# Patient Record
Sex: MALE | Race: WHITE | Employment: OTHER | URBAN - NONMETROPOLITAN AREA
[De-identification: names, ages, dates, MRNs, and addresses within clinical notes are randomized per-mention and may not be internally consistent; named-entity substitution may affect disease eponyms.]

---

## 2018-06-13 ENCOUNTER — OFFICE VISIT (OUTPATIENT)
Dept: CARDIOLOGY | Age: 63
End: 2018-06-13
Payer: COMMERCIAL

## 2018-06-13 VITALS
BODY MASS INDEX: 28.06 KG/M2 | HEART RATE: 69 BPM | HEIGHT: 70 IN | WEIGHT: 196 LBS | DIASTOLIC BLOOD PRESSURE: 84 MMHG | SYSTOLIC BLOOD PRESSURE: 132 MMHG

## 2018-06-13 DIAGNOSIS — I31.8 PERICARDIAL MASS: Primary | ICD-10-CM

## 2018-06-13 PROBLEM — G45.9 TIA (TRANSIENT ISCHEMIC ATTACK): Status: ACTIVE | Noted: 2018-06-13

## 2018-06-13 PROCEDURE — 99203 OFFICE O/P NEW LOW 30 MIN: CPT | Performed by: CLINICAL NURSE SPECIALIST

## 2018-06-13 PROCEDURE — 93000 ELECTROCARDIOGRAM COMPLETE: CPT | Performed by: CLINICAL NURSE SPECIALIST

## 2018-06-13 RX ORDER — MULTIVIT-MIN/IRON/FOLIC ACID/K 18-600-40
2000 CAPSULE ORAL DAILY
COMMUNITY

## 2018-06-20 ENCOUNTER — HOSPITAL ENCOUNTER (OUTPATIENT)
Dept: NON INVASIVE DIAGNOSTICS | Age: 63
Discharge: HOME | End: 2018-06-20
Payer: COMMERCIAL

## 2018-06-20 VITALS — WEIGHT: 193 LBS | BODY MASS INDEX: 27.69 KG/M2

## 2018-06-20 DIAGNOSIS — I31.8 PERICARDIAL MASS: ICD-10-CM

## 2018-06-20 LAB
LV EF: 55 %
LVEF MODALITY: NORMAL

## 2018-06-20 PROCEDURE — 6360000004 HC RX CONTRAST MEDICATION: Performed by: INTERNAL MEDICINE

## 2018-06-20 PROCEDURE — 2580000003 HC RX 258: Performed by: INTERNAL MEDICINE

## 2018-06-20 PROCEDURE — C8929 TTE W OR WO FOL WCON,DOPPLER: HCPCS

## 2018-06-20 RX ORDER — SODIUM CHLORIDE 0.9 % (FLUSH) 0.9 %
10 SYRINGE (ML) INJECTION PRN
Status: ACTIVE | OUTPATIENT
Start: 2018-06-20 | End: 2018-06-21

## 2018-06-20 RX ADMIN — Medication 10 ML: at 14:10

## 2018-06-20 RX ADMIN — PERFLUTREN 2.2 MG: 6.52 INJECTION, SUSPENSION INTRAVENOUS at 14:15

## 2018-06-25 ENCOUNTER — OFFICE VISIT (OUTPATIENT)
Dept: CARDIOLOGY | Age: 63
End: 2018-06-25
Payer: COMMERCIAL

## 2018-06-25 VITALS
SYSTOLIC BLOOD PRESSURE: 128 MMHG | BODY MASS INDEX: 28.06 KG/M2 | DIASTOLIC BLOOD PRESSURE: 78 MMHG | HEART RATE: 68 BPM | WEIGHT: 196 LBS | HEIGHT: 70 IN

## 2018-06-25 DIAGNOSIS — R94.2 ABNORMAL PET SCAN OF LUNG: ICD-10-CM

## 2018-06-25 DIAGNOSIS — I31.8 PERICARDIAL MASS: ICD-10-CM

## 2018-06-25 DIAGNOSIS — R07.9 CHEST PAIN IN ADULT: Primary | ICD-10-CM

## 2018-06-25 PROCEDURE — 99214 OFFICE O/P EST MOD 30 MIN: CPT | Performed by: INTERNAL MEDICINE

## 2018-06-25 NOTE — PATIENT INSTRUCTIONS
to the test.   Nitroglycerin patches must be taken off 1 hour before testing.  Wear comfortable clothing.  Please refrain from any strenuous exercise or activities the day before your test, or the day of your test.   The Nuclear Lexiscan Stress test takes about 2 ½ to 3 hours to complete. If for any reason you are unable to keep this appointment, please contact Outpatient Scheduling, [phone], as soon as possible to reschedule.

## 2018-07-11 NOTE — PROGRESS NOTES
[address], [address] [place] [address], [address] [facility]  The following was transcribed by Radha Mitchell M.T. Marium Link : 1955, 58 y.o. Male        Office Visit:  2018    Chief Complaint   Patient presents with    Follow-up     2-week follow up. No cardiac symptoms today. His doctor at the [facility] seen something on his PET scan and wanted him to see a heart doctor.  Other     He has a history of renal cell cancer and had right nephrectomy in . Reason for visit:   1. Abnormal scan. History of Present Illness:   Mr. Marium Link is seen because of an abnormal scan showing a pericardial mass. This is said to be a PET scan performed because of his history of renal cell carcinoma. The patient is asymptomatic. Patient Active Problem List   Diagnosis Code    TIA (transient ischemic attack) G45.9     Past Medical History:   Diagnosis Date    Cancer ([address]     Chronic kidney disease     TIA (transient ischemic attack)      Past Surgical History:   Procedure Laterality Date    HERNIA REPAIR  2012    KIDNEY REMOVAL Right 2014    SINUS SURGERY        Family History   Problem Relation Age of Onset    Heart Attack Paternal Uncle     Heart Attack Maternal Grandfather 79     Social History   Substance Use Topics    Smoking status: Former Smoker     Types: Cigarettes     Quit date:     Smokeless tobacco: Never Used    Alcohol use No      Allergies   Allergen Reactions    Latex     Clindamycin/Lincomycin     Doxycycline     Levaquin [Levofloxacin In D5w]     Pcn [Penicillins]      Outpatient Prescriptions Marked as Taking for the 18 encounter (Office Visit) with RAUL Butcher MD   Medication Sig Dispense Refill    Cholecalciferol (VITAMIN D) 2000 units CAPS capsule Take 2,000 Units by mouth daily      aspirin 81 MG tablet Take 81 mg by mouth daily       I have reviewed and confirm the Past Medical History, Allergies, and Medications sections above and have updated the computerized patient record. Data:   BP Readings from Last 3 Encounters:   06/25/18 128/78   06/13/18 132/84    Pulse Readings from Last 3 Encounters:   06/25/18 68   06/13/18 69        EKG shows diffuse T-wave abnormalities that are worrisome for ischemia and/or left ventricular hypertrophy. Review of Systems - As per HPI, otherwise negative. Ten organ review performed. Physical Exam:  Vital Signs:  /78   Pulse 68   Ht 5' 10\" (1.778 m)   Wt 196 lb (88.9 kg)   BMI 28.12 kg/m²   Constitutional:  The patient is a pleasant 58 y.o. male in no acute distress. He appears well-developed and well-nourished. HEAD:  Normocephalic without evidence of old or recent trauma. EYES:  Sclerae clear. Conjunctivae pink. EOMs intact. Pupils equal and round. NOSE:  No nasal discharge or epistaxis. MOUTH:  Teeth, gums and palate normal.   THROAT:  No lesions on lips or buccal mucosa. Tongue protrudes in midline and is well papillated. NECK:  There are no carotid bruits. No noted jugular venous distention. No thyromegaly. CHEST:  Clear bilateral breath sounds without wheezes or rhonchi. RESPIRATORY:  The lungs are clear. CARDIOVASCULAR:  The heart's rhythm is regular with a normal rate. No audible murmurs or gallops. ABDOMEN:  The abdomen is soft without tenderness or mass. UPPER EXTREMITY EVALUATION:  Radial pulses palpable bilaterally. LOWER EXTREMITY EVALUATION:  Negative for peripheral edema. Femoral, popliteal, dorsalis pedis, and posterior tibialis pulses 2+ to palpation bilaterally. No cyanosis or clubbing. MUSCULOSKELETAL:  Normal muscle strength and tone. No atrophy or abnormalities. SKIN:  Warm, dry, intact. No dermatitis or ulcers. NEUROLOGIC:  Intact cranial nerves II through XII and no focal weakness. Impression / Plan:   1. Abnormal ECG and pericardial mass.   We will order an

## 2018-07-27 ENCOUNTER — LAB REQUISITION (OUTPATIENT)
Dept: LAB | Facility: HOSPITAL | Age: 63
End: 2018-07-27

## 2018-07-27 DIAGNOSIS — Z00.00 ENCOUNTER FOR GENERAL ADULT MEDICAL EXAMINATION WITHOUT ABNORMAL FINDINGS: ICD-10-CM

## 2018-09-10 LAB
LAB AP CASE REPORT: NORMAL
PATH REPORT.FINAL DX SPEC: NORMAL

## 2018-12-20 ENCOUNTER — TELEPHONE (OUTPATIENT)
Dept: CARDIOLOGY | Age: 63
End: 2018-12-20

## 2018-12-21 ENCOUNTER — TELEPHONE (OUTPATIENT)
Dept: CARDIOLOGY | Age: 63
End: 2018-12-21

## 2019-02-18 ENCOUNTER — OUTSIDE FACILITY SERVICE (OUTPATIENT)
Dept: CARDIOLOGY | Facility: CLINIC | Age: 64
End: 2019-02-18

## 2019-02-18 PROCEDURE — 93010 ELECTROCARDIOGRAM REPORT: CPT | Performed by: INTERNAL MEDICINE

## 2019-07-03 ENCOUNTER — TELEPHONE (OUTPATIENT)
Dept: NEPHROLOGY | Facility: HOSPITAL | Age: 64
End: 2019-07-03

## 2019-07-03 DIAGNOSIS — C64.1 RENAL CELL CARCINOMA OF RIGHT KIDNEY: ICD-10-CM

## 2019-10-15 DIAGNOSIS — C64.9 RENAL CELL CARCINOMA, UNSPECIFIED LATERALITY (HCC): Primary | ICD-10-CM

## 2019-10-15 DIAGNOSIS — N17.9 ACUTE RENAL FAILURE, UNSPECIFIED ACUTE RENAL FAILURE TYPE (HCC): ICD-10-CM

## 2019-10-18 ENCOUNTER — APPOINTMENT (OUTPATIENT)
Dept: LAB | Facility: HOSPITAL | Age: 64
End: 2019-10-18

## 2019-10-18 ENCOUNTER — CONSULT (OUTPATIENT)
Dept: ONCOLOGY | Facility: CLINIC | Age: 64
End: 2019-10-18

## 2019-10-18 ENCOUNTER — APPOINTMENT (OUTPATIENT)
Dept: ULTRASOUND IMAGING | Facility: HOSPITAL | Age: 64
End: 2019-10-18

## 2019-10-18 ENCOUNTER — HOSPITAL ENCOUNTER (INPATIENT)
Facility: HOSPITAL | Age: 64
LOS: 4 days | Discharge: HOME | End: 2019-10-22
Attending: FAMILY MEDICINE | Admitting: INTERNAL MEDICINE

## 2019-10-18 ENCOUNTER — PREP FOR SURGERY (OUTPATIENT)
Dept: OTHER | Facility: HOSPITAL | Age: 64
End: 2019-10-18

## 2019-10-18 VITALS
SYSTOLIC BLOOD PRESSURE: 138 MMHG | RESPIRATION RATE: 16 BRPM | WEIGHT: 181 LBS | HEART RATE: 68 BPM | BODY MASS INDEX: 25.91 KG/M2 | DIASTOLIC BLOOD PRESSURE: 82 MMHG | OXYGEN SATURATION: 98 % | TEMPERATURE: 98.3 F | HEIGHT: 70 IN

## 2019-10-18 DIAGNOSIS — N17.9 ACUTE RENAL FAILURE, UNSPECIFIED ACUTE RENAL FAILURE TYPE (HCC): Primary | ICD-10-CM

## 2019-10-18 DIAGNOSIS — C64.1 CLEAR CELL CARCINOMA OF RIGHT KIDNEY (HCC): ICD-10-CM

## 2019-10-18 DIAGNOSIS — C64.9 RENAL CELL CARCINOMA, UNSPECIFIED LATERALITY (HCC): Primary | ICD-10-CM

## 2019-10-18 PROBLEM — N19 RENAL FAILURE: Status: ACTIVE | Noted: 2019-10-18

## 2019-10-18 PROBLEM — R91.1 SOLITARY PULMONARY NODULE: Status: ACTIVE | Noted: 2018-05-11

## 2019-10-18 PROBLEM — G45.9 TIA (TRANSIENT ISCHEMIC ATTACK): Status: ACTIVE | Noted: 2018-06-13

## 2019-10-18 PROBLEM — Q33.9 LUNG ANOMALY: Status: ACTIVE | Noted: 2018-11-26

## 2019-10-18 PROBLEM — R91.8 LUNG MASS: Status: ACTIVE | Noted: 2019-10-18

## 2019-10-18 LAB
ALBUMIN SERPL-MCNC: 4.2 G/DL (ref 3.5–5.2)
ALBUMIN/GLOB SERPL: 1.4 G/DL
ALP SERPL-CCNC: 59 U/L (ref 39–117)
ALT SERPL W P-5'-P-CCNC: 14 U/L (ref 1–41)
ANION GAP SERPL CALCULATED.3IONS-SCNC: 17 MMOL/L (ref 5–15)
AST SERPL-CCNC: 7 U/L (ref 1–40)
BACTERIA UR QL AUTO: ABNORMAL /HPF
BASOPHILS # BLD AUTO: 0.04 10*3/MM3 (ref 0–0.2)
BASOPHILS NFR BLD AUTO: 0.4 % (ref 0–1.5)
BILIRUB SERPL-MCNC: 0.3 MG/DL (ref 0.2–1.2)
BILIRUB UR QL STRIP: NEGATIVE
BUN BLD-MCNC: 72 MG/DL (ref 8–23)
BUN/CREAT SERPL: 11.2 (ref 7–25)
CALCIUM SPEC-SCNC: 8.5 MG/DL (ref 8.6–10.5)
CHLORIDE SERPL-SCNC: 108 MMOL/L (ref 98–107)
CLARITY UR: CLEAR
CO2 SERPL-SCNC: 18 MMOL/L (ref 22–29)
COLOR UR: YELLOW
CREAT BLD-MCNC: 6.43 MG/DL (ref 0.76–1.27)
CREAT UR-MCNC: 98.3 MG/DL
DEPRECATED RDW RBC AUTO: 46.7 FL (ref 37–54)
EOSINOPHIL # BLD AUTO: 0.35 10*3/MM3 (ref 0–0.4)
EOSINOPHIL NFR BLD AUTO: 3.6 % (ref 0.3–6.2)
ERYTHROCYTE [DISTWIDTH] IN BLOOD BY AUTOMATED COUNT: 14.5 % (ref 12.3–15.4)
GFR SERPL CREATININE-BSD FRML MDRD: 9 ML/MIN/1.73
GFR SERPL CREATININE-BSD FRML MDRD: ABNORMAL ML/MIN/{1.73_M2}
GLOBULIN UR ELPH-MCNC: 3.1 GM/DL
GLUCOSE BLD-MCNC: 110 MG/DL (ref 65–99)
GLUCOSE UR STRIP-MCNC: NEGATIVE MG/DL
HCT VFR BLD AUTO: 36.2 % (ref 37.5–51)
HGB BLD-MCNC: 12.2 G/DL (ref 13–17.7)
HGB UR QL STRIP.AUTO: ABNORMAL
HOLD SPECIMEN: NORMAL
IMM GRANULOCYTES # BLD AUTO: 0.06 10*3/MM3 (ref 0–0.05)
IMM GRANULOCYTES NFR BLD AUTO: 0.6 % (ref 0–0.5)
KETONES UR QL STRIP: NEGATIVE
LEUKOCYTE ESTERASE UR QL STRIP.AUTO: ABNORMAL
LYMPHOCYTES # BLD AUTO: 1.55 10*3/MM3 (ref 0.7–3.1)
LYMPHOCYTES NFR BLD AUTO: 16.1 % (ref 19.6–45.3)
MCH RBC QN AUTO: 29.9 PG (ref 26.6–33)
MCHC RBC AUTO-ENTMCNC: 33.7 G/DL (ref 31.5–35.7)
MCV RBC AUTO: 88.7 FL (ref 79–97)
MONOCYTES # BLD AUTO: 0.88 10*3/MM3 (ref 0.1–0.9)
MONOCYTES NFR BLD AUTO: 9.2 % (ref 5–12)
NEUTROPHILS # BLD AUTO: 6.72 10*3/MM3 (ref 1.7–7)
NEUTROPHILS NFR BLD AUTO: 70.1 % (ref 42.7–76)
NITRITE UR QL STRIP: NEGATIVE
NRBC BLD AUTO-RTO: 0 /100 WBC (ref 0–0.2)
PH UR STRIP.AUTO: 5.5 [PH] (ref 5–8)
PLATELET # BLD AUTO: 362 10*3/MM3 (ref 140–450)
PMV BLD AUTO: 8.8 FL (ref 6–12)
POTASSIUM BLD-SCNC: 4.7 MMOL/L (ref 3.5–5.2)
PROT SERPL-MCNC: 7.3 G/DL (ref 6–8.5)
PROT UR QL STRIP: ABNORMAL
PROT UR-MCNC: 23.7 MG/DL
RBC # BLD AUTO: 4.08 10*6/MM3 (ref 4.14–5.8)
RBC # UR: ABNORMAL /HPF
REF LAB TEST METHOD: ABNORMAL
SODIUM BLD-SCNC: 143 MMOL/L (ref 136–145)
SODIUM UR-SCNC: 76 MMOL/L
SP GR UR STRIP: 1.01 (ref 1–1.03)
SQUAMOUS #/AREA URNS HPF: ABNORMAL /HPF
URATE SERPL-MCNC: 3.9 MG/DL (ref 3.4–7)
UROBILINOGEN UR QL STRIP: ABNORMAL
UUN 24H UR-MCNC: 565 MG/DL
WBC NRBC COR # BLD: 9.6 10*3/MM3 (ref 3.4–10.8)
WBC UR QL AUTO: ABNORMAL /HPF

## 2019-10-18 PROCEDURE — 99205 OFFICE O/P NEW HI 60 MIN: CPT | Performed by: INTERNAL MEDICINE

## 2019-10-18 PROCEDURE — 85025 COMPLETE CBC W/AUTO DIFF WBC: CPT | Performed by: INTERNAL MEDICINE

## 2019-10-18 PROCEDURE — 76775 US EXAM ABDO BACK WALL LIM: CPT

## 2019-10-18 PROCEDURE — 25010000002 METHYLPREDNISOLONE PER 40 MG: Performed by: FAMILY MEDICINE

## 2019-10-18 PROCEDURE — 84300 ASSAY OF URINE SODIUM: CPT | Performed by: NURSE PRACTITIONER

## 2019-10-18 PROCEDURE — 87086 URINE CULTURE/COLONY COUNT: CPT | Performed by: NURSE PRACTITIONER

## 2019-10-18 PROCEDURE — 82570 ASSAY OF URINE CREATININE: CPT | Performed by: NURSE PRACTITIONER

## 2019-10-18 PROCEDURE — 80053 COMPREHEN METABOLIC PANEL: CPT | Performed by: INTERNAL MEDICINE

## 2019-10-18 PROCEDURE — 81001 URINALYSIS AUTO W/SCOPE: CPT | Performed by: NURSE PRACTITIONER

## 2019-10-18 PROCEDURE — 84540 ASSAY OF URINE/UREA-N: CPT | Performed by: NURSE PRACTITIONER

## 2019-10-18 PROCEDURE — 84156 ASSAY OF PROTEIN URINE: CPT | Performed by: NURSE PRACTITIONER

## 2019-10-18 PROCEDURE — 87205 SMEAR GRAM STAIN: CPT | Performed by: INTERNAL MEDICINE

## 2019-10-18 PROCEDURE — 36415 COLL VENOUS BLD VENIPUNCTURE: CPT | Performed by: INTERNAL MEDICINE

## 2019-10-18 PROCEDURE — 84550 ASSAY OF BLOOD/URIC ACID: CPT | Performed by: INTERNAL MEDICINE

## 2019-10-18 RX ORDER — PANTOPRAZOLE SODIUM 40 MG/1
40 TABLET, DELAYED RELEASE ORAL
Status: DISCONTINUED | OUTPATIENT
Start: 2019-10-18 | End: 2019-10-22 | Stop reason: HOSPADM

## 2019-10-18 RX ORDER — GABAPENTIN 100 MG/1
100 CAPSULE ORAL 3 TIMES DAILY
COMMUNITY
Start: 2018-11-27 | End: 2019-10-18

## 2019-10-18 RX ORDER — METHYLPREDNISOLONE SODIUM SUCCINATE 40 MG/ML
20 INJECTION, POWDER, LYOPHILIZED, FOR SOLUTION INTRAMUSCULAR; INTRAVENOUS EVERY 6 HOURS SCHEDULED
Status: DISCONTINUED | OUTPATIENT
Start: 2019-10-18 | End: 2019-10-21

## 2019-10-18 RX ORDER — PREDNISONE 20 MG/1
TABLET ORAL
Refills: 0 | COMMUNITY
Start: 2019-09-06 | End: 2019-10-18

## 2019-10-18 RX ORDER — SODIUM CHLORIDE 9 MG/ML
125 INJECTION, SOLUTION INTRAVENOUS CONTINUOUS
Status: DISCONTINUED | OUTPATIENT
Start: 2019-10-18 | End: 2019-10-19

## 2019-10-18 RX ORDER — TERAZOSIN 1 MG/1
1 CAPSULE ORAL
Refills: 3 | COMMUNITY
Start: 2019-07-27 | End: 2019-10-18

## 2019-10-18 RX ORDER — OMEPRAZOLE 20 MG/1
20 CAPSULE, DELAYED RELEASE ORAL 2 TIMES DAILY
Refills: 2 | COMMUNITY
Start: 2019-08-12 | End: 2020-01-10 | Stop reason: HOSPADM

## 2019-10-18 RX ORDER — ACYCLOVIR 400 MG/1
TABLET ORAL
Refills: 0 | COMMUNITY
Start: 2019-08-12 | End: 2019-10-18

## 2019-10-18 RX ORDER — PREDNISONE 10 MG/1
TABLET ORAL
Refills: 1 | COMMUNITY
Start: 2019-09-23 | End: 2019-10-18

## 2019-10-18 RX ORDER — ONDANSETRON 2 MG/ML
4 INJECTION INTRAMUSCULAR; INTRAVENOUS EVERY 6 HOURS PRN
Status: DISCONTINUED | OUTPATIENT
Start: 2019-10-18 | End: 2019-10-22 | Stop reason: HOSPADM

## 2019-10-18 RX ORDER — TERAZOSIN 2 MG/1
2 CAPSULE ORAL
Refills: 6 | COMMUNITY
Start: 2019-07-24 | End: 2019-10-18

## 2019-10-18 RX ORDER — SODIUM CHLORIDE 0.9 % (FLUSH) 0.9 %
10 SYRINGE (ML) INJECTION EVERY 12 HOURS SCHEDULED
Status: DISCONTINUED | OUTPATIENT
Start: 2019-10-18 | End: 2019-10-22 | Stop reason: HOSPADM

## 2019-10-18 RX ORDER — ALLOPURINOL 100 MG/1
100 TABLET ORAL 2 TIMES DAILY
COMMUNITY
End: 2020-10-28 | Stop reason: ALTCHOICE

## 2019-10-18 RX ORDER — SODIUM CHLORIDE 0.9 % (FLUSH) 0.9 %
10 SYRINGE (ML) INJECTION AS NEEDED
Status: DISCONTINUED | OUTPATIENT
Start: 2019-10-18 | End: 2019-10-22 | Stop reason: HOSPADM

## 2019-10-18 RX ADMIN — PANTOPRAZOLE SODIUM 40 MG: 40 TABLET, DELAYED RELEASE ORAL at 13:23

## 2019-10-18 RX ADMIN — SODIUM CHLORIDE 125 ML/HR: 9 INJECTION, SOLUTION INTRAVENOUS at 13:24

## 2019-10-18 RX ADMIN — SODIUM CHLORIDE, PRESERVATIVE FREE 10 ML: 5 INJECTION INTRAVENOUS at 13:23

## 2019-10-18 RX ADMIN — SODIUM CHLORIDE 125 ML/HR: 9 INJECTION, SOLUTION INTRAVENOUS at 21:50

## 2019-10-18 RX ADMIN — METHYLPREDNISOLONE SODIUM SUCCINATE 20 MG: 40 INJECTION, POWDER, FOR SOLUTION INTRAMUSCULAR; INTRAVENOUS at 17:27

## 2019-10-18 RX ADMIN — METHYLPREDNISOLONE SODIUM SUCCINATE 20 MG: 40 INJECTION, POWDER, FOR SOLUTION INTRAMUSCULAR; INTRAVENOUS at 23:45

## 2019-10-18 RX ADMIN — METHYLPREDNISOLONE SODIUM SUCCINATE 20 MG: 40 INJECTION, POWDER, FOR SOLUTION INTRAMUSCULAR; INTRAVENOUS at 13:23

## 2019-10-18 NOTE — CONSULTS
Nephrology ([facility]) Consult Note      Patient:  Parker Arnold  YOB: 1955  Date of Service: 10/18/2019  MRN: [id number]   Acct: [id number]   Primary Care Physician: Betty Almaraz APRN  Advance Directive:   Code Status and Medical Interventions:   Ordered at: 10/18/19 1120     Code Status:    CPR     Medical Interventions (Level of Support Prior to Arrest):    Full     Admit Date: 10/18/2019       Hospital Day: 0  Referring Provider: Charlie Franco MD      Patient personally seen and examined.  Complete chart including Consults, Notes, Operative Reports, Labs, Cardiology, and Radiology studies reviewed as able.        Subjective:  Parker Arnold is a 64 y.o. male  whom we were consulted for acute kidney injury.  Baseline chronic kidney disease stage 3.  prior right nephrectomy in 2014 due to renal cell carcinoma.  Has previously followed with Marie Shah in our office; last seen in July of this year. Creatinine 2.2 at that time.  Patient has been seeing nephrologist at [facility] since then.  For several months patient has been undergoing chemotherapy for malignant lung cancer from the RCC.  Over the past several months has had multiple episodes of acute kidney injury; due to Sutent as well as Optivo.  He has been on multiple rounds of prednisone.  Last dose of Optivo was five weeks ago.  Review of labs from outside facility shows creatinine has been 2.4-3.3 over the last two months.  Over the last week has developed fatigue, malaise.  Denies any change in urination. Denies n/v/d.  Currently is awake and alert. Denies pain or dyspnea, no edema.  Urine output nonoliguric    Allergies:  Clindamycin/lincomycin; Doxycycline; Hepatitis b virus vaccine; Latex; Levaquin [levofloxacin]; and Penicillins    Home Meds:  Medications Prior to Admission   Medication Sig Dispense Refill Last Dose   • allopurinol (ZYLOPRIM) 100 MG tablet Take 100 mg by mouth Daily.    10/18/2019 at 0630   • omeprazole (priLOSEC) 20 MG capsule Take 20 mg by mouth 2 (Two) Times a Day.  2 10/18/2019 at 0630       Medicines:  Current Facility-Administered Medications   Medication Dose Route Frequency Provider Last Rate Last Dose   • methylPREDNISolone sodium succinate (SOLU-Medrol) injection 20 mg  20 mg Intravenous Q6H Charlie Franco MD       • ondansetron (ZOFRAN) injection 4 mg  4 mg Intravenous Q6H PRN Charlie Franco MD       • pantoprazole (PROTONIX) EC tablet 40 mg  40 mg Oral Q AM Charlie Franco MD       • sodium chloride 0.9 % flush 10 mL  10 mL Intravenous Q12H Charlie Franco MD       • sodium chloride 0.9 % flush 10 mL  10 mL Intravenous PRN Charlie Franco MD       • sodium chloride 0.9 % infusion  125 mL/hr Intravenous Continuous Charlie Franco MD           Past Medical History:  Past Medical History:   Diagnosis Date   • Renal cell carcinoma (CMS/HCC)        Past Surgical History:  Past Surgical History:   Procedure Laterality Date   • HERNIA REPAIR     • LUNG BIOPSY  2018   • NEPHRECTOMY     • SINUS SURGERY         Family History  Family History   Problem Relation Age of Onset   • Other Mother         blood disease unknown   • Cancer Father         unknown   • No Known Problems Sister    • Heart disease Maternal Grandmother    • Heart disease Maternal Grandfather    • No Known Problems Paternal Grandmother    • No Known Problems Paternal Grandfather    • No Known Problems Sister        Social History  Social History     Socioeconomic History   • Marital status:      Spouse name: Not on file   • Number of children: Not on file   • Years of education: Not on file   • Highest education level: Not on file   Tobacco Use   • Smoking status: Former Smoker     Packs/day: 1.00     Years: 15.00     Pack years: 15.00     Types: Cigarettes     Last attempt to quit:      Years since quittin.8   • Smokeless tobacco: Never Used   Substance and Sexual Activity   •  "Alcohol use: No     Frequency: Never   • Drug use: No   • Sexual activity: Defer         Review of Systems:  History obtained from chart review and the patient  General ROS: Patient complains of fatigue and malaise and No fever or chills  Respiratory ROS: No cough, shortness of breath, wheezing  Cardiovascular ROS: No chest pain or palpitations  Gastrointestinal ROS: No abdominal pain or melena  Genito-Urinary ROS: No dysuria or hematuria  Neurological ROS: no headache or dizziness  14 point ROS reviewed with the patient and negative except as noted above and in the HPI unless unable to obtain.    Objective:  Patient Vitals for the past 24 hrs:   BP Temp Temp src Pulse Resp SpO2 Height Weight   10/18/19 0958 146/78 98.1 °F (36.7 °C) Oral 76 16 99 % 177.8 cm (70\") 82 kg (180 lb 12.8 oz)     No intake or output data in the 24 hours ending 10/18/19 1134  General: awake/alert    Neck :supple, no JVD  Chest:  clear to auscultation bilaterally without respiratory distress  CVS: regular rate and rhythm  Abdominal: soft, nontender, positive bowel sounds  Extremities: no cyanosis or edema  Skin: warm and dry without rash   Neuro: no focal motor deficits      Labs:  Results from last 7 days   Lab Units 10/18/19  0739   WBC 10*3/mm3 9.60   HEMOGLOBIN g/dL 12.2*   HEMATOCRIT % 36.2*   PLATELETS 10*3/mm3 362         Results from last 7 days   Lab Units 10/18/19  0739   SODIUM mmol/L 143   POTASSIUM mmol/L 4.7   CHLORIDE mmol/L 108*   CO2 mmol/L 18.0*   BUN mg/dL 72*   CREATININE mg/dL 6.43*   CALCIUM mg/dL 8.5*   BILIRUBIN mg/dL 0.3   ALK PHOS U/L 59   ALT (SGPT) U/L 14   AST (SGOT) U/L 7   GLUCOSE mg/dL 110*       Radiology:   Imaging Results (last 72 hours)     ** No results found for the last 72 hours. **          Culture:         Assessment   1.  Acute kidney injury (?due to Optivo)  2.  Baseline chronic kidney disease stage 3  3.  Prior right nephrectomy due to RCC  4.  Metastatic lung cancer  5.  Metabolic acidosis  6.  " Anemia     Plan:  1.  Workup ongoing. Renal US and urine studies pending  2.  Continue IV fluids  3.  Discussed possibility of dialysis with patient if no improvement in renal function.  4.  Further assessment and plan pending Dr Ward's evaluation of patient      Thank you for the consult, we appreciate the opportunity to provide care to your patients.  Feel free to contact me if I can be of any further assistance.      Leo Dumont, APRN  10/18/2019  11:34 AM

## 2019-10-18 NOTE — PLAN OF CARE
Problem: Patient Care Overview  Goal: Plan of Care Review  Outcome: Ongoing (interventions implemented as appropriate)   10/18/19 0492   Coping/Psychosocial   Plan of Care Reviewed With patient;spouse   Plan of Care Review   Progress no change   OTHER   Outcome Summary Patient was a direct admit today from Dr. PATTERSON's office. No c/o pain. Stirct I&O. Renal US ordered today. NSR on tele. Up ad aurelio. SCD. Voiding. IVF. IV steroids. Will continue to monitor.      Goal: Individualization and Mutuality  Outcome: Ongoing (interventions implemented as appropriate)    Goal: Discharge Needs Assessment  Outcome: Ongoing (interventions implemented as appropriate)    Goal: Interprofessional Rounds/Family Conf  Outcome: Ongoing (interventions implemented as appropriate)      Problem: Oncology Care (Adult)  Goal: Signs and Symptoms of Listed Potential Problems Will be Absent, Minimized or Managed (Oncology Care)  Outcome: Ongoing (interventions implemented as appropriate)      Problem: Kidney Disease, Chronic/End Stage Renal Disease (Adult)  Goal: Signs and Symptoms of Listed Potential Problems Will be Absent, Minimized or Managed (Kidney Disease, Chronic/End Stage Renal Disease)  Outcome: Ongoing (interventions implemented as appropriate)

## 2019-10-18 NOTE — PROGRESS NOTES
[facility]  HEMATOLOGY & ONCOLOGY        Subjective     VISIT DIAGNOSIS: No diagnosis found.    REASON FOR VISIT:     Chief Complaint   Patient presents with   • Renal Cell Cancer     Here for initial consult. Formerly tried Opdivo, caused renal failure. Tried prednisone to counteract the Opdivo but he refused to use.   • Nausea     Pt. states that he feels sick like when his calcium levels are up.        HEMATOLOGY / ONCOLOGY HISTORY:    No history exists.     [No treatment plan]  Cancer Staging Information:  Cancer Staging  No matching staging information was found for the patient.      INTERVAL HISTORY  Patient ID: Parker Arnold is a 64 y.o. year old male with a diagnosis of clear cell renal cancer in 2014. S/p Rh nephrectomy. He was under surveillance until Nov 2018  When he underwent  Wedge resection for a pulm nodule. Pathology c/w metastatic renal cell carcinoma. He was not able to tolerate sutent due to kidney failure, started yervoy/opdivo 5/13/2019 to 8/7/2019, then single agent opdivo 8/28 to 9/9/19. Therapy stopped due to renal failure. He has been on and off prednisone for 2 weeks.  -at today's visit, cr is 6.43,eGFR 7  -he has lost 20lbs, have mid epigastric pain, takes prilosec. He saw nephrologist and was told uric acid is elevated. Allopurinol started. He is complaining of urgency at night, he was started on flomax and passed out, hence that was discontinued.   -denies n/v, sob, cp, dizziness, diarrhea, constipation, rash, arthritis, focal weakness.      Review of Systems     See above otherwise neg    Medications:    Current Outpatient Medications   Medication Sig Dispense Refill   • allopurinol (ZYLOPRIM) 100 MG tablet Take 100 mg by mouth Daily.     • omeprazole (priLOSEC) 20 MG capsule Take 20 mg by mouth 2 (Two) Times a Day.  2     No current facility-administered medications for this visit.        ALLERGIES:  Allergies not on file    Objective      Vitals:    10/18/19  0758   BP: 138/82   Pulse: 68   Resp: 16   Temp: 98.3 °F (36.8 °C)   SpO2: 98%       Current Status 10/18/2019   ECOG score 0       General Appearance: Patient is awake, alert, oriented and in no acute distress. Patient is welldeveloped, wellnourished, and appears stated age.  HEENT: Normocephalic. Sclerae clear, conjunctiva pink, extraocular movements intact, pupils, round, reactive to light and  accommodation. Mouth and throat are clear with moist oral mucosa.  NECK: Supple, no jugular venous distention, thyroid not enlarged.  LYMPH: No cervical, supraclavicular, axillary, or inguinal lymphadenopathy.  CHEST: Equal bilateral expansion, AP  diameter normal, resonant percussion note  LUNGS: Good air movement, no rales, rhonchi, rubs or wheezes with auscultation  CARDIO: Regular sinus rhythm, no murmurs, gallops or rubs.  ABDOMEN: Nondistended, soft, No tenderness, no guarding, no rebound, No hepatosplenomegaly. No abdominal masses. Bowel sounds positive. No hernia  GENITALIA: Not examined.  BREASTS: Not examined.  MUSKEL: No joint swelling, decreased motion, or inflammation  EXTREMS: No edema, clubbing, cyanosis, No varicose veins.  NEURO: Grossly nonfocal, Gait is coordinated and smooth, Cognition is preserved.  SKIN: No rashes, no ecchymoses, no petechia.  PSYCH: Oriented to time, place and person. Memory is preserved. Mood and affect appear normal      RECENT LABS:  Orders Only on 10/15/2019   Component Date Value Ref Range Status   • Glucose 10/18/2019 110* 65 - 99 mg/dL Final   • BUN 10/18/2019 72* 8 - 23 mg/dL Final   • Creatinine 10/18/2019 6.43* 0.76 - 1.27 mg/dL Final   • Sodium 10/18/2019 143  136 - 145 mmol/L Final   • Potassium 10/18/2019 4.7  3.5 - 5.2 mmol/L Final   • Chloride 10/18/2019 108* 98 - 107 mmol/L Final   • CO2 10/18/2019 18.0* 22.0 - 29.0 mmol/L Final   • Calcium 10/18/2019 8.5* 8.6 - 10.5 mg/dL Final   • Total Protein 10/18/2019 7.3  6.0 - 8.5 g/dL Final   • Albumin 10/18/2019 4.20  3.50 -  5.20 g/dL Final   • ALT (SGPT) 10/18/2019 14  1 - 41 U/L Final   • AST (SGOT) 10/18/2019 7  1 - 40 U/L Final   • Alkaline Phosphatase 10/18/2019 59  39 - 117 U/L Final   • Total Bilirubin 10/18/2019 0.3  0.2 - 1.2 mg/dL Final   • eGFR Non African Amer 10/18/2019 9* >60 mL/min/1.73 Final    <15 Indicative of kidney failure.   • eGFR   Amer 10/18/2019    Final    <15 Indicative of kidney failure.   • Globulin 10/18/2019 3.1  gm/dL Final   • A/G Ratio 10/18/2019 1.4  g/dL Final   • BUN/Creatinine Ratio 10/18/2019 11.2  7.0 - 25.0 Final   • Anion Gap 10/18/2019 17.0* 5.0 - 15.0 mmol/L Final       RADIOLOGY:  No results found.         Assessment/Plan 63 yo male with met RCC developed grade 4 renal toxicity from opdivo. Need inpatient management per NCCN guideline    1. Renal failure due to opdivo toxicity: cr of >6. This is grade 3-4 toxicity.  -admit for high dose steroid methylprednisolone 1mg/kg/day. If creatine improved will d/c on oral steroid 60mg/day with slow taper over a month  -check UPEP every 3 days  -Dr Fleming is on call this weekend  -Nephrology consult while in patient.  -Plan of care discussed with Dr Franco and patient and his wife.  -They were given the opportunity to ask questions which I answered to my best ability and they seemed satisfied and willing to proceed with the plan.      Time Spent: 60 minutes; greater than  50% of time was spent in patient counseling and care coordination.     Julio Burleson MD    10/18/2019    8:37 AM

## 2019-10-18 NOTE — H&P
[facility] Medicine Services  HISTORY AND PHYSICAL    Date of Admission: 10/18/2019  Primary Care Physician: Betty Almaraz APRN    Subjective     Chief Complaint: Acute renal failure.  Renal cancer.    History of Present Illness  Patient is a 64-year  male direct admit from Dr. PATTERSON's office for acute renal failure secondary to immunoglobulin treatment.  Patient has nephrectomy of the right kidney in 2014.  Patient has been undergoing immunotherapy secondary to metastatic lung cancer from renal cell carcinoma.  Patient has renal failure for last 3 months.  Treatment immunoglobulins from 5/13/2019 to 8/7/2019.  Patient states he still producing urine.    Patient also have a history of benign prostate hypertrophy.    Review of Systems   Constitutional: Positive for activity change and appetite change. Negative for chills and fever.   HENT: Negative for hearing loss, nosebleeds, tinnitus and trouble swallowing.    Eyes: Negative for visual disturbance.   Respiratory: Negative for cough, chest tightness, shortness of breath and wheezing.    Cardiovascular: Negative for chest pain, palpitations and leg swelling.   Gastrointestinal: Negative for abdominal distention, abdominal pain, blood in stool, constipation, diarrhea, nausea and vomiting.   Endocrine: Negative for cold intolerance, heat intolerance, polydipsia, polyphagia and polyuria.   Genitourinary: Negative for decreased urine volume, difficulty urinating, dysuria, flank pain, frequency and hematuria.   Musculoskeletal: Negative for arthralgias, joint swelling and myalgias.   Skin: Negative for rash.   Allergic/Immunologic: Negative for immunocompromised state.   Neurological: Negative for dizziness, syncope, weakness, light-headedness and headaches.   Hematological: Negative for adenopathy. Does not bruise/bleed easily.   Psychiatric/Behavioral: Negative for confusion and sleep disturbance. The patient is not  nervous/anxious.         Otherwise complete ROS reviewed and negative except as mentioned in the HPI.      Past Medical History:   Past Medical History:   Diagnosis Date   • Renal cell carcinoma (CMS/HCC)        Past Surgical History:  Past Surgical History:   Procedure Laterality Date   • HERNIA REPAIR  2008   • LUNG BIOPSY  11/2018   • NEPHRECTOMY  2014   • SINUS SURGERY  2005       Family History: family history includes Cancer in his father; Heart disease in his maternal grandfather and maternal grandmother; No Known Problems in his paternal grandfather, paternal grandmother, sister, and sister; Other in his mother.    Social History:  reports that he quit smoking about 35 years ago. His smoking use included cigarettes. He has a 15.00 pack-year smoking history. He has never used smokeless tobacco. He reports that he does not drink alcohol or use drugs.    Medications:  Prior to Admission medications    Medication Sig Start Date End Date Taking? Authorizing Provider   allopurinol (ZYLOPRIM) 100 MG tablet Take 100 mg by mouth Daily.    Brian Lopes MD   omeprazole (priLOSEC) 20 MG capsule Take 20 mg by mouth 2 (Two) Times a Day. 8/12/19   Brian Lopes MD   acyclovir (ZOVIRAX) 400 MG tablet TAKE 1 TABLET(S) EVERY 6 HOURS BY ORAL ROUTE FOR 14 DAYS. 8/12/19 10/18/19  Brian Lopes MD   aspirin 81 MG tablet Take 81 mg by mouth.  10/18/19  Brian Lopes MD   gabapentin (NEURONTIN) 100 MG capsule Take 100 mg by mouth 3 (Three) Times a Day. 11/27/18 10/18/19  Brian Lopes MD   predniSONE (DELTASONE) 10 MG tablet TAKE 1 TABLET BY MOUTH EVERY DAY FOR 7 DAYS 9/23/19 10/18/19  Brian Lopes MD   predniSONE (DELTASONE) 20 MG tablet TAKE 2 TABLET(S) EVERY DAY BY MOUTH FOR 7 DAYS. 9/6/19 10/18/19  Brian Lopes MD   terazosin (HYTRIN) 1 MG capsule Take 1 mg by mouth every night at bedtime. 7/27/19 10/18/19  Brian Lopes MD   terazosin (HYTRIN) 2 MG capsule Take  "2 mg by mouth every night at bedtime. 7/24/19 10/18/19  Provider, Brian, MD     Allergies:  Allergies not on file    Objective     Vital Signs: /78 (BP Location: Right arm, Patient Position: Sitting)   Pulse 76   Temp 98.1 °F (36.7 °C) (Oral)   Resp 16   Ht 177.8 cm (70\")   Wt 82 kg (180 lb 12.8 oz)   SpO2 99%   BMI 25.94 kg/m²   Physical Exam   Constitutional: He is oriented to person, place, and time. He appears well-developed and well-nourished.   HENT:   Head: Normocephalic and atraumatic.   Eyes: Conjunctivae and EOM are normal. Pupils are equal, round, and reactive to light.   Neck: Neck supple. No JVD present. No thyromegaly present.   Cardiovascular: Normal rate, regular rhythm, normal heart sounds and intact distal pulses. Exam reveals no gallop and no friction rub.   No murmur heard.  Pulmonary/Chest: Effort normal and breath sounds normal. No respiratory distress. He has no wheezes. He has no rales. He exhibits no tenderness.   Abdominal: Soft. Bowel sounds are normal. He exhibits no distension. There is no tenderness. There is no rebound and no guarding.   Musculoskeletal: Normal range of motion. He exhibits no edema, tenderness or deformity.   Lymphadenopathy:     He has no cervical adenopathy.   Neurological: He is alert and oriented to person, place, and time. He displays normal reflexes. No cranial nerve deficit. He exhibits normal muscle tone.   Skin: Skin is warm and dry. No rash noted.   Psychiatric: He has a normal mood and affect. His behavior is normal. Judgment and thought content normal.   Nursing note and vitals reviewed.      Results Reviewed:    Lab Results (last 24 hours)     ** No results found for the last 24 hours. **           Radiology Data:    Imaging Results (last 24 hours)     ** No results found for the last 24 hours. **          I have personally reviewed and interpreted the radiology studies and ECG obtained at time of admission.     Assessment / Plan    "   Assessment & Plan  Active Hospital Problems    Diagnosis   • Clear cell carcinoma of kidney (CMS/HCC)     Overview:   Diagnoses 11/11/14. Path report in [facility] at [facility]. Tissue has been requested.      • Renal failure   • Lung mass     Plans    Renal failure.  Renal failure for last 3 months.  Probably secondary to immunoglobulin.  Consult nephrology.  IV hydration.  Creatinine greater than 6.  Ultrasound of the left kidneys.    Renal cell carcinoma.  Status post right kidney resection 2014.  Start methylprednisolone 1 mg/kg/day.  Consult oncology. Yervoy/opdivo 5/13/2019 to 8/7/2019.    Lung mass.  Pathology shows metastatic renal cell carcinoma in the lungs.    Reflux.  Protonix.  Zofran as needed.    Nutrition.  Renal diet.    Code Status: full code     I discussed the patient's findings and my recommendations with: patient    Estimated length of stay: 2-5 days.    Charlie Franco MD   10/18/19   11:11 AM

## 2019-10-19 LAB
ALBUMIN SERPL-MCNC: 3.7 G/DL (ref 3.5–5.2)
ALBUMIN/GLOB SERPL: 1.4 G/DL
ALP SERPL-CCNC: 54 U/L (ref 39–117)
ALT SERPL W P-5'-P-CCNC: 11 U/L (ref 1–41)
ANION GAP SERPL CALCULATED.3IONS-SCNC: 14 MMOL/L (ref 5–15)
AST SERPL-CCNC: 7 U/L (ref 1–40)
BACTERIA SPEC AEROBE CULT: NO GROWTH
BASOPHILS # BLD AUTO: 0.01 10*3/MM3 (ref 0–0.2)
BASOPHILS NFR BLD AUTO: 0.1 % (ref 0–1.5)
BILIRUB SERPL-MCNC: 0.2 MG/DL (ref 0.2–1.2)
BUN BLD-MCNC: 63 MG/DL (ref 8–23)
BUN/CREAT SERPL: 11.1 (ref 7–25)
CALCIUM SPEC-SCNC: 8 MG/DL (ref 8.6–10.5)
CHLORIDE SERPL-SCNC: 110 MMOL/L (ref 98–107)
CK SERPL-CCNC: 37 U/L (ref 20–200)
CO2 SERPL-SCNC: 15 MMOL/L (ref 22–29)
CREAT BLD-MCNC: 5.66 MG/DL (ref 0.76–1.27)
DEPRECATED RDW RBC AUTO: 46.8 FL (ref 37–54)
EOSINOPHIL # BLD AUTO: 0 10*3/MM3 (ref 0–0.4)
EOSINOPHIL NFR BLD AUTO: 0 % (ref 0.3–6.2)
EOSINOPHIL SPEC QL MICRO: 2 % EOS/100 CELLS (ref 0–0)
ERYTHROCYTE [DISTWIDTH] IN BLOOD BY AUTOMATED COUNT: 14.2 % (ref 12.3–15.4)
GFR SERPL CREATININE-BSD FRML MDRD: 10 ML/MIN/1.73
GFR SERPL CREATININE-BSD FRML MDRD: ABNORMAL ML/MIN/{1.73_M2}
GLOBULIN UR ELPH-MCNC: 2.7 GM/DL
GLUCOSE BLD-MCNC: 176 MG/DL (ref 65–99)
HCT VFR BLD AUTO: 33.9 % (ref 37.5–51)
HGB BLD-MCNC: 11.2 G/DL (ref 13–17.7)
IMM GRANULOCYTES # BLD AUTO: 0.08 10*3/MM3 (ref 0–0.05)
IMM GRANULOCYTES NFR BLD AUTO: 0.8 % (ref 0–0.5)
LYMPHOCYTES # BLD AUTO: 0.96 10*3/MM3 (ref 0.7–3.1)
LYMPHOCYTES NFR BLD AUTO: 9.1 % (ref 19.6–45.3)
MCH RBC QN AUTO: 30 PG (ref 26.6–33)
MCHC RBC AUTO-ENTMCNC: 33 G/DL (ref 31.5–35.7)
MCV RBC AUTO: 90.9 FL (ref 79–97)
MONOCYTES # BLD AUTO: 0.13 10*3/MM3 (ref 0.1–0.9)
MONOCYTES NFR BLD AUTO: 1.2 % (ref 5–12)
NEUTROPHILS # BLD AUTO: 9.39 10*3/MM3 (ref 1.7–7)
NEUTROPHILS NFR BLD AUTO: 88.8 % (ref 42.7–76)
NRBC BLD AUTO-RTO: 0 /100 WBC (ref 0–0.2)
PLATELET # BLD AUTO: 320 10*3/MM3 (ref 140–450)
PMV BLD AUTO: 9.2 FL (ref 6–12)
POTASSIUM BLD-SCNC: 5.8 MMOL/L (ref 3.5–5.2)
PROT SERPL-MCNC: 6.4 G/DL (ref 6–8.5)
RBC # BLD AUTO: 3.73 10*6/MM3 (ref 4.14–5.8)
SODIUM BLD-SCNC: 139 MMOL/L (ref 136–145)
TSH SERPL DL<=0.05 MIU/L-ACNC: 0.37 UIU/ML (ref 0.27–4.2)
URATE SERPL-MCNC: 3.5 MG/DL (ref 3.4–7)
WBC NRBC COR # BLD: 10.57 10*3/MM3 (ref 3.4–10.8)

## 2019-10-19 PROCEDURE — 94799 UNLISTED PULMONARY SVC/PX: CPT

## 2019-10-19 PROCEDURE — 80053 COMPREHEN METABOLIC PANEL: CPT | Performed by: FAMILY MEDICINE

## 2019-10-19 PROCEDURE — 85025 COMPLETE CBC W/AUTO DIFF WBC: CPT | Performed by: FAMILY MEDICINE

## 2019-10-19 PROCEDURE — 84443 ASSAY THYROID STIM HORMONE: CPT | Performed by: FAMILY MEDICINE

## 2019-10-19 PROCEDURE — 25010000002 METHYLPREDNISOLONE PER 40 MG: Performed by: FAMILY MEDICINE

## 2019-10-19 PROCEDURE — 94760 N-INVAS EAR/PLS OXIMETRY 1: CPT

## 2019-10-19 PROCEDURE — 84550 ASSAY OF BLOOD/URIC ACID: CPT | Performed by: NURSE PRACTITIONER

## 2019-10-19 PROCEDURE — 82550 ASSAY OF CK (CPK): CPT | Performed by: INTERNAL MEDICINE

## 2019-10-19 PROCEDURE — 99232 SBSQ HOSP IP/OBS MODERATE 35: CPT | Performed by: INTERNAL MEDICINE

## 2019-10-19 RX ORDER — SODIUM POLYSTYRENE SULFONATE 15 G/60ML
15 SUSPENSION ORAL; RECTAL ONCE
Status: COMPLETED | OUTPATIENT
Start: 2019-10-19 | End: 2019-10-19

## 2019-10-19 RX ORDER — CALCIUM CARBONATE 200(500)MG
1 TABLET,CHEWABLE ORAL 3 TIMES DAILY PRN
Status: DISCONTINUED | OUTPATIENT
Start: 2019-10-19 | End: 2019-10-22 | Stop reason: HOSPADM

## 2019-10-19 RX ADMIN — METHYLPREDNISOLONE SODIUM SUCCINATE 20 MG: 40 INJECTION, POWDER, FOR SOLUTION INTRAMUSCULAR; INTRAVENOUS at 06:26

## 2019-10-19 RX ADMIN — SODIUM CHLORIDE 125 ML/HR: 9 INJECTION, SOLUTION INTRAVENOUS at 06:26

## 2019-10-19 RX ADMIN — SODIUM BICARBONATE 150 MEQ: 84 INJECTION, SOLUTION INTRAVENOUS at 14:37

## 2019-10-19 RX ADMIN — CALCIUM CARBONATE 1 TABLET: 500 TABLET, CHEWABLE ORAL at 02:06

## 2019-10-19 RX ADMIN — METHYLPREDNISOLONE SODIUM SUCCINATE 20 MG: 40 INJECTION, POWDER, FOR SOLUTION INTRAMUSCULAR; INTRAVENOUS at 23:59

## 2019-10-19 RX ADMIN — PANTOPRAZOLE SODIUM 40 MG: 40 TABLET, DELAYED RELEASE ORAL at 06:26

## 2019-10-19 RX ADMIN — METHYLPREDNISOLONE SODIUM SUCCINATE 20 MG: 40 INJECTION, POWDER, FOR SOLUTION INTRAMUSCULAR; INTRAVENOUS at 18:00

## 2019-10-19 RX ADMIN — SODIUM BICARBONATE 150 MEQ: 84 INJECTION, SOLUTION INTRAVENOUS at 22:41

## 2019-10-19 RX ADMIN — SODIUM POLYSTYRENE SULFONATE 15 G: 15 SUSPENSION ORAL; RECTAL at 13:07

## 2019-10-19 RX ADMIN — METHYLPREDNISOLONE SODIUM SUCCINATE 20 MG: 40 INJECTION, POWDER, FOR SOLUTION INTRAMUSCULAR; INTRAVENOUS at 13:07

## 2019-10-19 NOTE — PLAN OF CARE
Problem: Patient Care Overview  Goal: Plan of Care Review  Outcome: Ongoing (interventions implemented as appropriate)   10/19/19 8481   Coping/Psychosocial   Plan of Care Reviewed With patient   Plan of Care Review   Progress no change   OTHER   Outcome Summary A&Ox4 Denies any pain, Did have c/o acid reflux, received Tums for relief. Up ad aurelio. Voiding well. iv fluids. SCD's on. Strict I&O's. Tele :NSR. Continue to monitor.      Goal: Individualization and Mutuality  Outcome: Ongoing (interventions implemented as appropriate)      Problem: Oncology Care (Adult)  Goal: Signs and Symptoms of Listed Potential Problems Will be Absent, Minimized or Managed (Oncology Care)  Outcome: Ongoing (interventions implemented as appropriate)      Problem: Kidney Disease, Chronic/End Stage Renal Disease (Adult)  Goal: Signs and Symptoms of Listed Potential Problems Will be Absent, Minimized or Managed (Kidney Disease, Chronic/End Stage Renal Disease)  Outcome: Ongoing (interventions implemented as appropriate)

## 2019-10-19 NOTE — PROGRESS NOTES
PROGRESS NOTE.      Patient:  Parker Arnold  YOB: 1955  Date of Service: 10/19/2019  MRN: [id number]   Acct: [id number]   Primary Care Physician: Betty Almaraz APRN  Advance Directive:   Code Status and Medical Interventions:   Ordered at: 10/18/19 1120     Code Status:    CPR     Medical Interventions (Level of Support Prior to Arrest):    Full     Admit Date: 10/18/2019       Hospital Day: 1  Referring Provider: Charlie Franco MD      Patient Seen, Chart, Consults, Notes, Labs, Radiology studies reviewed.        Subjective:  Parker Arnold is a 64 y.o. male  whom we were consulted for acute kidney injury.  Baseline chronic kidney disease stage 3 (s/pr right nephrectomy in 2014 due to renal cell carcinoma).  Has previously seen Marie Shah in our office; last seen in July of this year. Creatinine 2.2 at that time.  Patient has been seeing nephrologist at [facility] since then.  For several months patient has been undergoing chemotherapy for malignant lung cancer from the RCC.  Over the past several months has had multiple episodes of acute kidney injury; due to Sutent as well as Optivo.  He has been on multiple rounds of prednisone.  Last dose of Optivo was five weeks ago.  Review of labs from outside facility shows creatinine has been 2.4-3.3 over the last two months.  Over the last week has developed fatigue, malaise.  Denies any change in urination. Denies n/v/d. Patient was admitted for AMAURI (creatinine was 6.43). He denied NSAIDs abuse. Patient is now managed with steroids and IV fluids. He noticed improving urine output.      Review of Systems:  History obtained from chart review and the patient  General ROS: No fever or chills  Respiratory ROS: No cough, shortness of breath, wheezing  Cardiovascular ROS: no chest pain or dyspnea on exertion  Gastrointestinal ROS: No abdominal pain or melena  Genito-Urinary ROS: No dysuria or hematuria  Musculoskeletal: negative  Skin:  "negative    Objective:  /67 (BP Location: Left arm, Patient Position: Lying)   Pulse 78   Temp 97.8 °F (36.6 °C) (Oral)   Resp 14   Ht 177.8 cm (70\")   Wt 82 kg (180 lb 12.8 oz)   SpO2 99%   BMI 25.94 kg/m²     Intake/Output Summary (Last 24 hours) at 10/19/2019 1319  Last data filed at 10/19/2019 1150  Gross per 24 hour   Intake 2915 ml   Output 1875 ml   Net 1040 ml       Physical examination:  General: awake/alert   Chest:  clear to auscultation bilaterally without respiratory distress  CVS: regular rate and rhythm  Abdominal: soft, nontender, normal bowel sounds  Extremities: no cyanosis or edema  Skin: warm and dry without rash  Neuro: No focal motor deficits    Labs:  Lab Results (last 24 hours)     Procedure Component Value Units Date/Time    Comprehensive Metabolic Panel [[id number]]  (Abnormal) Collected:  10/19/19 0741    Specimen:  Blood Updated:  10/19/19 0815     Glucose 176 mg/dL      BUN 63 mg/dL      Creatinine 5.66 mg/dL      Sodium 139 mmol/L      Potassium 5.8 mmol/L      Chloride 110 mmol/L      CO2 15.0 mmol/L      Calcium 8.0 mg/dL      Total Protein 6.4 g/dL      Albumin 3.70 g/dL      ALT (SGPT) 11 U/L      AST (SGOT) 7 U/L      Alkaline Phosphatase 54 U/L      Total Bilirubin 0.2 mg/dL      eGFR Non African Amer 10 mL/min/1.73      Comment: <15 Indicative of kidney failure.        eGFR   Amer --     Comment: <15 Indicative of kidney failure.        Globulin 2.7 gm/dL      A/G Ratio 1.4 g/dL      BUN/Creatinine Ratio 11.1     Anion Gap 14.0 mmol/L     Narrative:       GFR Normal >60  Chronic Kidney Disease <60  Kidney Failure <15    Uric Acid [[id number]]  (Normal) Collected:  10/19/19 0627    Specimen:  Blood Updated:  10/19/19 0725     Uric Acid 3.5 mg/dL     CK [[id number]]  (Normal) Collected:  10/19/19 0627    Specimen:  Blood Updated:  10/19/19 0725     Creatine Kinase 37 U/L     TSH [[id number]]  (Normal) Collected:  10/19/19 0627    Specimen:  Blood Updated:  10/19/19 " 0725     TSH 0.367 uIU/mL     CBC Auto Differential [[id number]]  (Abnormal) Collected:  10/19/19 0627    Specimen:  Blood Updated:  10/19/19 0701     WBC 10.57 10*3/mm3      RBC 3.73 10*6/mm3      Hemoglobin 11.2 g/dL      Hematocrit 33.9 %      MCV 90.9 fL      MCH 30.0 pg      MCHC 33.0 g/dL      RDW 14.2 %      RDW-SD 46.8 fl      MPV 9.2 fL      Platelets 320 10*3/mm3      Neutrophil % 88.8 %      Lymphocyte % 9.1 %      Monocyte % 1.2 %      Eosinophil % 0.0 %      Basophil % 0.1 %      Immature Grans % 0.8 %      Neutrophils, Absolute 9.39 10*3/mm3      Lymphocytes, Absolute 0.96 10*3/mm3      Monocytes, Absolute 0.13 10*3/mm3      Eosinophils, Absolute 0.00 10*3/mm3      Basophils, Absolute 0.01 10*3/mm3      Immature Grans, Absolute 0.08 10*3/mm3      nRBC 0.0 /100 WBC     Eosinophil Smear - Urine, Urine, Clean Catch [[id number]]  (Abnormal) Collected:  10/18/19 1555    Specimen:  Urine, Clean Catch Updated:  10/19/19 0245     Eosinophil Smear 2 % EOS/100 Cells     Urea Nitrogen, Urine - Urine, Clean Catch [[id number]] Collected:  10/18/19 1138    Specimen:  Urine, Clean Catch Updated:  10/18/19 2048     Urea Nitrogen, Urine 565 mg/dL     Narrative:       Reference intervals for random urine have not been established.  Clinical usage is dependent upon physician's interpretation in combination with other laboratory tests.     Creatinine, Urine, Random - Urine, Clean Catch [[id number]] Collected:  10/18/19 1138    Specimen:  Urine, Clean Catch Updated:  10/18/19 2028     Creatinine, Urine 98.3 mg/dL     Narrative:       Reference intervals for random urine have not been established.  Clinical usage is dependent upon physician's interpretation in combination with other laboratory tests.     Sodium, Urine, Random - Urine, Clean Catch [[id number]] Collected:  10/18/19 1138    Specimen:  Urine, Clean Catch Updated:  10/18/19 1324     Sodium, Urine 76 mmol/L     Narrative:       Reference intervals for random urine  have not been established.  Clinical usage is dependent upon physician's interpretation in combination with other laboratory tests.     Protein, Urine, Random - Urine, Clean Catch [[id number]] Collected:  10/18/19 1138    Specimen:  Urine, Clean Catch Updated:  10/18/19 1324     Total Protein, Urine 23.7 mg/dL     Narrative:       Reference intervals for random urine have not been established.  Clinical usage is dependent upon physician's interpretation in combination with other laboratory tests.           Radiology:   Imaging Results (last 24 hours)     Procedure Component Value Units Date/Time     Renal Limited [[id number]] Collected:  10/18/19 1812     Updated:  10/18/19 1817    Narrative:       Exam: Renal ultrasound     Indication: renal failure     Comparison: None     Finding:     Right kidney is surgically absent.  The left kidney measures 12.5 cm in length.  Left kidney echogenicity and cortical thickness are normal.  No shadowing calculi or hydronephrosis.  No focal bladder abnormality.     Incidentally noted:  There is gallbladder wall thickening and the  gallbladder lumen contains layering sludge. Common bile duct is  nondilated measuring 4 mm diameter.       Impression:          Negative for hydronephrosis, renal atrophy, or shadowing calculi.     Incidentally noted, gallbladder wall thickening and layering sludge.  Recommend clinical correlation to exclude cholecystitis.  This report was finalized on 10/18/2019 18:14 by Dr. Akhil Collins MD.              Assessment     1.  Acute kidney injury (ATN vs AIN- immunotherapy-induced nephrotoxicity)  2.  Baseline chronic kidney disease stage 3  3.  Prior right nephrectomy due to RCC  4.  Metastatic lung disease  5.  Metabolic acidosis  6.  Anemia  7.  Hyperkalemia    Plan:  We will switch to bicarbonate based IV fluids, continue intravenous steroids.  Agree with Kayexalate.  Follow-up labs.  We will hold on dialysis.      Jaswinder Ward,  MD  10/19/2019  1:19 PM

## 2019-10-19 NOTE — PROGRESS NOTES
[facility] ER Medicine Services  INPATIENT PROGRESS NOTE    Length of Stay: 1  Date of Admission: 10/18/2019  Primary Care Physician: Betty Almaraz APRN    Subjective   Chief Complaint: Acute renal failure/renal cancer with mets to the lung.    HPI   Patient has good urine output.  Patient denies any chest pain or shortness of breath.  Patient denies any abdomen pain.    Review of Systems   Constitutional: Positive for activity change and appetite change. Negative for chills and fever.   HENT: Negative for hearing loss, nosebleeds, tinnitus and trouble swallowing.    Eyes: Negative for visual disturbance.   Respiratory: Negative for cough, chest tightness, shortness of breath and wheezing.    Cardiovascular: Negative for chest pain, palpitations and leg swelling.   Gastrointestinal: Negative for abdominal distention, abdominal pain, blood in stool, constipation, diarrhea, nausea and vomiting.   Endocrine: Negative for cold intolerance, heat intolerance, polydipsia, polyphagia and polyuria.   Genitourinary: Negative for decreased urine volume, difficulty urinating, dysuria, flank pain, frequency and hematuria.   Musculoskeletal: Negative for arthralgias, joint swelling and myalgias.   Skin: Negative for rash.   Allergic/Immunologic: Negative for immunocompromised state.   Neurological: Negative for dizziness, syncope, weakness, light-headedness and headaches.   Hematological: Negative for adenopathy. Does not bruise/bleed easily.   Psychiatric/Behavioral: Negative for confusion and sleep disturbance. The patient is not nervous/anxious.     All pertinent negatives and positives are as above. All other systems have been reviewed and are negative unless otherwise stated.     Objective    Temp:  [97.5 °F (36.4 °C)-98.5 °F (36.9 °C)] 97.6 °F (36.4 °C)  Heart Rate:  [74-80] 74  Resp:  [16-18] 16  BP: (116-158)/(60-77) 158/77    Intake/Output Summary (Last 24 hours) at 10/19/2019  1025  Last data filed at 10/19/2019 0918  Gross per 24 hour   Intake 2915 ml   Output 1775 ml   Net 1140 ml     Physical Exam  Constitutional: He is oriented to person, place, and time. He appears well-developed and well-nourished.   HENT:   Head: Normocephalic and atraumatic.   Eyes: Conjunctivae and EOM are normal. Pupils are equal, round, and reactive to light.   Neck: Neck supple. No JVD present. No thyromegaly present.   Cardiovascular: Normal rate, regular rhythm, normal heart sounds and intact distal pulses. Exam reveals no gallop and no friction rub.   No murmur heard.  Pulmonary/Chest: Effort normal and breath sounds normal. No respiratory distress. He has no wheezes. He has no rales. He exhibits no tenderness.   Abdominal: Soft. Bowel sounds are normal. He exhibits no distension. There is no tenderness. There is no rebound and no guarding.   Musculoskeletal: Normal range of motion. He exhibits no edema, tenderness or deformity.   Lymphadenopathy:     He has no cervical adenopathy.   Neurological: He is alert and oriented to person, place, and time. He displays normal reflexes. No cranial nerve deficit. He exhibits normal muscle tone.   Skin: Skin is warm and dry. No rash noted.   Psychiatric: He has a normal mood and affect. His behavior is normal. Judgment and thought content normal.   Nursing note and vitals reviewed.  Results Review:  Lab Results (last 24 hours)     Procedure Component Value Units Date/Time    Comprehensive Metabolic Panel [[id number]]  (Abnormal) Collected:  10/19/19 0741    Specimen:  Blood Updated:  10/19/19 0815     Glucose 176 mg/dL      BUN 63 mg/dL      Creatinine 5.66 mg/dL      Sodium 139 mmol/L      Potassium 5.8 mmol/L      Chloride 110 mmol/L      CO2 15.0 mmol/L      Calcium 8.0 mg/dL      Total Protein 6.4 g/dL      Albumin 3.70 g/dL      ALT (SGPT) 11 U/L      AST (SGOT) 7 U/L      Alkaline Phosphatase 54 U/L      Total Bilirubin 0.2 mg/dL      eGFR Non African Amer 10  mL/min/1.73      Comment: <15 Indicative of kidney failure.        eGFR   Amer --     Comment: <15 Indicative of kidney failure.        Globulin 2.7 gm/dL      A/G Ratio 1.4 g/dL      BUN/Creatinine Ratio 11.1     Anion Gap 14.0 mmol/L     Narrative:       GFR Normal >60  Chronic Kidney Disease <60  Kidney Failure <15    Uric Acid [[id number]]  (Normal) Collected:  10/19/19 0627    Specimen:  Blood Updated:  10/19/19 0725     Uric Acid 3.5 mg/dL     CK [[id number]]  (Normal) Collected:  10/19/19 0627    Specimen:  Blood Updated:  10/19/19 0725     Creatine Kinase 37 U/L     TSH [[id number]]  (Normal) Collected:  10/19/19 0627    Specimen:  Blood Updated:  10/19/19 0725     TSH 0.367 uIU/mL     CBC Auto Differential [[id number]]  (Abnormal) Collected:  10/19/19 0627    Specimen:  Blood Updated:  10/19/19 0701     WBC 10.57 10*3/mm3      RBC 3.73 10*6/mm3      Hemoglobin 11.2 g/dL      Hematocrit 33.9 %      MCV 90.9 fL      MCH 30.0 pg      MCHC 33.0 g/dL      RDW 14.2 %      RDW-SD 46.8 fl      MPV 9.2 fL      Platelets 320 10*3/mm3      Neutrophil % 88.8 %      Lymphocyte % 9.1 %      Monocyte % 1.2 %      Eosinophil % 0.0 %      Basophil % 0.1 %      Immature Grans % 0.8 %      Neutrophils, Absolute 9.39 10*3/mm3      Lymphocytes, Absolute 0.96 10*3/mm3      Monocytes, Absolute 0.13 10*3/mm3      Eosinophils, Absolute 0.00 10*3/mm3      Basophils, Absolute 0.01 10*3/mm3      Immature Grans, Absolute 0.08 10*3/mm3      nRBC 0.0 /100 WBC     Eosinophil Smear - Urine, Urine, Clean Catch [[id number]]  (Abnormal) Collected:  10/18/19 1555    Specimen:  Urine, Clean Catch Updated:  10/19/19 0245     Eosinophil Smear 2 % EOS/100 Cells     Urea Nitrogen, Urine - Urine, Clean Catch [[id number]] Collected:  10/18/19 1138    Specimen:  Urine, Clean Catch Updated:  10/18/19 2048     Urea Nitrogen, Urine 565 mg/dL     Narrative:       Reference intervals for random urine have not been established.  Clinical usage is dependent  upon physician's interpretation in combination with other laboratory tests.     Creatinine, Urine, Random - Urine, Clean Catch [[id number]] Collected:  10/18/19 1138    Specimen:  Urine, Clean Catch Updated:  10/18/19 2028     Creatinine, Urine 98.3 mg/dL     Narrative:       Reference intervals for random urine have not been established.  Clinical usage is dependent upon physician's interpretation in combination with other laboratory tests.     Sodium, Urine, Random - Urine, Clean Catch [[id number]] Collected:  10/18/19 1138    Specimen:  Urine, Clean Catch Updated:  10/18/19 1324     Sodium, Urine 76 mmol/L     Narrative:       Reference intervals for random urine have not been established.  Clinical usage is dependent upon physician's interpretation in combination with other laboratory tests.     Protein, Urine, Random - Urine, Clean Catch [[id number]] Collected:  10/18/19 1138    Specimen:  Urine, Clean Catch Updated:  10/18/19 1324     Total Protein, Urine 23.7 mg/dL     Narrative:       Reference intervals for random urine have not been established.  Clinical usage is dependent upon physician's interpretation in combination with other laboratory tests.     Urinalysis, Microscopic Only - Urine, Clean Catch [[id number]]  (Abnormal) Collected:  10/18/19 1138    Specimen:  Urine, Clean Catch Updated:  10/18/19 1203     RBC, UA 0-2 /HPF      WBC, UA Too Numerous to Count /HPF      Bacteria, UA None Seen /HPF      Squamous Epithelial Cells, UA None Seen /HPF      Methodology Manual Light Microscopy    Urine Culture - Urine, Urine, Clean Catch [[id number]] Collected:  10/18/19 1138    Specimen:  Urine, Clean Catch Updated:  10/18/19 1203    Urinalysis With Culture If Indicated - Urine, Clean Catch [[id number]]  (Abnormal) Collected:  10/18/19 1138    Specimen:  Urine, Clean Catch Updated:  10/18/19 1155     Color, UA Yellow     Appearance, UA Clear     pH, UA 5.5     Specific Gravity, UA 1.014     Glucose, UA Negative      Ketones, UA Negative     Bilirubin, UA Negative     Blood, UA Trace     Protein, UA 30 mg/dL (1+)     Leuk Esterase, UA Moderate (2+)     Nitrite, UA Negative     Urobilinogen, UA 0.2 E.U./dL           Cultures:       Radiology Data:    Imaging Results (last 24 hours)     Procedure Component Value Units Date/Time    US Renal Limited [[id number]] Collected:  10/18/19 1812     Updated:  10/18/19 1817    Narrative:       Exam: Renal ultrasound     Indication: renal failure     Comparison: None     Finding:     Right kidney is surgically absent.  The left kidney measures 12.5 cm in length.  Left kidney echogenicity and cortical thickness are normal.  No shadowing calculi or hydronephrosis.  No focal bladder abnormality.     Incidentally noted:  There is gallbladder wall thickening and the  gallbladder lumen contains layering sludge. Common bile duct is  nondilated measuring 4 mm diameter.       Impression:          Negative for hydronephrosis, renal atrophy, or shadowing calculi.     Incidentally noted, gallbladder wall thickening and layering sludge.  Recommend clinical correlation to exclude cholecystitis.  This report was finalized on 10/18/2019 18:14 by Dr. Akhil Collins MD.          Allergies   Allergen Reactions   • Clindamycin/Lincomycin Rash     pustules   • Doxycycline Rash   • Hepatitis B Virus Vaccine Rash     Pustules     • Latex Rash   • Levaquin [Levofloxacin] Rash   • Penicillins Rash       Scheduled meds:     methylPREDNISolone sodium succinate 20 mg Intravenous Q6H   pantoprazole 40 mg Oral Q AM   sodium chloride 10 mL Intravenous Q12H       PRN meds:  •  calcium carbonate  •  ondansetron  •  sodium chloride    Assessment/Plan       Clear cell carcinoma of kidney (CMS/HCC)    Renal failure    Lung mass    Acute renal failure (ARF) (CMS/HCC)      Plan:  Renal failure.  Improving. Renal failure for last 3 months.  Probably secondary to immunoglobulin.  Consult nephrology.  IV hydration.      Ultrasound of  the kidneys-negative for hydronephrosis, renal atrophy, or shadowing calculi.     Renal cell carcinoma mets to the lungs/lung mass.  Status post right kidney resection .    Continue Solu-Medrol per oncology.  Consult oncology. Yervoy/opdivo 2019 to 2019.    Hyperkalemia.  Kayexalate.    Leukocytosis.  Secondary to steroid.    Gallbladder wall thickening and layering sludge.  Incidental finding from ultrasound.  Discussed with patient.  Patient is asymptomatic.    UTI.  Rocephin.  Urine cultures pending.     Reflux.  Protonix.  Zofran as needed.     Nutrition.  Renal diet.     Discharge Plannin-5 days    Charlie Franco MD   10/19/19   10:25 AM

## 2019-10-19 NOTE — PROGRESS NOTES
"[facility] Progress Note    Progress Note    Patient:  Parker Arnold  YOB: 1955  Date of Service: 10/19/2019  MRN: [id number]   Acct: [id number]   Primary Care Physician: Betty Almaraz APRN  Advance Directive:   Code Status and Medical Interventions:   Ordered at: 10/18/19 1120     Code Status:    CPR     Medical Interventions (Level of Support Prior to Arrest):    Full     Admit Date: 10/18/2019       Hospital Day: 1      Subjective:     Chief Compliant: Patient seen.  No complaints. \"  I am doing fine.  I am urinating good.\"  Stable overnight per nurse.      Review of Systems:   Review of Systems   Constitutional: Negative for chills, diaphoresis and fever.   HENT: Negative for sore throat and trouble swallowing.    Eyes: Negative for redness and visual disturbance.   Respiratory: Negative for cough, shortness of breath and wheezing.    Cardiovascular: Negative for chest pain and leg swelling.   Gastrointestinal: Negative for abdominal pain, diarrhea, nausea and vomiting.   Endocrine: Negative for cold intolerance and heat intolerance.   Genitourinary: Negative for difficulty urinating, dysuria, flank pain and hematuria.   Musculoskeletal: Negative for arthralgias and joint swelling.   Skin: Positive for pallor.   Neurological: Negative for dizziness, seizures, syncope, numbness and headaches.   Hematological: Negative for adenopathy. Does not bruise/bleed easily.   Psychiatric/Behavioral: Negative for agitation, behavioral problems, confusion and hallucinations.           Medications:   Scheduled Meds:  methylPREDNISolone sodium succinate 20 mg Intravenous Q6H   pantoprazole 40 mg Oral Q AM   sodium chloride 10 mL Intravenous Q12H       Continuous Infusions:  sodium chloride 125 mL/hr Last Rate: 125 mL/hr (10/19/19 0626)       Labs:     Lab Results (last 72 hours)     Procedure Component Value Units Date/Time    Comprehensive Metabolic Panel [[id number]] Collected:  " 10/19/19 0741    Specimen:  Blood Updated:  10/19/19 0743    Uric Acid [[id number]]  (Normal) Collected:  10/19/19 0627    Specimen:  Blood Updated:  10/19/19 0725     Uric Acid 3.5 mg/dL     CK [[id number]]  (Normal) Collected:  10/19/19 0627    Specimen:  Blood Updated:  10/19/19 0725     Creatine Kinase 37 U/L     TSH [[id number]]  (Normal) Collected:  10/19/19 0627    Specimen:  Blood Updated:  10/19/19 0725     TSH 0.367 uIU/mL     CBC Auto Differential [[id number]]  (Abnormal) Collected:  10/19/19 0627    Specimen:  Blood Updated:  10/19/19 0701     WBC 10.57 10*3/mm3      RBC 3.73 10*6/mm3      Hemoglobin 11.2 g/dL      Hematocrit 33.9 %      MCV 90.9 fL      MCH 30.0 pg      MCHC 33.0 g/dL      RDW 14.2 %      RDW-SD 46.8 fl      MPV 9.2 fL      Platelets 320 10*3/mm3      Neutrophil % 88.8 %      Lymphocyte % 9.1 %      Monocyte % 1.2 %      Eosinophil % 0.0 %      Basophil % 0.1 %      Immature Grans % 0.8 %      Neutrophils, Absolute 9.39 10*3/mm3      Lymphocytes, Absolute 0.96 10*3/mm3      Monocytes, Absolute 0.13 10*3/mm3      Eosinophils, Absolute 0.00 10*3/mm3      Basophils, Absolute 0.01 10*3/mm3      Immature Grans, Absolute 0.08 10*3/mm3      nRBC 0.0 /100 WBC     Eosinophil Smear - Urine, Urine, Clean Catch [[id number]]  (Abnormal) Collected:  10/18/19 1555    Specimen:  Urine, Clean Catch Updated:  10/19/19 0245     Eosinophil Smear 2 % EOS/100 Cells     Urea Nitrogen, Urine - Urine, Clean Catch [[id number]] Collected:  10/18/19 1138    Specimen:  Urine, Clean Catch Updated:  10/18/19 2048     Urea Nitrogen, Urine 565 mg/dL     Narrative:       Reference intervals for random urine have not been established.  Clinical usage is dependent upon physician's interpretation in combination with other laboratory tests.     Creatinine, Urine, Random - Urine, Clean Catch [[id number]] Collected:  10/18/19 1138    Specimen:  Urine, Clean Catch Updated:  10/18/19 2028     Creatinine, Urine 98.3 mg/dL      Narrative:       Reference intervals for random urine have not been established.  Clinical usage is dependent upon physician's interpretation in combination with other laboratory tests.     Sodium, Urine, Random - Urine, Clean Catch [[id number]] Collected:  10/18/19 1138    Specimen:  Urine, Clean Catch Updated:  10/18/19 1324     Sodium, Urine 76 mmol/L     Narrative:       Reference intervals for random urine have not been established.  Clinical usage is dependent upon physician's interpretation in combination with other laboratory tests.     Protein, Urine, Random - Urine, Clean Catch [[id number]] Collected:  10/18/19 1138    Specimen:  Urine, Clean Catch Updated:  10/18/19 1324     Total Protein, Urine 23.7 mg/dL     Narrative:       Reference intervals for random urine have not been established.  Clinical usage is dependent upon physician's interpretation in combination with other laboratory tests.     Urinalysis, Microscopic Only - Urine, Clean Catch [[id number]]  (Abnormal) Collected:  10/18/19 1138    Specimen:  Urine, Clean Catch Updated:  10/18/19 1203     RBC, UA 0-2 /HPF      WBC, UA Too Numerous to Count /HPF      Bacteria, UA None Seen /HPF      Squamous Epithelial Cells, UA None Seen /HPF      Methodology Manual Light Microscopy    Urine Culture - Urine, Urine, Clean Catch [[id number]] Collected:  10/18/19 1138    Specimen:  Urine, Clean Catch Updated:  10/18/19 1203    Urinalysis With Culture If Indicated - Urine, Clean Catch [[id number]]  (Abnormal) Collected:  10/18/19 1138    Specimen:  Urine, Clean Catch Updated:  10/18/19 1155     Color, UA Yellow     Appearance, UA Clear     pH, UA 5.5     Specific Gravity, UA 1.014     Glucose, UA Negative     Ketones, UA Negative     Bilirubin, UA Negative     Blood, UA Trace     Protein, UA 30 mg/dL (1+)     Leuk Esterase, UA Moderate (2+)     Nitrite, UA Negative     Urobilinogen, UA 0.2 E.U./dL            Radiology:     Imaging Results (last 72 hours)      "Procedure Component Value Units Date/Time    US Renal Limited [[id number]] Collected:  10/18/19 1812     Updated:  10/18/19 1817    Narrative:       Exam: Renal ultrasound     Indication: renal failure     Comparison: None     Finding:     Right kidney is surgically absent.  The left kidney measures 12.5 cm in length.  Left kidney echogenicity and cortical thickness are normal.  No shadowing calculi or hydronephrosis.  No focal bladder abnormality.     Incidentally noted:  There is gallbladder wall thickening and the  gallbladder lumen contains layering sludge. Common bile duct is  nondilated measuring 4 mm diameter.       Impression:          Negative for hydronephrosis, renal atrophy, or shadowing calculi.     Incidentally noted, gallbladder wall thickening and layering sludge.  Recommend clinical correlation to exclude cholecystitis.  This report was finalized on 10/18/2019 18:14 by Dr. Akhil Collins MD.            Objective:   Vitals: /73 (BP Location: Left arm, Patient Position: Lying)   Pulse 77   Temp 97.5 °F (36.4 °C) (Oral)   Resp 18   Ht 177.8 cm (70\")   Wt 82 kg (180 lb 12.8 oz)   SpO2 98%   BMI 25.94 kg/m²   Physical Exam   Constitutional: He is oriented to person, place, and time. He appears well-developed and well-nourished. No distress.   HENT:   Head: Normocephalic and atraumatic.   Eyes: No scleral icterus.   Cardiovascular: Normal rate and regular rhythm.   Pulmonary/Chest: No respiratory distress. He has no wheezes. He has no rales.   Abdominal: Soft. Bowel sounds are normal. There is no tenderness.   Musculoskeletal: He exhibits no edema.   Lymphadenopathy:     He has no cervical adenopathy.   Neurological: He is alert and oriented to person, place, and time.   Skin: Skin is warm and dry. He is not diaphoretic. There is pallor.   Psychiatric: He has a normal mood and affect. His behavior is normal. Judgment and thought content normal.   Nursing note and vitals reviewed.    24HR " INTAKE/OUTPUT:      Intake/Output Summary (Last 24 hours) at 10/19/2019 0806  Last data filed at 10/19/2019 0600  Gross per 24 hour   Intake 2715 ml   Output 1425 ml   Net 1290 ml        Problem list:       Clear cell carcinoma of kidney (CMS/HCC)    Renal failure    Lung mass    Acute renal failure (ARF) (CMS/HCC)      Assessment/Plan:     ASSESSMENT:  1.  Acute kidney injury from immune therapy with Opdivo  2.  Baseline chronic kidney disease stage III.  3.  Stage IV renal cell carcinoma.  4.  Anemia secondary to chronic kidney disease.  5.  Lung metastasis.    Plan:  1.   Re: Heme status, hemoglobin 11.2 from 12.2.  2.  Await CMP.  Creatinine yesterday was 6.43 with GFR of 9 mL/min.  3.  Continue Solu-Medrol 20 mill grams IV every 6 hours with gastric prophylaxis.  4.  Further recommendations pending.  5.  Above discussed with nurse and patient.  They verbalized understanding.

## 2019-10-20 LAB
ALBUMIN SERPL-MCNC: 3.3 G/DL (ref 3.5–5.2)
ALBUMIN/GLOB SERPL: 1.5 G/DL
ALP SERPL-CCNC: 49 U/L (ref 39–117)
ALT SERPL W P-5'-P-CCNC: 11 U/L (ref 1–41)
ANION GAP SERPL CALCULATED.3IONS-SCNC: 13 MMOL/L (ref 5–15)
AST SERPL-CCNC: 7 U/L (ref 1–40)
BASOPHILS # BLD AUTO: 0.01 10*3/MM3 (ref 0–0.2)
BASOPHILS NFR BLD AUTO: 0.1 % (ref 0–1.5)
BILIRUB SERPL-MCNC: <0.2 MG/DL (ref 0.2–1.2)
BUN BLD-MCNC: 63 MG/DL (ref 8–23)
BUN/CREAT SERPL: 13 (ref 7–25)
CALCIUM SPEC-SCNC: 7.2 MG/DL (ref 8.6–10.5)
CHLORIDE SERPL-SCNC: 109 MMOL/L (ref 98–107)
CK SERPL-CCNC: 30 U/L (ref 20–200)
CO2 SERPL-SCNC: 22 MMOL/L (ref 22–29)
CREAT BLD-MCNC: 4.85 MG/DL (ref 0.76–1.27)
DEPRECATED RDW RBC AUTO: 45.2 FL (ref 37–54)
EOSINOPHIL # BLD AUTO: 0 10*3/MM3 (ref 0–0.4)
EOSINOPHIL NFR BLD AUTO: 0 % (ref 0.3–6.2)
ERYTHROCYTE [DISTWIDTH] IN BLOOD BY AUTOMATED COUNT: 14.2 % (ref 12.3–15.4)
GFR SERPL CREATININE-BSD FRML MDRD: 12 ML/MIN/1.73
GFR SERPL CREATININE-BSD FRML MDRD: ABNORMAL ML/MIN/{1.73_M2}
GLOBULIN UR ELPH-MCNC: 2.2 GM/DL
GLUCOSE BLD-MCNC: 139 MG/DL (ref 65–99)
HCT VFR BLD AUTO: 29.4 % (ref 37.5–51)
HGB BLD-MCNC: 10 G/DL (ref 13–17.7)
IMM GRANULOCYTES # BLD AUTO: 0.09 10*3/MM3 (ref 0–0.05)
IMM GRANULOCYTES NFR BLD AUTO: 0.6 % (ref 0–0.5)
LYMPHOCYTES # BLD AUTO: 0.98 10*3/MM3 (ref 0.7–3.1)
LYMPHOCYTES NFR BLD AUTO: 6.5 % (ref 19.6–45.3)
MCH RBC QN AUTO: 29.9 PG (ref 26.6–33)
MCHC RBC AUTO-ENTMCNC: 34 G/DL (ref 31.5–35.7)
MCV RBC AUTO: 87.8 FL (ref 79–97)
MONOCYTES # BLD AUTO: 0.33 10*3/MM3 (ref 0.1–0.9)
MONOCYTES NFR BLD AUTO: 2.2 % (ref 5–12)
NEUTROPHILS # BLD AUTO: 13.74 10*3/MM3 (ref 1.7–7)
NEUTROPHILS NFR BLD AUTO: 90.6 % (ref 42.7–76)
NRBC BLD AUTO-RTO: 0 /100 WBC (ref 0–0.2)
PLATELET # BLD AUTO: 280 10*3/MM3 (ref 140–450)
PMV BLD AUTO: 9.5 FL (ref 6–12)
POTASSIUM BLD-SCNC: 4.5 MMOL/L (ref 3.5–5.2)
PROT SERPL-MCNC: 5.5 G/DL (ref 6–8.5)
RBC # BLD AUTO: 3.35 10*6/MM3 (ref 4.14–5.8)
SODIUM BLD-SCNC: 144 MMOL/L (ref 136–145)
WBC NRBC COR # BLD: 15.15 10*3/MM3 (ref 3.4–10.8)

## 2019-10-20 PROCEDURE — 99232 SBSQ HOSP IP/OBS MODERATE 35: CPT | Performed by: INTERNAL MEDICINE

## 2019-10-20 PROCEDURE — 85025 COMPLETE CBC W/AUTO DIFF WBC: CPT | Performed by: FAMILY MEDICINE

## 2019-10-20 PROCEDURE — 25010000002 METHYLPREDNISOLONE PER 40 MG: Performed by: FAMILY MEDICINE

## 2019-10-20 PROCEDURE — 80053 COMPREHEN METABOLIC PANEL: CPT | Performed by: FAMILY MEDICINE

## 2019-10-20 PROCEDURE — 82550 ASSAY OF CK (CPK): CPT | Performed by: INTERNAL MEDICINE

## 2019-10-20 RX ADMIN — METHYLPREDNISOLONE SODIUM SUCCINATE 20 MG: 40 INJECTION, POWDER, FOR SOLUTION INTRAMUSCULAR; INTRAVENOUS at 17:27

## 2019-10-20 RX ADMIN — METHYLPREDNISOLONE SODIUM SUCCINATE 20 MG: 40 INJECTION, POWDER, FOR SOLUTION INTRAMUSCULAR; INTRAVENOUS at 13:26

## 2019-10-20 RX ADMIN — SODIUM BICARBONATE 75 MEQ: 84 INJECTION, SOLUTION INTRAVENOUS at 22:15

## 2019-10-20 RX ADMIN — SODIUM BICARBONATE 75 MEQ: 84 INJECTION, SOLUTION INTRAVENOUS at 13:26

## 2019-10-20 RX ADMIN — PANTOPRAZOLE SODIUM 40 MG: 40 TABLET, DELAYED RELEASE ORAL at 06:06

## 2019-10-20 RX ADMIN — SODIUM BICARBONATE 150 MEQ: 84 INJECTION, SOLUTION INTRAVENOUS at 07:03

## 2019-10-20 RX ADMIN — METHYLPREDNISOLONE SODIUM SUCCINATE 20 MG: 40 INJECTION, POWDER, FOR SOLUTION INTRAMUSCULAR; INTRAVENOUS at 06:06

## 2019-10-20 NOTE — PLAN OF CARE
Problem: Patient Care Overview  Goal: Plan of Care Review  Outcome: Ongoing (interventions implemented as appropriate)   10/19/19 2056 10/20/19 0342   Coping/Psychosocial   Plan of Care Reviewed With patient --    Plan of Care Review   Progress --  improving   OTHER   Outcome Summary --  No c/o discomfort. VSS. Receiving steroids and iv fluids. Up ad aurelio. Continue to monitor labs. Tele:NSR. Scd's on. Strict I&O. Safety maintained.      Goal: Individualization and Mutuality  Outcome: Ongoing (interventions implemented as appropriate)      Problem: Oncology Care (Adult)  Goal: Signs and Symptoms of Listed Potential Problems Will be Absent, Minimized or Managed (Oncology Care)  Outcome: Ongoing (interventions implemented as appropriate)      Problem: Kidney Disease, Chronic/End Stage Renal Disease (Adult)  Goal: Signs and Symptoms of Listed Potential Problems Will be Absent, Minimized or Managed (Kidney Disease, Chronic/End Stage Renal Disease)  Outcome: Ongoing (interventions implemented as appropriate)

## 2019-10-20 NOTE — PLAN OF CARE
Problem: Patient Care Overview  Goal: Plan of Care Review  Outcome: Ongoing (interventions implemented as appropriate)   10/20/19 3905   Coping/Psychosocial   Plan of Care Reviewed With patient   Plan of Care Review   Progress improving   OTHER   Outcome Summary Doing well today. Steroids continued and fluid changes Monitor labs. NSR on Tele. Safety maintained.        Problem: Oncology Care (Adult)  Goal: Signs and Symptoms of Listed Potential Problems Will be Absent, Minimized or Managed (Oncology Care)  Outcome: Ongoing (interventions implemented as appropriate)      Problem: Kidney Disease, Chronic/End Stage Renal Disease (Adult)  Goal: Signs and Symptoms of Listed Potential Problems Will be Absent, Minimized or Managed (Kidney Disease, Chronic/End Stage Renal Disease)  Outcome: Ongoing (interventions implemented as appropriate)

## 2019-10-20 NOTE — PROGRESS NOTES
[facility] Medicine Services  INPATIENT PROGRESS NOTE    Length of Stay: 2  Date of Admission: 10/18/2019  Primary Care Physician: Betty Almaraz APRN    Subjective   Chief Complaint: Acute renal failure/renal cancer with mets to the lung.    HPI   Good urine output.  Patient stated he is feeling better.  Patient denies any chest pain or shortness of breath.  Patient denies any abdomen pain.  Discussed the patient urinalysis shows normal bacteria and no growth in urine culture.  We will hold off any antibiotics for now.    Review of Systems   Constitutional: Positive for activity change and appetite change. Negative for chills and fever.   HENT: Negative for hearing loss, nosebleeds, tinnitus and trouble swallowing.    Eyes: Negative for visual disturbance.   Respiratory: Negative for cough, chest tightness, shortness of breath and wheezing.    Cardiovascular: Negative for chest pain, palpitations and leg swelling.   Gastrointestinal: Negative for abdominal distention, abdominal pain, blood in stool, constipation, diarrhea, nausea and vomiting.   Endocrine: Negative for cold intolerance, heat intolerance, polydipsia, polyphagia and polyuria.   Genitourinary: Negative for decreased urine volume, difficulty urinating, dysuria, flank pain, frequency and hematuria.   Musculoskeletal: Negative for arthralgias, joint swelling and myalgias.   Skin: Negative for rash.   Allergic/Immunologic: Negative for immunocompromised state.   Neurological: Negative for dizziness, syncope, weakness, light-headedness and headaches.   Hematological: Negative for adenopathy. Does not bruise/bleed easily.   Psychiatric/Behavioral: Negative for confusion and sleep disturbance. The patient is not nervous/anxious.     All pertinent negatives and positives are as above. All other systems have been reviewed and are negative unless otherwise stated.     Objective    Temp:  [97.6 °F (36.4 °C)-98.2 °F  (36.8 °C)] 97.8 °F (36.6 °C)  Heart Rate:  [69-87] 77  Resp:  [14-18] 18  BP: (135-147)/(67-79) 141/74    Intake/Output Summary (Last 24 hours) at 10/20/2019 0854  Last data filed at 10/20/2019 0703  Gross per 24 hour   Intake 2566.58 ml   Output 1725 ml   Net 841.58 ml     Physical Exam  Constitutional: He is oriented to person, place, and time. He appears well-developed and well-nourished.   HENT:   Head: Normocephalic and atraumatic.   Eyes: Conjunctivae and EOM are normal. Pupils are equal, round, and reactive to light.   Neck: Neck supple. No JVD present. No thyromegaly present.   Cardiovascular: Normal rate, regular rhythm, normal heart sounds and intact distal pulses. Exam reveals no gallop and no friction rub.   No murmur heard.  Pulmonary/Chest: Effort normal and breath sounds normal. No respiratory distress. He has no wheezes. He has no rales. He exhibits no tenderness.   Abdominal: Soft. Bowel sounds are normal. He exhibits no distension. There is no tenderness. There is no rebound and no guarding.   Musculoskeletal: Normal range of motion. He exhibits no edema, tenderness or deformity.   Lymphadenopathy:     He has no cervical adenopathy.   Neurological: He is alert and oriented to person, place, and time. He displays normal reflexes. No cranial nerve deficit. He exhibits normal muscle tone.   Skin: Skin is warm and dry. No rash noted.   Psychiatric: He has a normal mood and affect. His behavior is normal. Judgment and thought content normal.   Nursing note and vitals reviewed.  Results Review:  Lab Results (last 24 hours)     Procedure Component Value Units Date/Time    Comprehensive Metabolic Panel [[id number]]  (Abnormal) Collected:  10/20/19 0607    Specimen:  Blood Updated:  10/20/19 0720     Glucose 139 mg/dL      BUN 63 mg/dL      Creatinine 4.85 mg/dL      Sodium 144 mmol/L      Potassium 4.5 mmol/L      Chloride 109 mmol/L      CO2 22.0 mmol/L      Calcium 7.2 mg/dL      Total Protein 5.5 g/dL       Albumin 3.30 g/dL      ALT (SGPT) 11 U/L      AST (SGOT) 7 U/L      Alkaline Phosphatase 49 U/L      Total Bilirubin <0.2 mg/dL      eGFR Non African Amer 12 mL/min/1.73      Comment: <15 Indicative of kidney failure.        eGFR   Amer --     Comment: <15 Indicative of kidney failure.        Globulin 2.2 gm/dL      A/G Ratio 1.5 g/dL      BUN/Creatinine Ratio 13.0     Anion Gap 13.0 mmol/L     Narrative:       GFR Normal >60  Chronic Kidney Disease <60  Kidney Failure <15    CK [[id number]]  (Normal) Collected:  10/20/19 0607    Specimen:  Blood Updated:  10/20/19 0712     Creatine Kinase 30 U/L     CBC & Differential [[id number]] Collected:  10/20/19 0607    Specimen:  Blood Updated:  10/20/19 0655    Narrative:       The following orders were created for panel order CBC & Differential.  Procedure                               Abnormality         Status                     ---------                               -----------         ------                     CBC Auto Differential[[id number]]        Abnormal            Final result                 Please view results for these tests on the individual orders.    CBC Auto Differential [[id number]]  (Abnormal) Collected:  10/20/19 0607    Specimen:  Blood Updated:  10/20/19 0655     WBC 15.15 10*3/mm3      RBC 3.35 10*6/mm3      Hemoglobin 10.0 g/dL      Hematocrit 29.4 %      MCV 87.8 fL      MCH 29.9 pg      MCHC 34.0 g/dL      RDW 14.2 %      RDW-SD 45.2 fl      MPV 9.5 fL      Platelets 280 10*3/mm3      Neutrophil % 90.6 %      Lymphocyte % 6.5 %      Monocyte % 2.2 %      Eosinophil % 0.0 %      Basophil % 0.1 %      Immature Grans % 0.6 %      Neutrophils, Absolute 13.74 10*3/mm3      Lymphocytes, Absolute 0.98 10*3/mm3      Monocytes, Absolute 0.33 10*3/mm3      Eosinophils, Absolute 0.00 10*3/mm3      Basophils, Absolute 0.01 10*3/mm3      Immature Grans, Absolute 0.09 10*3/mm3      nRBC 0.0 /100 WBC     Urine Culture - Urine, Urine, Clean Catch  [[id number]]  (Normal) Collected:  10/18/19 1138    Specimen:  Urine, Clean Catch Updated:  10/19/19 1609     Urine Culture No growth           Cultures:  Urine Culture   Date Value Ref Range Status   10/18/2019 No growth  Final       Radiology Data:    Imaging Results (last 24 hours)     ** No results found for the last 24 hours. **          Allergies   Allergen Reactions   • Clindamycin/Lincomycin Rash     pustules   • Doxycycline Rash   • Hepatitis B Virus Vaccine Rash     Pustules     • Latex Rash   • Levaquin [Levofloxacin] Rash   • Penicillins Rash       Scheduled meds:     methylPREDNISolone sodium succinate 20 mg Intravenous Q6H   pantoprazole 40 mg Oral Q AM   sodium chloride 10 mL Intravenous Q12H       PRN meds:  •  calcium carbonate  •  ondansetron  •  sodium chloride    Assessment/Plan       Clear cell carcinoma of kidney (CMS/HCC)    Renal failure    Lung mass    Acute renal failure (ARF) (CMS/HCC)      Plan:  Renal failure.  Improving. Renal failure for last 3 months.  Probably secondary to immunoglobulin.  Consult nephrology.  IV hydration.   Baseline creatinine is 1.8.    Ultrasound of the kidneys-negative for hydronephrosis, renal atrophy, or shadowing calculi.      Renal cell carcinoma mets to the lungs/lung mass.  Status post right kidney resection .    Continue Solu-Medrol.  Consult oncology. Yervoy/opdivo 2019 to 2019.     Hyperkalemia.  Resolved.     Gallbladder wall thickening and layering sludge.  Incidental finding from ultrasound.  Discussed with patient.  Patient is asymptomatic.    Urine culture no growth.  Urinalysis-no bacteria.  No antibiotics for now.     Reflux.  Protonix.  Zofran as needed.     Nutrition.  Renal diet.    Discharge Plannin-5 days    Charlie Franco MD   10/20/19   8:54 AM

## 2019-10-20 NOTE — PROGRESS NOTES
"[facility] Progress Note    Progress Note    Patient:  Parker Arnold  YOB: 1955  Date of Service: 10/20/2019  MRN: [id number]   Acct: [id number]   Primary Care Physician: Betty Almaraz APRN  Advance Directive:   Code Status and Medical Interventions:   Ordered at: 10/18/19 1120     Code Status:    CPR     Medical Interventions (Level of Support Prior to Arrest):    Full     Admit Date: 10/18/2019       Hospital Day: 2      Subjective:     Chief Compliant: None.  \"I am doing better.\"  Stable overnight per nurse Hawa.      Review of Systems:   Review of Systems   Constitutional: Negative for chills, diaphoresis and fever.   HENT: Negative for nosebleeds and trouble swallowing.    Eyes: Negative for redness and visual disturbance.   Respiratory: Negative for cough, shortness of breath and wheezing.    Cardiovascular: Negative for chest pain, palpitations and leg swelling.   Gastrointestinal: Negative for abdominal pain, nausea and vomiting.   Endocrine: Negative for polydipsia and polyphagia.   Genitourinary: Negative for difficulty urinating, dysuria, flank pain and hematuria.   Musculoskeletal: Negative for arthralgias and joint swelling.   Skin: Positive for pallor.   Neurological: Negative for dizziness, syncope, speech difficulty, numbness and headaches.   Hematological: Negative for adenopathy. Does not bruise/bleed easily.   Psychiatric/Behavioral: Negative for agitation, behavioral problems, confusion and hallucinations. The patient is not nervous/anxious.            Medications:   Scheduled Meds:  methylPREDNISolone sodium succinate 20 mg Intravenous Q6H   pantoprazole 40 mg Oral Q AM   sodium chloride 10 mL Intravenous Q12H       Continuous Infusions:  sodium bicarbonate drip (greater than 75 mEq/bag) 150 mEq Last Rate: 150 mEq (10/20/19 0703)       Labs:     Lab Results (last 72 hours)     Procedure Component Value Units Date/Time    Comprehensive Metabolic Panel " [[id number]]  (Abnormal) Collected:  10/20/19 0607    Specimen:  Blood Updated:  10/20/19 0720     Glucose 139 mg/dL      BUN 63 mg/dL      Creatinine 4.85 mg/dL      Sodium 144 mmol/L      Potassium 4.5 mmol/L      Chloride 109 mmol/L      CO2 22.0 mmol/L      Calcium 7.2 mg/dL      Total Protein 5.5 g/dL      Albumin 3.30 g/dL      ALT (SGPT) 11 U/L      AST (SGOT) 7 U/L      Alkaline Phosphatase 49 U/L      Total Bilirubin <0.2 mg/dL      eGFR Non African Amer 12 mL/min/1.73      Comment: <15 Indicative of kidney failure.        eGFR   Amer --     Comment: <15 Indicative of kidney failure.        Globulin 2.2 gm/dL      A/G Ratio 1.5 g/dL      BUN/Creatinine Ratio 13.0     Anion Gap 13.0 mmol/L     Narrative:       GFR Normal >60  Chronic Kidney Disease <60  Kidney Failure <15    CK [[id number]]  (Normal) Collected:  10/20/19 0607    Specimen:  Blood Updated:  10/20/19 0712     Creatine Kinase 30 U/L     CBC & Differential [[id number]] Collected:  10/20/19 0607    Specimen:  Blood Updated:  10/20/19 0655    Narrative:       The following orders were created for panel order CBC & Differential.  Procedure                               Abnormality         Status                     ---------                               -----------         ------                     CBC Auto Differential[[id number]]        Abnormal            Final result                 Please view results for these tests on the individual orders.    CBC Auto Differential [[id number]]  (Abnormal) Collected:  10/20/19 0607    Specimen:  Blood Updated:  10/20/19 0655     WBC 15.15 10*3/mm3      RBC 3.35 10*6/mm3      Hemoglobin 10.0 g/dL      Hematocrit 29.4 %      MCV 87.8 fL      MCH 29.9 pg      MCHC 34.0 g/dL      RDW 14.2 %      RDW-SD 45.2 fl      MPV 9.5 fL      Platelets 280 10*3/mm3      Neutrophil % 90.6 %      Lymphocyte % 6.5 %      Monocyte % 2.2 %      Eosinophil % 0.0 %      Basophil % 0.1 %      Immature Grans % 0.6 %       Neutrophils, Absolute 13.74 10*3/mm3      Lymphocytes, Absolute 0.98 10*3/mm3      Monocytes, Absolute 0.33 10*3/mm3      Eosinophils, Absolute 0.00 10*3/mm3      Basophils, Absolute 0.01 10*3/mm3      Immature Grans, Absolute 0.09 10*3/mm3      nRBC 0.0 /100 WBC     Urine Culture - Urine, Urine, Clean Catch [[id number]]  (Normal) Collected:  10/18/19 1138    Specimen:  Urine, Clean Catch Updated:  10/19/19 1609     Urine Culture No growth    Comprehensive Metabolic Panel [[id number]]  (Abnormal) Collected:  10/19/19 0741    Specimen:  Blood Updated:  10/19/19 0815     Glucose 176 mg/dL      BUN 63 mg/dL      Creatinine 5.66 mg/dL      Sodium 139 mmol/L      Potassium 5.8 mmol/L      Chloride 110 mmol/L      CO2 15.0 mmol/L      Calcium 8.0 mg/dL      Total Protein 6.4 g/dL      Albumin 3.70 g/dL      ALT (SGPT) 11 U/L      AST (SGOT) 7 U/L      Alkaline Phosphatase 54 U/L      Total Bilirubin 0.2 mg/dL      eGFR Non African Amer 10 mL/min/1.73      Comment: <15 Indicative of kidney failure.        eGFR   Amer --     Comment: <15 Indicative of kidney failure.        Globulin 2.7 gm/dL      A/G Ratio 1.4 g/dL      BUN/Creatinine Ratio 11.1     Anion Gap 14.0 mmol/L     Narrative:       GFR Normal >60  Chronic Kidney Disease <60  Kidney Failure <15    Uric Acid [[id number]]  (Normal) Collected:  10/19/19 0627    Specimen:  Blood Updated:  10/19/19 0725     Uric Acid 3.5 mg/dL     CK [[id number]]  (Normal) Collected:  10/19/19 0627    Specimen:  Blood Updated:  10/19/19 0725     Creatine Kinase 37 U/L     TSH [[id number]]  (Normal) Collected:  10/19/19 0627    Specimen:  Blood Updated:  10/19/19 0725     TSH 0.367 uIU/mL     CBC Auto Differential [[id number]]  (Abnormal) Collected:  10/19/19 0627    Specimen:  Blood Updated:  10/19/19 0701     WBC 10.57 10*3/mm3      RBC 3.73 10*6/mm3      Hemoglobin 11.2 g/dL      Hematocrit 33.9 %      MCV 90.9 fL      MCH 30.0 pg      MCHC 33.0 g/dL      RDW 14.2 %      RDW-SD 46.8  fl      MPV 9.2 fL      Platelets 320 10*3/mm3      Neutrophil % 88.8 %      Lymphocyte % 9.1 %      Monocyte % 1.2 %      Eosinophil % 0.0 %      Basophil % 0.1 %      Immature Grans % 0.8 %      Neutrophils, Absolute 9.39 10*3/mm3      Lymphocytes, Absolute 0.96 10*3/mm3      Monocytes, Absolute 0.13 10*3/mm3      Eosinophils, Absolute 0.00 10*3/mm3      Basophils, Absolute 0.01 10*3/mm3      Immature Grans, Absolute 0.08 10*3/mm3      nRBC 0.0 /100 WBC     Eosinophil Smear - Urine, Urine, Clean Catch [[id number]]  (Abnormal) Collected:  10/18/19 1555    Specimen:  Urine, Clean Catch Updated:  10/19/19 0245     Eosinophil Smear 2 % EOS/100 Cells     Urea Nitrogen, Urine - Urine, Clean Catch [[id number]] Collected:  10/18/19 1138    Specimen:  Urine, Clean Catch Updated:  10/18/19 2048     Urea Nitrogen, Urine 565 mg/dL     Narrative:       Reference intervals for random urine have not been established.  Clinical usage is dependent upon physician's interpretation in combination with other laboratory tests.     Creatinine, Urine, Random - Urine, Clean Catch [[id number]] Collected:  10/18/19 1138    Specimen:  Urine, Clean Catch Updated:  10/18/19 2028     Creatinine, Urine 98.3 mg/dL     Narrative:       Reference intervals for random urine have not been established.  Clinical usage is dependent upon physician's interpretation in combination with other laboratory tests.     Sodium, Urine, Random - Urine, Clean Catch [[id number]] Collected:  10/18/19 1138    Specimen:  Urine, Clean Catch Updated:  10/18/19 1324     Sodium, Urine 76 mmol/L     Narrative:       Reference intervals for random urine have not been established.  Clinical usage is dependent upon physician's interpretation in combination with other laboratory tests.     Protein, Urine, Random - Urine, Clean Catch [[id number]] Collected:  10/18/19 1138    Specimen:  Urine, Clean Catch Updated:  10/18/19 1324     Total Protein, Urine 23.7 mg/dL     Narrative:        Reference intervals for random urine have not been established.  Clinical usage is dependent upon physician's interpretation in combination with other laboratory tests.     Urinalysis, Microscopic Only - Urine, Clean Catch [[id number]]  (Abnormal) Collected:  10/18/19 1138    Specimen:  Urine, Clean Catch Updated:  10/18/19 1203     RBC, UA 0-2 /HPF      WBC, UA Too Numerous to Count /HPF      Bacteria, UA None Seen /HPF      Squamous Epithelial Cells, UA None Seen /HPF      Methodology Manual Light Microscopy    Urinalysis With Culture If Indicated - Urine, Clean Catch [[id number]]  (Abnormal) Collected:  10/18/19 1138    Specimen:  Urine, Clean Catch Updated:  10/18/19 1155     Color, UA Yellow     Appearance, UA Clear     pH, UA 5.5     Specific Gravity, UA 1.014     Glucose, UA Negative     Ketones, UA Negative     Bilirubin, UA Negative     Blood, UA Trace     Protein, UA 30 mg/dL (1+)     Leuk Esterase, UA Moderate (2+)     Nitrite, UA Negative     Urobilinogen, UA 0.2 E.U./dL            Radiology:     Imaging Results (last 72 hours)     Procedure Component Value Units Date/Time    US Renal Limited [[id number]] Collected:  10/18/19 1812     Updated:  10/18/19 1817    Narrative:       Exam: Renal ultrasound     Indication: renal failure     Comparison: None     Finding:     Right kidney is surgically absent.  The left kidney measures 12.5 cm in length.  Left kidney echogenicity and cortical thickness are normal.  No shadowing calculi or hydronephrosis.  No focal bladder abnormality.     Incidentally noted:  There is gallbladder wall thickening and the  gallbladder lumen contains layering sludge. Common bile duct is  nondilated measuring 4 mm diameter.       Impression:          Negative for hydronephrosis, renal atrophy, or shadowing calculi.     Incidentally noted, gallbladder wall thickening and layering sludge.  Recommend clinical correlation to exclude cholecystitis.  This report was finalized on 10/18/2019  "18:14 by Dr. Akhil Collins MD.            Objective:   Vitals: /74 (BP Location: Left arm, Patient Position: Sitting)   Pulse 77   Temp 97.8 °F (36.6 °C) (Oral)   Resp 18   Ht 177.8 cm (70\")   Wt 82.1 kg (180 lb 14.4 oz)   SpO2 100%   BMI 25.96 kg/m²   Physical Exam   Constitutional: He is oriented to person, place, and time. He appears well-developed and well-nourished. No distress.   HENT:   Head: Normocephalic and atraumatic.   Eyes: EOM are normal. Pupils are equal, round, and reactive to light.   Neck: Neck supple.   Cardiovascular: Normal rate and regular rhythm.   Pulmonary/Chest: Breath sounds normal. No respiratory distress. He has no wheezes.   Abdominal: Soft. Bowel sounds are normal. He exhibits no distension. There is no tenderness.   Musculoskeletal: He exhibits no edema.   Lymphadenopathy:     He has no cervical adenopathy.   Neurological: He is alert and oriented to person, place, and time.   Skin: Skin is warm and dry. He is not diaphoretic. There is pallor.   Psychiatric: He has a normal mood and affect. His behavior is normal. Judgment and thought content normal.   Nursing note and vitals reviewed.    24HR INTAKE/OUTPUT:      Intake/Output Summary (Last 24 hours) at 10/20/2019 0819  Last data filed at 10/20/2019 0703  Gross per 24 hour   Intake 2566.58 ml   Output 1725 ml   Net 841.58 ml        Problem list:       Clear cell carcinoma of kidney (CMS/HCC)    Renal failure    Lung mass    Acute renal failure (ARF) (CMS/HCC)      Assessment/Plan:     ASSESSMENT:  1.  Acute kidney injury from immune therapy with Opdivo  2.  Baseline chronic kidney disease stage III.  3.  Stage IV renal cell carcinoma.  4.  Anemia secondary to chronic kidney disease.  5.  Lung metastasis.  6.  Leukocytosis from steroid.   7.  Hyperkalemia 10/19/2019.      Plan:  1.   Re: Heme status, hemoglobin 10 from 11.2 from 12.2.  2.  CMP.  Creatinine 4.85 from 5.66 with GFR of 12 from 10 mL/min.  3.  Continue " Solu-Medrol 20 mg IV every 6 hours with gastric prophylaxis and follow renal function.  4.  CK normal at 30 from 37.   5.  Above discussed with nurse and patient.  They verbalized understanding.

## 2019-10-20 NOTE — PROGRESS NOTES
PROGRESS NOTE.      Patient:  Parker Arnold  YOB: 1955  Date of Service: 10/20/2019  MRN: [id number]   Acct: [id number]   Primary Care Physician: Betty Almaraz APRN  Advance Directive:   Code Status and Medical Interventions:   Ordered at: 10/18/19 1120     Code Status:    CPR     Medical Interventions (Level of Support Prior to Arrest):    Full     Admit Date: 10/18/2019       Hospital Day: 2  Referring Provider: Charlie Franco MD      Patient Seen, Chart, Consults, Notes, Labs, Radiology studies reviewed.        Subjective:  Parker Arnold is a 64 y.o. male  whom we were consulted for acute kidney injury.  Baseline chronic kidney disease stage 3 (s/pr right nephrectomy in 2014 due to renal cell carcinoma).  Has previously seen Marie Shah in our office; last seen in July of this year. Creatinine 2.2 at that time.  Patient has been seeing nephrologist at [facility] since then.  For several months patient has been undergoing chemotherapy for malignant lung cancer from the RCC.  Over the past several months has had multiple episodes of acute kidney injury; due to Sutent as well as Optivo.  He has been on multiple rounds of prednisone.  Last dose of Optivo was five weeks ago.  Review of labs from outside facility shows creatinine has been 2.4-3.3 over the last two months.  Over the last week has developed fatigue, malaise.  Denies any change in urination. Denies n/v/d. Patient was admitted for AMAURI (creatinine was 6.43). He denied NSAIDs abuse. Patient is now managed with steroids and IV fluids. He noticed improving urine output. He has good appetite and urine output.       Review of Systems:  History obtained from chart review and the patient  General ROS: No fever or chills  Respiratory ROS: No cough, shortness of breath, wheezing  Cardiovascular ROS: no chest pain or dyspnea on exertion  Gastrointestinal ROS: No abdominal pain or melena  Genito-Urinary ROS: No dysuria or  "hematuria  Musculoskeletal: negative  Skin: negative    Objective:  /71 (BP Location: Left arm, Patient Position: Sitting)   Pulse 76   Temp 98.1 °F (36.7 °C) (Oral)   Resp 18   Ht 177.8 cm (70\")   Wt 82.1 kg (180 lb 16 oz)   SpO2 99%   BMI 25.97 kg/m²     Intake/Output Summary (Last 24 hours) at 10/20/2019 1222  Last data filed at 10/20/2019 0703  Gross per 24 hour   Intake 2366.58 ml   Output 1475 ml   Net 891.58 ml       Physical examination:  General: awake/alert   Chest:  clear to auscultation bilaterally without respiratory distress  CVS: regular rate and rhythm  Abdominal: soft, nontender, normal bowel sounds  Extremities: no cyanosis or edema  Skin: warm and dry without rash  Neuro: No focal motor deficits    Labs:  Lab Results (last 24 hours)     Procedure Component Value Units Date/Time    Comprehensive Metabolic Panel [[id number]]  (Abnormal) Collected:  10/20/19 0607    Specimen:  Blood Updated:  10/20/19 0720     Glucose 139 mg/dL      BUN 63 mg/dL      Creatinine 4.85 mg/dL      Sodium 144 mmol/L      Potassium 4.5 mmol/L      Chloride 109 mmol/L      CO2 22.0 mmol/L      Calcium 7.2 mg/dL      Total Protein 5.5 g/dL      Albumin 3.30 g/dL      ALT (SGPT) 11 U/L      AST (SGOT) 7 U/L      Alkaline Phosphatase 49 U/L      Total Bilirubin <0.2 mg/dL      eGFR Non African Amer 12 mL/min/1.73      Comment: <15 Indicative of kidney failure.        eGFR   Amer --     Comment: <15 Indicative of kidney failure.        Globulin 2.2 gm/dL      A/G Ratio 1.5 g/dL      BUN/Creatinine Ratio 13.0     Anion Gap 13.0 mmol/L     Narrative:       GFR Normal >60  Chronic Kidney Disease <60  Kidney Failure <15    CK [[id number]]  (Normal) Collected:  10/20/19 0607    Specimen:  Blood Updated:  10/20/19 0712     Creatine Kinase 30 U/L     CBC & Differential [[id number]] Collected:  10/20/19 0607    Specimen:  Blood Updated:  10/20/19 0655    Narrative:       The following orders were created for panel " order CBC & Differential.  Procedure                               Abnormality         Status                     ---------                               -----------         ------                     CBC Auto Differential[[id number]]        Abnormal            Final result                 Please view results for these tests on the individual orders.    CBC Auto Differential [[id number]]  (Abnormal) Collected:  10/20/19 0607    Specimen:  Blood Updated:  10/20/19 0655     WBC 15.15 10*3/mm3      RBC 3.35 10*6/mm3      Hemoglobin 10.0 g/dL      Hematocrit 29.4 %      MCV 87.8 fL      MCH 29.9 pg      MCHC 34.0 g/dL      RDW 14.2 %      RDW-SD 45.2 fl      MPV 9.5 fL      Platelets 280 10*3/mm3      Neutrophil % 90.6 %      Lymphocyte % 6.5 %      Monocyte % 2.2 %      Eosinophil % 0.0 %      Basophil % 0.1 %      Immature Grans % 0.6 %      Neutrophils, Absolute 13.74 10*3/mm3      Lymphocytes, Absolute 0.98 10*3/mm3      Monocytes, Absolute 0.33 10*3/mm3      Eosinophils, Absolute 0.00 10*3/mm3      Basophils, Absolute 0.01 10*3/mm3      Immature Grans, Absolute 0.09 10*3/mm3      nRBC 0.0 /100 WBC     Urine Culture - Urine, Urine, Clean Catch [[id number]]  (Normal) Collected:  10/18/19 1138    Specimen:  Urine, Clean Catch Updated:  10/19/19 1609     Urine Culture No growth          Radiology:   Imaging Results (last 24 hours)     ** No results found for the last 24 hours. **              Assessment     1.  Acute kidney injury (ATN vs AIN- immunotherapy-induced nephrotoxicity)  2.  Baseline chronic kidney disease stage 3  3.  Prior right nephrectomy due to RCC  4.  Metastatic lung disease  5.  Metabolic acidosis  6.  Anemia  7.  Hyperkalemia    Plan:  Continue bicarbonate based IV fluids, continue intravenous steroids.  Follow-up labs.  We will hold on dialysis.      Jaswinder Ward MD  10/20/2019  12:22 PM

## 2019-10-21 PROBLEM — C78.00 LUNG METASTASES: Status: ACTIVE | Noted: 2019-10-18

## 2019-10-21 PROBLEM — R82.81 PYURIA: Status: ACTIVE | Noted: 2019-10-21

## 2019-10-21 PROBLEM — E87.5 HYPERKALEMIA: Status: ACTIVE | Noted: 2019-10-21

## 2019-10-21 PROBLEM — E87.20 METABOLIC ACIDOSIS: Status: ACTIVE | Noted: 2019-10-21

## 2019-10-21 PROBLEM — R73.9 HYPERGLYCEMIA: Status: ACTIVE | Noted: 2019-10-21

## 2019-10-21 PROBLEM — E83.51 HYPOCALCEMIA: Status: ACTIVE | Noted: 2019-10-21

## 2019-10-21 PROBLEM — N17.0 ACUTE RENAL FAILURE WITH TUBULAR NECROSIS (HCC): Status: ACTIVE | Noted: 2019-10-18

## 2019-10-21 LAB
ALBUMIN SERPL-MCNC: 3.5 G/DL (ref 3.5–5.2)
ALBUMIN/GLOB SERPL: 1.5 G/DL
ALP SERPL-CCNC: 43 U/L (ref 39–117)
ALT SERPL W P-5'-P-CCNC: 10 U/L (ref 1–41)
ANION GAP SERPL CALCULATED.3IONS-SCNC: 13 MMOL/L (ref 5–15)
AST SERPL-CCNC: 10 U/L (ref 1–40)
BASOPHILS # BLD AUTO: 0.01 10*3/MM3 (ref 0–0.2)
BASOPHILS NFR BLD AUTO: 0.1 % (ref 0–1.5)
BILIRUB SERPL-MCNC: 0.2 MG/DL (ref 0.2–1.2)
BUN BLD-MCNC: 57 MG/DL (ref 8–23)
BUN/CREAT SERPL: 14.6 (ref 7–25)
CALCIUM SPEC-SCNC: 6.8 MG/DL (ref 8.6–10.5)
CHLORIDE SERPL-SCNC: 105 MMOL/L (ref 98–107)
CO2 SERPL-SCNC: 27 MMOL/L (ref 22–29)
CREAT BLD-MCNC: 3.91 MG/DL (ref 0.76–1.27)
DEPRECATED RDW RBC AUTO: 46.3 FL (ref 37–54)
EOSINOPHIL # BLD AUTO: 0 10*3/MM3 (ref 0–0.4)
EOSINOPHIL NFR BLD AUTO: 0 % (ref 0.3–6.2)
ERYTHROCYTE [DISTWIDTH] IN BLOOD BY AUTOMATED COUNT: 14.4 % (ref 12.3–15.4)
GFR SERPL CREATININE-BSD FRML MDRD: 16 ML/MIN/1.73
GLOBULIN UR ELPH-MCNC: 2.4 GM/DL
GLUCOSE BLD-MCNC: 130 MG/DL (ref 65–99)
HCT VFR BLD AUTO: 30 % (ref 37.5–51)
HGB BLD-MCNC: 10.1 G/DL (ref 13–17.7)
IMM GRANULOCYTES # BLD AUTO: 0.19 10*3/MM3 (ref 0–0.05)
IMM GRANULOCYTES NFR BLD AUTO: 1.5 % (ref 0–0.5)
LYMPHOCYTES # BLD AUTO: 1.12 10*3/MM3 (ref 0.7–3.1)
LYMPHOCYTES NFR BLD AUTO: 8.6 % (ref 19.6–45.3)
MCH RBC QN AUTO: 30.2 PG (ref 26.6–33)
MCHC RBC AUTO-ENTMCNC: 33.7 G/DL (ref 31.5–35.7)
MCV RBC AUTO: 89.8 FL (ref 79–97)
MONOCYTES # BLD AUTO: 0.38 10*3/MM3 (ref 0.1–0.9)
MONOCYTES NFR BLD AUTO: 2.9 % (ref 5–12)
NEUTROPHILS # BLD AUTO: 11.37 10*3/MM3 (ref 1.7–7)
NEUTROPHILS NFR BLD AUTO: 86.9 % (ref 42.7–76)
NRBC BLD AUTO-RTO: 0 /100 WBC (ref 0–0.2)
PLATELET # BLD AUTO: 262 10*3/MM3 (ref 140–450)
PMV BLD AUTO: 9.5 FL (ref 6–12)
POTASSIUM BLD-SCNC: 4.4 MMOL/L (ref 3.5–5.2)
PROT SERPL-MCNC: 5.9 G/DL (ref 6–8.5)
RBC # BLD AUTO: 3.34 10*6/MM3 (ref 4.14–5.8)
SODIUM BLD-SCNC: 145 MMOL/L (ref 136–145)
WBC NRBC COR # BLD: 13.07 10*3/MM3 (ref 3.4–10.8)

## 2019-10-21 PROCEDURE — 85025 COMPLETE CBC W/AUTO DIFF WBC: CPT | Performed by: FAMILY MEDICINE

## 2019-10-21 PROCEDURE — 99253 IP/OBS CNSLTJ NEW/EST LOW 45: CPT | Performed by: INTERNAL MEDICINE

## 2019-10-21 PROCEDURE — 80053 COMPREHEN METABOLIC PANEL: CPT | Performed by: FAMILY MEDICINE

## 2019-10-21 PROCEDURE — 25010000002 METHYLPREDNISOLONE PER 40 MG: Performed by: FAMILY MEDICINE

## 2019-10-21 RX ORDER — PREDNISONE 20 MG/1
40 TABLET ORAL
Status: DISCONTINUED | OUTPATIENT
Start: 2019-10-22 | End: 2019-10-22

## 2019-10-21 RX ORDER — ACETAMINOPHEN 325 MG/1
650 TABLET ORAL EVERY 6 HOURS PRN
Status: DISCONTINUED | OUTPATIENT
Start: 2019-10-21 | End: 2019-10-22 | Stop reason: HOSPADM

## 2019-10-21 RX ORDER — SODIUM CHLORIDE 9 MG/ML
75 INJECTION, SOLUTION INTRAVENOUS CONTINUOUS
Status: DISCONTINUED | OUTPATIENT
Start: 2019-10-21 | End: 2019-10-22 | Stop reason: HOSPADM

## 2019-10-21 RX ORDER — SULFAMETHOXAZOLE AND TRIMETHOPRIM 800; 160 MG/1; MG/1
1 TABLET ORAL 3 TIMES WEEKLY
Status: DISCONTINUED | OUTPATIENT
Start: 2019-10-21 | End: 2019-10-22 | Stop reason: HOSPADM

## 2019-10-21 RX ORDER — FLUCONAZOLE 200 MG/1
400 TABLET ORAL DAILY
Status: DISCONTINUED | OUTPATIENT
Start: 2019-10-21 | End: 2019-10-22

## 2019-10-21 RX ORDER — ACYCLOVIR 200 MG/1
400 CAPSULE ORAL 2 TIMES DAILY
Status: DISCONTINUED | OUTPATIENT
Start: 2019-10-21 | End: 2019-10-22 | Stop reason: HOSPADM

## 2019-10-21 RX ORDER — ALLOPURINOL 100 MG/1
100 TABLET ORAL DAILY
Status: DISCONTINUED | OUTPATIENT
Start: 2019-10-21 | End: 2019-10-22 | Stop reason: HOSPADM

## 2019-10-21 RX ADMIN — SODIUM CHLORIDE, PRESERVATIVE FREE 10 ML: 5 INJECTION INTRAVENOUS at 09:53

## 2019-10-21 RX ADMIN — METHYLPREDNISOLONE SODIUM SUCCINATE 20 MG: 40 INJECTION, POWDER, FOR SOLUTION INTRAMUSCULAR; INTRAVENOUS at 12:44

## 2019-10-21 RX ADMIN — ALLOPURINOL 100 MG: 100 TABLET ORAL at 17:41

## 2019-10-21 RX ADMIN — PANTOPRAZOLE SODIUM 40 MG: 40 TABLET, DELAYED RELEASE ORAL at 06:20

## 2019-10-21 RX ADMIN — METHYLPREDNISOLONE SODIUM SUCCINATE 20 MG: 40 INJECTION, POWDER, FOR SOLUTION INTRAMUSCULAR; INTRAVENOUS at 06:20

## 2019-10-21 RX ADMIN — SULFAMETHOXAZOLE AND TRIMETHOPRIM 160 MG: 800; 160 TABLET ORAL at 17:41

## 2019-10-21 RX ADMIN — ACYCLOVIR 400 MG: 200 CAPSULE ORAL at 20:52

## 2019-10-21 RX ADMIN — FLUCONAZOLE 400 MG: 200 TABLET ORAL at 17:41

## 2019-10-21 RX ADMIN — SODIUM CHLORIDE 75 ML/HR: 9 INJECTION, SOLUTION INTRAVENOUS at 12:44

## 2019-10-21 RX ADMIN — SODIUM BICARBONATE 75 MEQ: 84 INJECTION, SOLUTION INTRAVENOUS at 06:20

## 2019-10-21 RX ADMIN — METHYLPREDNISOLONE SODIUM SUCCINATE 20 MG: 40 INJECTION, POWDER, FOR SOLUTION INTRAMUSCULAR; INTRAVENOUS at 00:35

## 2019-10-21 NOTE — PLAN OF CARE
Problem: Patient Care Overview  Goal: Plan of Care Review  Outcome: Ongoing (interventions implemented as appropriate)   10/21/19 8564   Coping/Psychosocial   Plan of Care Reviewed With patient   Plan of Care Review   Progress improving   OTHER   Outcome Summary No complaints of discomfort. Up ad aurelio. Receiving steroids and fluids. VSS. Early this morning telemetry tech called said pt's hr dropped as low as 33, but went back up to 56, also he had 8 beat run of pvc's. Patient at time said he had previously gotten up to bathroom, Denied any pain or SOB. Will continue to monitor. Patient hoping to go home soon. Monitor labs. Safety maintained.      Goal: Individualization and Mutuality  Outcome: Ongoing (interventions implemented as appropriate)      Problem: Oncology Care (Adult)  Goal: Signs and Symptoms of Listed Potential Problems Will be Absent, Minimized or Managed (Oncology Care)  Outcome: Ongoing (interventions implemented as appropriate)      Problem: Kidney Disease, Chronic/End Stage Renal Disease (Adult)  Goal: Signs and Symptoms of Listed Potential Problems Will be Absent, Minimized or Managed (Kidney Disease, Chronic/End Stage Renal Disease)  Outcome: Ongoing (interventions implemented as appropriate)

## 2019-10-21 NOTE — CONSULTS
[facility]    HEMATOLOGY & ONCOLOGY      CONSULTATION NOTE      REASON FOR CONSULT: opdivo induced nephritis  REFERRING PHYSICIAN: Charlie Franco MD    ADMISSION DIAGNOSIS  Acute renal failure (ARF) (CMS/HCC) [N17.9]      Subjective     HISTORY OF PRESENT ILLNESS:     Pt admitted with a creatinine>6 and high dose steroid started. Creatine down to 4. Pt reports having good urine out put. Nephrology following. Would get a renal biopsy.  Denies fever, steroid induced psychosis, cp, n/v, sob, abdominal pain, fever, chills LAD.  PE unremarkable and non focal.    Past Medical History:   Past Medical History:   Diagnosis Date   • Renal cell carcinoma (CMS/HCC)      Past Surgical History:   Past Surgical History:   Procedure Laterality Date   • HERNIA REPAIR     • LUNG BIOPSY  2018   • NEPHRECTOMY     • SINUS SURGERY       Social History:   Social History     Socioeconomic History   • Marital status:      Spouse name: Not on file   • Number of children: Not on file   • Years of education: Not on file   • Highest education level: Not on file   Tobacco Use   • Smoking status: Former Smoker     Packs/day: 1.00     Years: 15.00     Pack years: 15.00     Types: Cigarettes     Last attempt to quit:      Years since quittin.8   • Smokeless tobacco: Never Used   Substance and Sexual Activity   • Alcohol use: No     Frequency: Never   • Drug use: No   • Sexual activity: Defer     Family History:   Family History   Problem Relation Age of Onset   • Other Mother         blood disease unknown   • Cancer Father         unknown   • No Known Problems Sister    • Heart disease Maternal Grandmother    • Heart disease Maternal Grandfather    • No Known Problems Paternal Grandmother    • No Known Problems Paternal Grandfather    • No Known Problems Sister        Review of Systems   See above. Otherwise negative.  Medications:    Current Facility-Administered Medications   Medication Dose Route  Frequency Provider Last Rate Last Dose   • acetaminophen (TYLENOL) tablet 650 mg  650 mg Oral Q6H PRN Savage Holloway, DO       • acyclovir (ZOVIRAX) capsule 400 mg  400 mg Oral BID Julio Burleson MD       • allopurinol (ZYLOPRIM) tablet 100 mg  100 mg Oral Daily Savage Holloway, DO       • calcium carbonate (TUMS) chewable tablet 500 mg (200 mg elemental)  1 tablet Oral TID PRN Aisha Hansen MD   1 tablet at 10/19/19 0206   • fluconazole (DIFLUCAN) tablet 400 mg  400 mg Oral Daily Julio Burleson MD       • ondansetron (ZOFRAN) injection 4 mg  4 mg Intravenous Q6H PRN Charlie Franco MD       • pantoprazole (PROTONIX) EC tablet 40 mg  40 mg Oral Q AM Charlie Franco MD   40 mg at 10/21/19 0620   • [START ON 10/22/2019] predniSONE (DELTASONE) tablet 40 mg  40 mg Oral Daily With Breakfast Satinder Grant MD       • sodium chloride 0.9 % flush 10 mL  10 mL Intravenous Q12H Charlie Franco MD   10 mL at 10/21/19 0953   • sodium chloride 0.9 % flush 10 mL  10 mL Intravenous PRN Charlie Franco MD       • sodium chloride 0.9 % infusion  75 mL/hr Intravenous Continuous Leo Dumont, APRLEODAN 75 mL/hr at 10/21/19 1244 75 mL/hr at 10/21/19 1244   • sulfamethoxazole-trimethoprim (BACTRIM DS,SEPTRA DS) 800-160 MG per tablet 160 mg  1 tablet Oral Once per day on Mon Wed Fri Julio Burleson MD           ALLERGIES:    Allergies   Allergen Reactions   • Clindamycin/Lincomycin Rash     pustules   • Doxycycline Rash   • Hepatitis B Virus Vaccine Rash     Pustules     • Latex Rash   • Levaquin [Levofloxacin] Rash   • Penicillins Rash       Objective      Vitals:    10/21/19 0145 10/21/19 0626 10/21/19 0802 10/21/19 1143   BP: 108/63 122/72 135/73 127/60   BP Location: Left arm Left arm Left arm Left arm   Patient Position: Lying Lying Sitting Sitting   Pulse: 73 64 51 65   Resp: 18 18 18 18   Temp: 97.9 °F (36.6 °C) 97.3 °F (36.3 °C) 98.1 °F (36.7 °C) 98.4 °F (36.9 °C)   TempSrc: Oral Oral  "Oral Oral   SpO2: 97% 97% 99% 96%   Weight:       Height:         /60 (BP Location: Left arm, Patient Position: Sitting)   Pulse 65   Temp 98.4 °F (36.9 °C) (Oral)   Resp 18   Ht 177.8 cm (70\")   Wt 82.1 kg (180 lb 16 oz)   SpO2 96%   BMI 25.97 kg/m²     Current Status 10/18/2019   ECOG score 0         General Appearance:    Alert, cooperative, no distress, appears stated age   Head:    Normocephalic, without obvious abnormality, atraumatic   Eyes:    PERRL, conjunctiva/corneas clear, EOM's intact, fundi     benign, both eyes   Ears:    Normal TM's and external ear canals, both ears   Nose:   Nares normal, septum midline, mucosa normal, no drainage     or sinus tenderness   Throat:   Lips, mucosa, and tongue normal; teeth and gums normal   Neck:   Supple, symmetrical, trachea midline, no adenopathy;     thyroid:  no enlargement/tenderness/nodules; no carotid    bruit or JVD   Back:     Symmetric, no curvature, ROM normal, no CVA tenderness   Lungs:     Clear to auscultation bilaterally, respirations unlabored   Chest Wall:    No tenderness or deformity    Heart:    Regular rate and rhythm, S1 and S2 normal, no murmur, rub    or gallop   Abdomen:     Soft, non-tender, bowel sounds active all four quadrants,     no masses, no organomegaly   Extremities:   Extremities normal, atraumatic, no cyanosis or edema   Pulses:   2+ and symmetric all extremities   Skin:   Skin color, texture, turgor normal, no rashes or lesions   Lymph nodes:   Cervical, supraclavicular, and axillary nodes normal   Neurologic:   CNII-XII intact, normal strength, sensation and reflexes     throughout       RECENT LABS:  Lab Results (last 72 hours)     Procedure Component Value Units Date/Time    Comprehensive Metabolic Panel [[id number]]  (Abnormal) Collected:  10/21/19 0824    Specimen:  Blood Updated:  10/21/19 0910     Glucose 130 mg/dL      BUN 57 mg/dL      Creatinine 3.91 mg/dL      Sodium 145 mmol/L      Potassium 4.4 mmol/L  "     Chloride 105 mmol/L      CO2 27.0 mmol/L      Calcium 6.8 mg/dL      Total Protein 5.9 g/dL      Albumin 3.50 g/dL      ALT (SGPT) 10 U/L      AST (SGOT) 10 U/L      Alkaline Phosphatase 43 U/L      Total Bilirubin 0.2 mg/dL      eGFR Non African Amer 16 mL/min/1.73      Globulin 2.4 gm/dL      A/G Ratio 1.5 g/dL      BUN/Creatinine Ratio 14.6     Anion Gap 13.0 mmol/L     Narrative:       GFR Normal >60  Chronic Kidney Disease <60  Kidney Failure <15    CBC & Differential [[id number]] Collected:  10/21/19 0824    Specimen:  Blood Updated:  10/21/19 0837    Narrative:       The following orders were created for panel order CBC & Differential.  Procedure                               Abnormality         Status                     ---------                               -----------         ------                     CBC Auto Differential[[id number]]        Abnormal            Final result                 Please view results for these tests on the individual orders.    CBC Auto Differential [[id number]]  (Abnormal) Collected:  10/21/19 0824    Specimen:  Blood Updated:  10/21/19 0837     WBC 13.07 10*3/mm3      RBC 3.34 10*6/mm3      Hemoglobin 10.1 g/dL      Hematocrit 30.0 %      MCV 89.8 fL      MCH 30.2 pg      MCHC 33.7 g/dL      RDW 14.4 %      RDW-SD 46.3 fl      MPV 9.5 fL      Platelets 262 10*3/mm3      Neutrophil % 86.9 %      Lymphocyte % 8.6 %      Monocyte % 2.9 %      Eosinophil % 0.0 %      Basophil % 0.1 %      Immature Grans % 1.5 %      Neutrophils, Absolute 11.37 10*3/mm3      Lymphocytes, Absolute 1.12 10*3/mm3      Monocytes, Absolute 0.38 10*3/mm3      Eosinophils, Absolute 0.00 10*3/mm3      Basophils, Absolute 0.01 10*3/mm3      Immature Grans, Absolute 0.19 10*3/mm3      nRBC 0.0 /100 WBC     Comprehensive Metabolic Panel [[id number]]  (Abnormal) Collected:  10/20/19 0607    Specimen:  Blood Updated:  10/20/19 0720     Glucose 139 mg/dL      BUN 63 mg/dL      Creatinine 4.85 mg/dL      Sodium  144 mmol/L      Potassium 4.5 mmol/L      Chloride 109 mmol/L      CO2 22.0 mmol/L      Calcium 7.2 mg/dL      Total Protein 5.5 g/dL      Albumin 3.30 g/dL      ALT (SGPT) 11 U/L      AST (SGOT) 7 U/L      Alkaline Phosphatase 49 U/L      Total Bilirubin <0.2 mg/dL      eGFR Non African Amer 12 mL/min/1.73      Comment: <15 Indicative of kidney failure.        eGFR   Amer --     Comment: <15 Indicative of kidney failure.        Globulin 2.2 gm/dL      A/G Ratio 1.5 g/dL      BUN/Creatinine Ratio 13.0     Anion Gap 13.0 mmol/L     Narrative:       GFR Normal >60  Chronic Kidney Disease <60  Kidney Failure <15    CK [[id number]]  (Normal) Collected:  10/20/19 0607    Specimen:  Blood Updated:  10/20/19 0712     Creatine Kinase 30 U/L     CBC & Differential [[id number]] Collected:  10/20/19 0607    Specimen:  Blood Updated:  10/20/19 0655    Narrative:       The following orders were created for panel order CBC & Differential.  Procedure                               Abnormality         Status                     ---------                               -----------         ------                     CBC Auto Differential[[id number]]        Abnormal            Final result                 Please view results for these tests on the individual orders.    CBC Auto Differential [[id number]]  (Abnormal) Collected:  10/20/19 0607    Specimen:  Blood Updated:  10/20/19 0655     WBC 15.15 10*3/mm3      RBC 3.35 10*6/mm3      Hemoglobin 10.0 g/dL      Hematocrit 29.4 %      MCV 87.8 fL      MCH 29.9 pg      MCHC 34.0 g/dL      RDW 14.2 %      RDW-SD 45.2 fl      MPV 9.5 fL      Platelets 280 10*3/mm3      Neutrophil % 90.6 %      Lymphocyte % 6.5 %      Monocyte % 2.2 %      Eosinophil % 0.0 %      Basophil % 0.1 %      Immature Grans % 0.6 %      Neutrophils, Absolute 13.74 10*3/mm3      Lymphocytes, Absolute 0.98 10*3/mm3      Monocytes, Absolute 0.33 10*3/mm3      Eosinophils, Absolute 0.00 10*3/mm3      Basophils,  Absolute 0.01 10*3/mm3      Immature Grans, Absolute 0.09 10*3/mm3      nRBC 0.0 /100 WBC     Urine Culture - Urine, Urine, Clean Catch [[id number]]  (Normal) Collected:  10/18/19 1138    Specimen:  Urine, Clean Catch Updated:  10/19/19 1609     Urine Culture No growth    Comprehensive Metabolic Panel [[id number]]  (Abnormal) Collected:  10/19/19 0741    Specimen:  Blood Updated:  10/19/19 0815     Glucose 176 mg/dL      BUN 63 mg/dL      Creatinine 5.66 mg/dL      Sodium 139 mmol/L      Potassium 5.8 mmol/L      Chloride 110 mmol/L      CO2 15.0 mmol/L      Calcium 8.0 mg/dL      Total Protein 6.4 g/dL      Albumin 3.70 g/dL      ALT (SGPT) 11 U/L      AST (SGOT) 7 U/L      Alkaline Phosphatase 54 U/L      Total Bilirubin 0.2 mg/dL      eGFR Non African Amer 10 mL/min/1.73      Comment: <15 Indicative of kidney failure.        eGFR   Amer --     Comment: <15 Indicative of kidney failure.        Globulin 2.7 gm/dL      A/G Ratio 1.4 g/dL      BUN/Creatinine Ratio 11.1     Anion Gap 14.0 mmol/L     Narrative:       GFR Normal >60  Chronic Kidney Disease <60  Kidney Failure <15    Uric Acid [[id number]]  (Normal) Collected:  10/19/19 0627    Specimen:  Blood Updated:  10/19/19 0725     Uric Acid 3.5 mg/dL     CK [[id number]]  (Normal) Collected:  10/19/19 0627    Specimen:  Blood Updated:  10/19/19 0725     Creatine Kinase 37 U/L     TSH [[id number]]  (Normal) Collected:  10/19/19 0627    Specimen:  Blood Updated:  10/19/19 0725     TSH 0.367 uIU/mL     CBC Auto Differential [[id number]]  (Abnormal) Collected:  10/19/19 0627    Specimen:  Blood Updated:  10/19/19 0701     WBC 10.57 10*3/mm3      RBC 3.73 10*6/mm3      Hemoglobin 11.2 g/dL      Hematocrit 33.9 %      MCV 90.9 fL      MCH 30.0 pg      MCHC 33.0 g/dL      RDW 14.2 %      RDW-SD 46.8 fl      MPV 9.2 fL      Platelets 320 10*3/mm3      Neutrophil % 88.8 %      Lymphocyte % 9.1 %      Monocyte % 1.2 %      Eosinophil % 0.0 %      Basophil % 0.1 %       Immature Grans % 0.8 %      Neutrophils, Absolute 9.39 10*3/mm3      Lymphocytes, Absolute 0.96 10*3/mm3      Monocytes, Absolute 0.13 10*3/mm3      Eosinophils, Absolute 0.00 10*3/mm3      Basophils, Absolute 0.01 10*3/mm3      Immature Grans, Absolute 0.08 10*3/mm3      nRBC 0.0 /100 WBC     Eosinophil Smear - Urine, Urine, Clean Catch [[id number]]  (Abnormal) Collected:  10/18/19 1555    Specimen:  Urine, Clean Catch Updated:  10/19/19 0245     Eosinophil Smear 2 % EOS/100 Cells     Urea Nitrogen, Urine - Urine, Clean Catch [[id number]] Collected:  10/18/19 1138    Specimen:  Urine, Clean Catch Updated:  10/18/19 2048     Urea Nitrogen, Urine 565 mg/dL     Narrative:       Reference intervals for random urine have not been established.  Clinical usage is dependent upon physician's interpretation in combination with other laboratory tests.     Creatinine, Urine, Random - Urine, Clean Catch [[id number]] Collected:  10/18/19 1138    Specimen:  Urine, Clean Catch Updated:  10/18/19 2028     Creatinine, Urine 98.3 mg/dL     Narrative:       Reference intervals for random urine have not been established.  Clinical usage is dependent upon physician's interpretation in combination with other laboratory tests.           None     RADIOLOGY:  Us Renal Limited    Result Date: 10/18/2019  Narrative: Exam: Renal ultrasound  Indication: renal failure  Comparison: None  Finding:  Right kidney is surgically absent. The left kidney measures 12.5 cm in length. Left kidney echogenicity and cortical thickness are normal. No shadowing calculi or hydronephrosis. No focal bladder abnormality.  Incidentally noted:  There is gallbladder wall thickening and the gallbladder lumen contains layering sludge. Common bile duct is nondilated measuring 4 mm diameter.      Impression:  Negative for hydronephrosis, renal atrophy, or shadowing calculi.  Incidentally noted, gallbladder wall thickening and layering sludge. Recommend clinical correlation  to exclude cholecystitis. This report was finalized on 10/18/2019 18:14 by Dr. Akhil Collins MD.            Assessment/Plan 64 yom with rcc s/p right nephrectomy, right wedge resection for mets, s/p 4 cycles of opdivo/ipi, then couple maintenace opdivo, therapy stopped due to grade 4-5 toxicity of the kidney that is improving      Acute renal failure with tubular necrosis in the setting of solitary kidney    Clear cell carcinoma of kidney (CMS/HCC)    Lung metastases (CMS/HCC)    Pyuria    Hyperkalemia    Metabolic acidosis    Hyperglycemia    Hypocalcemia          1. RCC:   -stop immunotherapy indefinitely  -will monitor disease.    2. Acute on chronic kidney failure from opdivo: responding well to steroid. Cr down to 4. Pt have been on and off steroid and creatinine worsens.  -will get renal biopsy to help understand what is going on. Discussed with nephrology and patient and they are in agreement  -will kathy high dose steroid as recommended by NCCN guideline 60mg prednisone until he is down to his baseline of 2.2, then will slowly taper for a month  -will plan to see pt in clinic after discharge in a week to reeval renal, if creatinine not down to baseline, will add infliximab to his regimen.    3. Prophylasix: bactrim for PCP, acyclovir for viral and fluconazole for candida since he will be on high dose steroid for a month.      Time Spent: 60 minutes; greater than  50% of time was spent in patient counseling and care coordination.     Julio Burleson MD    10/21/2019    4:15 PM        Thank you for this consultation and opportunity to participate in this patient's care.

## 2019-10-21 NOTE — PAYOR COMM NOTE
"FROM: ALVIN TAVAREZ  PHONE: [phone]  FAX: [phone]    PENDIN    Parker Arias (64 y.o. Male)     Date of Birth Social Security Number Address Home Phone MRN    1955  [address] [place] [id number] [phone] [id number]    Mandaen Marital Status          Tenriism        Admission Date Admission Type Admitting Provider Attending Provider Department, Room/Bed    10/18/19 Urgent Savage Holloway DO Hancock, John C, DO Psychiatric 3A, 330/1    Discharge Date Discharge Disposition Discharge Destination                       Attending Provider:  Savage Holloway DO    Allergies:  Clindamycin/lincomycin, Doxycycline, Hepatitis B Virus Vaccine, Latex, Levaquin [Levofloxacin], Penicillins    Isolation:  None   Infection:  None   Code Status:  CPR    Ht:  177.8 cm (70\")   Wt:  82.1 kg (180 lb 16 oz)    Admission Cmt:  None   Principal Problem:  None                Active Insurance as of 10/18/2019     Primary Coverage     Payor Plan Insurance Group Employer/Plan Group    MISC COMMERCIAL MISC COMMERCIAL [id number]     Coverage Address Coverage Phone Number Coverage Fax Number Effective Dates    [address] [phone]  2018 - None Entered    [facility] [id number]       Subscriber Name Subscriber Birth Date Member ID       ANG ARIAS 1967 [id number]           Secondary Coverage     Payor Plan Insurance Group Employer/Plan Group    MEDICARE MEDICARE A & B      Payor Plan Address Payor Plan Phone Number Payor Plan Fax Number Effective Dates    [address] [phone]  3/1/2019 - None Entered    [facility] [id number]       Subscriber Name Subscriber Birth Date Member ID       PARKER ARIAS 1955 [id number]                 Emergency Contacts      (Rel.) Home Phone Work Phone Mobile Phone    ANG ARIAS (Spouse) -- -- [phone]               History & Physical      Charlie Franco MD at 10/18/19 [address] [place] [address] " [facility] Medicine Services  HISTORY AND PHYSICAL    Date of Admission: 10/18/2019  Primary Care Physician: Betty Almaraz APRN    Subjective     Chief Complaint: Acute renal failure.  Renal cancer.    History of Present Illness  Patient is a 64-year  male direct admit from Dr. PATTERSON's office for acute renal failure secondary to immunoglobulin treatment.  Patient has nephrectomy of the right kidney in 2014.  Patient has been undergoing immunotherapy secondary to metastatic lung cancer from renal cell carcinoma.  Patient has renal failure for last 3 months.  Treatment immunoglobulins from 5/13/2019 to 8/7/2019.  Patient states he still producing urine.    Patient also have a history of benign prostate hypertrophy.    Review of Systems   Constitutional: Positive for activity change and appetite change. Negative for chills and fever.   HENT: Negative for hearing loss, nosebleeds, tinnitus and trouble swallowing.    Eyes: Negative for visual disturbance.   Respiratory: Negative for cough, chest tightness, shortness of breath and wheezing.    Cardiovascular: Negative for chest pain, palpitations and leg swelling.   Gastrointestinal: Negative for abdominal distention, abdominal pain, blood in stool, constipation, diarrhea, nausea and vomiting.   Endocrine: Negative for cold intolerance, heat intolerance, polydipsia, polyphagia and polyuria.   Genitourinary: Negative for decreased urine volume, difficulty urinating, dysuria, flank pain, frequency and hematuria.   Musculoskeletal: Negative for arthralgias, joint swelling and myalgias.   Skin: Negative for rash.   Allergic/Immunologic: Negative for immunocompromised state.   Neurological: Negative for dizziness, syncope, weakness, light-headedness and headaches.   Hematological: Negative for adenopathy. Does not bruise/bleed easily.   Psychiatric/Behavioral: Negative for confusion and sleep disturbance. The patient is not nervous/anxious.          Otherwise complete ROS reviewed and negative except as mentioned in the HPI.      Past Medical History:   Past Medical History:   Diagnosis Date   • Renal cell carcinoma (CMS/HCC)        Past Surgical History:  Past Surgical History:   Procedure Laterality Date   • HERNIA REPAIR  2008   • LUNG BIOPSY  11/2018   • NEPHRECTOMY  2014   • SINUS SURGERY  2005       Family History: family history includes Cancer in his father; Heart disease in his maternal grandfather and maternal grandmother; No Known Problems in his paternal grandfather, paternal grandmother, sister, and sister; Other in his mother.    Social History:  reports that he quit smoking about 35 years ago. His smoking use included cigarettes. He has a 15.00 pack-year smoking history. He has never used smokeless tobacco. He reports that he does not drink alcohol or use drugs.    Medications:  Prior to Admission medications    Medication Sig Start Date End Date Taking? Authorizing Provider   allopurinol (ZYLOPRIM) 100 MG tablet Take 100 mg by mouth Daily.    Brian Lopes MD   omeprazole (priLOSEC) 20 MG capsule Take 20 mg by mouth 2 (Two) Times a Day. 8/12/19   Brian Lopes MD   acyclovir (ZOVIRAX) 400 MG tablet TAKE 1 TABLET(S) EVERY 6 HOURS BY ORAL ROUTE FOR 14 DAYS. 8/12/19 10/18/19  Brian Lopes MD   aspirin 81 MG tablet Take 81 mg by mouth.  10/18/19  Brian Lopes MD   gabapentin (NEURONTIN) 100 MG capsule Take 100 mg by mouth 3 (Three) Times a Day. 11/27/18 10/18/19  Brian Lopes MD   predniSONE (DELTASONE) 10 MG tablet TAKE 1 TABLET BY MOUTH EVERY DAY FOR 7 DAYS 9/23/19 10/18/19  Brian Lopes MD   predniSONE (DELTASONE) 20 MG tablet TAKE 2 TABLET(S) EVERY DAY BY MOUTH FOR 7 DAYS. 9/6/19 10/18/19  Brian Lopes MD   terazosin (HYTRIN) 1 MG capsule Take 1 mg by mouth every night at bedtime. 7/27/19 10/18/19  Brian Lopes MD   terazosin (HYTRIN) 2 MG capsule Take 2 mg by mouth every  "night at bedtime. 7/24/19 10/18/19  Provider, Brian, MD     Allergies:  Allergies not on file    Objective     Vital Signs: /78 (BP Location: Right arm, Patient Position: Sitting)   Pulse 76   Temp 98.1 °F (36.7 °C) (Oral)   Resp 16   Ht 177.8 cm (70\")   Wt 82 kg (180 lb 12.8 oz)   SpO2 99%   BMI 25.94 kg/m²    Physical Exam   Constitutional: He is oriented to person, place, and time. He appears well-developed and well-nourished.   HENT:   Head: Normocephalic and atraumatic.   Eyes: Conjunctivae and EOM are normal. Pupils are equal, round, and reactive to light.   Neck: Neck supple. No JVD present. No thyromegaly present.   Cardiovascular: Normal rate, regular rhythm, normal heart sounds and intact distal pulses. Exam reveals no gallop and no friction rub.   No murmur heard.  Pulmonary/Chest: Effort normal and breath sounds normal. No respiratory distress. He has no wheezes. He has no rales. He exhibits no tenderness.   Abdominal: Soft. Bowel sounds are normal. He exhibits no distension. There is no tenderness. There is no rebound and no guarding.   Musculoskeletal: Normal range of motion. He exhibits no edema, tenderness or deformity.   Lymphadenopathy:     He has no cervical adenopathy.   Neurological: He is alert and oriented to person, place, and time. He displays normal reflexes. No cranial nerve deficit. He exhibits normal muscle tone.   Skin: Skin is warm and dry. No rash noted.   Psychiatric: He has a normal mood and affect. His behavior is normal. Judgment and thought content normal.   Nursing note and vitals reviewed.      Results Reviewed:    Lab Results (last 24 hours)     ** No results found for the last 24 hours. **           Radiology Data:    Imaging Results (last 24 hours)     ** No results found for the last 24 hours. **          I have personally reviewed and interpreted the radiology studies and ECG obtained at time of admission.     Assessment / Plan      Assessment & " Plan  Active Hospital Problems    Diagnosis   • Clear cell carcinoma of kidney (CMS/HCC)     Overview:   Diagnoses 11/11/14. Path report in [facility] at [facility]. Tissue has been requested.      • Renal failure   • Lung mass     Plans    Renal failure.  Renal failure for last 3 months.  Probably secondary to immunoglobulin.  Consult nephrology.  IV hydration.  Creatinine greater than 6.  Ultrasound of the left kidneys.    Renal cell carcinoma.  Status post right kidney resection 2014.  Start methylprednisolone 1 mg/kg/day.  Consult oncology.  Yervoy/opdivo 5/13/2019 to 8/7/2019.    Lung mass.  Pathology shows metastatic renal cell carcinoma in the lungs.    Reflux.  Protonix.  Zofran as needed.    Nutrition.  Renal diet.    Code Status: full code     I discussed the patient's findings and my recommendations with: patient    Estimated length of stay: 2-5 days.    Charlie Franco MD   10/18/19   11:11 AM              Electronically signed by Charlie Franco MD at 10/18/19 1123       Emergency Department Notes     No notes of this type exist for this encounter.           Physician Progress Notes (last 72 hours) (Notes from 10/18/19 1004 through 10/21/19 1004)      Jaswinder Ward MD at 10/20/19 1222          PROGRESS NOTE.      Patient:  Parker Arnold  YOB: 1955  Date of Service: 10/20/2019  MRN: [id number]   Acct: [id number]   Primary Care Physician: Betty Almaraz APRN  Advance Directive:   Code Status and Medical Interventions:   Ordered at: 10/18/19 1120     Code Status:    CPR     Medical Interventions (Level of Support Prior to Arrest):    Full     Admit Date: 10/18/2019       Hospital Day: 2  Referring Provider: Charlie Franco MD      Patient Seen, Chart, Consults, Notes, Labs, Radiology studies reviewed.        Subjective:  Parker Arnold is a 64 y.o. male  whom we were consulted for acute kidney injury.  Baseline chronic kidney disease stage 3 (s/pr  "right nephrectomy in 2014 due to renal cell carcinoma).  Has previously seen Marie Alyssa in our office; last seen in July of this year. Creatinine 2.2 at that time.  Patient has been seeing nephrologist at [facility] since then.  For several months patient has been undergoing chemotherapy for malignant lung cancer from the RCC.  Over the past several months has had multiple episodes of acute kidney injury; due to Sutent as well as Optivo.  He has been on multiple rounds of prednisone.  Last dose of Optivo was five weeks ago.  Review of labs from outside facility shows creatinine has been 2.4-3.3 over the last two months.  Over the last week has developed fatigue, malaise.  Denies any change in urination. Denies n/v/d. Patient was admitted for AMAURI (creatinine was 6.43). He denied NSAIDs abuse. Patient is now managed with steroids and IV fluids. He noticed improving urine output. He has good appetite and urine output.       Review of Systems:  History obtained from chart review and the patient  General ROS: No fever or chills  Respiratory ROS: No cough, shortness of breath, wheezing  Cardiovascular ROS: no chest pain or dyspnea on exertion  Gastrointestinal ROS: No abdominal pain or melena  Genito-Urinary ROS: No dysuria or hematuria  Musculoskeletal: negative  Skin: negative    Objective:  /71 (BP Location: Left arm, Patient Position: Sitting)   Pulse 76   Temp 98.1 °F (36.7 °C) (Oral)   Resp 18   Ht 177.8 cm (70\")   Wt 82.1 kg (180 lb 16 oz)   SpO2 99%   BMI 25.97 kg/m²      Intake/Output Summary (Last 24 hours) at 10/20/2019 1222  Last data filed at 10/20/2019 0703  Gross per 24 hour   Intake 2366.58 ml   Output 1475 ml   Net 891.58 ml       Physical examination:  General: awake/alert   Chest:  clear to auscultation bilaterally without respiratory distress  CVS: regular rate and rhythm  Abdominal: soft, nontender, normal bowel sounds  Extremities: no cyanosis or edema  Skin: warm and dry without " rash  Neuro: No focal motor deficits    Labs:  Lab Results (last 24 hours)     Procedure Component Value Units Date/Time    Comprehensive Metabolic Panel [[id number]]  (Abnormal) Collected:  10/20/19 0607    Specimen:  Blood Updated:  10/20/19 0720     Glucose 139 mg/dL      BUN 63 mg/dL      Creatinine 4.85 mg/dL      Sodium 144 mmol/L      Potassium 4.5 mmol/L      Chloride 109 mmol/L      CO2 22.0 mmol/L      Calcium 7.2 mg/dL      Total Protein 5.5 g/dL      Albumin 3.30 g/dL      ALT (SGPT) 11 U/L      AST (SGOT) 7 U/L      Alkaline Phosphatase 49 U/L      Total Bilirubin <0.2 mg/dL      eGFR Non African Amer 12 mL/min/1.73      Comment: <15 Indicative of kidney failure.        eGFR   Amer --     Comment: <15 Indicative of kidney failure.        Globulin 2.2 gm/dL      A/G Ratio 1.5 g/dL      BUN/Creatinine Ratio 13.0     Anion Gap 13.0 mmol/L     Narrative:       GFR Normal >60  Chronic Kidney Disease <60  Kidney Failure <15    CK [[id number]]  (Normal) Collected:  10/20/19 0607    Specimen:  Blood Updated:  10/20/19 0712     Creatine Kinase 30 U/L     CBC & Differential [[id number]] Collected:  10/20/19 0607    Specimen:  Blood Updated:  10/20/19 0655    Narrative:       The following orders were created for panel order CBC & Differential.  Procedure                               Abnormality         Status                     ---------                               -----------         ------                     CBC Auto Differential[[id number]]        Abnormal            Final result                 Please view results for these tests on the individual orders.    CBC Auto Differential [[id number]]  (Abnormal) Collected:  10/20/19 0607    Specimen:  Blood Updated:  10/20/19 0655     WBC 15.15 10*3/mm3      RBC 3.35 10*6/mm3      Hemoglobin 10.0 g/dL      Hematocrit 29.4 %      MCV 87.8 fL      MCH 29.9 pg      MCHC 34.0 g/dL      RDW 14.2 %      RDW-SD 45.2 fl      MPV 9.5 fL      Platelets 280 10*3/mm3       Neutrophil % 90.6 %      Lymphocyte % 6.5 %      Monocyte % 2.2 %      Eosinophil % 0.0 %      Basophil % 0.1 %      Immature Grans % 0.6 %      Neutrophils, Absolute 13.74 10*3/mm3      Lymphocytes, Absolute 0.98 10*3/mm3      Monocytes, Absolute 0.33 10*3/mm3      Eosinophils, Absolute 0.00 10*3/mm3      Basophils, Absolute 0.01 10*3/mm3      Immature Grans, Absolute 0.09 10*3/mm3      nRBC 0.0 /100 WBC     Urine Culture - Urine, Urine, Clean Catch [[id number]]  (Normal) Collected:  10/18/19 1138    Specimen:  Urine, Clean Catch Updated:  10/19/19 1609     Urine Culture No growth          Radiology:   Imaging Results (last 24 hours)     ** No results found for the last 24 hours. **              Assessment     1.  Acute kidney injury (ATN vs AIN- immunotherapy-induced nephrotoxicity)  2.  Baseline chronic kidney disease stage 3  3.  Prior right nephrectomy due to RCC  4.  Metastatic lung disease  5.  Metabolic acidosis  6.  Anemia  7.  Hyperkalemia    Plan:  Continue bicarbonate based IV fluids, continue intravenous steroids.  Follow-up labs.  We will hold on dialysis.      Jaswinder Ward MD  10/20/2019  12:22 PM    Electronically signed by Jaswinder Ward MD at 10/20/19 1223     Charlie Franco MD at 10/20/19 0897              [facility] Medicine Services  INPATIENT PROGRESS NOTE    Length of Stay: 2  Date of Admission: 10/18/2019  Primary Care Physician: Betty Almaraz APRN    Subjective   Chief Complaint: Acute renal failure/renal cancer with mets to the lung.    HPI   Good urine output.  Patient stated he is feeling better.  Patient denies any chest pain or shortness of breath.  Patient denies any abdomen pain.  Discussed the patient urinalysis shows normal bacteria and no growth in urine culture.  We will hold off any antibiotics for now.    Review of Systems   Constitutional: Positive for activity change and appetite change. Negative for chills and  fever.   HENT: Negative for hearing loss, nosebleeds, tinnitus and trouble swallowing.    Eyes: Negative for visual disturbance.   Respiratory: Negative for cough, chest tightness, shortness of breath and wheezing.    Cardiovascular: Negative for chest pain, palpitations and leg swelling.   Gastrointestinal: Negative for abdominal distention, abdominal pain, blood in stool, constipation, diarrhea, nausea and vomiting.   Endocrine: Negative for cold intolerance, heat intolerance, polydipsia, polyphagia and polyuria.   Genitourinary: Negative for decreased urine volume, difficulty urinating, dysuria, flank pain, frequency and hematuria.   Musculoskeletal: Negative for arthralgias, joint swelling and myalgias.   Skin: Negative for rash.   Allergic/Immunologic: Negative for immunocompromised state.   Neurological: Negative for dizziness, syncope, weakness, light-headedness and headaches.   Hematological: Negative for adenopathy. Does not bruise/bleed easily.   Psychiatric/Behavioral: Negative for confusion and sleep disturbance. The patient is not nervous/anxious.     All pertinent negatives and positives are as above. All other systems have been reviewed and are negative unless otherwise stated.     Objective    Temp:  [97.6 °F (36.4 °C)-98.2 °F (36.8 °C)] 97.8 °F (36.6 °C)  Heart Rate:  [69-87] 77  Resp:  [14-18] 18  BP: (135-147)/(67-79) 141/74    Intake/Output Summary (Last 24 hours) at 10/20/2019 0854  Last data filed at 10/20/2019 0703  Gross per 24 hour   Intake 2566.58 ml   Output 1725 ml   Net 841.58 ml     Physical Exam  Constitutional: He is oriented to person, place, and time. He appears well-developed and well-nourished.   HENT:   Head: Normocephalic and atraumatic.   Eyes: Conjunctivae and EOM are normal. Pupils are equal, round, and reactive to light.   Neck: Neck supple. No JVD present. No thyromegaly present.   Cardiovascular: Normal rate, regular rhythm, normal heart sounds and intact distal pulses.  Exam reveals no gallop and no friction rub.   No murmur heard.  Pulmonary/Chest: Effort normal and breath sounds normal. No respiratory distress. He has no wheezes. He has no rales. He exhibits no tenderness.   Abdominal: Soft. Bowel sounds are normal. He exhibits no distension. There is no tenderness. There is no rebound and no guarding.   Musculoskeletal: Normal range of motion. He exhibits no edema, tenderness or deformity.   Lymphadenopathy:     He has no cervical adenopathy.   Neurological: He is alert and oriented to person, place, and time. He displays normal reflexes. No cranial nerve deficit. He exhibits normal muscle tone.   Skin: Skin is warm and dry. No rash noted.   Psychiatric: He has a normal mood and affect. His behavior is normal. Judgment and thought content normal.   Nursing note and vitals reviewed.  Results Review:  Lab Results (last 24 hours)     Procedure Component Value Units Date/Time    Comprehensive Metabolic Panel [[id number]]  (Abnormal) Collected:  10/20/19 0607    Specimen:  Blood Updated:  10/20/19 0720     Glucose 139 mg/dL      BUN 63 mg/dL      Creatinine 4.85 mg/dL      Sodium 144 mmol/L      Potassium 4.5 mmol/L      Chloride 109 mmol/L      CO2 22.0 mmol/L      Calcium 7.2 mg/dL      Total Protein 5.5 g/dL      Albumin 3.30 g/dL      ALT (SGPT) 11 U/L      AST (SGOT) 7 U/L      Alkaline Phosphatase 49 U/L      Total Bilirubin <0.2 mg/dL      eGFR Non African Amer 12 mL/min/1.73      Comment: <15 Indicative of kidney failure.        eGFR   Amer --     Comment: <15 Indicative of kidney failure.        Globulin 2.2 gm/dL      A/G Ratio 1.5 g/dL      BUN/Creatinine Ratio 13.0     Anion Gap 13.0 mmol/L     Narrative:       GFR Normal >60  Chronic Kidney Disease <60  Kidney Failure <15    CK [[id number]]  (Normal) Collected:  10/20/19 0607    Specimen:  Blood Updated:  10/20/19 0712     Creatine Kinase 30 U/L     CBC & Differential [[id number]] Collected:  10/20/19 0607     Specimen:  Blood Updated:  10/20/19 0655    Narrative:       The following orders were created for panel order CBC & Differential.  Procedure                               Abnormality         Status                     ---------                               -----------         ------                     CBC Auto Differential[[id number]]        Abnormal            Final result                 Please view results for these tests on the individual orders.    CBC Auto Differential [[id number]]  (Abnormal) Collected:  10/20/19 0607    Specimen:  Blood Updated:  10/20/19 0655     WBC 15.15 10*3/mm3      RBC 3.35 10*6/mm3      Hemoglobin 10.0 g/dL      Hematocrit 29.4 %      MCV 87.8 fL      MCH 29.9 pg      MCHC 34.0 g/dL      RDW 14.2 %      RDW-SD 45.2 fl      MPV 9.5 fL      Platelets 280 10*3/mm3      Neutrophil % 90.6 %      Lymphocyte % 6.5 %      Monocyte % 2.2 %      Eosinophil % 0.0 %      Basophil % 0.1 %      Immature Grans % 0.6 %      Neutrophils, Absolute 13.74 10*3/mm3      Lymphocytes, Absolute 0.98 10*3/mm3      Monocytes, Absolute 0.33 10*3/mm3      Eosinophils, Absolute 0.00 10*3/mm3      Basophils, Absolute 0.01 10*3/mm3      Immature Grans, Absolute 0.09 10*3/mm3      nRBC 0.0 /100 WBC     Urine Culture - Urine, Urine, Clean Catch [[id number]]  (Normal) Collected:  10/18/19 1138    Specimen:  Urine, Clean Catch Updated:  10/19/19 1609     Urine Culture No growth           Cultures:  Urine Culture   Date Value Ref Range Status   10/18/2019 No growth  Final       Radiology Data:    Imaging Results (last 24 hours)     ** No results found for the last 24 hours. **          Allergies   Allergen Reactions   • Clindamycin/Lincomycin Rash     pustules   • Doxycycline Rash   • Hepatitis B Virus Vaccine Rash     Pustules     • Latex Rash   • Levaquin [Levofloxacin] Rash   • Penicillins Rash       Scheduled meds:     methylPREDNISolone sodium succinate 20 mg Intravenous Q6H   pantoprazole 40 mg Oral Q AM   sodium  "chloride 10 mL Intravenous Q12H       PRN meds:  •  calcium carbonate  •  ondansetron  •  sodium chloride    Assessment/Plan       Clear cell carcinoma of kidney (CMS/HCC)    Renal failure    Lung mass    Acute renal failure (ARF) (CMS/HCC)      Plan:  Renal failure.  Improving. Renal failure for last 3 months.  Probably secondary to immunoglobulin.  Consult nephrology.  IV hydration.   Baseline creatinine is 1.8.    Ultrasound of the kidneys-negative for hydronephrosis, renal atrophy, or shadowing calculi.      Renal cell carcinoma mets to the lungs/lung mass.  Status post right kidney resection .    Continue Solu-Medrol.  Consult oncology. Yervoy/opdivo 2019 to 2019.     Hyperkalemia.   Resolved.     Gallbladder wall thickening and layering sludge.  Incidental finding from ultrasound.  Discussed with patient.  Patient is asymptomatic.    Urine culture no growth.  Urinalysis-no bacteria.  No antibiotics for now.     Reflux.  Protonix.  Zofran as needed.     Nutrition.  Renal diet.    Discharge Plannin-5 days    Charlie Franco MD   10/20/19   8:54 AM                    Electronically signed by Charlie Franco MD at 10/20/19 0914     Rome Fleming MD at 10/20/19 0819          [facility] Progress Note    Progress Note    Patient:  Parker Arnold  YOB: 1955  Date of Service: 10/20/2019  MRN: [id number]   Acct: [id number]   Primary Care Physician: Betty Almaraz APRN  Advance Directive:   Code Status and Medical Interventions:   Ordered at: 10/18/19 1120     Code Status:    CPR     Medical Interventions (Level of Support Prior to Arrest):    Full     Admit Date: 10/18/2019       Hospital Day: 2      Subjective:     Chief Compliant: None.  \"I am doing better.\"  Stable overnight per nurse Hawa.      Review of Systems:   Review of Systems   Constitutional: Negative for chills, diaphoresis and fever.   HENT: Negative for nosebleeds and trouble swallowing.  "   Eyes: Negative for redness and visual disturbance.   Respiratory: Negative for cough, shortness of breath and wheezing.    Cardiovascular: Negative for chest pain, palpitations and leg swelling.   Gastrointestinal: Negative for abdominal pain, nausea and vomiting.   Endocrine: Negative for polydipsia and polyphagia.   Genitourinary: Negative for difficulty urinating, dysuria, flank pain and hematuria.   Musculoskeletal: Negative for arthralgias and joint swelling.   Skin: Positive for pallor.   Neurological: Negative for dizziness, syncope, speech difficulty, numbness and headaches.   Hematological: Negative for adenopathy. Does not bruise/bleed easily.   Psychiatric/Behavioral: Negative for agitation, behavioral problems, confusion and hallucinations. The patient is not nervous/anxious.            Medications:   Scheduled Meds:  methylPREDNISolone sodium succinate 20 mg Intravenous Q6H   pantoprazole 40 mg Oral Q AM   sodium chloride 10 mL Intravenous Q12H       Continuous Infusions:  sodium bicarbonate drip (greater than 75 mEq/bag) 150 mEq Last Rate: 150 mEq (10/20/19 0703)       Labs:     Lab Results (last 72 hours)     Procedure Component Value Units Date/Time    Comprehensive Metabolic Panel [[id number]]  (Abnormal) Collected:  10/20/19 0607    Specimen:  Blood Updated:  10/20/19 0720     Glucose 139 mg/dL      BUN 63 mg/dL      Creatinine 4.85 mg/dL      Sodium 144 mmol/L      Potassium 4.5 mmol/L      Chloride 109 mmol/L      CO2 22.0 mmol/L      Calcium 7.2 mg/dL      Total Protein 5.5 g/dL      Albumin 3.30 g/dL      ALT (SGPT) 11 U/L      AST (SGOT) 7 U/L      Alkaline Phosphatase 49 U/L      Total Bilirubin <0.2 mg/dL      eGFR Non African Amer 12 mL/min/1.73      Comment: <15 Indicative of kidney failure.        eGFR   Amer --     Comment: <15 Indicative of kidney failure.        Globulin 2.2 gm/dL      A/G Ratio 1.5 g/dL      BUN/Creatinine Ratio 13.0     Anion Gap 13.0 mmol/L     Narrative:        GFR Normal >60  Chronic Kidney Disease <60  Kidney Failure <15    CK [[id number]]  (Normal) Collected:  10/20/19 0607    Specimen:  Blood Updated:  10/20/19 0712     Creatine Kinase 30 U/L     CBC & Differential [[id number]] Collected:  10/20/19 0607    Specimen:  Blood Updated:  10/20/19 0655    Narrative:       The following orders were created for panel order CBC & Differential.  Procedure                               Abnormality         Status                     ---------                               -----------         ------                     CBC Auto Differential[[id number]]        Abnormal            Final result                 Please view results for these tests on the individual orders.    CBC Auto Differential [[id number]]  (Abnormal) Collected:  10/20/19 0607    Specimen:  Blood Updated:  10/20/19 0655     WBC 15.15 10*3/mm3      RBC 3.35 10*6/mm3      Hemoglobin 10.0 g/dL      Hematocrit 29.4 %      MCV 87.8 fL      MCH 29.9 pg      MCHC 34.0 g/dL      RDW 14.2 %      RDW-SD 45.2 fl      MPV 9.5 fL      Platelets 280 10*3/mm3      Neutrophil % 90.6 %      Lymphocyte % 6.5 %      Monocyte % 2.2 %      Eosinophil % 0.0 %      Basophil % 0.1 %      Immature Grans % 0.6 %      Neutrophils, Absolute 13.74 10*3/mm3      Lymphocytes, Absolute 0.98 10*3/mm3      Monocytes, Absolute 0.33 10*3/mm3      Eosinophils, Absolute 0.00 10*3/mm3      Basophils, Absolute 0.01 10*3/mm3      Immature Grans, Absolute 0.09 10*3/mm3      nRBC 0.0 /100 WBC     Urine Culture - Urine, Urine, Clean Catch [[id number]]  (Normal) Collected:  10/18/19 1138    Specimen:  Urine, Clean Catch Updated:  10/19/19 1609     Urine Culture No growth    Comprehensive Metabolic Panel [[id number]]  (Abnormal) Collected:  10/19/19 0741    Specimen:  Blood Updated:  10/19/19 0815     Glucose 176 mg/dL      BUN 63 mg/dL      Creatinine 5.66 mg/dL      Sodium 139 mmol/L      Potassium 5.8 mmol/L      Chloride 110 mmol/L      CO2 15.0 mmol/L       Calcium 8.0 mg/dL      Total Protein 6.4 g/dL      Albumin 3.70 g/dL      ALT (SGPT) 11 U/L      AST (SGOT) 7 U/L      Alkaline Phosphatase 54 U/L      Total Bilirubin 0.2 mg/dL      eGFR Non African Amer 10 mL/min/1.73      Comment: <15 Indicative of kidney failure.        eGFR   Amer --     Comment: <15 Indicative of kidney failure.        Globulin 2.7 gm/dL      A/G Ratio 1.4 g/dL      BUN/Creatinine Ratio 11.1     Anion Gap 14.0 mmol/L     Narrative:       GFR Normal >60  Chronic Kidney Disease <60  Kidney Failure <15    Uric Acid [[id number]]  (Normal) Collected:  10/19/19 0627    Specimen:  Blood Updated:  10/19/19 0725     Uric Acid 3.5 mg/dL     CK [[id number]]  (Normal) Collected:  10/19/19 0627    Specimen:  Blood Updated:  10/19/19 0725     Creatine Kinase 37 U/L     TSH [[id number]]  (Normal) Collected:  10/19/19 0627    Specimen:  Blood Updated:  10/19/19 0725     TSH 0.367 uIU/mL     CBC Auto Differential [[id number]]  (Abnormal) Collected:  10/19/19 0627    Specimen:  Blood Updated:  10/19/19 0701     WBC 10.57 10*3/mm3      RBC 3.73 10*6/mm3      Hemoglobin 11.2 g/dL      Hematocrit 33.9 %      MCV 90.9 fL      MCH 30.0 pg      MCHC 33.0 g/dL      RDW 14.2 %      RDW-SD 46.8 fl      MPV 9.2 fL      Platelets 320 10*3/mm3      Neutrophil % 88.8 %      Lymphocyte % 9.1 %      Monocyte % 1.2 %      Eosinophil % 0.0 %      Basophil % 0.1 %      Immature Grans % 0.8 %      Neutrophils, Absolute 9.39 10*3/mm3      Lymphocytes, Absolute 0.96 10*3/mm3      Monocytes, Absolute 0.13 10*3/mm3      Eosinophils, Absolute 0.00 10*3/mm3      Basophils, Absolute 0.01 10*3/mm3      Immature Grans, Absolute 0.08 10*3/mm3      nRBC 0.0 /100 WBC     Eosinophil Smear - Urine, Urine, Clean Catch [[id number]]  (Abnormal) Collected:  10/18/19 9677    Specimen:  Urine, Clean Catch Updated:  10/19/19 0245     Eosinophil Smear 2 % EOS/100 Cells     Urea Nitrogen, Urine - Urine, Clean Catch [[id number]] Collected:  10/18/19  1138    Specimen:  Urine, Clean Catch Updated:  10/18/19 2048     Urea Nitrogen, Urine 565 mg/dL     Narrative:       Reference intervals for random urine have not been established.  Clinical usage is dependent upon physician's interpretation in combination with other laboratory tests.     Creatinine, Urine, Random - Urine, Clean Catch [[id number]] Collected:  10/18/19 1138    Specimen:  Urine, Clean Catch Updated:  10/18/19 2028     Creatinine, Urine 98.3 mg/dL     Narrative:       Reference intervals for random urine have not been established.  Clinical usage is dependent upon physician's interpretation in combination with other laboratory tests.     Sodium, Urine, Random - Urine, Clean Catch [[id number]] Collected:  10/18/19 1138    Specimen:  Urine, Clean Catch Updated:  10/18/19 1324     Sodium, Urine 76 mmol/L     Narrative:       Reference intervals for random urine have not been established.  Clinical usage is dependent upon physician's interpretation in combination with other laboratory tests.     Protein, Urine, Random - Urine, Clean Catch [[id number]] Collected:  10/18/19 1138    Specimen:  Urine, Clean Catch Updated:  10/18/19 1324     Total Protein, Urine 23.7 mg/dL     Narrative:       Reference intervals for random urine have not been established.  Clinical usage is dependent upon physician's interpretation in combination with other laboratory tests.     Urinalysis, Microscopic Only - Urine, Clean Catch [[id number]]  (Abnormal) Collected:  10/18/19 1138    Specimen:  Urine, Clean Catch Updated:  10/18/19 1203     RBC, UA 0-2 /HPF      WBC, UA Too Numerous to Count /HPF      Bacteria, UA None Seen /HPF      Squamous Epithelial Cells, UA None Seen /HPF      Methodology Manual Light Microscopy    Urinalysis With Culture If Indicated - Urine, Clean Catch [[id number]]  (Abnormal) Collected:  10/18/19 1138    Specimen:  Urine, Clean Catch Updated:  10/18/19 1155     Color, UA Yellow     Appearance, UA Clear      "pH, UA 5.5     Specific Gravity, UA 1.014     Glucose, UA Negative     Ketones, UA Negative     Bilirubin, UA Negative     Blood, UA Trace     Protein, UA 30 mg/dL (1+)     Leuk Esterase, UA Moderate (2+)     Nitrite, UA Negative     Urobilinogen, UA 0.2 E.U./dL            Radiology:     Imaging Results (last 72 hours)     Procedure Component Value Units Date/Time    US Renal Limited [[id number]] Collected:  10/18/19 1812     Updated:  10/18/19 1817    Narrative:       Exam: Renal ultrasound     Indication: renal failure     Comparison: None     Finding:     Right kidney is surgically absent.  The left kidney measures 12.5 cm in length.  Left kidney echogenicity and cortical thickness are normal.  No shadowing calculi or hydronephrosis.  No focal bladder abnormality.     Incidentally noted:  There is gallbladder wall thickening and the  gallbladder lumen contains layering sludge. Common bile duct is  nondilated measuring 4 mm diameter.       Impression:          Negative for hydronephrosis, renal atrophy, or shadowing calculi.     Incidentally noted, gallbladder wall thickening and layering sludge.  Recommend clinical correlation to exclude cholecystitis.  This report was finalized on 10/18/2019 18:14 by Dr. Akhil Collins MD.            Objective:   Vitals: /74 (BP Location: Left arm, Patient Position: Sitting)   Pulse 77   Temp 97.8 °F (36.6 °C) (Oral)   Resp 18   Ht 177.8 cm (70\")   Wt 82.1 kg (180 lb 14.4 oz)   SpO2 100%   BMI 25.96 kg/m²    Physical Exam   Constitutional: He is oriented to person, place, and time. He appears well-developed and well-nourished. No distress.   HENT:   Head: Normocephalic and atraumatic.   Eyes: EOM are normal. Pupils are equal, round, and reactive to light.   Neck: Neck supple.   Cardiovascular: Normal rate and regular rhythm.   Pulmonary/Chest: Breath sounds normal. No respiratory distress. He has no wheezes.   Abdominal: Soft. Bowel sounds are normal. He exhibits no " distension. There is no tenderness.   Musculoskeletal: He exhibits no edema.   Lymphadenopathy:     He has no cervical adenopathy.   Neurological: He is alert and oriented to person, place, and time.   Skin: Skin is warm and dry. He is not diaphoretic. There is pallor.   Psychiatric: He has a normal mood and affect. His behavior is normal. Judgment and thought content normal.   Nursing note and vitals reviewed.    24HR INTAKE/OUTPUT:      Intake/Output Summary (Last 24 hours) at 10/20/2019 0819  Last data filed at 10/20/2019 0703  Gross per 24 hour   Intake 2566.58 ml   Output 1725 ml   Net 841.58 ml        Problem list:       Clear cell carcinoma of kidney (CMS/HCC)    Renal failure    Lung mass    Acute renal failure (ARF) (CMS/HCC)      Assessment/Plan:     ASSESSMENT:  1.  Acute kidney injury from immune therapy with Opdivo  2.  Baseline chronic kidney disease stage III.  3.  Stage IV renal cell carcinoma.  4.  Anemia secondary to chronic kidney disease.  5.  Lung metastasis.  6.  Leukocytosis from steroid.   7.  Hyperkalemia 10/19/2019.      Plan:  1.   Re: Heme status, hemoglobin 10 from 11.2 from 12.2.  2.  CMP.  Creatinine  4.85 from 5.66 with GFR of 12 from 10 mL/min.  3.  Continue Solu-Medrol 20 mg IV every 6 hours with gastric prophylaxis and follow renal function.  4.   CK normal at 30 from 37.   5.  Above discussed with nurse and patient.  They verbalized understanding.             Electronically signed by Rome Fleming MD at 10/20/19 0875     Jaswinder Ward MD at 10/19/19 1312          PROGRESS NOTE.      Patient:  Parker Arnold  YOB: 1955  Date of Service: 10/19/2019  MRN: [id number]   Acct: [id number]   Primary Care Physician: Betty Almaraz APRN  Advance Directive:   Code Status and Medical Interventions:   Ordered at: 10/18/19 1120     Code Status:    CPR     Medical Interventions (Level of Support Prior to Arrest):    Full     Admit Date: 10/18/2019  "      Hospital Day: 1  Referring Provider: Charlie Franco MD      Patient Seen, Chart, Consults, Notes, Labs, Radiology studies reviewed.        Subjective:  Parker Arnold is a 64 y.o. male  whom we were consulted for acute kidney injury.  Baseline chronic kidney disease stage 3 (s/pr right nephrectomy in 2014 due to renal cell carcinoma).  Has previously seen Marie Shah in our office; last seen in July of this year. Creatinine 2.2 at that time.  Patient has been seeing nephrologist at [facility] since then.  For several months patient has been undergoing chemotherapy for malignant lung cancer from the RCC.  Over the past several months has had multiple episodes of acute kidney injury; due to Sutent as well as Optivo.  He has been on multiple rounds of prednisone.  Last dose of Optivo was five weeks ago.  Review of labs from outside facility shows creatinine has been 2.4-3.3 over the last two months.  Over the last week has developed fatigue, malaise.  Denies any change in urination. Denies n/v/d. Patient was admitted for AMAURI (creatinine was 6.43). He denied NSAIDs abuse. Patient is now managed with steroids and IV fluids. He noticed improving urine output.      Review of Systems:  History obtained from chart review and the patient  General ROS: No fever or chills  Respiratory ROS: No cough, shortness of breath, wheezing  Cardiovascular ROS: no chest pain or dyspnea on exertion  Gastrointestinal ROS: No abdominal pain or melena  Genito-Urinary ROS: No dysuria or hematuria  Musculoskeletal: negative  Skin: negative    Objective:  /67 (BP Location: Left arm, Patient Position: Lying)   Pulse 78   Temp 97.8 °F (36.6 °C) (Oral)   Resp 14   Ht 177.8 cm (70\")   Wt 82 kg (180 lb 12.8 oz)   SpO2 99%   BMI 25.94 kg/m²      Intake/Output Summary (Last 24 hours) at 10/19/2019 1319  Last data filed at 10/19/2019 1150  Gross per 24 hour   Intake 2915 ml   Output 1875 ml   Net 1040 ml       Physical " examination:  General: awake/alert   Chest:  clear to auscultation bilaterally without respiratory distress  CVS: regular rate and rhythm  Abdominal: soft, nontender, normal bowel sounds  Extremities: no cyanosis or edema  Skin: warm and dry without rash  Neuro: No focal motor deficits    Labs:  Lab Results (last 24 hours)     Procedure Component Value Units Date/Time    Comprehensive Metabolic Panel [[id number]]  (Abnormal) Collected:  10/19/19 0741    Specimen:  Blood Updated:  10/19/19 0815     Glucose 176 mg/dL      BUN 63 mg/dL      Creatinine 5.66 mg/dL      Sodium 139 mmol/L      Potassium 5.8 mmol/L      Chloride 110 mmol/L      CO2 15.0 mmol/L      Calcium 8.0 mg/dL      Total Protein 6.4 g/dL      Albumin 3.70 g/dL      ALT (SGPT) 11 U/L      AST (SGOT) 7 U/L      Alkaline Phosphatase 54 U/L      Total Bilirubin 0.2 mg/dL      eGFR Non African Amer 10 mL/min/1.73      Comment: <15 Indicative of kidney failure.        eGFR   Amer --     Comment: <15 Indicative of kidney failure.        Globulin 2.7 gm/dL      A/G Ratio 1.4 g/dL      BUN/Creatinine Ratio 11.1     Anion Gap 14.0 mmol/L     Narrative:       GFR Normal >60  Chronic Kidney Disease <60  Kidney Failure <15    Uric Acid [[id number]]  (Normal) Collected:  10/19/19 0627    Specimen:  Blood Updated:  10/19/19 0725     Uric Acid 3.5 mg/dL     CK [[id number]]  (Normal) Collected:  10/19/19 0627    Specimen:  Blood Updated:  10/19/19 0725     Creatine Kinase 37 U/L     TSH [[id number]]  (Normal) Collected:  10/19/19 0627    Specimen:  Blood Updated:  10/19/19 0725     TSH 0.367 uIU/mL     CBC Auto Differential [[id number]]  (Abnormal) Collected:  10/19/19 0627    Specimen:  Blood Updated:  10/19/19 0701     WBC 10.57 10*3/mm3      RBC 3.73 10*6/mm3      Hemoglobin 11.2 g/dL      Hematocrit 33.9 %      MCV 90.9 fL      MCH 30.0 pg      MCHC 33.0 g/dL      RDW 14.2 %      RDW-SD 46.8 fl      MPV 9.2 fL      Platelets 320 10*3/mm3      Neutrophil % 88.8 %       Lymphocyte % 9.1 %      Monocyte % 1.2 %      Eosinophil % 0.0 %      Basophil % 0.1 %      Immature Grans % 0.8 %      Neutrophils, Absolute 9.39 10*3/mm3      Lymphocytes, Absolute 0.96 10*3/mm3      Monocytes, Absolute 0.13 10*3/mm3      Eosinophils, Absolute 0.00 10*3/mm3      Basophils, Absolute 0.01 10*3/mm3      Immature Grans, Absolute 0.08 10*3/mm3      nRBC 0.0 /100 WBC     Eosinophil Smear - Urine, Urine, Clean Catch [[id number]]  (Abnormal) Collected:  10/18/19 1555    Specimen:  Urine, Clean Catch Updated:  10/19/19 0245     Eosinophil Smear 2 % EOS/100 Cells     Urea Nitrogen, Urine - Urine, Clean Catch [[id number]] Collected:  10/18/19 1138    Specimen:  Urine, Clean Catch Updated:  10/18/19 2048     Urea Nitrogen, Urine 565 mg/dL     Narrative:       Reference intervals for random urine have not been established.  Clinical usage is dependent upon physician's interpretation in combination with other laboratory tests.     Creatinine, Urine, Random - Urine, Clean Catch [[id number]] Collected:  10/18/19 1138    Specimen:  Urine, Clean Catch Updated:  10/18/19 2028     Creatinine, Urine 98.3 mg/dL     Narrative:       Reference intervals for random urine have not been established.  Clinical usage is dependent upon physician's interpretation in combination with other laboratory tests.     Sodium, Urine, Random - Urine, Clean Catch [[id number]] Collected:  10/18/19 1138    Specimen:  Urine, Clean Catch Updated:  10/18/19 1324     Sodium, Urine 76 mmol/L     Narrative:       Reference intervals for random urine have not been established.  Clinical usage is dependent upon physician's interpretation in combination with other laboratory tests.     Protein, Urine, Random - Urine, Clean Catch [[id number]] Collected:  10/18/19 1138    Specimen:  Urine, Clean Catch Updated:  10/18/19 1324     Total Protein, Urine 23.7 mg/dL     Narrative:       Reference intervals for random urine have not been  established.  Clinical usage is dependent upon physician's interpretation in combination with other laboratory tests.           Radiology:   Imaging Results (last 24 hours)     Procedure Component Value Units Date/Time    US Renal Limited [[id number]] Collected:  10/18/19 1812     Updated:  10/18/19 1817    Narrative:       Exam: Renal ultrasound     Indication: renal failure     Comparison: None     Finding:     Right kidney is surgically absent.  The left kidney measures 12.5 cm in length.  Left kidney echogenicity and cortical thickness are normal.  No shadowing calculi or hydronephrosis.  No focal bladder abnormality.     Incidentally noted:  There is gallbladder wall thickening and the  gallbladder lumen contains layering sludge. Common bile duct is  nondilated measuring 4 mm diameter.       Impression:          Negative for hydronephrosis, renal atrophy, or shadowing calculi.     Incidentally noted, gallbladder wall thickening and layering sludge.  Recommend clinical correlation to exclude cholecystitis.  This report was finalized on 10/18/2019 18:14 by Dr. Akhil Collins MD.              Assessment     1.  Acute kidney injury (ATN vs AIN- immunotherapy-induced nephrotoxicity)  2.  Baseline chronic kidney disease stage 3  3.  Prior right nephrectomy due to RCC  4.  Metastatic lung disease  5.  Metabolic acidosis  6.  Anemia  7.  Hyperkalemia    Plan:  We will switch to bicarbonate based IV fluids, continue intravenous steroids.  Agree with Kayexalate.  Follow-up labs.  We will hold on dialysis.      Jaswinder Ward MD  10/19/2019  1:19 PM    Electronically signed by Jaswinder Ward MD at 10/19/19 1323     Charlie Franco MD at 10/19/19 1025              [facility]  INPATIENT PROGRESS NOTE    Length of Stay: 1  Date of Admission: 10/18/2019  Primary Care Physician: Betty Almaraz APRN    Subjective   Chief Complaint: Acute renal  failure/renal cancer with mets to the lung.    HPI   Patient has good urine output.  Patient denies any chest pain or shortness of breath.  Patient denies any abdomen pain.    Review of Systems   Constitutional: Positive for activity change and appetite change. Negative for chills and fever.   HENT: Negative for hearing loss, nosebleeds, tinnitus and trouble swallowing.    Eyes: Negative for visual disturbance.   Respiratory: Negative for cough, chest tightness, shortness of breath and wheezing.    Cardiovascular: Negative for chest pain, palpitations and leg swelling.   Gastrointestinal: Negative for abdominal distention, abdominal pain, blood in stool, constipation, diarrhea, nausea and vomiting.   Endocrine: Negative for cold intolerance, heat intolerance, polydipsia, polyphagia and polyuria.   Genitourinary: Negative for decreased urine volume, difficulty urinating, dysuria, flank pain, frequency and hematuria.   Musculoskeletal: Negative for arthralgias, joint swelling and myalgias.   Skin: Negative for rash.   Allergic/Immunologic: Negative for immunocompromised state.   Neurological: Negative for dizziness, syncope, weakness, light-headedness and headaches.   Hematological: Negative for adenopathy. Does not bruise/bleed easily.   Psychiatric/Behavioral: Negative for confusion and sleep disturbance. The patient is not nervous/anxious.     All pertinent negatives and positives are as above. All other systems have been reviewed and are negative unless otherwise stated.     Objective    Temp:  [97.5 °F (36.4 °C)-98.5 °F (36.9 °C)] 97.6 °F (36.4 °C)  Heart Rate:  [74-80] 74  Resp:  [16-18] 16  BP: (116-158)/(60-77) 158/77    Intake/Output Summary (Last 24 hours) at 10/19/2019 1025  Last data filed at 10/19/2019 0918  Gross per 24 hour   Intake 2915 ml   Output 1775 ml   Net 1140 ml     Physical Exam  Constitutional: He is oriented to person, place, and time. He appears well-developed and well-nourished.   HENT:    Head: Normocephalic and atraumatic.   Eyes: Conjunctivae and EOM are normal. Pupils are equal, round, and reactive to light.   Neck: Neck supple. No JVD present. No thyromegaly present.   Cardiovascular: Normal rate, regular rhythm, normal heart sounds and intact distal pulses. Exam reveals no gallop and no friction rub.   No murmur heard.  Pulmonary/Chest: Effort normal and breath sounds normal. No respiratory distress. He has no wheezes. He has no rales. He exhibits no tenderness.   Abdominal: Soft. Bowel sounds are normal. He exhibits no distension. There is no tenderness. There is no rebound and no guarding.   Musculoskeletal: Normal range of motion. He exhibits no edema, tenderness or deformity.   Lymphadenopathy:     He has no cervical adenopathy.   Neurological: He is alert and oriented to person, place, and time. He displays normal reflexes. No cranial nerve deficit. He exhibits normal muscle tone.   Skin: Skin is warm and dry. No rash noted.   Psychiatric: He has a normal mood and affect. His behavior is normal. Judgment and thought content normal.   Nursing note and vitals reviewed.  Results Review:  Lab Results (last 24 hours)     Procedure Component Value Units Date/Time    Comprehensive Metabolic Panel [[id number]]  (Abnormal) Collected:  10/19/19 0741    Specimen:  Blood Updated:  10/19/19 0815     Glucose 176 mg/dL      BUN 63 mg/dL      Creatinine 5.66 mg/dL      Sodium 139 mmol/L      Potassium 5.8 mmol/L      Chloride 110 mmol/L      CO2 15.0 mmol/L      Calcium 8.0 mg/dL      Total Protein 6.4 g/dL      Albumin 3.70 g/dL      ALT (SGPT) 11 U/L      AST (SGOT) 7 U/L      Alkaline Phosphatase 54 U/L      Total Bilirubin 0.2 mg/dL      eGFR Non African Amer 10 mL/min/1.73      Comment: <15 Indicative of kidney failure.        eGFR   Amer --     Comment: <15 Indicative of kidney failure.        Globulin 2.7 gm/dL      A/G Ratio 1.4 g/dL      BUN/Creatinine Ratio 11.1     Anion Gap 14.0 mmol/L      Narrative:       GFR Normal >60  Chronic Kidney Disease <60  Kidney Failure <15    Uric Acid [[id number]]  (Normal) Collected:  10/19/19 0627    Specimen:  Blood Updated:  10/19/19 0725     Uric Acid 3.5 mg/dL     CK [[id number]]  (Normal) Collected:  10/19/19 0627    Specimen:  Blood Updated:  10/19/19 0725     Creatine Kinase 37 U/L     TSH [[id number]]  (Normal) Collected:  10/19/19 0627    Specimen:  Blood Updated:  10/19/19 0725     TSH 0.367 uIU/mL     CBC Auto Differential [[id number]]  (Abnormal) Collected:  10/19/19 0627    Specimen:  Blood Updated:  10/19/19 0701     WBC 10.57 10*3/mm3      RBC 3.73 10*6/mm3      Hemoglobin 11.2 g/dL      Hematocrit 33.9 %      MCV 90.9 fL      MCH 30.0 pg      MCHC 33.0 g/dL      RDW 14.2 %      RDW-SD 46.8 fl      MPV 9.2 fL      Platelets 320 10*3/mm3      Neutrophil % 88.8 %      Lymphocyte % 9.1 %      Monocyte % 1.2 %      Eosinophil % 0.0 %      Basophil % 0.1 %      Immature Grans % 0.8 %      Neutrophils, Absolute 9.39 10*3/mm3      Lymphocytes, Absolute 0.96 10*3/mm3      Monocytes, Absolute 0.13 10*3/mm3      Eosinophils, Absolute 0.00 10*3/mm3      Basophils, Absolute 0.01 10*3/mm3      Immature Grans, Absolute 0.08 10*3/mm3      nRBC 0.0 /100 WBC     Eosinophil Smear - Urine, Urine, Clean Catch [[id number]]  (Abnormal) Collected:  10/18/19 1555    Specimen:  Urine, Clean Catch Updated:  10/19/19 0245     Eosinophil Smear 2 % EOS/100 Cells     Urea Nitrogen, Urine - Urine, Clean Catch [[id number]] Collected:  10/18/19 1138    Specimen:  Urine, Clean Catch Updated:  10/18/19 2048     Urea Nitrogen, Urine 565 mg/dL     Narrative:       Reference intervals for random urine have not been established.  Clinical usage is dependent upon physician's interpretation in combination with other laboratory tests.     Creatinine, Urine, Random - Urine, Clean Catch [[id number]] Collected:  10/18/19 1138    Specimen:  Urine, Clean Catch Updated:  10/18/19 2028     Creatinine, Urine  98.3 mg/dL     Narrative:       Reference intervals for random urine have not been established.  Clinical usage is dependent upon physician's interpretation in combination with other laboratory tests.     Sodium, Urine, Random - Urine, Clean Catch [[id number]] Collected:  10/18/19 1138    Specimen:  Urine, Clean Catch Updated:  10/18/19 1324     Sodium, Urine 76 mmol/L     Narrative:       Reference intervals for random urine have not been established.  Clinical usage is dependent upon physician's interpretation in combination with other laboratory tests.     Protein, Urine, Random - Urine, Clean Catch [[id number]] Collected:  10/18/19 1138    Specimen:  Urine, Clean Catch Updated:  10/18/19 1324     Total Protein, Urine 23.7 mg/dL     Narrative:       Reference intervals for random urine have not been established.  Clinical usage is dependent upon physician's interpretation in combination with other laboratory tests.     Urinalysis, Microscopic Only - Urine, Clean Catch [[id number]]  (Abnormal) Collected:  10/18/19 1138    Specimen:  Urine, Clean Catch Updated:  10/18/19 1203     RBC, UA 0-2 /HPF      WBC, UA Too Numerous to Count /HPF      Bacteria, UA None Seen /HPF      Squamous Epithelial Cells, UA None Seen /HPF      Methodology Manual Light Microscopy    Urine Culture - Urine, Urine, Clean Catch [[id number]] Collected:  10/18/19 1138    Specimen:  Urine, Clean Catch Updated:  10/18/19 1203    Urinalysis With Culture If Indicated - Urine, Clean Catch [[id number]]  (Abnormal) Collected:  10/18/19 1138    Specimen:  Urine, Clean Catch Updated:  10/18/19 1155     Color, UA Yellow     Appearance, UA Clear     pH, UA 5.5     Specific Gravity, UA 1.014     Glucose, UA Negative     Ketones, UA Negative     Bilirubin, UA Negative     Blood, UA Trace     Protein, UA 30 mg/dL (1+)     Leuk Esterase, UA Moderate (2+)     Nitrite, UA Negative     Urobilinogen, UA 0.2 E.U./dL           Cultures:       Radiology Data:    Imaging  Results (last 24 hours)     Procedure Component Value Units Date/Time    US Renal Limited [[id number]] Collected:  10/18/19 1812     Updated:  10/18/19 1817    Narrative:       Exam: Renal ultrasound     Indication: renal failure     Comparison: None     Finding:     Right kidney is surgically absent.  The left kidney measures 12.5 cm in length.  Left kidney echogenicity and cortical thickness are normal.  No shadowing calculi or hydronephrosis.  No focal bladder abnormality.     Incidentally noted:  There is gallbladder wall thickening and the  gallbladder lumen contains layering sludge. Common bile duct is  nondilated measuring 4 mm diameter.       Impression:          Negative for hydronephrosis, renal atrophy, or shadowing calculi.     Incidentally noted, gallbladder wall thickening and layering sludge.  Recommend clinical correlation to exclude cholecystitis.  This report was finalized on 10/18/2019 18:14 by Dr. Akhil Collins MD.          Allergies   Allergen Reactions   • Clindamycin/Lincomycin Rash     pustules   • Doxycycline Rash   • Hepatitis B Virus Vaccine Rash     Pustules     • Latex Rash   • Levaquin [Levofloxacin] Rash   • Penicillins Rash       Scheduled meds:     methylPREDNISolone sodium succinate 20 mg Intravenous Q6H   pantoprazole 40 mg Oral Q AM   sodium chloride 10 mL Intravenous Q12H       PRN meds:  •  calcium carbonate  •  ondansetron  •  sodium chloride    Assessment/Plan       Clear cell carcinoma of kidney (CMS/HCC)    Renal failure    Lung mass    Acute renal failure (ARF) (CMS/HCC)      Plan:  Renal failure.   Improving. Renal failure for last 3 months.  Probably secondary to immunoglobulin.  Consult nephrology.  IV hydration.      Ultrasound of the kidneys-negative for hydronephrosis, renal atrophy, or shadowing calculi.     Renal cell carcinoma mets to the lungs/lung mass.  Status post right kidney resection 2014.     Continue Solu-Medrol per oncology.  Consult  "oncology. Manisha/opdivo 2019 to 2019.    Hyperkalemia.  Kayexalate.    Leukocytosis.  Secondary to steroid.    Gallbladder wall thickening and layering sludge.  Incidental finding from ultrasound.  Discussed with patient.  Patient is asymptomatic.    UTI.  Rocephin.  Urine cultures pending.     Reflux.  Protonix.  Zofran as needed.     Nutrition.  Renal diet.     Discharge Plannin-5 days    Charlie Franco MD   10/19/19   10:25 AM                    Electronically signed by Charlie Franco MD at 10/20/19 0916     Rome Fleming MD at 10/19/19 0806          [facility] Progress Note    Progress Note    Patient:  Parker Arnold  YOB: 1955  Date of Service: 10/19/2019  MRN: [id number]   Acct: [id number]   Primary Care Physician: Betty Almaraz APRN  Advance Directive:   Code Status and Medical Interventions:   Ordered at: 10/18/19 1120     Code Status:    CPR     Medical Interventions (Level of Support Prior to Arrest):    Full     Admit Date: 10/18/2019       Hospital Day: 1      Subjective:     Chief Compliant: Patient seen.  No complaints. \"  I am doing fine.  I am urinating good.\"  Stable overnight per nurse.      Review of Systems:   Review of Systems   Constitutional: Negative for chills, diaphoresis and fever.   HENT: Negative for sore throat and trouble swallowing.    Eyes: Negative for redness and visual disturbance.   Respiratory: Negative for cough, shortness of breath and wheezing.    Cardiovascular: Negative for chest pain and leg swelling.   Gastrointestinal: Negative for abdominal pain, diarrhea, nausea and vomiting.   Endocrine: Negative for cold intolerance and heat intolerance.   Genitourinary: Negative for difficulty urinating, dysuria, flank pain and hematuria.   Musculoskeletal: Negative for arthralgias and joint swelling.   Skin: Positive for pallor.   Neurological: Negative for dizziness, seizures, syncope, numbness and headaches. "   Hematological: Negative for adenopathy. Does not bruise/bleed easily.   Psychiatric/Behavioral: Negative for agitation, behavioral problems, confusion and hallucinations.           Medications:   Scheduled Meds:  methylPREDNISolone sodium succinate 20 mg Intravenous Q6H   pantoprazole 40 mg Oral Q AM   sodium chloride 10 mL Intravenous Q12H       Continuous Infusions:  sodium chloride 125 mL/hr Last Rate: 125 mL/hr (10/19/19 0626)       Labs:     Lab Results (last 72 hours)     Procedure Component Value Units Date/Time    Comprehensive Metabolic Panel [[id number]] Collected:  10/19/19 0741    Specimen:  Blood Updated:  10/19/19 0743    Uric Acid [[id number]]  (Normal) Collected:  10/19/19 0627    Specimen:  Blood Updated:  10/19/19 0725     Uric Acid 3.5 mg/dL     CK [[id number]]  (Normal) Collected:  10/19/19 0627    Specimen:  Blood Updated:  10/19/19 0725     Creatine Kinase 37 U/L     TSH [[id number]]  (Normal) Collected:  10/19/19 0627    Specimen:  Blood Updated:  10/19/19 0725     TSH 0.367 uIU/mL     CBC Auto Differential [[id number]]  (Abnormal) Collected:  10/19/19 0627    Specimen:  Blood Updated:  10/19/19 0701     WBC 10.57 10*3/mm3      RBC 3.73 10*6/mm3      Hemoglobin 11.2 g/dL      Hematocrit 33.9 %      MCV 90.9 fL      MCH 30.0 pg      MCHC 33.0 g/dL      RDW 14.2 %      RDW-SD 46.8 fl      MPV 9.2 fL      Platelets 320 10*3/mm3      Neutrophil % 88.8 %      Lymphocyte % 9.1 %      Monocyte % 1.2 %      Eosinophil % 0.0 %      Basophil % 0.1 %      Immature Grans % 0.8 %      Neutrophils, Absolute 9.39 10*3/mm3      Lymphocytes, Absolute 0.96 10*3/mm3      Monocytes, Absolute 0.13 10*3/mm3      Eosinophils, Absolute 0.00 10*3/mm3      Basophils, Absolute 0.01 10*3/mm3      Immature Grans, Absolute 0.08 10*3/mm3      nRBC 0.0 /100 WBC     Eosinophil Smear - Urine, Urine, Clean Catch [[id number]]  (Abnormal) Collected:  10/18/19 1555    Specimen:  Urine, Clean Catch Updated:  10/19/19 0249      Eosinophil Smear 2 % EOS/100 Cells     Urea Nitrogen, Urine - Urine, Clean Catch [[id number]] Collected:  10/18/19 1138    Specimen:  Urine, Clean Catch Updated:  10/18/19 2048     Urea Nitrogen, Urine 565 mg/dL     Narrative:       Reference intervals for random urine have not been established.  Clinical usage is dependent upon physician's interpretation in combination with other laboratory tests.     Creatinine, Urine, Random - Urine, Clean Catch [[id number]] Collected:  10/18/19 1138    Specimen:  Urine, Clean Catch Updated:  10/18/19 2028     Creatinine, Urine 98.3 mg/dL     Narrative:       Reference intervals for random urine have not been established.  Clinical usage is dependent upon physician's interpretation in combination with other laboratory tests.     Sodium, Urine, Random - Urine, Clean Catch [[id number]] Collected:  10/18/19 1138    Specimen:  Urine, Clean Catch Updated:  10/18/19 1324     Sodium, Urine 76 mmol/L     Narrative:       Reference intervals for random urine have not been established.  Clinical usage is dependent upon physician's interpretation in combination with other laboratory tests.     Protein, Urine, Random - Urine, Clean Catch [[id number]] Collected:  10/18/19 1138    Specimen:  Urine, Clean Catch Updated:  10/18/19 1324     Total Protein, Urine 23.7 mg/dL     Narrative:       Reference intervals for random urine have not been established.  Clinical usage is dependent upon physician's interpretation in combination with other laboratory tests.     Urinalysis, Microscopic Only - Urine, Clean Catch [[id number]]  (Abnormal) Collected:  10/18/19 1138    Specimen:  Urine, Clean Catch Updated:  10/18/19 1203     RBC, UA 0-2 /HPF      WBC, UA Too Numerous to Count /HPF      Bacteria, UA None Seen /HPF      Squamous Epithelial Cells, UA None Seen /HPF      Methodology Manual Light Microscopy    Urine Culture - Urine, Urine, Clean Catch [[id number]] Collected:  10/18/19 1138    Specimen:  Urine,  "Clean Catch Updated:  10/18/19 1203    Urinalysis With Culture If Indicated - Urine, Clean Catch [[id number]]  (Abnormal) Collected:  10/18/19 1138    Specimen:  Urine, Clean Catch Updated:  10/18/19 1155     Color, UA Yellow     Appearance, UA Clear     pH, UA 5.5     Specific Gravity, UA 1.014     Glucose, UA Negative     Ketones, UA Negative     Bilirubin, UA Negative     Blood, UA Trace     Protein, UA 30 mg/dL (1+)     Leuk Esterase, UA Moderate (2+)     Nitrite, UA Negative     Urobilinogen, UA 0.2 E.U./dL            Radiology:     Imaging Results (last 72 hours)     Procedure Component Value Units Date/Time    US Renal Limited [[id number]] Collected:  10/18/19 1812     Updated:  10/18/19 1817    Narrative:       Exam: Renal ultrasound     Indication: renal failure     Comparison: None     Finding:     Right kidney is surgically absent.  The left kidney measures 12.5 cm in length.  Left kidney echogenicity and cortical thickness are normal.  No shadowing calculi or hydronephrosis.  No focal bladder abnormality.     Incidentally noted:  There is gallbladder wall thickening and the  gallbladder lumen contains layering sludge. Common bile duct is  nondilated measuring 4 mm diameter.       Impression:          Negative for hydronephrosis, renal atrophy, or shadowing calculi.     Incidentally noted, gallbladder wall thickening and layering sludge.  Recommend clinical correlation to exclude cholecystitis.  This report was finalized on 10/18/2019 18:14 by Dr. Akhil Collins MD.            Objective:   Vitals: /73 (BP Location: Left arm, Patient Position: Lying)   Pulse 77   Temp 97.5 °F (36.4 °C) (Oral)   Resp 18   Ht 177.8 cm (70\")   Wt 82 kg (180 lb 12.8 oz)   SpO2 98%   BMI 25.94 kg/m²    Physical Exam   Constitutional: He is oriented to person, place, and time. He appears well-developed and well-nourished. No distress.   HENT:   Head: Normocephalic and atraumatic.   Eyes: No scleral icterus. "   Cardiovascular: Normal rate and regular rhythm.   Pulmonary/Chest: No respiratory distress. He has no wheezes. He has no rales.   Abdominal: Soft. Bowel sounds are normal. There is no tenderness.   Musculoskeletal: He exhibits no edema.   Lymphadenopathy:     He has no cervical adenopathy.   Neurological: He is alert and oriented to person, place, and time.   Skin: Skin is warm and dry. He is not diaphoretic. There is pallor.   Psychiatric: He has a normal mood and affect. His behavior is normal. Judgment and thought content normal.   Nursing note and vitals reviewed.    24HR INTAKE/OUTPUT:      Intake/Output Summary (Last 24 hours) at 10/19/2019 0806  Last data filed at 10/19/2019 0600  Gross per 24 hour   Intake 2715 ml   Output 1425 ml   Net 1290 ml        Problem list:       Clear cell carcinoma of kidney (CMS/HCC)    Renal failure    Lung mass    Acute renal failure (ARF) (CMS/HCC)      Assessment/Plan:     ASSESSMENT:  1.  Acute kidney injury from immune therapy with Opdivo  2.  Baseline chronic kidney disease stage III.  3.  Stage IV renal cell carcinoma.  4.  Anemia secondary to chronic kidney disease.  5.  Lung metastasis.    Plan:  1.   Re: Heme status, hemoglobin 11.2 from 12.2.  2.  Await CMP.  Creatinine yesterday was 6.43 with GFR of 9 mL/min.  3.  Continue Solu-Medrol 20 mill grams IV every 6 hours with gastric prophylaxis.  4.  Further recommendations pending.  5.  Above discussed with nurse and patient.  They verbalized understanding.                    Electronically signed by Rome Fleming MD at 10/19/19 0813          Consult Notes (last 72 hours) (Notes from 10/18/19 1004 through 10/21/19 1004)      Leo Dumont, APRN at 10/18/19 1134      Consult Orders    1. Inpatient Nephrology Consult [[id number]] ordered by Charlie Franco MD at 10/18/19 1120           Attestation signed by Jaswinder Ward MD at 10/18/19 1300    I saw and examined patient independently. I have  reviewed the documentation above and agree.  Parker Arnold is a 64 y.o. male  whom we were consulted for acute kidney injury.  Baseline chronic kidney disease stage 3 (s/pr right nephrectomy in 2014 due to renal cell carcinoma).  Has previously seen Marie Shah in our office; last seen in July of this year. Creatinine 2.2 at that time.  Patient has been seeing nephrologist at [facility] since then.  For several months patient has been undergoing chemotherapy for malignant lung cancer from the RCC.  Over the past several months has had multiple episodes of acute kidney injury; due to Sutent as well as Optivo.  He has been on multiple rounds of prednisone.  Last dose of Optivo was five weeks ago.  Review of labs from outside facility shows creatinine has been 2.4-3.3 over the last two months.  Over the last week has developed fatigue, malaise.  Denies any change in urination. Denies n/v/d. Patient was admitted for AMAURI (creatinine was 6.43). He denied NSAIDs abuse.   Vitals: reviewed.  On exam, heart sounds were regular, Lungs with bilateral air entry, abdomen was soft nontender to palpation, legs without edema.  Labs were reviewed.    Assessment and plan:  1.  Acute kidney injury (ATN vs AIN- immunotherapy-induced nephrotoxicity)  2.  Baseline chronic kidney disease stage 3  3.  Prior right nephrectomy due to RCC  4.  Metastatic lung disease  5.  Metabolic acidosis  6.  Anemia   At this time, patient doesn't appear to be uremic and he preferred not to rush into dialysis. I suggest to provide adequate hydration first, hold chemo,provide steroids and follow up labs. Avoid hypotension. Will check CK level. Will also obtain a renal US.                  Nephrology ([facility]) Consult Note      Patient:  Parker Arnold  YOB: 1955  Date of Service: 10/18/2019  MRN: [id number]   Acct: [id number]   Primary Care Physician: Betty Almaraz APRN  Advance Directive:    Code Status and Medical Interventions:   Ordered at: 10/18/19 1120     Code Status:    CPR     Medical Interventions (Level of Support Prior to Arrest):    Full     Admit Date: 10/18/2019       Hospital Day: 0  Referring Provider: Charlie Franco MD      Patient personally seen and examined.  Complete chart including Consults, Notes, Operative Reports, Labs, Cardiology, and Radiology studies reviewed as able.        Subjective:  Parker Arnold is a 64 y.o. male  whom we were consulted for acute kidney injury.  Baseline chronic kidney disease stage 3.  prior right nephrectomy in 2014 due to renal cell carcinoma.  Has previously followed with Marie Shah in our office; last seen in July of this year. Creatinine 2.2 at that time.  Patient has been seeing nephrologist at [facility] since then.  For several months patient has been undergoing chemotherapy for malignant lung cancer from the RCC.  Over the past several months has had multiple episodes of acute kidney injury; due to Sutent as well as Optivo.  He has been on multiple rounds of prednisone.  Last dose of Optivo was five weeks ago.  Review of labs from outside facility shows creatinine has been 2.4-3.3 over the last two months.  Over the last week has developed fatigue, malaise.  Denies any change in urination. Denies n/v/d.  Currently is awake and alert. Denies pain or dyspnea, no edema.  Urine output nonoliguric    Allergies:  Clindamycin/lincomycin; Doxycycline; Hepatitis b virus vaccine; Latex; Levaquin [levofloxacin]; and Penicillins    Home Meds:  Medications Prior to Admission   Medication Sig Dispense Refill Last Dose   • allopurinol (ZYLOPRIM) 100 MG tablet Take 100 mg by mouth Daily.   10/18/2019 at 0630   • omeprazole (priLOSEC) 20 MG capsule Take 20 mg by mouth 2 (Two) Times a Day.  2 10/18/2019 at 0630       Medicines:  Current Facility-Administered Medications   Medication Dose Route Frequency Provider Last Rate Last Dose   •  methylPREDNISolone sodium succinate (SOLU-Medrol) injection 20 mg  20 mg Intravenous Q6H Charlie Franco MD       • ondansetron (ZOFRAN) injection 4 mg  4 mg Intravenous Q6H PRN Charlie Franco MD       • pantoprazole (PROTONIX) EC tablet 40 mg  40 mg Oral Q AM Charlie Franco MD       • sodium chloride 0.9 % flush 10 mL  10 mL Intravenous Q12H Charlie Franco MD       • sodium chloride 0.9 % flush 10 mL  10 mL Intravenous PRN Charlie Franco MD       • sodium chloride 0.9 % infusion  125 mL/hr Intravenous Continuous Charlie Franco MD           Past Medical History:  Past Medical History:   Diagnosis Date   • Renal cell carcinoma (CMS/HCC)        Past Surgical History:  Past Surgical History:   Procedure Laterality Date   • HERNIA REPAIR     • LUNG BIOPSY  2018   • NEPHRECTOMY     • SINUS SURGERY         Family History  Family History   Problem Relation Age of Onset   • Other Mother         blood disease unknown   • Cancer Father         unknown   • No Known Problems Sister    • Heart disease Maternal Grandmother    • Heart disease Maternal Grandfather    • No Known Problems Paternal Grandmother    • No Known Problems Paternal Grandfather    • No Known Problems Sister        Social History  Social History     Socioeconomic History   • Marital status:      Spouse name: Not on file   • Number of children: Not on file   • Years of education: Not on file   • Highest education level: Not on file   Tobacco Use   • Smoking status: Former Smoker     Packs/day: 1.00     Years: 15.00     Pack years: 15.00     Types: Cigarettes     Last attempt to quit:      Years since quittin.8   • Smokeless tobacco: Never Used   Substance and Sexual Activity   • Alcohol use: No     Frequency: Never   • Drug use: No   • Sexual activity: Defer         Review of Systems:  History obtained from chart review and the patient  General ROS: Patient complains of fatigue and malaise and No fever or chills  Respiratory  "ROS: No cough, shortness of breath, wheezing  Cardiovascular ROS: No chest pain or palpitations  Gastrointestinal ROS: No abdominal pain or melena  Genito-Urinary ROS: No dysuria or hematuria  Neurological ROS: no headache or dizziness  14 point ROS reviewed with the patient and negative except as noted above and in the HPI unless unable to obtain.    Objective:  Patient Vitals for the past 24 hrs:   BP Temp Temp src Pulse Resp SpO2 Height Weight   10/18/19 0958 146/78 98.1 °F (36.7 °C) Oral 76 16 99 % 177.8 cm (70\") 82 kg (180 lb 12.8 oz)     No intake or output data in the 24 hours ending 10/18/19 1134  General: awake/alert    Neck :supple, no JVD  Chest:  clear to auscultation bilaterally without respiratory distress  CVS: regular rate and rhythm  Abdominal: soft, nontender, positive bowel sounds  Extremities: no cyanosis or edema  Skin: warm and dry without rash   Neuro: no focal motor deficits      Labs:  Results from last 7 days   Lab Units 10/18/19  0739   WBC 10*3/mm3 9.60   HEMOGLOBIN g/dL 12.2*   HEMATOCRIT % 36.2*   PLATELETS 10*3/mm3 362         Results from last 7 days   Lab Units 10/18/19  0739   SODIUM mmol/L 143   POTASSIUM mmol/L 4.7   CHLORIDE mmol/L 108*   CO2 mmol/L 18.0*   BUN mg/dL 72*   CREATININE mg/dL 6.43*   CALCIUM mg/dL 8.5*   BILIRUBIN mg/dL 0.3   ALK PHOS U/L 59   ALT (SGPT) U/L 14   AST (SGOT) U/L 7   GLUCOSE mg/dL 110*       Radiology:   Imaging Results (last 72 hours)     ** No results found for the last 72 hours. **          Culture:         Assessment   1.  Acute kidney injury (?due to Optivo)  2.  Baseline chronic kidney disease stage 3  3.  Prior right nephrectomy due to RCC  4.  Metastatic lung cancer  5.  Metabolic acidosis  6.  Anemia     Plan:  1.  Workup ongoing. Renal US and urine studies pending  2.  Continue IV fluids  3.  Discussed possibility of dialysis with patient if no improvement in renal function.  4.  Further assessment and plan pending Dr Ward's evaluation of " patient      Thank you for the consult, we appreciate the opportunity to provide care to your patients.  Feel free to contact me if I can be of any further assistance.      Leo Dumont, APRN  10/18/2019  11:34 AM    Electronically signed by Jaswinder Ward MD at 10/18/19 1300

## 2019-10-21 NOTE — PROGRESS NOTES
[facility] Medicine Services  INPATIENT PROGRESS NOTE    Patient Name: Parker Arnold  Date of Admission: 10/18/2019  Today's Date: 10/21/19  Length of Stay: 3  Primary Care Physician: Betty Almaraz APRN    Subjective   Chief Complaint: Renal failure.   HPI   He feels very well today.  He really wishes that he can go home.  However, he knows that oncology and nephrology would like for him to stay until tomorrow.    Fluids are being adjusted.  His renal function continues to improve.    Review of Systems   All pertinent negatives and positives are as above. All other systems have been reviewed and are negative unless otherwise stated.     Objective    Temp:  [97.3 °F (36.3 °C)-98.4 °F (36.9 °C)] 98.4 °F (36.9 °C)  Heart Rate:  [51-73] 65  Resp:  [18] 18  BP: (108-135)/(57-73) 127/60  Physical Exam   Constitutional: He is oriented to person, place, and time. He appears well-developed and well-nourished.   Up in the chair.  No distress.  Seen and discussed with his wife.  Discussed with his nurse, Glenny.   HENT:   Head: Normocephalic and atraumatic.   Eyes: Conjunctivae and EOM are normal. Pupils are equal, round, and reactive to light.   Neck: Neck supple. No JVD present.   Cardiovascular: Normal rate, regular rhythm, normal heart sounds and intact distal pulses. Exam reveals no gallop and no friction rub.   No murmur heard.  Pulmonary/Chest: Effort normal and breath sounds normal. No respiratory distress. He has no wheezes. He has no rales. He exhibits no tenderness.   Abdominal: Soft. Bowel sounds are normal. He exhibits no distension. There is no tenderness. There is no rebound and no guarding.   Musculoskeletal: Normal range of motion. He exhibits no edema, tenderness or deformity.   Neurological: He is alert and oriented to person, place, and time. He displays normal reflexes. No cranial nerve deficit. He exhibits normal muscle tone.   Skin: Skin is warm and  dry. No rash noted.   Psychiatric: He has a normal mood and affect. His behavior is normal. Judgment and thought content normal.     Results Review:  I have reviewed the labs, radiology results, and diagnostic studies.    Laboratory Data:   Results from last 7 days   Lab Units 10/21/19  0824 10/20/19  0607 10/19/19  0627   WBC 10*3/mm3 13.07* 15.15* 10.57   HEMOGLOBIN g/dL 10.1* 10.0* 11.2*   HEMATOCRIT % 30.0* 29.4* 33.9*   PLATELETS 10*3/mm3 262 280 320        Results from last 7 days   Lab Units 10/21/19  0824 10/20/19  0607 10/19/19  0741   SODIUM mmol/L 145 144 139   POTASSIUM mmol/L 4.4 4.5 5.8*   CHLORIDE mmol/L 105 109* 110*   CO2 mmol/L 27.0 22.0 15.0*   BUN mg/dL 57* 63* 63*   CREATININE mg/dL 3.91* 4.85* 5.66*   CALCIUM mg/dL 6.8* 7.2* 8.0*   BILIRUBIN mg/dL 0.2 <0.2* 0.2   ALK PHOS U/L 43 49 54   ALT (SGPT) U/L 10 11 11   AST (SGOT) U/L 10 7 7   GLUCOSE mg/dL 130* 139* 176*     I have reviewed the patient's current medications.     Assessment/Plan     Active Hospital Problems    Diagnosis   • **Acute renal failure with tubular necrosis in the setting of solitary kidney   • Hyperkalemia   • Metabolic acidosis   • Clear cell carcinoma of kidney (CMS/HCC)     Overview:   Diagnoses 11/11/14. Path report in [facility] at [facility]. Tissue has been requested.      • Lung metastases (CMS/HCC)   • Pyuria   • Hyperglycemia   • Hypocalcemia     Plan:   The patient was originally admitted on 10/18 by Dr. Franco.  He has a history of solitary kidney after undergoing a nephrectomy of the right kidney in 2014 for renal cell carcinoma.  Unfortunately, he has had recurrence with metastatic disease to the lung.  He has started seeing Dr. Burleson after following with oncology in [place], [place].  It is felt that his acute renal failure is secondary to recent treatment with Sutent and Opdivo.    He is responding to fluids and steroids.  His serum creatinine is improving nicely.  His hyperkalemia and acidosis  have resolved.  He is now on normal saline.  He may be able to go home tomorrow if his kidney function continues to improve.    His hyperglycemia is likely provoked by steroids.  Check hemoglobin A1c.    SCDs and ambulation for DVT prophylaxis.    Discharge Planning: I expect the patient to be discharged to home tomorrow.     Savage Holloway,    10/21/19   3:21 PM

## 2019-10-21 NOTE — PLAN OF CARE
Problem: Patient Care Overview  Goal: Plan of Care Review  Outcome: Ongoing (interventions implemented as appropriate)   10/21/19 0429   Coping/Psychosocial   Plan of Care Reviewed With patient;spouse   Plan of Care Review   Progress improving   OTHER   Outcome Summary Pt is A&Ox4. VSS. Denies pain. IV infiltrated. New IV in L FA. NS @ 75mL started. Continuing with IV steroid. Strict I/O. Safety maintained. Will cont to monitor.      Goal: Individualization and Mutuality  Outcome: Ongoing (interventions implemented as appropriate)    Goal: Interprofessional Rounds/Family Conf  Outcome: Ongoing (interventions implemented as appropriate)      Problem: Oncology Care (Adult)  Goal: Signs and Symptoms of Listed Potential Problems Will be Absent, Minimized or Managed (Oncology Care)  Outcome: Ongoing (interventions implemented as appropriate)      Problem: Kidney Disease, Chronic/End Stage Renal Disease (Adult)  Goal: Signs and Symptoms of Listed Potential Problems Will be Absent, Minimized or Managed (Kidney Disease, Chronic/End Stage Renal Disease)  Outcome: Ongoing (interventions implemented as appropriate)

## 2019-10-21 NOTE — SIGNIFICANT NOTE
At 0246 patient had an 8 beat run of pvc's. At 0233 heart rate had dropped to 33, then jumped back to 56. He has been running sinus mikael mid 50's -60's. At time of 8 beat run, patient had just gotten back in bed from using bathroom. Denies any SOB, or pain. Will continue to monitor.

## 2019-10-21 NOTE — PROGRESS NOTES
Nephrology ([facility]) Progress Note      Patient:  Parker Arnold  YOB: 1955  Date of Service: 10/21/2019  MRN: [id number]   Acct: [id number]   Primary Care Physician: Betty Almaraz APRN  Advance Directive:   Code Status and Medical Interventions:   Ordered at: 10/18/19 1120     Code Status:    CPR     Medical Interventions (Level of Support Prior to Arrest):    Full     Admit Date: 10/18/2019       Hospital Day: 3  Referring Provider: Charlie Franco MD      Patient personally seen and examined.  Complete chart including Consults, Notes, Operative Reports, Labs, Cardiology, and Radiology studies reviewed as able.        Subjective:  Parker Arnold is a 64 y.o. male  whom we were consulted for acute kidney injury.  Baseline chronic kidney disease stage 3 (s/pr right nephrectomy in 2014 due to renal cell carcinoma).  Has previously seen Marie Shah in our office; last seen in July of this year. Creatinine 2.2 at that time.  Patient has been seeing nephrologist at [facility] since then.  For several months patient has been undergoing chemotherapy for malignant lung cancer from the [facility].  Over the past several months has had multiple episodes of acute kidney injury; due to Sutent as well as Optivo.  He has been on multiple rounds of prednisone.  Last dose of Optivo was five weeks ago.  Review of labs from outside facility shows creatinine has been 2.4-3.3 over the last two months.  Denies any change in urination. Denies n/v/d. Denies NSAIDs use.  Over the last week has developed fatigue, malaise.  Patient admitted for AMAURI (creatinine was 6.43).  Hospital course remarkable for improving renal function with steroids and IV fluids.     Today feeling better. No new overnight issues. Urine output nonoliguric    Allergies:  Clindamycin/lincomycin; Doxycycline; Hepatitis b virus vaccine; Latex; Levaquin [levofloxacin]; and Penicillins    Home Meds:  Medications  Prior to Admission   Medication Sig Dispense Refill Last Dose   • allopurinol (ZYLOPRIM) 100 MG tablet Take 100 mg by mouth Daily.   10/18/2019 at 0630   • omeprazole (priLOSEC) 20 MG capsule Take 20 mg by mouth 2 (Two) Times a Day.  2 10/18/2019 at 0630       Medicines:  Current Facility-Administered Medications   Medication Dose Route Frequency Provider Last Rate Last Dose   • calcium carbonate (TUMS) chewable tablet 500 mg (200 mg elemental)  1 tablet Oral TID PRN Aisha Hansen MD   1 tablet at 10/19/19 0206   • methylPREDNISolone sodium succinate (SOLU-Medrol) injection 20 mg  20 mg Intravenous Q6H Charlie Franco MD   20 mg at 10/21/19 0620   • ondansetron (ZOFRAN) injection 4 mg  4 mg Intravenous Q6H PRN Charlie Franco MD       • pantoprazole (PROTONIX) EC tablet 40 mg  40 mg Oral Q AM Charlie Franco MD   40 mg at 10/21/19 0620   • sodium chloride 0.9 % flush 10 mL  10 mL Intravenous Q12H Charlie Franco MD   10 mL at 10/21/19 0953   • sodium chloride 0.9 % flush 10 mL  10 mL Intravenous PRN Charlie Franco MD       • sodium chloride 0.9 % infusion  75 mL/hr Intravenous Continuous Leo Dumont, APRN           Past Medical History:  Past Medical History:   Diagnosis Date   • Renal cell carcinoma (CMS/HCC)        Past Surgical History:  Past Surgical History:   Procedure Laterality Date   • HERNIA REPAIR  2008   • LUNG BIOPSY  11/2018   • NEPHRECTOMY  2014   • SINUS SURGERY  2005       Family History  Family History   Problem Relation Age of Onset   • Other Mother         blood disease unknown   • Cancer Father         unknown   • No Known Problems Sister    • Heart disease Maternal Grandmother    • Heart disease Maternal Grandfather    • No Known Problems Paternal Grandmother    • No Known Problems Paternal Grandfather    • No Known Problems Sister        Social History  Social History     Socioeconomic History   • Marital status:      Spouse name: Not on file   • Number of children: Not  on file   • Years of education: Not on file   • Highest education level: Not on file   Tobacco Use   • Smoking status: Former Smoker     Packs/day: 1.00     Years: 15.00     Pack years: 15.00     Types: Cigarettes     Last attempt to quit: 1984     Years since quittin.8   • Smokeless tobacco: Never Used   Substance and Sexual Activity   • Alcohol use: No     Frequency: Never   • Drug use: No   • Sexual activity: Defer         Review of Systems:  History obtained from chart review and the patient  General ROS: No fever or chills  Respiratory ROS: No cough, shortness of breath, wheezing  Cardiovascular ROS: No chest pain or palpitations  Gastrointestinal ROS: No abdominal pain or melena  Genito-Urinary ROS: No dysuria or hematuria  Neurological ROS: no headache or dizziness    Objective:  Patient Vitals for the past 24 hrs:   BP Temp Temp src Pulse Resp SpO2 Weight   10/21/19 0802 135/73 98.1 °F (36.7 °C) Oral 51 18 99 % --   10/21/19 0626 122/72 97.3 °F (36.3 °C) Oral 64 18 97 % --   10/21/19 0145 108/63 97.9 °F (36.6 °C) Oral 73 18 97 % --   10/20/19 1900 122/57 98.4 °F (36.9 °C) Oral 73 18 97 % 82.1 kg (180 lb 16 oz)   10/20/19 1154 148/71 98.1 °F (36.7 °C) Oral 76 18 99 % --       Intake/Output Summary (Last 24 hours) at 10/21/2019 1040  Last data filed at 10/21/2019 0500  Gross per 24 hour   Intake 2320.84 ml   Output 1050 ml   Net 1270.84 ml     General: awake/alert    Neck: supple, no JVD  Chest:  clear to auscultation bilaterally without respiratory distress  CVS: regular rate and rhythm  Abdominal: soft, nontender, positive bowel sounds  Extremities: no cyanosis or edema  Skin: warm and dry without rash   Neuro: no focal motor deficits      Labs:  Results from last 7 days   Lab Units 10/21/19  0824 10/20/19  0607 10/19/19  0627   WBC 10*3/mm3 13.07* 15.15* 10.57   HEMOGLOBIN g/dL 10.1* 10.0* 11.2*   HEMATOCRIT % 30.0* 29.4* 33.9*   PLATELETS 10*3/mm3 262 280 320         Results from last 7 days   Lab  Units 10/21/19  0824 10/20/19  0607 10/19/19  0741   SODIUM mmol/L 145 144 139   POTASSIUM mmol/L 4.4 4.5 5.8*   CHLORIDE mmol/L 105 109* 110*   CO2 mmol/L 27.0 22.0 15.0*   BUN mg/dL 57* 63* 63*   CREATININE mg/dL 3.91* 4.85* 5.66*   CALCIUM mg/dL 6.8* 7.2* 8.0*   BILIRUBIN mg/dL 0.2 <0.2* 0.2   ALK PHOS U/L 43 49 54   ALT (SGPT) U/L 10 11 11   AST (SGOT) U/L 10 7 7   GLUCOSE mg/dL 130* 139* 176*       Radiology:   Imaging Results (last 72 hours)     Procedure Component Value Units Date/Time    US Renal Limited [[id number]] Collected:  10/18/19 1812     Updated:  10/18/19 1817    Narrative:       Exam: Renal ultrasound     Indication: renal failure     Comparison: None     Finding:     Right kidney is surgically absent.  The left kidney measures 12.5 cm in length.  Left kidney echogenicity and cortical thickness are normal.  No shadowing calculi or hydronephrosis.  No focal bladder abnormality.     Incidentally noted:  There is gallbladder wall thickening and the  gallbladder lumen contains layering sludge. Common bile duct is  nondilated measuring 4 mm diameter.       Impression:          Negative for hydronephrosis, renal atrophy, or shadowing calculi.     Incidentally noted, gallbladder wall thickening and layering sludge.  Recommend clinical correlation to exclude cholecystitis.  This report was finalized on 10/18/2019 18:14 by Dr. Akhil Collins MD.          Culture:  Urine Culture   Date Value Ref Range Status   10/18/2019 No growth  Final         Assessment   1.  Acute kidney injury (ATN vs AIN- immunotherapy-induced nephrotoxicity)  2.  Baseline chronic kidney disease stage 3  3.  Prior right nephrectomy due to RCC  4.  Metastatic lung disease  5.  Metabolic acidosis--resolved  6.  Anemia   7.  Hyperkalemia--improved    Plan:  1.  Change IV fluids to normal saline at 75 ml/hour  2.  Renal function slowly but steadily recovering. Monitor labs      Leo Dumont, JACLYN  10/21/2019  10:40 AM

## 2019-10-22 VITALS
RESPIRATION RATE: 16 BRPM | DIASTOLIC BLOOD PRESSURE: 84 MMHG | WEIGHT: 197 LBS | HEART RATE: 60 BPM | OXYGEN SATURATION: 98 % | TEMPERATURE: 97.6 F | BODY MASS INDEX: 28.2 KG/M2 | SYSTOLIC BLOOD PRESSURE: 149 MMHG | HEIGHT: 70 IN

## 2019-10-22 LAB
ALBUMIN SERPL-MCNC: 3.3 G/DL (ref 3.5–5.2)
ALBUMIN/GLOB SERPL: 1.4 G/DL
ALP SERPL-CCNC: 41 U/L (ref 39–117)
ALT SERPL W P-5'-P-CCNC: 12 U/L (ref 1–41)
ANION GAP SERPL CALCULATED.3IONS-SCNC: 11 MMOL/L (ref 5–15)
AST SERPL-CCNC: 15 U/L (ref 1–40)
BASOPHILS # BLD AUTO: 0.02 10*3/MM3 (ref 0–0.2)
BASOPHILS NFR BLD AUTO: 0.2 % (ref 0–1.5)
BILIRUB SERPL-MCNC: <0.2 MG/DL (ref 0.2–1.2)
BUN BLD-MCNC: 55 MG/DL (ref 8–23)
BUN/CREAT SERPL: 15.5 (ref 7–25)
CALCIUM SPEC-SCNC: 6.5 MG/DL (ref 8.6–10.5)
CHLORIDE SERPL-SCNC: 108 MMOL/L (ref 98–107)
CO2 SERPL-SCNC: 27 MMOL/L (ref 22–29)
CREAT BLD-MCNC: 3.55 MG/DL (ref 0.76–1.27)
DEPRECATED RDW RBC AUTO: 46.8 FL (ref 37–54)
EOSINOPHIL # BLD AUTO: 0 10*3/MM3 (ref 0–0.4)
EOSINOPHIL NFR BLD AUTO: 0 % (ref 0.3–6.2)
ERYTHROCYTE [DISTWIDTH] IN BLOOD BY AUTOMATED COUNT: 14.5 % (ref 12.3–15.4)
GFR SERPL CREATININE-BSD FRML MDRD: 17 ML/MIN/1.73
GLOBULIN UR ELPH-MCNC: 2.3 GM/DL
GLUCOSE BLD-MCNC: 107 MG/DL (ref 65–99)
HBA1C MFR BLD: 5.5 % (ref 4.8–5.6)
HCT VFR BLD AUTO: 31.1 % (ref 37.5–51)
HGB BLD-MCNC: 10.3 G/DL (ref 13–17.7)
IMM GRANULOCYTES # BLD AUTO: 0.5 10*3/MM3 (ref 0–0.05)
IMM GRANULOCYTES NFR BLD AUTO: 4.2 % (ref 0–0.5)
LYMPHOCYTES # BLD AUTO: 1.56 10*3/MM3 (ref 0.7–3.1)
LYMPHOCYTES NFR BLD AUTO: 13 % (ref 19.6–45.3)
MCH RBC QN AUTO: 29.9 PG (ref 26.6–33)
MCHC RBC AUTO-ENTMCNC: 33.1 G/DL (ref 31.5–35.7)
MCV RBC AUTO: 90.4 FL (ref 79–97)
MONOCYTES # BLD AUTO: 0.99 10*3/MM3 (ref 0.1–0.9)
MONOCYTES NFR BLD AUTO: 8.2 % (ref 5–12)
NEUTROPHILS # BLD AUTO: 8.95 10*3/MM3 (ref 1.7–7)
NEUTROPHILS NFR BLD AUTO: 74.4 % (ref 42.7–76)
NRBC BLD AUTO-RTO: 0.2 /100 WBC (ref 0–0.2)
PLATELET # BLD AUTO: 279 10*3/MM3 (ref 140–450)
PMV BLD AUTO: 9.4 FL (ref 6–12)
POTASSIUM BLD-SCNC: 4 MMOL/L (ref 3.5–5.2)
PROT SERPL-MCNC: 5.6 G/DL (ref 6–8.5)
RBC # BLD AUTO: 3.44 10*6/MM3 (ref 4.14–5.8)
SODIUM BLD-SCNC: 146 MMOL/L (ref 136–145)
WBC NRBC COR # BLD: 12.02 10*3/MM3 (ref 3.4–10.8)

## 2019-10-22 PROCEDURE — 83036 HEMOGLOBIN GLYCOSYLATED A1C: CPT | Performed by: INTERNAL MEDICINE

## 2019-10-22 PROCEDURE — 25010000002 CALCIUM GLUCONATE PER 10 ML: Performed by: INTERNAL MEDICINE

## 2019-10-22 PROCEDURE — 80053 COMPREHEN METABOLIC PANEL: CPT | Performed by: INTERNAL MEDICINE

## 2019-10-22 PROCEDURE — 63710000001 PREDNISONE PER 1 MG: Performed by: INTERNAL MEDICINE

## 2019-10-22 PROCEDURE — 85025 COMPLETE CBC W/AUTO DIFF WBC: CPT | Performed by: INTERNAL MEDICINE

## 2019-10-22 RX ORDER — PREDNISONE 20 MG/1
60 TABLET ORAL
Qty: 90 TABLET | Refills: 0 | Status: SHIPPED | OUTPATIENT
Start: 2019-10-23 | End: 2020-01-02

## 2019-10-22 RX ORDER — FLUCONAZOLE 200 MG/1
200 TABLET ORAL DAILY
Status: DISCONTINUED | OUTPATIENT
Start: 2019-10-23 | End: 2019-10-22 | Stop reason: HOSPADM

## 2019-10-22 RX ORDER — FLUCONAZOLE 200 MG/1
200 TABLET ORAL DAILY
Qty: 28 TABLET | Refills: 0 | Status: SHIPPED | OUTPATIENT
Start: 2019-10-23 | End: 2019-11-20

## 2019-10-22 RX ORDER — SULFAMETHOXAZOLE AND TRIMETHOPRIM 800; 160 MG/1; MG/1
1 TABLET ORAL 3 TIMES WEEKLY
Qty: 12 TABLET | Refills: 0 | Status: SHIPPED | OUTPATIENT
Start: 2019-10-23 | End: 2019-11-19

## 2019-10-22 RX ORDER — ACYCLOVIR 200 MG/1
400 CAPSULE ORAL 2 TIMES DAILY
Qty: 116 CAPSULE | Refills: 0 | Status: SHIPPED | OUTPATIENT
Start: 2019-10-22 | End: 2019-11-20

## 2019-10-22 RX ADMIN — PANTOPRAZOLE SODIUM 40 MG: 40 TABLET, DELAYED RELEASE ORAL at 08:51

## 2019-10-22 RX ADMIN — CALCIUM GLUCONATE 1 G: 98 INJECTION, SOLUTION INTRAVENOUS at 11:05

## 2019-10-22 RX ADMIN — ALLOPURINOL 100 MG: 100 TABLET ORAL at 08:46

## 2019-10-22 RX ADMIN — ACYCLOVIR 400 MG: 200 CAPSULE ORAL at 08:46

## 2019-10-22 RX ADMIN — FLUCONAZOLE 400 MG: 200 TABLET ORAL at 08:46

## 2019-10-22 RX ADMIN — PREDNISONE 40 MG: 20 TABLET ORAL at 08:46

## 2019-10-22 RX ADMIN — SODIUM CHLORIDE 75 ML/HR: 9 INJECTION, SOLUTION INTRAVENOUS at 02:30

## 2019-10-22 NOTE — PAYOR COMM NOTE
"Parker Arias (64 y.o. Male) [id number]   D/C notification   [facility] phone    Fax        Date of Birth Social Security Number Address Home Phone MRN    1955  [address] [place] [id number] [phone] [id number]    Latter-day Marital Status          Baptism        Admission Date Admission Type Admitting Provider Attending Provider Department, Room/Bed    10/18/19 Urgent Savage Holloway, [facility] 3A, 330/1    Discharge Date Discharge Disposition Discharge Destination        10/22/2019 Home or Self Care              Attending Provider:  (none)   Allergies:  Clindamycin/lincomycin, Doxycycline, Hepatitis B Virus Vaccine, Latex, Levaquin [Levofloxacin], Penicillins    Isolation:  None   Infection:  None   Code Status:  CPR    Ht:  177.8 cm (70\")   Wt:  89.4 kg (197 lb)    Admission Cmt:  None   Principal Problem:  Acute renal failure with tubular necrosis in the setting of solitary kidney [N17.0]                 Active Insurance as of 10/18/2019     Primary Coverage     Payor Plan Insurance Group Employer/Plan Group    MISC COMMERCIAL MISC COMMERCIAL [id number]     Coverage Address Coverage Phone Number Coverage Fax Number Effective Dates    [address] [phone]  9/2/2018 - None Entered    [facility] [id number]       Subscriber Name Subscriber Birth Date Member ID       ANG ARIAS 8/13/1967 [id number]           Secondary Coverage     Payor Plan Insurance Group Employer/Plan Group    MEDICARE MEDICARE A & B      Payor Plan Address Payor Plan Phone Number Payor Plan Fax Number Effective Dates    [address] [phone]  3/1/2019 - None Entered    [facility] [id number]       Subscriber Name Subscriber Birth Date Member ID       PARKER ARIAS 1955 [id number]                 Emergency Contacts      (Rel.) Home Phone Work Phone Mobile Phone    ANG ARIAS (Spouse) -- -- [phone]             "   Discharge Summary      Savage Holloway  at 10/22/19 1004                [facility] Medicine Services  DISCHARGE SUMMARY       Date of Admission: 10/18/2019  Date of Discharge:  10/22/2019  Primary Care Physician: Betty Almaraz APRN    Presenting Problem/History of Present Illness:  Abnormal renal function.     Final Discharge Diagnoses:  Active Hospital Problems    Diagnosis   • **Acute renal failure with tubular necrosis in the setting of solitary kidney   • Hyperkalemia   • Metabolic acidosis   • Clear cell carcinoma of kidney (CMS/HCC)     Overview:   Diagnoses 11/11/14. Path report in C.E at [facility]. Tissue has been requested.      • Lung metastases (CMS/HCC)   • Pyuria   • Hyperglycemia   • Hypocalcemia     Consults:   1. Dr. Burleson with onocology.   2. Dr. Ward and Dr. Grant with nephrology.     Procedures Performed: None.     Pertinent Test Results:   Imaging Results (last 7 days)     Procedure Component Value Units Date/Time    US Renal Limited [[id number]] Collected:  10/18/19 1812     Updated:  10/18/19 1817    Narrative:       Exam: Renal ultrasound     Indication: renal failure     Comparison: None     Finding:     Right kidney is surgically absent.  The left kidney measures 12.5 cm in length.  Left kidney echogenicity and cortical thickness are normal.  No shadowing calculi or hydronephrosis.  No focal bladder abnormality.     Incidentally noted:  There is gallbladder wall thickening and the  gallbladder lumen contains layering sludge. Common bile duct is  nondilated measuring 4 mm diameter.       Impression:          Negative for hydronephrosis, renal atrophy, or shadowing calculi.     Incidentally noted, gallbladder wall thickening and layering sludge.  Recommend clinical correlation to exclude cholecystitis.  This report was finalized on 10/18/2019 18:14 by Dr. Akhil Collins MD.        Lab Results (last 7 days)     Procedure  Component Value Units Date/Time    Hemoglobin A1c [[id number]]  (Normal) Collected:  10/22/19 0542    Specimen:  Blood Updated:  10/22/19 0634     Hemoglobin A1C 5.50 %     Narrative:       Hemoglobin A1C Ranges:    Increased Risk for Diabetes  5.7% to 6.4%  Diabetes                     >= 6.5%  Diabetic Goal                < 7.0%    Comprehensive Metabolic Panel [[id number]]  (Abnormal) Collected:  10/22/19 0542    Specimen:  Blood Updated:  10/22/19 0633     Glucose 107 mg/dL      BUN 55 mg/dL      Creatinine 3.55 mg/dL      Sodium 146 mmol/L      Potassium 4.0 mmol/L      Chloride 108 mmol/L      CO2 27.0 mmol/L      Calcium 6.5 mg/dL      Total Protein 5.6 g/dL      Albumin 3.30 g/dL      ALT (SGPT) 12 U/L      AST (SGOT) 15 U/L      Alkaline Phosphatase 41 U/L      Total Bilirubin <0.2 mg/dL      eGFR Non African Amer 17 mL/min/1.73      Globulin 2.3 gm/dL      A/G Ratio 1.4 g/dL      BUN/Creatinine Ratio 15.5     Anion Gap 11.0 mmol/L     Narrative:       GFR Normal >60  Chronic Kidney Disease <60  Kidney Failure <15    CBC Auto Differential [[id number]]  (Abnormal) Collected:  10/22/19 0542    Specimen:  Blood Updated:  10/22/19 0607     WBC 12.02 10*3/mm3      RBC 3.44 10*6/mm3      Hemoglobin 10.3 g/dL      Hematocrit 31.1 %      MCV 90.4 fL      MCH 29.9 pg      MCHC 33.1 g/dL      RDW 14.5 %      RDW-SD 46.8 fl      MPV 9.4 fL      Platelets 279 10*3/mm3      Neutrophil % 74.4 %      Lymphocyte % 13.0 %      Monocyte % 8.2 %      Eosinophil % 0.0 %      Basophil % 0.2 %      Immature Grans % 4.2 %      Neutrophils, Absolute 8.95 10*3/mm3      Lymphocytes, Absolute 1.56 10*3/mm3      Monocytes, Absolute 0.99 10*3/mm3      Eosinophils, Absolute 0.00 10*3/mm3      Basophils, Absolute 0.02 10*3/mm3      Immature Grans, Absolute 0.50 10*3/mm3      nRBC 0.2 /100 WBC     Comprehensive Metabolic Panel [[id number]]  (Abnormal) Collected:  10/21/19 0824    Specimen:  Blood Updated:  10/21/19 0910     Glucose 130 mg/dL       BUN 57 mg/dL      Creatinine 3.91 mg/dL      Sodium 145 mmol/L      Potassium 4.4 mmol/L      Chloride 105 mmol/L      CO2 27.0 mmol/L      Calcium 6.8 mg/dL      Total Protein 5.9 g/dL      Albumin 3.50 g/dL      ALT (SGPT) 10 U/L      AST (SGOT) 10 U/L      Alkaline Phosphatase 43 U/L      Total Bilirubin 0.2 mg/dL      eGFR Non African Amer 16 mL/min/1.73      Globulin 2.4 gm/dL      A/G Ratio 1.5 g/dL      BUN/Creatinine Ratio 14.6     Anion Gap 13.0 mmol/L     Narrative:       GFR Normal >60  Chronic Kidney Disease <60  Kidney Failure <15    CBC Auto Differential [[id number]]  (Abnormal) Collected:  10/21/19 0824    Specimen:  Blood Updated:  10/21/19 0837     WBC 13.07 10*3/mm3      RBC 3.34 10*6/mm3      Hemoglobin 10.1 g/dL      Hematocrit 30.0 %      MCV 89.8 fL      MCH 30.2 pg      MCHC 33.7 g/dL      RDW 14.4 %      RDW-SD 46.3 fl      MPV 9.5 fL      Platelets 262 10*3/mm3      Neutrophil % 86.9 %      Lymphocyte % 8.6 %      Monocyte % 2.9 %      Eosinophil % 0.0 %      Basophil % 0.1 %      Immature Grans % 1.5 %      Neutrophils, Absolute 11.37 10*3/mm3      Lymphocytes, Absolute 1.12 10*3/mm3      Monocytes, Absolute 0.38 10*3/mm3      Eosinophils, Absolute 0.00 10*3/mm3      Basophils, Absolute 0.01 10*3/mm3      Immature Grans, Absolute 0.19 10*3/mm3      nRBC 0.0 /100 WBC     Comprehensive Metabolic Panel [[id number]]  (Abnormal) Collected:  10/20/19 0607    Specimen:  Blood Updated:  10/20/19 0720     Glucose 139 mg/dL      BUN 63 mg/dL      Creatinine 4.85 mg/dL      Sodium 144 mmol/L      Potassium 4.5 mmol/L      Chloride 109 mmol/L      CO2 22.0 mmol/L      Calcium 7.2 mg/dL      Total Protein 5.5 g/dL      Albumin 3.30 g/dL      ALT (SGPT) 11 U/L      AST (SGOT) 7 U/L      Alkaline Phosphatase 49 U/L      Total Bilirubin <0.2 mg/dL      eGFR Non African Amer 12 mL/min/1.73      Comment: <15 Indicative of kidney failure.        eGFR   Amer --     Comment: <15 Indicative of kidney  failure.        Globulin 2.2 gm/dL      A/G Ratio 1.5 g/dL      BUN/Creatinine Ratio 13.0     Anion Gap 13.0 mmol/L     Narrative:       GFR Normal >60  Chronic Kidney Disease <60  Kidney Failure <15    CK [[id number]]  (Normal) Collected:  10/20/19 0607    Specimen:  Blood Updated:  10/20/19 0712     Creatine Kinase 30 U/L     CBC Auto Differential [[id number]]  (Abnormal) Collected:  10/20/19 0607    Specimen:  Blood Updated:  10/20/19 0655     WBC 15.15 10*3/mm3      RBC 3.35 10*6/mm3      Hemoglobin 10.0 g/dL      Hematocrit 29.4 %      MCV 87.8 fL      MCH 29.9 pg      MCHC 34.0 g/dL      RDW 14.2 %      RDW-SD 45.2 fl      MPV 9.5 fL      Platelets 280 10*3/mm3      Neutrophil % 90.6 %      Lymphocyte % 6.5 %      Monocyte % 2.2 %      Eosinophil % 0.0 %      Basophil % 0.1 %      Immature Grans % 0.6 %      Neutrophils, Absolute 13.74 10*3/mm3      Lymphocytes, Absolute 0.98 10*3/mm3      Monocytes, Absolute 0.33 10*3/mm3      Eosinophils, Absolute 0.00 10*3/mm3      Basophils, Absolute 0.01 10*3/mm3      Immature Grans, Absolute 0.09 10*3/mm3      nRBC 0.0 /100 WBC     Urine Culture - Urine, Urine, Clean Catch [[id number]]  (Normal) Collected:  10/18/19 1138    Specimen:  Urine, Clean Catch Updated:  10/19/19 1609     Urine Culture No growth    Comprehensive Metabolic Panel [[id number]]  (Abnormal) Collected:  10/19/19 0741    Specimen:  Blood Updated:  10/19/19 0815     Glucose 176 mg/dL      BUN 63 mg/dL      Creatinine 5.66 mg/dL      Sodium 139 mmol/L      Potassium 5.8 mmol/L      Chloride 110 mmol/L      CO2 15.0 mmol/L      Calcium 8.0 mg/dL      Total Protein 6.4 g/dL      Albumin 3.70 g/dL      ALT (SGPT) 11 U/L      AST (SGOT) 7 U/L      Alkaline Phosphatase 54 U/L      Total Bilirubin 0.2 mg/dL      eGFR Non African Amer 10 mL/min/1.73      Comment: <15 Indicative of kidney failure.        eGFR   Amer --     Comment: <15 Indicative of kidney failure.        Globulin 2.7 gm/dL      A/G Ratio 1.4  g/dL      BUN/Creatinine Ratio 11.1     Anion Gap 14.0 mmol/L     Narrative:       GFR Normal >60  Chronic Kidney Disease <60  Kidney Failure <15    Uric Acid [[id number]]  (Normal) Collected:  10/19/19 0627    Specimen:  Blood Updated:  10/19/19 0725     Uric Acid 3.5 mg/dL     CK [[id number]]  (Normal) Collected:  10/19/19 0627    Specimen:  Blood Updated:  10/19/19 0725     Creatine Kinase 37 U/L     TSH [[id number]]  (Normal) Collected:  10/19/19 0627    Specimen:  Blood Updated:  10/19/19 0725     TSH 0.367 uIU/mL     CBC Auto Differential [[id number]]  (Abnormal) Collected:  10/19/19 0627    Specimen:  Blood Updated:  10/19/19 0701     WBC 10.57 10*3/mm3      RBC 3.73 10*6/mm3      Hemoglobin 11.2 g/dL      Hematocrit 33.9 %      MCV 90.9 fL      MCH 30.0 pg      MCHC 33.0 g/dL      RDW 14.2 %      RDW-SD 46.8 fl      MPV 9.2 fL      Platelets 320 10*3/mm3      Neutrophil % 88.8 %      Lymphocyte % 9.1 %      Monocyte % 1.2 %      Eosinophil % 0.0 %      Basophil % 0.1 %      Immature Grans % 0.8 %      Neutrophils, Absolute 9.39 10*3/mm3      Lymphocytes, Absolute 0.96 10*3/mm3      Monocytes, Absolute 0.13 10*3/mm3      Eosinophils, Absolute 0.00 10*3/mm3      Basophils, Absolute 0.01 10*3/mm3      Immature Grans, Absolute 0.08 10*3/mm3      nRBC 0.0 /100 WBC     Eosinophil Smear - Urine, Urine, Clean Catch [[id number]]  (Abnormal) Collected:  10/18/19 1555    Specimen:  Urine, Clean Catch Updated:  10/19/19 0245     Eosinophil Smear 2 % EOS/100 Cells     Urea Nitrogen, Urine - Urine, Clean Catch [[id number]] Collected:  10/18/19 1138    Specimen:  Urine, Clean Catch Updated:  10/18/19 2048     Urea Nitrogen, Urine 565 mg/dL     Narrative:       Reference intervals for random urine have not been established.  Clinical usage is dependent upon physician's interpretation in combination with other laboratory tests.     Creatinine, Urine, Random - Urine, Clean Catch [[id number]] Collected:  10/18/19 1138    Specimen:   Urine, Clean Catch Updated:  10/18/19 2028     Creatinine, Urine 98.3 mg/dL     Narrative:       Reference intervals for random urine have not been established.  Clinical usage is dependent upon physician's interpretation in combination with other laboratory tests.     Sodium, Urine, Random - Urine, Clean Catch [[id number]] Collected:  10/18/19 1138    Specimen:  Urine, Clean Catch Updated:  10/18/19 1324     Sodium, Urine 76 mmol/L     Narrative:       Reference intervals for random urine have not been established.  Clinical usage is dependent upon physician's interpretation in combination with other laboratory tests.     Protein, Urine, Random - Urine, Clean Catch [[id number]] Collected:  10/18/19 1138    Specimen:  Urine, Clean Catch Updated:  10/18/19 1324     Total Protein, Urine 23.7 mg/dL     Narrative:       Reference intervals for random urine have not been established.  Clinical usage is dependent upon physician's interpretation in combination with other laboratory tests.     Urinalysis, Microscopic Only - Urine, Clean Catch [[id number]]  (Abnormal) Collected:  10/18/19 1138    Specimen:  Urine, Clean Catch Updated:  10/18/19 1203     RBC, UA 0-2 /HPF      WBC, UA Too Numerous to Count /HPF      Bacteria, UA None Seen /HPF      Squamous Epithelial Cells, UA None Seen /HPF      Methodology Manual Light Microscopy    Urinalysis With Culture If Indicated - Urine, Clean Catch [[id number]]  (Abnormal) Collected:  10/18/19 1138    Specimen:  Urine, Clean Catch Updated:  10/18/19 1155     Color, UA Yellow     Appearance, UA Clear     pH, UA 5.5     Specific Gravity, UA 1.014     Glucose, UA Negative     Ketones, UA Negative     Bilirubin, UA Negative     Blood, UA Trace     Protein, UA 30 mg/dL (1+)     Leuk Esterase, UA Moderate (2+)     Nitrite, UA Negative     Urobilinogen, UA 0.2 E.U./dL        Hospital Course:  The patient was originally admitted on 10/18 by Dr. Franco.  He has a history of solitary kidney after  "undergoing a nephrectomy of the right kidney in 2014 for renal cell carcinoma.  Unfortunately, he has had recurrence with metastatic disease to the lung.  He has started seeing Dr. Burleson after following with oncology in [place], [place].  It is felt that his acute renal failure is secondary to recent treatment with Sutent and Opdivo.  Interstitial nephritis would be the main concern.     He has responded to fluids and steroids.  His serum creatinine is improving on a daily basis.  His baseline serum creatinine is 2.2  His hyperkalemia and acidosis have resolved.     It is felt that he can go home with continued improvement of his renal function.  He will remain on 60 mg of prednisone after receiving Solu-Medrol in the hospital.  Nephrology does not recommend a renal biopsy at this point in time.  They will see him on Friday with a repeat BMP.  He will receive a dose of calcium gluconate prior to discharge.     Dr. Burleson is to see him in clinic in 1 week.  She recommends that he be on prophylaxis for viral, bacterial, and fungal infections.  He is to take Bactrim 3 times per week and fluconazole/acyclovir on a daily basis.  Renal dosages were confirmed with pharmacy today.    His mild hyperglycemia is provoked by steroids.   Hemoglobin A1c normal.     Today, he feels well and has no complaints.  He is very anxious to go home and have close outpatient follow-up.    Physical Exam on Discharge:  /84 (BP Location: Left arm, Patient Position: Sitting)   Pulse 60   Temp 97.6 °F (36.4 °C) (Oral)   Resp 16   Ht 177.8 cm (70\")   Wt 89.4 kg (197 lb)   SpO2 98%   BMI 28.27 kg/m²    Physical Exam  Constitutional: He is oriented to person, place, and time. He appears well-developed and well-nourished. Up in bed.  No distress. No family currently present.  Discussed with his nurse, Skylar.   Head: Normocephalic and atraumatic.   Eyes: Conjunctivae and EOM are normal. Pupils are equal, round, and reactive to light. "   Neck: Neck supple. No JVD present.   Musculoskeletal: Normal range of motion. He exhibits no edema, tenderness or deformity.   Neurological: He is alert and oriented to person, place, and time. He displays normal reflexes. No cranial nerve deficit. He exhibits normal muscle tone.   Skin: Skin is warm and dry. No rash noted.   Psychiatric: He has a normal mood and affect. His behavior is normal. Judgment and thought content normal.      Condition on Discharge: Good.     Discharge Disposition:  Home or Self Care    Discharge Medications:     Discharge Medications      New Medications      Instructions Start Date   acyclovir 200 MG capsule  Commonly known as:  ZOVIRAX   400 mg, Oral, 2 Times Daily      fluconazole 200 MG tablet  Commonly known as:  DIFLUCAN   200 mg, Oral, Daily   Start Date:  10/23/2019     predniSONE 20 MG tablet  Commonly known as:  DELTASONE   60 mg, Oral, Daily With Breakfast   Start Date:  10/23/2019     sulfamethoxazole-trimethoprim 800-160 MG per tablet  Commonly known as:  BACTRIM DS,SEPTRA DS   1 tablet, Oral, 3 Times Weekly   Start Date:  10/23/2019        Continue These Medications      Instructions Start Date   allopurinol 100 MG tablet  Commonly known as:  ZYLOPRIM   100 mg, Oral, Daily      omeprazole 20 MG capsule  Commonly known as:  priLOSEC   20 mg, Oral, 2 Times Daily           Discharge Diet:   Diet Instructions     Diet: Regular; Thin      Discharge Diet:  Regular    Fluid Consistency:  Thin        Activity at Discharge:   Activity Instructions     Activity as Tolerated          Follow-up Appointments:   1.  PCP in 1 week.  2.  Dr. Burleson in 1 week.  3.  Nephrology this Friday with a BMP.    Test Results Pending at Discharge: None.     Savage Holloway DO  10/22/19  10:04 AM    Time: 35 minutes.           Electronically signed by Savage Holloway DO at 10/22/19 1012       Discharge Order (From admission, onward)    Start     Ordered    10/22/19 1002  Discharge patient  Once      Expected Discharge Date:  10/22/19    Discharge Disposition:  Home or Self Care    Physician of Record for Attribution - Please select from Treatment Team:  LARISA ZAMAN [[id number]]    Review needed by CMO to determine Physician of Record:  No       Question Answer Comment   Physician of Record for Attribution - Please select from Treatment Team LARISA ZAMAN    Review needed by CMO to determine Physician of Record No        10/22/19 1007

## 2019-10-22 NOTE — PLAN OF CARE
Problem: Patient Care Overview  Goal: Plan of Care Review  Outcome: Ongoing (interventions implemented as appropriate)   10/22/19 0330   Coping/Psychosocial   Plan of Care Reviewed With patient   Plan of Care Review   Progress no change   OTHER   Outcome Summary Pt A&O. Voiding adequetly. Denies pain.        Problem: Oncology Care (Adult)  Goal: Signs and Symptoms of Listed Potential Problems Will be Absent, Minimized or Managed (Oncology Care)  Outcome: Ongoing (interventions implemented as appropriate)      Problem: Kidney Disease, Chronic/End Stage Renal Disease (Adult)  Goal: Signs and Symptoms of Listed Potential Problems Will be Absent, Minimized or Managed (Kidney Disease, Chronic/End Stage Renal Disease)  Outcome: Ongoing (interventions implemented as appropriate)

## 2019-10-22 NOTE — DISCHARGE SUMMARY
[facility] ER Medicine Services  DISCHARGE SUMMARY       Date of Admission: 10/18/2019  Date of Discharge:  10/22/2019  Primary Care Physician: Betty Almaraz APRN    Presenting Problem/History of Present Illness:  Abnormal renal function.     Final Discharge Diagnoses:  Active Hospital Problems    Diagnosis   • **Acute renal failure with tubular necrosis in the setting of solitary kidney   • Hyperkalemia   • Metabolic acidosis   • Clear cell carcinoma of kidney (CMS/HCC)     Overview:   Diagnoses 11/11/14. Path report in C[facility]E at [facility]. Tissue has been requested.      • Lung metastases (CMS/HCC)   • Pyuria   • Hyperglycemia   • Hypocalcemia     Consults:   1. Dr. Burleson with onocology.   2. Dr. Ward and Dr. Grant with nephrology.     Procedures Performed: None.     Pertinent Test Results:   Imaging Results (last 7 days)     Procedure Component Value Units Date/Time    US Renal Limited [[id number]] Collected:  10/18/19 1812     Updated:  10/18/19 1817    Narrative:       Exam: Renal ultrasound     Indication: renal failure     Comparison: None     Finding:     Right kidney is surgically absent.  The left kidney measures 12.5 cm in length.  Left kidney echogenicity and cortical thickness are normal.  No shadowing calculi or hydronephrosis.  No focal bladder abnormality.     Incidentally noted:  There is gallbladder wall thickening and the  gallbladder lumen contains layering sludge. Common bile duct is  nondilated measuring 4 mm diameter.       Impression:          Negative for hydronephrosis, renal atrophy, or shadowing calculi.     Incidentally noted, gallbladder wall thickening and layering sludge.  Recommend clinical correlation to exclude cholecystitis.  This report was finalized on 10/18/2019 18:14 by Dr. Akhil Collins MD.        Lab Results (last 7 days)     Procedure Component Value Units Date/Time    Hemoglobin A1c [[id number]]  (Normal)  Collected:  10/22/19 0542    Specimen:  Blood Updated:  10/22/19 0634     Hemoglobin A1C 5.50 %     Narrative:       Hemoglobin A1C Ranges:    Increased Risk for Diabetes  5.7% to 6.4%  Diabetes                     >= 6.5%  Diabetic Goal                < 7.0%    Comprehensive Metabolic Panel [[id number]]  (Abnormal) Collected:  10/22/19 0542    Specimen:  Blood Updated:  10/22/19 0633     Glucose 107 mg/dL      BUN 55 mg/dL      Creatinine 3.55 mg/dL      Sodium 146 mmol/L      Potassium 4.0 mmol/L      Chloride 108 mmol/L      CO2 27.0 mmol/L      Calcium 6.5 mg/dL      Total Protein 5.6 g/dL      Albumin 3.30 g/dL      ALT (SGPT) 12 U/L      AST (SGOT) 15 U/L      Alkaline Phosphatase 41 U/L      Total Bilirubin <0.2 mg/dL      eGFR Non African Amer 17 mL/min/1.73      Globulin 2.3 gm/dL      A/G Ratio 1.4 g/dL      BUN/Creatinine Ratio 15.5     Anion Gap 11.0 mmol/L     Narrative:       GFR Normal >60  Chronic Kidney Disease <60  Kidney Failure <15    CBC Auto Differential [[id number]]  (Abnormal) Collected:  10/22/19 0542    Specimen:  Blood Updated:  10/22/19 0607     WBC 12.02 10*3/mm3      RBC 3.44 10*6/mm3      Hemoglobin 10.3 g/dL      Hematocrit 31.1 %      MCV 90.4 fL      MCH 29.9 pg      MCHC 33.1 g/dL      RDW 14.5 %      RDW-SD 46.8 fl      MPV 9.4 fL      Platelets 279 10*3/mm3      Neutrophil % 74.4 %      Lymphocyte % 13.0 %      Monocyte % 8.2 %      Eosinophil % 0.0 %      Basophil % 0.2 %      Immature Grans % 4.2 %      Neutrophils, Absolute 8.95 10*3/mm3      Lymphocytes, Absolute 1.56 10*3/mm3      Monocytes, Absolute 0.99 10*3/mm3      Eosinophils, Absolute 0.00 10*3/mm3      Basophils, Absolute 0.02 10*3/mm3      Immature Grans, Absolute 0.50 10*3/mm3      nRBC 0.2 /100 WBC     Comprehensive Metabolic Panel [[id number]]  (Abnormal) Collected:  10/21/19 0824    Specimen:  Blood Updated:  10/21/19 0910     Glucose 130 mg/dL      BUN 57 mg/dL      Creatinine 3.91 mg/dL      Sodium 145 mmol/L       Potassium 4.4 mmol/L      Chloride 105 mmol/L      CO2 27.0 mmol/L      Calcium 6.8 mg/dL      Total Protein 5.9 g/dL      Albumin 3.50 g/dL      ALT (SGPT) 10 U/L      AST (SGOT) 10 U/L      Alkaline Phosphatase 43 U/L      Total Bilirubin 0.2 mg/dL      eGFR Non African Amer 16 mL/min/1.73      Globulin 2.4 gm/dL      A/G Ratio 1.5 g/dL      BUN/Creatinine Ratio 14.6     Anion Gap 13.0 mmol/L     Narrative:       GFR Normal >60  Chronic Kidney Disease <60  Kidney Failure <15    CBC Auto Differential [[id number]]  (Abnormal) Collected:  10/21/19 0824    Specimen:  Blood Updated:  10/21/19 0837     WBC 13.07 10*3/mm3      RBC 3.34 10*6/mm3      Hemoglobin 10.1 g/dL      Hematocrit 30.0 %      MCV 89.8 fL      MCH 30.2 pg      MCHC 33.7 g/dL      RDW 14.4 %      RDW-SD 46.3 fl      MPV 9.5 fL      Platelets 262 10*3/mm3      Neutrophil % 86.9 %      Lymphocyte % 8.6 %      Monocyte % 2.9 %      Eosinophil % 0.0 %      Basophil % 0.1 %      Immature Grans % 1.5 %      Neutrophils, Absolute 11.37 10*3/mm3      Lymphocytes, Absolute 1.12 10*3/mm3      Monocytes, Absolute 0.38 10*3/mm3      Eosinophils, Absolute 0.00 10*3/mm3      Basophils, Absolute 0.01 10*3/mm3      Immature Grans, Absolute 0.19 10*3/mm3      nRBC 0.0 /100 WBC     Comprehensive Metabolic Panel [[id number]]  (Abnormal) Collected:  10/20/19 0607    Specimen:  Blood Updated:  10/20/19 0720     Glucose 139 mg/dL      BUN 63 mg/dL      Creatinine 4.85 mg/dL      Sodium 144 mmol/L      Potassium 4.5 mmol/L      Chloride 109 mmol/L      CO2 22.0 mmol/L      Calcium 7.2 mg/dL      Total Protein 5.5 g/dL      Albumin 3.30 g/dL      ALT (SGPT) 11 U/L      AST (SGOT) 7 U/L      Alkaline Phosphatase 49 U/L      Total Bilirubin <0.2 mg/dL      eGFR Non African Amer 12 mL/min/1.73      Comment: <15 Indicative of kidney failure.        eGFR   Amer --     Comment: <15 Indicative of kidney failure.        Globulin 2.2 gm/dL      A/G Ratio 1.5 g/dL       BUN/Creatinine Ratio 13.0     Anion Gap 13.0 mmol/L     Narrative:       GFR Normal >60  Chronic Kidney Disease <60  Kidney Failure <15    CK [[id number]]  (Normal) Collected:  10/20/19 0607    Specimen:  Blood Updated:  10/20/19 0712     Creatine Kinase 30 U/L     CBC Auto Differential [[id number]]  (Abnormal) Collected:  10/20/19 0607    Specimen:  Blood Updated:  10/20/19 0655     WBC 15.15 10*3/mm3      RBC 3.35 10*6/mm3      Hemoglobin 10.0 g/dL      Hematocrit 29.4 %      MCV 87.8 fL      MCH 29.9 pg      MCHC 34.0 g/dL      RDW 14.2 %      RDW-SD 45.2 fl      MPV 9.5 fL      Platelets 280 10*3/mm3      Neutrophil % 90.6 %      Lymphocyte % 6.5 %      Monocyte % 2.2 %      Eosinophil % 0.0 %      Basophil % 0.1 %      Immature Grans % 0.6 %      Neutrophils, Absolute 13.74 10*3/mm3      Lymphocytes, Absolute 0.98 10*3/mm3      Monocytes, Absolute 0.33 10*3/mm3      Eosinophils, Absolute 0.00 10*3/mm3      Basophils, Absolute 0.01 10*3/mm3      Immature Grans, Absolute 0.09 10*3/mm3      nRBC 0.0 /100 WBC     Urine Culture - Urine, Urine, Clean Catch [[id number]]  (Normal) Collected:  10/18/19 1138    Specimen:  Urine, Clean Catch Updated:  10/19/19 1609     Urine Culture No growth    Comprehensive Metabolic Panel [[id number]]  (Abnormal) Collected:  10/19/19 0741    Specimen:  Blood Updated:  10/19/19 0815     Glucose 176 mg/dL      BUN 63 mg/dL      Creatinine 5.66 mg/dL      Sodium 139 mmol/L      Potassium 5.8 mmol/L      Chloride 110 mmol/L      CO2 15.0 mmol/L      Calcium 8.0 mg/dL      Total Protein 6.4 g/dL      Albumin 3.70 g/dL      ALT (SGPT) 11 U/L      AST (SGOT) 7 U/L      Alkaline Phosphatase 54 U/L      Total Bilirubin 0.2 mg/dL      eGFR Non African Amer 10 mL/min/1.73      Comment: <15 Indicative of kidney failure.        eGFR   Amer --     Comment: <15 Indicative of kidney failure.        Globulin 2.7 gm/dL      A/G Ratio 1.4 g/dL      BUN/Creatinine Ratio 11.1     Anion Gap 14.0 mmol/L      Narrative:       GFR Normal >60  Chronic Kidney Disease <60  Kidney Failure <15    Uric Acid [[id number]]  (Normal) Collected:  10/19/19 0627    Specimen:  Blood Updated:  10/19/19 0725     Uric Acid 3.5 mg/dL     CK [[id number]]  (Normal) Collected:  10/19/19 0627    Specimen:  Blood Updated:  10/19/19 0725     Creatine Kinase 37 U/L     TSH [[id number]]  (Normal) Collected:  10/19/19 0627    Specimen:  Blood Updated:  10/19/19 0725     TSH 0.367 uIU/mL     CBC Auto Differential [[id number]]  (Abnormal) Collected:  10/19/19 0627    Specimen:  Blood Updated:  10/19/19 0701     WBC 10.57 10*3/mm3      RBC 3.73 10*6/mm3      Hemoglobin 11.2 g/dL      Hematocrit 33.9 %      MCV 90.9 fL      MCH 30.0 pg      MCHC 33.0 g/dL      RDW 14.2 %      RDW-SD 46.8 fl      MPV 9.2 fL      Platelets 320 10*3/mm3      Neutrophil % 88.8 %      Lymphocyte % 9.1 %      Monocyte % 1.2 %      Eosinophil % 0.0 %      Basophil % 0.1 %      Immature Grans % 0.8 %      Neutrophils, Absolute 9.39 10*3/mm3      Lymphocytes, Absolute 0.96 10*3/mm3      Monocytes, Absolute 0.13 10*3/mm3      Eosinophils, Absolute 0.00 10*3/mm3      Basophils, Absolute 0.01 10*3/mm3      Immature Grans, Absolute 0.08 10*3/mm3      nRBC 0.0 /100 WBC     Eosinophil Smear - Urine, Urine, Clean Catch [[id number]]  (Abnormal) Collected:  10/18/19 1555    Specimen:  Urine, Clean Catch Updated:  10/19/19 0245     Eosinophil Smear 2 % EOS/100 Cells     Urea Nitrogen, Urine - Urine, Clean Catch [[id number]] Collected:  10/18/19 1138    Specimen:  Urine, Clean Catch Updated:  10/18/19 2048     Urea Nitrogen, Urine 565 mg/dL     Narrative:       Reference intervals for random urine have not been established.  Clinical usage is dependent upon physician's interpretation in combination with other laboratory tests.     Creatinine, Urine, Random - Urine, Clean Catch [[id number]] Collected:  10/18/19 1138    Specimen:  Urine, Clean Catch Updated:  10/18/19 2028     Creatinine, Urine  98.3 mg/dL     Narrative:       Reference intervals for random urine have not been established.  Clinical usage is dependent upon physician's interpretation in combination with other laboratory tests.     Sodium, Urine, Random - Urine, Clean Catch [[id number]] Collected:  10/18/19 1138    Specimen:  Urine, Clean Catch Updated:  10/18/19 1324     Sodium, Urine 76 mmol/L     Narrative:       Reference intervals for random urine have not been established.  Clinical usage is dependent upon physician's interpretation in combination with other laboratory tests.     Protein, Urine, Random - Urine, Clean Catch [[id number]] Collected:  10/18/19 1138    Specimen:  Urine, Clean Catch Updated:  10/18/19 1324     Total Protein, Urine 23.7 mg/dL     Narrative:       Reference intervals for random urine have not been established.  Clinical usage is dependent upon physician's interpretation in combination with other laboratory tests.     Urinalysis, Microscopic Only - Urine, Clean Catch [[id number]]  (Abnormal) Collected:  10/18/19 1138    Specimen:  Urine, Clean Catch Updated:  10/18/19 1203     RBC, UA 0-2 /HPF      WBC, UA Too Numerous to Count /HPF      Bacteria, UA None Seen /HPF      Squamous Epithelial Cells, UA None Seen /HPF      Methodology Manual Light Microscopy    Urinalysis With Culture If Indicated - Urine, Clean Catch [[id number]]  (Abnormal) Collected:  10/18/19 1138    Specimen:  Urine, Clean Catch Updated:  10/18/19 1155     Color, UA Yellow     Appearance, UA Clear     pH, UA 5.5     Specific Gravity, UA 1.014     Glucose, UA Negative     Ketones, UA Negative     Bilirubin, UA Negative     Blood, UA Trace     Protein, UA 30 mg/dL (1+)     Leuk Esterase, UA Moderate (2+)     Nitrite, UA Negative     Urobilinogen, UA 0.2 E.U./dL        Hospital Course:  The patient was originally admitted on 10/18 by Dr. Franco.  He has a history of solitary kidney after undergoing a nephrectomy of the right kidney in 2014 for renal  "cell carcinoma.  Unfortunately, he has had recurrence with metastatic disease to the lung.  He has started seeing Dr. Burleson after following with oncology in [place], [place].  It is felt that his acute renal failure is secondary to recent treatment with Sutent and Opdivo.  Interstitial nephritis would be the main concern.     He has responded to fluids and steroids.  His serum creatinine is improving on a daily basis.  His baseline serum creatinine is 2.2  His hyperkalemia and acidosis have resolved.    It is felt that he can go home with continued improvement of his renal function.  He will remain on 60 mg of prednisone after receiving Solu-Medrol in the hospital.  Nephrology does not recommend a renal biopsy at this point in time.  They will see him on Friday with a repeat BMP.  He will receive a dose of calcium gluconate prior to discharge.     Dr. Burleson is to see him in clinic in 1 week.  She recommends that he be on prophylaxis for viral, bacterial, and fungal infections.  He is to take Bactrim 3 times per week and fluconazole/acyclovir on a daily basis.  Renal dosages were confirmed with pharmacy today.    His mild hyperglycemia is provoked by steroids.  Hemoglobin A1c normal.     Today, he feels well and has no complaints.  He is very anxious to go home and have close outpatient follow-up.    Physical Exam on Discharge:  /84 (BP Location: Left arm, Patient Position: Sitting)   Pulse 60   Temp 97.6 °F (36.4 °C) (Oral)   Resp 16   Ht 177.8 cm (70\")   Wt 89.4 kg (197 lb)   SpO2 98%   BMI 28.27 kg/m²   Physical Exam  Constitutional: He is oriented to person, place, and time. He appears well-developed and well-nourished. Up in bed.  No distress. No family currently present.  Discussed with his nurse, Skylar.   Head: Normocephalic and atraumatic.   Eyes: Conjunctivae and EOM are normal. Pupils are equal, round, and reactive to light.   Neck: Neck supple. No JVD present.   Musculoskeletal: Normal " range of motion. He exhibits no edema, tenderness or deformity.   Neurological: He is alert and oriented to person, place, and time. He displays normal reflexes. No cranial nerve deficit. He exhibits normal muscle tone.   Skin: Skin is warm and dry. No rash noted.   Psychiatric: He has a normal mood and affect. His behavior is normal. Judgment and thought content normal.     Condition on Discharge: Good.     Discharge Disposition:  Home or Self Care    Discharge Medications:     Discharge Medications      New Medications      Instructions Start Date   acyclovir 200 MG capsule  Commonly known as:  ZOVIRAX   400 mg, Oral, 2 Times Daily      fluconazole 200 MG tablet  Commonly known as:  DIFLUCAN   200 mg, Oral, Daily   Start Date:  10/23/2019     predniSONE 20 MG tablet  Commonly known as:  DELTASONE   60 mg, Oral, Daily With Breakfast   Start Date:  10/23/2019     sulfamethoxazole-trimethoprim 800-160 MG per tablet  Commonly known as:  BACTRIM DS,SEPTRA DS   1 tablet, Oral, 3 Times Weekly   Start Date:  10/23/2019        Continue These Medications      Instructions Start Date   allopurinol 100 MG tablet  Commonly known as:  ZYLOPRIM   100 mg, Oral, Daily      omeprazole 20 MG capsule  Commonly known as:  priLOSEC   20 mg, Oral, 2 Times Daily           Discharge Diet:   Diet Instructions     Diet: Regular; Thin      Discharge Diet:  Regular    Fluid Consistency:  Thin        Activity at Discharge:   Activity Instructions     Activity as Tolerated          Follow-up Appointments:   1.  PCP in 1 week.  2.  Dr. Burleson in 1 week.  3.  Nephrology this Friday with a BMP.    Test Results Pending at Discharge: None.     Savage Holloway DO  10/22/19  10:04 AM    Time: 35 minutes.

## 2019-10-23 ENCOUNTER — READMISSION MANAGEMENT (OUTPATIENT)
Dept: CALL CENTER | Facility: HOSPITAL | Age: 64
End: 2019-10-23

## 2019-10-25 ENCOUNTER — READMISSION MANAGEMENT (OUTPATIENT)
Dept: CALL CENTER | Facility: HOSPITAL | Age: 64
End: 2019-10-25

## 2019-10-25 ENCOUNTER — LAB (OUTPATIENT)
Dept: LAB | Facility: HOSPITAL | Age: 64
End: 2019-10-25

## 2019-10-25 ENCOUNTER — TELEPHONE (OUTPATIENT)
Dept: ONCOLOGY | Facility: CLINIC | Age: 64
End: 2019-10-25

## 2019-10-25 DIAGNOSIS — C78.00 MALIGNANT NEOPLASM METASTATIC TO LUNG, UNSPECIFIED LATERALITY (HCC): Primary | ICD-10-CM

## 2019-10-25 DIAGNOSIS — N17.0 ACUTE RENAL FAILURE WITH TUBULAR NECROSIS (HCC): Primary | ICD-10-CM

## 2019-10-25 LAB
ALBUMIN SERPL-MCNC: 3.6 G/DL (ref 3.5–5.2)
ALBUMIN/GLOB SERPL: 1.8 G/DL
ALP SERPL-CCNC: 41 U/L (ref 39–117)
ALT SERPL W P-5'-P-CCNC: 34 U/L (ref 1–41)
ANION GAP SERPL CALCULATED.3IONS-SCNC: 14 MMOL/L (ref 5–15)
AST SERPL-CCNC: 18 U/L (ref 1–40)
BASOPHILS # BLD AUTO: 0.02 10*3/MM3 (ref 0–0.2)
BASOPHILS NFR BLD AUTO: 0.2 % (ref 0–1.5)
BILIRUB SERPL-MCNC: 0.2 MG/DL (ref 0.2–1.2)
BUN BLD-MCNC: 50 MG/DL (ref 8–23)
BUN/CREAT SERPL: 16.7 (ref 7–25)
CALCIUM SPEC-SCNC: 6.6 MG/DL (ref 8.6–10.5)
CHLORIDE SERPL-SCNC: 109 MMOL/L (ref 98–107)
CO2 SERPL-SCNC: 21 MMOL/L (ref 22–29)
CREAT BLD-MCNC: 3 MG/DL (ref 0.76–1.27)
DEPRECATED RDW RBC AUTO: 49 FL (ref 37–54)
EOSINOPHIL # BLD AUTO: 0.01 10*3/MM3 (ref 0–0.4)
EOSINOPHIL NFR BLD AUTO: 0.1 % (ref 0.3–6.2)
ERYTHROCYTE [DISTWIDTH] IN BLOOD BY AUTOMATED COUNT: 15 % (ref 12.3–15.4)
GFR SERPL CREATININE-BSD FRML MDRD: 21 ML/MIN/1.73
GLOBULIN UR ELPH-MCNC: 2 GM/DL
GLUCOSE BLD-MCNC: 129 MG/DL (ref 65–99)
HCT VFR BLD AUTO: 31 % (ref 37.5–51)
HGB BLD-MCNC: 10.2 G/DL (ref 13–17.7)
HOLD SPECIMEN: NORMAL
IMM GRANULOCYTES # BLD AUTO: 0.36 10*3/MM3 (ref 0–0.05)
IMM GRANULOCYTES NFR BLD AUTO: 3.1 % (ref 0–0.5)
LYMPHOCYTES # BLD AUTO: 1.15 10*3/MM3 (ref 0.7–3.1)
LYMPHOCYTES NFR BLD AUTO: 9.9 % (ref 19.6–45.3)
MCH RBC QN AUTO: 30.1 PG (ref 26.6–33)
MCHC RBC AUTO-ENTMCNC: 32.9 G/DL (ref 31.5–35.7)
MCV RBC AUTO: 91.4 FL (ref 79–97)
MONOCYTES # BLD AUTO: 0.55 10*3/MM3 (ref 0.1–0.9)
MONOCYTES NFR BLD AUTO: 4.7 % (ref 5–12)
NEUTROPHILS # BLD AUTO: 9.51 10*3/MM3 (ref 1.7–7)
NEUTROPHILS NFR BLD AUTO: 82 % (ref 42.7–76)
NRBC BLD AUTO-RTO: 0 /100 WBC (ref 0–0.2)
PLATELET # BLD AUTO: 218 10*3/MM3 (ref 140–450)
PMV BLD AUTO: 9.4 FL (ref 6–12)
POTASSIUM BLD-SCNC: 4.2 MMOL/L (ref 3.5–5.2)
PROT SERPL-MCNC: 5.6 G/DL (ref 6–8.5)
RBC # BLD AUTO: 3.39 10*6/MM3 (ref 4.14–5.8)
SODIUM BLD-SCNC: 144 MMOL/L (ref 136–145)
WBC NRBC COR # BLD: 11.6 10*3/MM3 (ref 3.4–10.8)

## 2019-10-25 PROCEDURE — 80053 COMPREHEN METABOLIC PANEL: CPT

## 2019-10-25 PROCEDURE — 85025 COMPLETE CBC W/AUTO DIFF WBC: CPT

## 2019-10-25 PROCEDURE — 36415 COLL VENOUS BLD VENIPUNCTURE: CPT

## 2019-10-25 NOTE — TELEPHONE ENCOUNTER
Placed phone call to Mr Parker Arnold today regarding his labs he had drawn today.  Dr Burleson reviewed them and the creatinine is continuing to improve and now down to 3.0.      Dr PATTERSON advised to tell patient that we will continue him on Prednisone 60mg daily until he reaches his baseline of 2.0 and then we will start the wean. We will wean slowly over 1 month.      He will return on Tuesday the 29th for repeat labs and further recommendations.

## 2019-10-28 ENCOUNTER — READMISSION MANAGEMENT (OUTPATIENT)
Dept: CALL CENTER | Facility: HOSPITAL | Age: 64
End: 2019-10-28

## 2019-10-28 NOTE — OUTREACH NOTE
Medical Week 1 Survey      Responses   Facility patient discharged from?  [facility]   Does the patient have one of the following disease processes/diagnoses(primary or secondary)?  Other   Is there a successful TCM telephone encounter documented?  No   Week 1 attempt successful?  Yes   Call start time  1703   Call end time  1708   Discharge diagnosis  ARF with tubular necrosis in setting of solitary kidney, hyperkalemia, Metabolic acidosis, Clear cell carcinoma of Kidney, lung metastases, pyuria, Hyperglycemia, hypocalcemia   Meds reviewed with patient/caregiver?  Yes   Is the patient having any side effects they believe may be caused by any medication additions or changes?  No   Does the patient have all medications ordered at discharge?  Yes   Is the patient taking all medications as directed (includes completed medication regime)?  Yes   Comments regarding appointments  PCP on 10/29/19   Does the patient have a primary care provider?   Yes   Does the patient have an appointment with their PCP within 7 days of discharge?  Yes   Has the patient kept scheduled appointments due by today?  N/A   Has home health visited the patient within 72 hours of discharge?  N/A   Psychosocial issues?  No   Did the patient receive a copy of their discharge instructions?  Yes   Nursing interventions  Reviewed instructions with patient   What is the patient's perception of their health status since discharge?  Improving   Is the patient/caregiver able to teach back signs and symptoms related to disease process for when to call PCP?  Yes   Is the patient/caregiver able to teach back signs and symptoms related to disease process for when to call 911?  Yes   Is the patient/caregiver able to teach back the hierarchy of who to call/visit for symptoms/problems? PCP, Specialist, Home health nurse, Urgent Care, ED, 911  Yes   Week 1 call completed?  Yes          Estephania Sibley RN

## 2019-10-29 ENCOUNTER — LAB (OUTPATIENT)
Dept: LAB | Facility: HOSPITAL | Age: 64
End: 2019-10-29

## 2019-10-29 DIAGNOSIS — N17.0 ACUTE RENAL FAILURE WITH TUBULAR NECROSIS (HCC): Primary | ICD-10-CM

## 2019-10-29 DIAGNOSIS — N17.0 ACUTE RENAL FAILURE WITH TUBULAR NECROSIS (HCC): ICD-10-CM

## 2019-10-29 LAB
ALBUMIN SERPL-MCNC: 3.8 G/DL (ref 3.5–5.2)
ALBUMIN/GLOB SERPL: 1.7 G/DL
ALP SERPL-CCNC: 42 U/L (ref 39–117)
ALT SERPL W P-5'-P-CCNC: 22 U/L (ref 1–41)
ANION GAP SERPL CALCULATED.3IONS-SCNC: 16 MMOL/L (ref 5–15)
AST SERPL-CCNC: 12 U/L (ref 1–40)
BASOPHILS # BLD AUTO: 0.02 10*3/MM3 (ref 0–0.2)
BASOPHILS NFR BLD AUTO: 0.2 % (ref 0–1.5)
BILIRUB SERPL-MCNC: 0.3 MG/DL (ref 0.2–1.2)
BUN BLD-MCNC: 44 MG/DL (ref 8–23)
BUN/CREAT SERPL: 16 (ref 7–25)
CALCIUM SPEC-SCNC: 7.1 MG/DL (ref 8.6–10.5)
CHLORIDE SERPL-SCNC: 108 MMOL/L (ref 98–107)
CO2 SERPL-SCNC: 19 MMOL/L (ref 22–29)
CREAT BLD-MCNC: 2.75 MG/DL (ref 0.76–1.27)
DEPRECATED RDW RBC AUTO: 50.9 FL (ref 37–54)
EOSINOPHIL # BLD AUTO: 0.01 10*3/MM3 (ref 0–0.4)
EOSINOPHIL NFR BLD AUTO: 0.1 % (ref 0.3–6.2)
ERYTHROCYTE [DISTWIDTH] IN BLOOD BY AUTOMATED COUNT: 16 % (ref 12.3–15.4)
GFR SERPL CREATININE-BSD FRML MDRD: 23 ML/MIN/1.73
GLOBULIN UR ELPH-MCNC: 2.2 GM/DL
GLUCOSE BLD-MCNC: 113 MG/DL (ref 65–99)
HCT VFR BLD AUTO: 34 % (ref 37.5–51)
HGB BLD-MCNC: 11.2 G/DL (ref 13–17.7)
IMM GRANULOCYTES # BLD AUTO: 0.12 10*3/MM3 (ref 0–0.05)
IMM GRANULOCYTES NFR BLD AUTO: 1 % (ref 0–0.5)
LYMPHOCYTES # BLD AUTO: 1.42 10*3/MM3 (ref 0.7–3.1)
LYMPHOCYTES NFR BLD AUTO: 12.2 % (ref 19.6–45.3)
MCH RBC QN AUTO: 30.4 PG (ref 26.6–33)
MCHC RBC AUTO-ENTMCNC: 32.9 G/DL (ref 31.5–35.7)
MCV RBC AUTO: 92.1 FL (ref 79–97)
MONOCYTES # BLD AUTO: 0.78 10*3/MM3 (ref 0.1–0.9)
MONOCYTES NFR BLD AUTO: 6.7 % (ref 5–12)
NEUTROPHILS # BLD AUTO: 9.32 10*3/MM3 (ref 1.7–7)
NEUTROPHILS NFR BLD AUTO: 79.8 % (ref 42.7–76)
NRBC BLD AUTO-RTO: 0 /100 WBC (ref 0–0.2)
PLATELET # BLD AUTO: 198 10*3/MM3 (ref 140–450)
PMV BLD AUTO: 10.2 FL (ref 6–12)
POTASSIUM BLD-SCNC: 4.1 MMOL/L (ref 3.5–5.2)
PROT SERPL-MCNC: 6 G/DL (ref 6–8.5)
RBC # BLD AUTO: 3.69 10*6/MM3 (ref 4.14–5.8)
SODIUM BLD-SCNC: 143 MMOL/L (ref 136–145)
WBC NRBC COR # BLD: 11.67 10*3/MM3 (ref 3.4–10.8)

## 2019-10-29 PROCEDURE — 36415 COLL VENOUS BLD VENIPUNCTURE: CPT

## 2019-10-29 PROCEDURE — 85025 COMPLETE CBC W/AUTO DIFF WBC: CPT

## 2019-10-29 PROCEDURE — 80053 COMPREHEN METABOLIC PANEL: CPT

## 2019-10-29 NOTE — PAYOR COMM NOTE
"[id number]  [place] HOME 10-22-19  UR  103 [id number]    Parker Arias (64 y.o. Male)     Date of Birth Social Security Number Address Home Phone MRN    [id number]  [address] [facility] [address] [phone] [id number]    Denominational Marital Status          Zoroastrianism        Admission Date Admission Type Admitting Provider Attending Provider Department, Room/Bed    10/18/19 Urgent [place], [facility] 3A, 330/1    Discharge Date Discharge Disposition Discharge Destination        10/22/2019 Home or Self Care              Attending Provider:  (none)   Allergies:  Clindamycin/lincomycin, Doxycycline, Hepatitis B Virus Vaccine, Latex, Levaquin [Levofloxacin], Penicillins    Isolation:  None   Infection:  None   Code Status:  Prior    Ht:  177.8 cm (70\")   Wt:  89.4 kg (197 lb)    Admission Cmt:  None   Principal Problem:  Acute renal failure with tubular necrosis in the setting of solitary kidney [N17.0]                 Active Insurance as of 10/18/2019     Primary Coverage     Payor Plan Insurance Group Employer/Plan Group    MISC COMMERCIAL MISC COMMERCIAL [id number]     Coverage Address Coverage Phone Number Coverage Fax Number Effective Dates    [address] [phone]  9/2/2018 - None Entered    [facility] [id number]       Subscriber Name Subscriber Birth Date Member ID       ANG ARIAS 8/13/1967 [id number]           Secondary Coverage     Payor Plan Insurance Group Employer/Plan Group    MEDICARE MEDICARE A & B      Payor Plan Address Payor Plan Phone Number Payor Plan Fax Number Effective Dates    [address] [phone]  3/1/2019 - None Entered    [facility] [id number]       Subscriber Name Subscriber Birth Date Member ID       PARKER ARIAS 1955 [id number]                 Emergency Contacts      (Rel.) Home Phone Work Phone Mobile Phone    ANG ARIAS (Spouse) -- -- [phone]               History & Physical      Charlie Franco MD at " 10/18/19 1053              [facility] Medicine Services  HISTORY AND PHYSICAL    Date of Admission: 10/18/2019  Primary Care Physician: Betty Almaraz APRN    Subjective     Chief Complaint: Acute renal failure.  Renal cancer.    History of Present Illness  Patient is a 64-year  male direct admit from Dr. PATTERSON's office for acute renal failure secondary to immunoglobulin treatment.  Patient has nephrectomy of the right kidney in 2014.  Patient has been undergoing immunotherapy secondary to metastatic lung cancer from renal cell carcinoma.  Patient has renal failure for last 3 months.  Treatment immunoglobulins from 5/13/2019 to 8/7/2019.  Patient states he still producing urine.    Patient also have a history of benign prostate hypertrophy.    Review of Systems   Constitutional: Positive for activity change and appetite change. Negative for chills and fever.   HENT: Negative for hearing loss, nosebleeds, tinnitus and trouble swallowing.    Eyes: Negative for visual disturbance.   Respiratory: Negative for cough, chest tightness, shortness of breath and wheezing.    Cardiovascular: Negative for chest pain, palpitations and leg swelling.   Gastrointestinal: Negative for abdominal distention, abdominal pain, blood in stool, constipation, diarrhea, nausea and vomiting.   Endocrine: Negative for cold intolerance, heat intolerance, polydipsia, polyphagia and polyuria.   Genitourinary: Negative for decreased urine volume, difficulty urinating, dysuria, flank pain, frequency and hematuria.   Musculoskeletal: Negative for arthralgias, joint swelling and myalgias.   Skin: Negative for rash.   Allergic/Immunologic: Negative for immunocompromised state.   Neurological: Negative for dizziness, syncope, weakness, light-headedness and headaches.   Hematological: Negative for adenopathy. Does not bruise/bleed easily.   Psychiatric/Behavioral: Negative for confusion and sleep  disturbance. The patient is not nervous/anxious.         Otherwise complete ROS reviewed and negative except as mentioned in the HPI.      Past Medical History:   Past Medical History:   Diagnosis Date   • Renal cell carcinoma (CMS/HCC)        Past Surgical History:  Past Surgical History:   Procedure Laterality Date   • HERNIA REPAIR  2008   • LUNG BIOPSY  11/2018   • NEPHRECTOMY  2014   • SINUS SURGERY  2005       Family History: family history includes Cancer in his father; Heart disease in his maternal grandfather and maternal grandmother; No Known Problems in his paternal grandfather, paternal grandmother, sister, and sister; Other in his mother.    Social History:  reports that he quit smoking about 35 years ago. His smoking use included cigarettes. He has a 15.00 pack-year smoking history. He has never used smokeless tobacco. He reports that he does not drink alcohol or use drugs.    Medications:  Prior to Admission medications    Medication Sig Start Date End Date Taking? Authorizing Provider   allopurinol (ZYLOPRIM) 100 MG tablet Take 100 mg by mouth Daily.    Brian Lopes MD   omeprazole (priLOSEC) 20 MG capsule Take 20 mg by mouth 2 (Two) Times a Day. 8/12/19   Brian Lopes MD   acyclovir (ZOVIRAX) 400 MG tablet TAKE 1 TABLET(S) EVERY 6 HOURS BY ORAL ROUTE FOR 14 DAYS. 8/12/19 10/18/19  Brian Lopes MD   aspirin 81 MG tablet Take 81 mg by mouth.  10/18/19  Brian Lopes MD   gabapentin (NEURONTIN) 100 MG capsule Take 100 mg by mouth 3 (Three) Times a Day. 11/27/18 10/18/19  Brian Lopes MD   predniSONE (DELTASONE) 10 MG tablet TAKE 1 TABLET BY MOUTH EVERY DAY FOR 7 DAYS 9/23/19 10/18/19  Brian Lopes MD   predniSONE (DELTASONE) 20 MG tablet TAKE 2 TABLET(S) EVERY DAY BY MOUTH FOR 7 DAYS. 9/6/19 10/18/19  Brian Lopes MD   terazosin (HYTRIN) 1 MG capsule Take 1 mg by mouth every night at bedtime. 7/27/19 10/18/19  Brian Lopes MD  "  terazosin (HYTRIN) 2 MG capsule Take 2 mg by mouth every night at bedtime. 7/24/19 10/18/19  Provider, Brian, MD     Allergies:  Allergies not on file    Objective     Vital Signs: /78 (BP Location: Right arm, Patient Position: Sitting)   Pulse 76   Temp 98.1 °F (36.7 °C) (Oral)   Resp 16   Ht 177.8 cm (70\")   Wt 82 kg (180 lb 12.8 oz)   SpO2 99%   BMI 25.94 kg/m²    Physical Exam   Constitutional: He is oriented to person, place, and time. He appears well-developed and well-nourished.   HENT:   Head: Normocephalic and atraumatic.   Eyes: Conjunctivae and EOM are normal. Pupils are equal, round, and reactive to light.   Neck: Neck supple. No JVD present. No thyromegaly present.   Cardiovascular: Normal rate, regular rhythm, normal heart sounds and intact distal pulses. Exam reveals no gallop and no friction rub.   No murmur heard.  Pulmonary/Chest: Effort normal and breath sounds normal. No respiratory distress. He has no wheezes. He has no rales. He exhibits no tenderness.   Abdominal: Soft. Bowel sounds are normal. He exhibits no distension. There is no tenderness. There is no rebound and no guarding.   Musculoskeletal: Normal range of motion. He exhibits no edema, tenderness or deformity.   Lymphadenopathy:     He has no cervical adenopathy.   Neurological: He is alert and oriented to person, place, and time. He displays normal reflexes. No cranial nerve deficit. He exhibits normal muscle tone.   Skin: Skin is warm and dry. No rash noted.   Psychiatric: He has a normal mood and affect. His behavior is normal. Judgment and thought content normal.   Nursing note and vitals reviewed.      Results Reviewed:    Lab Results (last 24 hours)     ** No results found for the last 24 hours. **           Radiology Data:    Imaging Results (last 24 hours)     ** No results found for the last 24 hours. **          I have personally reviewed and interpreted the radiology studies and ECG obtained at time of " admission.     Assessment / Plan      Assessment & Plan  Active Hospital Problems    Diagnosis   • Clear cell carcinoma of kidney (CMS/HCC)     Overview:   Diagnoses 11/11/14. Path report in C.E at [facility]. Tissue has been requested.      • Renal failure   • Lung mass     Plans    Renal failure.  Renal failure for last 3 months.  Probably secondary to immunoglobulin.  Consult nephrology.  IV hydration.  Creatinine greater than 6.  Ultrasound of the left kidneys.    Renal cell carcinoma.  Status post right kidney resection 2014.  Start methylprednisolone 1 mg/kg/day.  Consult oncology.  Yervoy/opdivo 5/13/2019 to 8/7/2019.    Lung mass.  Pathology shows metastatic renal cell carcinoma in the lungs.    Reflux.  Protonix.  Zofran as needed.    Nutrition.  Renal diet.    Code Status: full code     I discussed the patient's findings and my recommendations with: patient    Estimated length of stay: 2-5 days.    Charlie Franco MD   10/18/19   11:11 AM              Electronically signed by Charlie Franco MD at 10/18/19 1123       Emergency Department Notes     No notes of this type exist for this encounter.        Hospital Medications (all)       Dose Frequency Start End    calcium gluconate 1 g in sodium chloride 0.9 % 100 mL IVPB 1 g Once 10/22/2019 10/22/2019    Sig - Route: Infuse 1 g into a venous catheter 1 (One) Time. - Intravenous    sodium polystyrene (KAYEXALATE) 15 GM/60ML suspension 15 g 15 g Once 10/19/2019 10/19/2019    Sig - Route: Take 60 mL by mouth 1 (One) Time. - Oral    acetaminophen (TYLENOL) tablet 650 mg (Discontinued) 650 mg Every 6 Hours PRN 10/21/2019 10/22/2019    Sig - Route: Take 2 tablets by mouth Every 6 (Six) Hours As Needed for Mild Pain . - Oral    Reason for Discontinue: Patient Discharge    acyclovir (ZOVIRAX) capsule 400 mg (Discontinued) 400 mg 2 Times Daily 10/21/2019 10/22/2019    Sig - Route: Take 2 capsules by mouth 2 (Two) Times a Day. - Oral    Reason for  Discontinue: Patient Discharge    allopurinol (ZYLOPRIM) tablet 100 mg (Discontinued) 100 mg Daily 10/21/2019 10/22/2019    Sig - Route: Take 1 tablet by mouth Daily. - Oral    Reason for Discontinue: Patient Discharge    calcium carbonate (TUMS) chewable tablet 500 mg (200 mg elemental) (Discontinued) 1 tablet 3 Times Daily PRN 10/19/2019 10/22/2019    Sig - Route: Chew 500 mg 3 (Three) Times a Day As Needed for Indigestion or Heartburn. - Oral    Reason for Discontinue: Patient Discharge    cefTRIAXone (ROCEPHIN) 1 g/100 mL 0.9% NS (MBP) (Discontinued) 1 g Every 24 Hours 10/20/2019 10/20/2019    Sig - Route: Infuse 100 mL into a venous catheter Daily. - Intravenous    fluconazole (DIFLUCAN) tablet 200 mg (Discontinued) 200 mg Daily 10/23/2019 10/22/2019    Sig - Route: Take 1 tablet by mouth Daily. - Oral    Reason for Discontinue: Patient Discharge    fluconazole (DIFLUCAN) tablet 400 mg (Discontinued) 400 mg Daily 10/21/2019 10/22/2019    Sig - Route: Take 2 tablets by mouth Daily. - Oral    methylPREDNISolone sodium succinate (SOLU-Medrol) injection 20 mg (Discontinued) 20 mg Every 6 Hours Scheduled 10/18/2019 10/21/2019    Sig - Route: Infuse 0.5 mL into a venous catheter Every 6 (Six) Hours. - Intravenous    ondansetron (ZOFRAN) injection 4 mg (Discontinued) 4 mg Every 6 Hours PRN 10/18/2019 10/22/2019    Sig - Route: Infuse 2 mL into a venous catheter Every 6 (Six) Hours As Needed for Nausea or Vomiting. - Intravenous    Reason for Discontinue: Patient Discharge    pantoprazole (PROTONIX) EC tablet 40 mg (Discontinued) 40 mg Every Early Morning 10/18/2019 10/22/2019    Sig - Route: Take 1 tablet by mouth Every Morning. - Oral    Reason for Discontinue: Patient Discharge    predniSONE (DELTASONE) tablet 40 mg (Discontinued) 40 mg Daily With Breakfast 10/22/2019 10/22/2019    Sig - Route: Take 2 tablets by mouth Daily With Breakfast. - Oral    predniSONE (DELTASONE) tablet 60 mg (Discontinued) 60 mg Daily With  Breakfast 10/23/2019 10/22/2019    Sig - Route: Take 60 mg by mouth Daily With Breakfast. - Oral    Reason for Discontinue: Patient Discharge    sodium bicarbonate 8.4 % 150 mEq in sodium chloride 0.45 % 1,000 mL infusion (greater than 75 mEq) (Discontinued) 150 mEq Continuous 10/19/2019 10/20/2019    Sig - Route: Infuse 150 mEq into a venous catheter Continuous. - Intravenous    sodium bicarbonate 8.4 % 75 mEq in sodium chloride 0.45 % 1,000 mL infusion (greater than 75 mEq) (Discontinued) 75 mEq Continuous 10/20/2019 10/21/2019    Sig - Route: Infuse 75 mEq into a venous catheter Continuous. - Intravenous    sodium chloride 0.9 % flush 10 mL (Discontinued) 10 mL Every 12 Hours Scheduled 10/18/2019 10/22/2019    Sig - Route: Infuse 10 mL into a venous catheter Every 12 (Twelve) Hours. - Intravenous    Reason for Discontinue: Patient Discharge    sodium chloride 0.9 % flush 10 mL (Discontinued) 10 mL As Needed 10/18/2019 10/22/2019    Sig - Route: Infuse 10 mL into a venous catheter As Needed for Line Care. - Intravenous    Reason for Discontinue: Patient Discharge    sodium chloride 0.9 % infusion (Discontinued) 125 mL/hr Continuous 10/18/2019 10/19/2019    Sig - Route: Infuse 125 mL/hr into a venous catheter Continuous. - Intravenous    sodium chloride 0.9 % infusion (Discontinued) 75 mL/hr Continuous 10/21/2019 10/22/2019    Sig - Route: Infuse 75 mL/hr into a venous catheter Continuous. - Intravenous    Reason for Discontinue: Patient Discharge    sulfamethoxazole-trimethoprim (BACTRIM DS,SEPTRA DS) 800-160 MG per tablet 160 mg (Discontinued) 1 tablet 3 Times Weekly 10/21/2019 10/22/2019    Sig - Route: Take 1 tablet by mouth Once per day on Mon Wed Fri. - Oral    Reason for Discontinue: Patient Discharge          Lab Results (last 48 hours)     Procedure Component Value Units Date/Time    Hemoglobin A1c [[id number]]  (Normal) Collected:  10/22/19 0542    Specimen:  Blood Updated:  10/22/19 0634     Hemoglobin  A1C 5.50 %     Narrative:       Hemoglobin A1C Ranges:    Increased Risk for Diabetes  5.7% to 6.4%  Diabetes                     >= 6.5%  Diabetic Goal                < 7.0%    Comprehensive Metabolic Panel [[id number]]  (Abnormal) Collected:  10/22/19 0542    Specimen:  Blood Updated:  10/22/19 0633     Glucose 107 mg/dL      BUN 55 mg/dL      Creatinine 3.55 mg/dL      Sodium 146 mmol/L      Potassium 4.0 mmol/L      Chloride 108 mmol/L      CO2 27.0 mmol/L      Calcium 6.5 mg/dL      Total Protein 5.6 g/dL      Albumin 3.30 g/dL      ALT (SGPT) 12 U/L      AST (SGOT) 15 U/L      Alkaline Phosphatase 41 U/L      Total Bilirubin <0.2 mg/dL      eGFR Non African Amer 17 mL/min/1.73      Globulin 2.3 gm/dL      A/G Ratio 1.4 g/dL      BUN/Creatinine Ratio 15.5     Anion Gap 11.0 mmol/L     Narrative:       GFR Normal >60  Chronic Kidney Disease <60  Kidney Failure <15    CBC & Differential [[id number]] Collected:  10/22/19 0542    Specimen:  Blood Updated:  10/22/19 0607    Narrative:       The following orders were created for panel order CBC & Differential.  Procedure                               Abnormality         Status                     ---------                               -----------         ------                     CBC Auto Differential[[id number]]        Abnormal            Final result                 Please view results for these tests on the individual orders.    CBC Auto Differential [[id number]]  (Abnormal) Collected:  10/22/19 0542    Specimen:  Blood Updated:  10/22/19 0607     WBC 12.02 10*3/mm3      RBC 3.44 10*6/mm3      Hemoglobin 10.3 g/dL      Hematocrit 31.1 %      MCV 90.4 fL      MCH 29.9 pg      MCHC 33.1 g/dL      RDW 14.5 %      RDW-SD 46.8 fl      MPV 9.4 fL      Platelets 279 10*3/mm3      Neutrophil % 74.4 %      Lymphocyte % 13.0 %      Monocyte % 8.2 %      Eosinophil % 0.0 %      Basophil % 0.2 %      Immature Grans % 4.2 %      Neutrophils, Absolute 8.95 10*3/mm3       Lymphocytes, Absolute 1.56 10*3/mm3      Monocytes, Absolute 0.99 10*3/mm3      Eosinophils, Absolute 0.00 10*3/mm3      Basophils, Absolute 0.02 10*3/mm3      Immature Grans, Absolute 0.50 10*3/mm3      nRBC 0.2 /100 WBC     Comprehensive Metabolic Panel [[id number]]  (Abnormal) Collected:  10/21/19 0824    Specimen:  Blood Updated:  10/21/19 0910     Glucose 130 mg/dL      BUN 57 mg/dL      Creatinine 3.91 mg/dL      Sodium 145 mmol/L      Potassium 4.4 mmol/L      Chloride 105 mmol/L      CO2 27.0 mmol/L      Calcium 6.8 mg/dL      Total Protein 5.9 g/dL      Albumin 3.50 g/dL      ALT (SGPT) 10 U/L      AST (SGOT) 10 U/L      Alkaline Phosphatase 43 U/L      Total Bilirubin 0.2 mg/dL      eGFR Non African Amer 16 mL/min/1.73      Globulin 2.4 gm/dL      A/G Ratio 1.5 g/dL      BUN/Creatinine Ratio 14.6     Anion Gap 13.0 mmol/L     Narrative:       GFR Normal >60  Chronic Kidney Disease <60  Kidney Failure <15    CBC & Differential [[id number]] Collected:  10/21/19 0824    Specimen:  Blood Updated:  10/21/19 0837    Narrative:       The following orders were created for panel order CBC & Differential.  Procedure                               Abnormality         Status                     ---------                               -----------         ------                     CBC Auto Differential[[id number]]        Abnormal            Final result                 Please view results for these tests on the individual orders.    CBC Auto Differential [[id number]]  (Abnormal) Collected:  10/21/19 0824    Specimen:  Blood Updated:  10/21/19 0837     WBC 13.07 10*3/mm3      RBC 3.34 10*6/mm3      Hemoglobin 10.1 g/dL      Hematocrit 30.0 %      MCV 89.8 fL      MCH 30.2 pg      MCHC 33.7 g/dL      RDW 14.4 %      RDW-SD 46.3 fl      MPV 9.5 fL      Platelets 262 10*3/mm3      Neutrophil % 86.9 %      Lymphocyte % 8.6 %      Monocyte % 2.9 %      Eosinophil % 0.0 %      Basophil % 0.1 %      Immature Grans % 1.5 %       Neutrophils, Absolute 11.37 10*3/mm3      Lymphocytes, Absolute 1.12 10*3/mm3      Monocytes, Absolute 0.38 10*3/mm3      Eosinophils, Absolute 0.00 10*3/mm3      Basophils, Absolute 0.01 10*3/mm3      Immature Grans, Absolute 0.19 10*3/mm3      nRBC 0.0 /100 WBC           Lab Results (last 48 hours)     Procedure Component Value Units Date/Time    Hemoglobin A1c [[id number]]  (Normal) Collected:  10/22/19 0542    Specimen:  Blood Updated:  10/22/19 0634     Hemoglobin A1C 5.50 %     Narrative:       Hemoglobin A1C Ranges:    Increased Risk for Diabetes  5.7% to 6.4%  Diabetes                     >= 6.5%  Diabetic Goal                < 7.0%    Comprehensive Metabolic Panel [[id number]]  (Abnormal) Collected:  10/22/19 0542    Specimen:  Blood Updated:  10/22/19 0633     Glucose 107 mg/dL      BUN 55 mg/dL      Creatinine 3.55 mg/dL      Sodium 146 mmol/L      Potassium 4.0 mmol/L      Chloride 108 mmol/L      CO2 27.0 mmol/L      Calcium 6.5 mg/dL      Total Protein 5.6 g/dL      Albumin 3.30 g/dL      ALT (SGPT) 12 U/L      AST (SGOT) 15 U/L      Alkaline Phosphatase 41 U/L      Total Bilirubin <0.2 mg/dL      eGFR Non African Amer 17 mL/min/1.73      Globulin 2.3 gm/dL      A/G Ratio 1.4 g/dL      BUN/Creatinine Ratio 15.5     Anion Gap 11.0 mmol/L     Narrative:       GFR Normal >60  Chronic Kidney Disease <60  Kidney Failure <15    CBC & Differential [[id number]] Collected:  10/22/19 0542    Specimen:  Blood Updated:  10/22/19 0607    Narrative:       The following orders were created for panel order CBC & Differential.  Procedure                               Abnormality         Status                     ---------                               -----------         ------                     CBC Auto Differential[[id number]]        Abnormal            Final result                 Please view results for these tests on the individual orders.    CBC Auto Differential [[id number]]  (Abnormal) Collected:  10/22/19 0542     Specimen:  Blood Updated:  10/22/19 0607     WBC 12.02 10*3/mm3      RBC 3.44 10*6/mm3      Hemoglobin 10.3 g/dL      Hematocrit 31.1 %      MCV 90.4 fL      MCH 29.9 pg      MCHC 33.1 g/dL      RDW 14.5 %      RDW-SD 46.8 fl      MPV 9.4 fL      Platelets 279 10*3/mm3      Neutrophil % 74.4 %      Lymphocyte % 13.0 %      Monocyte % 8.2 %      Eosinophil % 0.0 %      Basophil % 0.2 %      Immature Grans % 4.2 %      Neutrophils, Absolute 8.95 10*3/mm3      Lymphocytes, Absolute 1.56 10*3/mm3      Monocytes, Absolute 0.99 10*3/mm3      Eosinophils, Absolute 0.00 10*3/mm3      Basophils, Absolute 0.02 10*3/mm3      Immature Grans, Absolute 0.50 10*3/mm3      nRBC 0.2 /100 WBC     Comprehensive Metabolic Panel [[id number]]  (Abnormal) Collected:  10/21/19 0824    Specimen:  Blood Updated:  10/21/19 0910     Glucose 130 mg/dL      BUN 57 mg/dL      Creatinine 3.91 mg/dL      Sodium 145 mmol/L      Potassium 4.4 mmol/L      Chloride 105 mmol/L      CO2 27.0 mmol/L      Calcium 6.8 mg/dL      Total Protein 5.9 g/dL      Albumin 3.50 g/dL      ALT (SGPT) 10 U/L      AST (SGOT) 10 U/L      Alkaline Phosphatase 43 U/L      Total Bilirubin 0.2 mg/dL      eGFR Non African Amer 16 mL/min/1.73      Globulin 2.4 gm/dL      A/G Ratio 1.5 g/dL      BUN/Creatinine Ratio 14.6     Anion Gap 13.0 mmol/L     Narrative:       GFR Normal >60  Chronic Kidney Disease <60  Kidney Failure <15    CBC & Differential [[id number]] Collected:  10/21/19 0824    Specimen:  Blood Updated:  10/21/19 0837    Narrative:       The following orders were created for panel order CBC & Differential.  Procedure                               Abnormality         Status                     ---------                               -----------         ------                     CBC Auto Differential[[id number]]        Abnormal            Final result                 Please view results for these tests on the individual orders.    CBC Auto Differential [[id number]]   (Abnormal) Collected:  10/21/19 0824    Specimen:  Blood Updated:  10/21/19 0837     WBC 13.07 10*3/mm3      RBC 3.34 10*6/mm3      Hemoglobin 10.1 g/dL      Hematocrit 30.0 %      MCV 89.8 fL      MCH 30.2 pg      MCHC 33.7 g/dL      RDW 14.4 %      RDW-SD 46.3 fl      MPV 9.5 fL      Platelets 262 10*3/mm3      Neutrophil % 86.9 %      Lymphocyte % 8.6 %      Monocyte % 2.9 %      Eosinophil % 0.0 %      Basophil % 0.1 %      Immature Grans % 1.5 %      Neutrophils, Absolute 11.37 10*3/mm3      Lymphocytes, Absolute 1.12 10*3/mm3      Monocytes, Absolute 0.38 10*3/mm3      Eosinophils, Absolute 0.00 10*3/mm3      Basophils, Absolute 0.01 10*3/mm3      Immature Grans, Absolute 0.19 10*3/mm3      nRBC 0.0 /100 WBC         Orders (all)     Start     Ordered    10/23/19 0900  fluconazole (DIFLUCAN) tablet 200 mg  Daily,   Status:  Discontinued      10/22/19 0930    10/23/19 0800  predniSONE (DELTASONE) tablet 60 mg  Daily With Breakfast,   Status:  Discontinued      10/22/19 0950    10/23/19 0000  fluconazole (DIFLUCAN) 200 MG tablet  Daily      10/22/19 1004    10/23/19 0000  predniSONE (DELTASONE) 20 MG tablet  Daily With Breakfast      10/22/19 1004    10/23/19 0000  sulfamethoxazole-trimethoprim (BACTRIM DS,SEPTRA DS) 800-160 MG per tablet  3 Times Weekly      10/22/19 1004    10/22/19 1100  calcium gluconate 1 g in sodium chloride 0.9 % 100 mL IVPB  Once      10/22/19 1011    10/22/19 1004  Discontinue IV  Once,   Status:  Canceled      10/22/19 1004    10/22/19 1002  Discharge patient  Once      10/22/19 1004    10/22/19 0800  predniSONE (DELTASONE) tablet 40 mg  Daily With Breakfast,   Status:  Discontinued      10/21/19 1548    10/22/19 0600  Calcium, Ionized  Morning Draw,   Status:  Canceled      10/21/19 1014    10/22/19 0600  Hemoglobin A1c  Morning Draw      10/21/19 1527    10/22/19 0600  Basic Metabolic Panel  Morning Draw,   Status:  Canceled      10/21/19 1527    10/22/19 0600  CBC & Differential   Morning Draw      10/21/19 1548    10/22/19 0600  Comprehensive Metabolic Panel  Morning Draw      10/21/19 1548    10/22/19 0600  CBC Auto Differential  PROCEDURE ONCE      10/22/19 0001    10/22/19 0000  acyclovir (ZOVIRAX) 200 MG capsule  2 Times Daily      10/22/19 1004    10/22/19 0000  Discharge Follow-up with PCP      10/22/19 1004    10/22/19 0000  Discharge Follow-up with Specified Provider: Dr. Burleson; 1 Week      10/22/19 1004    10/22/19 0000  Discharge Follow-up with Specified Provider: Nephrology this Friday with a BMP      10/22/19 1004    10/22/19 0000  Activity as Tolerated      10/22/19 1004    10/22/19 0000  Diet: Regular; Thin      10/22/19 1004    10/21/19 2100  acyclovir (ZOVIRAX) capsule 400 mg  2 Times Daily,   Status:  Discontinued      10/21/19 1614    10/21/19 1700  fluconazole (DIFLUCAN) tablet 400 mg  Daily,   Status:  Discontinued      10/21/19 1614    10/21/19 1700  sulfamethoxazole-trimethoprim (BACTRIM DS,SEPTRA DS) 800-160 MG per tablet 160 mg  Once per day on Mon Wed Fri,   Status:  Discontinued      10/21/19 1614    10/21/19 1615  allopurinol (ZYLOPRIM) tablet 100 mg  Daily,   Status:  Discontinued      10/21/19 1529    10/21/19 1529  acetaminophen (TYLENOL) tablet 650 mg  Every 6 Hours PRN,   Status:  Discontinued      10/21/19 1529    10/21/19 1529  Diet Regular  Diet Effective Now,   Status:  Canceled      10/21/19 1529    10/21/19 1115  sodium chloride 0.9 % infusion  Continuous,   Status:  Discontinued      10/21/19 1015    10/21/19 0600  Basic Metabolic Panel  Morning Draw,   Status:  Canceled      10/20/19 1223    10/21/19 0600  CBC Auto Differential  PROCEDURE ONCE      10/21/19 0001    10/20/19 1315  sodium bicarbonate 8.4 % 75 mEq in sodium chloride 0.45 % 1,000 mL infusion (greater than 75 mEq)  Continuous,   Status:  Discontinued      10/20/19 1223    10/20/19 1000  cefTRIAXone (ROCEPHIN) 1 g/100 mL 0.9% NS (MBP)  Every 24 Hours,   Status:  Discontinued      10/20/19  0902    10/20/19 0600  CBC & Differential  Morning Draw,   Status:  Canceled      10/19/19 0814    10/20/19 0600  CBC & Differential  Daily,   Status:  Canceled      10/19/19 1039    10/20/19 0600  Comprehensive Metabolic Panel  Daily,   Status:  Canceled      10/19/19 1039    10/20/19 0600  Basic Metabolic Panel  Morning Draw,   Status:  Canceled      10/19/19 1324    10/20/19 0600  CK  Morning Draw      10/19/19 1324    10/20/19 0600  CBC Auto Differential  PROCEDURE ONCE      10/20/19 0001    10/19/19 1415  sodium bicarbonate 8.4 % 150 mEq in sodium chloride 0.45 % 1,000 mL infusion (greater than 75 mEq)  Continuous,   Status:  Discontinued      10/19/19 1324    10/19/19 1130  sodium polystyrene (KAYEXALATE) 15 GM/60ML suspension 15 g  Once      10/19/19 1031    10/19/19 0919  Oxygen Therapy- Nasal Cannula; Titrate for SPO2: 90% - 95%  Continuous PRN,   Status:  Canceled      10/19/19 0918    10/19/19 0726  Comprehensive Metabolic Panel  Morning Draw      10/18/19 1120    10/19/19 0600  CBC Auto Differential  Morning Draw      10/18/19 1120    10/19/19 0600  TSH  Morning Draw      10/18/19 1120    10/19/19 0600  Uric Acid  Morning Draw      10/18/19 1148    10/19/19 0600  Basic Metabolic Panel  Morning Draw,   Status:  Canceled      10/18/19 1301    10/19/19 0600  CK  Morning Draw      10/18/19 1301    10/19/19 0111  calcium carbonate (TUMS) chewable tablet 500 mg (200 mg elemental)  3 Times Daily PRN,   Status:  Discontinued      10/19/19 0112    10/18/19 1302  Eosinophil Smear - Urine, Urine, Clean Catch  Once      10/18/19 1301    10/18/19 1215  sodium chloride 0.9 % flush 10 mL  Every 12 Hours Scheduled,   Status:  Discontinued      10/18/19 1120    10/18/19 1215  sodium chloride 0.9 % infusion  Continuous,   Status:  Discontinued      10/18/19 1121    10/18/19 1204  Urine Culture - Urine,  Once      10/18/19 1203    10/18/19 1200  pantoprazole (PROTONIX) EC tablet 40 mg  Every Early Morning,   Status:   Discontinued      10/18/19 1120    10/18/19 1200  Vital Signs  Every 4 Hours,   Status:  Canceled      10/18/19 1120    10/18/19 1200  Strict Intake & Output  Every Hour,   Status:  Canceled      10/18/19 1120    10/18/19 1200  methylPREDNISolone sodium succinate (SOLU-Medrol) injection 20 mg  Every 6 Hours Scheduled,   Status:  Discontinued      10/18/19 1120    10/18/19 1150  Urinalysis, Microscopic Only - Urine, Clean Catch  Once      10/18/19 1149    10/18/19 1148  Protein, Urine, Random - Urine, Clean Catch  Once      10/18/19 1148    10/18/19 1148  Creatinine, Urine, Random - Urine, Clean Catch  Once      10/18/19 1148    10/18/19 1148  Sodium, Urine, Random - Urine, Clean Catch  Once      10/18/19 1148    10/18/19 1148  Urea Nitrogen, Urine - Urine, Clean Catch  Once      10/18/19 1148    10/18/19 1134  Urinalysis With Culture If Indicated - Urine, Clean Catch  Once      10/18/19 1134    10/18/19 1123  Inpatient Oncology Consult  Once     Specialty:  Oncology  Provider:  Julio Burleson MD    10/18/19 1122    10/18/19 1121  Daily Weights  Daily,   Status:  Canceled      10/18/19 1120    10/18/19 1118  US Renal Limited  1 Time Imaging      10/18/19 1120    10/18/19 1118  Inpatient Nephrology Consult  Once     Specialty:  Nephrology  Provider:  Jaswinder Ward MD    10/18/19 1120    10/18/19 1117  Code Status and Medical Interventions:  Continuous,   Status:  Canceled      10/18/19 1120    10/18/19 1117  Intake & Output  Every Shift,   Status:  Canceled      10/18/19 1120    10/18/19 1117  Weigh Patient  Once,   Status:  Canceled      10/18/19 1120    10/18/19 1117  Oxygen Therapy- Nasal Cannula; Titrate for SPO2: 90% - 95%  Continuous,   Status:  Canceled      10/18/19 1120    10/18/19 1117  Insert Peripheral IV  Once,   Status:  Canceled      10/18/19 1120    10/18/19 1117  Saline Lock & Maintain IV Access  Continuous,   Status:  Canceled      10/18/19 1120    10/18/19 1117  Inpatient  Admission  Once      10/18/19 1120    10/18/19 1117  Place Sequential Compression Device  Once      10/18/19 1120    10/18/19 1117  Maintain Sequential Compression Device  Continuous,   Status:  Canceled      10/18/19 1120    10/18/19 1117  Cardiac Monitoring  Continuous,   Status:  Canceled      10/18/19 1120    10/18/19 1117  Diet Regular; Renal  Diet Effective Now,   Status:  Canceled      10/18/19 1120    10/18/19 1116  sodium chloride 0.9 % flush 10 mL  As Needed,   Status:  Discontinued      10/18/19 1120    10/18/19 1115  ondansetron (ZOFRAN) injection 4 mg  Every 6 Hours PRN,   Status:  Discontinued      10/18/19 1120    --  SCANNED - TELEMETRY        10/18/19 0000    --  SCANNED - TELEMETRY        10/18/19 0000    --  SCANNED - TELEMETRY        10/18/19 0000    --  SCANNED - TELEMETRY        10/18/19 0000    --  SCANNED - TELEMETRY        10/18/19 0000             Physician Progress Notes (all)      Savage Holloway DO at 10/21/19 1521              [facility] Medicine Services  INPATIENT PROGRESS NOTE    Patient Name: Parker Arnold  Date of Admission: 10/18/2019  Today's Date: 10/21/19  Length of Stay: 3  Primary Care Physician: Betty Almaraz APRN    Subjective   Chief Complaint: Renal failure.   HPI   He feels very well today.  He really wishes that he can go home.  However, he knows that oncology and nephrology would like for him to stay until tomorrow.    Fluids are being adjusted.  His renal function continues to improve.    Review of Systems   All pertinent negatives and positives are as above. All other systems have been reviewed and are negative unless otherwise stated.     Objective    Temp:  [97.3 °F (36.3 °C)-98.4 °F (36.9 °C)] 98.4 °F (36.9 °C)  Heart Rate:  [51-73] 65  Resp:  [18] 18  BP: (108-135)/(57-73) 127/60  Physical Exam   Constitutional: He is oriented to person, place, and time. He appears well-developed and well-nourished.   Up in the  chair.  No distress.  Seen and discussed with his wife.  Discussed with his nurse, Glenny.   HENT:   Head: Normocephalic and atraumatic.   Eyes: Conjunctivae and EOM are normal. Pupils are equal, round, and reactive to light.   Neck: Neck supple. No JVD present.   Cardiovascular: Normal rate, regular rhythm, normal heart sounds and intact distal pulses. Exam reveals no gallop and no friction rub.   No murmur heard.  Pulmonary/Chest: Effort normal and breath sounds normal. No respiratory distress. He has no wheezes. He has no rales. He exhibits no tenderness.   Abdominal: Soft. Bowel sounds are normal. He exhibits no distension. There is no tenderness. There is no rebound and no guarding.   Musculoskeletal: Normal range of motion. He exhibits no edema, tenderness or deformity.   Neurological: He is alert and oriented to person, place, and time. He displays normal reflexes. No cranial nerve deficit. He exhibits normal muscle tone.   Skin: Skin is warm and dry. No rash noted.   Psychiatric: He has a normal mood and affect. His behavior is normal. Judgment and thought content normal.     Results Review:  I have reviewed the labs, radiology results, and diagnostic studies.    Laboratory Data:   Results from last 7 days   Lab Units 10/21/19  0824 10/20/19  0607 10/19/19  0627   WBC 10*3/mm3 13.07* 15.15* 10.57   HEMOGLOBIN g/dL 10.1* 10.0* 11.2*   HEMATOCRIT % 30.0* 29.4* 33.9*   PLATELETS 10*3/mm3 262 280 320        Results from last 7 days   Lab Units 10/21/19  0824 10/20/19  0607 10/19/19  0741   SODIUM mmol/L 145 144 139   POTASSIUM mmol/L 4.4 4.5 5.8*   CHLORIDE mmol/L 105 109* 110*   CO2 mmol/L 27.0 22.0 15.0*   BUN mg/dL 57* 63* 63*   CREATININE mg/dL 3.91* 4.85* 5.66*   CALCIUM mg/dL 6.8* 7.2* 8.0*   BILIRUBIN mg/dL 0.2 <0.2* 0.2   ALK PHOS U/L 43 49 54   ALT (SGPT) U/L 10 11 11   AST (SGOT) U/L 10 7 7   GLUCOSE mg/dL 130* 139* 176*     I have reviewed the patient's current medications.     Assessment/Plan      Active Hospital Problems    Diagnosis   • **Acute renal failure with tubular necrosis in the setting of solitary kidney   • Hyperkalemia   • Metabolic acidosis   • Clear cell carcinoma of kidney (CMS/HCC)     Overview:   Diagnoses 11/11/14. Path report in [facility] at [facility]. Tissue has been requested.      • Lung metastases (CMS/HCC)   • Pyuria   • Hyperglycemia   • Hypocalcemia     Plan:   The patient was originally admitted on 10/18 by Dr. Franco.  He has a history of solitary kidney after undergoing a nephrectomy of the right kidney in 2014 for renal cell carcinoma.  Unfortunately, he has had recurrence with metastatic disease to the lung.  He has started seeing Dr. Burleson after following with oncology in [place], [place].  It is felt that his acute renal failure is secondary to recent treatment with Sutent and Opdivo.    He is responding to fluids and steroids.  His serum creatinine is improving nicely.  His hyperkalemia and acidosis have resolved.  He is now on normal saline.  He may be able to go home tomorrow if his kidney function continues to improve.    His hyperglycemia is likely provoked by steroids.  Check hemoglobin A1c.    SCDs and ambulation for DVT prophylaxis.    Discharge Planning: I expect the patient to be discharged to home tomorrow.     Savage Holloway DO   10/21/19   3:21 PM      Electronically signed by Savage Holloway DO at 10/21/19 6829     Leo Dumont APRN at 10/21/19 1039     Attestation signed by Satinder Grant MD at 10/21/19 8377 (Updated)    I have personally examined the patient and review all the data.  I also agree with history and physical examination as well as plan generated by my nurse practitioner.    Chief complaint: Abnormal labs.    63 years old very pleasant gentleman who has history of stage IV renal cell CA.  Patient has history of right nephrectomy in 2014 and was diagnosed metastatic disease in the lungs.  His baseline serum creatinine  usually between 1.5 to 2.0 mg.  Patient has been going chemotherapy including Optivo.  Presented with worsening of renal function creatinine of 5.6 mg.  Hospital course remarkable for IV fluid and IV steroid for possible treatment of allergic interstitial nephritis.  His serum creatinine is slowly improving.    Assessment:  1.  Acute kidney injury/allergic interstitial nephritis.  2.  Stage III chronic kidney disease baseline.  3.  Previous right nephrectomy.  4.  Stage IV renal cell CA.  5.  Metastatic disease to lungs.  6.  Hyperkalemia resolved.  7.  Metabolic acidemia improved.    Plan:  1.  Continue slow hydration.  2.  DC Solu-Medrol.  3.  P.o. prednisone.  4.  renal biopsy renal biopsy is not recommended as he has single kidney, meanwhile renal function has improved.                    Nephrology ([facility]) Progress Note      Patient:  Parker Arnold  YOB: 1955  Date of Service: 10/21/2019  MRN: [id number]   Acct: [id number]   Primary Care Physician: Betty Almaraz APRN  Advance Directive:   Code Status and Medical Interventions:   Ordered at: 10/18/19 1120     Code Status:    CPR     Medical Interventions (Level of Support Prior to Arrest):    Full     Admit Date: 10/18/2019       Hospital Day: 3  Referring Provider: Charlie Franco MD      Patient personally seen and examined.  Complete chart including Consults, Notes, Operative Reports, Labs, Cardiology, and Radiology studies reviewed as able.        Subjective:  Parker Arnold is a 64 y.o. male  whom we were consulted for acute kidney injury.  Baseline chronic kidney disease stage 3 (s/pr right nephrectomy in 2014 due to renal cell carcinoma).  Has previously seen Marie Shah in our office; last seen in July of this year. Creatinine 2.2 at that time.  Patient has been seeing nephrologist at [facility] since then.  For several months patient has been undergoing chemotherapy for malignant lung  cancer from the RCC.  Over the past several months has had multiple episodes of acute kidney injury; due to Sutent as well as Optivo.  He has been on multiple rounds of prednisone.  Last dose of Optivo was five weeks ago.  Review of labs from outside facility shows creatinine has been 2.4-3.3 over the last two months.  Denies any change in urination. Denies n/v/d. Denies NSAIDs use.  Over the last week has developed fatigue, malaise.  Patient admitted for AMAURI (creatinine was 6.43).  Hospital course remarkable for improving renal function with steroids and IV fluids.     Today feeling better. No new overnight issues. Urine output nonoliguric    Review of Systems:  History obtained from chart review and the patient  General ROS: No fever or chills  Respiratory ROS: No cough, shortness of breath, wheezing  Cardiovascular ROS: No chest pain or palpitations  Gastrointestinal ROS: No abdominal pain or melena  Genito-Urinary ROS: No dysuria or hematuria  Neurological ROS: no headache or dizziness    Objective:  Patient Vitals for the past 24 hrs:   BP Temp Temp src Pulse Resp SpO2 Weight   10/21/19 0802 135/73 98.1 °F (36.7 °C) Oral 51 18 99 % --   10/21/19 0626 122/72 97.3 °F (36.3 °C) Oral 64 18 97 % --   10/21/19 0145 108/63 97.9 °F (36.6 °C) Oral 73 18 97 % --   10/20/19 1900 122/57 98.4 °F (36.9 °C) Oral 73 18 97 % 82.1 kg (180 lb 16 oz)   10/20/19 1154 148/71 98.1 °F (36.7 °C) Oral 76 18 99 % --       Intake/Output Summary (Last 24 hours) at 10/21/2019 1040  Last data filed at 10/21/2019 0500  Gross per 24 hour   Intake 2320.84 ml   Output 1050 ml   Net 1270.84 ml     General: awake/alert    Neck: supple, no JVD  Chest:  clear to auscultation bilaterally without respiratory distress  CVS: regular rate and rhythm  Abdominal: soft, nontender, positive bowel sounds  Extremities: no cyanosis or edema  Skin: warm and dry without rash   Neuro: no focal motor deficits        Assessment   1.  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  Baseline chronic kidney disease stage 3  3.  Prior right nephrectomy due to RCC  4.  Metastatic lung disease  5.  Metabolic acidosis--resolved  6.  Anemia   7.  Hyperkalemia--improved    Plan:  1.  Change IV fluids to normal saline at 75 ml/hour  2.  Renal function slowly but steadily recovering. Monitor labs      JACLYN Story  10/21/2019  10:40 AM      Electronically signed by Satinder Grant MD at 10/21/19 1547     Jaswinder Wadr MD at 10/20/19 1222          PROGRESS NOTE.      Patient:  Parker Arnold  YOB: 1955  Date of Service: 10/20/2019  MRN: [id number]   Acct: [id number]   Primary Care Physician: Betty Almaraz APRN  Advance Directive:   Code Status and Medical Interventions:   Ordered at: 10/18/19 1120     Code Status:    CPR     Medical Interventions (Level of Support Prior to Arrest):    Full     Admit Date: 10/18/2019       Hospital Day: 2  Referring Provider: Charlie Franco MD      Patient Seen, Chart, Consults, Notes, Labs, Radiology studies reviewed.        Subjective:  Parker Arnold is a 64 y.o. male  whom we were consulted for acute kidney injury.  Baseline chronic kidney disease stage 3 (s/pr right nephrectomy in 2014 due to renal cell carcinoma).  Has previously seen Marie Shah in our office; last seen in July of this year. Creatinine 2.2 at that time.  Patient has been seeing nephrologist at [facility] since then.  For several months patient has been undergoing chemotherapy for malignant lung cancer from the RCC.  Over the past several months has had multiple episodes of acute kidney injury; due to Sutent as well as Optivo.  He has been on multiple rounds of prednisone.  Last dose of Optivo was five weeks ago.  Review of labs from outside facility shows creatinine has been 2.4-3.3 over the last two months.  Over the last week has developed fatigue, malaise.  Denies any change in urination. Denies  "n/v/d. Patient was admitted for AMAURI (creatinine was 6.43). He denied NSAIDs abuse. Patient is now managed with steroids and IV fluids. He noticed improving urine output. He has good appetite and urine output.       Review of Systems:  History obtained from chart review and the patient  General ROS: No fever or chills  Respiratory ROS: No cough, shortness of breath, wheezing  Cardiovascular ROS: no chest pain or dyspnea on exertion  Gastrointestinal ROS: No abdominal pain or melena  Genito-Urinary ROS: No dysuria or hematuria  Musculoskeletal: negative  Skin: negative    Objective:  /71 (BP Location: Left arm, Patient Position: Sitting)   Pulse 76   Temp 98.1 °F (36.7 °C) (Oral)   Resp 18   Ht 177.8 cm (70\")   Wt 82.1 kg (180 lb 16 oz)   SpO2 99%   BMI 25.97 kg/m²      Intake/Output Summary (Last 24 hours) at 10/20/2019 1222  Last data filed at 10/20/2019 0703  Gross per 24 hour   Intake 2366.58 ml   Output 1475 ml   Net 891.58 ml       Physical examination:  General: awake/alert   Chest:  clear to auscultation bilaterally without respiratory distress  CVS: regular rate and rhythm  Abdominal: soft, nontender, normal bowel sounds  Extremities: no cyanosis or edema  Skin: warm and dry without rash  Neuro: No focal motor deficits      1.  Acute kidney injury (ATN vs AIN- immunotherapy-induced nephrotoxicity)  2.  Baseline chronic kidney disease stage 3  3.  Prior right nephrectomy due to RCC  4.  Metastatic lung disease  5.  Metabolic acidosis  6.  Anemia  7.  Hyperkalemia    Plan:  Continue bicarbonate based IV fluids, continue intravenous steroids.  Follow-up labs.  We will hold on dialysis.      Jaswinder Ward MD  10/20/2019  12:22 PM    Electronically signed by Jaswinder Ward MD at 10/20/19 7764     Charlie Franco MD at 10/20/19 0265              [facility] Medicine Services  INPATIENT PROGRESS NOTE    Length of Stay: 2  Date of Admission: " 10/18/2019  Primary Care Physician: Betty Almaraz APRN    Subjective   Chief Complaint: Acute renal failure/renal cancer with mets to the lung.    HPI   Good urine output.  Patient stated he is feeling better.  Patient denies any chest pain or shortness of breath.  Patient denies any abdomen pain.  Discussed the patient urinalysis shows normal bacteria and no growth in urine culture.  We will hold off any antibiotics for now.    Review of Systems   Constitutional: Positive for activity change and appetite change. Negative for chills and fever.   HENT: Negative for hearing loss, nosebleeds, tinnitus and trouble swallowing.    Eyes: Negative for visual disturbance.   Respiratory: Negative for cough, chest tightness, shortness of breath and wheezing.    Cardiovascular: Negative for chest pain, palpitations and leg swelling.   Gastrointestinal: Negative for abdominal distention, abdominal pain, blood in stool, constipation, diarrhea, nausea and vomiting.   Endocrine: Negative for cold intolerance, heat intolerance, polydipsia, polyphagia and polyuria.   Genitourinary: Negative for decreased urine volume, difficulty urinating, dysuria, flank pain, frequency and hematuria.   Musculoskeletal: Negative for arthralgias, joint swelling and myalgias.   Skin: Negative for rash.   Allergic/Immunologic: Negative for immunocompromised state.   Neurological: Negative for dizziness, syncope, weakness, light-headedness and headaches.   Hematological: Negative for adenopathy. Does not bruise/bleed easily.   Psychiatric/Behavioral: Negative for confusion and sleep disturbance. The patient is not nervous/anxious.     All pertinent negatives and positives are as above. All other systems have been reviewed and are negative unless otherwise stated.     Objective    Temp:  [97.6 °F (36.4 °C)-98.2 °F (36.8 °C)] 97.8 °F (36.6 °C)  Heart Rate:  [69-87] 77  Resp:  [14-18] 18  BP: (135-147)/(67-79) 141/74    Intake/Output Summary (Last  24 hours) at 10/20/2019 0854  Last data filed at 10/20/2019 0703  Gross per 24 hour   Intake 2566.58 ml   Output 1725 ml   Net 841.58 ml     Physical Exam  Constitutional: He is oriented to person, place, and time. He appears well-developed and well-nourished.   HENT:   Head: Normocephalic and atraumatic.   Eyes: Conjunctivae and EOM are normal. Pupils are equal, round, and reactive to light.   Neck: Neck supple. No JVD present. No thyromegaly present.   Cardiovascular: Normal rate, regular rhythm, normal heart sounds and intact distal pulses. Exam reveals no gallop and no friction rub.   No murmur heard.  Pulmonary/Chest: Effort normal and breath sounds normal. No respiratory distress. He has no wheezes. He has no rales. He exhibits no tenderness.   Abdominal: Soft. Bowel sounds are normal. He exhibits no distension. There is no tenderness. There is no rebound and no guarding.   Musculoskeletal: Normal range of motion. He exhibits no edema, tenderness or deformity.   Lymphadenopathy:     He has no cervical adenopathy.   Neurological: He is alert and oriented to person, place, and time. He displays normal reflexes. No cranial nerve deficit. He exhibits normal muscle tone.   Skin: Skin is warm and dry. No rash noted.   Psychiatric: He has a normal mood and affect. His behavior is normal. Judgment and thought content normal.   Nursing note and vitals reviewed.      Plan:  Renal failure.  Improving. Renal failure for last 3 months.  Probably secondary to immunoglobulin.  Consult nephrology.  IV hydration.   Baseline creatinine is 1.8.    Ultrasound of the kidneys-negative for hydronephrosis, renal atrophy, or shadowing calculi.      Renal cell carcinoma mets to the lungs/lung mass.  Status post right kidney resection 2014.    Continue Solu-Medrol.  Consult oncology. Yervoy/opdivo 5/13/2019 to 8/7/2019.     Hyperkalemia.   Resolved.     Gallbladder wall thickening and layering sludge.  Incidental finding from  "ultrasound.  Discussed with patient.  Patient is asymptomatic.    Urine culture no growth.  Urinalysis-no bacteria.  No antibiotics for now.     Reflux.  Protonix.  Zofran as needed.     Nutrition.  Renal diet.    Discharge Plannin-5 days    Charlie Franco MD   10/20/19   8:54 AM                    Electronically signed by Charlie Franco MD at 10/20/19 0914     Rome Fleming MD at 10/20/19 0819          [facility] Progress Note    Progress Note    Patient:  Parker Arnold  YOB: 1955  Date of Service: 10/20/2019  MRN: [id number]   Acct: [id number]   Primary Care Physician: Betty Almaraz APRN  Advance Directive:   Code Status and Medical Interventions:   Ordered at: 10/18/19 1120     Code Status:    CPR     Medical Interventions (Level of Support Prior to Arrest):    Full     Admit Date: 10/18/2019       Hospital Day: 2      Subjective:     Chief Compliant: None.  \"I am doing better.\"  Stable overnight per nurse Hawa.      Review of Systems:   Review of Systems   Constitutional: Negative for chills, diaphoresis and fever.   HENT: Negative for nosebleeds and trouble swallowing.    Eyes: Negative for redness and visual disturbance.   Respiratory: Negative for cough, shortness of breath and wheezing.    Cardiovascular: Negative for chest pain, palpitations and leg swelling.   Gastrointestinal: Negative for abdominal pain, nausea and vomiting.   Endocrine: Negative for polydipsia and polyphagia.   Genitourinary: Negative for difficulty urinating, dysuria, flank pain and hematuria.   Musculoskeletal: Negative for arthralgias and joint swelling.   Skin: Positive for pallor.   Neurological: Negative for dizziness, syncope, speech difficulty, numbness and headaches.   Hematological: Negative for adenopathy. Does not bruise/bleed easily.   Psychiatric/Behavioral: Negative for agitation, behavioral problems, confusion and hallucinations. The patient is not nervous/anxious.  " "        Objective:   Vitals: /74 (BP Location: Left arm, Patient Position: Sitting)   Pulse 77   Temp 97.8 °F (36.6 °C) (Oral)   Resp 18   Ht 177.8 cm (70\")   Wt 82.1 kg (180 lb 14.4 oz)   SpO2 100%   BMI 25.96 kg/m²    Physical Exam   Constitutional: He is oriented to person, place, and time. He appears well-developed and well-nourished. No distress.   HENT:   Head: Normocephalic and atraumatic.   Eyes: EOM are normal. Pupils are equal, round, and reactive to light.   Neck: Neck supple.   Cardiovascular: Normal rate and regular rhythm.   Pulmonary/Chest: Breath sounds normal. No respiratory distress. He has no wheezes.   Abdominal: Soft. Bowel sounds are normal. He exhibits no distension. There is no tenderness.   Musculoskeletal: He exhibits no edema.   Lymphadenopathy:     He has no cervical adenopathy.   Neurological: He is alert and oriented to person, place, and time.   Skin: Skin is warm and dry. He is not diaphoretic. There is pallor.   Psychiatric: He has a normal mood and affect. His behavior is normal. Judgment and thought content normal.   Nursing note and vitals reviewed.    24HR INTAKE/OUTPUT:      Intake/Output Summary (Last 24 hours) at 10/20/2019 0819  Last data filed at 10/20/2019 0703  Gross per 24 hour   Intake 2566.58 ml   Output 1725 ml   Net 841.58 ml        Problem list:       Clear cell carcinoma of kidney (CMS/HCC)    Renal failure    Lung mass    Acute renal failure (ARF) (CMS/HCC)      Assessment/Plan:     ASSESSMENT:  1.  Acute kidney injury from immune therapy with Opdivo  2.  Baseline chronic kidney disease stage III.  3.  Stage IV renal cell carcinoma.  4.  Anemia secondary to chronic kidney disease.  5.  Lung metastasis.  6.  Leukocytosis from steroid.   7.  Hyperkalemia 10/19/2019.      Plan:  1.   Re: Heme status, hemoglobin 10 from 11.2 from 12.2.  2.  CMP.  Creatinine  4.85 from 5.66 with GFR of 12 from 10 mL/min.  3.  Continue Solu-Medrol 20 mg IV every 6 " hours with gastric prophylaxis and follow renal function.  4.   CK normal at 30 from 37.   5.  Above discussed with nurse and patient.  They verbalized understanding.             Electronically signed by Rome Fleming MD at 10/20/19 7629     Jaswinder Ward MD at 10/19/19 6352          PROGRESS NOTE.      Patient:  Parker Arnold  YOB: 1955  Date of Service: 10/19/2019  MRN: [id number]   Acct: [id number]   Primary Care Physician: Betty Almaraz APRN  Advance Directive:   Code Status and Medical Interventions:   Ordered at: 10/18/19 1120     Code Status:    CPR     Medical Interventions (Level of Support Prior to Arrest):    Full     Admit Date: 10/18/2019       Hospital Day: 1  Referring Provider: Charlie Franco MD      Patient Seen, Chart, Consults, Notes, Labs, Radiology studies reviewed.        Subjective:  Parker Arnold is a 64 y.o. male  whom we were consulted for acute kidney injury.  Baseline chronic kidney disease stage 3 (s/pr right nephrectomy in 2014 due to renal cell carcinoma).  Has previously seen Marie Shah in our office; last seen in July of this year. Creatinine 2.2 at that time.  Patient has been seeing nephrologist at [facility] since then.  For several months patient has been undergoing chemotherapy for malignant lung cancer from the RCC.  Over the past several months has had multiple episodes of acute kidney injury; due to Sutent as well as Optivo.  He has been on multiple rounds of prednisone.  Last dose of Optivo was five weeks ago.  Review of labs from outside facility shows creatinine has been 2.4-3.3 over the last two months.  Over the last week has developed fatigue, malaise.  Denies any change in urination. Denies n/v/d. Patient was admitted for AMAURI (creatinine was 6.43). He denied NSAIDs abuse. Patient is now managed with steroids and IV fluids. He noticed improving urine output.      Review of Systems:  History obtained from chart review  "and the patient  General ROS: No fever or chills  Respiratory ROS: No cough, shortness of breath, wheezing  Cardiovascular ROS: no chest pain or dyspnea on exertion  Gastrointestinal ROS: No abdominal pain or melena  Genito-Urinary ROS: No dysuria or hematuria  Musculoskeletal: negative  Skin: negative    Objective:  /67 (BP Location: Left arm, Patient Position: Lying)   Pulse 78   Temp 97.8 °F (36.6 °C) (Oral)   Resp 14   Ht 177.8 cm (70\")   Wt 82 kg (180 lb 12.8 oz)   SpO2 99%   BMI 25.94 kg/m²      Intake/Output Summary (Last 24 hours) at 10/19/2019 1319  Last data filed at 10/19/2019 1150  Gross per 24 hour   Intake 2915 ml   Output 1875 ml   Net 1040 ml       Physical examination:  General: awake/alert   Chest:  clear to auscultation bilaterally without respiratory distress  CVS: regular rate and rhythm  Abdominal: soft, nontender, normal bowel sounds  Extremities: no cyanosis or edema  Skin: warm and dry without rash  Neuro: No focal motor deficits    Labs:  Lab Results (last 24 hours)     Procedure Component Value Units Date/Time    Comprehensive Metabolic Panel [[id number]]  (Abnormal) Collected:  10/19/19 0741    Specimen:  Blood Updated:  10/19/19 0815     Glucose 176 mg/dL      BUN 63 mg/dL      Creatinine 5.66 mg/dL      Sodium 139 mmol/L      Potassium 5.8 mmol/L      Chloride 110 mmol/L      CO2 15.0 mmol/L      Calcium 8.0 mg/dL      Total Protein 6.4 g/dL      Albumin 3.70 g/dL      ALT (SGPT) 11 U/L      AST (SGOT) 7 U/L      Alkaline Phosphatase 54 U/L      Total Bilirubin 0.2 mg/dL      eGFR Non African Amer 10 mL/min/1.73      Comment: <15 Indicative of kidney failure.        eGFR   Amer --     Comment: <15 Indicative of kidney failure.        Globulin 2.7 gm/dL      A/G Ratio 1.4 g/dL      BUN/Creatinine Ratio 11.1     Anion Gap 14.0 mmol/L     Narrative:       GFR Normal >60  Chronic Kidney Disease <60  Kidney Failure <15    Uric Acid [[id number]]  (Normal) Collected:  " 10/19/19 0627    Specimen:  Blood Updated:  10/19/19 0725     Uric Acid 3.5 mg/dL     CK [[id number]]  (Normal) Collected:  10/19/19 0627    Specimen:  Blood Updated:  10/19/19 0725     Creatine Kinase 37 U/L     TSH [[id number]]  (Normal) Collected:  10/19/19 0627    Specimen:  Blood Updated:  10/19/19 0725     TSH 0.367 uIU/mL     CBC Auto Differential [[id number]]  (Abnormal) Collected:  10/19/19 0627    Specimen:  Blood Updated:  10/19/19 0701     WBC 10.57 10*3/mm3      RBC 3.73 10*6/mm3      Hemoglobin 11.2 g/dL      Hematocrit 33.9 %      MCV 90.9 fL      MCH 30.0 pg      MCHC 33.0 g/dL      RDW 14.2 %      RDW-SD 46.8 fl      MPV 9.2 fL      Platelets 320 10*3/mm3      Neutrophil % 88.8 %      Lymphocyte % 9.1 %      Monocyte % 1.2 %      Eosinophil % 0.0 %      Basophil % 0.1 %      Immature Grans % 0.8 %      Neutrophils, Absolute 9.39 10*3/mm3      Lymphocytes, Absolute 0.96 10*3/mm3      Monocytes, Absolute 0.13 10*3/mm3      Eosinophils, Absolute 0.00 10*3/mm3      Basophils, Absolute 0.01 10*3/mm3      Immature Grans, Absolute 0.08 10*3/mm3      nRBC 0.0 /100 WBC     Eosinophil Smear - Urine, Urine, Clean Catch [[id number]]  (Abnormal) Collected:  10/18/19 1555    Specimen:  Urine, Clean Catch Updated:  10/19/19 0245     Eosinophil Smear 2 % EOS/100 Cells     Urea Nitrogen, Urine - Urine, Clean Catch [[id number]] Collected:  10/18/19 1138    Specimen:  Urine, Clean Catch Updated:  10/18/19 2048     Urea Nitrogen, Urine 565 mg/dL     Narrative:       Reference intervals for random urine have not been established.  Clinical usage is dependent upon physician's interpretation in combination with other laboratory tests.     Creatinine, Urine, Random - Urine, Clean Catch [[id number]] Collected:  10/18/19 1138    Specimen:  Urine, Clean Catch Updated:  10/18/19 2028     Creatinine, Urine 98.3 mg/dL     Narrative:       Reference intervals for random urine have not been established.  Clinical usage is dependent upon  physician's interpretation in combination with other laboratory tests.     Sodium, Urine, Random - Urine, Clean Catch [[id number]] Collected:  10/18/19 1138    Specimen:  Urine, Clean Catch Updated:  10/18/19 1324     Sodium, Urine 76 mmol/L     Narrative:       Reference intervals for random urine have not been established.  Clinical usage is dependent upon physician's interpretation in combination with other laboratory tests.     Protein, Urine, Random - Urine, Clean Catch [[id number]] Collected:  10/18/19 1138    Specimen:  Urine, Clean Catch Updated:  10/18/19 1324     Total Protein, Urine 23.7 mg/dL     Narrative:       Reference intervals for random urine have not been established.  Clinical usage is dependent upon physician's interpretation in combination with other laboratory tests.           Assessment     1.  Acute kidney injury (ATN vs AIN- immunotherapy-induced nephrotoxicity)  2.  Baseline chronic kidney disease stage 3  3.  Prior right nephrectomy due to RCC  4.  Metastatic lung disease  5.  Metabolic acidosis  6.  Anemia  7.  Hyperkalemia    Plan:  We will switch to bicarbonate based IV fluids, continue intravenous steroids.  Agree with Kayexalate.  Follow-up labs.  We will hold on dialysis.      Jaswinder Ward MD  10/19/2019  1:19 PM    Electronically signed by Jaswinder Wrad MD at 10/19/19 1323     Charlie Franco MD at 10/19/19 1025              [facility] Medicine Services  INPATIENT PROGRESS NOTE    Length of Stay: 1  Date of Admission: 10/18/2019  Primary Care Physician: Betty Almaraz APRN    Subjective   Chief Complaint: Acute renal failure/renal cancer with mets to the lung.    HPI   Patient has good urine output.  Patient denies any chest pain or shortness of breath.  Patient denies any abdomen pain.    Review of Systems   Constitutional: Positive for activity change and appetite change. Negative for chills and fever.   HENT: Negative  for hearing loss, nosebleeds, tinnitus and trouble swallowing.    Eyes: Negative for visual disturbance.   Respiratory: Negative for cough, chest tightness, shortness of breath and wheezing.    Cardiovascular: Negative for chest pain, palpitations and leg swelling.   Gastrointestinal: Negative for abdominal distention, abdominal pain, blood in stool, constipation, diarrhea, nausea and vomiting.   Endocrine: Negative for cold intolerance, heat intolerance, polydipsia, polyphagia and polyuria.   Genitourinary: Negative for decreased urine volume, difficulty urinating, dysuria, flank pain, frequency and hematuria.   Musculoskeletal: Negative for arthralgias, joint swelling and myalgias.   Skin: Negative for rash.   Allergic/Immunologic: Negative for immunocompromised state.   Neurological: Negative for dizziness, syncope, weakness, light-headedness and headaches.   Hematological: Negative for adenopathy. Does not bruise/bleed easily.   Psychiatric/Behavioral: Negative for confusion and sleep disturbance. The patient is not nervous/anxious.     All pertinent negatives and positives are as above. All other systems have been reviewed and are negative unless otherwise stated.     Objective    Temp:  [97.5 °F (36.4 °C)-98.5 °F (36.9 °C)] 97.6 °F (36.4 °C)  Heart Rate:  [74-80] 74  Resp:  [16-18] 16  BP: (116-158)/(60-77) 158/77    Intake/Output Summary (Last 24 hours) at 10/19/2019 1025  Last data filed at 10/19/2019 0918  Gross per 24 hour   Intake 2915 ml   Output 1775 ml   Net 1140 ml     Physical Exam  Constitutional: He is oriented to person, place, and time. He appears well-developed and well-nourished.   HENT:   Head: Normocephalic and atraumatic.   Eyes: Conjunctivae and EOM are normal. Pupils are equal, round, and reactive to light.   Neck: Neck supple. No JVD present. No thyromegaly present.   Cardiovascular: Normal rate, regular rhythm, normal heart sounds and intact distal pulses. Exam reveals no gallop and no  friction rub.   No murmur heard.  Pulmonary/Chest: Effort normal and breath sounds normal. No respiratory distress. He has no wheezes. He has no rales. He exhibits no tenderness.   Abdominal: Soft. Bowel sounds are normal. He exhibits no distension. There is no tenderness. There is no rebound and no guarding.   Musculoskeletal: Normal range of motion. He exhibits no edema, tenderness or deformity.   Lymphadenopathy:     He has no cervical adenopathy.   Neurological: He is alert and oriented to person, place, and time. He displays normal reflexes. No cranial nerve deficit. He exhibits normal muscle tone.   Skin: Skin is warm and dry. No rash noted.   Psychiatric: He has a normal mood and affect. His behavior is normal. Judgment and thought content normal.   Nursing note and vitals reviewed.  Results Review:  Lab Results (last 24 hours)     Procedure Component Value Units Date/Time    Comprehensive Metabolic Panel [[id number]]  (Abnormal) Collected:  10/19/19 0741    Specimen:  Blood Updated:  10/19/19 0815     Glucose 176 mg/dL      BUN 63 mg/dL      Creatinine 5.66 mg/dL      Sodium 139 mmol/L      Potassium 5.8 mmol/L      Chloride 110 mmol/L      CO2 15.0 mmol/L      Calcium 8.0 mg/dL      Total Protein 6.4 g/dL      Albumin 3.70 g/dL      ALT (SGPT) 11 U/L      AST (SGOT) 7 U/L      Alkaline Phosphatase 54 U/L      Total Bilirubin 0.2 mg/dL      eGFR Non African Amer 10 mL/min/1.73      Comment: <15 Indicative of kidney failure.        eGFR   Amer --     Comment: <15 Indicative of kidney failure.        Globulin 2.7 gm/dL      A/G Ratio 1.4 g/dL      BUN/Creatinine Ratio 11.1     Anion Gap 14.0 mmol/L     Narrative:       GFR Normal >60  Chronic Kidney Disease <60  Kidney Failure <15    Uric Acid [[id number]]  (Normal) Collected:  10/19/19 0627    Specimen:  Blood Updated:  10/19/19 0725     Uric Acid 3.5 mg/dL     CK [[id number]]  (Normal) Collected:  10/19/19 0627    Specimen:  Blood Updated:  10/19/19 0725      Creatine Kinase 37 U/L     TSH [[id number]]  (Normal) Collected:  10/19/19 0627    Specimen:  Blood Updated:  10/19/19 0725     TSH 0.367 uIU/mL     CBC Auto Differential [[id number]]  (Abnormal) Collected:  10/19/19 0627    Specimen:  Blood Updated:  10/19/19 0701     WBC 10.57 10*3/mm3      RBC 3.73 10*6/mm3      Hemoglobin 11.2 g/dL      Hematocrit 33.9 %      MCV 90.9 fL      MCH 30.0 pg      MCHC 33.0 g/dL      RDW 14.2 %      RDW-SD 46.8 fl      MPV 9.2 fL      Platelets 320 10*3/mm3      Neutrophil % 88.8 %      Lymphocyte % 9.1 %      Monocyte % 1.2 %      Eosinophil % 0.0 %      Basophil % 0.1 %      Immature Grans % 0.8 %      Neutrophils, Absolute 9.39 10*3/mm3      Lymphocytes, Absolute 0.96 10*3/mm3      Monocytes, Absolute 0.13 10*3/mm3      Eosinophils, Absolute 0.00 10*3/mm3      Basophils, Absolute 0.01 10*3/mm3      Immature Grans, Absolute 0.08 10*3/mm3      nRBC 0.0 /100 WBC     Eosinophil Smear - Urine, Urine, Clean Catch [[id number]]  (Abnormal) Collected:  10/18/19 1555    Specimen:  Urine, Clean Catch Updated:  10/19/19 0245     Eosinophil Smear 2 % EOS/100 Cells     Urea Nitrogen, Urine - Urine, Clean Catch [[id number]] Collected:  10/18/19 1138    Specimen:  Urine, Clean Catch Updated:  10/18/19 2048     Urea Nitrogen, Urine 565 mg/dL     Narrative:       Reference intervals for random urine have not been established.  Clinical usage is dependent upon physician's interpretation in combination with other laboratory tests.     Creatinine, Urine, Random - Urine, Clean Catch [[id number]] Collected:  10/18/19 1138    Specimen:  Urine, Clean Catch Updated:  10/18/19 2028     Creatinine, Urine 98.3 mg/dL     Narrative:       Reference intervals for random urine have not been established.  Clinical usage is dependent upon physician's interpretation in combination with other laboratory tests.     Sodium, Urine, Random - Urine, Clean Catch [[id number]] Collected:  10/18/19 1138    Specimen:  Urine,  Clean Catch Updated:  10/18/19 1324     Sodium, Urine 76 mmol/L     Narrative:       Reference intervals for random urine have not been established.  Clinical usage is dependent upon physician's interpretation in combination with other laboratory tests.     Protein, Urine, Random - Urine, Clean Catch [[id number]] Collected:  10/18/19 1138    Specimen:  Urine, Clean Catch Updated:  10/18/19 1324     Total Protein, Urine 23.7 mg/dL     Narrative:       Reference intervals for random urine have not been established.  Clinical usage is dependent upon physician's interpretation in combination with other laboratory tests.     Urinalysis, Microscopic Only - Urine, Clean Catch [[id number]]  (Abnormal) Collected:  10/18/19 1138    Specimen:  Urine, Clean Catch Updated:  10/18/19 1203     RBC, UA 0-2 /HPF      WBC, UA Too Numerous to Count /HPF      Bacteria, UA None Seen /HPF      Squamous Epithelial Cells, UA None Seen /HPF      Methodology Manual Light Microscopy    Urine Culture - Urine, Urine, Clean Catch [[id number]] Collected:  10/18/19 1138    Specimen:  Urine, Clean Catch Updated:  10/18/19 1203    Urinalysis With Culture If Indicated - Urine, Clean Catch [[id number]]  (Abnormal) Collected:  10/18/19 1138    Specimen:  Urine, Clean Catch Updated:  10/18/19 1155     Color, UA Yellow     Appearance, UA Clear     pH, UA 5.5     Specific Gravity, UA 1.014     Glucose, UA Negative     Ketones, UA Negative     Bilirubin, UA Negative     Blood, UA Trace     Protein, UA 30 mg/dL (1+)     Leuk Esterase, UA Moderate (2+)     Nitrite, UA Negative     Urobilinogen, UA 0.2 E.U./dL           Cultures:       Radiology Data:    Imaging Results (last 24 hours)     Procedure Component Value Units Date/Time    US Renal Limited [[id number]] Collected:  10/18/19 1812     Updated:  10/18/19 1817    Narrative:       Exam: Renal ultrasound     Indication: renal failure     Comparison: None     Finding:     Right kidney is surgically absent.  The  left kidney measures 12.5 cm in length.  Left kidney echogenicity and cortical thickness are normal.  No shadowing calculi or hydronephrosis.  No focal bladder abnormality.     Incidentally noted:  There is gallbladder wall thickening and the  gallbladder lumen contains layering sludge. Common bile duct is  nondilated measuring 4 mm diameter.       Impression:          Negative for hydronephrosis, renal atrophy, or shadowing calculi.     Incidentally noted, gallbladder wall thickening and layering sludge.  Recommend clinical correlation to exclude cholecystitis.  This report was finalized on 10/18/2019 18:14 by Dr. Akhil Collins MD.          Allergies   Allergen Reactions   • Clindamycin/Lincomycin Rash     pustules   • Doxycycline Rash   • Hepatitis B Virus Vaccine Rash     Pustules     • Latex Rash   • Levaquin [Levofloxacin] Rash   • Penicillins Rash       Scheduled meds:     methylPREDNISolone sodium succinate 20 mg Intravenous Q6H   pantoprazole 40 mg Oral Q AM   sodium chloride 10 mL Intravenous Q12H       PRN meds:  •  calcium carbonate  •  ondansetron  •  sodium chloride    Assessment/Plan       Clear cell carcinoma of kidney (CMS/HCC)    Renal failure    Lung mass    Acute renal failure (ARF) (CMS/HCC)      Plan:  Renal failure.   Improving. Renal failure for last 3 months.  Probably secondary to immunoglobulin.  Consult nephrology.  IV hydration.      Ultrasound of the kidneys-negative for hydronephrosis, renal atrophy, or shadowing calculi.     Renal cell carcinoma mets to the lungs/lung mass.  Status post right kidney resection 2014.     Continue Solu-Medrol per oncology.  Consult oncology. Yervoy/opdivo 5/13/2019 to 8/7/2019.    Hyperkalemia.  Kayexalate.    Leukocytosis.  Secondary to steroid.    Gallbladder wall thickening and layering sludge.  Incidental finding from ultrasound.  Discussed with patient.  Patient is asymptomatic.    UTI.  Rocephin.  Urine cultures pending.     Reflux.  Protonix.   "Zofran as needed.     Nutrition.  Renal diet.     Discharge Plannin-5 days    Charlie Franco MD   10/19/19   10:25 AM                    Electronically signed by Charlie Franco MD at 10/20/19 0916     Rome Fleming MD at 10/19/19 0806          [facility] Progress Note    Progress Note    Patient:  Parker Arnold  YOB: 1955  Date of Service: 10/19/2019  MRN: [id number]   Acct: [id number]   Primary Care Physician: Betty Almaraz APRN  Advance Directive:   Code Status and Medical Interventions:   Ordered at: 10/18/19 1120     Code Status:    CPR     Medical Interventions (Level of Support Prior to Arrest):    Full     Admit Date: 10/18/2019       Hospital Day: 1      Subjective:     Chief Compliant: Patient seen.  No complaints. \"  I am doing fine.  I am urinating good.\"  Stable overnight per nurse.      Review of Systems:   Review of Systems   Constitutional: Negative for chills, diaphoresis and fever.   HENT: Negative for sore throat and trouble swallowing.    Eyes: Negative for redness and visual disturbance.   Respiratory: Negative for cough, shortness of breath and wheezing.    Cardiovascular: Negative for chest pain and leg swelling.   Gastrointestinal: Negative for abdominal pain, diarrhea, nausea and vomiting.   Endocrine: Negative for cold intolerance and heat intolerance.   Genitourinary: Negative for difficulty urinating, dysuria, flank pain and hematuria.   Musculoskeletal: Negative for arthralgias and joint swelling.   Skin: Positive for pallor.   Neurological: Negative for dizziness, seizures, syncope, numbness and headaches.   Hematological: Negative for adenopathy. Does not bruise/bleed easily.   Psychiatric/Behavioral: Negative for agitation, behavioral problems, confusion and hallucinations.           Medications:   Scheduled Meds:  methylPREDNISolone sodium succinate 20 mg Intravenous Q6H   pantoprazole 40 mg Oral Q AM   sodium chloride 10 mL " "Intravenous Q12H         Objective:   Vitals: /73 (BP Location: Left arm, Patient Position: Lying)   Pulse 77   Temp 97.5 °F (36.4 °C) (Oral)   Resp 18   Ht 177.8 cm (70\")   Wt 82 kg (180 lb 12.8 oz)   SpO2 98%   BMI 25.94 kg/m²    Physical Exam   Constitutional: He is oriented to person, place, and time. He appears well-developed and well-nourished. No distress.   HENT:   Head: Normocephalic and atraumatic.   Eyes: No scleral icterus.   Cardiovascular: Normal rate and regular rhythm.   Pulmonary/Chest: No respiratory distress. He has no wheezes. He has no rales.   Abdominal: Soft. Bowel sounds are normal. There is no tenderness.   Musculoskeletal: He exhibits no edema.   Lymphadenopathy:     He has no cervical adenopathy.   Neurological: He is alert and oriented to person, place, and time.   Skin: Skin is warm and dry. He is not diaphoretic. There is pallor.   Psychiatric: He has a normal mood and affect. His behavior is normal. Judgment and thought content normal.   Nursing note and vitals reviewed.    24HR INTAKE/OUTPUT:      Intake/Output Summary (Last 24 hours) at 10/19/2019 0806  Last data filed at 10/19/2019 0600  Gross per 24 hour   Intake 2715 ml   Output 1425 ml   Net 1290 ml        Problem list:       Clear cell carcinoma of kidney (CMS/HCC)    Renal failure    Lung mass    Acute renal failure (ARF) (CMS/HCC)      Assessment/Plan:     ASSESSMENT:  1.  Acute kidney injury from immune therapy with Opdivo  2.  Baseline chronic kidney disease stage III.  3.  Stage IV renal cell carcinoma.  4.  Anemia secondary to chronic kidney disease.  5.  Lung metastasis.    Plan:  1.   Re: Heme status, hemoglobin 11.2 from 12.2.  2.  Await CMP.  Creatinine yesterday was 6.43 with GFR of 9 mL/min.  3.  Continue Solu-Medrol 20 mill grams IV every 6 hours with gastric prophylaxis.  4.  Further recommendations pending.  5.  Above discussed with nurse and patient.  They verbalized " "understanding.                    Electronically signed by Rome lFeming MD at 10/19/19 0813          Consult Notes (all)      Julio Bruleson MD at 10/21/19 1615      Consult Orders    1. Inpatient Oncology Consult [[id number]] ordered by Charlie Franco MD at 10/18/19 1122                [facility]    HEMATOLOGY & ONCOLOGY      CONSULTATION NOTE      REASON FOR CONSULT: opdivo induced nephritis  REFERRING PHYSICIAN: Charlie Franco MD    ADMISSION DIAGNOSIS  Acute renal failure (ARF) (CMS/HCC) [N17.9]      Subjective     HISTORY OF PRESENT ILLNESS:     Pt admitted with a creatinine>6 and high dose steroid started. Creatine down to 4. Pt reports having good urine out put. Nephrology following. Would get a renal biopsy.  Denies fever, steroid induced psychosis, cp, n/v, sob, abdominal pain, fever, chills LAD.  PE unremarkable and non focal.  /60 (BP Location: Left arm, Patient Position: Sitting)   Pulse 65   Temp 98.4 °F (36.9 °C) (Oral)   Resp 18   Ht 177.8 cm (70\")   Wt 82.1 kg (180 lb 16 oz)   SpO2 96%   BMI 25.97 kg/m²      Current Status 10/18/2019   ECOG score 0         General Appearance:    Alert, cooperative, no distress, appears stated age   Head:    Normocephalic, without obvious abnormality, atraumatic   Eyes:    PERRL, conjunctiva/corneas clear, EOM's intact, fundi     benign, both eyes   Ears:    Normal TM's and external ear canals, both ears   Nose:   Nares normal, septum midline, mucosa normal, no drainage     or sinus tenderness   Throat:   Lips, mucosa, and tongue normal; teeth and gums normal   Neck:   Supple, symmetrical, trachea midline, no adenopathy;     thyroid:  no enlargement/tenderness/nodules; no carotid    bruit or JVD   Back:     Symmetric, no curvature, ROM normal, no CVA tenderness   Lungs:     Clear to auscultation bilaterally, respirations unlabored   Chest Wall:    No tenderness or deformity    Heart:    Regular rate and rhythm, S1 and S2 " normal, no murmur, rub    or gallop   Abdomen:     Soft, non-tender, bowel sounds active all four quadrants,     no masses, no organomegaly   Extremities:   Extremities normal, atraumatic, no cyanosis or edema   Pulses:   2+ and symmetric all extremities   Skin:   Skin color, texture, turgor normal, no rashes or lesions   Lymph nodes:   Cervical, supraclavicular, and axillary nodes normal   Neurologic:   CNII-XII intact, normal strength, sensation and reflexes     throughout     Assessment/Plan 64 yom with rcc s/p right nephrectomy, right wedge resection for mets, s/p 4 cycles of opdivo/ipi, then couple maintenace opdivo, therapy stopped due to grade 4-5 toxicity of the kidney that is improving      Acute renal failure with tubular necrosis in the setting of solitary kidney    Clear cell carcinoma of kidney (CMS/HCC)    Lung metastases (CMS/HCC)    Pyuria    Hyperkalemia    Metabolic acidosis    Hyperglycemia    Hypocalcemia          1. RCC:   -stop immunotherapy indefinitely  -will monitor disease.    2. Acute on chronic kidney failure from opdivo: responding well to steroid. Cr down to 4. Pt have been on and off steroid and creatinine worsens.  -will get renal biopsy to help understand what is going on. Discussed with nephrology and patient and they are in agreement  -will kathy high dose steroid as recommended by NCCN guideline 60mg prednisone until he is down to his baseline of 2.2, then will slowly taper for a month  -will plan to see pt in clinic after discharge in a week to reeval renal, if creatinine not down to baseline, will add infliximab to his regimen.    3. Prophylasix: bactrim for PCP, acyclovir for viral and fluconazole for candida since he will be on high dose steroid for a month.      Time Spent: 60 minutes; greater than  50% of time was spent in patient counseling and care coordination.     Julio Burleson MD    10/21/2019    4:15 PM        Thank you for this consultation and  opportunity to participate in this patient's care.    Electronically signed by Julio Burleson MD at 10/21/19 1650     Leo Dumont APRN at 10/18/19 1134      Consult Orders    1. Inpatient Nephrology Consult [[id number]] ordered by Charlie Franco MD at 10/18/19 1120           Attestation signed by Jaswinder Ward MD at 10/18/19 1300    I saw and examined patient independently. I have reviewed the documentation above and agree.  Parker Arnold is a 64 y.o. male  whom we were consulted for acute kidney injury.  Baseline chronic kidney disease stage 3 (s/pr right nephrectomy in 2014 due to renal cell carcinoma).  Has previously seen Marie Shah in our office; last seen in July of this year. Creatinine 2.2 at that time.  Patient has been seeing nephrologist at [facility] since then.  For several months patient has been undergoing chemotherapy for malignant lung cancer from the RCC.  Over the past several months has had multiple episodes of acute kidney injury; due to Sutent as well as Optivo.  He has been on multiple rounds of prednisone.  Last dose of Optivo was five weeks ago.  Review of labs from outside facility shows creatinine has been 2.4-3.3 over the last two months.  Over the last week has developed fatigue, malaise.  Denies any change in urination. Denies n/v/d. Patient was admitted for AMAURI (creatinine was 6.43). He denied NSAIDs abuse.   Vitals: reviewed.  On exam, heart sounds were regular, Lungs with bilateral air entry, abdomen was soft nontender to palpation, legs without edema.  Labs were reviewed.    Assessment and plan:  1.  Acute kidney injury (ATN vs AIN- immunotherapy-induced nephrotoxicity)  2.  Baseline chronic kidney disease stage 3  3.  Prior right nephrectomy due to RCC  4.  Metastatic lung disease  5.  Metabolic acidosis  6.  Anemia   At this time, patient doesn't appear to be uremic and he preferred not to rush into dialysis. I suggest to provide adequate  hydration first, hold chemo,provide steroids and follow up labs. Avoid hypotension. Will check CK level. Will also obtain a renal US.                  Nephrology ([facility]) Consult Note      Patient:  Parker Arnold  YOB: 1955  Date of Service: 10/18/2019  MRN: [id number]   Acct: [id number]   Primary Care Physician: Betty Almaraz APRN  Advance Directive:   Code Status and Medical Interventions:   Ordered at: 10/18/19 1120     Code Status:    CPR     Medical Interventions (Level of Support Prior to Arrest):    Full     Admit Date: 10/18/2019       Hospital Day: 0  Referring Provider: Charlie Franco MD      Patient personally seen and examined.  Complete chart including Consults, Notes, Operative Reports, Labs, Cardiology, and Radiology studies reviewed as able.        Subjective:  Parker Arnold is a 64 y.o. male  whom we were consulted for acute kidney injury.  Baseline chronic kidney disease stage 3.  prior right nephrectomy in 2014 due to renal cell carcinoma.  Has previously followed with Marie Shah in our office; last seen in July of this year. Creatinine 2.2 at that time.  Patient has been seeing nephrologist at [facility] since then.  For several months patient has been undergoing chemotherapy for malignant lung cancer from the RCC.  Over the past several months has had multiple episodes of acute kidney injury; due to Sutent as well as Optivo.  He has been on multiple rounds of prednisone.  Last dose of Optivo was five weeks ago.  Review of labs from outside facility shows creatinine has been 2.4-3.3 over the last two months.  Over the last week has developed fatigue, malaise.  Denies any change in urination. Denies n/v/d.  Currently is awake and alert. Denies pain or dyspnea, no edema.  Urine output nonoliguric    Allergies:  Clindamycin/lincomycin; Doxycycline; Hepatitis b virus vaccine; Latex; Levaquin [levofloxacin]; and Penicillins    Home  Meds:  Medications Prior to Admission   Medication Sig Dispense Refill Last Dose   • allopurinol (ZYLOPRIM) 100 MG tablet Take 100 mg by mouth Daily.   10/18/2019 at 0630   • omeprazole (priLOSEC) 20 MG capsule Take 20 mg by mouth 2 (Two) Times a Day.  2 10/18/2019 at 0630       Medicines:  Current Facility-Administered Medications   Medication Dose Route Frequency Provider Last Rate Last Dose   • methylPREDNISolone sodium succinate (SOLU-Medrol) injection 20 mg  20 mg Intravenous Q6H Charlie Franco MD       • ondansetron (ZOFRAN) injection 4 mg  4 mg Intravenous Q6H PRN Charlie Franco MD       • pantoprazole (PROTONIX) EC tablet 40 mg  40 mg Oral Q AM Charlie Franco MD       • sodium chloride 0.9 % flush 10 mL  10 mL Intravenous Q12H Charlie Franco MD       • sodium chloride 0.9 % flush 10 mL  10 mL Intravenous PRN Charlie Franco MD       • sodium chloride 0.9 % infusion  125 mL/hr Intravenous Continuous Charlie Franco MD           Past Medical History:  Past Medical History:   Diagnosis Date   • Renal cell carcinoma (CMS/HCC)        Past Surgical History:  Past Surgical History:   Procedure Laterality Date   • HERNIA REPAIR  2008   • LUNG BIOPSY  11/2018   • NEPHRECTOMY  2014   • SINUS SURGERY  2005       Family History  Family History   Problem Relation Age of Onset   • Other Mother         blood disease unknown   • Cancer Father         unknown   • No Known Problems Sister    • Heart disease Maternal Grandmother    • Heart disease Maternal Grandfather    • No Known Problems Paternal Grandmother    • No Known Problems Paternal Grandfather    • No Known Problems Sister        Social History  Social History     Socioeconomic History   • Marital status:      Spouse name: Not on file   • Number of children: Not on file   • Years of education: Not on file   • Highest education level: Not on file   Tobacco Use   • Smoking status: Former Smoker     Packs/day: 1.00     Years: 15.00     Pack years: 15.00      "Types: Cigarettes     Last attempt to quit:      Years since quittin.8   • Smokeless tobacco: Never Used   Substance and Sexual Activity   • Alcohol use: No     Frequency: Never   • Drug use: No   • Sexual activity: Defer         Review of Systems:  History obtained from chart review and the patient  General ROS: Patient complains of fatigue and malaise and No fever or chills  Respiratory ROS: No cough, shortness of breath, wheezing  Cardiovascular ROS: No chest pain or palpitations  Gastrointestinal ROS: No abdominal pain or melena  Genito-Urinary ROS: No dysuria or hematuria  Neurological ROS: no headache or dizziness  14 point ROS reviewed with the patient and negative except as noted above and in the HPI unless unable to obtain.    Objective:  Patient Vitals for the past 24 hrs:   BP Temp Temp src Pulse Resp SpO2 Height Weight   10/18/19 0958 146/78 98.1 °F (36.7 °C) Oral 76 16 99 % 177.8 cm (70\") 82 kg (180 lb 12.8 oz)     No intake or output data in the 24 hours ending 10/18/19 1134  General: awake/alert    Neck :supple, no JVD  Chest:  clear to auscultation bilaterally without respiratory distress  CVS: regular rate and rhythm  Abdominal: soft, nontender, positive bowel sounds  Extremities: no cyanosis or edema  Skin: warm and dry without rash   Neuro: no focal motor deficits      Labs:  Results from last 7 days   Lab Units 10/18/19  0739   WBC 10*3/mm3 9.60   HEMOGLOBIN g/dL 12.2*   HEMATOCRIT % 36.2*   PLATELETS 10*3/mm3 362         Results from last 7 days   Lab Units 10/18/19  0739   SODIUM mmol/L 143   POTASSIUM mmol/L 4.7   CHLORIDE mmol/L 108*   CO2 mmol/L 18.0*   BUN mg/dL 72*   CREATININE mg/dL 6.43*   CALCIUM mg/dL 8.5*   BILIRUBIN mg/dL 0.3   ALK PHOS U/L 59   ALT (SGPT) U/L 14   AST (SGOT) U/L 7   GLUCOSE mg/dL 110*       Radiology:   Imaging Results (last 72 hours)     ** No results found for the last 72 hours. **          Culture:         Assessment   1.  Acute kidney injury (?due to " Optivo)  2.  Baseline chronic kidney disease stage 3  3.  Prior right nephrectomy due to RCC  4.  Metastatic lung cancer  5.  Metabolic acidosis  6.  Anemia     Plan:  1.  Workup ongoing. Renal US and urine studies pending  2.  Continue IV fluids  3.  Discussed possibility of dialysis with patient if no improvement in renal function.  4.  Further assessment and plan pending Dr Ward's evaluation of patient      Thank you for the consult, we appreciate the opportunity to provide care to your patients.  Feel free to contact me if I can be of any further assistance.      JACLYN Story  10/18/2019  11:34 AM    Electronically signed by Jaswinder Ward MD at 10/18/19 1300          Discharge Summary      Savage Holloway DO at 10/22/19 1004                [facility]  DISCHARGE SUMMARY       Date of Admission: 10/18/2019  Date of Discharge:  10/22/2019  Primary Care Physician: Betty Almaraz APRN    Presenting Problem/History of Present Illness:  Abnormal renal function.     Final Discharge Diagnoses:  Active Hospital Problems    Diagnosis   • **Acute renal failure with tubular necrosis in the setting of solitary kidney   • Hyperkalemia   • Metabolic acidosis   • Clear cell carcinoma of kidney (CMS/HCC)     Overview:   Diagnoses 11/11/14. Path report in C[facility]E at [facility]. Tissue has been requested.      • Lung metastases (CMS/HCC)   • Pyuria   • Hyperglycemia   • Hypocalcemia     Consults:   1. Dr. Burleson with onocology.   2. Dr. Ward and Dr. Grant with nephrology.     Procedures Performed: None.     Pertinent Test Results:   Imaging Results (last 7 days)     Procedure Component Value Units Date/Time    US Renal Limited [[id number]] Collected:  10/18/19 1812     Updated:  10/18/19 1817    Narrative:       Exam: Renal ultrasound     Indication: renal failure     Comparison: None     Finding:     Right kidney is surgically  absent.  The left kidney measures 12.5 cm in length.  Left kidney echogenicity and cortical thickness are normal.  No shadowing calculi or hydronephrosis.  No focal bladder abnormality.     Incidentally noted:  There is gallbladder wall thickening and the  gallbladder lumen contains layering sludge. Common bile duct is  nondilated measuring 4 mm diameter.       Impression:          Negative for hydronephrosis, renal atrophy, or shadowing calculi.     Incidentally noted, gallbladder wall thickening and layering sludge.  Recommend clinical correlation to exclude cholecystitis.  This report was finalized on 10/18/2019 18:14 by Dr. Akhil Collins MD.        Lab Results (last 7 days)     Procedure Component Value Units Date/Time    Hemoglobin A1c [[id number]]  (Normal) Collected:  10/22/19 0542    Specimen:  Blood Updated:  10/22/19 0634     Hemoglobin A1C 5.50 %     Narrative:       Hemoglobin A1C Ranges:    Increased Risk for Diabetes  5.7% to 6.4%  Diabetes                     >= 6.5%  Diabetic Goal                < 7.0%    Comprehensive Metabolic Panel [[id number]]  (Abnormal) Collected:  10/22/19 0542    Specimen:  Blood Updated:  10/22/19 0633     Glucose 107 mg/dL      BUN 55 mg/dL      Creatinine 3.55 mg/dL      Sodium 146 mmol/L      Potassium 4.0 mmol/L      Chloride 108 mmol/L      CO2 27.0 mmol/L      Calcium 6.5 mg/dL      Total Protein 5.6 g/dL      Albumin 3.30 g/dL      ALT (SGPT) 12 U/L      AST (SGOT) 15 U/L      Alkaline Phosphatase 41 U/L      Total Bilirubin <0.2 mg/dL      eGFR Non African Amer 17 mL/min/1.73      Globulin 2.3 gm/dL      A/G Ratio 1.4 g/dL      BUN/Creatinine Ratio 15.5     Anion Gap 11.0 mmol/L     Narrative:       GFR Normal >60  Chronic Kidney Disease <60  Kidney Failure <15    CBC Auto Differential [[id number]]  (Abnormal) Collected:  10/22/19 0542    Specimen:  Blood Updated:  10/22/19 0607     WBC 12.02 10*3/mm3      RBC 3.44 10*6/mm3      Hemoglobin 10.3 g/dL      Hematocrit  31.1 %      MCV 90.4 fL      MCH 29.9 pg      MCHC 33.1 g/dL      RDW 14.5 %      RDW-SD 46.8 fl      MPV 9.4 fL      Platelets 279 10*3/mm3      Neutrophil % 74.4 %      Lymphocyte % 13.0 %      Monocyte % 8.2 %      Eosinophil % 0.0 %      Basophil % 0.2 %      Immature Grans % 4.2 %      Neutrophils, Absolute 8.95 10*3/mm3      Lymphocytes, Absolute 1.56 10*3/mm3      Monocytes, Absolute 0.99 10*3/mm3      Eosinophils, Absolute 0.00 10*3/mm3      Basophils, Absolute 0.02 10*3/mm3      Immature Grans, Absolute 0.50 10*3/mm3      nRBC 0.2 /100 WBC     Comprehensive Metabolic Panel [[id number]]  (Abnormal) Collected:  10/21/19 0824    Specimen:  Blood Updated:  10/21/19 0910     Glucose 130 mg/dL      BUN 57 mg/dL      Creatinine 3.91 mg/dL      Sodium 145 mmol/L      Potassium 4.4 mmol/L      Chloride 105 mmol/L      CO2 27.0 mmol/L      Calcium 6.8 mg/dL      Total Protein 5.9 g/dL      Albumin 3.50 g/dL      ALT (SGPT) 10 U/L      AST (SGOT) 10 U/L      Alkaline Phosphatase 43 U/L      Total Bilirubin 0.2 mg/dL      eGFR Non African Amer 16 mL/min/1.73      Globulin 2.4 gm/dL      A/G Ratio 1.5 g/dL      BUN/Creatinine Ratio 14.6     Anion Gap 13.0 mmol/L     Narrative:       GFR Normal >60  Chronic Kidney Disease <60  Kidney Failure <15    CBC Auto Differential [[id number]]  (Abnormal) Collected:  10/21/19 0824    Specimen:  Blood Updated:  10/21/19 0837     WBC 13.07 10*3/mm3      RBC 3.34 10*6/mm3      Hemoglobin 10.1 g/dL      Hematocrit 30.0 %      MCV 89.8 fL      MCH 30.2 pg      MCHC 33.7 g/dL      RDW 14.4 %      RDW-SD 46.3 fl      MPV 9.5 fL      Platelets 262 10*3/mm3      Neutrophil % 86.9 %      Lymphocyte % 8.6 %      Monocyte % 2.9 %      Eosinophil % 0.0 %      Basophil % 0.1 %      Immature Grans % 1.5 %      Neutrophils, Absolute 11.37 10*3/mm3      Lymphocytes, Absolute 1.12 10*3/mm3      Monocytes, Absolute 0.38 10*3/mm3      Eosinophils, Absolute 0.00 10*3/mm3      Basophils, Absolute 0.01  10*3/mm3      Immature Grans, Absolute 0.19 10*3/mm3      nRBC 0.0 /100 WBC     Comprehensive Metabolic Panel [[id number]]  (Abnormal) Collected:  10/20/19 0607    Specimen:  Blood Updated:  10/20/19 0720     Glucose 139 mg/dL      BUN 63 mg/dL      Creatinine 4.85 mg/dL      Sodium 144 mmol/L      Potassium 4.5 mmol/L      Chloride 109 mmol/L      CO2 22.0 mmol/L      Calcium 7.2 mg/dL      Total Protein 5.5 g/dL      Albumin 3.30 g/dL      ALT (SGPT) 11 U/L      AST (SGOT) 7 U/L      Alkaline Phosphatase 49 U/L      Total Bilirubin <0.2 mg/dL      eGFR Non African Amer 12 mL/min/1.73      Comment: <15 Indicative of kidney failure.        eGFR   Amer --     Comment: <15 Indicative of kidney failure.        Globulin 2.2 gm/dL      A/G Ratio 1.5 g/dL      BUN/Creatinine Ratio 13.0     Anion Gap 13.0 mmol/L     Narrative:       GFR Normal >60  Chronic Kidney Disease <60  Kidney Failure <15    CK [[id number]]  (Normal) Collected:  10/20/19 0607    Specimen:  Blood Updated:  10/20/19 0712     Creatine Kinase 30 U/L     CBC Auto Differential [[id number]]  (Abnormal) Collected:  10/20/19 0607    Specimen:  Blood Updated:  10/20/19 0655     WBC 15.15 10*3/mm3      RBC 3.35 10*6/mm3      Hemoglobin 10.0 g/dL      Hematocrit 29.4 %      MCV 87.8 fL      MCH 29.9 pg      MCHC 34.0 g/dL      RDW 14.2 %      RDW-SD 45.2 fl      MPV 9.5 fL      Platelets 280 10*3/mm3      Neutrophil % 90.6 %      Lymphocyte % 6.5 %      Monocyte % 2.2 %      Eosinophil % 0.0 %      Basophil % 0.1 %      Immature Grans % 0.6 %      Neutrophils, Absolute 13.74 10*3/mm3      Lymphocytes, Absolute 0.98 10*3/mm3      Monocytes, Absolute 0.33 10*3/mm3      Eosinophils, Absolute 0.00 10*3/mm3      Basophils, Absolute 0.01 10*3/mm3      Immature Grans, Absolute 0.09 10*3/mm3      nRBC 0.0 /100 WBC     Urine Culture - Urine, Urine, Clean Catch [[id number]]  (Normal) Collected:  10/18/19 1138    Specimen:  Urine, Clean Catch Updated:  10/19/19 7046      Urine Culture No growth    Comprehensive Metabolic Panel [[id number]]  (Abnormal) Collected:  10/19/19 0741    Specimen:  Blood Updated:  10/19/19 0815     Glucose 176 mg/dL      BUN 63 mg/dL      Creatinine 5.66 mg/dL      Sodium 139 mmol/L      Potassium 5.8 mmol/L      Chloride 110 mmol/L      CO2 15.0 mmol/L      Calcium 8.0 mg/dL      Total Protein 6.4 g/dL      Albumin 3.70 g/dL      ALT (SGPT) 11 U/L      AST (SGOT) 7 U/L      Alkaline Phosphatase 54 U/L      Total Bilirubin 0.2 mg/dL      eGFR Non African Amer 10 mL/min/1.73      Comment: <15 Indicative of kidney failure.        eGFR   Amer --     Comment: <15 Indicative of kidney failure.        Globulin 2.7 gm/dL      A/G Ratio 1.4 g/dL      BUN/Creatinine Ratio 11.1     Anion Gap 14.0 mmol/L     Narrative:       GFR Normal >60  Chronic Kidney Disease <60  Kidney Failure <15    Uric Acid [[id number]]  (Normal) Collected:  10/19/19 0627    Specimen:  Blood Updated:  10/19/19 0725     Uric Acid 3.5 mg/dL     CK [[id number]]  (Normal) Collected:  10/19/19 0627    Specimen:  Blood Updated:  10/19/19 0725     Creatine Kinase 37 U/L     TSH [[id number]]  (Normal) Collected:  10/19/19 0627    Specimen:  Blood Updated:  10/19/19 0725     TSH 0.367 uIU/mL     CBC Auto Differential [[id number]]  (Abnormal) Collected:  10/19/19 0627    Specimen:  Blood Updated:  10/19/19 0701     WBC 10.57 10*3/mm3      RBC 3.73 10*6/mm3      Hemoglobin 11.2 g/dL      Hematocrit 33.9 %      MCV 90.9 fL      MCH 30.0 pg      MCHC 33.0 g/dL      RDW 14.2 %      RDW-SD 46.8 fl      MPV 9.2 fL      Platelets 320 10*3/mm3      Neutrophil % 88.8 %      Lymphocyte % 9.1 %      Monocyte % 1.2 %      Eosinophil % 0.0 %      Basophil % 0.1 %      Immature Grans % 0.8 %      Neutrophils, Absolute 9.39 10*3/mm3      Lymphocytes, Absolute 0.96 10*3/mm3      Monocytes, Absolute 0.13 10*3/mm3      Eosinophils, Absolute 0.00 10*3/mm3      Basophils, Absolute 0.01 10*3/mm3      Immature Grans,  Absolute 0.08 10*3/mm3      nRBC 0.0 /100 WBC     Eosinophil Smear - Urine, Urine, Clean Catch [[id number]]  (Abnormal) Collected:  10/18/19 1555    Specimen:  Urine, Clean Catch Updated:  10/19/19 0245     Eosinophil Smear 2 % EOS/100 Cells     Urea Nitrogen, Urine - Urine, Clean Catch [[id number]] Collected:  10/18/19 1138    Specimen:  Urine, Clean Catch Updated:  10/18/19 2048     Urea Nitrogen, Urine 565 mg/dL     Narrative:       Reference intervals for random urine have not been established.  Clinical usage is dependent upon physician's interpretation in combination with other laboratory tests.     Creatinine, Urine, Random - Urine, Clean Catch [[id number]] Collected:  10/18/19 1138    Specimen:  Urine, Clean Catch Updated:  10/18/19 2028     Creatinine, Urine 98.3 mg/dL     Narrative:       Reference intervals for random urine have not been established.  Clinical usage is dependent upon physician's interpretation in combination with other laboratory tests.     Sodium, Urine, Random - Urine, Clean Catch [[id number]] Collected:  10/18/19 1138    Specimen:  Urine, Clean Catch Updated:  10/18/19 1324     Sodium, Urine 76 mmol/L     Narrative:       Reference intervals for random urine have not been established.  Clinical usage is dependent upon physician's interpretation in combination with other laboratory tests.     Protein, Urine, Random - Urine, Clean Catch [[id number]] Collected:  10/18/19 1138    Specimen:  Urine, Clean Catch Updated:  10/18/19 1324     Total Protein, Urine 23.7 mg/dL     Narrative:       Reference intervals for random urine have not been established.  Clinical usage is dependent upon physician's interpretation in combination with other laboratory tests.     Urinalysis, Microscopic Only - Urine, Clean Catch [[id number]]  (Abnormal) Collected:  10/18/19 1138    Specimen:  Urine, Clean Catch Updated:  10/18/19 1203     RBC, UA 0-2 /HPF      WBC, UA Too Numerous to Count /HPF      Bacteria, UA None  Seen /HPF      Squamous Epithelial Cells, UA None Seen /HPF      Methodology Manual Light Microscopy    Urinalysis With Culture If Indicated - Urine, Clean Catch [[id number]]  (Abnormal) Collected:  10/18/19 1138    Specimen:  Urine, Clean Catch Updated:  10/18/19 1155     Color, UA Yellow     Appearance, UA Clear     pH, UA 5.5     Specific Gravity, UA 1.014     Glucose, UA Negative     Ketones, UA Negative     Bilirubin, UA Negative     Blood, UA Trace     Protein, UA 30 mg/dL (1+)     Leuk Esterase, UA Moderate (2+)     Nitrite, UA Negative     Urobilinogen, UA 0.2 E.U./dL        Hospital Course:  The patient was originally admitted on 10/18 by Dr. Franco.  He has a history of solitary kidney after undergoing a nephrectomy of the right kidney in 2014 for renal cell carcinoma.  Unfortunately, he has had recurrence with metastatic disease to the lung.  He has started seeing Dr. Burleson after following with oncology in [place], [place].  It is felt that his acute renal failure is secondary to recent treatment with Sutent and Opdivo.  Interstitial nephritis would be the main concern.     He has responded to fluids and steroids.  His serum creatinine is improving on a daily basis.  His baseline serum creatinine is 2.2  His hyperkalemia and acidosis have resolved.     It is felt that he can go home with continued improvement of his renal function.  He will remain on 60 mg of prednisone after receiving Solu-Medrol in the hospital.  Nephrology does not recommend a renal biopsy at this point in time.  They will see him on Friday with a repeat BMP.  He will receive a dose of calcium gluconate prior to discharge.     Dr. Burleson is to see him in clinic in 1 week.  She recommends that he be on prophylaxis for viral, bacterial, and fungal infections.  He is to take Bactrim 3 times per week and fluconazole/acyclovir on a daily basis.  Renal dosages were confirmed with pharmacy today.    His mild hyperglycemia is provoked by  "steroids.   Hemoglobin A1c normal.     Today, he feels well and has no complaints.  He is very anxious to go home and have close outpatient follow-up.    Physical Exam on Discharge:  /84 (BP Location: Left arm, Patient Position: Sitting)   Pulse 60   Temp 97.6 °F (36.4 °C) (Oral)   Resp 16   Ht 177.8 cm (70\")   Wt 89.4 kg (197 lb)   SpO2 98%   BMI 28.27 kg/m²    Physical Exam  Constitutional: He is oriented to person, place, and time. He appears well-developed and well-nourished. Up in bed.  No distress. No family currently present.  Discussed with his nurse, Skylar.   Head: Normocephalic and atraumatic.   Eyes: Conjunctivae and EOM are normal. Pupils are equal, round, and reactive to light.   Neck: Neck supple. No JVD present.   Musculoskeletal: Normal range of motion. He exhibits no edema, tenderness or deformity.   Neurological: He is alert and oriented to person, place, and time. He displays normal reflexes. No cranial nerve deficit. He exhibits normal muscle tone.   Skin: Skin is warm and dry. No rash noted.   Psychiatric: He has a normal mood and affect. His behavior is normal. Judgment and thought content normal.      Condition on Discharge: Good.     Discharge Disposition:  Home or Self Care    Discharge Medications:     Discharge Medications      New Medications      Instructions Start Date   acyclovir 200 MG capsule  Commonly known as:  ZOVIRAX   400 mg, Oral, 2 Times Daily      fluconazole 200 MG tablet  Commonly known as:  DIFLUCAN   200 mg, Oral, Daily   Start Date:  10/23/2019     predniSONE 20 MG tablet  Commonly known as:  DELTASONE   60 mg, Oral, Daily With Breakfast   Start Date:  10/23/2019     sulfamethoxazole-trimethoprim 800-160 MG per tablet  Commonly known as:  BACTRIM DS,SEPTRA DS   1 tablet, Oral, 3 Times Weekly   Start Date:  10/23/2019        Continue These Medications      Instructions Start Date   allopurinol 100 MG tablet  Commonly known as:  ZYLOPRIM   100 mg, Oral, Daily    "   omeprazole 20 MG capsule  Commonly known as:  priLOSEC   20 mg, Oral, 2 Times Daily           Discharge Diet:   Diet Instructions     Diet: Regular; Thin      Discharge Diet:  Regular    Fluid Consistency:  Thin        Activity at Discharge:   Activity Instructions     Activity as Tolerated          Follow-up Appointments:   1.  PCP in 1 week.  2.  Dr. Burleson in 1 week.  3.  Nephrology this Friday with a BMP.    Test Results Pending at Discharge: None.     Savage Holloway DO  10/22/19  10:04 AM    Time: 35 minutes.           Electronically signed by Savage Holloway DO at 10/22/19 1014

## 2019-11-01 ENCOUNTER — APPOINTMENT (OUTPATIENT)
Dept: LAB | Facility: HOSPITAL | Age: 64
End: 2019-11-01

## 2019-11-01 ENCOUNTER — OFFICE VISIT (OUTPATIENT)
Dept: ONCOLOGY | Facility: CLINIC | Age: 64
End: 2019-11-01

## 2019-11-01 ENCOUNTER — LAB (OUTPATIENT)
Dept: LAB | Facility: HOSPITAL | Age: 64
End: 2019-11-01

## 2019-11-01 VITALS
DIASTOLIC BLOOD PRESSURE: 72 MMHG | HEART RATE: 81 BPM | RESPIRATION RATE: 16 BRPM | WEIGHT: 181 LBS | HEIGHT: 70 IN | BODY MASS INDEX: 25.91 KG/M2 | SYSTOLIC BLOOD PRESSURE: 121 MMHG | OXYGEN SATURATION: 99 % | TEMPERATURE: 98.1 F

## 2019-11-01 DIAGNOSIS — T38.0X5A ADVERSE EFFECT OF CORTICOSTEROIDS, INITIAL ENCOUNTER: Primary | ICD-10-CM

## 2019-11-01 DIAGNOSIS — N17.0 ACUTE RENAL FAILURE WITH TUBULAR NECROSIS (HCC): ICD-10-CM

## 2019-11-01 PROBLEM — R35.1 NOCTURIA: Status: ACTIVE | Noted: 2019-07-23

## 2019-11-01 PROBLEM — R91.8 MULTIPLE NODULES OF LUNG: Status: ACTIVE | Noted: 2018-05-11

## 2019-11-01 PROBLEM — N40.0 BENIGN PROSTATIC HYPERPLASIA: Status: ACTIVE | Noted: 2019-07-23

## 2019-11-01 PROBLEM — E83.52 HYPERCALCEMIA: Status: ACTIVE | Noted: 2019-10-21

## 2019-11-01 PROBLEM — R33.9 RETENTION OF URINE: Status: ACTIVE | Noted: 2019-07-18

## 2019-11-01 PROBLEM — R35.0 INCREASED FREQUENCY OF URINATION: Status: ACTIVE | Noted: 2019-07-23

## 2019-11-01 LAB
ALBUMIN SERPL-MCNC: 4.2 G/DL (ref 3.5–5.2)
ALBUMIN/GLOB SERPL: 1.9 G/DL
ALP SERPL-CCNC: 43 U/L (ref 39–117)
ALT SERPL W P-5'-P-CCNC: 20 U/L (ref 1–41)
ANION GAP SERPL CALCULATED.3IONS-SCNC: 13 MMOL/L (ref 5–15)
AST SERPL-CCNC: 9 U/L (ref 1–40)
BASOPHILS # BLD AUTO: 0.01 10*3/MM3 (ref 0–0.2)
BASOPHILS NFR BLD AUTO: 0.1 % (ref 0–1.5)
BILIRUB SERPL-MCNC: 0.3 MG/DL (ref 0.2–1.2)
BUN BLD-MCNC: 46 MG/DL (ref 8–23)
BUN/CREAT SERPL: 18.7 (ref 7–25)
CALCIUM SPEC-SCNC: 8.5 MG/DL (ref 8.6–10.5)
CHLORIDE SERPL-SCNC: 107 MMOL/L (ref 98–107)
CO2 SERPL-SCNC: 22 MMOL/L (ref 22–29)
CREAT BLD-MCNC: 2.46 MG/DL (ref 0.76–1.27)
DEPRECATED RDW RBC AUTO: 54.6 FL (ref 37–54)
EOSINOPHIL # BLD AUTO: 0.04 10*3/MM3 (ref 0–0.4)
EOSINOPHIL NFR BLD AUTO: 0.3 % (ref 0.3–6.2)
ERYTHROCYTE [DISTWIDTH] IN BLOOD BY AUTOMATED COUNT: 16.9 % (ref 12.3–15.4)
GFR SERPL CREATININE-BSD FRML MDRD: 27 ML/MIN/1.73
GLOBULIN UR ELPH-MCNC: 2.2 GM/DL
GLUCOSE BLD-MCNC: 82 MG/DL (ref 65–99)
HCT VFR BLD AUTO: 38 % (ref 37.5–51)
HGB BLD-MCNC: 12.3 G/DL (ref 13–17.7)
IMM GRANULOCYTES # BLD AUTO: 0.1 10*3/MM3 (ref 0–0.05)
IMM GRANULOCYTES NFR BLD AUTO: 0.9 % (ref 0–0.5)
LYMPHOCYTES # BLD AUTO: 1.39 10*3/MM3 (ref 0.7–3.1)
LYMPHOCYTES NFR BLD AUTO: 12.1 % (ref 19.6–45.3)
MCH RBC QN AUTO: 30.4 PG (ref 26.6–33)
MCHC RBC AUTO-ENTMCNC: 32.4 G/DL (ref 31.5–35.7)
MCV RBC AUTO: 93.8 FL (ref 79–97)
MONOCYTES # BLD AUTO: 0.74 10*3/MM3 (ref 0.1–0.9)
MONOCYTES NFR BLD AUTO: 6.4 % (ref 5–12)
NEUTROPHILS # BLD AUTO: 9.2 10*3/MM3 (ref 1.7–7)
NEUTROPHILS NFR BLD AUTO: 80.2 % (ref 42.7–76)
NRBC BLD AUTO-RTO: 0 /100 WBC (ref 0–0.2)
PLATELET # BLD AUTO: 167 10*3/MM3 (ref 140–450)
PMV BLD AUTO: 10 FL (ref 6–12)
POTASSIUM BLD-SCNC: 4.2 MMOL/L (ref 3.5–5.2)
PROT SERPL-MCNC: 6.4 G/DL (ref 6–8.5)
RBC # BLD AUTO: 4.05 10*6/MM3 (ref 4.14–5.8)
SODIUM BLD-SCNC: 142 MMOL/L (ref 136–145)
WBC NRBC COR # BLD: 11.48 10*3/MM3 (ref 3.4–10.8)

## 2019-11-01 PROCEDURE — 85025 COMPLETE CBC W/AUTO DIFF WBC: CPT

## 2019-11-01 PROCEDURE — 99214 OFFICE O/P EST MOD 30 MIN: CPT | Performed by: INTERNAL MEDICINE

## 2019-11-01 PROCEDURE — 80053 COMPREHEN METABOLIC PANEL: CPT

## 2019-11-01 PROCEDURE — 36415 COLL VENOUS BLD VENIPUNCTURE: CPT

## 2019-11-01 RX ORDER — ALLOPURINOL 100 MG/1
200 TABLET ORAL
COMMUNITY
End: 2019-12-30 | Stop reason: SDUPTHER

## 2019-11-01 RX ORDER — ACYCLOVIR 200 MG/1
CAPSULE ORAL EVERY 12 HOURS SCHEDULED
COMMUNITY
End: 2020-01-02

## 2019-11-01 RX ORDER — FLUCONAZOLE 200 MG/1
TABLET ORAL
COMMUNITY
End: 2020-01-02

## 2019-11-01 RX ORDER — PREDNISONE 10 MG/1
TABLET ORAL
COMMUNITY
End: 2019-11-01 | Stop reason: DRUGHIGH

## 2019-11-01 RX ORDER — ONDANSETRON HYDROCHLORIDE 8 MG/1
8 TABLET, FILM COATED ORAL EVERY 8 HOURS
COMMUNITY
End: 2021-01-19 | Stop reason: ALTCHOICE

## 2019-11-01 RX ORDER — SULFAMETHOXAZOLE AND TRIMETHOPRIM 800; 160 MG/1; MG/1
TABLET ORAL
COMMUNITY
End: 2020-01-02

## 2019-11-01 NOTE — PROGRESS NOTES
[facility]  HEMATOLOGY & ONCOLOGY        Subjective     VISIT DIAGNOSIS:   Encounter Diagnosis   Name Primary?   • Adverse effect of corticosteroids, initial encounter Yes       REASON FOR VISIT:     Chief Complaint   Patient presents with   • renal cell carcinoma     2 week follow up         HEMATOLOGY / ONCOLOGY HISTORY:    No history exists.     [No treatment plan]  Cancer Staging Information:  Cancer Staging  No matching staging information was found for the patient.      INTERVAL HISTORY  Patient ID: Parker Arnold is a 64 y.o. year old male with a diagnosis of clear cell renal cancer in 2014. S/p Rh nephrectomy. He was under surveillance until Nov 2018  When he underwent  Wedge resection for a pulm nodule. Pathology c/w metastatic renal cell carcinoma. He was not able to tolerate sutent due to kidney failure, started yervoy/opdivo 5/13/2019 to 8/7/2019, then single agent opdivo 8/28 to 9/9/19. Therapy stopped due to renal failure. He has been on and off prednisone for 2 weeks.  -at today's visit, cr is 6.43,eGFR 7  -he has lost 20lbs, have mid epigastric pain, takes prilosec. He saw nephrologist and was told uric acid is elevated. Allopurinol started. He is complaining of urgency at night, he was started on flomax and passed out, hence that was discontinued.   11/1/19: he is tolerating 40mg of prednisone. His stomach stays quizzy due to being on a lot of medications. Antiacid bid helps. Ho hallucination, insomnia or manic behavior.  He saw urologist for urinary issues and some meds was rxed, he does not know the name of it.  -denies n/v, sob, cp, dizziness, diarrhea, constipation, rash, arthritis, focal weakness.      Review of Systems     See above otherwise neg    Medications:    Current Outpatient Medications   Medication Sig Dispense Refill   • acyclovir (ZOVIRAX) 200 MG capsule Take 2 capsules by mouth 2 (Two) Times a Day for 58 doses. 116 capsule 0   • acyclovir (ZOVIRAX) 200 MG  capsule Every 12 (Twelve) Hours.     • allopurinol (ZYLOPRIM) 100 MG tablet Take 100 mg by mouth Daily.     • allopurinol (ZYLOPRIM) 100 MG tablet 200 mg.     • fluconazole (DIFLUCAN) 200 MG tablet Take 1 tablet by mouth Daily for 28 doses. 28 tablet 0   • fluconazole (DIFLUCAN) 200 MG tablet fluconazole 200 mg tablet   Take 1 tablet every day by oral route.     • OMEPRAZOLE PO omeprazole 20 mg capsule,delayed release   Take 1 capsule every day by oral route.     • ondansetron (ZOFRAN) 8 MG tablet Every 8 (Eight) Hours.     • predniSONE (DELTASONE) 20 MG tablet Take 3 tablets by mouth Daily With Breakfast. 90 tablet 0   • sulfamethoxazole-trimethoprim (BACTRIM DS) 800-160 MG per tablet Bactrim DS   3 times a week     • sulfamethoxazole-trimethoprim (BACTRIM DS,SEPTRA DS) 800-160 MG per tablet Take 1 tablet by mouth 3 (Three) Times a Week for 12 doses. 12 tablet 0   • Calcium Carb-Cholecalciferol (CALCIUM + D3) 600-200 MG-UNIT tablet Take 1 tablet by mouth 2 (Two) Times a Day. 60 tablet 5   • omeprazole (priLOSEC) 20 MG capsule Take 20 mg by mouth 2 (Two) Times a Day.  2     No current facility-administered medications for this visit.        ALLERGIES:    Allergies   Allergen Reactions   • Penicillins Rash and Hives   • Clindamycin/Lincomycin Rash     pustules  Rash - pustules     • Doxycycline Rash   • Hepatitis B Virus Vaccine Rash     Pustules     • Latex Rash   • Levofloxacin Rash       Objective      Vitals:    11/01/19 0828   BP: 121/72   Pulse: 81   Resp: 16   Temp: 98.1 °F (36.7 °C)   SpO2: 99%       Current Status 10/18/2019   ECOG score 0       General Appearance: Patient is awake, alert, oriented and in no acute distress. Patient is welldeveloped, wellnourished, and appears stated age.  HEENT: Normocephalic. Sclerae clear, conjunctiva pink, extraocular movements intact, pupils, round, reactive to light and  accommodation. Mouth and throat are clear with moist oral mucosa.  NECK: Supple, no jugular venous  distention, thyroid not enlarged.  LYMPH: No cervical, supraclavicular, axillary, or inguinal lymphadenopathy.  CHEST: Equal bilateral expansion, AP  diameter normal, resonant percussion note  LUNGS: Good air movement, no rales, rhonchi, rubs or wheezes with auscultation  CARDIO: Regular sinus rhythm, no murmurs, gallops or rubs.  ABDOMEN: Nondistended, soft, No tenderness, no guarding, no rebound, No hepatosplenomegaly. No abdominal masses. Bowel sounds positive. No hernia  GENITALIA: Not examined.  BREASTS: Not examined.  MUSKEL: No joint swelling, decreased motion, or inflammation  EXTREMS: No edema, clubbing, cyanosis, No varicose veins.  NEURO: Grossly nonfocal, Gait is coordinated and smooth, Cognition is preserved.  SKIN: No rashes, no ecchymoses, no petechia.  PSYCH: Oriented to time, place and person. Memory is preserved. Mood and affect appear normal      RECENT LABS:  Lab on 11/01/2019   Component Date Value Ref Range Status   • Glucose 11/01/2019 82  65 - 99 mg/dL Final   • BUN 11/01/2019 46* 8 - 23 mg/dL Final   • Creatinine 11/01/2019 2.46* 0.76 - 1.27 mg/dL Final   • Sodium 11/01/2019 142  136 - 145 mmol/L Final   • Potassium 11/01/2019 4.2  3.5 - 5.2 mmol/L Final   • Chloride 11/01/2019 107  98 - 107 mmol/L Final   • CO2 11/01/2019 22.0  22.0 - 29.0 mmol/L Final   • Calcium 11/01/2019 8.5* 8.6 - 10.5 mg/dL Final   • Total Protein 11/01/2019 6.4  6.0 - 8.5 g/dL Final   • Albumin 11/01/2019 4.20  3.50 - 5.20 g/dL Final   • ALT (SGPT) 11/01/2019 20  1 - 41 U/L Final   • AST (SGOT) 11/01/2019 9  1 - 40 U/L Final   • Alkaline Phosphatase 11/01/2019 43  39 - 117 U/L Final   • Total Bilirubin 11/01/2019 0.3  0.2 - 1.2 mg/dL Final   • eGFR Non African Amer 11/01/2019 27* >60 mL/min/1.73 Final   • Globulin 11/01/2019 2.2  gm/dL Final   • A/G Ratio 11/01/2019 1.9  g/dL Final   • BUN/Creatinine Ratio 11/01/2019 18.7  7.0 - 25.0 Final   • Anion Gap 11/01/2019 13.0  5.0 - 15.0 mmol/L Final   • WBC 11/01/2019  11.48* 3.40 - 10.80 10*3/mm3 Final   • RBC 11/01/2019 4.05* 4.14 - 5.80 10*6/mm3 Final   • Hemoglobin 11/01/2019 12.3* 13.0 - 17.7 g/dL Final   • Hematocrit 11/01/2019 38.0  37.5 - 51.0 % Final   • MCV 11/01/2019 93.8  79.0 - 97.0 fL Final   • MCH 11/01/2019 30.4  26.6 - 33.0 pg Final   • MCHC 11/01/2019 32.4  31.5 - 35.7 g/dL Final   • RDW 11/01/2019 16.9* 12.3 - 15.4 % Final   • RDW-SD 11/01/2019 54.6* 37.0 - 54.0 fl Final   • MPV 11/01/2019 10.0  6.0 - 12.0 fL Final   • Platelets 11/01/2019 167  140 - 450 10*3/mm3 Final   • Neutrophil % 11/01/2019 80.2* 42.7 - 76.0 % Final   • Lymphocyte % 11/01/2019 12.1* 19.6 - 45.3 % Final   • Monocyte % 11/01/2019 6.4  5.0 - 12.0 % Final   • Eosinophil % 11/01/2019 0.3  0.3 - 6.2 % Final   • Basophil % 11/01/2019 0.1  0.0 - 1.5 % Final   • Immature Grans % 11/01/2019 0.9* 0.0 - 0.5 % Final   • Neutrophils, Absolute 11/01/2019 9.20* 1.70 - 7.00 10*3/mm3 Final   • Lymphocytes, Absolute 11/01/2019 1.39  0.70 - 3.10 10*3/mm3 Final   • Monocytes, Absolute 11/01/2019 0.74  0.10 - 0.90 10*3/mm3 Final   • Eosinophils, Absolute 11/01/2019 0.04  0.00 - 0.40 10*3/mm3 Final   • Basophils, Absolute 11/01/2019 0.01  0.00 - 0.20 10*3/mm3 Final   • Immature Grans, Absolute 11/01/2019 0.10* 0.00 - 0.05 10*3/mm3 Final   • nRBC 11/01/2019 0.0  0.0 - 0.2 /100 WBC Final   Lab on 10/29/2019   Component Date Value Ref Range Status   • Glucose 10/29/2019 113* 65 - 99 mg/dL Final   • BUN 10/29/2019 44* 8 - 23 mg/dL Final   • Creatinine 10/29/2019 2.75* 0.76 - 1.27 mg/dL Final   • Sodium 10/29/2019 143  136 - 145 mmol/L Final   • Potassium 10/29/2019 4.1  3.5 - 5.2 mmol/L Final   • Chloride 10/29/2019 108* 98 - 107 mmol/L Final   • CO2 10/29/2019 19.0* 22.0 - 29.0 mmol/L Final   • Calcium 10/29/2019 7.1* 8.6 - 10.5 mg/dL Final   • Total Protein 10/29/2019 6.0  6.0 - 8.5 g/dL Final   • Albumin 10/29/2019 3.80  3.50 - 5.20 g/dL Final   • ALT (SGPT) 10/29/2019 22  1 - 41 U/L Final   • AST (SGOT)  10/29/2019 12  1 - 40 U/L Final   • Alkaline Phosphatase 10/29/2019 42  39 - 117 U/L Final   • Total Bilirubin 10/29/2019 0.3  0.2 - 1.2 mg/dL Final   • eGFR Non African Amer 10/29/2019 23* >60 mL/min/1.73 Final   • Globulin 10/29/2019 2.2  gm/dL Final   • A/G Ratio 10/29/2019 1.7  g/dL Final   • BUN/Creatinine Ratio 10/29/2019 16.0  7.0 - 25.0 Final   • Anion Gap 10/29/2019 16.0* 5.0 - 15.0 mmol/L Final   • WBC 10/29/2019 11.67* 3.40 - 10.80 10*3/mm3 Final   • RBC 10/29/2019 3.69* 4.14 - 5.80 10*6/mm3 Final   • Hemoglobin 10/29/2019 11.2* 13.0 - 17.7 g/dL Final   • Hematocrit 10/29/2019 34.0* 37.5 - 51.0 % Final   • MCV 10/29/2019 92.1  79.0 - 97.0 fL Final   • MCH 10/29/2019 30.4  26.6 - 33.0 pg Final   • MCHC 10/29/2019 32.9  31.5 - 35.7 g/dL Final   • RDW 10/29/2019 16.0* 12.3 - 15.4 % Final   • RDW-SD 10/29/2019 50.9  37.0 - 54.0 fl Final   • MPV 10/29/2019 10.2  6.0 - 12.0 fL Final   • Platelets 10/29/2019 198  140 - 450 10*3/mm3 Final   • Neutrophil % 10/29/2019 79.8* 42.7 - 76.0 % Final   • Lymphocyte % 10/29/2019 12.2* 19.6 - 45.3 % Final   • Monocyte % 10/29/2019 6.7  5.0 - 12.0 % Final   • Eosinophil % 10/29/2019 0.1* 0.3 - 6.2 % Final   • Basophil % 10/29/2019 0.2  0.0 - 1.5 % Final   • Immature Grans % 10/29/2019 1.0* 0.0 - 0.5 % Final   • Neutrophils, Absolute 10/29/2019 9.32* 1.70 - 7.00 10*3/mm3 Final   • Lymphocytes, Absolute 10/29/2019 1.42  0.70 - 3.10 10*3/mm3 Final   • Monocytes, Absolute 10/29/2019 0.78  0.10 - 0.90 10*3/mm3 Final   • Eosinophils, Absolute 10/29/2019 0.01  0.00 - 0.40 10*3/mm3 Final   • Basophils, Absolute 10/29/2019 0.02  0.00 - 0.20 10*3/mm3 Final   • Immature Grans, Absolute 10/29/2019 0.12* 0.00 - 0.05 10*3/mm3 Final   • nRBC 10/29/2019 0.0  0.0 - 0.2 /100 WBC Final       RADIOLOGY:  Us Renal Limited    Result Date: 10/18/2019  Narrative: Exam: Renal ultrasound  Indication: renal failure  Comparison: None  Finding:  Right kidney is surgically absent. The left kidney  measures 12.5 cm in length. Left kidney echogenicity and cortical thickness are normal. No shadowing calculi or hydronephrosis. No focal bladder abnormality.  Incidentally noted:  There is gallbladder wall thickening and the gallbladder lumen contains layering sludge. Common bile duct is nondilated measuring 4 mm diameter.      Impression:  Negative for hydronephrosis, renal atrophy, or shadowing calculi.  Incidentally noted, gallbladder wall thickening and layering sludge. Recommend clinical correlation to exclude cholecystitis. This report was finalized on 10/18/2019 18:14 by Dr. Akhil Collins MD.           Assessment/Plan 63 yo male with met RCC developed grade 4 renal toxicity from opdivo.pt here for f/o and is stable.    1. Renal failure due to opdivo toxicity: pt on HD-steroid in the hospital and currently down to 40mg of prednisone. Cr down to 2.46. His baseline was arround 2.2.  -will decrease dose of pr predisone by 10 every week until he is down to 10mg/day , then reduce by 5mg every 5days then stop.  -stop acyclovir, bactrim and fluconazole a week after stopping steroid  -will check dexa for done density  -cmp every 2 weeks  -rtc in a month  2. FEN: low calcium. rx bola and vit D  3. Gerd: on omeprazole        Julio Burleson MD    11/1/2019    8:58 AM

## 2019-11-02 ENCOUNTER — NURSE TRIAGE (OUTPATIENT)
Dept: CALL CENTER | Facility: HOSPITAL | Age: 64
End: 2019-11-02

## 2019-11-02 NOTE — TELEPHONE ENCOUNTER
"States due to multiple medications he is having frequent diarrhea. Asking if okay to take imodium? Discussed based on medications, okay to take regarding medication interactions but may cause QT interval prolongation if abused. States he is going to take sparingly. Discussed Desitin and tucks pads for irritation. Discussed probiotic.     Reason for Disposition  • Caller has medication question, adult has minor symptoms, caller declines triage, and triager answers question    Additional Information  • Negative: Drug overdose and nurse unable to answer question  • Negative: Caller requesting information not related to medicine  • Negative: Caller requesting a prescription for Strep throat and has a positive culture result  • Negative: Rash while taking a medication or within 3 days of stopping it  • Negative: Immunization reaction suspected  • Negative: [1] Asthma and [2] having symptoms of asthma (cough, wheezing, etc)  • Negative: MORE THAN A DOUBLE DOSE of a prescription or over-the-counter (OTC) drug  • Negative: [1] DOUBLE DOSE (an extra dose or lesser amount) of over-the-counter (OTC) drug AND [2] any symptoms (e.g., dizziness, nausea, pain, sleepiness)  • Negative: [1] DOUBLE DOSE (an extra dose or lesser amount) of prescription drug AND [2] any symptoms (e.g., dizziness, nausea, pain, sleepiness)  • Negative: Took another person's prescription drug  • Negative: [1] DOUBLE DOSE (an extra dose or lesser amount) of prescription drug AND [2] NO symptoms (Exception: a double dose of antibiotics)  • Negative: Diabetes drug error or overdose (e.g., insulin or extra dose)  • Negative: [1] Request for URGENT new prescription or refill of \"essential\" medication (i.e., likelihood of harm to patient if not taken) AND [2] triager unable to fill per unit policy  • Negative: [1] Prescription not at pharmacy AND [2] was prescribed today by PCP  • Negative: Pharmacy calling with prescription questions and triager unable to " "answer question  • Negative: Caller has URGENT medication question about med that PCP prescribed and triager unable to answer question  • Negative: Caller has NON-URGENT medication question about med that PCP prescribed and triager unable to answer question  • Negative: Caller requesting a NON-URGENT new prescription or refill and triager unable to refill per unit policy  • Negative: Caller has medication question about med not prescribed by PCP and triager unable to answer question (e.g., compatibility with other med, storage)  • Negative: [1] DOUBLE DOSE (an extra dose or lesser amount) of over-the-counter (OTC) drug AND [2] NO symptoms  • Negative: [1] DOUBLE DOSE (an extra dose or lesser amount) of antibiotic drug AND [2] NO symptoms  • Negative: Caller has medication question only, adult not sick, and triager answers question    Answer Assessment - Initial Assessment Questions  1. SYMPTOMS: \"Do you have any symptoms?\"      See note  2. SEVERITY: If symptoms are present, ask \"Are they mild, moderate or severe?\"      n/a    Protocols used: MEDICATION QUESTION CALL-ADULT-      "

## 2019-11-05 ENCOUNTER — HOSPITAL ENCOUNTER (OUTPATIENT)
Dept: BONE DENSITY | Facility: HOSPITAL | Age: 64
Discharge: HOME | End: 2019-11-05
Admitting: INTERNAL MEDICINE

## 2019-11-05 ENCOUNTER — READMISSION MANAGEMENT (OUTPATIENT)
Dept: CALL CENTER | Facility: HOSPITAL | Age: 64
End: 2019-11-05

## 2019-11-05 DIAGNOSIS — T38.0X5A ADVERSE EFFECT OF CORTICOSTEROIDS, INITIAL ENCOUNTER: ICD-10-CM

## 2019-11-05 PROCEDURE — 77080 DXA BONE DENSITY AXIAL: CPT

## 2019-11-06 ENCOUNTER — READMISSION MANAGEMENT (OUTPATIENT)
Dept: CALL CENTER | Facility: HOSPITAL | Age: 64
End: 2019-11-06

## 2019-11-06 NOTE — OUTREACH NOTE
Medical Week 2 Survey      Responses   Facility patient discharged from?  [facility]   Does the patient have one of the following disease processes/diagnoses(primary or secondary)?  Other   Week 2 attempt successful?  Yes   Call start time  1424   Discharge diagnosis  ARF with tubular necrosis in setting of solitary kidney, hyperkalemia, Metabolic acidosis, Clear cell carcinoma of Kidney, lung metastases, pyuria, Hyperglycemia, hypocalcemia   Call end time  1425   Is patient permission given to speak with other caregiver?  Yes   List who call center can speak with  spouse- Nelda   Person spoke with today (if not patient) and relationship  spouse- Nelda   What is the patient's perception of their health status since discharge?  Improving   Is the patient/caregiver able to teach back the hierarchy of who to call/visit for symptoms/problems? PCP, Specialist, Home health nurse, Urgent Care, ED, 911  Yes   Additional teach back comments  Per wife, pt is doing well, no questions or concerns at this time.   Week 2 Call Completed?  Yes   Revoked  No further contact(revokes)-requires comment   Graduated/Revoked comments  Per wife, no further calls will be needed.          Maribeth Mills RN

## 2019-11-15 ENCOUNTER — APPOINTMENT (OUTPATIENT)
Dept: LAB | Facility: HOSPITAL | Age: 64
End: 2019-11-15

## 2019-11-15 DIAGNOSIS — C78.00 MALIGNANT NEOPLASM METASTATIC TO LUNG, UNSPECIFIED LATERALITY (HCC): Primary | ICD-10-CM

## 2019-11-15 LAB
ALBUMIN SERPL-MCNC: 4.1 G/DL (ref 3.5–5.2)
ALBUMIN/GLOB SERPL: 2 G/DL
ALP SERPL-CCNC: 40 U/L (ref 39–117)
ALT SERPL W P-5'-P-CCNC: 22 U/L (ref 1–41)
ANION GAP SERPL CALCULATED.3IONS-SCNC: 11 MMOL/L (ref 5–15)
AST SERPL-CCNC: 17 U/L (ref 1–40)
BASOPHILS # BLD AUTO: 0.02 10*3/MM3 (ref 0–0.2)
BASOPHILS NFR BLD AUTO: 0.3 % (ref 0–1.5)
BILIRUB SERPL-MCNC: 0.3 MG/DL (ref 0.2–1.2)
BUN BLD-MCNC: 52 MG/DL (ref 8–23)
BUN/CREAT SERPL: 16 (ref 7–25)
CALCIUM SPEC-SCNC: 8.7 MG/DL (ref 8.6–10.5)
CHLORIDE SERPL-SCNC: 110 MMOL/L (ref 98–107)
CO2 SERPL-SCNC: 20 MMOL/L (ref 22–29)
CREAT BLD-MCNC: 3.26 MG/DL (ref 0.76–1.27)
DEPRECATED RDW RBC AUTO: 62.9 FL (ref 37–54)
EOSINOPHIL # BLD AUTO: 0.11 10*3/MM3 (ref 0–0.4)
EOSINOPHIL NFR BLD AUTO: 1.5 % (ref 0.3–6.2)
ERYTHROCYTE [DISTWIDTH] IN BLOOD BY AUTOMATED COUNT: 17.9 % (ref 12.3–15.4)
GFR SERPL CREATININE-BSD FRML MDRD: 19 ML/MIN/1.73
GLOBULIN UR ELPH-MCNC: 2.1 GM/DL
GLUCOSE BLD-MCNC: 113 MG/DL (ref 65–99)
HCT VFR BLD AUTO: 41.4 % (ref 37.5–51)
HGB BLD-MCNC: 13.5 G/DL (ref 13–17.7)
HOLD SPECIMEN: NORMAL
HOLD SPECIMEN: NORMAL
IMM GRANULOCYTES # BLD AUTO: 0.07 10*3/MM3 (ref 0–0.05)
IMM GRANULOCYTES NFR BLD AUTO: 1 % (ref 0–0.5)
LYMPHOCYTES # BLD AUTO: 1.52 10*3/MM3 (ref 0.7–3.1)
LYMPHOCYTES NFR BLD AUTO: 21.4 % (ref 19.6–45.3)
MCH RBC QN AUTO: 31.5 PG (ref 26.6–33)
MCHC RBC AUTO-ENTMCNC: 32.6 G/DL (ref 31.5–35.7)
MCV RBC AUTO: 96.7 FL (ref 79–97)
MONOCYTES # BLD AUTO: 0.6 10*3/MM3 (ref 0.1–0.9)
MONOCYTES NFR BLD AUTO: 8.4 % (ref 5–12)
NEUTROPHILS # BLD AUTO: 4.79 10*3/MM3 (ref 1.7–7)
NEUTROPHILS NFR BLD AUTO: 67.4 % (ref 42.7–76)
NRBC BLD AUTO-RTO: 0 /100 WBC (ref 0–0.2)
PLATELET # BLD AUTO: 168 10*3/MM3 (ref 140–450)
PMV BLD AUTO: 9.2 FL (ref 6–12)
POTASSIUM BLD-SCNC: 5.8 MMOL/L (ref 3.5–5.2)
PROT SERPL-MCNC: 6.2 G/DL (ref 6–8.5)
RBC # BLD AUTO: 4.28 10*6/MM3 (ref 4.14–5.8)
SODIUM BLD-SCNC: 141 MMOL/L (ref 136–145)
WBC NRBC COR # BLD: 7.11 10*3/MM3 (ref 3.4–10.8)

## 2019-11-15 PROCEDURE — 85025 COMPLETE CBC W/AUTO DIFF WBC: CPT | Performed by: INTERNAL MEDICINE

## 2019-11-15 PROCEDURE — 36415 COLL VENOUS BLD VENIPUNCTURE: CPT | Performed by: INTERNAL MEDICINE

## 2019-11-15 PROCEDURE — 80053 COMPREHEN METABOLIC PANEL: CPT | Performed by: INTERNAL MEDICINE

## 2019-11-26 DIAGNOSIS — C78.00 MALIGNANT NEOPLASM METASTATIC TO LUNG, UNSPECIFIED LATERALITY (HCC): Primary | ICD-10-CM

## 2019-12-02 ENCOUNTER — APPOINTMENT (OUTPATIENT)
Dept: LAB | Facility: HOSPITAL | Age: 64
End: 2019-12-02

## 2019-12-02 ENCOUNTER — OFFICE VISIT (OUTPATIENT)
Dept: ONCOLOGY | Facility: CLINIC | Age: 64
End: 2019-12-02

## 2019-12-02 VITALS
SYSTOLIC BLOOD PRESSURE: 120 MMHG | HEIGHT: 70 IN | TEMPERATURE: 98.5 F | RESPIRATION RATE: 16 BRPM | DIASTOLIC BLOOD PRESSURE: 71 MMHG | WEIGHT: 193 LBS | OXYGEN SATURATION: 98 % | BODY MASS INDEX: 27.63 KG/M2 | HEART RATE: 89 BPM

## 2019-12-02 DIAGNOSIS — C78.00 MALIGNANT NEOPLASM METASTATIC TO LUNG, UNSPECIFIED LATERALITY (HCC): Primary | ICD-10-CM

## 2019-12-02 DIAGNOSIS — C64.9 RENAL CELL CARCINOMA, UNSPECIFIED LATERALITY (HCC): Primary | ICD-10-CM

## 2019-12-02 LAB
ALBUMIN SERPL-MCNC: 4 G/DL (ref 3.5–5.2)
ALBUMIN/GLOB SERPL: 1.9 G/DL
ALP SERPL-CCNC: 45 U/L (ref 39–117)
ALT SERPL W P-5'-P-CCNC: 13 U/L (ref 1–41)
ANION GAP SERPL CALCULATED.3IONS-SCNC: 9 MMOL/L (ref 5–15)
AST SERPL-CCNC: 10 U/L (ref 1–40)
BASOPHILS # BLD AUTO: 0.02 10*3/MM3 (ref 0–0.2)
BASOPHILS NFR BLD AUTO: 0.3 % (ref 0–1.5)
BILIRUB SERPL-MCNC: 0.4 MG/DL (ref 0.2–1.2)
BUN BLD-MCNC: 37 MG/DL (ref 8–23)
BUN/CREAT SERPL: 15.7 (ref 7–25)
CALCIUM SPEC-SCNC: 8.4 MG/DL (ref 8.6–10.5)
CHLORIDE SERPL-SCNC: 110 MMOL/L (ref 98–107)
CO2 SERPL-SCNC: 24 MMOL/L (ref 22–29)
CREAT BLD-MCNC: 2.36 MG/DL (ref 0.76–1.27)
DEPRECATED RDW RBC AUTO: 58.3 FL (ref 37–54)
EOSINOPHIL # BLD AUTO: 0.06 10*3/MM3 (ref 0–0.4)
EOSINOPHIL NFR BLD AUTO: 0.8 % (ref 0.3–6.2)
ERYTHROCYTE [DISTWIDTH] IN BLOOD BY AUTOMATED COUNT: 16.7 % (ref 12.3–15.4)
GFR SERPL CREATININE-BSD FRML MDRD: 28 ML/MIN/1.73
GLOBULIN UR ELPH-MCNC: 2.1 GM/DL
GLUCOSE BLD-MCNC: 92 MG/DL (ref 65–99)
HCT VFR BLD AUTO: 36.4 % (ref 37.5–51)
HGB BLD-MCNC: 12.2 G/DL (ref 13–17.7)
HOLD SPECIMEN: NORMAL
HOLD SPECIMEN: NORMAL
IMM GRANULOCYTES # BLD AUTO: 0.04 10*3/MM3 (ref 0–0.05)
IMM GRANULOCYTES NFR BLD AUTO: 0.5 % (ref 0–0.5)
LYMPHOCYTES # BLD AUTO: 1.22 10*3/MM3 (ref 0.7–3.1)
LYMPHOCYTES NFR BLD AUTO: 16.3 % (ref 19.6–45.3)
MCH RBC QN AUTO: 31.8 PG (ref 26.6–33)
MCHC RBC AUTO-ENTMCNC: 33.5 G/DL (ref 31.5–35.7)
MCV RBC AUTO: 94.8 FL (ref 79–97)
MONOCYTES # BLD AUTO: 0.65 10*3/MM3 (ref 0.1–0.9)
MONOCYTES NFR BLD AUTO: 8.7 % (ref 5–12)
NEUTROPHILS # BLD AUTO: 5.5 10*3/MM3 (ref 1.7–7)
NEUTROPHILS NFR BLD AUTO: 73.4 % (ref 42.7–76)
NRBC BLD AUTO-RTO: 0 /100 WBC (ref 0–0.2)
PLATELET # BLD AUTO: 186 10*3/MM3 (ref 140–450)
PMV BLD AUTO: 9 FL (ref 6–12)
POTASSIUM BLD-SCNC: 4.3 MMOL/L (ref 3.5–5.2)
PROT SERPL-MCNC: 6.1 G/DL (ref 6–8.5)
RBC # BLD AUTO: 3.84 10*6/MM3 (ref 4.14–5.8)
SODIUM BLD-SCNC: 143 MMOL/L (ref 136–145)
WBC NRBC COR # BLD: 7.49 10*3/MM3 (ref 3.4–10.8)

## 2019-12-02 PROCEDURE — 36415 COLL VENOUS BLD VENIPUNCTURE: CPT | Performed by: INTERNAL MEDICINE

## 2019-12-02 PROCEDURE — 99214 OFFICE O/P EST MOD 30 MIN: CPT | Performed by: INTERNAL MEDICINE

## 2019-12-02 PROCEDURE — 80053 COMPREHEN METABOLIC PANEL: CPT | Performed by: INTERNAL MEDICINE

## 2019-12-02 PROCEDURE — 85025 COMPLETE CBC W/AUTO DIFF WBC: CPT | Performed by: INTERNAL MEDICINE

## 2019-12-02 RX ORDER — FINASTERIDE 5 MG/1
5 TABLET, FILM COATED ORAL DAILY
Refills: 3 | COMMUNITY
Start: 2019-10-30

## 2019-12-02 NOTE — PROGRESS NOTES
[facility]  HEMATOLOGY & ONCOLOGY        Subjective     VISIT DIAGNOSIS:   No diagnosis found.    REASON FOR VISIT:     Chief Complaint   Patient presents with   • renal cell carcinoma     here for follow up, complaints of hand cramps         HEMATOLOGY / ONCOLOGY HISTORY:    No history exists.     [No treatment plan]  Cancer Staging Information:  Cancer Staging  No matching staging information was found for the patient.      INTERVAL HISTORY  Patient ID: Parker Arnold is a 64 y.o. year old male with a diagnosis of clear cell renal cancer in 2014. S/p Rh nephrectomy. He was under surveillance until Nov 2018  When he underwent  Wedge resection for a pulm nodule. Pathology c/w metastatic renal cell carcinoma. He was not able to tolerate sutent due to kidney failure, started yervoy/opdivo 5/13/2019 to 8/7/2019, then single agent opdivo 8/28 to 9/9/19. Therapy stopped due to renal failure. He has been on and off prednisone for 2 weeks.  -at today's visit, cr is 6.43,eGFR 7  -he has lost 20lbs, have mid epigastric pain, takes prilosec. He saw nephrologist and was told uric acid is elevated. Allopurinol started. He is complaining of urgency at night, he was started on flomax and passed out, hence that was discontinued.   11/1/19: he is tolerating 40mg of prednisone. His stomach stays quizzy due to being on a lot of medications. Antiacid bid helps. Ho hallucination, insomnia or manic behavior.  He saw urologist for urinary issues and some meds was rxed, he does not know the name of it.  -denies n/v, sob, cp, dizziness, diarrhea, constipation, rash, arthritis, focal weakness.      Review of Systems     See above otherwise neg    Medications:    Current Outpatient Medications   Medication Sig Dispense Refill   • acyclovir (ZOVIRAX) 200 MG capsule Every 12 (Twelve) Hours.     • allopurinol (ZYLOPRIM) 100 MG tablet Take 100 mg by mouth Daily.     • allopurinol (ZYLOPRIM) 100 MG tablet 200 mg.     •  Calcium Carb-Cholecalciferol (CALCIUM + D3) 600-200 MG-UNIT tablet Take 1 tablet by mouth 2 (Two) Times a Day. 60 tablet 5   • finasteride (PROSCAR) 5 MG tablet Take 5 mg by mouth Daily.  3   • fluconazole (DIFLUCAN) 200 MG tablet fluconazole 200 mg tablet   Take 1 tablet every day by oral route.     • omeprazole (priLOSEC) 20 MG capsule Take 20 mg by mouth 2 (Two) Times a Day.  2   • OMEPRAZOLE PO omeprazole 20 mg capsule,delayed release   Take 1 capsule every day by oral route.     • ondansetron (ZOFRAN) 8 MG tablet Every 8 (Eight) Hours.     • predniSONE (DELTASONE) 20 MG tablet Take 3 tablets by mouth Daily With Breakfast. 90 tablet 0   • sulfamethoxazole-trimethoprim (BACTRIM DS) 800-160 MG per tablet Bactrim DS   3 times a week       No current facility-administered medications for this visit.        ALLERGIES:    Allergies   Allergen Reactions   • Penicillins Rash and Hives   • Clindamycin/Lincomycin Rash     pustules  Rash - pustules     • Doxycycline Rash   • Hepatitis B Virus Vaccine Rash     Pustules     • Latex Rash   • Levofloxacin Rash       Objective      Vitals:    12/02/19 0905   BP: 120/71   Pulse: 89   Resp: 16   Temp: 98.5 °F (36.9 °C)   SpO2: 98%       Current Status 12/2/2019   ECOG score 0       General Appearance: Patient is awake, alert, oriented and in no acute distress. Patient is welldeveloped, wellnourished, and appears stated age.  HEENT: Normocephalic. Sclerae clear, conjunctiva pink, extraocular movements intact, pupils, round, reactive to light and  accommodation. Mouth and throat are clear with moist oral mucosa.  NECK: Supple, no jugular venous distention, thyroid not enlarged.  LYMPH: No cervical, supraclavicular, axillary, or inguinal lymphadenopathy.  CHEST: Equal bilateral expansion, AP  diameter normal, resonant percussion note  LUNGS: Good air movement, no rales, rhonchi, rubs or wheezes with auscultation  CARDIO: Regular sinus rhythm, no murmurs, gallops or rubs.  ABDOMEN:  Nondistended, soft, No tenderness, no guarding, no rebound, No hepatosplenomegaly. No abdominal masses. Bowel sounds positive. No hernia  GENITALIA: Not examined.  BREASTS: Not examined.  MUSKEL: No joint swelling, decreased motion, or inflammation  EXTREMS: No edema, clubbing, cyanosis, No varicose veins.  NEURO: Grossly nonfocal, Gait is coordinated and smooth, Cognition is preserved.  SKIN: No rashes, no ecchymoses, no petechia.  PSYCH: Oriented to time, place and person. Memory is preserved. Mood and affect appear normal      RECENT LABS:  Orders Only on 11/26/2019   Component Date Value Ref Range Status   • WBC 12/02/2019 7.49  3.40 - 10.80 10*3/mm3 Final   • RBC 12/02/2019 3.84* 4.14 - 5.80 10*6/mm3 Final   • Hemoglobin 12/02/2019 12.2* 13.0 - 17.7 g/dL Final   • Hematocrit 12/02/2019 36.4* 37.5 - 51.0 % Final   • MCV 12/02/2019 94.8  79.0 - 97.0 fL Final   • MCH 12/02/2019 31.8  26.6 - 33.0 pg Final   • MCHC 12/02/2019 33.5  31.5 - 35.7 g/dL Final   • RDW 12/02/2019 16.7* 12.3 - 15.4 % Final   • RDW-SD 12/02/2019 58.3* 37.0 - 54.0 fl Final   • MPV 12/02/2019 9.0  6.0 - 12.0 fL Final   • Platelets 12/02/2019 186  140 - 450 10*3/mm3 Final   • Neutrophil % 12/02/2019 73.4  42.7 - 76.0 % Final   • Lymphocyte % 12/02/2019 16.3* 19.6 - 45.3 % Final   • Monocyte % 12/02/2019 8.7  5.0 - 12.0 % Final   • Eosinophil % 12/02/2019 0.8  0.3 - 6.2 % Final   • Basophil % 12/02/2019 0.3  0.0 - 1.5 % Final   • Immature Grans % 12/02/2019 0.5  0.0 - 0.5 % Final   • Neutrophils, Absolute 12/02/2019 5.50  1.70 - 7.00 10*3/mm3 Final   • Lymphocytes, Absolute 12/02/2019 1.22  0.70 - 3.10 10*3/mm3 Final   • Monocytes, Absolute 12/02/2019 0.65  0.10 - 0.90 10*3/mm3 Final   • Eosinophils, Absolute 12/02/2019 0.06  0.00 - 0.40 10*3/mm3 Final   • Basophils, Absolute 12/02/2019 0.02  0.00 - 0.20 10*3/mm3 Final   • Immature Grans, Absolute 12/02/2019 0.04  0.00 - 0.05 10*3/mm3 Final   • nRBC 12/02/2019 0.0  0.0 - 0.2 /100 WBC Final        RADIOLOGY:  Dexa Bone Density Axial    Result Date: 11/5/2019  Narrative: EXAMINATION: DEXA BONE DENSITY AXIAL- 11/5/2019 11:39 AM CST  HISTORY: 64-year-old male on high dose steroids, clinical concern for osteoporosis.  Bone densitometry has been performed using a Hologic Discovery C Model bone densitometer. The routine evaluation includes the lumbar spine and left hip.  The calculated bone density of the femoral neck is 0.785 g/cm2 which corresponds to a T-score of  -1.1 and a Z-score of 0.0.  The T-score corresponds to the number of standard deviations above or below the standard of a 30 year old patient.  The Z-score refers to the number of standard deviations above or below the mean for a person of the same age as this patient.  Evaluation of the lumbar spine demonstrates an average bone mineral density measurement of 1.104 g/cm2 which corresponds to a T-score of 0.1 and a Z-score of 0.9  Comparison: There are no correlative imaging studies for comparison.      Impression: Impression:  1. Osteopenia of the left hip. Low bone mass. The bone density is between 1.0 and 2.5 standard deviations below the mean for a young adult woman. There is an increased risk of fracture in these patients. This report was finalized on 11/05/2019 11:40 by Dr. Ty Rahman MD.           Assessment/Plan 65 yo male with met RCC developed grade 4 renal toxicity from opdivo.pt here for f/o and is stable.    1. Renal failure due to opdivo toxicity: pt on HD-steroid in the hospital and currently down to 40mg of prednisone. Cr down to 2.46. His baseline was arround 2.2.  -will decrease dose of pr predisone by 10 every week until he is down to 10mg/day , then reduce by 5mg every 5days then stop.  -stop acyclovir, bactrim and fluconazole a week after stopping steroid  -will check dexa for done density  -go to every other day prednisone, rtc in a month  -referral to nephrology made  -cmp every 2 weeks  -rtc in a month  2. FEN: low  calcium. rx gurinder and vit D  3. Gerd: on omeprazole  4. Osteopenia: on gurinder+vit D kathy. Gurinder level still low  5. Hyperextension of digits. Suspect from low gurinder        Julio Burleson MD    12/2/2019    9:14 AM

## 2019-12-30 ENCOUNTER — TELEPHONE (OUTPATIENT)
Dept: ONCOLOGY | Facility: CLINIC | Age: 64
End: 2019-12-30

## 2019-12-30 ENCOUNTER — APPOINTMENT (OUTPATIENT)
Dept: LAB | Facility: HOSPITAL | Age: 64
End: 2019-12-30

## 2019-12-30 ENCOUNTER — HOSPITAL ENCOUNTER (OUTPATIENT)
Dept: CT IMAGING | Facility: HOSPITAL | Age: 64
Discharge: HOME | End: 2019-12-30
Admitting: NURSE PRACTITIONER

## 2019-12-30 ENCOUNTER — OFFICE VISIT (OUTPATIENT)
Dept: ONCOLOGY | Facility: CLINIC | Age: 64
End: 2019-12-30

## 2019-12-30 VITALS
OXYGEN SATURATION: 96 % | HEART RATE: 81 BPM | SYSTOLIC BLOOD PRESSURE: 140 MMHG | BODY MASS INDEX: 27.09 KG/M2 | HEIGHT: 70 IN | RESPIRATION RATE: 16 BRPM | TEMPERATURE: 98.8 F | DIASTOLIC BLOOD PRESSURE: 85 MMHG | WEIGHT: 189.2 LBS

## 2019-12-30 DIAGNOSIS — N28.9 RENAL INSUFFICIENCY: Primary | ICD-10-CM

## 2019-12-30 DIAGNOSIS — C64.9 RENAL CELL CARCINOMA, UNSPECIFIED LATERALITY (HCC): Primary | ICD-10-CM

## 2019-12-30 DIAGNOSIS — C43.9 METASTATIC MELANOMA (HCC): ICD-10-CM

## 2019-12-30 DIAGNOSIS — R25.2 CRAMPS, EXTREMITY: Primary | ICD-10-CM
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
## 2019-12-30 DIAGNOSIS — R79.0 LOW MAGNESIUM LEVEL: Primary | ICD-10-CM

## 2019-12-30 DIAGNOSIS — E86.0 DEHYDRATION: ICD-10-CM

## 2019-12-30 DIAGNOSIS — R26.89 LOSS OF BALANCE: ICD-10-CM

## 2019-12-30 DIAGNOSIS — C64.9 RENAL CELL CARCINOMA, UNSPECIFIED LATERALITY (HCC): ICD-10-CM

## 2019-12-30 DIAGNOSIS — C78.00 MALIGNANT NEOPLASM METASTATIC TO LUNG, UNSPECIFIED LATERALITY (HCC): Primary | ICD-10-CM

## 2019-12-30 LAB
ALBUMIN SERPL-MCNC: 4.1 G/DL (ref 3.5–5.2)
ALBUMIN/GLOB SERPL: 1.6 G/DL
ALP SERPL-CCNC: 60 U/L (ref 39–117)
ALT SERPL W P-5'-P-CCNC: 7 U/L (ref 1–41)
ANION GAP SERPL CALCULATED.3IONS-SCNC: 15 MMOL/L (ref 5–15)
AST SERPL-CCNC: 8 U/L (ref 1–40)
BASOPHILS # BLD AUTO: 0.04 10*3/MM3 (ref 0–0.2)
BASOPHILS NFR BLD AUTO: 0.4 % (ref 0–1.5)
BILIRUB SERPL-MCNC: 0.4 MG/DL (ref 0.2–1.2)
BUN BLD-MCNC: 58 MG/DL (ref 8–23)
BUN/CREAT SERPL: 10.9 (ref 7–25)
CALCIUM SPEC-SCNC: 8 MG/DL (ref 8.6–10.5)
CHLORIDE SERPL-SCNC: 108 MMOL/L (ref 98–107)
CO2 SERPL-SCNC: 17 MMOL/L (ref 22–29)
CREAT BLD-MCNC: 5.32 MG/DL (ref 0.76–1.27)
DEPRECATED RDW RBC AUTO: 48.9 FL (ref 37–54)
EOSINOPHIL # BLD AUTO: 0.2 10*3/MM3 (ref 0–0.4)
EOSINOPHIL NFR BLD AUTO: 2.1 % (ref 0.3–6.2)
ERYTHROCYTE [DISTWIDTH] IN BLOOD BY AUTOMATED COUNT: 14.5 % (ref 12.3–15.4)
GFR SERPL CREATININE-BSD FRML MDRD: 11 ML/MIN/1.73
GFR SERPL CREATININE-BSD FRML MDRD: ABNORMAL ML/MIN/{1.73_M2}
GLOBULIN UR ELPH-MCNC: 2.5 GM/DL
GLUCOSE BLD-MCNC: 120 MG/DL (ref 65–99)
HCT VFR BLD AUTO: 33.3 % (ref 37.5–51)
HGB BLD-MCNC: 11.3 G/DL (ref 13–17.7)
HOLD SPECIMEN: NORMAL
HOLD SPECIMEN: NORMAL
IMM GRANULOCYTES # BLD AUTO: 0.06 10*3/MM3 (ref 0–0.05)
IMM GRANULOCYTES NFR BLD AUTO: 0.6 % (ref 0–0.5)
LYMPHOCYTES # BLD AUTO: 1.17 10*3/MM3 (ref 0.7–3.1)
LYMPHOCYTES NFR BLD AUTO: 12.5 % (ref 19.6–45.3)
MAGNESIUM SERPL-MCNC: 1.6 MG/DL (ref 1.6–2.4)
MCH RBC QN AUTO: 31.1 PG (ref 26.6–33)
MCHC RBC AUTO-ENTMCNC: 33.9 G/DL (ref 31.5–35.7)
MCV RBC AUTO: 91.7 FL (ref 79–97)
MONOCYTES # BLD AUTO: 1.01 10*3/MM3 (ref 0.1–0.9)
MONOCYTES NFR BLD AUTO: 10.8 % (ref 5–12)
NEUTROPHILS # BLD AUTO: 6.85 10*3/MM3 (ref 1.7–7)
NEUTROPHILS NFR BLD AUTO: 73.6 % (ref 42.7–76)
NRBC BLD AUTO-RTO: 0 /100 WBC (ref 0–0.2)
PHOSPHATE SERPL-MCNC: 4 MG/DL (ref 2.5–4.5)
PLATELET # BLD AUTO: 227 10*3/MM3 (ref 140–450)
PMV BLD AUTO: 9.2 FL (ref 6–12)
POTASSIUM BLD-SCNC: 4.8 MMOL/L (ref 3.5–5.2)
PROT SERPL-MCNC: 6.6 G/DL (ref 6–8.5)
RBC # BLD AUTO: 3.63 10*6/MM3 (ref 4.14–5.8)
SODIUM BLD-SCNC: 140 MMOL/L (ref 136–145)
WBC NRBC COR # BLD: 9.33 10*3/MM3 (ref 3.4–10.8)

## 2019-12-30 PROCEDURE — 70450 CT HEAD/BRAIN W/O DYE: CPT

## 2019-12-30 PROCEDURE — 84100 ASSAY OF PHOSPHORUS: CPT | Performed by: INTERNAL MEDICINE

## 2019-12-30 PROCEDURE — 80053 COMPREHEN METABOLIC PANEL: CPT | Performed by: INTERNAL MEDICINE

## 2019-12-30 PROCEDURE — 99214 OFFICE O/P EST MOD 30 MIN: CPT | Performed by: NURSE PRACTITIONER

## 2019-12-30 PROCEDURE — 83735 ASSAY OF MAGNESIUM: CPT | Performed by: INTERNAL MEDICINE

## 2019-12-30 PROCEDURE — 85025 COMPLETE CBC W/AUTO DIFF WBC: CPT | Performed by: INTERNAL MEDICINE

## 2019-12-30 PROCEDURE — 36415 COLL VENOUS BLD VENIPUNCTURE: CPT | Performed by: INTERNAL MEDICINE

## 2019-12-30 RX ORDER — BENZONATATE 100 MG/1
100 CAPSULE ORAL 4 TIMES DAILY
COMMUNITY
End: 2020-10-28 | Stop reason: ALTCHOICE

## 2019-12-30 NOTE — TELEPHONE ENCOUNTER
Patient presents to the office, no apt scheduled with c/o having severe cramping in his hands with his fingers drawing and becoming distorted along with weakness of his lower extremities and has fallen a couple of times at home with this morning being the most recent. He has completed steroid tapering. Labs ordered and patient to come back to office for results.   Discussed with Dr Burleson, she recommends a MRI Scan and will order, to notify patient.

## 2019-12-30 NOTE — PROGRESS NOTES
"[facility]  HEMATOLOGY & ONCOLOGY    [facility] HEMATOLOGY AND ONCOLOGY  [address]  [facility] [id number] [phone]    Patient Name: Parker Arnold  Encounter Date: 12/30/2019  YOB: 1955  Patient Number: [id number]    Chief Complaint   Patient presents with   • renal cell carcinoma     per taya        REASON FOR VISIT: Mr. Parker Arnold is a 64-year-old gentleman with metastatic renal cell carcinoma.  His initial line of therapy with Sutent but became intolerant therefore he was switched to Opdivo/Yervoy.  He went through the Yervoy without complications but then began having grade 4 renal toxicity secondary to Opdivo.  He was placed on high-dose prednisone and slow taper.  He has been off the prednisone now approximately 2 weeks.  His renal function had been improving.  He has not received any treatment for his disease since September 9, 2019.    He walked into the office today with complaints of losing his balance and muscle cramping.  He states his muscles have been cramping off and on for the last few days.  His fingers have been drawing and hands have been drawing.  Does have a headache but no visual changes and no nausea or vomiting.  He denies any significant shortness of breath or chest pain.  He has been fatigued and tired but no known fever or chills and no night sweats.  He states\" there is something wrong, I feel one step off.\"    Labs today show a significant rise in his creatinine to 5.32 with a BUN of 58.  This is up from 4 weeks ago when his BUN was 37 and creatinine 2.36.  Calcium is at 8.0 potassium normal at 4.8 sodium normal at 140, GFR is down to 11 from 28.  CBC shows his hemoglobin dropping to 11.3, white blood count 9.33 and platelets 227,000.    PAST MEDICAL HISTORY:  ALLERGIES:  Allergies   Allergen Reactions   • Amoxicillin Hives   • Penicillins Rash and Hives   • " Clindamycin/Lincomycin Rash     pustules  Rash - pustules     • Doxycycline Rash   • Hepatitis B Virus Vaccine Rash     Pustules     • Latex Rash   • Levofloxacin Rash       CURRENT MEDICATIONS:  Outpatient Encounter Medications as of 12/30/2019   Medication Sig Dispense Refill   • allopurinol (ZYLOPRIM) 100 MG tablet Take 100 mg by mouth 2 (Two) Times a Day.     • benzonatate (TESSALON) 100 MG capsule Take 100 mg by mouth 4 (Four) Times a Day.     • Calcium Carb-Cholecalciferol (CALCIUM + D3) 600-200 MG-UNIT tablet Take 1 tablet by mouth 2 (Two) Times a Day. 60 tablet 5   • finasteride (PROSCAR) 5 MG tablet Take 5 mg by mouth Daily.  3   • omeprazole (priLOSEC) 20 MG capsule Take 20 mg by mouth 2 (Two) Times a Day.  2   • ondansetron (ZOFRAN) 8 MG tablet Every 8 (Eight) Hours.     • acyclovir (ZOVIRAX) 200 MG capsule Every 12 (Twelve) Hours.     • fluconazole (DIFLUCAN) 200 MG tablet fluconazole 200 mg tablet   Take 1 tablet every day by oral route.     • predniSONE (DELTASONE) 20 MG tablet Take 3 tablets by mouth Daily With Breakfast. 90 tablet 0   • sulfamethoxazole-trimethoprim (BACTRIM DS) 800-160 MG per tablet Bactrim DS   3 times a week     • [DISCONTINUED] allopurinol (ZYLOPRIM) 100 MG tablet 200 mg.     • [DISCONTINUED] OMEPRAZOLE PO omeprazole 20 mg capsule,delayed release   Take 1 capsule every day by oral route.       No facility-administered encounter medications on file as of 12/30/2019.      ADULT ILLNESSES:  Patient Active Problem List   Diagnosis Code   • Renal cell carcinoma (CMS/HCC) C64.9   • Multiple nodules of lung R91.8   • Lung anomaly Q33.9   • TIA (transient ischemic attack) G45.9   • Acute renal failure with tubular necrosis in the setting of solitary kidney N17.0   • Lung metastases (CMS/HCC) C78.00   • Pyuria R82.81   • Hyperkalemia E87.5   • Metabolic acidosis E87.2   • Hyperglycemia R73.9   • Hypercalcemia E83.52   • Benign prostatic hyperplasia N40.0   • Increased frequency of  urination R35.0   • Nocturia R35.1   • Retention of urine R33.9     SURGERIES:  Past Surgical History:   Procedure Laterality Date   • HERNIA REPAIR     • LUNG BIOPSY  2018   • NEPHRECTOMY     • SINUS SURGERY       HEALTH MAINTENANCE ITEMS:  Health Maintenance Due   Topic Date Due   • TDAP/TD VACCINES (1 - Tdap) 1965   • ZOSTER VACCINE (1 of 2) 2004   • PNEUMOCOCCAL VACCINE (65+ HIGH RISK) (1 of 2 - PCV13) 2019   • HEPATITIS C SCREENING  2019   • MEDICARE ANNUAL WELLNESS  2019   • COLONOSCOPY  2019   • INFLUENZA VACCINE  2019       <no information>  Last Completed Colonoscopy       Status Date      COLONOSCOPY No completions recorded          FAMILY HISTORY:  Family History   Problem Relation Age of Onset   • Other Mother         blood disease unknown   • Cancer Father         unknown   • No Known Problems Sister    • Heart disease Maternal Grandmother    • Heart disease Maternal Grandfather    • No Known Problems Paternal Grandmother    • No Known Problems Paternal Grandfather    • No Known Problems Sister      SOCIAL HISTORY:  Social History     Socioeconomic History   • Marital status:      Spouse name: Not on file   • Number of children: Not on file   • Years of education: Not on file   • Highest education level: Not on file   Tobacco Use   • Smoking status: Former Smoker     Packs/day: 1.00     Years: 15.00     Pack years: 15.00     Types: Cigarettes     Last attempt to quit: 1984     Years since quittin.0   • Smokeless tobacco: Never Used   Substance and Sexual Activity   • Alcohol use: No     Frequency: Never   • Drug use: No   • Sexual activity: Defer       REVIEW OF SYSTEMS:  Review of Systems   Constitutional: Positive for fatigue. Negative for appetite change, chills, diaphoresis and fever.   HENT:   Negative for hearing loss, mouth sores, nosebleeds, sore throat and trouble swallowing.    Eyes: Negative for eye problems.   Respiratory:  "Negative for chest tightness, cough and shortness of breath.    Cardiovascular: Negative for chest pain, leg swelling and palpitations.   Gastrointestinal: Negative for abdominal distention, abdominal pain, constipation, diarrhea, nausea and vomiting.   Genitourinary: Negative for bladder incontinence, difficulty urinating, dysuria, frequency and hematuria.    Musculoskeletal: Positive for arthralgias, gait problem and myalgias. Negative for back pain and neck pain.   Skin: Negative for itching, rash and wound.   Neurological: Positive for dizziness, gait problem and headaches. Negative for numbness, seizures and speech difficulty.   Hematological: Negative for adenopathy. Does not bruise/bleed easily.   Psychiatric/Behavioral: Negative for confusion and decreased concentration. The patient is not nervous/anxious.         /85   Pulse 81   Temp 98.8 °F (37.1 °C)   Resp 16   Ht 177.8 cm (70\")   Wt 85.8 kg (189 lb 3.2 oz)   SpO2 96%   BMI 27.15 kg/m²  Body surface area is 2.04 meters squared.  Pain Score    12/30/19 0920   PainSc:   2   PainLoc: Generalized  Comment: joints hurting and hands and feets cramping       Physical Exam:  Physical Exam   Constitutional: He is oriented to person, place, and time. He appears well-developed and well-nourished.   HENT:   Head: Normocephalic and atraumatic.   Mouth/Throat: Oropharynx is clear and moist.   Eyes: Pupils are equal, round, and reactive to light. Conjunctivae and EOM are normal.   Neck: Normal range of motion. Neck supple.   Cardiovascular: Normal rate, regular rhythm and normal heart sounds.   No murmur heard.  Pulmonary/Chest: Effort normal and breath sounds normal. No respiratory distress. He has no wheezes. He has no rales.   Abdominal: Soft. Bowel sounds are normal. He exhibits no distension. There is no tenderness.   Musculoskeletal: Normal range of motion. He exhibits no edema.   Neurological: He is alert and oriented to person, place, and time. "   Gait normal in office   Skin: Skin is warm and dry. No rash noted.   Psychiatric: He has a normal mood and affect. His behavior is normal. Judgment and thought content normal.       Parker Arnold reports a pain score of 2.  Given his pain assessment as noted, treatment options were discussed and the following options were decided upon as a follow-up plan to address the patient's pain: continuation of current treatment plan for pain.      Patient's Body mass index is 27.15 kg/m². BMI is above normal parameters. Recommendations include: nutrition counseling.      LABS    Lab Results - Last 18 Months   Lab Units 12/30/19  0826 12/02/19  0858 11/15/19  0838 11/01/19  0805 10/29/19  0857 10/25/19  0828   HEMOGLOBIN g/dL 11.3* 12.2* 13.5 12.3* 11.2* 10.2*   HEMATOCRIT % 33.3* 36.4* 41.4 38.0 34.0* 31.0*   MCV fL 91.7 94.8 96.7 93.8 92.1 91.4   WBC 10*3/mm3 9.33 7.49 7.11 11.48* 11.67* 11.60*   RDW % 14.5 16.7* 17.9* 16.9* 16.0* 15.0   MPV fL 9.2 9.0 9.2 10.0 10.2 9.4   PLATELETS 10*3/mm3 227 186 168 167 198 218   IMM GRAN % % 0.6* 0.5 1.0* 0.9* 1.0* 3.1*   NEUTROS ABS 10*3/mm3 6.85 5.50 4.79 9.20* 9.32* 9.51*   LYMPHS ABS 10*3/mm3 1.17 1.22 1.52 1.39 1.42 1.15   MONOS ABS 10*3/mm3 1.01* 0.65 0.60 0.74 0.78 0.55   EOS ABS 10*3/mm3 0.20 0.06 0.11 0.04 0.01 0.01   BASOS ABS 10*3/mm3 0.04 0.02 0.02 0.01 0.02 0.02   IMMATURE GRANS (ABS) 10*3/mm3 0.06* 0.04 0.07* 0.10* 0.12* 0.36*   NRBC /100 WBC 0.0 0.0 0.0 0.0 0.0 0.0       Lab Results - Last 18 Months   Lab Units 12/30/19  0826 12/02/19  0858 11/15/19  0838 11/01/19  0805 10/29/19  0857 10/25/19  0828   GLUCOSE mg/dL 120* 92 113* 82 113* 129*   SODIUM mmol/L 140 143 141 142 143 144   POTASSIUM mmol/L 4.8 4.3 5.8* 4.2 4.1 4.2   CO2 mmol/L 17.0* 24.0 20.0* 22.0 19.0* 21.0*   CHLORIDE mmol/L 108* 110* 110* 107 108* 109*   ANION GAP mmol/L 15.0 9.0 11.0 13.0 16.0* 14.0   CREATININE mg/dL 5.32* 2.36* 3.26* 2.46* 2.75* 3.00*   BUN mg/dL 58* 37* 52* 46* 44* 50*   BUN /  "CREAT RATIO  10.9 15.7 16.0 18.7 16.0 16.7   CALCIUM mg/dL 8.0* 8.4* 8.7 8.5* 7.1* 6.6*   EGFR IF NONAFRICN AM mL/min/1.73 11* 28* 19* 27* 23* 21*   ALK PHOS U/L 60 45 40 43 42 41   TOTAL PROTEIN g/dL 6.6 6.1 6.2 6.4 6.0 5.6*   ALT (SGPT) U/L 7 13 22 20 22 34   AST (SGOT) U/L 8 10 17 9 12 18   BILIRUBIN mg/dL 0.4 0.4 0.3 0.3 0.3 0.2   ALBUMIN g/dL 4.10 4.00 4.10 4.20 3.80 3.60   GLOBULIN gm/dL 2.5 2.1 2.1 2.2 2.2 2.0       Lab Results - Last 18 Months   Lab Units 10/19/19  0627 10/18/19  0739   URIC ACID mg/dL 3.5 3.9       Lab Results - Last 18 Months   Lab Units 10/19/19  0627   TSH uIU/mL 0.367     ASSESSMENT  1.  Metastatic renal cell carcinoma with lung metastases whom unfortunately has not been able to undergo therapy since September due to renal toxicity from Opdivo.  He has been on high-dose prednisone therapy and slow taper.  The prednisone was discontinued 2 weeks ago.  2.  Renal toxicity secondary to Opdivo therapy now off prednisone for 2 weeks.  Creatinine has doubled within the last month from 2.36 on 12/2/2019 to now 5.32.  BUN is up from 37 to now 58.  GFR is down to 11 from 28.  Potassium is normal and calcium is slightly low at 8.0.  Albumin is normal.  3.  New onset of balance loss, muscle cramping and drawing of the fingers and hands.  Patient states he feels \"a step-off.\"  4.  Afebrile with no obvious infectious sequela  5.  Weight down 4 pounds since December 2, 2019  6.  Anemia likely secondary to renal insufficiency stable at 11.3    PLAN  1.  Order mag and phosphorus  2.  Discussed patient with Dr. Burleson due to renal insufficiency and in need of imaging of the brain. Dr Burleson recommended CT of Head without contrast for now.  Will give a liter of NS and then reevaluate labs.   3.  Patient has appt with Nephrology today and was advised to keep that appointment.  4.  Advised if symptoms worsen to go to ER    All activities occurring within this visit are in accordance with the plan of " care as set forth by Dr. Julio Burleson.      I have spent a total of  __36__  minutes in face-to-face encounter with the patient, out of which more than 50% was counseling the patient and family regarding coordination of care.  Counseling included but not limited to time spent reviewing labs, treatment plan as well as answering questions.     Demetrice Lowery, APRN   12/30/2019  1:17 PM

## 2019-12-30 NOTE — TELEPHONE ENCOUNTER
Spoke with patient, we are unable to chela IV fluids for him today at the Center due to amount of txts already scheduled. Informed him that I could chela IV fluids locally at his hospital [facility], but he refused said he would rather wait until tomorrow.  Apt was chela for the IV infusion on 12/31/19 @ 8:30 am in [facility], he v/u and written apt note was given to patient with time and date. He v/u.

## 2019-12-31 ENCOUNTER — INFUSION (OUTPATIENT)
Dept: ONCOLOGY | Facility: HOSPITAL | Age: 64
End: 2019-12-31

## 2019-12-31 VITALS
WEIGHT: 190 LBS | HEART RATE: 85 BPM | HEIGHT: 70 IN | TEMPERATURE: 97.6 F | BODY MASS INDEX: 27.2 KG/M2 | RESPIRATION RATE: 12 BRPM | DIASTOLIC BLOOD PRESSURE: 63 MMHG | OXYGEN SATURATION: 100 % | SYSTOLIC BLOOD PRESSURE: 140 MMHG

## 2019-12-31 DIAGNOSIS — E86.0 DEHYDRATION: Primary | ICD-10-CM

## 2019-12-31 DIAGNOSIS — C64.9 RENAL CELL CARCINOMA, UNSPECIFIED LATERALITY (HCC): ICD-10-CM

## 2019-12-31 PROCEDURE — 96361 HYDRATE IV INFUSION ADD-ON: CPT

## 2019-12-31 PROCEDURE — 96360 HYDRATION IV INFUSION INIT: CPT

## 2019-12-31 RX ADMIN — SODIUM CHLORIDE 1000 ML: 9 INJECTION, SOLUTION INTRAVENOUS at 09:14

## 2020-01-02 ENCOUNTER — TELEPHONE (OUTPATIENT)
Dept: ONCOLOGY | Facility: CLINIC | Age: 65
End: 2020-01-02

## 2020-01-02 ENCOUNTER — APPOINTMENT (OUTPATIENT)
Dept: LAB | Facility: HOSPITAL | Age: 65
End: 2020-01-02

## 2020-01-02 ENCOUNTER — OFFICE VISIT (OUTPATIENT)
Dept: ONCOLOGY | Facility: CLINIC | Age: 65
End: 2020-01-02

## 2020-01-02 VITALS
BODY MASS INDEX: 27.43 KG/M2 | SYSTOLIC BLOOD PRESSURE: 115 MMHG | HEART RATE: 73 BPM | DIASTOLIC BLOOD PRESSURE: 70 MMHG | WEIGHT: 191.6 LBS | HEIGHT: 70 IN | OXYGEN SATURATION: 95 % | RESPIRATION RATE: 16 BRPM | TEMPERATURE: 97.8 F

## 2020-01-02 DIAGNOSIS — C64.9 RENAL CELL CARCINOMA, UNSPECIFIED LATERALITY (HCC): Primary | ICD-10-CM

## 2020-01-02 DIAGNOSIS — C64.9 RENAL CELL CARCINOMA, UNSPECIFIED LATERALITY (HCC): ICD-10-CM

## 2020-01-02 DIAGNOSIS — R35.1 NOCTURIA: Primary | ICD-10-CM

## 2020-01-02 DIAGNOSIS — E86.0 DEHYDRATION: Primary | ICD-10-CM

## 2020-01-02 DIAGNOSIS — E86.0 DEHYDRATION: ICD-10-CM

## 2020-01-02 DIAGNOSIS — C64.1 RENAL CELL CARCINOMA OF RIGHT KIDNEY (HCC): ICD-10-CM

## 2020-01-02 PROBLEM — N05.9 NEPHRITIS AND NEPHROPATHY: Status: ACTIVE | Noted: 2020-01-02

## 2020-01-02 PROBLEM — T65.91XA: Status: ACTIVE | Noted: 2020-01-02

## 2020-01-02 LAB
ALBUMIN SERPL-MCNC: 4.1 G/DL (ref 3.5–5.2)
ALBUMIN/GLOB SERPL: 1.7 G/DL
ALP SERPL-CCNC: 62 U/L (ref 39–117)
ALT SERPL W P-5'-P-CCNC: 9 U/L (ref 1–41)
ANION GAP SERPL CALCULATED.3IONS-SCNC: 14 MMOL/L (ref 5–15)
AST SERPL-CCNC: 10 U/L (ref 1–40)
BASOPHILS # BLD AUTO: 0.04 10*3/MM3 (ref 0–0.2)
BASOPHILS NFR BLD AUTO: 0.6 % (ref 0–1.5)
BILIRUB SERPL-MCNC: 0.3 MG/DL (ref 0.2–1.2)
BUN BLD-MCNC: 58 MG/DL (ref 8–23)
BUN/CREAT SERPL: 10.3 (ref 7–25)
CALCIUM SPEC-SCNC: 8 MG/DL (ref 8.6–10.5)
CHLORIDE SERPL-SCNC: 106 MMOL/L (ref 98–107)
CO2 SERPL-SCNC: 19 MMOL/L (ref 22–29)
CREAT BLD-MCNC: 5.64 MG/DL (ref 0.76–1.27)
DEPRECATED RDW RBC AUTO: 46.7 FL (ref 37–54)
EOSINOPHIL # BLD AUTO: 0.27 10*3/MM3 (ref 0–0.4)
EOSINOPHIL NFR BLD AUTO: 3.9 % (ref 0.3–6.2)
ERYTHROCYTE [DISTWIDTH] IN BLOOD BY AUTOMATED COUNT: 14.2 % (ref 12.3–15.4)
GFR SERPL CREATININE-BSD FRML MDRD: 10 ML/MIN/1.73
GFR SERPL CREATININE-BSD FRML MDRD: ABNORMAL ML/MIN/{1.73_M2}
GLOBULIN UR ELPH-MCNC: 2.4 GM/DL
GLUCOSE BLD-MCNC: 114 MG/DL (ref 65–99)
HCT VFR BLD AUTO: 29.2 % (ref 37.5–51)
HGB BLD-MCNC: 10.6 G/DL (ref 13–17.7)
HOLD SPECIMEN: NORMAL
IMM GRANULOCYTES # BLD AUTO: 0.03 10*3/MM3 (ref 0–0.05)
IMM GRANULOCYTES NFR BLD AUTO: 0.4 % (ref 0–0.5)
LYMPHOCYTES # BLD AUTO: 1 10*3/MM3 (ref 0.7–3.1)
LYMPHOCYTES NFR BLD AUTO: 14.6 % (ref 19.6–45.3)
MCH RBC QN AUTO: 32.6 PG (ref 26.6–33)
MCHC RBC AUTO-ENTMCNC: 36.3 G/DL (ref 31.5–35.7)
MCV RBC AUTO: 89.8 FL (ref 79–97)
MONOCYTES # BLD AUTO: 0.72 10*3/MM3 (ref 0.1–0.9)
MONOCYTES NFR BLD AUTO: 10.5 % (ref 5–12)
NEUTROPHILS # BLD AUTO: 4.8 10*3/MM3 (ref 1.7–7)
NEUTROPHILS NFR BLD AUTO: 70 % (ref 42.7–76)
NRBC BLD AUTO-RTO: 0 /100 WBC (ref 0–0.2)
PLATELET # BLD AUTO: 236 10*3/MM3 (ref 140–450)
PMV BLD AUTO: 9.6 FL (ref 6–12)
POTASSIUM BLD-SCNC: 4.5 MMOL/L (ref 3.5–5.2)
PROT SERPL-MCNC: 6.5 G/DL (ref 6–8.5)
RBC # BLD AUTO: 3.25 10*6/MM3 (ref 4.14–5.8)
SODIUM BLD-SCNC: 139 MMOL/L (ref 136–145)
URATE SERPL-MCNC: 4.6 MG/DL (ref 3.4–7)
WBC NRBC COR # BLD: 6.86 10*3/MM3 (ref 3.4–10.8)

## 2020-01-02 PROCEDURE — 36415 COLL VENOUS BLD VENIPUNCTURE: CPT | Performed by: INTERNAL MEDICINE

## 2020-01-02 PROCEDURE — 99214 OFFICE O/P EST MOD 30 MIN: CPT | Performed by: INTERNAL MEDICINE

## 2020-01-02 PROCEDURE — 85025 COMPLETE CBC W/AUTO DIFF WBC: CPT | Performed by: INTERNAL MEDICINE

## 2020-01-02 PROCEDURE — 84550 ASSAY OF BLOOD/URIC ACID: CPT | Performed by: INTERNAL MEDICINE

## 2020-01-02 PROCEDURE — 80053 COMPREHEN METABOLIC PANEL: CPT | Performed by: INTERNAL MEDICINE

## 2020-01-02 NOTE — TELEPHONE ENCOUNTER
Spoke with pt. He v/u    ----- Message from Julio Burleson MD sent at 1/2/2020  2:29 PM CST -----  Please contact patient with result. Normal. Tell him to stop allopurinol.

## 2020-01-02 NOTE — PROGRESS NOTES
[facility]  HEMATOLOGY & ONCOLOGY        Subjective     VISIT DIAGNOSIS:   Encounter Diagnoses   Name Primary?   • Renal cell carcinoma, unspecified laterality (CMS/HCC) Yes   • Renal cell carcinoma of right kidney (CMS/HCC)        REASON FOR VISIT:     Chief Complaint   Patient presents with   • renal cell carcinoma     follow up.         HEMATOLOGY / ONCOLOGY HISTORY:      Renal cell carcinoma of right kidney (CMS/HCC)    10/18/2019 Initial Diagnosis     Renal cell carcinoma (CMS/HCC)      12/31/2019 -  Chemotherapy     OP SUPPORTIVE HYDRATION + ANTIEMETICS       Chemotherapy Treatment History     Treatment Plan:  Cyclophosphamide -immunotherapy induced nephrotoxicity    Medications: predniSONE (DELTASONE) tablet 123 mg, 60 mg/m2, 0 of 1 cycle    vinCRIStine (ONCOVIN) 2 mg in sodium chloride 0.9 % 52 mL chemo IVPB, 2 mg, 0 of 1 cycle    hydrocortisone sodium succinate (Solu-CORTEF) 50 mg, methotrexate PF 12 mg in sodium chloride 0.9 % 5 mL chemo syringe, , 0 of 1 cycle    cyclophosphamide (CYTOXAN) 1,640 mg in sodium chloride 0.9 % 332 mL chemo IVPB, 800 mg/m2, 0 of 1 cycle      Reason treatment was stopped:  Change in Level of Care     Chemotherapy Treatment History     Treatment Plan: cytoxan    Medications: [No matching medication found in this treatment plan]    Reason treatment was stopped:  [Plan is still active]     Cancer Staging Information:  Cancer Staging  No matching staging information was found for the patient.      INTERVAL HISTORY  Patient ID: Parker Arnold is a 64 y.o. year old male with a diagnosis of clear cell renal cancer in 2014. S/p Rh nephrectomy. He was under surveillance until Nov 2018  When he underwent  Wedge resection for a pulm nodule. Pathology c/w metastatic renal cell carcinoma. He was not able to tolerate sutent due to kidney failure, started yervoy/opdivo 5/13/2019 to 8/7/2019, then single agent opdivo 8/28 to 9/9/19. Therapy stopped due to renal  failure. He has been on and off prednisone for 2 weeks.  -at today's visit, cr is 6.43,eGFR 7  -he has lost 20lbs, have mid epigastric pain, takes prilosec. He saw nephrologist and was told uric acid is elevated. Allopurinol started. He is complaining of urgency at night, he was started on flomax and passed out, hence that was discontinued.   11/1/19: he is tolerating 40mg of prednisone. His stomach stays quizzy due to being on a lot of medications. Antiacid bid helps. Ho hallucination, insomnia or manic behavior.  He saw urologist for urinary issues and some meds was rxed, he does not know the name of it.  1/2/20: complaining of just not feeling right, muscle cramps and multiple joint pains with no swelling. Labs show elevated cr>5, He received fluid without much improvement. He will see nephrology tomorrow. He is on 600/200 bola/vit once a day  -denies n/v, sob, cp, dizziness, diarrhea, constipation, rash,  focal weakness.      Review of Systems     See above otherwise neg    Medications:    Current Outpatient Medications   Medication Sig Dispense Refill   • allopurinol (ZYLOPRIM) 100 MG tablet Take 100 mg by mouth 2 (Two) Times a Day.     • benzonatate (TESSALON) 100 MG capsule Take 100 mg by mouth 4 (Four) Times a Day.     • Calcium Carb-Cholecalciferol (CALCIUM + D3) 600-200 MG-UNIT tablet Take 1 tablet by mouth 2 (Two) Times a Day. 60 tablet 5   • finasteride (PROSCAR) 5 MG tablet Take 5 mg by mouth Daily.  3   • omeprazole (priLOSEC) 20 MG capsule Take 20 mg by mouth 2 (Two) Times a Day.  2   • ondansetron (ZOFRAN) 8 MG tablet Every 8 (Eight) Hours.       No current facility-administered medications for this visit.        ALLERGIES:    Allergies   Allergen Reactions   • Amoxicillin Hives   • Penicillins Hives and Rash   • Clindamycin/Lincomycin Rash     pustules  Rash - pustules     • Doxycycline Rash   • Hepatitis B Virus Vaccine Rash     Pustules     • Latex Rash   • Levofloxacin Rash       Objective       Vitals:    01/02/20 0934   BP: 115/70   Pulse: 73   Resp: 16   Temp: 97.8 °F (36.6 °C)   SpO2: 95%       Current Status 1/2/2020   ECOG score 0       General Appearance: Patient is awake, alert, oriented and in no acute distress. Patient is welldeveloped, wellnourished, and appears stated age.  HEENT: Normocephalic. Sclerae clear, conjunctiva pink, extraocular movements intact, pupils, round, reactive to light and  accommodation. Mouth and throat are clear with moist oral mucosa.  NECK: Supple, no jugular venous distention, thyroid not enlarged.  LYMPH: No cervical, supraclavicular, axillary, or inguinal lymphadenopathy.  CHEST: Equal bilateral expansion, AP  diameter normal, resonant percussion note  LUNGS: Good air movement, no rales, rhonchi, rubs or wheezes with auscultation  CARDIO: Regular sinus rhythm, no murmurs, gallops or rubs.  ABDOMEN: Nondistended, soft, No tenderness, no guarding, no rebound, No hepatosplenomegaly. No abdominal masses. Bowel sounds positive. No hernia  GENITALIA: Not examined.  BREASTS: Not examined.  MUSKEL: No joint swelling, decreased motion, or inflammation  EXTREMS: No edema, clubbing, cyanosis, No varicose veins.  NEURO: Grossly nonfocal, Gait is coordinated and smooth, Cognition is preserved.  SKIN: No rashes, no ecchymoses, no petechia.  PSYCH: Oriented to time, place and person. Memory is preserved. Mood and affect appear normal      RECENT LABS:  Orders Only on 01/02/2020   Component Date Value Ref Range Status   • Glucose 01/02/2020 114* 65 - 99 mg/dL Final   • BUN 01/02/2020 58* 8 - 23 mg/dL Final   • Creatinine 01/02/2020 5.64* 0.76 - 1.27 mg/dL Final   • Sodium 01/02/2020 139  136 - 145 mmol/L Final   • Potassium 01/02/2020 4.5  3.5 - 5.2 mmol/L Final   • Chloride 01/02/2020 106  98 - 107 mmol/L Final   • CO2 01/02/2020 19.0* 22.0 - 29.0 mmol/L Final   • Calcium 01/02/2020 8.0* 8.6 - 10.5 mg/dL Final   • Total Protein 01/02/2020 6.5  6.0 - 8.5 g/dL Final   • Albumin  01/02/2020 4.10  3.50 - 5.20 g/dL Final   • ALT (SGPT) 01/02/2020 9  1 - 41 U/L Final   • AST (SGOT) 01/02/2020 10  1 - 40 U/L Final   • Alkaline Phosphatase 01/02/2020 62  39 - 117 U/L Final   • Total Bilirubin 01/02/2020 0.3  0.2 - 1.2 mg/dL Final   • eGFR Non African Amer 01/02/2020 10* >60 mL/min/1.73 Final    <15 Indicative of kidney failure.   • eGFR   Amer 01/02/2020    Final    <15 Indicative of kidney failure.   • Globulin 01/02/2020 2.4  gm/dL Final   • A/G Ratio 01/02/2020 1.7  g/dL Final   • BUN/Creatinine Ratio 01/02/2020 10.3  7.0 - 25.0 Final   • Anion Gap 01/02/2020 14.0  5.0 - 15.0 mmol/L Final   • WBC 01/02/2020 6.86  3.40 - 10.80 10*3/mm3 Final   • RBC 01/02/2020 3.25* 4.14 - 5.80 10*6/mm3 Final   • Hemoglobin 01/02/2020 10.6* 13.0 - 17.7 g/dL Final   • Hematocrit 01/02/2020 29.2* 37.5 - 51.0 % Final   • MCV 01/02/2020 89.8  79.0 - 97.0 fL Final   • MCH 01/02/2020 32.6  26.6 - 33.0 pg Final   • MCHC 01/02/2020 36.3* 31.5 - 35.7 g/dL Final   • RDW 01/02/2020 14.2  12.3 - 15.4 % Final   • RDW-SD 01/02/2020 46.7  37.0 - 54.0 fl Final   • MPV 01/02/2020 9.6  6.0 - 12.0 fL Final   • Platelets 01/02/2020 236  140 - 450 10*3/mm3 Final   • Neutrophil % 01/02/2020 70.0  42.7 - 76.0 % Final   • Lymphocyte % 01/02/2020 14.6* 19.6 - 45.3 % Final   • Monocyte % 01/02/2020 10.5  5.0 - 12.0 % Final   • Eosinophil % 01/02/2020 3.9  0.3 - 6.2 % Final   • Basophil % 01/02/2020 0.6  0.0 - 1.5 % Final   • Immature Grans % 01/02/2020 0.4  0.0 - 0.5 % Final   • Neutrophils, Absolute 01/02/2020 4.80  1.70 - 7.00 10*3/mm3 Final   • Lymphocytes, Absolute 01/02/2020 1.00  0.70 - 3.10 10*3/mm3 Final   • Monocytes, Absolute 01/02/2020 0.72  0.10 - 0.90 10*3/mm3 Final   • Eosinophils, Absolute 01/02/2020 0.27  0.00 - 0.40 10*3/mm3 Final   • Basophils, Absolute 01/02/2020 0.04  0.00 - 0.20 10*3/mm3 Final   • Immature Grans, Absolute 01/02/2020 0.03  0.00 - 0.05 10*3/mm3 Final   • nRBC 01/02/2020 0.0  0.0 - 0.2 /100 WBC  Final   • Uric Acid 01/02/2020 4.6  3.4 - 7.0 mg/dL Final   Appointment on 01/02/2020   Component Date Value Ref Range Status   • Extra Tube 01/02/2020 Hold for add-ons.   Final    Auto resulted.   Orders Only on 12/30/2019   Component Date Value Ref Range Status   • Magnesium 12/30/2019 1.6  1.6 - 2.4 mg/dL Final   • Phosphorus 12/30/2019 4.0  2.5 - 4.5 mg/dL Final   Orders Only on 12/30/2019   Component Date Value Ref Range Status   • Glucose 12/30/2019 120* 65 - 99 mg/dL Final   • BUN 12/30/2019 58* 8 - 23 mg/dL Final   • Creatinine 12/30/2019 5.32* 0.76 - 1.27 mg/dL Final   • Sodium 12/30/2019 140  136 - 145 mmol/L Final   • Potassium 12/30/2019 4.8  3.5 - 5.2 mmol/L Final   • Chloride 12/30/2019 108* 98 - 107 mmol/L Final   • CO2 12/30/2019 17.0* 22.0 - 29.0 mmol/L Final   • Calcium 12/30/2019 8.0* 8.6 - 10.5 mg/dL Final   • Total Protein 12/30/2019 6.6  6.0 - 8.5 g/dL Final   • Albumin 12/30/2019 4.10  3.50 - 5.20 g/dL Final   • ALT (SGPT) 12/30/2019 7  1 - 41 U/L Final   • AST (SGOT) 12/30/2019 8  1 - 40 U/L Final   • Alkaline Phosphatase 12/30/2019 60  39 - 117 U/L Final   • Total Bilirubin 12/30/2019 0.4  0.2 - 1.2 mg/dL Final   • eGFR Non African Amer 12/30/2019 11* >60 mL/min/1.73 Final    <15 Indicative of kidney failure.   • eGFR   Amer 12/30/2019    Final    <15 Indicative of kidney failure.   • Globulin 12/30/2019 2.5  gm/dL Final   • A/G Ratio 12/30/2019 1.6  g/dL Final   • BUN/Creatinine Ratio 12/30/2019 10.9  7.0 - 25.0 Final   • Anion Gap 12/30/2019 15.0  5.0 - 15.0 mmol/L Final   • WBC 12/30/2019 9.33  3.40 - 10.80 10*3/mm3 Final   • RBC 12/30/2019 3.63* 4.14 - 5.80 10*6/mm3 Final   • Hemoglobin 12/30/2019 11.3* 13.0 - 17.7 g/dL Final   • Hematocrit 12/30/2019 33.3* 37.5 - 51.0 % Final   • MCV 12/30/2019 91.7  79.0 - 97.0 fL Final   • MCH 12/30/2019 31.1  26.6 - 33.0 pg Final   • MCHC 12/30/2019 33.9  31.5 - 35.7 g/dL Final   • RDW 12/30/2019 14.5  12.3 - 15.4 % Final   • RDW-SD 12/30/2019  48.9  37.0 - 54.0 fl Final   • MPV 12/30/2019 9.2  6.0 - 12.0 fL Final   • Platelets 12/30/2019 227  140 - 450 10*3/mm3 Final   • Neutrophil % 12/30/2019 73.6  42.7 - 76.0 % Final   • Lymphocyte % 12/30/2019 12.5* 19.6 - 45.3 % Final   • Monocyte % 12/30/2019 10.8  5.0 - 12.0 % Final   • Eosinophil % 12/30/2019 2.1  0.3 - 6.2 % Final   • Basophil % 12/30/2019 0.4  0.0 - 1.5 % Final   • Immature Grans % 12/30/2019 0.6* 0.0 - 0.5 % Final   • Neutrophils, Absolute 12/30/2019 6.85  1.70 - 7.00 10*3/mm3 Final   • Lymphocytes, Absolute 12/30/2019 1.17  0.70 - 3.10 10*3/mm3 Final   • Monocytes, Absolute 12/30/2019 1.01* 0.10 - 0.90 10*3/mm3 Final   • Eosinophils, Absolute 12/30/2019 0.20  0.00 - 0.40 10*3/mm3 Final   • Basophils, Absolute 12/30/2019 0.04  0.00 - 0.20 10*3/mm3 Final   • Immature Grans, Absolute 12/30/2019 0.06* 0.00 - 0.05 10*3/mm3 Final   • nRBC 12/30/2019 0.0  0.0 - 0.2 /100 WBC Final   Appointment on 12/30/2019   Component Date Value Ref Range Status   • Extra Tube 12/30/2019 Hold for add-ons.   Final    Auto resulted.   • Extra Tube 12/30/2019 Hold for add-ons.   Final    Auto resulted.       RADIOLOGY:  Ct Head Without Contrast    Result Date: 12/31/2019  Narrative: CT HEAD WO CONTRAST- 12/30/2019 12:12 PM CST  HISTORY: loss of balance; metastatic renal cell ca; R26.89-Other abnormalities of gait and mobility; C79.9-Secondary malignant neoplasm of unspecified site   DOSE LENGTH PRODUCT: 762 mGy cm. Automated exposure control was also utilized to decrease patient radiation dose.  Technique: Axial CT of the brain without IV contrast. Sagittal and coronal reformations are also provided for review. Soft tissue and bone kernels are available for interpretation.  Comparison: None.  Findings:  There is no evidence of acute large vascular territory infarct. No intra-axial or extra-axial hemorrhage. No visualized mass lesion or mass effect. The ventricles, cortical sulci and basal cisterns are symmetric and age  appropriate.  Posterior fossa structures are unremarkable. The scalp and calvarium are intact. Visualized paranasal sinuses and mastoids are clear.      Impression: Impression:  1. No acute intracranial process. This report was finalized on 12/31/2019 07:18 by Dr Enzo Ovalle, .           Assessment/Plan 63 yo male with met RCC developed grade 4 renal toxicity from opdivo.pt here for f/o and is stable.    1. Renal failure due to opdivo toxicity: pt on HD-steroid in the hospital and currently down to 40mg of prednisone. Cr down to 2.46. His baseline was arround 2.2.  -will decrease dose of pr predisone by 10 every week until he is down to 10mg/day , then reduce by 5mg every 5days then stop.  -stop acyclovir, bactrim and fluconazole a week after stopping steroid  -dexa scan showed 11/2019 showed osteopenia. Advised bola/vid 600/200 BID  -currently off steroid. Not sure if this is his new baseline  -he has appointment with nephrology  -hopefully biopsy of the kidney will be obtained to help guide therapy  -avoid all nephrotoxins. If uric acid normal, will stop allopurinol  -plan for monthly cytoxan per NCCN guideline  -not able to get CT or MRI due to bad renal function. Will order MRI to assess disease.  2. FEN: low calcium. rx bola and vit D  3. Gerd: on omeprazole  4. Osteopenia: on bola+vit D kathy. Bola level still low, advised 2 tabs instead of one as he was taking  5. Hyperextension of digits. Suspect from low bola        Julio Burleson MD    1/2/2020    10:57 AM

## 2020-01-03 ENCOUNTER — HOSPITAL ENCOUNTER (OUTPATIENT)
Dept: ULTRASOUND IMAGING | Age: 65
Discharge: HOME | End: 2020-01-03
Payer: COMMERCIAL

## 2020-01-03 LAB
BACTERIA: NEGATIVE /HPF
BILIRUBIN URINE: NEGATIVE
BLOOD, URINE: ABNORMAL
CLARITY: CLEAR
COLOR: YELLOW
CREATININE URINE: 66.6 MG/DL (ref 4.2–622)
EOSINOPHIL,URINE: NORMAL
EPITHELIAL CELLS, UA: 1 /HPF (ref 0–5)
GLUCOSE URINE: NEGATIVE MG/DL
HYALINE CASTS: 1 /HPF (ref 0–8)
KETONES, URINE: NEGATIVE MG/DL
LEUKOCYTE ESTERASE, URINE: ABNORMAL
NITRITE, URINE: NEGATIVE
PH UA: 6 (ref 5–8)
PROTEIN PROTEIN: 13 MG/DL (ref 15–45)
PROTEIN UA: ABNORMAL MG/DL
RBC UA: 1 /HPF (ref 0–4)
SODIUM URINE: 72 MMOL/L
SPECIFIC GRAVITY UA: 1.01 (ref 1–1.03)
UROBILINOGEN, URINE: 0.2 E.U./DL
WBC UA: 11 /HPF (ref 0–5)

## 2020-01-03 PROCEDURE — 87205 SMEAR GRAM STAIN: CPT

## 2020-01-03 PROCEDURE — 76770 US EXAM ABDO BACK WALL COMP: CPT

## 2020-01-03 PROCEDURE — 82570 ASSAY OF URINE CREATININE: CPT

## 2020-01-03 PROCEDURE — 84300 ASSAY OF URINE SODIUM: CPT

## 2020-01-03 PROCEDURE — 81001 URINALYSIS AUTO W/SCOPE: CPT

## 2020-01-03 PROCEDURE — 84156 ASSAY OF PROTEIN URINE: CPT

## 2020-01-04 ENCOUNTER — HOSPITAL ENCOUNTER (INPATIENT)
Facility: HOSPITAL | Age: 65
LOS: 6 days | Discharge: HOME | End: 2020-01-10
Attending: INTERNAL MEDICINE | Admitting: INTERNAL MEDICINE

## 2020-01-04 DIAGNOSIS — E83.51 HYPOCALCEMIA: ICD-10-CM

## 2020-01-04 DIAGNOSIS — C78.00 MALIGNANT NEOPLASM METASTATIC TO LUNG, UNSPECIFIED LATERALITY (HCC): ICD-10-CM

## 2020-01-04 DIAGNOSIS — N17.9 AKI (ACUTE KIDNEY INJURY) (HCC): Primary | ICD-10-CM

## 2020-01-04 DIAGNOSIS — C64.1 RENAL CELL CARCINOMA OF RIGHT KIDNEY (HCC): ICD-10-CM

## 2020-01-04 DIAGNOSIS — D64.9 ANEMIA, UNSPECIFIED TYPE: ICD-10-CM

## 2020-01-04 PROBLEM — N18.30 STAGE 3 CHRONIC KIDNEY DISEASE: Status: ACTIVE | Noted: 2020-01-04

## 2020-01-04 LAB
ALBUMIN SERPL-MCNC: 4 G/DL (ref 3.5–5.2)
ALBUMIN/GLOB SERPL: 1.5 G/DL
ALP SERPL-CCNC: 64 U/L (ref 39–117)
ALT SERPL W P-5'-P-CCNC: 8 U/L (ref 1–41)
ANION GAP SERPL CALCULATED.3IONS-SCNC: 12 MMOL/L (ref 5–15)
AST SERPL-CCNC: 12 U/L (ref 1–40)
BACTERIA UR QL AUTO: ABNORMAL /HPF
BASOPHILS # BLD AUTO: 0.02 10*3/MM3 (ref 0–0.2)
BASOPHILS NFR BLD AUTO: 0.3 % (ref 0–1.5)
BILIRUB SERPL-MCNC: 0.3 MG/DL (ref 0.2–1.2)
BILIRUB UR QL STRIP: NEGATIVE
BUN BLD-MCNC: 69 MG/DL (ref 8–23)
BUN/CREAT SERPL: 12.4 (ref 7–25)
CALCIUM SPEC-SCNC: 8.2 MG/DL (ref 8.6–10.5)
CHLORIDE SERPL-SCNC: 108 MMOL/L (ref 98–107)
CLARITY UR: CLEAR
CO2 SERPL-SCNC: 18 MMOL/L (ref 22–29)
COLOR UR: YELLOW
CREAT BLD-MCNC: 5.58 MG/DL (ref 0.76–1.27)
DEPRECATED RDW RBC AUTO: 47.6 FL (ref 37–54)
EOSINOPHIL # BLD AUTO: 0.23 10*3/MM3 (ref 0–0.4)
EOSINOPHIL NFR BLD AUTO: 3.1 % (ref 0.3–6.2)
ERYTHROCYTE [DISTWIDTH] IN BLOOD BY AUTOMATED COUNT: 14.1 % (ref 12.3–15.4)
GFR SERPL CREATININE-BSD FRML MDRD: 10 ML/MIN/1.73
GFR SERPL CREATININE-BSD FRML MDRD: ABNORMAL ML/MIN/{1.73_M2}
GLOBULIN UR ELPH-MCNC: 2.6 GM/DL
GLUCOSE BLD-MCNC: 101 MG/DL (ref 65–99)
GLUCOSE UR STRIP-MCNC: NEGATIVE MG/DL
HCT VFR BLD AUTO: 29.5 % (ref 37.5–51)
HGB BLD-MCNC: 10.2 G/DL (ref 13–17.7)
HGB UR QL STRIP.AUTO: NEGATIVE
HYALINE CASTS UR QL AUTO: ABNORMAL /LPF
IMM GRANULOCYTES # BLD AUTO: 0.03 10*3/MM3 (ref 0–0.05)
IMM GRANULOCYTES NFR BLD AUTO: 0.4 % (ref 0–0.5)
KETONES UR QL STRIP: NEGATIVE
LEUKOCYTE ESTERASE UR QL STRIP.AUTO: ABNORMAL
LYMPHOCYTES # BLD AUTO: 1.22 10*3/MM3 (ref 0.7–3.1)
LYMPHOCYTES NFR BLD AUTO: 16.4 % (ref 19.6–45.3)
MCH RBC QN AUTO: 31.6 PG (ref 26.6–33)
MCHC RBC AUTO-ENTMCNC: 34.6 G/DL (ref 31.5–35.7)
MCV RBC AUTO: 91.3 FL (ref 79–97)
MONOCYTES # BLD AUTO: 0.81 10*3/MM3 (ref 0.1–0.9)
MONOCYTES NFR BLD AUTO: 10.9 % (ref 5–12)
NEUTROPHILS # BLD AUTO: 5.12 10*3/MM3 (ref 1.7–7)
NEUTROPHILS NFR BLD AUTO: 68.9 % (ref 42.7–76)
NITRITE UR QL STRIP: NEGATIVE
NRBC BLD AUTO-RTO: 0 /100 WBC (ref 0–0.2)
PH UR STRIP.AUTO: 5.5 [PH] (ref 5–8)
PLATELET # BLD AUTO: 238 10*3/MM3 (ref 140–450)
PMV BLD AUTO: 9.3 FL (ref 6–12)
POTASSIUM BLD-SCNC: 5.2 MMOL/L (ref 3.5–5.2)
PROT SERPL-MCNC: 6.6 G/DL (ref 6–8.5)
PROT UR QL STRIP: ABNORMAL
RBC # BLD AUTO: 3.23 10*6/MM3 (ref 4.14–5.8)
RBC # UR: ABNORMAL /HPF
REF LAB TEST METHOD: ABNORMAL
SODIUM BLD-SCNC: 138 MMOL/L (ref 136–145)
SODIUM UR-SCNC: 97 MMOL/L
SP GR UR STRIP: 1.01 (ref 1–1.03)
SQUAMOUS #/AREA URNS HPF: ABNORMAL /HPF
UROBILINOGEN UR QL STRIP: ABNORMAL
WBC NRBC COR # BLD: 7.43 10*3/MM3 (ref 3.4–10.8)
WBC UR QL AUTO: ABNORMAL /HPF

## 2020-01-04 PROCEDURE — 80053 COMPREHEN METABOLIC PANEL: CPT | Performed by: PHYSICIAN ASSISTANT

## 2020-01-04 PROCEDURE — 63710000001 PREDNISONE PER 1 MG: Performed by: INTERNAL MEDICINE

## 2020-01-04 PROCEDURE — 99284 EMERGENCY DEPT VISIT MOD MDM: CPT

## 2020-01-04 PROCEDURE — 81001 URINALYSIS AUTO W/SCOPE: CPT | Performed by: PHYSICIAN ASSISTANT

## 2020-01-04 PROCEDURE — 82570 ASSAY OF URINE CREATININE: CPT | Performed by: INTERNAL MEDICINE

## 2020-01-04 PROCEDURE — 87086 URINE CULTURE/COLONY COUNT: CPT | Performed by: PHYSICIAN ASSISTANT

## 2020-01-04 PROCEDURE — 85025 COMPLETE CBC W/AUTO DIFF WBC: CPT | Performed by: PHYSICIAN ASSISTANT

## 2020-01-04 PROCEDURE — 94760 N-INVAS EAR/PLS OXIMETRY 1: CPT

## 2020-01-04 PROCEDURE — 94799 UNLISTED PULMONARY SVC/PX: CPT

## 2020-01-04 PROCEDURE — 84300 ASSAY OF URINE SODIUM: CPT | Performed by: INTERNAL MEDICINE

## 2020-01-04 RX ORDER — PANTOPRAZOLE SODIUM 40 MG/1
40 TABLET, DELAYED RELEASE ORAL
Status: DISCONTINUED | OUTPATIENT
Start: 2020-01-04 | End: 2020-01-10 | Stop reason: HOSPADM

## 2020-01-04 RX ORDER — PANTOPRAZOLE SODIUM 40 MG/1
40 TABLET, DELAYED RELEASE ORAL EVERY MORNING
Status: DISCONTINUED | OUTPATIENT
Start: 2020-01-04 | End: 2020-01-04

## 2020-01-04 RX ORDER — SODIUM CHLORIDE 0.9 % (FLUSH) 0.9 %
10 SYRINGE (ML) INJECTION AS NEEDED
Status: DISCONTINUED | OUTPATIENT
Start: 2020-01-04 | End: 2020-01-10 | Stop reason: HOSPADM

## 2020-01-04 RX ORDER — SODIUM CHLORIDE 0.9 % (FLUSH) 0.9 %
10 SYRINGE (ML) INJECTION EVERY 12 HOURS SCHEDULED
Status: DISCONTINUED | OUTPATIENT
Start: 2020-01-04 | End: 2020-01-10 | Stop reason: HOSPADM

## 2020-01-04 RX ORDER — ACETAMINOPHEN 325 MG/1
650 TABLET ORAL EVERY 4 HOURS PRN
Status: DISCONTINUED | OUTPATIENT
Start: 2020-01-04 | End: 2020-01-10 | Stop reason: HOSPADM

## 2020-01-04 RX ORDER — FINASTERIDE 5 MG/1
5 TABLET, FILM COATED ORAL DAILY
Status: DISCONTINUED | OUTPATIENT
Start: 2020-01-04 | End: 2020-01-10 | Stop reason: HOSPADM

## 2020-01-04 RX ORDER — PREDNISONE 20 MG/1
40 TABLET ORAL
Status: DISCONTINUED | OUTPATIENT
Start: 2020-01-04 | End: 2020-01-10 | Stop reason: HOSPADM

## 2020-01-04 RX ORDER — ACETAMINOPHEN 160 MG/5ML
650 SOLUTION ORAL EVERY 4 HOURS PRN
Status: DISCONTINUED | OUTPATIENT
Start: 2020-01-04 | End: 2020-01-10 | Stop reason: HOSPADM

## 2020-01-04 RX ORDER — SODIUM CHLORIDE 9 MG/ML
75 INJECTION, SOLUTION INTRAVENOUS CONTINUOUS
Status: DISCONTINUED | OUTPATIENT
Start: 2020-01-04 | End: 2020-01-07

## 2020-01-04 RX ORDER — ACETAMINOPHEN 650 MG/1
650 SUPPOSITORY RECTAL EVERY 4 HOURS PRN
Status: DISCONTINUED | OUTPATIENT
Start: 2020-01-04 | End: 2020-01-10 | Stop reason: HOSPADM

## 2020-01-04 RX ORDER — ONDANSETRON 2 MG/ML
4 INJECTION INTRAMUSCULAR; INTRAVENOUS EVERY 6 HOURS PRN
Status: DISCONTINUED | OUTPATIENT
Start: 2020-01-04 | End: 2020-01-10 | Stop reason: HOSPADM

## 2020-01-04 RX ADMIN — SODIUM CHLORIDE, PRESERVATIVE FREE 10 ML: 5 INJECTION INTRAVENOUS at 12:16

## 2020-01-04 RX ADMIN — SODIUM CHLORIDE 125 ML/HR: 9 INJECTION, SOLUTION INTRAVENOUS at 12:09

## 2020-01-04 RX ADMIN — SODIUM CHLORIDE 500 ML: 9 INJECTION, SOLUTION INTRAVENOUS at 08:25

## 2020-01-04 RX ADMIN — FINASTERIDE 5 MG: 5 TABLET, FILM COATED ORAL at 12:09

## 2020-01-04 RX ADMIN — PREDNISONE 40 MG: 20 TABLET ORAL at 20:52

## 2020-01-04 RX ADMIN — SODIUM CHLORIDE 125 ML/HR: 9 INJECTION, SOLUTION INTRAVENOUS at 20:52

## 2020-01-04 RX ADMIN — PANTOPRAZOLE SODIUM 40 MG: 40 TABLET, DELAYED RELEASE ORAL at 12:09

## 2020-01-04 NOTE — ED NOTES
Patient is a 64 year old male that presents to ER with complaints of abnormal labs after a follow up visit with his PCP. Patient reports that for the past 10 days he has felt fatigued and followed up with his PCP. Patient reports a history of Kidney cancer and recently found that it had mets to his lungs. Patient reports that he felt as if something were off due to his fatigue. Labs at PCP showed decreased kidney function studies.      Lori Spangler RN  01/04/20 0911

## 2020-01-04 NOTE — ED PROVIDER NOTES
Subjective   Pt is a pleasant 64 y.o. Male in [facility]. He presents as referral from for acute renal failure.   Hx of RCC 5 years ago with R nephrectomy.  Did very well until recurrent disease with mets diagnosed 1/2019.  Tried various chemo but each time had complication including kidney injury.  Last chemo tx in September.  Started on prednisone for kidney injury in October with taper.  Just completed ~2-3 weeks ago.  His basline Cr has been ~2.   Was feeling more fatigued and sluggish.  Also c/o muscle tightness.  Presented to outside office ~5 days ago and noted to have AMAURI.  Sent for IV fluids on 12/30 but recheck on 1/2 showed persistently elevated Cr 5.64 and GFR of 10. K 4.5, Ca 8.0. LFTs wnl.  Also anemic with H/H 10.6/29.2  Per pt, renal/bladder US also completed wo evidence of obstruction or abnormality but I do not have those records as of this time to confirm findings.       History provided by:  Patient and spouse      Review of Systems   Constitutional: Positive for activity change and fatigue. Negative for chills, diaphoresis and fever.   HENT: Negative for trouble swallowing and voice change.    Eyes: Negative for visual disturbance.   Respiratory: Positive for cough. Negative for apnea, chest tightness, shortness of breath, wheezing and stridor.         He did have coughing spell x 1 last night with post-tussive vomiting but otherwise denies respiratory s/s   Cardiovascular: Negative for chest pain, palpitations and leg swelling.   Gastrointestinal: Positive for vomiting (x 1 episode last night- post tussive). Negative for abdominal distention, abdominal pain, constipation, diarrhea and nausea.   Genitourinary: Positive for flank pain (left). Negative for decreased urine volume, difficulty urinating, dysuria, hematuria, penile swelling, scrotal swelling and testicular pain.        Has chronic urgency, frequency and hesitancy but denies worsening with current c/o   Musculoskeletal: Positive for  arthralgias and myalgias. Negative for back pain, gait problem, joint swelling, neck pain and neck stiffness.   Skin: Negative for color change, pallor, rash and wound.        Pruritis    Neurological: Positive for weakness. Negative for dizziness, tremors, syncope, facial asymmetry, speech difficulty, light-headedness and headaches.   Hematological: Negative for adenopathy. Does not bruise/bleed easily.   Psychiatric/Behavioral: Negative for agitation, behavioral problems, confusion, decreased concentration and hallucinations.       Past Medical History:   Diagnosis Date   • Dehydration 12/30/2019   • Nephritis and nephropathy 1/2/2020   • Renal cell carcinoma (CMS/HCC)    • Renal cell carcinoma of right kidney (CMS/HCC) 10/18/2019    Overview:  Diagnoses 11/11/14. Path report in C.E at [facility]. Tissue has been requested.    • Toxicity, chemicals 1/2/2020       Allergies   Allergen Reactions   • Amoxicillin Hives   • Penicillins Hives and Rash   • Clindamycin/Lincomycin Rash     pustules  Rash - pustules     • Doxycycline Rash   • Hepatitis B Virus Vaccine Rash     Pustules     • Latex Rash   • Levofloxacin Rash       Past Surgical History:   Procedure Laterality Date   • HERNIA REPAIR  2008   • LUNG BIOPSY  11/2018   • NEPHRECTOMY  2014   • SINUS SURGERY  2005       Family History   Problem Relation Age of Onset   • Other Mother         blood disease unknown   • Cancer Father         unknown   • No Known Problems Sister    • Heart disease Maternal Grandmother    • Heart disease Maternal Grandfather    • No Known Problems Paternal Grandmother    • No Known Problems Paternal Grandfather    • No Known Problems Sister        Social History     Socioeconomic History   • Marital status:      Spouse name: Not on file   • Number of children: Not on file   • Years of education: Not on file   • Highest education level: Not on file   Tobacco Use   • Smoking status: Former Smoker     Packs/day: 1.00  "    Years: 15.00     Pack years: 15.00     Types: Cigarettes     Last attempt to quit:      Years since quittin.0   • Smokeless tobacco: Never Used   Substance and Sexual Activity   • Alcohol use: No     Frequency: Never   • Drug use: No   • Sexual activity: Defer       Objective   Physical Exam   Constitutional: He is oriented to person, place, and time. He appears well-developed and well-nourished. No distress.   HENT:   Head: Normocephalic.   Mouth/Throat: Oropharynx is clear and moist.   Eyes: Pupils are equal, round, and reactive to light. Conjunctivae and EOM are normal. No scleral icterus.   Neck: Normal range of motion. Neck supple. No neck rigidity.   Cardiovascular: Normal rate, regular rhythm, normal heart sounds and intact distal pulses.   Pulmonary/Chest: Effort normal and breath sounds normal. He has no wheezes. He has no rales. He exhibits no tenderness.   Abdominal: Soft. Bowel sounds are normal. He exhibits no distension. There is CVA tenderness (left). There is no rebound and no guarding.   Musculoskeletal: Normal range of motion. He exhibits no tenderness or deformity.   Lymphadenopathy:     He has no cervical adenopathy.   Neurological: He is alert and oriented to person, place, and time. He displays normal reflexes. No sensory deficit. He exhibits normal muscle tone.   CN normal as tested   Skin: Skin is warm and dry. Capillary refill takes less than 2 seconds. No rash noted. He is not diaphoretic. No erythema. No pallor.   Psychiatric: He has a normal mood and affect. His behavior is normal. Judgment and thought content normal.   Nursing note and vitals reviewed.      Procedures           ED Course  ED Course as of 910   Sat 2020   0859 Renal US reviewed- \"The ultrasound examination of the kidneys bilaterally is performed.  There is no previous study for comparison.  The right kidney is surgically absent.  Left kidney measures 13.2 x 6.4 x 5.8 cm. There is an " "echolucent  nodule in the upper pole measuring 1.4 x 2.6 x 2.6 cm. There is good  through transmission. There is normal corticomedullary differentiation  of the remaining kidney. The renal cortex measures 1.5-1.8 cm. No  evidence of hydronephrosis.  The urinary bladder is incompletely distended. The prostate is  enlarged and pushing into the floor of the urinary bladder. Prostate  measures 4.8 x 3.9 x 4 cm. There is intrinsic calcification.  IMPRESSION:  Left renal cyst. No hydronephrosis.  Moderate enlargement of the prostate.  Right nephrectomy.  Signed by Dr Kathe Ramírez on 1/3/2020 4:21 PM\"    [DC]   0902 Labs consistent with hx and AMAURI. Hypocalcemia, K wnl. Records reviewed- call placed to medicine.     [DC]   0905 Spoke with Dr. Holloway, pt to be admitted for further eval/care.  Discussed tx plan with patient.  He verbalized understanding.  Resting comfortably as this time.    [DC]      ED Course User Index  [DC] Castleman, Danna D, PA      /70   Pulse 77   Temp 97.4 °F (36.3 °C)   Resp 15   Ht 177.8 cm (70\")   Wt 87.1 kg (192 lb)   SpO2 99%   BMI 27.55 kg/m²                                            MDM  Number of Diagnoses or Management Options  AMAURI (acute kidney injury) (CMS/HCC): new and requires workup  Anemia, unspecified type:   Hypocalcemia:   Malignant neoplasm metastatic to lung, unspecified laterality (CMS/HCC):   Renal cell carcinoma of right kidney (CMS/HCC):      Amount and/or Complexity of Data Reviewed  Clinical lab tests: reviewed  Review and summarize past medical records: yes  Discuss the patient with other providers: yes    Risk of Complications, Morbidity, and/or Mortality  Presenting problems: moderate    Patient Progress  Patient progress: stable      Final diagnoses:   AMAURI (acute kidney injury) (CMS/HCC)   Hypocalcemia   Anemia, unspecified type   Renal cell carcinoma of right kidney (CMS/HCC)   Malignant neoplasm metastatic to lung, unspecified laterality (CMS/HCC) "            Castleman, Danna D, PA  01/04/20 0937

## 2020-01-04 NOTE — H&P
[facility] Medicine Services  HISTORY AND PHYSICAL    Date of Admission: 1/4/2020  Primary Care Physician: Betty Almaraz APRN    Subjective     Chief Complaint: Weakness, slow mentation.    History of Present Illness  This is a 64-year-old  gentleman who resides in [place], [place].  He sees Betty Hernadez for primary care.  He follows with Dr. Burleson for history of metastatic renal cell carcinoma.  He had a right nephrectomy in 2014 and has had recurrence in his lungs.  He was treated with Sutent and Opdivo last fall and developed what was thought to be interstitial nephritis of the remaining left kidney.  He was treated with a prolonged course of oral steroids, prednisone.  His renal function had improved with this.  There was some thought given to the possibility of a biopsy, but it was felt that the risk outweighed the benefits as his renal function was improving on steroids and he only has 1 kidney.  He was seen by Dr. Grant during his stay in the hospital in October.    He has been doing well as an outpatient recently.  He has been followed in the nephrology clinic by Leo Dumont.  He was not feeling well over about the last week.  He did not feel that his mentation was a sharp.  He has been nauseous.  He has had vomiting at times.  He was concerned that there was a new problem with his labs.  He ultimately had new labs drawn on 12/30.  His serum creatinine was found to be 5.32 with a CO2 of 17, but normal potassium.  An attempt was made to hydrate him as an outpatient, but his renal function has been no better on repeat labs on 1/2.  He was contacted yesterday and told to come to the hospital by Leo Dumont.  However, he waited until this morning.  Serum creatinine is 5.58 at present with a bicarb of 18 and a normal potassium level.    He does not complain of a decrease in his urine output.    He tells me that he finally tapered off prednisone  about a week prior to [place].    His serum creatinine was 3.55 when we discharged him on 10/22.  He was followed subsequently in the nephrology clinic and ultimately had a serum creatinine that got down to 2.46 on 11/1.  He had labs repeated on 11/15 and had a creatinine of 3.2.  Creatinine was then 2.36 on 12/2.    He has not had any further treatment of his metastatic renal cell carcinoma since September.    Review of Systems   Otherwise complete ROS reviewed and negative except as mentioned in the HPI.    Past Medical History:   Past Medical History:   Diagnosis Date   • Dehydration 12/30/2019   • Nephritis and nephropathy 1/2/2020   • Renal cell carcinoma (CMS/HCC)    • Renal cell carcinoma of right kidney (CMS/HCC) 10/18/2019    Overview:  Diagnoses 11/11/14. Path report in C.E at [facility]. Tissue has been requested.    • Toxicity, chemicals 1/2/2020     Past Surgical History:  Past Surgical History:   Procedure Laterality Date   • HERNIA REPAIR  2008   • LUNG BIOPSY  11/2018   • NEPHRECTOMY  2014   • SINUS SURGERY  2005     Social History:  reports that he quit smoking about 36 years ago. His smoking use included cigarettes. He has a 15.00 pack-year smoking history. He has never used smokeless tobacco. He reports that he does not drink alcohol or use drugs.    Family History: family history includes Cancer in his father; Heart disease in his maternal grandfather and maternal grandmother; No Known Problems in his paternal grandfather, paternal grandmother, sister, and sister; Other in his mother.       Allergies:  Allergies   Allergen Reactions   • Amoxicillin Hives   • Penicillins Hives and Rash   • Clindamycin/Lincomycin Rash     pustules  Rash - pustules     • Doxycycline Rash   • Hepatitis B Virus Vaccine Rash     Pustules     • Latex Rash   • Levofloxacin Rash     Medications:  Prior to Admission medications    Medication Sig Start Date End Date Taking? Authorizing Provider  "  allopurinol (ZYLOPRIM) 100 MG tablet Take 100 mg by mouth 2 (Two) Times a Day.    Brian Lopes MD   benzonatate (TESSALON) 100 MG capsule Take 100 mg by mouth 4 (Four) Times a Day.    Brian Lopes MD   Calcium Carb-Cholecalciferol (CALCIUM + D3) 600-200 MG-UNIT tablet Take 1 tablet by mouth 2 (Two) Times a Day. 11/1/19   Julio Burleson MD   finasteride (PROSCAR) 5 MG tablet Take 5 mg by mouth Daily. 10/30/19   Brian Lopes MD   omeprazole (priLOSEC) 20 MG capsule Take 20 mg by mouth 2 (Two) Times a Day. 8/12/19   Brian Lopes MD   ondansetron (ZOFRAN) 8 MG tablet Every 8 (Eight) Hours.    Brian Lopes MD     Objective     Vital Signs: /70   Pulse 77   Temp 97.4 °F (36.3 °C)   Resp 15   Ht 177.8 cm (70\")   Wt 87.1 kg (192 lb)   SpO2 99%   BMI 27.55 kg/m²   Physical Exam   Constitutional: He is oriented to person, place, and time. He appears well-developed and well-nourished.   Up in bed.  No distress.  His wife is present with him.   HENT:   Head: Normocephalic and atraumatic.   Eyes: Pupils are equal, round, and reactive to light. Conjunctivae and EOM are normal.   Neck: Neck supple. No JVD present.   Cardiovascular: Normal rate, regular rhythm, normal heart sounds and intact distal pulses. Exam reveals no gallop and no friction rub.   No murmur heard.  Pulmonary/Chest: Effort normal and breath sounds normal. No respiratory distress. He has no wheezes. He has no rales. He exhibits no tenderness.   Abdominal: Soft. Bowel sounds are normal. He exhibits no distension. There is no tenderness. There is no rebound and no guarding.   Musculoskeletal: Normal range of motion. He exhibits no edema, tenderness or deformity.   Neurological: He is alert and oriented to person, place, and time. He displays normal reflexes. No cranial nerve deficit. He exhibits normal muscle tone.   Skin: Skin is warm and dry. No rash noted.   Psychiatric: He has a normal " mood and affect. His behavior is normal. Judgment and thought content normal.     Results Reviewed:  Lab Results (last 24 hours)     Procedure Component Value Units Date/Time    Urinalysis With Culture If Indicated - Urine, Clean Catch [[id number]]  (Abnormal) Collected:  01/04/20 0830    Specimen:  Urine, Clean Catch Updated:  01/04/20 0855     Color, UA Yellow     Appearance, UA Clear     pH, UA 5.5     Specific Gravity, UA 1.012     Glucose, UA Negative     Ketones, UA Negative     Bilirubin, UA Negative     Blood, UA Negative     Protein, UA 30 mg/dL (1+)     Leuk Esterase, UA Small (1+)     Nitrite, UA Negative     Urobilinogen, UA 0.2 E.U./dL    Urinalysis, Microscopic Only - Urine, Clean Catch [[id number]]  (Abnormal) Collected:  01/04/20 0830    Specimen:  Urine, Clean Catch Updated:  01/04/20 0855     RBC, UA None Seen /HPF      WBC, UA 6-12 /HPF      Bacteria, UA None Seen /HPF      Squamous Epithelial Cells, UA 0-2 /HPF      Hyaline Casts, UA 0-2 /LPF      Methodology Manual Light Microscopy    Urine Culture - Urine, Urine, Clean Catch [[id number]] Collected:  01/04/20 0830    Specimen:  Urine, Clean Catch Updated:  01/04/20 0855    Comprehensive Metabolic Panel [[id number]]  (Abnormal) Collected:  01/04/20 0825    Specimen:  Blood Updated:  01/04/20 0854     Glucose 101 mg/dL      BUN 69 mg/dL      Creatinine 5.58 mg/dL      Sodium 138 mmol/L      Potassium 5.2 mmol/L      Chloride 108 mmol/L      CO2 18.0 mmol/L      Calcium 8.2 mg/dL      Total Protein 6.6 g/dL      Albumin 4.00 g/dL      ALT (SGPT) 8 U/L      AST (SGOT) 12 U/L      Comment: Specimen hemolyzed.  Results may be affected.        Alkaline Phosphatase 64 U/L      Total Bilirubin 0.3 mg/dL      eGFR Non African Amer 10 mL/min/1.73      Comment: <15 Indicative of kidney failure.        eGFR   Amer --     Comment: <15 Indicative of kidney failure.        Globulin 2.6 gm/dL      A/G Ratio 1.5 g/dL      BUN/Creatinine Ratio 12.4     Anion  Gap 12.0 mmol/L     Narrative:       GFR Normal >60  Chronic Kidney Disease <60  Kidney Failure <15      CBC & Differential [[id number]] Collected:  01/04/20 0825    Specimen:  Blood Updated:  01/04/20 0833    Narrative:       The following orders were created for panel order CBC & Differential.  Procedure                               Abnormality         Status                     ---------                               -----------         ------                     CBC Auto Differential[[id number]]        Abnormal            Final result                 Please view results for these tests on the individual orders.    CBC Auto Differential [[id number]]  (Abnormal) Collected:  01/04/20 0825    Specimen:  Blood Updated:  01/04/20 0833     WBC 7.43 10*3/mm3      RBC 3.23 10*6/mm3      Hemoglobin 10.2 g/dL      Hematocrit 29.5 %      MCV 91.3 fL      MCH 31.6 pg      MCHC 34.6 g/dL      RDW 14.1 %      RDW-SD 47.6 fl      MPV 9.3 fL      Platelets 238 10*3/mm3      Neutrophil % 68.9 %      Lymphocyte % 16.4 %      Monocyte % 10.9 %      Eosinophil % 3.1 %      Basophil % 0.3 %      Immature Grans % 0.4 %      Neutrophils, Absolute 5.12 10*3/mm3      Lymphocytes, Absolute 1.22 10*3/mm3      Monocytes, Absolute 0.81 10*3/mm3      Eosinophils, Absolute 0.23 10*3/mm3      Basophils, Absolute 0.02 10*3/mm3      Immature Grans, Absolute 0.03 10*3/mm3      nRBC 0.0 /100 WBC         Renal ultrasound done at [facility] [place] [facility] on 1/3:  Left renal cyst. No hydronephrosis.  Moderate enlargement of the prostate.  Right nephrectomy.    I have personally reviewed and interpreted the radiology studies and ECG obtained at time of admission.     Assessment / Plan     Assessment:   Active Hospital Problems    Diagnosis   • **Acute renal failure with tubular necrosis in the setting of solitary kidney   • Stage 3 chronic kidney disease (CMS/HCC)   • Lung metastases (CMS/HCC)   • Renal cell carcinoma of right kidney (CMS/HCC)     Overview:    Diagnoses 11/11/14. Path report in C.E at [facility]. Tissue has been requested.      • Dehydration   • Metabolic acidosis   • Nephritis and nephropathy   • Benign prostatic hyperplasia     Plan:   He will be admitted to my service for [facility].  He has a history of solitary kidney after undergoing a nephrectomy of the right kidney in 2014 for renal cell carcinoma.  Unfortunately, he has had recurrence with metastatic disease to the lung.  He has started seeing Dr. Burleson after following with oncology in [place], [place].  He had an episode acute renal failure in October 2019 that was felt secondary to Sutent and Opdivo.  He presents today with worsening renal function on an outpatient basis.  Nephrology has tried to give him outpatient fluids with no improvement in his labs.  He had an outpatient renal ultrasound yesterday that showed prior right nephrectomy and a left renal cyst with no hydronephrosis.  He is admitted for nephrology consultation and management.    There was some question as to whether or not we need to proceed with a biopsy the last time that this happened.  Dr. Grant elected not to given the fact that he had solitary kidney and improving numbers on prednisone.  Nephrology may want to entertain a biopsy at this point.  I will hold on further steroids until he is evaluated by Dr. Ferrer.  In the meantime, will obtain a urinalysis and urine electrolytes.  IV fluids.    Avoid nephrotoxins.    Reconcile and resume home medications as appropriate.    SCDs for DVT prophylaxis.    Code Status: Full.      I discussed the patient's findings and my recommendations with the patient and his wife.     Estimated length of stay is indeterminate at present.     Savage Holloway DO   01/04/20   10:28 AM

## 2020-01-05 LAB
25(OH)D3 SERPL-MCNC: 30.8 NG/ML (ref 30–100)
ANION GAP SERPL CALCULATED.3IONS-SCNC: 13 MMOL/L (ref 5–15)
ANION GAP SERPL CALCULATED.3IONS-SCNC: 13 MMOL/L (ref 5–15)
BACTERIA SPEC AEROBE CULT: NO GROWTH
BUN BLD-MCNC: 64 MG/DL (ref 8–23)
BUN BLD-MCNC: 66 MG/DL (ref 8–23)
BUN/CREAT SERPL: 12.1 (ref 7–25)
BUN/CREAT SERPL: 12.1 (ref 7–25)
CALCIUM SPEC-SCNC: 7.4 MG/DL (ref 8.6–10.5)
CALCIUM SPEC-SCNC: 7.5 MG/DL (ref 8.6–10.5)
CHLORIDE SERPL-SCNC: 111 MMOL/L (ref 98–107)
CHLORIDE SERPL-SCNC: 112 MMOL/L (ref 98–107)
CO2 SERPL-SCNC: 16 MMOL/L (ref 22–29)
CO2 SERPL-SCNC: 16 MMOL/L (ref 22–29)
CREAT BLD-MCNC: 5.29 MG/DL (ref 0.76–1.27)
CREAT BLD-MCNC: 5.45 MG/DL (ref 0.76–1.27)
CREAT UR-MCNC: 38.1 MG/DL
DEPRECATED RDW RBC AUTO: 45.8 FL (ref 37–54)
ERYTHROCYTE [DISTWIDTH] IN BLOOD BY AUTOMATED COUNT: 13.8 % (ref 12.3–15.4)
GFR SERPL CREATININE-BSD FRML MDRD: 11 ML/MIN/1.73
GFR SERPL CREATININE-BSD FRML MDRD: 11 ML/MIN/1.73
GFR SERPL CREATININE-BSD FRML MDRD: ABNORMAL ML/MIN/{1.73_M2}
GFR SERPL CREATININE-BSD FRML MDRD: ABNORMAL ML/MIN/{1.73_M2}
GLUCOSE BLD-MCNC: 133 MG/DL (ref 65–99)
GLUCOSE BLD-MCNC: 167 MG/DL (ref 65–99)
HCT VFR BLD AUTO: 28.6 % (ref 37.5–51)
HGB BLD-MCNC: 10 G/DL (ref 13–17.7)
MAGNESIUM SERPL-MCNC: 1.7 MG/DL (ref 1.6–2.4)
MCH RBC QN AUTO: 31.3 PG (ref 26.6–33)
MCHC RBC AUTO-ENTMCNC: 35 G/DL (ref 31.5–35.7)
MCV RBC AUTO: 89.7 FL (ref 79–97)
PHOSPHATE SERPL-MCNC: 2.6 MG/DL (ref 2.5–4.5)
PLATELET # BLD AUTO: 214 10*3/MM3 (ref 140–450)
PMV BLD AUTO: 9.5 FL (ref 6–12)
POTASSIUM BLD-SCNC: 5.7 MMOL/L (ref 3.5–5.2)
POTASSIUM BLD-SCNC: 6.2 MMOL/L (ref 3.5–5.2)
PTH-INTACT SERPL-MCNC: 285.9 PG/ML (ref 15–65)
RBC # BLD AUTO: 3.19 10*6/MM3 (ref 4.14–5.8)
SODIUM BLD-SCNC: 140 MMOL/L (ref 136–145)
SODIUM BLD-SCNC: 141 MMOL/L (ref 136–145)
URATE SERPL-MCNC: 4.7 MG/DL (ref 3.4–7)
WBC NRBC COR # BLD: 6.65 10*3/MM3 (ref 3.4–10.8)

## 2020-01-05 PROCEDURE — 82306 VITAMIN D 25 HYDROXY: CPT | Performed by: INTERNAL MEDICINE

## 2020-01-05 PROCEDURE — 94760 N-INVAS EAR/PLS OXIMETRY 1: CPT

## 2020-01-05 PROCEDURE — 63710000001 PREDNISONE PER 1 MG: Performed by: INTERNAL MEDICINE

## 2020-01-05 PROCEDURE — 85027 COMPLETE CBC AUTOMATED: CPT | Performed by: INTERNAL MEDICINE

## 2020-01-05 PROCEDURE — 84550 ASSAY OF BLOOD/URIC ACID: CPT | Performed by: INTERNAL MEDICINE

## 2020-01-05 PROCEDURE — 94799 UNLISTED PULMONARY SVC/PX: CPT

## 2020-01-05 PROCEDURE — 83735 ASSAY OF MAGNESIUM: CPT | Performed by: INTERNAL MEDICINE

## 2020-01-05 PROCEDURE — 84100 ASSAY OF PHOSPHORUS: CPT | Performed by: INTERNAL MEDICINE

## 2020-01-05 PROCEDURE — 83970 ASSAY OF PARATHORMONE: CPT | Performed by: INTERNAL MEDICINE

## 2020-01-05 PROCEDURE — 80048 BASIC METABOLIC PNL TOTAL CA: CPT | Performed by: INTERNAL MEDICINE

## 2020-01-05 RX ORDER — SODIUM POLYSTYRENE SULFONATE 15 G/60ML
15 SUSPENSION ORAL; RECTAL ONCE
Status: COMPLETED | OUTPATIENT
Start: 2020-01-05 | End: 2020-01-05

## 2020-01-05 RX ORDER — SODIUM BICARBONATE 650 MG/1
650 TABLET ORAL 2 TIMES DAILY
Status: DISCONTINUED | OUTPATIENT
Start: 2020-01-05 | End: 2020-01-06

## 2020-01-05 RX ADMIN — SODIUM CHLORIDE 125 ML/HR: 9 INJECTION, SOLUTION INTRAVENOUS at 04:07

## 2020-01-05 RX ADMIN — SODIUM BICARBONATE 650 MG: 650 TABLET ORAL at 21:25

## 2020-01-05 RX ADMIN — SODIUM CHLORIDE 75 ML/HR: 9 INJECTION, SOLUTION INTRAVENOUS at 16:36

## 2020-01-05 RX ADMIN — PREDNISONE 40 MG: 20 TABLET ORAL at 09:46

## 2020-01-05 RX ADMIN — FINASTERIDE 5 MG: 5 TABLET, FILM COATED ORAL at 09:46

## 2020-01-05 RX ADMIN — PANTOPRAZOLE SODIUM 40 MG: 40 TABLET, DELAYED RELEASE ORAL at 05:37

## 2020-01-05 RX ADMIN — SODIUM POLYSTYRENE SULFONATE 15 G: 15 SUSPENSION ORAL; RECTAL at 16:36

## 2020-01-05 RX ADMIN — SODIUM CHLORIDE, PRESERVATIVE FREE 10 ML: 5 INJECTION INTRAVENOUS at 09:46

## 2020-01-05 NOTE — PLAN OF CARE
Problem: Patient Care Overview  Goal: Plan of Care Review  Outcome: Ongoing (interventions implemented as appropriate)  Flowsheets (Taken 1/5/2020 7422)  Progress: improving  Plan of Care Reviewed With: patient  Outcome Summary: No c/o pain.  Voiding well.  IV fluids infusing as ordered.  Prednisone given as ordered.  Pt up ad aurelio.  Gait steady.

## 2020-01-05 NOTE — PLAN OF CARE
A&Ox4. No c/o pain. C/o weakness and fatigue. Denies any n/t. Pt searches for words at times. Reports increase urinary frequency as well as hesitancy. IVF initiated. Room air. PPP. VSS. Safety maintained.

## 2020-01-05 NOTE — NURSING NOTE
Discussed with pt order for SCDs.  Pt is refusing at this time.  He states that he is up so often to void he does not want to have to be taking them off and on so frequently.  Education provided r/t increased risk of DVT.  Pt voices understanding. Larisa Workman RN

## 2020-01-05 NOTE — CONSULTS
Nephrology ([facility]) Consult Note      Patient:  Parker Arnold  YOB: 1955  Date of Service: 1/4/2020  MRN: [id number]   Acct: [id number]   Primary Care Physician: Betty Almarza APRN  Advance Directive:   Code Status and Medical Interventions:   Ordered at: 01/04/20 1107     Level Of Support Discussed With:    Patient     Code Status:    CPR     Medical Interventions (Level of Support Prior to Arrest):    Full     Admit Date: 1/4/2020       Hospital Day: 0  Referring Provider: Savage Holloway DO      Patient personally seen and examined.  Complete chart including Consults, Notes, Operative Reports, Labs, Cardiology, and Radiology studies reviewed as able.        Subjective:  Parker Arnold is a 64 y.o. male  whom we were consulted for acute renal failure with baseline chronic kidney disease.  Has a history of renal cell carcinoma status post right nephrectomy.  Was treated with chemotherapy including Sutent and Opdivo and subsequently developed acute or social nephritis (clinical diagnosis) of the left kidney.  He was treated with steroids with improvement and the risk of biopsy was felt to outweigh the benefits.  He had been feeling poorly over the last several days with nausea and decreased appetite.  Notes chronic urinary frequency.  Also had some vomiting as well.  He had seen JACLYN Matute in the office yesterday was advised to present to the emergency room and instead came the the next day.  Denies NSAIDs, hematuria, hemoptysis.  They note that he had previously been on prednisone 10 mg every other day for the past several months and stopped this approximately 10 days prior to admission    Allergies:  Amoxicillin; Penicillins; Clindamycin/lincomycin; Doxycycline; Hepatitis b virus vaccine; Latex; and Levofloxacin    Home Meds:  Medications Prior to Admission   Medication Sig Dispense Refill Last Dose   • allopurinol (ZYLOPRIM) 100 MG tablet  Take 100 mg by mouth 2 (Two) Times a Day.   Taking   • benzonatate (TESSALON) 100 MG capsule Take 100 mg by mouth 4 (Four) Times a Day.   Taking   • Calcium Carb-Cholecalciferol (CALCIUM + D3) 600-200 MG-UNIT tablet Take 1 tablet by mouth 2 (Two) Times a Day. 60 tablet 5 Taking   • finasteride (PROSCAR) 5 MG tablet Take 5 mg by mouth Daily.  3 Taking   • omeprazole (priLOSEC) 20 MG capsule Take 20 mg by mouth 2 (Two) Times a Day.  2 Taking   • ondansetron (ZOFRAN) 8 MG tablet Every 8 (Eight) Hours.   Taking       Medicines:  Current Facility-Administered Medications   Medication Dose Route Frequency Provider Last Rate Last Dose   • acetaminophen (TYLENOL) tablet 650 mg  650 mg Oral Q4H PRN Savage Holloway DO        Or   • acetaminophen (TYLENOL) 160 MG/5ML solution 650 mg  650 mg Oral Q4H PRN Savage Holloway DO        Or   • acetaminophen (TYLENOL) suppository 650 mg  650 mg Rectal Q4H PRN Savage Holloway DO       • finasteride (PROSCAR) tablet 5 mg  5 mg Oral Daily Savage Holloway DO   5 mg at 01/04/20 1209   • ondansetron (ZOFRAN) injection 4 mg  4 mg Intravenous Q6H PRN Savage Holloway DO       • pantoprazole (PROTONIX) EC tablet 40 mg  40 mg Oral Q AM Savage Holloway DO   40 mg at 01/04/20 1209   • sodium chloride 0.9 % flush 10 mL  10 mL Intravenous PRN Castleman, Danna D, PA       • sodium chloride 0.9 % flush 10 mL  10 mL Intravenous Q12H Savage Holloway DO   10 mL at 01/04/20 1216   • sodium chloride 0.9 % flush 10 mL  10 mL Intravenous PRN Savage Holloway DO       • sodium chloride 0.9 % infusion  125 mL/hr Intravenous Continuous Savage Holloway  mL/hr at 01/04/20 1209 125 mL/hr at 01/04/20 1209       Past Medical History:  Past Medical History:   Diagnosis Date   • Dehydration 12/30/2019   • Nephritis and nephropathy 1/2/2020   • Renal cell carcinoma (CMS/HCC)    • Renal cell carcinoma of right kidney (CMS/HCC) 10/18/2019    Overview:  Diagnoses 11/11/14. Path report in C.E at [facility]. Tissue has been requested.    • Toxicity, chemicals 2020       Past Surgical History:  Past Surgical History:   Procedure Laterality Date   • HERNIA REPAIR     • LUNG BIOPSY  2018   • NEPHRECTOMY     • SINUS SURGERY         Family History  Family History   Problem Relation Age of Onset   • Other Mother         blood disease unknown   • Cancer Father         unknown   • No Known Problems Sister    • Heart disease Maternal Grandmother    • Heart disease Maternal Grandfather    • No Known Problems Paternal Grandmother    • No Known Problems Paternal Grandfather    • No Known Problems Sister        Social History  Social History     Socioeconomic History   • Marital status:      Spouse name: Not on file   • Number of children: Not on file   • Years of education: Not on file   • Highest education level: Not on file   Tobacco Use   • Smoking status: Former Smoker     Packs/day: 1.00     Years: 15.00     Pack years: 15.00     Types: Cigarettes     Last attempt to quit:      Years since quittin.0   • Smokeless tobacco: Never Used   Substance and Sexual Activity   • Alcohol use: No     Frequency: Never   • Drug use: No   • Sexual activity: Defer         Review of Systems:  History obtained from chart review and the patient  General ROS: No fever or chills  Respiratory ROS: No cough, shortness of breath, wheezing  Cardiovascular ROS: No chest pain or palpitations  Gastrointestinal ROS: No abdominal pain or melena  Genito-Urinary ROS: No dysuria or hematuria  14 point ROS reviewed with the patient and negative except as noted above and in the HPI unless unable to obtain.    Objective:  Patient Vitals for the past 24 hrs:   BP Temp Temp src Pulse Resp SpO2 Height Weight   20 1841 -- -- -- 76 -- 98 % -- --   20 1556 126/69 98.3 °F (36.8 °C) Oral 77 16 98 % -- --   20 1530 -- -- -- 74 16 98 % -- --   20 1100 153/69 98.1 °F (36.7 °C) Oral 72 16 98 % -- --  "  01/04/20 0922 145/70 97.4 °F (36.3 °C) -- 77 15 99 % -- --   01/04/20 0742 130/72 97.9 °F (36.6 °C) Oral 80 16 100 % 177.8 cm (70\") 87.1 kg (192 lb)       Intake/Output Summary (Last 24 hours) at 1/4/2020 1855  Last data filed at 1/4/2020 1259  Gross per 24 hour   Intake 900 ml   Output 600 ml   Net 300 ml     General: awake/alert   Chest:  clear to auscultation bilaterally without respiratory distress  CVS: regular rate and rhythm  Abdominal: soft, nontender, positive bowel sounds  Extremities: no cyanosis or edema  Skin: warm and dry without rash      Labs:  Results from last 7 days   Lab Units 01/04/20  0825 01/02/20  0824 12/30/19  0826   WBC 10*3/mm3 7.43 6.86 9.33   HEMOGLOBIN g/dL 10.2* 10.6* 11.3*   HEMATOCRIT % 29.5* 29.2* 33.3*   PLATELETS 10*3/mm3 238 236 227         Results from last 7 days   Lab Units 01/04/20  0825 01/02/20  0824 12/30/19  0826   SODIUM mmol/L 138 139 140   POTASSIUM mmol/L 5.2 4.5 4.8   CHLORIDE mmol/L 108* 106 108*   CO2 mmol/L 18.0* 19.0* 17.0*   BUN mg/dL 69* 58* 58*   CREATININE mg/dL 5.58* 5.64* 5.32*   CALCIUM mg/dL 8.2* 8.0* 8.0*   BILIRUBIN mg/dL 0.3 0.3 0.4   ALK PHOS U/L 64 62 60   ALT (SGPT) U/L 8 9 7   AST (SGOT) U/L 12 10 8   GLUCOSE mg/dL 101* 114* 120*       Radiology:   Imaging Results (Last 72 Hours)     ** No results found for the last 72 hours. **          Culture:  No results found for: BLOODCX, URINECX, WOUNDCX, MRSACX, RESPCX, STOOLCX      Assessment   Acute renal failure  Chronic kidney disease stage III  Solitary left kidney  Renal cell carcinoma of the right kidney status post nephrectomy with lung metastases  Metabolic acidosis  Nausea vomiting  BPH with lower urinary tract symptoms    Plan:  Discussed with patient, nursing, family, Dr. Holloway  Work-up ordered and ongoing  IV fluids  Steroids  Defer any consideration of biopsy at this point given the risk for now the same as they were previously and monitor response to therapy.  We would not be able to do " a renal biopsy until Monday at this point anyway      Thank you for the consult, we appreciate the opportunity to provide care to your patients.  Feel free to contact me if I can be of any further assistance.      Won Ferrer MD  1/4/2020  6:55 PM

## 2020-01-05 NOTE — PROGRESS NOTES
"    [facility] Medicine Services  INPATIENT PROGRESS NOTE    Patient Name: Parker Arnold  Date of Admission: 1/4/2020  Today's Date: 01/05/20  Length of Stay: 1  Primary Care Physician: Betty Almaraz APRN    Subjective   Chief Complaint: AoCKD.  HPI   He does not feel as \"foggy\" today.  He thinks his appetite is better.  He continues on hydration.  Repeating labs now to reassess potassium.    He is concerned about being back on steroids.    Review of Systems   All pertinent negatives and positives are as above. All other systems have been reviewed and are negative unless otherwise stated.     Objective    Temp:  [97.4 °F (36.3 °C)-98.3 °F (36.8 °C)] 98.1 °F (36.7 °C)  Heart Rate:  [74-87] 82  Resp:  [16] 16  BP: (124-129)/(60-73) 128/73  Physical Exam  Constitutional: He is oriented to person, place, and time. He appears well-developed and well-nourished. Up in bed, watching basketball.  No distress.  His wife and daughter are present with him.  Head: Normocephalic and atraumatic.   Eyes: Pupils are equal, round, and reactive to light. Conjunctivae and EOM are normal.   Neck: Neck supple. No JVD present.   Cardiovascular: Normal rate, regular rhythm, normal heart sounds and intact distal pulses. Exam reveals no gallop and no friction rub.   No murmur heard.  Pulmonary/Chest: Effort normal and breath sounds normal. No respiratory distress. He has no wheezes. He has no rales. He exhibits no tenderness.   Abdominal: Soft. Bowel sounds are normal. He exhibits no distension. There is no tenderness. There is no rebound and no guarding.   Musculoskeletal: Normal range of motion. He exhibits no edema, tenderness or deformity.   Neurological: He is alert and oriented to person, place, and time. He displays normal reflexes. No cranial nerve deficit. He exhibits normal muscle tone.   Skin: Skin is warm and dry. No rash noted.   Psychiatric: He has a normal mood and affect. His " behavior is normal. Judgment and thought content normal.    Results Review:  I have reviewed the labs, radiology results, and diagnostic studies.    Laboratory Data:   Results from last 7 days   Lab Units 01/05/20  0520 01/04/20  0825 01/02/20  0824   WBC 10*3/mm3 6.65 7.43 6.86   HEMOGLOBIN g/dL 10.0* 10.2* 10.6*   HEMATOCRIT % 28.6* 29.5* 29.2*   PLATELETS 10*3/mm3 214 238 236     Results from last 7 days   Lab Units 01/05/20  0520 01/04/20  0825 01/02/20  0824 12/30/19  0826   SODIUM mmol/L 140 138 139 140   POTASSIUM mmol/L 6.2* 5.2 4.5 4.8   CHLORIDE mmol/L 111* 108* 106 108*   CO2 mmol/L 16.0* 18.0* 19.0* 17.0*   BUN mg/dL 64* 69* 58* 58*   CREATININE mg/dL 5.29* 5.58* 5.64* 5.32*   CALCIUM mg/dL 7.5* 8.2* 8.0* 8.0*   BILIRUBIN mg/dL  --  0.3 0.3 0.4   ALK PHOS U/L  --  64 62 60   ALT (SGPT) U/L  --  8 9 7   AST (SGOT) U/L  --  12 10 8   GLUCOSE mg/dL 167* 101* 114* 120*     Culture Data:   Urine Culture   Date Value Ref Range Status   01/04/2020 No growth  Final     I have reviewed the patient's current medications.     Assessment/Plan     Active Hospital Problems    Diagnosis   • **Acute renal failure with tubular necrosis in the setting of solitary kidney   • Stage 3 chronic kidney disease (CMS/HCC)   • Nephritis and nephropathy   • Metabolic acidosis   • Hyperkalemia   • Lung metastases (CMS/HCC)   • Renal cell carcinoma of right kidney (CMS/HCC)     Overview:   Diagnoses 11/11/14. Path report in C.E at [facility]. Tissue has been requested.      • Dehydration   • Hyperglycemia   • Benign prostatic hyperplasia     Plan:   He was admitted to my service here at [facility] on 1/4.  He has a history of solitary kidney after undergoing a nephrectomy of the right kidney in 2014 for renal cell carcinoma.  Unfortunately, he has had recurrence with metastatic disease to the lung.  He has started seeing Dr. Burleson after following with oncology in [place], [place].  He had an episode  acute renal failure in October 2019 that was felt secondary to Sutent and Opdivo.  He presented on 1/4 with recent worsening renal function on an outpatient basis.  Nephrology had tried to give him outpatient fluids with no improvement in his labs.  He had an outpatient renal ultrasound on 1/3 that showed prior right nephrectomy and a left renal cyst with no hydronephrosis.  He was admitted for nephrology consultation and management.     There was some question as to whether or not we need to proceed with a biopsy the last time that this happened.  Dr. Grant elected not to given the fact that he had solitary kidney and improving numbers on prednisone.  I discussed the case with Dr. Ferrer last night.  He wanted to go ahead and start the patient back on steroids and continue IV fluids.  Labs to be further monitored.  Dr. Grant will resume the patient's care tomorrow and can make further decisions on whether or not a biopsy is felt necessary given the recurrence of his issue.      Avoid nephrotoxins.  Start oral sodium bicarbonate.  Repeat BMP now and treat potassium if needed.     Reconcile and resume home medications as appropriate.     SCDs for DVT prophylaxis.    Discharge Planning: I expect the patient to be discharged to home when okay with nephrology.     Savage Holloway, DO   01/05/20   1:52 PM

## 2020-01-05 NOTE — PLAN OF CARE
A&Ox4. No c/o pain thus far this shift. Denies any n/t. Generalized weakness and fatigue continues. Voiding frequently. PPP.Up ad. VSS. Very pleasant. Wife is at bedside.Safety maintained. Will continue to monitor.

## 2020-01-05 NOTE — PROGRESS NOTES
Nephrology ([facility]) Progress Note      Patient:  Parker Arnold  YOB: 1955  Date of Service: 1/5/2020  MRN: [id number]   Acct: [id number]   Primary Care Physician: Betty Almaraz APRN  Advance Directive:   Code Status and Medical Interventions:   Ordered at: 01/04/20 1107     Level Of Support Discussed With:    Patient     Code Status:    CPR     Medical Interventions (Level of Support Prior to Arrest):    Full     Admit Date: 1/4/2020       Hospital Day: 1  Referring Provider: Savage Holloway DO      Patient personally seen and examined.  Complete chart including Consults, Notes, Operative Reports, Labs, Cardiology, and Radiology studies reviewed as able.        Subjective:  Parker Arnold is a 64 y.o. male whom we were consulted for acute renal failure with baseline chronic kidney disease.  Has a history of renal cell carcinoma status post right nephrectomy.  Was treated with chemotherapy including Sutent and Opdivo and subsequently developed acute or social nephritis (clinical diagnosis) of the left kidney.  He was treated with steroids with improvement and the risk of biopsy was felt to outweigh the benefits.  He had been feeling poorly over the last several days with nausea and decreased appetite.  Notes chronic urinary frequency.  Also had some vomiting as well.  He had seen JACLYN Matute in the office yesterday was advised to present to the emergency room and instead came the the next day.  Denies NSAIDs, hematuria, hemoptysis.  They note that he had previously been on prednisone 10 mg every other day for the past several months and stopped this approximately 10 days prior to admission    Today, no new events.  Patient continues to deny chest pain, shortness of air, nausea vomiting.  No issues dysuria hematuria.  Potassium was high this morning rechecked and still high so Kayexalate was ordered but not yet given at time  examination.    Allergies:  Amoxicillin; Penicillins; Clindamycin/lincomycin; Doxycycline; Hepatitis b virus vaccine; Latex; and Levofloxacin    Home Meds:  Medications Prior to Admission   Medication Sig Dispense Refill Last Dose   • allopurinol (ZYLOPRIM) 100 MG tablet Take 100 mg by mouth 2 (Two) Times a Day.   Taking   • benzonatate (TESSALON) 100 MG capsule Take 100 mg by mouth 4 (Four) Times a Day.   Taking   • Calcium Carb-Cholecalciferol (CALCIUM + D3) 600-200 MG-UNIT tablet Take 1 tablet by mouth 2 (Two) Times a Day. 60 tablet 5 Taking   • finasteride (PROSCAR) 5 MG tablet Take 5 mg by mouth Daily.  3 Taking   • omeprazole (priLOSEC) 20 MG capsule Take 20 mg by mouth 2 (Two) Times a Day.  2 Taking   • ondansetron (ZOFRAN) 8 MG tablet Every 8 (Eight) Hours.   Taking       Medicines:  Current Facility-Administered Medications   Medication Dose Route Frequency Provider Last Rate Last Dose   • acetaminophen (TYLENOL) tablet 650 mg  650 mg Oral Q4H PRN Savage Holloway DO        Or   • acetaminophen (TYLENOL) 160 MG/5ML solution 650 mg  650 mg Oral Q4H PRN Savage Holloway DO        Or   • acetaminophen (TYLENOL) suppository 650 mg  650 mg Rectal Q4H PRN Savage Holloway DO       • finasteride (PROSCAR) tablet 5 mg  5 mg Oral Daily Savage Holloway DO   5 mg at 01/05/20 0946   • ondansetron (ZOFRAN) injection 4 mg  4 mg Intravenous Q6H PRN Savage Holloway DO       • pantoprazole (PROTONIX) EC tablet 40 mg  40 mg Oral Q AM Savage Holloway DO   40 mg at 01/05/20 0537   • predniSONE (DELTASONE) tablet 40 mg  40 mg Oral Daily With Breakfast Won Ferrer MD   40 mg at 01/05/20 0946   • sodium bicarbonate tablet 650 mg  650 mg Oral BID Savage Holloway DO       • sodium chloride 0.9 % flush 10 mL  10 mL Intravenous PRN Castleman, Danna D, PA       • sodium chloride 0.9 % flush 10 mL  10 mL Intravenous Q12H Savage Holloway DO   10 mL at 01/05/20 0946   • sodium chloride 0.9 % flush 10 mL  10 mL  Intravenous PRN Savage Holloway DO       • sodium chloride 0.9 % infusion  75 mL/hr Intravenous Continuous Savage Holloway DO 75 mL/hr at 20 1636 75 mL/hr at 20 1636       Past Medical History:  Past Medical History:   Diagnosis Date   • Dehydration 2019   • Nephritis and nephropathy 2020   • Renal cell carcinoma (CMS/HCC)    • Renal cell carcinoma of right kidney (CMS/HCC) 10/18/2019    Overview:  Diagnoses 14. Path report in C.E at [facility]. Tissue has been requested.    • Toxicity, chemicals 2020       Past Surgical History:  Past Surgical History:   Procedure Laterality Date   • HERNIA REPAIR     • LUNG BIOPSY  2018   • NEPHRECTOMY     • SINUS SURGERY         Family History  Family History   Problem Relation Age of Onset   • Other Mother         blood disease unknown   • Cancer Father         unknown   • No Known Problems Sister    • Heart disease Maternal Grandmother    • Heart disease Maternal Grandfather    • No Known Problems Paternal Grandmother    • No Known Problems Paternal Grandfather    • No Known Problems Sister        Social History  Social History     Socioeconomic History   • Marital status:      Spouse name: Not on file   • Number of children: Not on file   • Years of education: Not on file   • Highest education level: Not on file   Tobacco Use   • Smoking status: Former Smoker     Packs/day: 1.00     Years: 15.00     Pack years: 15.00     Types: Cigarettes     Last attempt to quit: 1984     Years since quittin.0   • Smokeless tobacco: Never Used   Substance and Sexual Activity   • Alcohol use: No     Frequency: Never   • Drug use: No   • Sexual activity: Defer         Review of Systems:  History obtained from chart review and the patient  General ROS: No fever or chills  Respiratory ROS: No cough, shortness of breath, wheezing  Cardiovascular ROS: No chest pain or palpitations  Gastrointestinal ROS: No abdominal pain  or melena  Genito-Urinary ROS: No dysuria or hematuria    Objective:  Patient Vitals for the past 24 hrs:   BP Temp Temp src Pulse Resp SpO2   01/05/20 1541 129/71 97.8 °F (36.6 °C) Oral 84 16 97 %   01/05/20 0702 128/73 98.1 °F (36.7 °C) Oral 82 16 98 %   01/05/20 0435 126/64 97.4 °F (36.3 °C) Oral 87 16 96 %   01/05/20 0017 124/60 97.8 °F (36.6 °C) Oral 82 16 96 %   01/04/20 2039 129/67 97.9 °F (36.6 °C) Oral 75 16 97 %   01/04/20 1841 -- -- -- 76 -- 98 %       Intake/Output Summary (Last 24 hours) at 1/5/2020 1720  Last data filed at 1/5/2020 1300  Gross per 24 hour   Intake 2608 ml   Output --   Net 2608 ml     General: awake/alert   Chest:  clear to auscultation bilaterally without respiratory distress  CVS: regular rate and rhythm  Abdominal: soft, nontender, positive bowel sounds  Extremities: no cyanosis or edema  Skin: warm and dry without rash      Labs:  Results from last 7 days   Lab Units 01/05/20 0520 01/04/20  0825 01/02/20  0824   WBC 10*3/mm3 6.65 7.43 6.86   HEMOGLOBIN g/dL 10.0* 10.2* 10.6*   HEMATOCRIT % 28.6* 29.5* 29.2*   PLATELETS 10*3/mm3 214 238 236         Results from last 7 days   Lab Units 01/05/20  1414 01/05/20  0520 01/04/20  0825 01/02/20  0824 12/30/19  0826   SODIUM mmol/L 141 140 138 139 140   POTASSIUM mmol/L 5.7* 6.2* 5.2 4.5 4.8   CHLORIDE mmol/L 112* 111* 108* 106 108*   CO2 mmol/L 16.0* 16.0* 18.0* 19.0* 17.0*   BUN mg/dL 66* 64* 69* 58* 58*   CREATININE mg/dL 5.45* 5.29* 5.58* 5.64* 5.32*   CALCIUM mg/dL 7.4* 7.5* 8.2* 8.0* 8.0*   BILIRUBIN mg/dL  --   --  0.3 0.3 0.4   ALK PHOS U/L  --   --  64 62 60   ALT (SGPT) U/L  --   --  8 9 7   AST (SGOT) U/L  --   --  12 10 8   GLUCOSE mg/dL 133* 167* 101* 114* 120*       Radiology:   Imaging Results (Last 72 Hours)     ** No results found for the last 72 hours. **          Culture:  Urine Culture   Date Value Ref Range Status   01/04/2020 No growth  Final         Assessment   Acute renal failure  Chronic kidney disease stage  III  Solitary left kidney  Renal cell carcinoma of the right kidney status post nephrectomy with lung metastases  Metabolic acidosis  Nausea vomiting  BPH with lower urinary tract symptoms     Plan:  Discussed with patient, nursing, family, Dr. Holloway  Work-up ordered and ongoing  IV fluids  Steroids  Defer any consideration of biopsy at this point given the risk for now the same as they were previously and monitor response to therapy.  We would not be able to do a renal biopsy until Monday at this point anyway  We will keep n.p.o. overnight in case dialysis required tomorrow  Medically manage potassium as noted above  Titrate bicarbonate      Won Ferrer MD  1/5/2020  5:20 PM

## 2020-01-06 LAB
ANION GAP SERPL CALCULATED.3IONS-SCNC: 13 MMOL/L (ref 5–15)
BUN BLD-MCNC: 65 MG/DL (ref 8–23)
BUN/CREAT SERPL: 13.3 (ref 7–25)
CALCIUM SPEC-SCNC: 6.9 MG/DL (ref 8.6–10.5)
CHLORIDE SERPL-SCNC: 111 MMOL/L (ref 98–107)
CO2 SERPL-SCNC: 17 MMOL/L (ref 22–29)
CREAT BLD-MCNC: 4.87 MG/DL (ref 0.76–1.27)
DEPRECATED RDW RBC AUTO: 46.1 FL (ref 37–54)
ERYTHROCYTE [DISTWIDTH] IN BLOOD BY AUTOMATED COUNT: 13.8 % (ref 12.3–15.4)
GFR SERPL CREATININE-BSD FRML MDRD: 12 ML/MIN/1.73
GFR SERPL CREATININE-BSD FRML MDRD: ABNORMAL ML/MIN/{1.73_M2}
GLUCOSE BLD-MCNC: 135 MG/DL (ref 65–99)
HCT VFR BLD AUTO: 24.3 % (ref 37.5–51)
HGB BLD-MCNC: 8.4 G/DL (ref 13–17.7)
MCH RBC QN AUTO: 31.6 PG (ref 26.6–33)
MCHC RBC AUTO-ENTMCNC: 34.6 G/DL (ref 31.5–35.7)
MCV RBC AUTO: 91.4 FL (ref 79–97)
PLATELET # BLD AUTO: 197 10*3/MM3 (ref 140–450)
PMV BLD AUTO: 9.8 FL (ref 6–12)
POTASSIUM BLD-SCNC: 4.8 MMOL/L (ref 3.5–5.2)
RBC # BLD AUTO: 2.66 10*6/MM3 (ref 4.14–5.8)
SODIUM BLD-SCNC: 141 MMOL/L (ref 136–145)
WBC NRBC COR # BLD: 10.12 10*3/MM3 (ref 3.4–10.8)

## 2020-01-06 PROCEDURE — 63710000001 PREDNISONE PER 1 MG: Performed by: INTERNAL MEDICINE

## 2020-01-06 PROCEDURE — 85027 COMPLETE CBC AUTOMATED: CPT | Performed by: INTERNAL MEDICINE

## 2020-01-06 PROCEDURE — 80048 BASIC METABOLIC PNL TOTAL CA: CPT | Performed by: INTERNAL MEDICINE

## 2020-01-06 RX ORDER — SODIUM BICARBONATE 650 MG/1
650 TABLET ORAL 3 TIMES DAILY
Status: DISCONTINUED | OUTPATIENT
Start: 2020-01-06 | End: 2020-01-10 | Stop reason: HOSPADM

## 2020-01-06 RX ADMIN — FINASTERIDE 5 MG: 5 TABLET, FILM COATED ORAL at 10:42

## 2020-01-06 RX ADMIN — SODIUM CHLORIDE 75 ML/HR: 9 INJECTION, SOLUTION INTRAVENOUS at 20:12

## 2020-01-06 RX ADMIN — SODIUM BICARBONATE 650 MG: 650 TABLET ORAL at 20:12

## 2020-01-06 RX ADMIN — SODIUM CHLORIDE 75 ML/HR: 9 INJECTION, SOLUTION INTRAVENOUS at 05:37

## 2020-01-06 RX ADMIN — SODIUM BICARBONATE 650 MG: 650 TABLET ORAL at 16:56

## 2020-01-06 RX ADMIN — SODIUM CHLORIDE, PRESERVATIVE FREE 10 ML: 5 INJECTION INTRAVENOUS at 10:43

## 2020-01-06 RX ADMIN — PREDNISONE 40 MG: 20 TABLET ORAL at 10:42

## 2020-01-06 NOTE — PLAN OF CARE
Problem: Patient Care Overview  Goal: Plan of Care Review  Outcome: Ongoing (interventions implemented as appropriate)  Flowsheets (Taken 1/6/2020 0512)  Progress: improving  Plan of Care Reviewed With: patient  Note:   A & O, no c/o pain, gen weakness, up ad aurelio, voiding, IVF continue, NPO since midnight in case dialysis needed today

## 2020-01-06 NOTE — PLAN OF CARE
Problem: Patient Care Overview  Goal: Plan of Care Review  Outcome: Ongoing (interventions implemented as appropriate)  Flowsheets  Taken 1/6/2020 1426  Progress: improving  Outcome Summary: Pt alert and orientedx4. Denies pain. VSS. Family at bedside.  Taken 1/6/2020 0812  Plan of Care Reviewed With: patient

## 2020-01-06 NOTE — PROGRESS NOTES
[facility] Medicine Services  INPATIENT PROGRESS NOTE    Length of Stay: 2  Date of Admission: 1/4/2020  Primary Care Physician: Betty Almaraz APRN    Subjective     Chief Complaint:     Acute on chronic kidney failure    HPI     Patient states that he feels more alert today.  He is currently eating lunch.  He continues on IV fluids.  Creatinine has improved to 4.7 with the EFR improved to 12.  Hemoglobin 8.4.  Dialysis is being held while labs show improvement.    Review of Systems     All pertinent negatives and positives are as above. All other systems have been reviewed and are negative unless otherwise stated.     Objective    Temp:  [97.8 °F (36.6 °C)-98.5 °F (36.9 °C)] 98.2 °F (36.8 °C)  Heart Rate:  [] 86  Resp:  [16] 16  BP: (108-137)/(55-75) 110/67    Lab Results (last 24 hours)     Procedure Component Value Units Date/Time    Basic Metabolic Panel [[id number]]  (Abnormal) Collected:  01/06/20 0611    Specimen:  Blood Updated:  01/06/20 0704     Glucose 135 mg/dL      BUN 65 mg/dL      Creatinine 4.87 mg/dL      Sodium 141 mmol/L      Potassium 4.8 mmol/L      Chloride 111 mmol/L      CO2 17.0 mmol/L      Calcium 6.9 mg/dL      eGFR   Amer --     Comment: <15 Indicative of kidney failure.        eGFR Non African Amer 12 mL/min/1.73      Comment: <15 Indicative of kidney failure.        BUN/Creatinine Ratio 13.3     Anion Gap 13.0 mmol/L     Narrative:       GFR Normal >60  Chronic Kidney Disease <60  Kidney Failure <15      CBC (No Diff) [[id number]]  (Abnormal) Collected:  01/06/20 0611    Specimen:  Blood Updated:  01/06/20 0702     WBC 10.12 10*3/mm3      RBC 2.66 10*6/mm3      Hemoglobin 8.4 g/dL      Hematocrit 24.3 %      MCV 91.4 fL      MCH 31.6 pg      MCHC 34.6 g/dL      RDW 13.8 %      RDW-SD 46.1 fl      MPV 9.8 fL      Platelets 197 10*3/mm3     Basic Metabolic Panel [[id number]]  (Abnormal) Collected:  01/05/20 1414    Specimen:  Blood  Updated:  01/05/20 1515     Glucose 133 mg/dL      BUN 66 mg/dL      Creatinine 5.45 mg/dL      Sodium 141 mmol/L      Potassium 5.7 mmol/L      Chloride 112 mmol/L      CO2 16.0 mmol/L      Calcium 7.4 mg/dL      eGFR   Amer --     Comment: <15 Indicative of kidney failure.        eGFR Non African Amer 11 mL/min/1.73      Comment: <15 Indicative of kidney failure.        BUN/Creatinine Ratio 12.1     Anion Gap 13.0 mmol/L     Narrative:       GFR Normal >60  Chronic Kidney Disease <60  Kidney Failure <15      Vitamin D 25 Hydroxy [[id number]]  (Normal) Collected:  01/05/20 0520    Specimen:  Blood Updated:  01/05/20 1330     25 Hydroxy, Vitamin D 30.8 ng/ml     Narrative:       Reference Range for Total Vitamin D 25(OH)     Deficiency <20.0 ng/mL   Insufficiency 21-29 ng/mL   Sufficiency  ng/mL  Toxicity >100 ng/ml    Creatinine, Urine, Random - Urine, Clean Catch [[id number]] Collected:  01/04/20 1649    Specimen:  Urine, Clean Catch Updated:  01/05/20 1327     Creatinine, Urine 38.1 mg/dL     Narrative:       Reference intervals for random urine have not been established.  Clinical usage is dependent upon physician's interpretation in combination with other laboratory tests.             Imaging Results (Last 24 Hours)     ** No results found for the last 24 hours. **             Intake/Output Summary (Last 24 hours) at 1/6/2020 1303  Last data filed at 1/6/2020 0900  Gross per 24 hour   Intake 1480 ml   Output --   Net 1480 ml       Physical Exam  Constitutional: He is oriented to person, place, and time. He appears well-developed and well-nourished.  In the side of the bed eating lunch. No distress.  His wife is present with him.  Head: Normocephalic and atraumatic.   Eyes: Pupils are equal, round, and reactive to light. Conjunctivae and EOM are normal.   Neck: Neck supple. No JVD present.   Cardiovascular: Normal rate, regular rhythm, normal heart sounds and intact distal pulses. Exam reveals no  gallop and no friction rub.   No murmur heard.  Pulmonary/Chest: Effort normal and breath sounds normal. No respiratory distress. He has no wheezes. He has no rales. He exhibits no tenderness.   Abdominal: Soft. Bowel sounds are normal. He exhibits no distension. There is no tenderness. There is no rebound and no guarding.   Musculoskeletal: Normal range of motion. He exhibits no edema, tenderness or deformity.   Neurological: He is alert and oriented to person, place, and time. He displays normal reflexes. No cranial nerve deficit. He exhibits normal muscle tone.   Skin: Skin is warm and dry. No rash noted.   Psychiatric: He has a normal mood and affect. His behavior is normal. Judgment and thought content normal.        Results Review:  I have reviewed the labs, radiology results, and diagnostic studies since my last progress note and made treatment changes reflective of the results.   I have reviewed the current medications.    Assessment/Plan     Active Hospital Problems    Diagnosis   • **Acute renal failure with tubular necrosis in the setting of solitary kidney   • Stage 3 chronic kidney disease (CMS/HCC)   • Nephritis and nephropathy   • Dehydration   • Metabolic acidosis   • Hyperkalemia   • Hyperglycemia   • Lung metastases (CMS/HCC)   • Renal cell carcinoma of right kidney (CMS/HCC)     Overview:   Diagnoses 11/11/14. Path report in C.E at [facility]. Tissue has been requested.      • Benign prostatic hyperplasia       PLAN:  Continue serial BMPs  IV fluids per nephrology  It is doubtful that renal biopsy would be worth the risk.    Bipin Burr,    01/06/20   1:03 PM

## 2020-01-06 NOTE — PROGRESS NOTES
Nephrology ([facility]) Progress Note      Patient:  Parker Arnold  YOB: 1955  Date of Service: 1/6/2020  MRN: [id number]   Acct: [id number]   Primary Care Physician: Betty Almaraz APRN  Advance Directive:   Code Status and Medical Interventions:   Ordered at: 01/04/20 1107     Level Of Support Discussed With:    Patient     Code Status:    CPR     Medical Interventions (Level of Support Prior to Arrest):    Full     Admit Date: 1/4/2020       Hospital Day: 2  Referring Provider: Savage Holloway DO      Patient personally seen and examined.  Complete chart including Consults, Notes, Operative Reports, Labs, Cardiology, and Radiology studies reviewed as able.        Subjective:  Parker Arnold is a 64 y.o. male  whom we were consulted for acute kidney injury.  Baseline chronic kidney disease stage 3, baseline creatinine approximately 2.4.  History of renal cell carcinoma status post right nephrectomy.  Was treated with chemotherapy including Sutent and Opdivo; subsequently developed acute interstitial nephritis (clinical diagnosis) of the left kidney.  He was treated with steroids with improvement and the risk of biopsy was felt to outweigh the benefits.  He was tapered off steroids about ten days ago.  Over the last several days began feeling poorly along with nausea/vomiting and decreased appetite, also had noted diffuse skin itching.  Outpatient labs on 12/30 creatinine 5.3; given IV fluids. Repeat labs on 1/02 did now show any improvement.  He had not noted any decreased urine output but was having urinary frequency; Renal US on 1/03 was unremarkable.  Hospital course remarkable for resumption of Prednisone.    Today is feeling better. No new overnight issues.  Urine output nonoliguric    Allergies:  Amoxicillin; Penicillins; Clindamycin/lincomycin; Doxycycline; Hepatitis b virus vaccine; Latex; and Levofloxacin    Home Meds:  Medications Prior to  Admission   Medication Sig Dispense Refill Last Dose   • allopurinol (ZYLOPRIM) 100 MG tablet Take 100 mg by mouth 2 (Two) Times a Day.   Taking   • benzonatate (TESSALON) 100 MG capsule Take 100 mg by mouth 4 (Four) Times a Day.   Taking   • Calcium Carb-Cholecalciferol (CALCIUM + D3) 600-200 MG-UNIT tablet Take 1 tablet by mouth 2 (Two) Times a Day. 60 tablet 5 Taking   • finasteride (PROSCAR) 5 MG tablet Take 5 mg by mouth Daily.  3 Taking   • omeprazole (priLOSEC) 20 MG capsule Take 20 mg by mouth 2 (Two) Times a Day.  2 Taking   • ondansetron (ZOFRAN) 8 MG tablet Every 8 (Eight) Hours.   Taking       Medicines:  Current Facility-Administered Medications   Medication Dose Route Frequency Provider Last Rate Last Dose   • acetaminophen (TYLENOL) tablet 650 mg  650 mg Oral Q4H PRN Savage Holloway DO        Or   • acetaminophen (TYLENOL) 160 MG/5ML solution 650 mg  650 mg Oral Q4H PRN Savage Holloway DO        Or   • acetaminophen (TYLENOL) suppository 650 mg  650 mg Rectal Q4H PRN Savage Holloway DO       • finasteride (PROSCAR) tablet 5 mg  5 mg Oral Daily Savage Holloway DO   5 mg at 01/05/20 0946   • ondansetron (ZOFRAN) injection 4 mg  4 mg Intravenous Q6H PRN Savage Holloway DO       • pantoprazole (PROTONIX) EC tablet 40 mg  40 mg Oral Q AM Savage Holloway DO   40 mg at 01/05/20 0537   • predniSONE (DELTASONE) tablet 40 mg  40 mg Oral Daily With Breakfast Won Ferrer MD   40 mg at 01/05/20 0946   • sodium bicarbonate tablet 650 mg  650 mg Oral BID Savage Holloway DO   650 mg at 01/05/20 2125   • sodium chloride 0.9 % flush 10 mL  10 mL Intravenous PRN Castleman, Danna D, PA       • sodium chloride 0.9 % flush 10 mL  10 mL Intravenous Q12H Savage Holloway DO   10 mL at 01/05/20 0946   • sodium chloride 0.9 % flush 10 mL  10 mL Intravenous PRN Savage Holloway DO       • sodium chloride 0.9 % infusion  75 mL/hr Intravenous Continuous Savage Holloway DO 75 mL/hr at 01/06/20 0537 75 mL/hr  at 20 0537       Past Medical History:  Past Medical History:   Diagnosis Date   • Dehydration 2019   • Nephritis and nephropathy 2020   • Renal cell carcinoma (CMS/HCC)    • Renal cell carcinoma of right kidney (CMS/HCC) 10/18/2019    Overview:  Diagnoses 14. Path report in C.E at [facility]. Tissue has been requested.    • Toxicity, chemicals 2020       Past Surgical History:  Past Surgical History:   Procedure Laterality Date   • HERNIA REPAIR     • LUNG BIOPSY  2018   • NEPHRECTOMY     • SINUS SURGERY         Family History  Family History   Problem Relation Age of Onset   • Other Mother         blood disease unknown   • Cancer Father         unknown   • No Known Problems Sister    • Heart disease Maternal Grandmother    • Heart disease Maternal Grandfather    • No Known Problems Paternal Grandmother    • No Known Problems Paternal Grandfather    • No Known Problems Sister        Social History  Social History     Socioeconomic History   • Marital status:      Spouse name: Not on file   • Number of children: Not on file   • Years of education: Not on file   • Highest education level: Not on file   Tobacco Use   • Smoking status: Former Smoker     Packs/day: 1.00     Years: 15.00     Pack years: 15.00     Types: Cigarettes     Last attempt to quit:      Years since quittin.0   • Smokeless tobacco: Never Used   Substance and Sexual Activity   • Alcohol use: No     Frequency: Never   • Drug use: No   • Sexual activity: Defer         Review of Systems:  History obtained from chart review and the patient  General ROS: No fever or chills  Respiratory ROS: No cough, shortness of breath, wheezing  Cardiovascular ROS: No chest pain or palpitations  Gastrointestinal ROS: No abdominal pain or melena  Genito-Urinary ROS: No dysuria or hematuria  Neurological ROS: no headache or dizziness    Objective:  Patient Vitals for the past 24 hrs:   BP Temp Temp  src Pulse Resp SpO2   01/06/20 0700 108/61 97.9 °F (36.6 °C) Oral 84 16 96 %   01/06/20 0445 131/56 97.8 °F (36.6 °C) Oral 89 16 95 %   01/06/20 0054 117/55 98.5 °F (36.9 °C) Oral 88 16 98 %   01/05/20 2014 137/75 97.9 °F (36.6 °C) Oral 73 16 97 %   01/05/20 1902 -- -- -- 100 -- 99 %   01/05/20 1541 129/71 97.8 °F (36.6 °C) Oral 84 16 97 %       Intake/Output Summary (Last 24 hours) at 1/6/2020 1007  Last data filed at 1/6/2020 0537  Gross per 24 hour   Intake 1360 ml   Output --   Net 1360 ml     General: awake/alert    Neck: supple, no JVD  Chest:  clear to auscultation bilaterally without respiratory distress  CVS: regular rate and rhythm  Abdominal: soft, nontender, positive bowel sounds  Extremities: no cyanosis or edema  Skin: warm and dry without rash  Neuro: no focal motor deficits    Labs:  Results from last 7 days   Lab Units 01/06/20  0611 01/05/20  0520 01/04/20  0825   WBC 10*3/mm3 10.12 6.65 7.43   HEMOGLOBIN g/dL 8.4* 10.0* 10.2*   HEMATOCRIT % 24.3* 28.6* 29.5*   PLATELETS 10*3/mm3 197 214 238         Results from last 7 days   Lab Units 01/06/20  0611 01/05/20  1414 01/05/20  0520 01/04/20  0825 01/02/20  0824   SODIUM mmol/L 141 141 140 138 139   POTASSIUM mmol/L 4.8 5.7* 6.2* 5.2 4.5   CHLORIDE mmol/L 111* 112* 111* 108* 106   CO2 mmol/L 17.0* 16.0* 16.0* 18.0* 19.0*   BUN mg/dL 65* 66* 64* 69* 58*   CREATININE mg/dL 4.87* 5.45* 5.29* 5.58* 5.64*   CALCIUM mg/dL 6.9* 7.4* 7.5* 8.2* 8.0*   BILIRUBIN mg/dL  --   --   --  0.3 0.3   ALK PHOS U/L  --   --   --  64 62   ALT (SGPT) U/L  --   --   --  8 9   AST (SGOT) U/L  --   --   --  12 10   GLUCOSE mg/dL 135* 133* 167* 101* 114*       Radiology:   Imaging Results (Last 72 Hours)     ** No results found for the last 72 hours. **          Culture:  Urine Culture   Date Value Ref Range Status   01/04/2020 No growth  Final         Assessment   1.  Acute kidney injury  2.  Baseline chronic kidney disease stage 3  3.  Status post right nephrectomy  4.   Prior right renal cell carcinoma with current metastatic disease to lung  5.  Metabolic acidosis  6.  BPH with chronic urinary frequency    Plan:  1.  Labs showing some improvement today, will hold on dialysis and monitor closely  2.  Resume diet  3.  Increase bicarbonate to TID  4.  Continue gentle IV fluids      Leo Dumont, APRN  1/6/2020  10:07 AM

## 2020-01-07 ENCOUNTER — HOSPITAL ENCOUNTER (OUTPATIENT)
Dept: CT IMAGING | Facility: HOSPITAL | Age: 65
End: 2020-01-07

## 2020-01-07 ENCOUNTER — APPOINTMENT (OUTPATIENT)
Dept: CT IMAGING | Facility: HOSPITAL | Age: 65
End: 2020-01-07

## 2020-01-07 ENCOUNTER — APPOINTMENT (OUTPATIENT)
Dept: LAB | Facility: HOSPITAL | Age: 65
End: 2020-01-07

## 2020-01-07 LAB
ALBUMIN SERPL-MCNC: 3.5 G/DL (ref 3.5–5.2)
ALBUMIN/GLOB SERPL: 1.8 G/DL
ALP SERPL-CCNC: 48 U/L (ref 39–117)
ALT SERPL W P-5'-P-CCNC: 8 U/L (ref 1–41)
ANION GAP SERPL CALCULATED.3IONS-SCNC: 13 MMOL/L (ref 5–15)
AST SERPL-CCNC: 10 U/L (ref 1–40)
BILIRUB SERPL-MCNC: 0.2 MG/DL (ref 0.2–1.2)
BUN BLD-MCNC: 59 MG/DL (ref 8–23)
BUN/CREAT SERPL: 12.6 (ref 7–25)
CALCIUM SPEC-SCNC: 6.4 MG/DL (ref 8.6–10.5)
CHLORIDE SERPL-SCNC: 112 MMOL/L (ref 98–107)
CO2 SERPL-SCNC: 19 MMOL/L (ref 22–29)
CREAT BLD-MCNC: 4.68 MG/DL (ref 0.76–1.27)
GFR SERPL CREATININE-BSD FRML MDRD: 13 ML/MIN/1.73
GFR SERPL CREATININE-BSD FRML MDRD: ABNORMAL ML/MIN/{1.73_M2}
GLOBULIN UR ELPH-MCNC: 2 GM/DL
GLUCOSE BLD-MCNC: 114 MG/DL (ref 65–99)
POTASSIUM BLD-SCNC: 4.6 MMOL/L (ref 3.5–5.2)
PROT SERPL-MCNC: 5.5 G/DL (ref 6–8.5)
SODIUM BLD-SCNC: 144 MMOL/L (ref 136–145)

## 2020-01-07 PROCEDURE — 80053 COMPREHEN METABOLIC PANEL: CPT | Performed by: NURSE PRACTITIONER

## 2020-01-07 PROCEDURE — 63710000001 PREDNISONE PER 1 MG: Performed by: INTERNAL MEDICINE

## 2020-01-07 RX ADMIN — SODIUM BICARBONATE: 84 INJECTION, SOLUTION INTRAVENOUS at 18:03

## 2020-01-07 RX ADMIN — PANTOPRAZOLE SODIUM 40 MG: 40 TABLET, DELAYED RELEASE ORAL at 05:59

## 2020-01-07 RX ADMIN — SODIUM CHLORIDE 75 ML/HR: 9 INJECTION, SOLUTION INTRAVENOUS at 09:02

## 2020-01-07 RX ADMIN — SODIUM BICARBONATE 650 MG: 650 TABLET ORAL at 17:47

## 2020-01-07 RX ADMIN — PREDNISONE 40 MG: 20 TABLET ORAL at 09:08

## 2020-01-07 RX ADMIN — FINASTERIDE 5 MG: 5 TABLET, FILM COATED ORAL at 09:08

## 2020-01-07 RX ADMIN — SODIUM BICARBONATE 650 MG: 650 TABLET ORAL at 22:23

## 2020-01-07 RX ADMIN — SODIUM BICARBONATE 650 MG: 650 TABLET ORAL at 09:08

## 2020-01-07 NOTE — PLAN OF CARE
Problem: Patient Care Overview  Goal: Plan of Care Review  Outcome: Ongoing (interventions implemented as appropriate)  Flowsheets (Taken 1/7/2020 4622)  Progress: improving  Plan of Care Reviewed With: patient  Outcome Summary: pt alert and oriented x4,  up ad aurelio, safety maintained, no c/o pain, VSS, will continue to monitor.

## 2020-01-07 NOTE — PROGRESS NOTES
[facility] Medicine Services  INPATIENT PROGRESS NOTE    Length of Stay: 3  Date of Admission: 1/4/2020  Primary Care Physician: Betty Almaraz APRN    Subjective     Chief Complaint:     Acute on chronic kidney failure    HPI     Patient is awake, alert and very talkative.  His wife is at bedside.  Estimated GFR has improved to 13 and creatinine is improved to 4.68 with a BUN of 59.  His IV has been out for 3 or 4 hours now and we are waiting for the VATS team to restart it.  Dialysis continues to be held while renal function shows improvement.  The patient is nonoliguric.  Patient was encouraged to be out of bed and to ambulate in the halls.    Review of Systems     All pertinent negatives and positives are as above. All other systems have been reviewed and are negative unless otherwise stated.     Objective    Temp:  [97.5 °F (36.4 °C)-98.5 °F (36.9 °C)] 97.5 °F (36.4 °C)  Heart Rate:  [74-89] 80  Resp:  [16-18] 18  BP: ()/(49-77) 94/57    Lab Results (last 24 hours)     Procedure Component Value Units Date/Time    Comprehensive Metabolic Panel [[id number]]  (Abnormal) Collected:  01/07/20 0534    Specimen:  Blood Updated:  01/07/20 0643     Glucose 114 mg/dL      BUN 59 mg/dL      Creatinine 4.68 mg/dL      Sodium 144 mmol/L      Potassium 4.6 mmol/L      Chloride 112 mmol/L      CO2 19.0 mmol/L      Calcium 6.4 mg/dL      Total Protein 5.5 g/dL      Albumin 3.50 g/dL      ALT (SGPT) 8 U/L      AST (SGOT) 10 U/L      Alkaline Phosphatase 48 U/L      Total Bilirubin 0.2 mg/dL      eGFR Non African Amer 13 mL/min/1.73      Comment: <15 Indicative of kidney failure.        eGFR   Amer --     Comment: <15 Indicative of kidney failure.        Globulin 2.0 gm/dL      A/G Ratio 1.8 g/dL      BUN/Creatinine Ratio 12.6     Anion Gap 13.0 mmol/L     Narrative:       GFR Normal >60  Chronic Kidney Disease <60  Kidney Failure <15            Imaging Results (Last 24  Hours)     ** No results found for the last 24 hours. **             Intake/Output Summary (Last 24 hours) at 1/7/2020 1319  Last data filed at 1/7/2020 0904  Gross per 24 hour   Intake 1000 ml   Output 2875 ml   Net -1875 ml       Physical Exam    Constitutional: He is oriented to person, place, and time. He appears well-developed and well-nourished.  Sitting on the side of the bed. No distress.  His wife is present with him.  Head: Normocephalic and atraumatic.   Eyes: Pupils are equal, round, and reactive to light. Conjunctivae and EOM are normal.   Neck: Neck supple. No JVD present.   Cardiovascular: Normal rate, regular rhythm, normal heart sounds and intact distal pulses. Exam reveals no gallop and no friction rub.   No murmur heard.  Pulmonary/Chest: Effort normal and breath sounds normal. No respiratory distress. He has no wheezes. He has no rales. He exhibits no tenderness.   Abdominal: Soft. Bowel sounds are normal. He exhibits no distension. There is no tenderness. There is no rebound and no guarding.   Musculoskeletal: Normal range of motion. He exhibits no edema, tenderness or deformity.   Neurological: He is alert and oriented to person, place, and time. He displays normal reflexes. No cranial nerve deficit. He exhibits normal muscle tone.   Skin: Skin is warm and dry. No rash noted.   Psychiatric: He has a normal mood and affect. His behavior is normal. Judgment and thought content normal.    Results Review:  I have reviewed the labs, radiology results, and diagnostic studies since my last progress note and made treatment changes reflective of the results.   I have reviewed the current medications.    Assessment/Plan     Active Hospital Problems    Diagnosis   • **Acute renal failure with tubular necrosis in the setting of solitary kidney   • Stage 3 chronic kidney disease (CMS/HCC)   • Nephritis and nephropathy   • Dehydration   • Metabolic acidosis   • Hyperkalemia   • Hyperglycemia   • Lung  metastases (CMS/HCC)   • Renal cell carcinoma of right kidney (CMS/HCC)     Overview:   Diagnoses 11/11/14. Path report in C.E at [facility]. Tissue has been requested.      • Benign prostatic hyperplasia       PLAN:  Continue serial BMPs  IV fluids per nephrology    Bipin Burr DO   01/07/20   1:19 PM

## 2020-01-07 NOTE — PROGRESS NOTES
Nephrology ([facility]) Progress Note      Patient:  Parker Arnold  YOB: 1955  Date of Service: 1/7/2020  MRN: [id number]   Acct: [id number]   Primary Care Physician: Betty Almaraz APRN  Advance Directive:   Code Status and Medical Interventions:   Ordered at: 01/04/20 1107     Level Of Support Discussed With:    Patient     Code Status:    CPR     Medical Interventions (Level of Support Prior to Arrest):    Full     Admit Date: 1/4/2020       Hospital Day: 3  Referring Provider: Savage Holloway DO      Patient personally seen and examined.  Complete chart including Consults, Notes, Operative Reports, Labs, Cardiology, and Radiology studies reviewed as able.        Subjective:  Parker Arnold is a 64 y.o. male  whom we were consulted for acute kidney injury.  Baseline chronic kidney disease stage 3, baseline creatinine approximately 2.4.  History of renal cell carcinoma status post right nephrectomy.  Was treated with chemotherapy including Sutent and Opdivo; subsequently developed acute interstitial nephritis (clinical diagnosis) of the left kidney.  He was treated with steroids with improvement and the risk of biopsy was felt to outweigh the benefits.  He was tapered off steroids about ten days ago.  Over the last several days began feeling poorly along with nausea/vomiting and decreased appetite, also had noted diffuse skin itching.  Outpatient labs on 12/30 creatinine 5.3; given IV fluids. Repeat labs on 1/02 did now show any improvement.  He had not noted any decreased urine output but was having urinary frequency; Renal US on 1/03 was unremarkable.  Hospital course remarkable for resumption of Prednisone with some improvement of renal function    Today is feeling better. No new overnight issues.  Urine output nonoliguric    Allergies:  Amoxicillin; Penicillins; Clindamycin/lincomycin; Doxycycline; Hepatitis b virus vaccine; Latex; and  Levofloxacin    Home Meds:  Medications Prior to Admission   Medication Sig Dispense Refill Last Dose   • allopurinol (ZYLOPRIM) 100 MG tablet Take 100 mg by mouth 2 (Two) Times a Day.   Past Week at Unknown time   • benzonatate (TESSALON) 100 MG capsule Take 100 mg by mouth 4 (Four) Times a Day.   Past Week at Unknown time   • Calcium Carb-Cholecalciferol (CALCIUM + D3) 600-200 MG-UNIT tablet Take 1 tablet by mouth 2 (Two) Times a Day. 60 tablet 5 Past Week at Unknown time   • finasteride (PROSCAR) 5 MG tablet Take 5 mg by mouth Daily.  3 Past Week at Unknown time   • omeprazole (priLOSEC) 20 MG capsule Take 20 mg by mouth 2 (Two) Times a Day.  2 Past Week at Unknown time   • ondansetron (ZOFRAN) 8 MG tablet Take 8 mg by mouth Every 8 (Eight) Hours.   Past Week at Unknown time       Medicines:  Current Facility-Administered Medications   Medication Dose Route Frequency Provider Last Rate Last Dose   • acetaminophen (TYLENOL) tablet 650 mg  650 mg Oral Q4H PRN Savage Holloway DO        Or   • acetaminophen (TYLENOL) 160 MG/5ML solution 650 mg  650 mg Oral Q4H PRN Savage Holloway DO        Or   • acetaminophen (TYLENOL) suppository 650 mg  650 mg Rectal Q4H PRN Savage Holloway DO       • finasteride (PROSCAR) tablet 5 mg  5 mg Oral Daily Savage Holloway DO   5 mg at 01/07/20 0908   • ondansetron (ZOFRAN) injection 4 mg  4 mg Intravenous Q6H PRN Savage Holloway DO       • pantoprazole (PROTONIX) EC tablet 40 mg  40 mg Oral Q AM Savage Holloway DO   40 mg at 01/07/20 0559   • predniSONE (DELTASONE) tablet 40 mg  40 mg Oral Daily With Breakfast Won Ferrer MD   40 mg at 01/07/20 0908   • sodium bicarbonate tablet 650 mg  650 mg Oral TID Leo Dumont APRN   650 mg at 01/07/20 0908   • sodium chloride 0.9 % flush 10 mL  10 mL Intravenous PRN Castleman, Danna D, PA       • sodium chloride 0.9 % flush 10 mL  10 mL Intravenous Q12H Savage Holloway, DO   10 mL at 01/06/20 1043   • sodium chloride  0.9 % flush 10 mL  10 mL Intravenous PRN Savage Holloway DO       • sodium chloride 0.9 % infusion  75 mL/hr Intravenous Continuous Savage Holloway DO 75 mL/hr at 20 0902 75 mL/hr at 20 0902       Past Medical History:  Past Medical History:   Diagnosis Date   • Dehydration 2019   • Nephritis and nephropathy 2020   • Renal cell carcinoma (CMS/HCC)    • Renal cell carcinoma of right kidney (CMS/HCC) 10/18/2019    Overview:  Diagnoses 14. Path report in C.E at [facility]. Tissue has been requested.    • Toxicity, chemicals 2020       Past Surgical History:  Past Surgical History:   Procedure Laterality Date   • HERNIA REPAIR     • LUNG BIOPSY  2018   • NEPHRECTOMY     • SINUS SURGERY         Family History  Family History   Problem Relation Age of Onset   • Other Mother         blood disease unknown   • Cancer Father         unknown   • No Known Problems Sister    • Heart disease Maternal Grandmother    • Heart disease Maternal Grandfather    • No Known Problems Paternal Grandmother    • No Known Problems Paternal Grandfather    • No Known Problems Sister        Social History  Social History     Socioeconomic History   • Marital status:      Spouse name: Not on file   • Number of children: Not on file   • Years of education: Not on file   • Highest education level: Not on file   Tobacco Use   • Smoking status: Former Smoker     Packs/day: 1.00     Years: 15.00     Pack years: 15.00     Types: Cigarettes     Last attempt to quit:      Years since quittin.0   • Smokeless tobacco: Never Used   Substance and Sexual Activity   • Alcohol use: No     Frequency: Never   • Drug use: No   • Sexual activity: Defer         Review of Systems:  History obtained from chart review and the patient  General ROS: No fever or chills  Respiratory ROS: No cough, shortness of breath, wheezing  Cardiovascular ROS: No chest pain or palpitations  Gastrointestinal  ROS: No abdominal pain or melena  Genito-Urinary ROS: No dysuria or hematuria  Neurological ROS: no headache or dizziness    Objective:  Patient Vitals for the past 24 hrs:   BP Temp Temp src Pulse Resp SpO2 Weight   01/07/20 0739 94/57 97.5 °F (36.4 °C) Oral 80 18 98 % --   01/07/20 0537 108/53 97.8 °F (36.6 °C) Oral 74 18 97 % --   01/07/20 0141 97/49 98.2 °F (36.8 °C) Oral 79 18 98 % --   01/06/20 1900 147/77 98.5 °F (36.9 °C) Oral 79 16 97 % 87.1 kg (192 lb)   01/06/20 1500 142/73 98.3 °F (36.8 °C) Oral 89 16 96 % --   01/06/20 1100 110/67 98.2 °F (36.8 °C) Oral 86 16 97 % --       Intake/Output Summary (Last 24 hours) at 1/7/2020 0919  Last data filed at 1/7/2020 0904  Gross per 24 hour   Intake 1480 ml   Output 2875 ml   Net -1395 ml     General: awake/alert    Neck: supple, no JVD  Chest:  clear to auscultation bilaterally without respiratory distress  CVS: regular rate and rhythm  Abdominal: soft, nontender, positive bowel sounds  Extremities: no cyanosis or edema  Skin: warm and dry without rash  Neuro: no focal motor deficits    Labs:  Results from last 7 days   Lab Units 01/06/20  0611 01/05/20  0520 01/04/20  0825   WBC 10*3/mm3 10.12 6.65 7.43   HEMOGLOBIN g/dL 8.4* 10.0* 10.2*   HEMATOCRIT % 24.3* 28.6* 29.5*   PLATELETS 10*3/mm3 197 214 238         Results from last 7 days   Lab Units 01/07/20  0534 01/06/20  0611 01/05/20  1414  01/04/20  0825 01/02/20  0824   SODIUM mmol/L 144 141 141   < > 138 139   POTASSIUM mmol/L 4.6 4.8 5.7*   < > 5.2 4.5   CHLORIDE mmol/L 112* 111* 112*   < > 108* 106   CO2 mmol/L 19.0* 17.0* 16.0*   < > 18.0* 19.0*   BUN mg/dL 59* 65* 66*   < > 69* 58*   CREATININE mg/dL 4.68* 4.87* 5.45*   < > 5.58* 5.64*   CALCIUM mg/dL 6.4* 6.9* 7.4*   < > 8.2* 8.0*   BILIRUBIN mg/dL 0.2  --   --   --  0.3 0.3   ALK PHOS U/L 48  --   --   --  64 62   ALT (SGPT) U/L 8  --   --   --  8 9   AST (SGOT) U/L 10  --   --   --  12 10   GLUCOSE mg/dL 114* 135* 133*   < > 101* 114*    < > = values  in this interval not displayed.       Radiology:   Imaging Results (Last 72 Hours)     ** No results found for the last 72 hours. **          Culture:  Urine Culture   Date Value Ref Range Status   01/04/2020 No growth  Final         Assessment   1.  Acute kidney injury  2.  Baseline chronic kidney disease stage 3  3.  Status post right nephrectomy  4.  Prior right renal cell carcinoma with current metastatic disease to lung  5.  Metabolic acidosis  6.  BPH with chronic urinary frequency    Plan:  1.  Labs continue to slowly improve, will hold on dialysis and monitor closely  2.  Encourage PO fluids      Leo Dumont, APRN  1/7/2020  9:19 AM

## 2020-01-07 NOTE — PAYOR COMM NOTE
"FROM: ALVIN TAVAREZ  PHONE: [phone]  FAX: [phone]    AUTH: [id number]    Parker Arias (64 y.o. Male)     Date of Birth Social Security Number Address Home Phone MRN    1955  [address] [place] [id number] [phone] [id number]    [employer] Marital Status          Yarsani        Admission Date Admission Type Admitting Provider Attending Provider Department, Room/Bed    1/4/20 Emergency Bipin Burr DO Robinson, Maurice S, DO [facility] 3A, 338/1    Discharge Date Discharge Disposition Discharge Destination                       Attending Provider:  Bipin Burr DO    Allergies:  Amoxicillin, Penicillins, Clindamycin/lincomycin, Doxycycline, Hepatitis B Virus Vaccine, Latex, Levofloxacin    Isolation:  None   Infection:  None   Code Status:  CPR    Ht:  177.8 cm (70\")   Wt:  87.1 kg (192 lb)    Admission Cmt:  None   Principal Problem:  Acute renal failure with tubular necrosis in the setting of solitary kidney [N17.0]                 Active Insurance as of 1/4/2020     Primary Coverage     Payor Plan Insurance Group Employer/Plan Group    MISC COMMERCIAL MISC COMMERCIAL [id number]     Coverage Address Coverage Phone Number Coverage Fax Number Effective Dates    [address] [phone]  9/2/2018 - None Entered    [facility] [id number]       Subscriber Name Subscriber Birth Date Member ID       ANG ARIAS 8/13/1967 [id number]           Secondary Coverage     Payor Plan Insurance Group Employer/Plan Group    MEDICARE MEDICARE A & B      Payor Plan Address Payor Plan Phone Number Payor Plan Fax Number Effective Dates    [address] [phone]  3/1/2019 - None Entered    [facility] [id number]       Subscriber Name Subscriber Birth Date Member ID       PARKER ARIAS 1955 [id number]                 Emergency Contacts      (Rel.) Home Phone Work Phone Mobile Phone    HUGOBETHANG (Spouse) -- -- [phone]            Vital " Signs (last day)     Date/Time   Temp   Temp src   Pulse   Resp   BP   Patient Position   SpO2    01/07/20 0739   97.5 (36.4)   Oral   80   18   94/57   Lying   98    01/07/20 0537   97.8 (36.6)   Oral   74   18   108/53   Lying   97    01/07/20 0141   98.2 (36.8)   Oral   79   18   97/49   Lying   98    01/06/20 1900   98.5 (36.9)   Oral   79   16   147/77   Sitting   97    01/06/20 1500   98.3 (36.8)   Oral   89   16   142/73   Sitting   96    01/06/20 1100   98.2 (36.8)   Oral   86   16   110/67   Sitting   97    01/06/20 0700   97.9 (36.6)   Oral   84   16   108/61   Lying   96    01/06/20 0445   97.8 (36.6)   Oral   89   16   131/56   Lying   95    01/06/20 0054   98.5 (36.9)   Oral   88   16   117/55   --   98              Facility-Administered Medications as of 1/7/2020   Medication Dose Route Frequency Provider Last Rate Last Dose   • acetaminophen (TYLENOL) tablet 650 mg  650 mg Oral Q4H PRN Savage Holloway DO        Or   • acetaminophen (TYLENOL) 160 MG/5ML solution 650 mg  650 mg Oral Q4H PRN Savage Holloway DO        Or   • acetaminophen (TYLENOL) suppository 650 mg  650 mg Rectal Q4H PRN Savage Holloway DO       • finasteride (PROSCAR) tablet 5 mg  5 mg Oral Daily Savage Holloway DO   5 mg at 01/07/20 0908   • ondansetron (ZOFRAN) injection 4 mg  4 mg Intravenous Q6H PRN Savage Holloway DO       • pantoprazole (PROTONIX) EC tablet 40 mg  40 mg Oral Q AM Savage Holloway DO   40 mg at 01/07/20 0559   • predniSONE (DELTASONE) tablet 40 mg  40 mg Oral Daily With Breakfast Won Ferrer MD   40 mg at 01/07/20 0908   • sodium bicarbonate tablet 650 mg  650 mg Oral TID Leo Dumont APRN   650 mg at 01/07/20 0908   • [COMPLETED] sodium chloride 0.9 % bolus 500 mL  500 mL Intravenous Once Castleman, Fidelina D, PA   Stopped at 01/04/20 0923   • sodium chloride 0.9 % flush 10 mL  10 mL Intravenous PRN Castleman, Danna D, PA       • sodium chloride 0.9 % flush 10 mL  10 mL Intravenous Q12H  Savage Holloway DO   10 mL at 01/06/20 1043   • sodium chloride 0.9 % flush 10 mL  10 mL Intravenous PRN Savage Holloawy DO       • sodium chloride 0.9 % infusion  75 mL/hr Intravenous Continuous Savage Holloway DO 75 mL/hr at 01/07/20 0902 75 mL/hr at 01/07/20 0902   • [COMPLETED] sodium polystyrene (KAYEXALATE) 15 GM/60ML suspension 15 g  15 g Oral Once Won Ferrer MD   15 g at 01/05/20 1636     Lab Results (last 24 hours)     Procedure Component Value Units Date/Time    Comprehensive Metabolic Panel [[id number]]  (Abnormal) Collected:  01/07/20 0534    Specimen:  Blood Updated:  01/07/20 0643     Glucose 114 mg/dL      BUN 59 mg/dL      Creatinine 4.68 mg/dL      Sodium 144 mmol/L      Potassium 4.6 mmol/L      Chloride 112 mmol/L      CO2 19.0 mmol/L      Calcium 6.4 mg/dL      Total Protein 5.5 g/dL      Albumin 3.50 g/dL      ALT (SGPT) 8 U/L      AST (SGOT) 10 U/L      Alkaline Phosphatase 48 U/L      Total Bilirubin 0.2 mg/dL      eGFR Non African Amer 13 mL/min/1.73      Comment: <15 Indicative of kidney failure.        eGFR   Amer --     Comment: <15 Indicative of kidney failure.        Globulin 2.0 gm/dL      A/G Ratio 1.8 g/dL      BUN/Creatinine Ratio 12.6     Anion Gap 13.0 mmol/L     Narrative:       GFR Normal >60  Chronic Kidney Disease <60  Kidney Failure <15          Imaging Results (Last 24 Hours)     ** No results found for the last 24 hours. **        ECG/EMG Results (last 24 hours)     ** No results found for the last 24 hours. **        Orders (last 24 hrs)      Start     Ordered    01/07/20 1224  PICC Consult  Once     Provider:  (Not yet assigned)    01/07/20 1223    01/07/20 0600  Comprehensive Metabolic Panel  Daily      01/06/20 1021    01/06/20 1600  sodium bicarbonate tablet 650 mg  3 Times Daily      01/06/20 1021    01/04/20 2000  predniSONE (DELTASONE) tablet 40 mg  Daily With Breakfast      01/04/20 1907    01/04/20 1215  pantoprazole (PROTONIX) EC tablet 40  mg  Every Early Morning      01/04/20 1118    01/04/20 1200  sodium chloride 0.9 % flush 10 mL  Every 12 Hours Scheduled      01/04/20 1107    01/04/20 1200  sodium chloride 0.9 % infusion  Continuous      01/04/20 1107    01/04/20 1200  finasteride (PROSCAR) tablet 5 mg  Daily      01/04/20 1107    01/04/20 1107  ondansetron (ZOFRAN) injection 4 mg  Every 6 Hours PRN      01/04/20 1107    01/04/20 1107  acetaminophen (TYLENOL) tablet 650 mg  Every 4 Hours PRN      01/04/20 1107    01/04/20 1107  acetaminophen (TYLENOL) 160 MG/5ML solution 650 mg  Every 4 Hours PRN      01/04/20 1107    01/04/20 1107  acetaminophen (TYLENOL) suppository 650 mg  Every 4 Hours PRN      01/04/20 1107    01/04/20 1106  sodium chloride 0.9 % flush 10 mL  As Needed      01/04/20 1107    01/04/20 0804  sodium chloride 0.9 % flush 10 mL  As Needed      01/04/20 0804    Unscheduled  Oxygen Therapy- Nasal Cannula; Titrate for SPO2: 90% - 95%  Continuous PRN      01/06/20 0901                   Physician Progress Notes (last 24 hours) (Notes from 01/06/20 1320 through 01/07/20 1320)      Leo Dumont APRN at 01/07/20 0919          Nephrology ([facility]) Progress Note      Patient:  Parker Arnold  YOB: 1955  Date of Service: 1/7/2020  MRN: [id number]   Acct: [id number]   Primary Care Physician: Betty Almaraz APRN  Advance Directive:   Code Status and Medical Interventions:   Ordered at: 01/04/20 1107     Level Of Support Discussed With:    Patient     Code Status:    CPR     Medical Interventions (Level of Support Prior to Arrest):    Full     Admit Date: 1/4/2020       Hospital Day: 3  Referring Provider: Savage Holloway DO      Patient personally seen and examined.  Complete chart including Consults, Notes, Operative Reports, Labs, Cardiology, and Radiology studies reviewed as able.        Subjective:  Parker Arnold is a 64 y.o. male  whom we were consulted for acute  kidney injury.  Baseline chronic kidney disease stage 3, baseline creatinine approximately 2.4.  History of renal cell carcinoma status post right nephrectomy.  Was treated with chemotherapy including Sutent and Opdivo; subsequently developed acute interstitial nephritis (clinical diagnosis) of the left kidney.  He was treated with steroids with improvement and the risk of biopsy was felt to outweigh the benefits.  He was tapered off steroids about ten days ago.  Over the last several days began feeling poorly along with nausea/vomiting and decreased appetite, also had noted diffuse skin itching.  Outpatient labs on 12/30 creatinine 5.3; given IV fluids. Repeat labs on 1/02 did now show any improvement.  He had not noted any decreased urine output but was having urinary frequency; Renal US on 1/03 was unremarkable.  Hospital course remarkable for resumption of Prednisone with some improvement of renal function    Today is feeling better. No new overnight issues.  Urine output nonoliguric    Allergies:  Amoxicillin; Penicillins; Clindamycin/lincomycin; Doxycycline; Hepatitis b virus vaccine; Latex; and Levofloxacin    Home Meds:  Medications Prior to Admission   Medication Sig Dispense Refill Last Dose   • allopurinol (ZYLOPRIM) 100 MG tablet Take 100 mg by mouth 2 (Two) Times a Day.   Past Week at Unknown time   • benzonatate (TESSALON) 100 MG capsule Take 100 mg by mouth 4 (Four) Times a Day.   Past Week at Unknown time   • Calcium Carb-Cholecalciferol (CALCIUM + D3) 600-200 MG-UNIT tablet Take 1 tablet by mouth 2 (Two) Times a Day. 60 tablet 5 Past Week at Unknown time   • finasteride (PROSCAR) 5 MG tablet Take 5 mg by mouth Daily.  3 Past Week at Unknown time   • omeprazole (priLOSEC) 20 MG capsule Take 20 mg by mouth 2 (Two) Times a Day.  2 Past Week at Unknown time   • ondansetron (ZOFRAN) 8 MG tablet Take 8 mg by mouth Every 8 (Eight) Hours.   Past Week at Unknown time       Medicines:  Current  Facility-Administered Medications   Medication Dose Route Frequency Provider Last Rate Last Dose   • acetaminophen (TYLENOL) tablet 650 mg  650 mg Oral Q4H PRN Savage Holloway DO        Or   • acetaminophen (TYLENOL) 160 MG/5ML solution 650 mg  650 mg Oral Q4H PRN Savage Holloway DO        Or   • acetaminophen (TYLENOL) suppository 650 mg  650 mg Rectal Q4H PRN Savage Holloway DO       • finasteride (PROSCAR) tablet 5 mg  5 mg Oral Daily Savage Holloway DO   5 mg at 01/07/20 0908   • ondansetron (ZOFRAN) injection 4 mg  4 mg Intravenous Q6H PRN Savage Holloway DO       • pantoprazole (PROTONIX) EC tablet 40 mg  40 mg Oral Q AM Savage Holloway DO   40 mg at 01/07/20 0559   • predniSONE (DELTASONE) tablet 40 mg  40 mg Oral Daily With Breakfast Won Ferrer MD   40 mg at 01/07/20 0908   • sodium bicarbonate tablet 650 mg  650 mg Oral TID Leo Dumont APRN   650 mg at 01/07/20 0908   • sodium chloride 0.9 % flush 10 mL  10 mL Intravenous PRN Castleman, Danna D, PA       • sodium chloride 0.9 % flush 10 mL  10 mL Intravenous Q12H Savage Holloway DO   10 mL at 01/06/20 1043   • sodium chloride 0.9 % flush 10 mL  10 mL Intravenous PRN Savage Holloway DO       • sodium chloride 0.9 % infusion  75 mL/hr Intravenous Continuous Savage Holloway DO 75 mL/hr at 01/07/20 0902 75 mL/hr at 01/07/20 0902       Past Medical History:  Past Medical History:   Diagnosis Date   • Dehydration 12/30/2019   • Nephritis and nephropathy 1/2/2020   • Renal cell carcinoma (CMS/HCC)    • Renal cell carcinoma of right kidney (CMS/HCC) 10/18/2019    Overview:  Diagnoses 11/11/14. Path report in [facility] at [facility]. Tissue has been requested.    • Toxicity, chemicals 1/2/2020       Past Surgical History:  Past Surgical History:   Procedure Laterality Date   • HERNIA REPAIR  2008   • LUNG BIOPSY  11/2018   • NEPHRECTOMY  2014   • SINUS SURGERY  2005       Family History  Family History   Problem Relation  Age of Onset   • Other Mother         blood disease unknown   • Cancer Father         unknown   • No Known Problems Sister    • Heart disease Maternal Grandmother    • Heart disease Maternal Grandfather    • No Known Problems Paternal Grandmother    • No Known Problems Paternal Grandfather    • No Known Problems Sister        Social History  Social History     Socioeconomic History   • Marital status:      Spouse name: Not on file   • Number of children: Not on file   • Years of education: Not on file   • Highest education level: Not on file   Tobacco Use   • Smoking status: Former Smoker     Packs/day: 1.00     Years: 15.00     Pack years: 15.00     Types: Cigarettes     Last attempt to quit:      Years since quittin.0   • Smokeless tobacco: Never Used   Substance and Sexual Activity   • Alcohol use: No     Frequency: Never   • Drug use: No   • Sexual activity: Defer         Review of Systems:  History obtained from chart review and the patient  General ROS: No fever or chills  Respiratory ROS: No cough, shortness of breath, wheezing  Cardiovascular ROS: No chest pain or palpitations  Gastrointestinal ROS: No abdominal pain or melena  Genito-Urinary ROS: No dysuria or hematuria  Neurological ROS: no headache or dizziness    Objective:  Patient Vitals for the past 24 hrs:   BP Temp Temp src Pulse Resp SpO2 Weight   20 0739 94/57 97.5 °F (36.4 °C) Oral 80 18 98 % --   20 0537 108/53 97.8 °F (36.6 °C) Oral 74 18 97 % --   20 0141 97/49 98.2 °F (36.8 °C) Oral 79 18 98 % --   20 1900 147/77 98.5 °F (36.9 °C) Oral 79 16 97 % 87.1 kg (192 lb)   20 1500 142/73 98.3 °F (36.8 °C) Oral 89 16 96 % --   20 1100 110/67 98.2 °F (36.8 °C) Oral 86 16 97 % --       Intake/Output Summary (Last 24 hours) at 2020 0919  Last data filed at 2020 0904  Gross per 24 hour   Intake 1480 ml   Output 2875 ml   Net -1395 ml     General: awake/alert    Neck: supple, no JVD  Chest:  clear  to auscultation bilaterally without respiratory distress  CVS: regular rate and rhythm  Abdominal: soft, nontender, positive bowel sounds  Extremities: no cyanosis or edema  Skin: warm and dry without rash  Neuro: no focal motor deficits    Labs:  Results from last 7 days   Lab Units 01/06/20  0611 01/05/20  0520 01/04/20  0825   WBC 10*3/mm3 10.12 6.65 7.43   HEMOGLOBIN g/dL 8.4* 10.0* 10.2*   HEMATOCRIT % 24.3* 28.6* 29.5*   PLATELETS 10*3/mm3 197 214 238         Results from last 7 days   Lab Units 01/07/20  0534 01/06/20  0611 01/05/20  1414  01/04/20  0825 01/02/20  0824   SODIUM mmol/L 144 141 141   < > 138 139   POTASSIUM mmol/L 4.6 4.8 5.7*   < > 5.2 4.5   CHLORIDE mmol/L 112* 111* 112*   < > 108* 106   CO2 mmol/L 19.0* 17.0* 16.0*   < > 18.0* 19.0*   BUN mg/dL 59* 65* 66*   < > 69* 58*   CREATININE mg/dL 4.68* 4.87* 5.45*   < > 5.58* 5.64*   CALCIUM mg/dL 6.4* 6.9* 7.4*   < > 8.2* 8.0*   BILIRUBIN mg/dL 0.2  --   --   --  0.3 0.3   ALK PHOS U/L 48  --   --   --  64 62   ALT (SGPT) U/L 8  --   --   --  8 9   AST (SGOT) U/L 10  --   --   --  12 10   GLUCOSE mg/dL 114* 135* 133*   < > 101* 114*    < > = values in this interval not displayed.       Radiology:   Imaging Results (Last 72 Hours)     ** No results found for the last 72 hours. **          Culture:  Urine Culture   Date Value Ref Range Status   01/04/2020 No growth  Final         Assessment   1.  Acute kidney injury  2.  Baseline chronic kidney disease stage 3  3.  Status post right nephrectomy  4.  Prior right renal cell carcinoma with current metastatic disease to lung  5.  Metabolic acidosis  6.  BPH with chronic urinary frequency    Plan:  1.  Labs continue to slowly improve, will hold on dialysis and monitor closely  2.  Encourage PO fluids      Leo MOORE. JACLYN Dumont  1/7/2020  9:19 AM    Electronically signed by Leo Dumont, JACLYN at 01/07/20 0977       Consult Notes (last 24 hours) (Notes from 01/06/20 1320 through 01/07/20 1320)     No notes of this type exist for this encounter.

## 2020-01-07 NOTE — CONSULTS
20 g 1.75 inch cathlon placed in left forearm using palpation. 2 attempts required. Initial attempt in right hand unsuccessful.

## 2020-01-08 LAB
ALBUMIN SERPL-MCNC: 3.4 G/DL (ref 3.5–5.2)
ALBUMIN/GLOB SERPL: 1.7 G/DL
ALP SERPL-CCNC: 44 U/L (ref 39–117)
ALT SERPL W P-5'-P-CCNC: 9 U/L (ref 1–41)
ANION GAP SERPL CALCULATED.3IONS-SCNC: 11 MMOL/L (ref 5–15)
AST SERPL-CCNC: 8 U/L (ref 1–40)
BASOPHILS # BLD AUTO: 0.01 10*3/MM3 (ref 0–0.2)
BASOPHILS NFR BLD AUTO: 0.1 % (ref 0–1.5)
BILIRUB SERPL-MCNC: 0.2 MG/DL (ref 0.2–1.2)
BUN BLD-MCNC: 56 MG/DL (ref 8–23)
BUN/CREAT SERPL: 14.3 (ref 7–25)
CALCIUM SPEC-SCNC: 6.3 MG/DL (ref 8.6–10.5)
CHLORIDE SERPL-SCNC: 111 MMOL/L (ref 98–107)
CO2 SERPL-SCNC: 21 MMOL/L (ref 22–29)
CREAT BLD-MCNC: 3.91 MG/DL (ref 0.76–1.27)
DEPRECATED RDW RBC AUTO: 45.7 FL (ref 37–54)
EOSINOPHIL # BLD AUTO: 0.02 10*3/MM3 (ref 0–0.4)
EOSINOPHIL NFR BLD AUTO: 0.2 % (ref 0.3–6.2)
ERYTHROCYTE [DISTWIDTH] IN BLOOD BY AUTOMATED COUNT: 13.9 % (ref 12.3–15.4)
GFR SERPL CREATININE-BSD FRML MDRD: 16 ML/MIN/1.73
GLOBULIN UR ELPH-MCNC: 2 GM/DL
GLUCOSE BLD-MCNC: 110 MG/DL (ref 65–99)
HCT VFR BLD AUTO: 23.7 % (ref 37.5–51)
HGB BLD-MCNC: 8.3 G/DL (ref 13–17.7)
IMM GRANULOCYTES # BLD AUTO: 0.05 10*3/MM3 (ref 0–0.05)
IMM GRANULOCYTES NFR BLD AUTO: 0.6 % (ref 0–0.5)
LYMPHOCYTES # BLD AUTO: 1.17 10*3/MM3 (ref 0.7–3.1)
LYMPHOCYTES NFR BLD AUTO: 13.3 % (ref 19.6–45.3)
MCH RBC QN AUTO: 31.7 PG (ref 26.6–33)
MCHC RBC AUTO-ENTMCNC: 35 G/DL (ref 31.5–35.7)
MCV RBC AUTO: 90.5 FL (ref 79–97)
MONOCYTES # BLD AUTO: 0.67 10*3/MM3 (ref 0.1–0.9)
MONOCYTES NFR BLD AUTO: 7.6 % (ref 5–12)
NEUTROPHILS # BLD AUTO: 6.89 10*3/MM3 (ref 1.7–7)
NEUTROPHILS NFR BLD AUTO: 78.2 % (ref 42.7–76)
NRBC BLD AUTO-RTO: 0 /100 WBC (ref 0–0.2)
PLATELET # BLD AUTO: 199 10*3/MM3 (ref 140–450)
PMV BLD AUTO: 9.5 FL (ref 6–12)
POTASSIUM BLD-SCNC: 4.1 MMOL/L (ref 3.5–5.2)
PROT SERPL-MCNC: 5.4 G/DL (ref 6–8.5)
RBC # BLD AUTO: 2.62 10*6/MM3 (ref 4.14–5.8)
SODIUM BLD-SCNC: 143 MMOL/L (ref 136–145)
WBC NRBC COR # BLD: 8.81 10*3/MM3 (ref 3.4–10.8)

## 2020-01-08 PROCEDURE — 85025 COMPLETE CBC W/AUTO DIFF WBC: CPT | Performed by: INTERNAL MEDICINE

## 2020-01-08 PROCEDURE — 80053 COMPREHEN METABOLIC PANEL: CPT | Performed by: NURSE PRACTITIONER

## 2020-01-08 PROCEDURE — 63710000001 PREDNISONE PER 1 MG: Performed by: INTERNAL MEDICINE

## 2020-01-08 RX ORDER — CALCITRIOL 0.25 UG/1
0.25 CAPSULE, LIQUID FILLED ORAL DAILY
Status: DISCONTINUED | OUTPATIENT
Start: 2020-01-08 | End: 2020-01-10 | Stop reason: HOSPADM

## 2020-01-08 RX ADMIN — SODIUM BICARBONATE: 84 INJECTION, SOLUTION INTRAVENOUS at 16:22

## 2020-01-08 RX ADMIN — FINASTERIDE 5 MG: 5 TABLET, FILM COATED ORAL at 08:38

## 2020-01-08 RX ADMIN — CALCITRIOL 0.25 MCG: 0.25 CAPSULE ORAL at 18:22

## 2020-01-08 RX ADMIN — SODIUM BICARBONATE 650 MG: 650 TABLET ORAL at 08:38

## 2020-01-08 RX ADMIN — SODIUM BICARBONATE: 84 INJECTION, SOLUTION INTRAVENOUS at 05:12

## 2020-01-08 RX ADMIN — SODIUM BICARBONATE 650 MG: 650 TABLET ORAL at 22:12

## 2020-01-08 RX ADMIN — PREDNISONE 40 MG: 20 TABLET ORAL at 08:38

## 2020-01-08 RX ADMIN — PANTOPRAZOLE SODIUM 40 MG: 40 TABLET, DELAYED RELEASE ORAL at 05:12

## 2020-01-08 RX ADMIN — SODIUM BICARBONATE 650 MG: 650 TABLET ORAL at 15:34

## 2020-01-08 NOTE — PROGRESS NOTES
Discharge Planning Assessment  [facility]     Patient Name: Parker Arnold  MRN: [id number]  Today's Date: 1/8/2020    Admit Date: 1/4/2020    Discharge Needs Assessment     Row Name 01/08/20 1035       Living Environment    Lives With  spouse    Name(s) of Who Lives With Patient  WifeSammy Lutz    Current Living Arrangements  home/apartment/condo    Primary Care Provided by  self    Provides Primary Care For  no one    Family Caregiver if Needed  spouse    Quality of Family Relationships  supportive;involved;helpful    Able to Return to Prior Arrangements  yes       Resource/Environmental Concerns    Resource/Environmental Concerns  none    Transportation Concerns  car, none       Transition Planning    Patient/Family Anticipates Transition to  home with family    Patient/Family Anticipated Services at Transition  none    Transportation Anticipated  family or friend will provide       Discharge Needs Assessment    Readmission Within the Last 30 Days  no previous admission in last 30 days    Concerns to be Addressed  no discharge needs identified    Equipment Currently Used at Home  none    Anticipated Changes Related to Illness  none    Equipment Needed After Discharge  none        Discharge Plan     Row Name 01/08/20 1036       Plan    Plan  Home    Patient/Family in Agreement with Plan  yes    Plan Comments  Spoke with pt to assess for home needs. Pt lives at home with wife and plans same. Pt is independent and no home needs identified. Pt has RX coverage/PCP, will have a ride home at discharge. Will follow.         Destination      Coordination has not been started for this encounter.      Durable Medical Equipment      Coordination has not been started for this encounter.      Dialysis/Infusion      Coordination has not been started for this encounter.      Home Medical Care      Coordination has not been started for this encounter.      Therapy      Coordination has not been started for this encounter.       Community Resources      Coordination has not been started for this encounter.          Demographic Summary    No documentation.       Functional Status    No documentation.       Psychosocial    No documentation.       Abuse/Neglect    No documentation.       Legal    No documentation.       Substance Abuse    No documentation.       Patient Forms    No documentation.           DANIEL Bradley

## 2020-01-08 NOTE — PLAN OF CARE
Problem: Patient Care Overview  Goal: Plan of Care Review  1/8/2020 0312 by Connie Yao RN  Outcome: Ongoing (interventions implemented as appropriate)  Flowsheets (Taken 1/8/2020 0312)  Progress: improving  Plan of Care Reviewed With: patient  Outcome Summary: Pt has denied pain. IVF with bicarb infusing. VSS.

## 2020-01-08 NOTE — PLAN OF CARE
Patient has no c/o of pain.Wife at bedside.Cr is stabilizing.Gentle hydration continues with sodium bicarb.Safety maintained, patient ambulates in hallway.Will continue to follow.

## 2020-01-08 NOTE — PAYOR COMM NOTE
"ADMIT INPT 1-4-20  [id number]  UR  [phone]    Parker Arias (64 y.o. Male)     Date of Birth Social Security Number Address Home Phone MRN    1955  [address] [place] [id number] [phone] [id number]    Christian Marital Status          [employer]        Admission Date Admission Type Admitting Provider Attending Provider Department, Room/Bed    1/4/20 Emergency Serjio Burr MD Robinson, Maurice S, DO [address], 480/1    Discharge Date Discharge Disposition Discharge Destination                       Attending Provider:  Bipin Burr DO    Allergies:  Amoxicillin, Penicillins, Clindamycin/lincomycin, Doxycycline, Hepatitis B Virus Vaccine, Latex, Levofloxacin    Isolation:  None   Infection:  None   Code Status:  CPR    Ht:  177.8 cm (70\")   Wt:  87.4 kg (192 lb 9.6 oz)    Admission Cmt:  None   Principal Problem:  Acute renal failure with tubular necrosis in the setting of solitary kidney [N17.0]                 Active Insurance as of 1/4/2020     Primary Coverage     Payor Plan Insurance Group Employer/Plan Group    MISC COMMERCIAL MISC COMMERCIAL [id number]     Coverage Address Coverage Phone Number Coverage Fax Number Effective Dates    [address] [phone]  9/2/2018 - None Entered    [facility] [id number]       Subscriber Name Subscriber Birth Date Member ID       ANG ARIAS 8/13/1967 [id number]           Secondary Coverage     Payor Plan Insurance Group Employer/Plan Group    MEDICARE MEDICARE A & B      Payor Plan Address Payor Plan Phone Number Payor Plan Fax Number Effective Dates    [address] [phone]  3/1/2019 - None Entered    [facility] [id number]       Subscriber Name Subscriber Birth Date Member ID       PARKER ARIAS 1955 [id number]                 Emergency Contacts      (Rel.) Home Phone Work Phone Mobile Phone    ANG ARIAS (Spouse) -- -- [phone]               History & Physical      Jewel, " Savage RAND DO at 01/04/20 1028              [facility] Medicine Services  HISTORY AND PHYSICAL    Date of Admission: 1/4/2020  Primary Care Physician: Betty Almaraz APRN    Subjective     Chief Complaint: Weakness, slow mentation.    History of Present Illness  This is a 64-year-old  gentleman who resides in [place], [place].  He sees Betty Hernadez for primary care.  He follows with Dr. Burleson for history of metastatic renal cell carcinoma.  He had a right nephrectomy in 2014 and has had recurrence in his lungs.  He was treated with Sutent and Opdivo last fall and developed what was thought to be interstitial nephritis of the remaining left kidney.  He was treated with a prolonged course of oral steroids, prednisone.  His renal function had improved with this.  There was some thought given to the possibility of a biopsy, but it was felt that the risk outweighed the benefits as his renal function was improving on steroids and he only has 1 kidney.  He was seen by Dr. Grant during his stay in the hospital in October.    He has been doing well as an outpatient recently.  He has been followed in the nephrology clinic by Leo Dumont.  He was not feeling well over about the last week.  He did not feel that his mentation was a sharp.  He has been nauseous.  He has had vomiting at times.  He was concerned that there was a new problem with his labs.  He ultimately had new labs drawn on 12/30.  His serum creatinine was found to be 5.32 with a CO2 of 17, but normal potassium.  An attempt was made to hydrate him as an outpatient, but his renal function has been no better on repeat labs on 1/2.  He was contacted yesterday and told to come to the hospital by Leo Dumont.  However, he waited until this morning.  Serum creatinine is 5.58 at present with a bicarb of 18 and a normal potassium level.    He does not complain of a decrease in his urine output.    He tells me  that he finally tapered off prednisone about a week prior to [place].    His serum creatinine was 3.55 when we discharged him on 10/22.  He was followed subsequently in the nephrology clinic and ultimately had a serum creatinine that got down to 2.46 on 11/1.  He had labs repeated on 11/15 and had a creatinine of 3.2.  Creatinine was then 2.36 on 12/2.    He has not had any further treatment of his metastatic renal cell carcinoma since September.    Review of Systems   Otherwise complete ROS reviewed and negative except as mentioned in the HPI.    Past Medical History:   Past Medical History:   Diagnosis Date   • Dehydration 12/30/2019   • Nephritis and nephropathy 1/2/2020   • Renal cell carcinoma (CMS/HCC)    • Renal cell carcinoma of right kidney (CMS/HCC) 10/18/2019    Overview:  Diagnoses 11/11/14. Path report in C.E at [facility]. Tissue has been requested.    • Toxicity, chemicals 1/2/2020     Past Surgical History:  Past Surgical History:   Procedure Laterality Date   • HERNIA REPAIR  2008   • LUNG BIOPSY  11/2018   • NEPHRECTOMY  2014   • SINUS SURGERY  2005     Social History:  reports that he quit smoking about 36 years ago. His smoking use included cigarettes. He has a 15.00 pack-year smoking history. He has never used smokeless tobacco. He reports that he does not drink alcohol or use drugs.    Family History: family history includes Cancer in his father; Heart disease in his maternal grandfather and maternal grandmother; No Known Problems in his paternal grandfather, paternal grandmother, sister, and sister; Other in his mother.       Allergies:  Allergies   Allergen Reactions   • Amoxicillin Hives   • Penicillins Hives and Rash   • Clindamycin/Lincomycin Rash     pustules  Rash - pustules     • Doxycycline Rash   • Hepatitis B Virus Vaccine Rash     Pustules     • Latex Rash   • Levofloxacin Rash     Medications:  Prior to Admission medications    Medication Sig Start Date End Date  "Taking? Authorizing Provider   allopurinol (ZYLOPRIM) 100 MG tablet Take 100 mg by mouth 2 (Two) Times a Day.    Brian Lopes MD   benzonatate (TESSALON) 100 MG capsule Take 100 mg by mouth 4 (Four) Times a Day.    Brian Lopes MD   Calcium Carb-Cholecalciferol (CALCIUM + D3) 600-200 MG-UNIT tablet Take 1 tablet by mouth 2 (Two) Times a Day. 11/1/19   Julio Burleson MD   finasteride (PROSCAR) 5 MG tablet Take 5 mg by mouth Daily. 10/30/19   Brian Lopes MD   omeprazole (priLOSEC) 20 MG capsule Take 20 mg by mouth 2 (Two) Times a Day. 8/12/19   Brian Lopes MD   ondansetron (ZOFRAN) 8 MG tablet Every 8 (Eight) Hours.    Brian Lopes MD     Objective     Vital Signs: /70   Pulse 77   Temp 97.4 °F (36.3 °C)   Resp 15   Ht 177.8 cm (70\")   Wt 87.1 kg (192 lb)   SpO2 99%   BMI 27.55 kg/m²    Physical Exam   Constitutional: He is oriented to person, place, and time. He appears well-developed and well-nourished.   Up in bed.  No distress.  His wife is present with him.   HENT:   Head: Normocephalic and atraumatic.   Eyes: Pupils are equal, round, and reactive to light. Conjunctivae and EOM are normal.   Neck: Neck supple. No JVD present.   Cardiovascular: Normal rate, regular rhythm, normal heart sounds and intact distal pulses. Exam reveals no gallop and no friction rub.   No murmur heard.  Pulmonary/Chest: Effort normal and breath sounds normal. No respiratory distress. He has no wheezes. He has no rales. He exhibits no tenderness.   Abdominal: Soft. Bowel sounds are normal. He exhibits no distension. There is no tenderness. There is no rebound and no guarding.   Musculoskeletal: Normal range of motion. He exhibits no edema, tenderness or deformity.   Neurological: He is alert and oriented to person, place, and time. He displays normal reflexes. No cranial nerve deficit. He exhibits normal muscle tone.   Skin: Skin is warm and dry. No rash noted. "   Psychiatric: He has a normal mood and affect. His behavior is normal. Judgment and thought content normal.     Results Reviewed:  Lab Results (last 24 hours)     Procedure Component Value Units Date/Time    Urinalysis With Culture If Indicated - Urine, Clean Catch [[id number]]  (Abnormal) Collected:  01/04/20 0830    Specimen:  Urine, Clean Catch Updated:  01/04/20 0855     Color, UA Yellow     Appearance, UA Clear     pH, UA 5.5     Specific Gravity, UA 1.012     Glucose, UA Negative     Ketones, UA Negative     Bilirubin, UA Negative     Blood, UA Negative     Protein, UA 30 mg/dL (1+)     Leuk Esterase, UA Small (1+)     Nitrite, UA Negative     Urobilinogen, UA 0.2 E.U./dL    Urinalysis, Microscopic Only - Urine, Clean Catch [[id number]]  (Abnormal) Collected:  01/04/20 0830    Specimen:  Urine, Clean Catch Updated:  01/04/20 0855     RBC, UA None Seen /HPF      WBC, UA 6-12 /HPF      Bacteria, UA None Seen /HPF      Squamous Epithelial Cells, UA 0-2 /HPF      Hyaline Casts, UA 0-2 /LPF      Methodology Manual Light Microscopy    Urine Culture - Urine, Urine, Clean Catch [[id number]] Collected:  01/04/20 0830    Specimen:  Urine, Clean Catch Updated:  01/04/20 0855    Comprehensive Metabolic Panel [[id number]]  (Abnormal) Collected:  01/04/20 0825    Specimen:  Blood Updated:  01/04/20 0854     Glucose 101 mg/dL      BUN 69 mg/dL      Creatinine 5.58 mg/dL      Sodium 138 mmol/L      Potassium 5.2 mmol/L      Chloride 108 mmol/L      CO2 18.0 mmol/L      Calcium 8.2 mg/dL      Total Protein 6.6 g/dL      Albumin 4.00 g/dL      ALT (SGPT) 8 U/L      AST (SGOT) 12 U/L      Comment: Specimen hemolyzed.  Results may be affected.        Alkaline Phosphatase 64 U/L      Total Bilirubin 0.3 mg/dL      eGFR Non African Amer 10 mL/min/1.73      Comment: <15 Indicative of kidney failure.        eGFR   Amer --     Comment: <15 Indicative of kidney failure.        Globulin 2.6 gm/dL      A/G Ratio 1.5 g/dL       BUN/Creatinine Ratio 12.4     Anion Gap 12.0 mmol/L     Narrative:       GFR Normal >60  Chronic Kidney Disease <60  Kidney Failure <15      CBC & Differential [[id number]] Collected:  01/04/20 0825    Specimen:  Blood Updated:  01/04/20 0833    Narrative:       The following orders were created for panel order CBC & Differential.  Procedure                               Abnormality         Status                     ---------                               -----------         ------                     CBC Auto Differential[[id number]]        Abnormal            Final result                 Please view results for these tests on the individual orders.    CBC Auto Differential [[id number]]  (Abnormal) Collected:  01/04/20 0825    Specimen:  Blood Updated:  01/04/20 0833     WBC 7.43 10*3/mm3      RBC 3.23 10*6/mm3      Hemoglobin 10.2 g/dL      Hematocrit 29.5 %      MCV 91.3 fL      MCH 31.6 pg      MCHC 34.6 g/dL      RDW 14.1 %      RDW-SD 47.6 fl      MPV 9.3 fL      Platelets 238 10*3/mm3      Neutrophil % 68.9 %      Lymphocyte % 16.4 %      Monocyte % 10.9 %      Eosinophil % 3.1 %      Basophil % 0.3 %      Immature Grans % 0.4 %      Neutrophils, Absolute 5.12 10*3/mm3      Lymphocytes, Absolute 1.22 10*3/mm3      Monocytes, Absolute 0.81 10*3/mm3      Eosinophils, Absolute 0.23 10*3/mm3      Basophils, Absolute 0.02 10*3/mm3      Immature Grans, Absolute 0.03 10*3/mm3      nRBC 0.0 /100 WBC         Renal ultrasound done at [facility] on 1/3:  Left renal cyst. No hydronephrosis.  Moderate enlargement of the prostate.  Right nephrectomy.    I have personally reviewed and interpreted the radiology studies and ECG obtained at time of admission.     Assessment / Plan     Assessment:   Active Hospital Problems    Diagnosis   • **Acute renal failure with tubular necrosis in the setting of solitary kidney   • Stage 3 chronic kidney disease (CMS/HCC)   • Lung metastases (CMS/HCC)   • Renal cell carcinoma of right  kidney (CMS/[facility])     Overview:   Diagnoses 11/11/14. Path report in C.E at [facility]. Tissue has been requested.      • Dehydration   • Metabolic acidosis   • Nephritis and nephropathy   • Benign prostatic hyperplasia     Plan:   He will be admitted to my service for [facility].  He has a history of solitary kidney after undergoing a nephrectomy of the right kidney in 2014 for renal cell carcinoma.  Unfortunately, he has had recurrence with metastatic disease to the lung.  He has started seeing Dr. Burleson after following with oncology in [place], [place].  He had an episode acute renal failure in October 2019 that was felt secondary to Sutent and Opdivo.  He presents today with worsening renal function on an outpatient basis.  Nephrology has tried to give him outpatient fluids with no improvement in his labs.  He had an outpatient renal ultrasound yesterday that showed prior right nephrectomy and a left renal cyst with no hydronephrosis.  He is admitted for nephrology consultation and management.    There was some question as to whether or not we need to proceed with a biopsy the last time that this happened.  Dr. Grant elected not to given the fact that he had solitary kidney and improving numbers on prednisone.  Nephrology may want to entertain a biopsy at this point.  I will hold on further steroids until he is evaluated by Dr. Ferrer.  In the meantime, will obtain a urinalysis and urine electrolytes.  IV fluids.    Avoid nephrotoxins.    Reconcile and resume home medications as appropriate.    SCDs for DVT prophylaxis.    Code Status: Full.      I discussed the patient's findings and my recommendations with the patient and his wife.     Estimated length of stay is indeterminate at present.     Savage Holloway DO   01/04/20   10:28 AM              Electronically signed by Savage Holloway DO at 01/04/20 1111          Emergency Department Notes      Castleman, Danna D, PA at  01/04/20 0749     Attestation signed by Prem Miller MD at 01/04/20 1256          For this patient encounter, I reviewed the NP or PA documentation, treatment plan, and medical decision making. Prem Miller MD 1/4/2020 12:51 PM                  Subjective   Pt is a pleasant 64 y.o. Male in [facility]. He presents as referral from for acute renal failure.   Hx of RCC 5 years ago with R nephrectomy.  Did very well until recurrent disease with mets diagnosed 1/2019.  Tried various chemo but each time had complication including kidney injury.  Last chemo tx in September.  Started on prednisone for kidney injury in October with taper.  Just completed ~2-3 weeks ago.  His basline Cr has been ~2.   Was feeling more fatigued and sluggish.  Also c/o muscle tightness.  Presented to outside office ~5 days ago and noted to have AMAURI.  Sent for IV fluids on 12/30 but recheck on 1/2 showed persistently elevated Cr 5.64 and GFR of 10. K 4.5, Ca 8.0. LFTs wnl.  Also anemic with H/H 10.6/29.2  Per pt, renal/bladder US also completed wo evidence of obstruction or abnormality but I do not have those records as of this time to confirm findings.       History provided by:  Patient and spouse      Review of Systems   Constitutional: Positive for activity change and fatigue. Negative for chills, diaphoresis and fever.   HENT: Negative for trouble swallowing and voice change.    Eyes: Negative for visual disturbance.   Respiratory: Positive for cough. Negative for apnea, chest tightness, shortness of breath, wheezing and stridor.         He did have coughing spell x 1 last night with post-tussive vomiting but otherwise denies respiratory s/s   Cardiovascular: Negative for chest pain, palpitations and leg swelling.   Gastrointestinal: Positive for vomiting (x 1 episode last night- post tussive). Negative for abdominal distention, abdominal pain, constipation, diarrhea and nausea.   Genitourinary: Positive for flank pain (left). Negative for  decreased urine volume, difficulty urinating, dysuria, hematuria, penile swelling, scrotal swelling and testicular pain.        Has chronic urgency, frequency and hesitancy but denies worsening with current c/o   Musculoskeletal: Positive for arthralgias and myalgias. Negative for back pain, gait problem, joint swelling, neck pain and neck stiffness.   Skin: Negative for color change, pallor, rash and wound.        Pruritis    Neurological: Positive for weakness. Negative for dizziness, tremors, syncope, facial asymmetry, speech difficulty, light-headedness and headaches.   Hematological: Negative for adenopathy. Does not bruise/bleed easily.   Psychiatric/Behavioral: Negative for agitation, behavioral problems, confusion, decreased concentration and hallucinations.       Past Medical History:   Diagnosis Date   • Dehydration 12/30/2019   • Nephritis and nephropathy 1/2/2020   • Renal cell carcinoma (CMS/HCC)    • Renal cell carcinoma of right kidney (CMS/HCC) 10/18/2019    Overview:  Diagnoses 11/11/14. Path report in C.E at [facility]. Tissue has been requested.    • Toxicity, chemicals 1/2/2020       Allergies   Allergen Reactions   • Amoxicillin Hives   • Penicillins Hives and Rash   • Clindamycin/Lincomycin Rash     pustules  Rash - pustules     • Doxycycline Rash   • Hepatitis B Virus Vaccine Rash     Pustules     • Latex Rash   • Levofloxacin Rash       Past Surgical History:   Procedure Laterality Date   • HERNIA REPAIR  2008   • LUNG BIOPSY  11/2018   • NEPHRECTOMY  2014   • SINUS SURGERY  2005       Family History   Problem Relation Age of Onset   • Other Mother         blood disease unknown   • Cancer Father         unknown   • No Known Problems Sister    • Heart disease Maternal Grandmother    • Heart disease Maternal Grandfather    • No Known Problems Paternal Grandmother    • No Known Problems Paternal Grandfather    • No Known Problems Sister        Social History     Socioeconomic  History   • Marital status:      Spouse name: Not on file   • Number of children: Not on file   • Years of education: Not on file   • Highest education level: Not on file   Tobacco Use   • Smoking status: Former Smoker     Packs/day: 1.00     Years: 15.00     Pack years: 15.00     Types: Cigarettes     Last attempt to quit:      Years since quittin.0   • Smokeless tobacco: Never Used   Substance and Sexual Activity   • Alcohol use: No     Frequency: Never   • Drug use: No   • Sexual activity: Defer       Objective   Physical Exam   Constitutional: He is oriented to person, place, and time. He appears well-developed and well-nourished. No distress.   HENT:   Head: Normocephalic.   Mouth/Throat: Oropharynx is clear and moist.   Eyes: Pupils are equal, round, and reactive to light. Conjunctivae and EOM are normal. No scleral icterus.   Neck: Normal range of motion. Neck supple. No neck rigidity.   Cardiovascular: Normal rate, regular rhythm, normal heart sounds and intact distal pulses.   Pulmonary/Chest: Effort normal and breath sounds normal. He has no wheezes. He has no rales. He exhibits no tenderness.   Abdominal: Soft. Bowel sounds are normal. He exhibits no distension. There is CVA tenderness (left). There is no rebound and no guarding.   Musculoskeletal: Normal range of motion. He exhibits no tenderness or deformity.   Lymphadenopathy:     He has no cervical adenopathy.   Neurological: He is alert and oriented to person, place, and time. He displays normal reflexes. No sensory deficit. He exhibits normal muscle tone.   CN normal as tested   Skin: Skin is warm and dry. Capillary refill takes less than 2 seconds. No rash noted. He is not diaphoretic. No erythema. No pallor.   Psychiatric: He has a normal mood and affect. His behavior is normal. Judgment and thought content normal.   Nursing note and vitals reviewed.      Procedures          ED Course  ED Course as of 910   Sat 2020  "  0859 Renal US reviewed- \"The ultrasound examination of the kidneys bilaterally is performed.  There is no previous study for comparison.  The right kidney is surgically absent.  Left kidney measures 13.2 x 6.4 x 5.8 cm. There is an echolucent  nodule in the upper pole measuring 1.4 x 2.6 x 2.6 cm. There is good  through transmission. There is normal corticomedullary differentiation  of the remaining kidney. The renal cortex measures 1.5-1.8 cm. No  evidence of hydronephrosis.  The urinary bladder is incompletely distended. The prostate is  enlarged and pushing into the floor of the urinary bladder. Prostate  measures 4.8 x 3.9 x 4 cm. There is intrinsic calcification.  IMPRESSION:  Left renal cyst. No hydronephrosis.  Moderate enlargement of the prostate.  Right nephrectomy.  Signed by Dr Kathe Ramírez on 1/3/2020 4:21 PM\"    [DC]   0902 Labs consistent with hx and AMAURI. Hypocalcemia, K wnl. Records reviewed- call placed to medicine.     [DC]   0905 Spoke with Dr. Holloway, pt to be admitted for further eval/care.  Discussed tx plan with patient.  He verbalized understanding.  Resting comfortably as this time.    [DC]      ED Course User Index  [DC] Castleman, Danna D, PA      /70   Pulse 77   Temp 97.4 °F (36.3 °C)   Resp 15   Ht 177.8 cm (70\")   Wt 87.1 kg (192 lb)   SpO2 99%   BMI 27.55 kg/m²                                             MDM  Number of Diagnoses or Management Options  AMAURI (acute kidney injury) (CMS/HCC): new and requires workup  Anemia, unspecified type:   Hypocalcemia:   Malignant neoplasm metastatic to lung, unspecified laterality (CMS/HCC):   Renal cell carcinoma of right kidney (CMS/HCC):      Amount and/or Complexity of Data Reviewed  Clinical lab tests: reviewed  Review and summarize past medical records: yes  Discuss the patient with other providers: yes    Risk of Complications, Morbidity, and/or Mortality  Presenting problems: moderate    Patient Progress  Patient progress: " stable      Final diagnoses:   AMAURI (acute kidney injury) (CMS/HCC)   Hypocalcemia   Anemia, unspecified type   Renal cell carcinoma of right kidney (CMS/HCC)   Malignant neoplasm metastatic to lung, unspecified laterality (CMS/HCC)            Castleman, Danna D, PA  01/04/20 0937      Electronically signed by Prem Miller MD at 01/04/20 1251     Lori Spangler RN at 01/04/20 0909        Patient is a 64 year old male that presents to ER with complaints of abnormal labs after a follow up visit with his PCP. Patient reports that for the past 10 days he has felt fatigued and followed up with his PCP. Patient reports a history of Kidney cancer and recently found that it had mets to his lungs. Patient reports that he felt as if something were off due to his fatigue. Labs at PCP showed decreased kidney function studies.      Lori Spangler RN  01/04/20 0911      Electronically signed by Lori Spangler RN at 01/04/20 0911          Physician Progress Notes (last 72 hours) (Notes from 01/05/20 1214 through 01/08/20 1214)      Leo Dumont APRN at 01/08/20 1029          Nephrology ([facility]) Progress Note      Patient:  Parker Arnold  YOB: 1955  Date of Service: 1/8/2020  MRN: [id number]   Acct: [id number]   Primary Care Physician: Betty Almaraz APRN  Advance Directive:   Code Status and Medical Interventions:   Ordered at: 01/04/20 1107     Level Of Support Discussed With:    Patient     Code Status:    CPR     Medical Interventions (Level of Support Prior to Arrest):    Full     Admit Date: 1/4/2020       Hospital Day: 4  Referring Provider: Savage Holloway DO      Patient personally seen and examined.  Complete chart including Consults, Notes, Operative Reports, Labs, Cardiology, and Radiology studies reviewed as able.        Subjective:  Parker Arnold is a 64 y.o. male  whom we were consulted for acute kidney injury.  Baseline chronic  kidney disease stage 3, baseline creatinine approximately 2.4.  History of renal cell carcinoma status post right nephrectomy.  Was treated with chemotherapy including Sutent and Opdivo; subsequently developed acute interstitial nephritis (clinical diagnosis) of the left kidney.  He was treated with steroids with improvement and the risk of biopsy was felt to outweigh the benefits.  He was tapered off steroids about ten days ago.  Over the last several days began feeling poorly along with nausea/vomiting and decreased appetite, also had noted diffuse skin itching.  Outpatient labs on 12/30 creatinine 5.3; given IV fluids. Repeat labs on 1/02 did now show any improvement.  He had not noted any decreased urine output but was having urinary frequency; Renal US on 1/03 was unremarkable.  Hospital course remarkable for resumption of Prednisone with some improvement of renal function    Today is feeling better. No new overnight issues.  Urine output nonoliguric    Allergies:  Amoxicillin; Penicillins; Clindamycin/lincomycin; Doxycycline; Hepatitis b virus vaccine; Latex; and Levofloxacin    Home Meds:  Medications Prior to Admission   Medication Sig Dispense Refill Last Dose   • allopurinol (ZYLOPRIM) 100 MG tablet Take 100 mg by mouth 2 (Two) Times a Day.   Past Week at Unknown time   • benzonatate (TESSALON) 100 MG capsule Take 100 mg by mouth 4 (Four) Times a Day.   Past Week at Unknown time   • Calcium Carb-Cholecalciferol (CALCIUM + D3) 600-200 MG-UNIT tablet Take 1 tablet by mouth 2 (Two) Times a Day. 60 tablet 5 Past Week at Unknown time   • finasteride (PROSCAR) 5 MG tablet Take 5 mg by mouth Daily.  3 Past Week at Unknown time   • omeprazole (priLOSEC) 20 MG capsule Take 20 mg by mouth 2 (Two) Times a Day.  2 Past Week at Unknown time   • ondansetron (ZOFRAN) 8 MG tablet Take 8 mg by mouth Every 8 (Eight) Hours.   Past Week at Unknown time       Medicines:  Current Facility-Administered Medications   Medication  Dose Route Frequency Provider Last Rate Last Dose   • acetaminophen (TYLENOL) tablet 650 mg  650 mg Oral Q4H PRN Savage Holloway DO        Or   • acetaminophen (TYLENOL) 160 MG/5ML solution 650 mg  650 mg Oral Q4H PRN Savage Holloway DO        Or   • acetaminophen (TYLENOL) suppository 650 mg  650 mg Rectal Q4H PRN Savage Holloway DO       • finasteride (PROSCAR) tablet 5 mg  5 mg Oral Daily Savage Holloway DO   5 mg at 01/08/20 0838   • ondansetron (ZOFRAN) injection 4 mg  4 mg Intravenous Q6H PRN Savage Holloway DO       • pantoprazole (PROTONIX) EC tablet 40 mg  40 mg Oral Q AM Savage Holloway DO   40 mg at 01/08/20 0512   • predniSONE (DELTASONE) tablet 40 mg  40 mg Oral Daily With Breakfast Won Ferrer MD   40 mg at 01/08/20 0838   • sodium bicarbonate tablet 650 mg  650 mg Oral TID Loe Dumont APRN   650 mg at 01/08/20 0838   • sodium chloride 0.45 % 925 mL with sodium bicarbonate 8.4 % 75 mEq infusion   Intravenous Continuous Satinder Grant  mL/hr at 01/08/20 0512     • sodium chloride 0.9 % flush 10 mL  10 mL Intravenous PRN Castleman, Danna D, PA       • sodium chloride 0.9 % flush 10 mL  10 mL Intravenous Q12H Savage Holloway DO   10 mL at 01/06/20 1043   • sodium chloride 0.9 % flush 10 mL  10 mL Intravenous PRN Savage Holloway DO           Past Medical History:  Past Medical History:   Diagnosis Date   • Dehydration 12/30/2019   • Nephritis and nephropathy 1/2/2020   • Renal cell carcinoma (CMS/HCC)    • Renal cell carcinoma of right kidney (CMS/HCC) 10/18/2019    Overview:  Diagnoses 11/11/14. Path report in C.E at [facility]. Tissue has been requested.    • Toxicity, chemicals 1/2/2020       Past Surgical History:  Past Surgical History:   Procedure Laterality Date   • HERNIA REPAIR  2008   • LUNG BIOPSY  11/2018   • NEPHRECTOMY  2014   • SINUS SURGERY  2005       Family History  Family History   Problem Relation Age of Onset   • Other Mother          "blood disease unknown   • Cancer Father         unknown   • No Known Problems Sister    • Heart disease Maternal Grandmother    • Heart disease Maternal Grandfather    • No Known Problems Paternal Grandmother    • No Known Problems Paternal Grandfather    • No Known Problems Sister        Social History  Social History     Socioeconomic History   • Marital status:      Spouse name: Not on file   • Number of children: Not on file   • Years of education: Not on file   • Highest education level: Not on file   Tobacco Use   • Smoking status: Former Smoker     Packs/day: 1.00     Years: 15.00     Pack years: 15.00     Types: Cigarettes     Last attempt to quit:      Years since quittin.0   • Smokeless tobacco: Never Used   Substance and Sexual Activity   • Alcohol use: No     Frequency: Never   • Drug use: No   • Sexual activity: Defer         Review of Systems:  History obtained from chart review and the patient  General ROS: No fever or chills  Respiratory ROS: No cough, shortness of breath, wheezing  Cardiovascular ROS: No chest pain or palpitations  Gastrointestinal ROS: No abdominal pain or melena  Genito-Urinary ROS: No dysuria or hematuria  Neurological ROS: no headache or dizziness    Objective:  Patient Vitals for the past 24 hrs:   BP Temp Temp src Pulse Resp SpO2 Height Weight   20 0722 117/65 98 °F (36.7 °C) Oral 77 18 98 % -- --   20 0413 115/67 98 °F (36.7 °C) Oral 78 16 97 % -- --   20 2223 147/83 97.9 °F (36.6 °C) Axillary 92 18 98 % -- --   20 149/85 98 °F (36.7 °C) Oral 78 18 97 % -- 87.4 kg (192 lb 9.6 oz)   20 1823 154/90 98 °F (36.7 °C) Oral 109 18 96 % 177.8 cm (70\") --   20 1758 147/77 -- -- -- -- -- -- --       Intake/Output Summary (Last 24 hours) at 2020 1029  Last data filed at 2020 0421  Gross per 24 hour   Intake --   Output 1550 ml   Net -1550 ml     General: awake/alert    Neck: supple, no JVD  Chest:  clear to auscultation " bilaterally without respiratory distress  CVS: regular rate and rhythm  Abdominal: soft, nontender, positive bowel sounds  Extremities: no cyanosis or edema  Skin: warm and dry without rash  Neuro: no focal motor deficits    Labs:  Results from last 7 days   Lab Units 01/08/20  0501 01/06/20  0611 01/05/20  0520   WBC 10*3/mm3 8.81 10.12 6.65   HEMOGLOBIN g/dL 8.3* 8.4* 10.0*   HEMATOCRIT % 23.7* 24.3* 28.6*   PLATELETS 10*3/mm3 199 197 214         Results from last 7 days   Lab Units 01/08/20  0501 01/07/20  0534 01/06/20  0611  01/04/20  0825   SODIUM mmol/L 143 144 141   < > 138   POTASSIUM mmol/L 4.1 4.6 4.8   < > 5.2   CHLORIDE mmol/L 111* 112* 111*   < > 108*   CO2 mmol/L 21.0* 19.0* 17.0*   < > 18.0*   BUN mg/dL 56* 59* 65*   < > 69*   CREATININE mg/dL 3.91* 4.68* 4.87*   < > 5.58*   CALCIUM mg/dL 6.3* 6.4* 6.9*   < > 8.2*   BILIRUBIN mg/dL 0.2 0.2  --   --  0.3   ALK PHOS U/L 44 48  --   --  64   ALT (SGPT) U/L 9 8  --   --  8   AST (SGOT) U/L 8 10  --   --  12   GLUCOSE mg/dL 110* 114* 135*   < > 101*    < > = values in this interval not displayed.       Radiology:   Imaging Results (Last 72 Hours)     ** No results found for the last 72 hours. **          Culture:  Urine Culture   Date Value Ref Range Status   01/04/2020 No growth  Final         Assessment   1.  Acute kidney injury; allergic interstitial nephritis--improving  2.  Baseline chronic kidney disease stage 3  3.  Status post right nephrectomy  4.  Prior right renal cell carcinoma with current metastatic disease to lung  5.  Metabolic acidosis  6.  BPH with chronic urinary frequency  7.  Hypocalcemia     Plan:  1.  Renal function steady improvement; monitor labs  2.  Continue bicarbonate IV fluids  3.  Check ionized calcium level      Leo Dumont, JACLYN  1/8/2020  10:29 AM    Electronically signed by Leo Dumont APRN at 01/08/20 1032     Bipin Burr DO at 01/07/20 1319              [facility]  Hospital Medicine Services  INPATIENT PROGRESS NOTE    Length of Stay: 3  Date of Admission: 1/4/2020  Primary Care Physician: Betty Almaraz APRN    Subjective     Chief Complaint:     Acute on chronic kidney failure    HPI     Patient is awake, alert and very talkative.  His wife is at bedside.  Estimated GFR has improved to 13 and creatinine is improved to 4.68 with a BUN of 59.  His IV has been out for 3 or 4 hours now and we are waiting for the VATS team to restart it.  Dialysis continues to be held while renal function shows improvement.  The patient is nonoliguric.  Patient was encouraged to be out of bed and to ambulate in the halls.    Review of Systems     All pertinent negatives and positives are as above. All other systems have been reviewed and are negative unless otherwise stated.     Objective    Temp:  [97.5 °F (36.4 °C)-98.5 °F (36.9 °C)] 97.5 °F (36.4 °C)  Heart Rate:  [74-89] 80  Resp:  [16-18] 18  BP: ()/(49-77) 94/57    Lab Results (last 24 hours)     Procedure Component Value Units Date/Time    Comprehensive Metabolic Panel [[id number]]  (Abnormal) Collected:  01/07/20 0534    Specimen:  Blood Updated:  01/07/20 0643     Glucose 114 mg/dL      BUN 59 mg/dL      Creatinine 4.68 mg/dL      Sodium 144 mmol/L      Potassium 4.6 mmol/L      Chloride 112 mmol/L      CO2 19.0 mmol/L      Calcium 6.4 mg/dL      Total Protein 5.5 g/dL      Albumin 3.50 g/dL      ALT (SGPT) 8 U/L      AST (SGOT) 10 U/L      Alkaline Phosphatase 48 U/L      Total Bilirubin 0.2 mg/dL      eGFR Non African Amer 13 mL/min/1.73      Comment: <15 Indicative of kidney failure.        eGFR   Amer --     Comment: <15 Indicative of kidney failure.        Globulin 2.0 gm/dL      A/G Ratio 1.8 g/dL      BUN/Creatinine Ratio 12.6     Anion Gap 13.0 mmol/L     Narrative:       GFR Normal >60  Chronic Kidney Disease <60  Kidney Failure <15            Imaging Results (Last 24 Hours)     ** No results found for the  last 24 hours. **             Intake/Output Summary (Last 24 hours) at 1/7/2020 1319  Last data filed at 1/7/2020 0904  Gross per 24 hour   Intake 1000 ml   Output 2875 ml   Net -1875 ml       Physical Exam    Constitutional: He is oriented to person, place, and time. He appears well-developed and well-nourished.   Sitting on the side of the bed. No distress.  His wife is present with him.  Head: Normocephalic and atraumatic.   Eyes: Pupils are equal, round, and reactive to light. Conjunctivae and EOM are normal.   Neck: Neck supple. No JVD present.   Cardiovascular: Normal rate, regular rhythm, normal heart sounds and intact distal pulses. Exam reveals no gallop and no friction rub.   No murmur heard.  Pulmonary/Chest: Effort normal and breath sounds normal. No respiratory distress. He has no wheezes. He has no rales. He exhibits no tenderness.   Abdominal: Soft. Bowel sounds are normal. He exhibits no distension. There is no tenderness. There is no rebound and no guarding.   Musculoskeletal: Normal range of motion. He exhibits no edema, tenderness or deformity.   Neurological: He is alert and oriented to person, place, and time. He displays normal reflexes. No cranial nerve deficit. He exhibits normal muscle tone.   Skin: Skin is warm and dry. No rash noted.   Psychiatric: He has a normal mood and affect. His behavior is normal. Judgment and thought content normal.    Results Review:  I have reviewed the labs, radiology results, and diagnostic studies since my last progress note and made treatment changes reflective of the results.   I have reviewed the current medications.    Assessment/Plan     Active Hospital Problems    Diagnosis   • **Acute renal failure with tubular necrosis in the setting of solitary kidney   • Stage 3 chronic kidney disease (CMS/HCC)   • Nephritis and nephropathy   • Dehydration   • Metabolic acidosis   • Hyperkalemia   • Hyperglycemia   • Lung metastases (CMS/HCC)   • Renal cell carcinoma  of right kidney (CMS/[facility])     Overview:   Diagnoses 11/11/14. Path report in C.E at [facility]. Tissue has been requested.      • Benign prostatic hyperplasia       PLAN:  Continue serial BMPs  IV fluids per nephrology    Bipin Burr DO   01/07/20   1:19 PM      Electronically signed by Bipin Burr DO at 01/07/20 1321     Leo Dumont APRN at 01/07/20 0938     Attestation signed by Satinder Grant MD at 01/07/20 1520    I have personally examined the patient and reviewed all the data.  I also agree with history and physical examination as well as plan generated by my nurse practitioner.    Chief complaint: Abnormal labs.    64 years old very pleasant gentleman who has history of single kidney, status post creatinine for the treatment of metastatic renal cell carcinoma.  Patient was treated with Opdivo and has developed acute kidney injury secondary to acute allergic interstitial nephritis.  He responded very well to prednisone.  However recently prednisone was discontinued abruptly rather slow taper.  He has significant bump in serum creatinine from baseline 2.3 mg now to 5.3 mg.  He is currently getting IV fluid as well as steroids.  Patient has slow improvement of renal function.    ASSESSMENT:  1.  Acute kidney injury/allergic interstitial nephritis.  2.  Right nephrectomy for the treatment of renal cell CA.  3.  Metastatic disease.  4.  Benign prostatic hypertrophy  5.  Metabolic acidosis.    Plan:  1.  IV fluid with bicarb.  2.  P.o. statin steroid.  3.  Continue to monitor renal recovery.                  Nephrology ([facility]) Progress Note      Patient:  Parker Arnold  YOB: 1955  Date of Service: 1/7/2020  MRN: [id number]   Acct: [id number]   Primary Care Physician: Betty Almaraz, JACLYN  Advance Directive:   Code Status and Medical Interventions:   Ordered at: 01/04/20 1107     Level Of Support Discussed With:     Patient     Code Status:    CPR     Medical Interventions (Level of Support Prior to Arrest):    Full     Admit Date: 1/4/2020       Hospital Day: 3  Referring Provider: Savage Holloway DO      Patient personally seen and examined.  Complete chart including Consults, Notes, Operative Reports, Labs, Cardiology, and Radiology studies reviewed as able.        Subjective:  Parker Arnold is a 64 y.o. male  whom we were consulted for acute kidney injury.  Baseline chronic kidney disease stage 3, baseline creatinine approximately 2.4.  History of renal cell carcinoma status post right nephrectomy.  Was treated with chemotherapy including Sutent and Opdivo; subsequently developed acute interstitial nephritis (clinical diagnosis) of the left kidney.  He was treated with steroids with improvement and the risk of biopsy was felt to outweigh the benefits.  He was tapered off steroids about ten days ago.  Over the last several days began feeling poorly along with nausea/vomiting and decreased appetite, also had noted diffuse skin itching.  Outpatient labs on 12/30 creatinine 5.3; given IV fluids. Repeat labs on 1/02 did now show any improvement.  He had not noted any decreased urine output but was having urinary frequency; Renal US on 1/03 was unremarkable.  Hospital course remarkable for resumption of Prednisone with some improvement of renal function    Today is feeling better. No new overnight issues.  Urine output nonoliguric    Allergies:  Amoxicillin; Penicillins; Clindamycin/lincomycin; Doxycycline; Hepatitis b virus vaccine; Latex; and Levofloxacin    Home Meds:  Medications Prior to Admission   Medication Sig Dispense Refill Last Dose   • allopurinol (ZYLOPRIM) 100 MG tablet Take 100 mg by mouth 2 (Two) Times a Day.   Past Week at Unknown time   • benzonatate (TESSALON) 100 MG capsule Take 100 mg by mouth 4 (Four) Times a Day.   Past Week at Unknown time   • Calcium Carb-Cholecalciferol (CALCIUM + D3) 600-200  MG-UNIT tablet Take 1 tablet by mouth 2 (Two) Times a Day. 60 tablet 5 Past Week at Unknown time   • finasteride (PROSCAR) 5 MG tablet Take 5 mg by mouth Daily.  3 Past Week at Unknown time   • omeprazole (priLOSEC) 20 MG capsule Take 20 mg by mouth 2 (Two) Times a Day.  2 Past Week at Unknown time   • ondansetron (ZOFRAN) 8 MG tablet Take 8 mg by mouth Every 8 (Eight) Hours.   Past Week at Unknown time       Medicines:  Current Facility-Administered Medications   Medication Dose Route Frequency Provider Last Rate Last Dose   • acetaminophen (TYLENOL) tablet 650 mg  650 mg Oral Q4H PRN Savage Holloway DO        Or   • acetaminophen (TYLENOL) 160 MG/5ML solution 650 mg  650 mg Oral Q4H PRN Savage Holloway DO        Or   • acetaminophen (TYLENOL) suppository 650 mg  650 mg Rectal Q4H PRN Savage Holloway DO       • finasteride (PROSCAR) tablet 5 mg  5 mg Oral Daily Savage Holloway DO   5 mg at 01/07/20 0908   • ondansetron (ZOFRAN) injection 4 mg  4 mg Intravenous Q6H PRN Savage Holloway DO       • pantoprazole (PROTONIX) EC tablet 40 mg  40 mg Oral Q AM Savage Holloway DO   40 mg at 01/07/20 0559   • predniSONE (DELTASONE) tablet 40 mg  40 mg Oral Daily With Breakfast Won Ferrer MD   40 mg at 01/07/20 0908   • sodium bicarbonate tablet 650 mg  650 mg Oral TID Leo Dumont APRN   650 mg at 01/07/20 0908   • sodium chloride 0.9 % flush 10 mL  10 mL Intravenous PRN Castleman, Danna D, PA       • sodium chloride 0.9 % flush 10 mL  10 mL Intravenous Q12H Savage Holloway DO   10 mL at 01/06/20 1043   • sodium chloride 0.9 % flush 10 mL  10 mL Intravenous PRN Savage Holloway DO       • sodium chloride 0.9 % infusion  75 mL/hr Intravenous Continuous Savage Holloway DO 75 mL/hr at 01/07/20 0902 75 mL/hr at 01/07/20 0902       Past Medical History:  Past Medical History:   Diagnosis Date   • Dehydration 12/30/2019   • Nephritis and nephropathy 1/2/2020   • Renal cell carcinoma (CMS/HCC)    •  Renal cell carcinoma of right kidney (CMS/HCC) 10/18/2019    Overview:  Diagnoses 14. Path report in C.E at [facility]. Tissue has been requested.    • Toxicity, chemicals 2020       Past Surgical History:  Past Surgical History:   Procedure Laterality Date   • HERNIA REPAIR     • LUNG BIOPSY  2018   • NEPHRECTOMY     • SINUS SURGERY         Family History  Family History   Problem Relation Age of Onset   • Other Mother         blood disease unknown   • Cancer Father         unknown   • No Known Problems Sister    • Heart disease Maternal Grandmother    • Heart disease Maternal Grandfather    • No Known Problems Paternal Grandmother    • No Known Problems Paternal Grandfather    • No Known Problems Sister        Social History  Social History     Socioeconomic History   • Marital status:      Spouse name: Not on file   • Number of children: Not on file   • Years of education: Not on file   • Highest education level: Not on file   Tobacco Use   • Smoking status: Former Smoker     Packs/day: 1.00     Years: 15.00     Pack years: 15.00     Types: Cigarettes     Last attempt to quit:      Years since quittin.0   • Smokeless tobacco: Never Used   Substance and Sexual Activity   • Alcohol use: No     Frequency: Never   • Drug use: No   • Sexual activity: Defer         Review of Systems:  History obtained from chart review and the patient  General ROS: No fever or chills  Respiratory ROS: No cough, shortness of breath, wheezing  Cardiovascular ROS: No chest pain or palpitations  Gastrointestinal ROS: No abdominal pain or melena  Genito-Urinary ROS: No dysuria or hematuria  Neurological ROS: no headache or dizziness    Objective:  Patient Vitals for the past 24 hrs:   BP Temp Temp src Pulse Resp SpO2 Weight   20 0739 94/57 97.5 °F (36.4 °C) Oral 80 18 98 % --   20 0537 108/53 97.8 °F (36.6 °C) Oral 74 18 97 % --   20 0141 97/49 98.2 °F (36.8 °C) Oral  79 18 98 % --   01/06/20 1900 147/77 98.5 °F (36.9 °C) Oral 79 16 97 % 87.1 kg (192 lb)   01/06/20 1500 142/73 98.3 °F (36.8 °C) Oral 89 16 96 % --   01/06/20 1100 110/67 98.2 °F (36.8 °C) Oral 86 16 97 % --       Intake/Output Summary (Last 24 hours) at 1/7/2020 0919  Last data filed at 1/7/2020 0904  Gross per 24 hour   Intake 1480 ml   Output 2875 ml   Net -1395 ml     General: awake/alert    Neck: supple, no JVD  Chest:  clear to auscultation bilaterally without respiratory distress  CVS: regular rate and rhythm  Abdominal: soft, nontender, positive bowel sounds  Extremities: no cyanosis or edema  Skin: warm and dry without rash  Neuro: no focal motor deficits    Labs:  Results from last 7 days   Lab Units 01/06/20  0611 01/05/20  0520 01/04/20  0825   WBC 10*3/mm3 10.12 6.65 7.43   HEMOGLOBIN g/dL 8.4* 10.0* 10.2*   HEMATOCRIT % 24.3* 28.6* 29.5*   PLATELETS 10*3/mm3 197 214 238         Results from last 7 days   Lab Units 01/07/20  0534 01/06/20  0611 01/05/20  1414  01/04/20  0825 01/02/20  0824   SODIUM mmol/L 144 141 141   < > 138 139   POTASSIUM mmol/L 4.6 4.8 5.7*   < > 5.2 4.5   CHLORIDE mmol/L 112* 111* 112*   < > 108* 106   CO2 mmol/L 19.0* 17.0* 16.0*   < > 18.0* 19.0*   BUN mg/dL 59* 65* 66*   < > 69* 58*   CREATININE mg/dL 4.68* 4.87* 5.45*   < > 5.58* 5.64*   CALCIUM mg/dL 6.4* 6.9* 7.4*   < > 8.2* 8.0*   BILIRUBIN mg/dL 0.2  --   --   --  0.3 0.3   ALK PHOS U/L 48  --   --   --  64 62   ALT (SGPT) U/L 8  --   --   --  8 9   AST (SGOT) U/L 10  --   --   --  12 10   GLUCOSE mg/dL 114* 135* 133*   < > 101* 114*    < > = values in this interval not displayed.       Radiology:   Imaging Results (Last 72 Hours)     ** No results found for the last 72 hours. **          Culture:  Urine Culture   Date Value Ref Range Status   01/04/2020 No growth  Final         Assessment   1.  Acute kidney injury  2.  Baseline chronic kidney disease stage 3  3.  Status post right nephrectomy  4.  Prior right renal cell  carcinoma with current metastatic disease to lung  5.  Metabolic acidosis  6.  BPH with chronic urinary frequency    Plan:  1.  Labs continue to slowly improve, will hold on dialysis and monitor closely  2.  Encourage PO fluids      JACLYN Story  1/7/2020  9:19 AM    Electronically signed by Satinder Grant MD at 01/07/20 1520     Bipin Burr DO at 01/06/20 1303              [facility] Emergency Room Medicine Services  INPATIENT PROGRESS NOTE    Length of Stay: 2  Date of Admission: 1/4/2020  Primary Care Physician: Betty Almaraz APRN    Subjective     Chief Complaint:     Acute on chronic kidney failure    HPI     Patient states that he feels more alert today.  He is currently eating lunch.  He continues on IV fluids.  Creatinine has improved to 4.7 with the EFR improved to 12.  Hemoglobin 8.4.  Dialysis is being held while labs show improvement.    Review of Systems     All pertinent negatives and positives are as above. All other systems have been reviewed and are negative unless otherwise stated.     Objective    Temp:  [97.8 °F (36.6 °C)-98.5 °F (36.9 °C)] 98.2 °F (36.8 °C)  Heart Rate:  [] 86  Resp:  [16] 16  BP: (108-137)/(55-75) 110/67    Lab Results (last 24 hours)     Procedure Component Value Units Date/Time    Basic Metabolic Panel [[id number]]  (Abnormal) Collected:  01/06/20 0611    Specimen:  Blood Updated:  01/06/20 0704     Glucose 135 mg/dL      BUN 65 mg/dL      Creatinine 4.87 mg/dL      Sodium 141 mmol/L      Potassium 4.8 mmol/L      Chloride 111 mmol/L      CO2 17.0 mmol/L      Calcium 6.9 mg/dL      eGFR   Amer --     Comment: <15 Indicative of kidney failure.        eGFR Non African Amer 12 mL/min/1.73      Comment: <15 Indicative of kidney failure.        BUN/Creatinine Ratio 13.3     Anion Gap 13.0 mmol/L     Narrative:       GFR Normal >60  Chronic Kidney Disease <60  Kidney Failure <15      CBC (No Diff) [[id number]]  (Abnormal)  Collected:  01/06/20 0611    Specimen:  Blood Updated:  01/06/20 0702     WBC 10.12 10*3/mm3      RBC 2.66 10*6/mm3      Hemoglobin 8.4 g/dL      Hematocrit 24.3 %      MCV 91.4 fL      MCH 31.6 pg      MCHC 34.6 g/dL      RDW 13.8 %      RDW-SD 46.1 fl      MPV 9.8 fL      Platelets 197 10*3/mm3     Basic Metabolic Panel [[id number]]  (Abnormal) Collected:  01/05/20 1414    Specimen:  Blood Updated:  01/05/20 1515     Glucose 133 mg/dL      BUN 66 mg/dL      Creatinine 5.45 mg/dL      Sodium 141 mmol/L      Potassium 5.7 mmol/L      Chloride 112 mmol/L      CO2 16.0 mmol/L      Calcium 7.4 mg/dL      eGFR   Amer --     Comment: <15 Indicative of kidney failure.        eGFR Non African Amer 11 mL/min/1.73      Comment: <15 Indicative of kidney failure.        BUN/Creatinine Ratio 12.1     Anion Gap 13.0 mmol/L     Narrative:       GFR Normal >60  Chronic Kidney Disease <60  Kidney Failure <15      Vitamin D 25 Hydroxy [[id number]]  (Normal) Collected:  01/05/20 0520    Specimen:  Blood Updated:  01/05/20 1330     25 Hydroxy, Vitamin D 30.8 ng/ml     Narrative:       Reference Range for Total Vitamin D 25(OH)     Deficiency <20.0 ng/mL   Insufficiency 21-29 ng/mL   Sufficiency  ng/mL  Toxicity >100 ng/ml    Creatinine, Urine, Random - Urine, Clean Catch [[id number]] Collected:  01/04/20 1649    Specimen:  Urine, Clean Catch Updated:  01/05/20 1327     Creatinine, Urine 38.1 mg/dL     Narrative:       Reference intervals for random urine have not been established.  Clinical usage is dependent upon physician's interpretation in combination with other laboratory tests.             Imaging Results (Last 24 Hours)     ** No results found for the last 24 hours. **             Intake/Output Summary (Last 24 hours) at 1/6/2020 1303  Last data filed at 1/6/2020 0900  Gross per 24 hour   Intake 1480 ml   Output --   Net 1480 ml       Physical Exam  Constitutional: He is oriented to person, place, and time. He  appears well-developed and well-nourished.  In the side of the bed eating lunch. No distress.  His wife  is present with him.  Head: Normocephalic and atraumatic.   Eyes: Pupils are equal, round, and reactive to light. Conjunctivae and EOM are normal.   Neck: Neck supple. No JVD present.   Cardiovascular: Normal rate, regular rhythm, normal heart sounds and intact distal pulses. Exam reveals no gallop and no friction rub.   No murmur heard.  Pulmonary/Chest: Effort normal and breath sounds normal. No respiratory distress. He has no wheezes. He has no rales. He exhibits no tenderness.   Abdominal: Soft. Bowel sounds are normal. He exhibits no distension. There is no tenderness. There is no rebound and no guarding.   Musculoskeletal: Normal range of motion. He exhibits no edema, tenderness or deformity.   Neurological: He is alert and oriented to person, place, and time. He displays normal reflexes. No cranial nerve deficit. He exhibits normal muscle tone.   Skin: Skin is warm and dry. No rash noted.   Psychiatric: He has a normal mood and affect. His behavior is normal. Judgment and thought content normal.        Results Review:  I have reviewed the labs, radiology results, and diagnostic studies since my last progress note and made treatment changes reflective of the results.   I have reviewed the current medications.    Assessment/Plan     Active Hospital Problems    Diagnosis   • **Acute renal failure with tubular necrosis in the setting of solitary kidney   • Stage 3 chronic kidney disease (CMS/HCC)   • Nephritis and nephropathy   • Dehydration   • Metabolic acidosis   • Hyperkalemia   • Hyperglycemia   • Lung metastases (CMS/HCC)   • Renal cell carcinoma of right kidney (CMS/HCC)     Overview:   Diagnoses 11/11/14. Path report in C.E at [facility]. Tissue has been requested.      • Benign prostatic hyperplasia       PLAN:  Continue serial BMPs  IV fluids per nephrology  It is doubtful that renal  biopsy would be worth the risk.    Bipin Burr DO   01/06/20   1:03 PM      Electronically signed by Bipin Burr DO at 01/06/20 1310     Leo Dumont APRN at 01/06/20 1007     Attestation signed by Won Ferrer MD at 01/06/20 1923    I have independently interviewed and examined the patient and reviewed the laboratory, imaging, notes and all other records as available.  I have discussed key elements of the care plan with the APRN and agree with the findings and care plan documented above except as noted.      Subjective:  Initially consulted for acute renal failure with baseline chronic kidney disease.  Has a history of renal cell carcinoma status post right nephrectomy.  Was treated with chemotherapy including Sutent and Opdivo and subsequently developed acute or social nephritis (clinical diagnosis) of the left kidney.  He was treated with steroids with improvement and the risk of biopsy was felt to outweigh the benefits.  He had been feeling poorly over the last several days with nausea and decreased appetite.  Notes chronic urinary frequency.  Also had some vomiting as well.  He had seen JACLYN Matute in the office yesterday was advised to present to the emergency room and instead came the the next day.  Denies NSAIDs, hematuria, hemoptysis.  They note that he had previously been on prednisone 10 mg every other day for the past several months and stopped this approximately 10 days prior to admission     Today, no new events.  Patient continues to deny chest pain, shortness of air, nausea vomiting.  No issues with dysuria or hematuria.  creatinine improved a bit so no plans for dialysis access today.    Objective:  Vitals/labs/studies/notes all reviewed  Exam independently verified with additional comments or findings as noted:  Ext: no edema    Assessment:  Acute renal failure  Chronic kidney disease stage III  Solitary left kidney  Renal cell carcinoma of the right  kidney status post nephrectomy with lung metastases  Metabolic acidosis  Nausea vomiting  BPH with lower urinary tract symptoms     Plan:  Discussed with patient, nursing, family  Work-up ordered and ongoing  IV fluids  Steroids  Defer any consideration of biopsy at this point given the risk for now the same as they were previously and monitor response to therapy.   Medically manage potassium as needed  Titrate bicarbonate         Won Ferrer MD  1/6/2020  7:17 PM                    Nephrology ([facility]) Progress Note      Patient:  Parker Arnold  YOB: 1955  Date of Service: 1/6/2020  MRN: [id number]   Acct: [id number]   Primary Care Physician: Betty Almaraz APRN  Advance Directive:   Code Status and Medical Interventions:   Ordered at: 01/04/20 1107     Level Of Support Discussed With:    Patient     Code Status:    CPR     Medical Interventions (Level of Support Prior to Arrest):    Full     Admit Date: 1/4/2020       Hospital Day: 2  Referring Provider: Savage Holloway DO      Patient personally seen and examined.  Complete chart including Consults, Notes, Operative Reports, Labs, Cardiology, and Radiology studies reviewed as able.        Subjective:  Parker Arnold is a 64 y.o. male  whom we were consulted for acute kidney injury.  Baseline chronic kidney disease stage 3, baseline creatinine approximately 2.4.  History of renal cell carcinoma status post right nephrectomy.  Was treated with chemotherapy including Sutent and Opdivo; subsequently developed acute interstitial nephritis (clinical diagnosis) of the left kidney.  He was treated with steroids with improvement and the risk of biopsy was felt to outweigh the benefits.  He was tapered off steroids about ten days ago.  Over the last several days began feeling poorly along with nausea/vomiting and decreased appetite, also had noted diffuse skin itching.  Outpatient labs on 12/30  creatinine 5.3; given IV fluids. Repeat labs on 1/02 did now show any improvement.  He had not noted any decreased urine output but was having urinary frequency; Renal US on 1/03 was unremarkable.  Hospital course remarkable for resumption of Prednisone.    Today is feeling better. No new overnight issues.  Urine output nonoliguric    Allergies:  Amoxicillin; Penicillins; Clindamycin/lincomycin; Doxycycline; Hepatitis b virus vaccine; Latex; and Levofloxacin    Home Meds:  Medications Prior to Admission   Medication Sig Dispense Refill Last Dose   • allopurinol (ZYLOPRIM) 100 MG tablet Take 100 mg by mouth 2 (Two) Times a Day.   Taking   • benzonatate (TESSALON) 100 MG capsule Take 100 mg by mouth 4 (Four) Times a Day.   Taking   • Calcium Carb-Cholecalciferol (CALCIUM + D3) 600-200 MG-UNIT tablet Take 1 tablet by mouth 2 (Two) Times a Day. 60 tablet 5 Taking   • finasteride (PROSCAR) 5 MG tablet Take 5 mg by mouth Daily.  3 Taking   • omeprazole (priLOSEC) 20 MG capsule Take 20 mg by mouth 2 (Two) Times a Day.  2 Taking   • ondansetron (ZOFRAN) 8 MG tablet Every 8 (Eight) Hours.   Taking       Medicines:  Current Facility-Administered Medications   Medication Dose Route Frequency Provider Last Rate Last Dose   • acetaminophen (TYLENOL) tablet 650 mg  650 mg Oral Q4H PRN Savage Holloway DO        Or   • acetaminophen (TYLENOL) 160 MG/5ML solution 650 mg  650 mg Oral Q4H PRN Savage Holloway DO        Or   • acetaminophen (TYLENOL) suppository 650 mg  650 mg Rectal Q4H PRN Savage Holloway DO       • finasteride (PROSCAR) tablet 5 mg  5 mg Oral Daily Savage Holloway DO   5 mg at 01/05/20 0946   • ondansetron (ZOFRAN) injection 4 mg  4 mg Intravenous Q6H PRN Savage Holloway DO       • pantoprazole (PROTONIX) EC tablet 40 mg  40 mg Oral Q AM Savage Holloway DO   40 mg at 01/05/20 0537   • predniSONE (DELTASONE) tablet 40 mg  40 mg Oral Daily With Breakfast Won Ferrer MD   40 mg at 01/05/20 0946   •  sodium bicarbonate tablet 650 mg  650 mg Oral BID Savage Holloway, DO   650 mg at 20 2125   • sodium chloride 0.9 % flush 10 mL  10 mL Intravenous PRN Castleman, Danna D, PA       • sodium chloride 0.9 % flush 10 mL  10 mL Intravenous Q12H Savage Holloway, DO   10 mL at 20 0946   • sodium chloride 0.9 % flush 10 mL  10 mL Intravenous PRN Savage Holloway, DO       • sodium chloride 0.9 % infusion  75 mL/hr Intravenous Continuous Savage Holloway, DO 75 mL/hr at 20 0537 75 mL/hr at 20 0537       Past Medical History:  Past Medical History:   Diagnosis Date   • Dehydration 2019   • Nephritis and nephropathy 2020   • Renal cell carcinoma (CMS/HCC)    • Renal cell carcinoma of right kidney (CMS/HCC) 10/18/2019    Overview:  Diagnoses 14. Path report in C.E at [facility]. Tissue has been requested.    • Toxicity, chemicals 2020       Past Surgical History:  Past Surgical History:   Procedure Laterality Date   • HERNIA REPAIR     • LUNG BIOPSY  2018   • NEPHRECTOMY     • SINUS SURGERY         Family History  Family History   Problem Relation Age of Onset   • Other Mother         blood disease unknown   • Cancer Father         unknown   • No Known Problems Sister    • Heart disease Maternal Grandmother    • Heart disease Maternal Grandfather    • No Known Problems Paternal Grandmother    • No Known Problems Paternal Grandfather    • No Known Problems Sister        Social History  Social History     Socioeconomic History   • Marital status:      Spouse name: Not on file   • Number of children: Not on file   • Years of education: Not on file   • Highest education level: Not on file   Tobacco Use   • Smoking status: Former Smoker     Packs/day: 1.00     Years: 15.00     Pack years: 15.00     Types: Cigarettes     Last attempt to quit:      Years since quittin.0   • Smokeless tobacco: Never Used   Substance and Sexual Activity   • Alcohol  use: No     Frequency: Never   • Drug use: No   • Sexual activity: Defer         Review of Systems:  History obtained from chart review and the patient  General ROS: No fever or chills  Respiratory ROS: No cough, shortness of breath, wheezing  Cardiovascular ROS: No chest pain or palpitations  Gastrointestinal ROS: No abdominal pain or melena  Genito-Urinary ROS: No dysuria or hematuria  Neurological ROS: no headache or dizziness    Objective:  Patient Vitals for the past 24 hrs:   BP Temp Temp src Pulse Resp SpO2   01/06/20 0700 108/61 97.9 °F (36.6 °C) Oral 84 16 96 %   01/06/20 0445 131/56 97.8 °F (36.6 °C) Oral 89 16 95 %   01/06/20 0054 117/55 98.5 °F (36.9 °C) Oral 88 16 98 %   01/05/20 2014 137/75 97.9 °F (36.6 °C) Oral 73 16 97 %   01/05/20 1902 -- -- -- 100 -- 99 %   01/05/20 1541 129/71 97.8 °F (36.6 °C) Oral 84 16 97 %       Intake/Output Summary (Last 24 hours) at 1/6/2020 1007  Last data filed at 1/6/2020 0537  Gross per 24 hour   Intake 1360 ml   Output --   Net 1360 ml     General: awake/alert    Neck: supple, no JVD  Chest:  clear to auscultation bilaterally without respiratory distress  CVS: regular rate and rhythm  Abdominal: soft, nontender, positive bowel sounds  Extremities: no cyanosis or edema  Skin: warm and dry without rash  Neuro: no focal motor deficits    Labs:  Results from last 7 days   Lab Units 01/06/20  0611 01/05/20  0520 01/04/20  0825   WBC 10*3/mm3 10.12 6.65 7.43   HEMOGLOBIN g/dL 8.4* 10.0* 10.2*   HEMATOCRIT % 24.3* 28.6* 29.5*   PLATELETS 10*3/mm3 197 214 238         Results from last 7 days   Lab Units 01/06/20  0611 01/05/20  1414 01/05/20  0520 01/04/20  0825 01/02/20  0824   SODIUM mmol/L 141 141 140 138 139   POTASSIUM mmol/L 4.8 5.7* 6.2* 5.2 4.5   CHLORIDE mmol/L 111* 112* 111* 108* 106   CO2 mmol/L 17.0* 16.0* 16.0* 18.0* 19.0*   BUN mg/dL 65* 66* 64* 69* 58*   CREATININE mg/dL 4.87* 5.45* 5.29* 5.58* 5.64*   CALCIUM mg/dL 6.9* 7.4* 7.5* 8.2* 8.0*   BILIRUBIN mg/dL   --   --   --  0.3 0.3   ALK PHOS U/L  --   --   --  64 62   ALT (SGPT) U/L  --   --   --  8 9   AST (SGOT) U/L  --   --   --  12 10   GLUCOSE mg/dL 135* 133* 167* 101* 114*       Radiology:   Imaging Results (Last 72 Hours)     ** No results found for the last 72 hours. **          Culture:  Urine Culture   Date Value Ref Range Status   01/04/2020 No growth  Final         Assessment   1.  Acute kidney injury  2.  Baseline chronic kidney disease stage 3  3.  Status post right nephrectomy  4.  Prior right renal cell carcinoma with current metastatic disease to lung  5.  Metabolic acidosis  6.  BPH with chronic urinary frequency    Plan:  1.  Labs showing some improvement today, will hold on dialysis and monitor closely  2.  Resume diet  3.  Increase bicarbonate to TID  4.  Continue gentle IV fluids      JACLYN Story  1/6/2020  10:07 AM      Electronically signed by Won Ferrer MD at 01/06/20 1923     Won Ferrer MD at 01/05/20 1720          Nephrology ([facility]) Progress Note      Patient:  Parker Arnold  YOB: 1955  Date of Service: 1/5/2020  MRN: [id number]   Acct: [id number]   Primary Care Physician: Betty Almaraz APRN  Advance Directive:   Code Status and Medical Interventions:   Ordered at: 01/04/20 1107     Level Of Support Discussed With:    Patient     Code Status:    CPR     Medical Interventions (Level of Support Prior to Arrest):    Full     Admit Date: 1/4/2020       Hospital Day: 1  Referring Provider: Savage Holloway DO      Patient personally seen and examined.  Complete chart including Consults, Notes, Operative Reports, Labs, Cardiology, and Radiology studies reviewed as able.        Subjective:  Parker Arnold is a 64 y.o. male whom we were consulted for acute renal failure with baseline chronic kidney disease.  Has a history of renal cell carcinoma status post right nephrectomy.  Was treated  with chemotherapy including Sutent and Opdivo and subsequently developed acute or social nephritis (clinical diagnosis) of the left kidney.  He was treated with steroids with improvement and the risk of biopsy was felt to outweigh the benefits.  He had been feeling poorly over the last several days with nausea and decreased appetite.  Notes chronic urinary frequency.  Also had some vomiting as well.  He had seen JACLYN Matute in the office yesterday was advised to present to the emergency room and instead came the the next day.  Denies NSAIDs, hematuria, hemoptysis.  They note that he had previously been on prednisone 10 mg every other day for the past several months and stopped this approximately 10 days prior to admission    Today, no new events.  Patient continues to deny chest pain, shortness of air, nausea vomiting.  No issues dysuria hematuria.  Potassium was high this morning rechecked and still high so Kayexalate was ordered but not yet given at time examination.    Allergies:  Amoxicillin; Penicillins; Clindamycin/lincomycin; Doxycycline; Hepatitis b virus vaccine; Latex; and Levofloxacin    Home Meds:  Medications Prior to Admission   Medication Sig Dispense Refill Last Dose   • allopurinol (ZYLOPRIM) 100 MG tablet Take 100 mg by mouth 2 (Two) Times a Day.   Taking   • benzonatate (TESSALON) 100 MG capsule Take 100 mg by mouth 4 (Four) Times a Day.   Taking   • Calcium Carb-Cholecalciferol (CALCIUM + D3) 600-200 MG-UNIT tablet Take 1 tablet by mouth 2 (Two) Times a Day. 60 tablet 5 Taking   • finasteride (PROSCAR) 5 MG tablet Take 5 mg by mouth Daily.  3 Taking   • omeprazole (priLOSEC) 20 MG capsule Take 20 mg by mouth 2 (Two) Times a Day.  2 Taking   • ondansetron (ZOFRAN) 8 MG tablet Every 8 (Eight) Hours.   Taking       Medicines:  Current Facility-Administered Medications   Medication Dose Route Frequency Provider Last Rate Last Dose   • acetaminophen (TYLENOL) tablet 650 mg  650 mg Oral Q4H PRN  Savage Holloway, DO        Or   • acetaminophen (TYLENOL) 160 MG/5ML solution 650 mg  650 mg Oral Q4H PRN Savage Holloway DO        Or   • acetaminophen (TYLENOL) suppository 650 mg  650 mg Rectal Q4H PRN Savage Holloway DO       • finasteride (PROSCAR) tablet 5 mg  5 mg Oral Daily Savage Holloway DO   5 mg at 01/05/20 0946   • ondansetron (ZOFRAN) injection 4 mg  4 mg Intravenous Q6H PRN Savage Holloway DO       • pantoprazole (PROTONIX) EC tablet 40 mg  40 mg Oral Q AM Savage Holloway DO   40 mg at 01/05/20 0537   • predniSONE (DELTASONE) tablet 40 mg  40 mg Oral Daily With Breakfast Won Ferrer MD   40 mg at 01/05/20 0946   • sodium bicarbonate tablet 650 mg  650 mg Oral BID Savage Holloway DO       • sodium chloride 0.9 % flush 10 mL  10 mL Intravenous PRN Castleman, Danna D, PA       • sodium chloride 0.9 % flush 10 mL  10 mL Intravenous Q12H Savage Holloway DO   10 mL at 01/05/20 0946   • sodium chloride 0.9 % flush 10 mL  10 mL Intravenous PRN Savage Holloway DO       • sodium chloride 0.9 % infusion  75 mL/hr Intravenous Continuous Savage Holloway DO 75 mL/hr at 01/05/20 1636 75 mL/hr at 01/05/20 1636       Past Medical History:  Past Medical History:   Diagnosis Date   • Dehydration 12/30/2019   • Nephritis and nephropathy 1/2/2020   • Renal cell carcinoma (CMS/HCC)    • Renal cell carcinoma of right kidney (CMS/HCC) 10/18/2019    Overview:  Diagnoses 11/11/14. Path report in C.E at [facility]. Tissue has been requested.    • Toxicity, chemicals 1/2/2020       Past Surgical History:  Past Surgical History:   Procedure Laterality Date   • HERNIA REPAIR  2008   • LUNG BIOPSY  11/2018   • NEPHRECTOMY  2014   • SINUS SURGERY  2005       Family History  Family History   Problem Relation Age of Onset   • Other Mother         blood disease unknown   • Cancer Father         unknown   • No Known Problems Sister    • Heart disease Maternal Grandmother    • Heart disease  Maternal Grandfather    • No Known Problems Paternal Grandmother    • No Known Problems Paternal Grandfather    • No Known Problems Sister        Social History  Social History     Socioeconomic History   • Marital status:      Spouse name: Not on file   • Number of children: Not on file   • Years of education: Not on file   • Highest education level: Not on file   Tobacco Use   • Smoking status: Former Smoker     Packs/day: 1.00     Years: 15.00     Pack years: 15.00     Types: Cigarettes     Last attempt to quit:      Years since quittin.0   • Smokeless tobacco: Never Used   Substance and Sexual Activity   • Alcohol use: No     Frequency: Never   • Drug use: No   • Sexual activity: Defer         Review of Systems:  History obtained from chart review and the patient  General ROS: No fever or chills  Respiratory ROS: No cough, shortness of breath, wheezing  Cardiovascular ROS: No chest pain or palpitations  Gastrointestinal ROS: No abdominal pain or melena  Genito-Urinary ROS: No dysuria or hematuria    Objective:  Patient Vitals for the past 24 hrs:   BP Temp Temp src Pulse Resp SpO2   20 1541 129/71 97.8 °F (36.6 °C) Oral 84 16 97 %   20 0702 128/73 98.1 °F (36.7 °C) Oral 82 16 98 %   20 0435 126/64 97.4 °F (36.3 °C) Oral 87 16 96 %   20 0017 124/60 97.8 °F (36.6 °C) Oral 82 16 96 %   20 2039 129/67 97.9 °F (36.6 °C) Oral 75 16 97 %   20 1841 -- -- -- 76 -- 98 %       Intake/Output Summary (Last 24 hours) at 2020 1720  Last data filed at 2020 1300  Gross per 24 hour   Intake 2608 ml   Output --   Net 2608 ml     General: awake/alert   Chest:  clear to auscultation bilaterally without respiratory distress  CVS: regular rate and rhythm  Abdominal: soft, nontender, positive bowel sounds  Extremities: no cyanosis or edema  Skin: warm and dry without rash      Labs:  Results from last 7 days   Lab Units 20  0520 20  0825 20  0824   WBC  10*3/mm3 6.65 7.43 6.86   HEMOGLOBIN g/dL 10.0* 10.2* 10.6*   HEMATOCRIT % 28.6* 29.5* 29.2*   PLATELETS 10*3/mm3 214 238 236         Results from last 7 days   Lab Units 01/05/20  1414 01/05/20  0520 01/04/20  0825 01/02/20  0824 12/30/19  0826   SODIUM mmol/L 141 140 138 139 140   POTASSIUM mmol/L 5.7* 6.2* 5.2 4.5 4.8   CHLORIDE mmol/L 112* 111* 108* 106 108*   CO2 mmol/L 16.0* 16.0* 18.0* 19.0* 17.0*   BUN mg/dL 66* 64* 69* 58* 58*   CREATININE mg/dL 5.45* 5.29* 5.58* 5.64* 5.32*   CALCIUM mg/dL 7.4* 7.5* 8.2* 8.0* 8.0*   BILIRUBIN mg/dL  --   --  0.3 0.3 0.4   ALK PHOS U/L  --   --  64 62 60   ALT (SGPT) U/L  --   --  8 9 7   AST (SGOT) U/L  --   --  12 10 8   GLUCOSE mg/dL 133* 167* 101* 114* 120*       Radiology:   Imaging Results (Last 72 Hours)     ** No results found for the last 72 hours. **          Culture:  Urine Culture   Date Value Ref Range Status   01/04/2020 No growth  Final         Assessment   Acute renal failure  Chronic kidney disease stage III  Solitary left kidney  Renal cell carcinoma of the right kidney status post nephrectomy with lung metastases  Metabolic acidosis  Nausea vomiting  BPH with lower urinary tract symptoms     Plan:  Discussed with patient, nursing, family, Dr. Holloway  Work-up ordered and ongoing  IV fluids  Steroids  Defer any consideration of biopsy at this point given the risk for now the same as they were previously and monitor response to therapy.  We would not be able to do a renal biopsy until Monday at this point anyway  We will keep n.p.o. overnight in case dialysis required tomorrow  Medically manage potassium as noted above  Titrate bicarbonate      Won Ferrer MD  1/5/2020  5:20 PM        Electronically signed by Won Ferrer MD at 01/05/20 6671     Savage Holloway DO at 01/05/20 1352              [facility]  INPATIENT PROGRESS NOTE    Patient Name: Parker Arnlod  Date of  "Admission: 1/4/2020  Today's Date: 01/05/20  Length of Stay: 1  Primary Care Physician: Betty Almaraz APRN    Subjective   Chief Complaint: AoCKD.  HPI   He does not feel as \"foggy\" today.  He thinks his appetite is better.  He continues on hydration.  Repeating labs now to reassess potassium.    He is concerned about being back on steroids.    Review of Systems   All pertinent negatives and positives are as above. All other systems have been reviewed and are negative unless otherwise stated.     Objective    Temp:  [97.4 °F (36.3 °C)-98.3 °F (36.8 °C)] 98.1 °F (36.7 °C)  Heart Rate:  [74-87] 82  Resp:  [16] 16  BP: (124-129)/(60-73) 128/73  Physical Exam  Constitutional: He is oriented to person, place, and time. He appears well-developed and well-nourished. Up in bed, watching basketball.  No distress.  His wife and daughter are present with him.  Head: Normocephalic and atraumatic.   Eyes: Pupils are equal, round, and reactive to light. Conjunctivae and EOM are normal.   Neck: Neck supple. No JVD present.   Cardiovascular: Normal rate, regular rhythm, normal heart sounds and intact distal pulses. Exam reveals no gallop and no friction rub.   No murmur heard.  Pulmonary/Chest: Effort normal and breath sounds normal. No respiratory distress. He has no wheezes. He has no rales. He exhibits no tenderness.   Abdominal: Soft. Bowel sounds are normal. He exhibits no distension. There is no tenderness. There is no rebound and no guarding.   Musculoskeletal: Normal range of motion. He exhibits no edema, tenderness or deformity.   Neurological: He is alert and oriented to person, place, and time. He displays normal reflexes. No cranial nerve deficit. He exhibits normal muscle tone.   Skin: Skin is warm and dry. No rash noted.   Psychiatric: He has a normal mood and affect. His behavior is normal. Judgment and thought content normal.    Results Review:  I have reviewed the labs, radiology results, and diagnostic " studies.    Laboratory Data:   Results from last 7 days   Lab Units 01/05/20  0520 01/04/20  0825 01/02/20  0824   WBC 10*3/mm3 6.65 7.43 6.86   HEMOGLOBIN g/dL 10.0* 10.2* 10.6*   HEMATOCRIT % 28.6* 29.5* 29.2*   PLATELETS 10*3/mm3 214 238 236     Results from last 7 days   Lab Units 01/05/20  0520 01/04/20  0825 01/02/20  0824 12/30/19  0826   SODIUM mmol/L 140 138 139 140   POTASSIUM mmol/L 6.2* 5.2 4.5 4.8   CHLORIDE mmol/L 111* 108* 106 108*   CO2 mmol/L 16.0* 18.0* 19.0* 17.0*   BUN mg/dL 64* 69* 58* 58*   CREATININE mg/dL 5.29* 5.58* 5.64* 5.32*   CALCIUM mg/dL 7.5* 8.2* 8.0* 8.0*   BILIRUBIN mg/dL  --  0.3 0.3 0.4   ALK PHOS U/L  --  64 62 60   ALT (SGPT) U/L  --  8 9 7   AST (SGOT) U/L  --  12 10 8   GLUCOSE mg/dL 167* 101* 114* 120*     Culture Data:   Urine Culture   Date Value Ref Range Status   01/04/2020 No growth  Final     I have reviewed the patient's current medications.     Assessment/Plan     Active Hospital Problems    Diagnosis   • **Acute renal failure with tubular necrosis in the setting of solitary kidney   • Stage 3 chronic kidney disease (CMS/HCC)   • Nephritis and nephropathy   • Metabolic acidosis   • Hyperkalemia   • Lung metastases (CMS/HCC)   • Renal cell carcinoma of right kidney (CMS/HCC)     Overview:   Diagnoses 11/11/14. Path report in C.E at [facility]. Tissue has been requested.      • Dehydration   • Hyperglycemia   • Benign prostatic hyperplasia     Plan:   He was admitted to my service here at [facility] on 1/4.  He has a history of solitary kidney after undergoing a nephrectomy of the right kidney in 2014 for renal cell carcinoma.  Unfortunately, he has had recurrence with metastatic disease to the lung.  He has started seeing Dr. Burleson after following with oncology in [place], [place].  He had an episode acute renal failure in October 2019 that was felt secondary to Sutent and Opdivo.  He presented on 1/4 with recent worsening renal function  on an outpatient basis.  Nephrology had tried to give him outpatient fluids with no improvement in his labs.  He had an outpatient renal ultrasound on 1/3 that showed prior right nephrectomy and a left renal cyst with no hydronephrosis.  He  was admitted for nephrology consultation and management.     There was some question as to whether or not we need to proceed with a biopsy the last time that this happened.  Dr. Grant elected not to given the fact that he had solitary kidney and improving numbers on prednisone.  I discussed the case with Dr. Ferrer last night.  He wanted to go ahead and start the patient back on steroids and continue IV fluids.  Labs to be further monitored.  Dr. Grant will resume the patient's care tomorrow and can make further decisions on whether or not a biopsy is felt necessary given the recurrence of his issue.      Avoid nephrotoxins.  Start oral sodium bicarbonate.  Repeat BMP now and treat potassium if needed.     Reconcile and resume home medications as appropriate.     SCDs for DVT prophylaxis.    Discharge Planning: I expect the patient to be discharged to home when okay with nephrology.     Savage Holloway DO   01/05/20   1:52 PM      Electronically signed by Savage Holloway DO at 01/05/20 2248       Consult Notes (last 72 hours) (Notes from 01/05/20 1214 through 01/08/20 1214)    No notes of this type exist for this encounter.

## 2020-01-08 NOTE — PROGRESS NOTES
[facility] Medicine Services  INPATIENT PROGRESS NOTE    Length of Stay: 4  Date of Admission: 1/4/2020  Primary Care Physician: Betty Almaraz APRN    Subjective     Chief Complaint:     No complaint today.    HPI     Patient is alert, calm with no complaints today.  Renal function has improved to a GFR of 16 and a creatinine of 3.91.  I discussed the patient's treatment with Dr. Grant at length yesterday.  Goal creatinine that would be allowable for discharge would be less than 3.  Patient is doing well with steroids.  He continues on IV fluids with bicarbonate.    Review of Systems     All pertinent negatives and positives are as above. All other systems have been reviewed and are negative unless otherwise stated.     Objective    Temp:  [97.9 °F (36.6 °C)-98.1 °F (36.7 °C)] 98.1 °F (36.7 °C)  Heart Rate:  [] 76  Resp:  [16-18] 18  BP: (115-154)/(65-90) 131/78    Lab Results (last 24 hours)     Procedure Component Value Units Date/Time    Comprehensive Metabolic Panel [[id number]]  (Abnormal) Collected:  01/08/20 0501    Specimen:  Blood Updated:  01/08/20 0538     Glucose 110 mg/dL      BUN 56 mg/dL      Creatinine 3.91 mg/dL      Sodium 143 mmol/L      Potassium 4.1 mmol/L      Chloride 111 mmol/L      CO2 21.0 mmol/L      Calcium 6.3 mg/dL      Total Protein 5.4 g/dL      Albumin 3.40 g/dL      ALT (SGPT) 9 U/L      AST (SGOT) 8 U/L      Alkaline Phosphatase 44 U/L      Total Bilirubin 0.2 mg/dL      eGFR Non African Amer 16 mL/min/1.73      Globulin 2.0 gm/dL      A/G Ratio 1.7 g/dL      BUN/Creatinine Ratio 14.3     Anion Gap 11.0 mmol/L     Narrative:       GFR Normal >60  Chronic Kidney Disease <60  Kidney Failure <15      CBC & Differential [[id number]] Collected:  01/08/20 0501    Specimen:  Blood Updated:  01/08/20 0519    Narrative:       The following orders were created for panel order CBC & Differential.  Procedure                                Abnormality         Status                     ---------                               -----------         ------                     CBC Auto Differential[[id number]]        Abnormal            Final result                 Please view results for these tests on the individual orders.    CBC Auto Differential [[id number]]  (Abnormal) Collected:  01/08/20 0501    Specimen:  Blood Updated:  01/08/20 0519     WBC 8.81 10*3/mm3      RBC 2.62 10*6/mm3      Hemoglobin 8.3 g/dL      Hematocrit 23.7 %      MCV 90.5 fL      MCH 31.7 pg      MCHC 35.0 g/dL      RDW 13.9 %      RDW-SD 45.7 fl      MPV 9.5 fL      Platelets 199 10*3/mm3      Neutrophil % 78.2 %      Lymphocyte % 13.3 %      Monocyte % 7.6 %      Eosinophil % 0.2 %      Basophil % 0.1 %      Immature Grans % 0.6 %      Neutrophils, Absolute 6.89 10*3/mm3      Lymphocytes, Absolute 1.17 10*3/mm3      Monocytes, Absolute 0.67 10*3/mm3      Eosinophils, Absolute 0.02 10*3/mm3      Basophils, Absolute 0.01 10*3/mm3      Immature Grans, Absolute 0.05 10*3/mm3      nRBC 0.0 /100 WBC           Imaging Results (Last 24 Hours)     ** No results found for the last 24 hours. **             Intake/Output Summary (Last 24 hours) at 1/8/2020 1516  Last data filed at 1/8/2020 1400  Gross per 24 hour   Intake 600 ml   Output 2450 ml   Net -1850 ml       Physical Exam    Constitutional: He is oriented to person, place, and time. He appears well-developed and well-nourished.  Sitting up in bed. No distress.  His wife is present with him.  Head: Normocephalic and atraumatic.   Eyes: Conjunctivae and EOM are normal.   Neck: Neck supple. No JVD present.   Cardiovascular: Normal rate, regular rhythm, normal heart sounds and intact distal pulses. No murmur heard.  Pulmonary/Chest: Effort normal and breath sounds normal.   Abdominal: Soft. Bowel sounds are normal. He exhibits no distension. There is no tenderness.   Musculoskeletal: Normal range of motion. He exhibits  no edema, tenderness or deformity.   Neurological: He is alert and oriented to person, place, and time.  Skin: Skin is warm and dry. No rash noted.   Psychiatric: He has a normal mood and affect.     Results Review:  I have reviewed the labs, radiology results, and diagnostic studies since my last progress note and made treatment changes reflective of the results.   I have reviewed the current medications.    Assessment/Plan     Active Hospital Problems    Diagnosis   • **Acute renal failure with tubular necrosis in the setting of solitary kidney   • Stage 3 chronic kidney disease (CMS/HCC)   • Nephritis and nephropathy   • Dehydration   • Metabolic acidosis   • Hyperkalemia   • Hyperglycemia   • Lung metastases (CMS/HCC)   • Renal cell carcinoma of right kidney (CMS/HCC)     Overview:   Diagnoses 11/11/14. Path report in C.E at [facility]. Tissue has been requested.      • Benign prostatic hyperplasia       PLAN:  Continue IV fluid management per nephrology  Continue oral steroids  Continue serial BMPs    Bipin Burr DO   01/08/20   3:16 PM

## 2020-01-08 NOTE — PLAN OF CARE
Renal function improved. No dialysis for now. Voiding small amounts. Transfer to room 480 for need for beds on this floor.

## 2020-01-08 NOTE — PROGRESS NOTES
Nephrology ([facility]) Progress Note      Patient:  Parker Arnold  YOB: 1955  Date of Service: 1/8/2020  MRN: [id number]   Acct: [id number]   Primary Care Physician: Betty Almaraz APRN  Advance Directive:   Code Status and Medical Interventions:   Ordered at: 01/04/20 1107     Level Of Support Discussed With:    Patient     Code Status:    CPR     Medical Interventions (Level of Support Prior to Arrest):    Full     Admit Date: 1/4/2020       Hospital Day: 4  Referring Provider: Savage Holloway DO      Patient personally seen and examined.  Complete chart including Consults, Notes, Operative Reports, Labs, Cardiology, and Radiology studies reviewed as able.        Subjective:  Parker Arnold is a 64 y.o. male  whom we were consulted for acute kidney injury.  Baseline chronic kidney disease stage 3, baseline creatinine approximately 2.4.  History of renal cell carcinoma status post right nephrectomy.  Was treated with chemotherapy including Sutent and Opdivo; subsequently developed acute interstitial nephritis (clinical diagnosis) of the left kidney.  He was treated with steroids with improvement and the risk of biopsy was felt to outweigh the benefits.  He was tapered off steroids about ten days ago.  Over the last several days began feeling poorly along with nausea/vomiting and decreased appetite, also had noted diffuse skin itching.  Outpatient labs on 12/30 creatinine 5.3; given IV fluids. Repeat labs on 1/02 did now show any improvement.  He had not noted any decreased urine output but was having urinary frequency; Renal US on 1/03 was unremarkable.  Hospital course remarkable for resumption of Prednisone with some improvement of renal function    Today is feeling better. No new overnight issues.  Urine output nonoliguric    Allergies:  Amoxicillin; Penicillins; Clindamycin/lincomycin; Doxycycline; Hepatitis b virus vaccine; Latex; and  Levofloxacin    Home Meds:  Medications Prior to Admission   Medication Sig Dispense Refill Last Dose   • allopurinol (ZYLOPRIM) 100 MG tablet Take 100 mg by mouth 2 (Two) Times a Day.   Past Week at Unknown time   • benzonatate (TESSALON) 100 MG capsule Take 100 mg by mouth 4 (Four) Times a Day.   Past Week at Unknown time   • Calcium Carb-Cholecalciferol (CALCIUM + D3) 600-200 MG-UNIT tablet Take 1 tablet by mouth 2 (Two) Times a Day. 60 tablet 5 Past Week at Unknown time   • finasteride (PROSCAR) 5 MG tablet Take 5 mg by mouth Daily.  3 Past Week at Unknown time   • omeprazole (priLOSEC) 20 MG capsule Take 20 mg by mouth 2 (Two) Times a Day.  2 Past Week at Unknown time   • ondansetron (ZOFRAN) 8 MG tablet Take 8 mg by mouth Every 8 (Eight) Hours.   Past Week at Unknown time       Medicines:  Current Facility-Administered Medications   Medication Dose Route Frequency Provider Last Rate Last Dose   • acetaminophen (TYLENOL) tablet 650 mg  650 mg Oral Q4H PRN Savage Holloway DO        Or   • acetaminophen (TYLENOL) 160 MG/5ML solution 650 mg  650 mg Oral Q4H PRN Savage Holloway DO        Or   • acetaminophen (TYLENOL) suppository 650 mg  650 mg Rectal Q4H PRN Savage Holloway DO       • finasteride (PROSCAR) tablet 5 mg  5 mg Oral Daily Savage Holloway DO   5 mg at 01/08/20 0838   • ondansetron (ZOFRAN) injection 4 mg  4 mg Intravenous Q6H PRN Savage Holloway DO       • pantoprazole (PROTONIX) EC tablet 40 mg  40 mg Oral Q AM Savage Holloway DO   40 mg at 01/08/20 0512   • predniSONE (DELTASONE) tablet 40 mg  40 mg Oral Daily With Breakfast Won Ferrer MD   40 mg at 01/08/20 0838   • sodium bicarbonate tablet 650 mg  650 mg Oral TID Leo Dumont APRN   650 mg at 01/08/20 0838   • sodium chloride 0.45 % 925 mL with sodium bicarbonate 8.4 % 75 mEq infusion   Intravenous Continuous Satinder Grant  mL/hr at 01/08/20 0512     • sodium chloride 0.9 % flush 10 mL  10 mL Intravenous PRN  Castleman, Danna D, PA       • sodium chloride 0.9 % flush 10 mL  10 mL Intravenous Q12H Savage Holloway DO   10 mL at 20 1043   • sodium chloride 0.9 % flush 10 mL  10 mL Intravenous PRN Savage Holloway DO           Past Medical History:  Past Medical History:   Diagnosis Date   • Dehydration 2019   • Nephritis and nephropathy 2020   • Renal cell carcinoma (CMS/HCC)    • Renal cell carcinoma of right kidney (CMS/HCC) 10/18/2019    Overview:  Diagnoses 14. Path report in C.E at [facility]. Tissue has been requested.    • Toxicity, chemicals 2020       Past Surgical History:  Past Surgical History:   Procedure Laterality Date   • HERNIA REPAIR     • LUNG BIOPSY  2018   • NEPHRECTOMY     • SINUS SURGERY         Family History  Family History   Problem Relation Age of Onset   • Other Mother         blood disease unknown   • Cancer Father         unknown   • No Known Problems Sister    • Heart disease Maternal Grandmother    • Heart disease Maternal Grandfather    • No Known Problems Paternal Grandmother    • No Known Problems Paternal Grandfather    • No Known Problems Sister        Social History  Social History     Socioeconomic History   • Marital status:      Spouse name: Not on file   • Number of children: Not on file   • Years of education: Not on file   • Highest education level: Not on file   Tobacco Use   • Smoking status: Former Smoker     Packs/day: 1.00     Years: 15.00     Pack years: 15.00     Types: Cigarettes     Last attempt to quit:      Years since quittin.0   • Smokeless tobacco: Never Used   Substance and Sexual Activity   • Alcohol use: No     Frequency: Never   • Drug use: No   • Sexual activity: Defer         Review of Systems:  History obtained from chart review and the patient  General ROS: No fever or chills  Respiratory ROS: No cough, shortness of breath, wheezing  Cardiovascular ROS: No chest pain or  "palpitations  Gastrointestinal ROS: No abdominal pain or melena  Genito-Urinary ROS: No dysuria or hematuria  Neurological ROS: no headache or dizziness    Objective:  Patient Vitals for the past 24 hrs:   BP Temp Temp src Pulse Resp SpO2 Height Weight   01/08/20 0722 117/65 98 °F (36.7 °C) Oral 77 18 98 % -- --   01/08/20 0413 115/67 98 °F (36.7 °C) Oral 78 16 97 % -- --   01/07/20 2223 147/83 97.9 °F (36.6 °C) Axillary 92 18 98 % -- --   01/07/20 2006 149/85 98 °F (36.7 °C) Oral 78 18 97 % -- 87.4 kg (192 lb 9.6 oz)   01/07/20 1823 154/90 98 °F (36.7 °C) Oral 109 18 96 % 177.8 cm (70\") --   01/07/20 1758 147/77 -- -- -- -- -- -- --       Intake/Output Summary (Last 24 hours) at 1/8/2020 1029  Last data filed at 1/8/2020 0421  Gross per 24 hour   Intake --   Output 1550 ml   Net -1550 ml     General: awake/alert    Neck: supple, no JVD  Chest:  clear to auscultation bilaterally without respiratory distress  CVS: regular rate and rhythm  Abdominal: soft, nontender, positive bowel sounds  Extremities: no cyanosis or edema  Skin: warm and dry without rash  Neuro: no focal motor deficits    Labs:  Results from last 7 days   Lab Units 01/08/20  0501 01/06/20  0611 01/05/20  0520   WBC 10*3/mm3 8.81 10.12 6.65   HEMOGLOBIN g/dL 8.3* 8.4* 10.0*   HEMATOCRIT % 23.7* 24.3* 28.6*   PLATELETS 10*3/mm3 199 197 214         Results from last 7 days   Lab Units 01/08/20  0501 01/07/20  0534 01/06/20  0611  01/04/20  0825   SODIUM mmol/L 143 144 141   < > 138   POTASSIUM mmol/L 4.1 4.6 4.8   < > 5.2   CHLORIDE mmol/L 111* 112* 111*   < > 108*   CO2 mmol/L 21.0* 19.0* 17.0*   < > 18.0*   BUN mg/dL 56* 59* 65*   < > 69*   CREATININE mg/dL 3.91* 4.68* 4.87*   < > 5.58*   CALCIUM mg/dL 6.3* 6.4* 6.9*   < > 8.2*   BILIRUBIN mg/dL 0.2 0.2  --   --  0.3   ALK PHOS U/L 44 48  --   --  64   ALT (SGPT) U/L 9 8  --   --  8   AST (SGOT) U/L 8 10  --   --  12   GLUCOSE mg/dL 110* 114* 135*   < > 101*    < > = values in this interval not " displayed.       Radiology:   Imaging Results (Last 72 Hours)     ** No results found for the last 72 hours. **          Culture:  Urine Culture   Date Value Ref Range Status   01/04/2020 No growth  Final         Assessment   1.  Acute kidney injury; allergic interstitial nephritis--improving  2.  Baseline chronic kidney disease stage 3  3.  Status post right nephrectomy  4.  Prior right renal cell carcinoma with current metastatic disease to lung  5.  Metabolic acidosis  6.  BPH with chronic urinary frequency  7.  Hypocalcemia     Plan:  1.  Renal function steady improvement; monitor labs  2.  Continue bicarbonate IV fluids  3.  Check ionized calcium level      Leo Dumont, APRLEODAN  1/8/2020  10:29 AM

## 2020-01-09 LAB
ALBUMIN SERPL-MCNC: 3.3 G/DL (ref 3.5–5.2)
ALBUMIN/GLOB SERPL: 1.8 G/DL
ALP SERPL-CCNC: 38 U/L (ref 39–117)
ALT SERPL W P-5'-P-CCNC: 7 U/L (ref 1–41)
ANION GAP SERPL CALCULATED.3IONS-SCNC: 10 MMOL/L (ref 5–15)
AST SERPL-CCNC: 8 U/L (ref 1–40)
BASOPHILS # BLD AUTO: 0.01 10*3/MM3 (ref 0–0.2)
BASOPHILS NFR BLD AUTO: 0.1 % (ref 0–1.5)
BILIRUB SERPL-MCNC: <0.2 MG/DL (ref 0.2–1.2)
BUN BLD-MCNC: 52 MG/DL (ref 8–23)
BUN/CREAT SERPL: 15.3 (ref 7–25)
CALCIUM SPEC-SCNC: 5.8 MG/DL (ref 8.6–10.5)
CALCIUM SPEC-SCNC: 6.2 MG/DL (ref 8.6–10.5)
CHLORIDE SERPL-SCNC: 109 MMOL/L (ref 98–107)
CO2 SERPL-SCNC: 25 MMOL/L (ref 22–29)
CREAT BLD-MCNC: 3.39 MG/DL (ref 0.76–1.27)
DEPRECATED RDW RBC AUTO: 44.7 FL (ref 37–54)
EOSINOPHIL # BLD AUTO: 0.02 10*3/MM3 (ref 0–0.4)
EOSINOPHIL NFR BLD AUTO: 0.3 % (ref 0.3–6.2)
ERYTHROCYTE [DISTWIDTH] IN BLOOD BY AUTOMATED COUNT: 13.6 % (ref 12.3–15.4)
GFR SERPL CREATININE-BSD FRML MDRD: 18 ML/MIN/1.73
GLOBULIN UR ELPH-MCNC: 1.8 GM/DL
GLUCOSE BLD-MCNC: 107 MG/DL (ref 65–99)
HCT VFR BLD AUTO: 22.2 % (ref 37.5–51)
HGB BLD-MCNC: 7.8 G/DL (ref 13–17.7)
IMM GRANULOCYTES # BLD AUTO: 0.06 10*3/MM3 (ref 0–0.05)
IMM GRANULOCYTES NFR BLD AUTO: 0.8 % (ref 0–0.5)
LYMPHOCYTES # BLD AUTO: 1.29 10*3/MM3 (ref 0.7–3.1)
LYMPHOCYTES NFR BLD AUTO: 18.1 % (ref 19.6–45.3)
MCH RBC QN AUTO: 31.5 PG (ref 26.6–33)
MCHC RBC AUTO-ENTMCNC: 35.1 G/DL (ref 31.5–35.7)
MCV RBC AUTO: 89.5 FL (ref 79–97)
MONOCYTES # BLD AUTO: 0.64 10*3/MM3 (ref 0.1–0.9)
MONOCYTES NFR BLD AUTO: 9 % (ref 5–12)
NEUTROPHILS # BLD AUTO: 5.12 10*3/MM3 (ref 1.7–7)
NEUTROPHILS NFR BLD AUTO: 71.7 % (ref 42.7–76)
NRBC BLD AUTO-RTO: 0 /100 WBC (ref 0–0.2)
PLATELET # BLD AUTO: 188 10*3/MM3 (ref 140–450)
PMV BLD AUTO: 9.6 FL (ref 6–12)
POTASSIUM BLD-SCNC: 3.7 MMOL/L (ref 3.5–5.2)
PROT SERPL-MCNC: 5.1 G/DL (ref 6–8.5)
RBC # BLD AUTO: 2.48 10*6/MM3 (ref 4.14–5.8)
SODIUM BLD-SCNC: 144 MMOL/L (ref 136–145)
WBC NRBC COR # BLD: 7.14 10*3/MM3 (ref 3.4–10.8)

## 2020-01-09 PROCEDURE — 25010000002 CALCIUM GLUCONATE PER 10 ML: Performed by: FAMILY MEDICINE

## 2020-01-09 PROCEDURE — 63710000001 PREDNISONE PER 1 MG: Performed by: INTERNAL MEDICINE

## 2020-01-09 PROCEDURE — 85025 COMPLETE CBC W/AUTO DIFF WBC: CPT | Performed by: INTERNAL MEDICINE

## 2020-01-09 PROCEDURE — 80053 COMPREHEN METABOLIC PANEL: CPT | Performed by: NURSE PRACTITIONER

## 2020-01-09 PROCEDURE — 82310 ASSAY OF CALCIUM: CPT | Performed by: FAMILY MEDICINE

## 2020-01-09 PROCEDURE — 25010000002 CALCIUM GLUCONATE PER 10 ML: Performed by: INTERNAL MEDICINE

## 2020-01-09 RX ORDER — SODIUM CHLORIDE 9 MG/ML
75 INJECTION, SOLUTION INTRAVENOUS CONTINUOUS
Status: DISCONTINUED | OUTPATIENT
Start: 2020-01-09 | End: 2020-01-10 | Stop reason: HOSPADM

## 2020-01-09 RX ADMIN — SODIUM BICARBONATE 650 MG: 650 TABLET ORAL at 17:24

## 2020-01-09 RX ADMIN — SODIUM BICARBONATE 650 MG: 650 TABLET ORAL at 09:05

## 2020-01-09 RX ADMIN — FINASTERIDE 5 MG: 5 TABLET, FILM COATED ORAL at 09:05

## 2020-01-09 RX ADMIN — SODIUM CHLORIDE 75 ML/HR: 9 INJECTION, SOLUTION INTRAVENOUS at 11:13

## 2020-01-09 RX ADMIN — PREDNISONE 40 MG: 20 TABLET ORAL at 09:05

## 2020-01-09 RX ADMIN — PANTOPRAZOLE SODIUM 40 MG: 40 TABLET, DELAYED RELEASE ORAL at 06:49

## 2020-01-09 RX ADMIN — CALCIUM GLUCONATE 1 G: 98 INJECTION, SOLUTION INTRAVENOUS at 06:48

## 2020-01-09 RX ADMIN — CALCITRIOL 0.25 MCG: 0.25 CAPSULE ORAL at 09:05

## 2020-01-09 RX ADMIN — SODIUM BICARBONATE 650 MG: 650 TABLET ORAL at 20:18

## 2020-01-09 RX ADMIN — CALCIUM GLUCONATE 6 G: 98 INJECTION, SOLUTION INTRAVENOUS at 19:07

## 2020-01-09 RX ADMIN — SODIUM CHLORIDE, PRESERVATIVE FREE 10 ML: 5 INJECTION INTRAVENOUS at 09:05

## 2020-01-09 NOTE — PROGRESS NOTES
Nephrology ([facility]) Progress Note      Patient:  Parker Arnold  YOB: 1955  Date of Service: 1/9/2020  MRN: [id number]   Acct: [id number]   Primary Care Physician: Betty Almaraz APRN  Advance Directive:   Code Status and Medical Interventions:   Ordered at: 01/04/20 1107     Level Of Support Discussed With:    Patient     Code Status:    CPR     Medical Interventions (Level of Support Prior to Arrest):    Full     Admit Date: 1/4/2020       Hospital Day: 5  Referring Provider: Savage Holloway DO      Patient personally seen and examined.  Complete chart including Consults, Notes, Operative Reports, Labs, Cardiology, and Radiology studies reviewed as able.        Subjective:  Parker Arnold is a 64 y.o. male  whom we were consulted for acute kidney injury.  Baseline chronic kidney disease stage 3, baseline creatinine approximately 2.4.  History of renal cell carcinoma status post right nephrectomy.  Was treated with chemotherapy including Sutent and Opdivo; subsequently developed acute interstitial nephritis (clinical diagnosis) of the left kidney.  He was treated with steroids with improvement and the risk of biopsy was felt to outweigh the benefits.  He was tapered off steroids about ten days ago.  Over the last several days began feeling poorly along with nausea/vomiting and decreased appetite, also had noted diffuse skin itching.  Outpatient labs on 12/30 creatinine 5.3; given IV fluids. Repeat labs on 1/02 did now show any improvement.  He had not noted any decreased urine output but was having urinary frequency; Renal US on 1/03 was unremarkable.  Hospital course remarkable for resumption of Prednisone with ongoing improvement of renal function    Today has no complaint.  No new overnight issues.  Urine output nonoliguric    Allergies:  Amoxicillin; Penicillins; Clindamycin/lincomycin; Doxycycline; Hepatitis b virus vaccine; Latex; and  Levofloxacin    Home Meds:  Medications Prior to Admission   Medication Sig Dispense Refill Last Dose   • allopurinol (ZYLOPRIM) 100 MG tablet Take 100 mg by mouth 2 (Two) Times a Day.   Past Week at Unknown time   • benzonatate (TESSALON) 100 MG capsule Take 100 mg by mouth 4 (Four) Times a Day.   Past Week at Unknown time   • Calcium Carb-Cholecalciferol (CALCIUM + D3) 600-200 MG-UNIT tablet Take 1 tablet by mouth 2 (Two) Times a Day. 60 tablet 5 Past Week at Unknown time   • finasteride (PROSCAR) 5 MG tablet Take 5 mg by mouth Daily.  3 Past Week at Unknown time   • omeprazole (priLOSEC) 20 MG capsule Take 20 mg by mouth 2 (Two) Times a Day.  2 Past Week at Unknown time   • ondansetron (ZOFRAN) 8 MG tablet Take 8 mg by mouth Every 8 (Eight) Hours.   Past Week at Unknown time       Medicines:  Current Facility-Administered Medications   Medication Dose Route Frequency Provider Last Rate Last Dose   • acetaminophen (TYLENOL) tablet 650 mg  650 mg Oral Q4H PRN Savage Holloway DO        Or   • acetaminophen (TYLENOL) 160 MG/5ML solution 650 mg  650 mg Oral Q4H PRN Savage Holloway DO        Or   • acetaminophen (TYLENOL) suppository 650 mg  650 mg Rectal Q4H PRN Savage Holloway DO       • calcitriol (ROCALTROL) capsule 0.25 mcg  0.25 mcg Oral Daily Satinder Grant MD   0.25 mcg at 01/09/20 0905   • finasteride (PROSCAR) tablet 5 mg  5 mg Oral Daily Savage Holloway DO   5 mg at 01/09/20 0905   • ondansetron (ZOFRAN) injection 4 mg  4 mg Intravenous Q6H PRN Savage Holloway DO       • pantoprazole (PROTONIX) EC tablet 40 mg  40 mg Oral Q AM Savage Holloway DO   40 mg at 01/09/20 0649   • predniSONE (DELTASONE) tablet 40 mg  40 mg Oral Daily With Breakfast Won Ferrer MD   40 mg at 01/09/20 0905   • sodium bicarbonate tablet 650 mg  650 mg Oral TID Leo Dumont APRN   650 mg at 01/09/20 0905   • sodium chloride 0.45 % 925 mL with sodium bicarbonate 8.4 % 75 mEq infusion   Intravenous Continuous  Satinder Grant  mL/hr at 20 1622     • sodium chloride 0.9 % flush 10 mL  10 mL Intravenous PRN Castleman, Danna D, PA       • sodium chloride 0.9 % flush 10 mL  10 mL Intravenous Q12H Savage Holloway DO   10 mL at 20 0905   • sodium chloride 0.9 % flush 10 mL  10 mL Intravenous PRN Savage Holloway DO           Past Medical History:  Past Medical History:   Diagnosis Date   • Dehydration 2019   • Nephritis and nephropathy 2020   • Renal cell carcinoma (CMS/HCC)    • Renal cell carcinoma of right kidney (CMS/HCC) 10/18/2019    Overview:  Diagnoses 14. Path report in [facility] at [facility]. Tissue has been requested.    • Toxicity, chemicals 2020       Past Surgical History:  Past Surgical History:   Procedure Laterality Date   • HERNIA REPAIR     • LUNG BIOPSY  2018   • NEPHRECTOMY     • SINUS SURGERY         Family History  Family History   Problem Relation Age of Onset   • Other Mother         blood disease unknown   • Cancer Father         unknown   • No Known Problems Sister    • Heart disease Maternal Grandmother    • Heart disease Maternal Grandfather    • No Known Problems Paternal Grandmother    • No Known Problems Paternal Grandfather    • No Known Problems Sister        Social History  Social History     Socioeconomic History   • Marital status:      Spouse name: Not on file   • Number of children: Not on file   • Years of education: Not on file   • Highest education level: Not on file   Tobacco Use   • Smoking status: Former Smoker     Packs/day: 1.00     Years: 15.00     Pack years: 15.00     Types: Cigarettes     Last attempt to quit:      Years since quittin.0   • Smokeless tobacco: Never Used   Substance and Sexual Activity   • Alcohol use: No     Frequency: Never   • Drug use: No   • Sexual activity: Defer         Review of Systems:  History obtained from chart review and the patient  General ROS: No fever or  chills  Respiratory ROS: No cough, shortness of breath, wheezing  Cardiovascular ROS: No chest pain or palpitations  Gastrointestinal ROS: No abdominal pain or melena  Genito-Urinary ROS: No dysuria or hematuria  Neurological ROS: no headache or dizziness    Objective:  Patient Vitals for the past 24 hrs:   BP Temp Temp src Pulse Resp SpO2 Weight   01/09/20 0725 111/67 97.7 °F (36.5 °C) Oral 81 18 99 % --   01/09/20 0508 158/81 97.9 °F (36.6 °C) Oral 63 18 97 % --   01/08/20 2125 -- -- -- -- -- -- 89.5 kg (197 lb 6.4 oz)   01/08/20 1852 143/74 97.8 °F (36.6 °C) Oral 78 18 98 % --   01/08/20 1607 126/80 97.8 °F (36.6 °C) Oral 78 18 97 % --   01/08/20 1115 131/78 98.1 °F (36.7 °C) Oral 76 18 98 % --       Intake/Output Summary (Last 24 hours) at 1/9/2020 1040  Last data filed at 1/9/2020 0905  Gross per 24 hour   Intake 2075 ml   Output 2775 ml   Net -700 ml     General: awake/alert    Neck: supple, no JVD  Chest:  clear to auscultation bilaterally without respiratory distress  CVS: regular rate and rhythm  Abdominal: soft, nontender, positive bowel sounds  Extremities: no cyanosis or edema  Skin: warm and dry without rash  Neuro: no focal motor deficits    Labs:  Results from last 7 days   Lab Units 01/09/20  0432 01/08/20  0501 01/06/20  0611   WBC 10*3/mm3 7.14 8.81 10.12   HEMOGLOBIN g/dL 7.8* 8.3* 8.4*   HEMATOCRIT % 22.2* 23.7* 24.3*   PLATELETS 10*3/mm3 188 199 197         Results from last 7 days   Lab Units 01/09/20  0432 01/08/20  0501 01/07/20  0534   SODIUM mmol/L 144 143 144   POTASSIUM mmol/L 3.7 4.1 4.6   CHLORIDE mmol/L 109* 111* 112*   CO2 mmol/L 25.0 21.0* 19.0*   BUN mg/dL 52* 56* 59*   CREATININE mg/dL 3.39* 3.91* 4.68*   CALCIUM mg/dL 5.8* 6.3* 6.4*   BILIRUBIN mg/dL <0.2* 0.2 0.2   ALK PHOS U/L 38* 44 48   ALT (SGPT) U/L 7 9 8   AST (SGOT) U/L 8 8 10   GLUCOSE mg/dL 107* 110* 114*       Radiology:   Imaging Results (Last 72 Hours)     ** No results found for the last 72 hours. **           Culture:  Urine Culture   Date Value Ref Range Status   01/04/2020 No growth  Final         Assessment   1.  Acute kidney injury; allergic interstitial nephritis--improving  2.  Baseline chronic kidney disease stage 3  3.  Status post right nephrectomy  4.  Prior right renal cell carcinoma with current metastatic disease to lung  5.  Metabolic acidosis--improved  6.  BPH with chronic urinary frequency  7.  Hypocalcemia     Plan:  1.  Renal function continues to improve; once creatinine <3 should be OK for discharge  2.  Change IV fluids to normal saline at 75 ml/hour  3.  Agree with replacement of calcium      Leo Dumont, APRN  1/9/2020  10:40 AM

## 2020-01-09 NOTE — PROGRESS NOTES
[facility]  INPATIENT PROGRESS NOTE    Length of Stay: 5  Date of Admission: 1/4/2020  Primary Care Physician: Betty Almaraz APRN    Subjective     Chief Complaint:     No complaints    HPI     The patient remains alert and interactive with no specific complaints.  He was anxious to go home.  His renal function continues to improve with a creatinine of 3.39 today.  Goal creatinine for discharge is less than 3.  He will continue on p.o. steroids.  Labs will be rechecked tomorrow and hopefully he can be discharged at that time if his creatinine is less than 3.0.  Fluids have been changed from bicarbonate containing fluids to normal saline.    Review of Systems     All pertinent negatives and positives are as above. All other systems have been reviewed and are negative unless otherwise stated.     Objective    Temp:  [97.7 °F (36.5 °C)-98.1 °F (36.7 °C)] 98.1 °F (36.7 °C)  Heart Rate:  [63-82] 82  Resp:  [18] 18  BP: (111-158)/(67-81) 127/74    Lab Results (last 24 hours)     Procedure Component Value Units Date/Time    Comprehensive Metabolic Panel [[id number]]  (Abnormal) Collected:  01/09/20 0432    Specimen:  Blood Updated:  01/09/20 0546     Glucose 107 mg/dL      BUN 52 mg/dL      Creatinine 3.39 mg/dL      Sodium 144 mmol/L      Potassium 3.7 mmol/L      Chloride 109 mmol/L      CO2 25.0 mmol/L      Calcium 5.8 mg/dL      Total Protein 5.1 g/dL      Albumin 3.30 g/dL      ALT (SGPT) 7 U/L      AST (SGOT) 8 U/L      Alkaline Phosphatase 38 U/L      Total Bilirubin <0.2 mg/dL      eGFR Non African Amer 18 mL/min/1.73      Globulin 1.8 gm/dL      A/G Ratio 1.8 g/dL      BUN/Creatinine Ratio 15.3     Anion Gap 10.0 mmol/L     Narrative:       GFR Normal >60  Chronic Kidney Disease <60  Kidney Failure <15      CBC & Differential [[id number]] Collected:  01/09/20 0432    Specimen:  Blood Updated:  01/09/20 0503    Narrative:       The following orders were  created for panel order CBC & Differential.  Procedure                               Abnormality         Status                     ---------                               -----------         ------                     CBC Auto Differential[[id number]]        Abnormal            Final result                 Please view results for these tests on the individual orders.    CBC Auto Differential [[id number]]  (Abnormal) Collected:  01/09/20 0432    Specimen:  Blood Updated:  01/09/20 0503     WBC 7.14 10*3/mm3      RBC 2.48 10*6/mm3      Hemoglobin 7.8 g/dL      Hematocrit 22.2 %      MCV 89.5 fL      MCH 31.5 pg      MCHC 35.1 g/dL      RDW 13.6 %      RDW-SD 44.7 fl      MPV 9.6 fL      Platelets 188 10*3/mm3      Neutrophil % 71.7 %      Lymphocyte % 18.1 %      Monocyte % 9.0 %      Eosinophil % 0.3 %      Basophil % 0.1 %      Immature Grans % 0.8 %      Neutrophils, Absolute 5.12 10*3/mm3      Lymphocytes, Absolute 1.29 10*3/mm3      Monocytes, Absolute 0.64 10*3/mm3      Eosinophils, Absolute 0.02 10*3/mm3      Basophils, Absolute 0.01 10*3/mm3      Immature Grans, Absolute 0.06 10*3/mm3      nRBC 0.0 /100 WBC           Imaging Results (Last 24 Hours)     ** No results found for the last 24 hours. **             Intake/Output Summary (Last 24 hours) at 1/9/2020 1737  Last data filed at 1/9/2020 1300  Gross per 24 hour   Intake 720 ml   Output 2175 ml   Net -1455 ml       Physical Exam    Constitutional: He is oriented to person, place, and time. He appears well-developed and well-nourished.  Sitting in a chair at the foot of the bed. No distress.  His wife is present with him.  Head: Normocephalic and atraumatic.   Eyes: Conjunctivae and EOM are normal.   Neck: Neck supple. No JVD present.   Cardiovascular: Normal rate, regular rhythm, normal heart sounds and intact distal pulses. No murmur heard.  Pulmonary/Chest: Effort normal and breath sounds normal.   Abdominal: Soft. Bowel sounds are normal. He exhibits no  distension. There is no tenderness.   Musculoskeletal: Normal range of motion. He exhibits no edema, tenderness or deformity.   Neurological: He is alert and oriented to person, place, and time.  Skin: Skin is warm and dry. No rash noted.   Psychiatric: He has a normal mood and affect.        Results Review:  I have reviewed the labs, radiology results, and diagnostic studies since my last progress note and made treatment changes reflective of the results.   I have reviewed the current medications.    Assessment/Plan     Active Hospital Problems    Diagnosis   • **Acute renal failure with tubular necrosis in the setting of solitary kidney   • Stage 3 chronic kidney disease (CMS/HCC)   • Nephritis and nephropathy   • Dehydration   • Metabolic acidosis   • Hyperkalemia   • Hyperglycemia   • Lung metastases (CMS/HCC)   • Renal cell carcinoma of right kidney (CMS/HCC)     Overview:   Diagnoses 11/11/14. Path report in C.E at [facility]. Tissue has been requested.      • Benign prostatic hyperplasia       PLAN:  Continue IV fluid management per nephrology  Continue oral steroids  Continue serial BMPs    Bipin Burr DO   01/09/20   5:37 PM

## 2020-01-10 VITALS
HEIGHT: 70 IN | RESPIRATION RATE: 18 BRPM | BODY MASS INDEX: 28.25 KG/M2 | HEART RATE: 72 BPM | OXYGEN SATURATION: 97 % | TEMPERATURE: 98.4 F | WEIGHT: 197.31 LBS | DIASTOLIC BLOOD PRESSURE: 80 MMHG | SYSTOLIC BLOOD PRESSURE: 146 MMHG

## 2020-01-10 LAB
ALBUMIN SERPL-MCNC: 3.4 G/DL (ref 3.5–5.2)
ALBUMIN/GLOB SERPL: 2 G/DL
ALP SERPL-CCNC: 37 U/L (ref 39–117)
ALT SERPL W P-5'-P-CCNC: 9 U/L (ref 1–41)
ANION GAP SERPL CALCULATED.3IONS-SCNC: 12 MMOL/L (ref 5–15)
AST SERPL-CCNC: 8 U/L (ref 1–40)
BILIRUB SERPL-MCNC: 0.2 MG/DL (ref 0.2–1.2)
BUN BLD-MCNC: 51 MG/DL (ref 8–23)
BUN/CREAT SERPL: 14.5 (ref 7–25)
CALCIUM SPEC-SCNC: 7 MG/DL (ref 8.6–10.5)
CHLORIDE SERPL-SCNC: 107 MMOL/L (ref 98–107)
CO2 SERPL-SCNC: 25 MMOL/L (ref 22–29)
CREAT BLD-MCNC: 3.51 MG/DL (ref 0.76–1.27)
GFR SERPL CREATININE-BSD FRML MDRD: 18 ML/MIN/1.73
GLOBULIN UR ELPH-MCNC: 1.7 GM/DL
GLUCOSE BLD-MCNC: 108 MG/DL (ref 65–99)
POTASSIUM BLD-SCNC: 3.8 MMOL/L (ref 3.5–5.2)
PROT SERPL-MCNC: 5.1 G/DL (ref 6–8.5)
SODIUM BLD-SCNC: 144 MMOL/L (ref 136–145)

## 2020-01-10 PROCEDURE — 63710000001 PREDNISONE PER 1 MG: Performed by: INTERNAL MEDICINE

## 2020-01-10 PROCEDURE — 80053 COMPREHEN METABOLIC PANEL: CPT | Performed by: NURSE PRACTITIONER

## 2020-01-10 RX ORDER — PANTOPRAZOLE SODIUM 40 MG/1
40 TABLET, DELAYED RELEASE ORAL DAILY
Qty: 30 TABLET | Refills: 2 | Status: SHIPPED | OUTPATIENT
Start: 2020-01-10 | End: 2021-04-19 | Stop reason: ALTCHOICE

## 2020-01-10 RX ORDER — CALCITRIOL 0.25 UG/1
0.25 CAPSULE, LIQUID FILLED ORAL DAILY
Qty: 30 CAPSULE | Refills: 2 | Status: SHIPPED | OUTPATIENT
Start: 2020-01-11 | End: 2021-11-24

## 2020-01-10 RX ORDER — SODIUM BICARBONATE 650 MG/1
650 TABLET ORAL 3 TIMES DAILY
Qty: 90 TABLET | Refills: 2 | Status: SHIPPED | OUTPATIENT
Start: 2020-01-10

## 2020-01-10 RX ORDER — PREDNISONE 20 MG/1
40 TABLET ORAL
Qty: 30 TABLET | Refills: 0 | Status: SHIPPED | OUTPATIENT
Start: 2020-01-11 | End: 2020-10-28 | Stop reason: ALTCHOICE

## 2020-01-10 RX ADMIN — FINASTERIDE 5 MG: 5 TABLET, FILM COATED ORAL at 10:11

## 2020-01-10 RX ADMIN — SODIUM CHLORIDE 75 ML/HR: 9 INJECTION, SOLUTION INTRAVENOUS at 14:15

## 2020-01-10 RX ADMIN — SODIUM BICARBONATE 650 MG: 650 TABLET ORAL at 10:11

## 2020-01-10 RX ADMIN — SODIUM CHLORIDE, PRESERVATIVE FREE 10 ML: 5 INJECTION INTRAVENOUS at 10:12

## 2020-01-10 RX ADMIN — PREDNISONE 40 MG: 20 TABLET ORAL at 10:12

## 2020-01-10 RX ADMIN — CALCITRIOL 0.25 MCG: 0.25 CAPSULE ORAL at 10:11

## 2020-01-10 RX ADMIN — SODIUM BICARBONATE 650 MG: 650 TABLET ORAL at 17:56

## 2020-01-10 RX ADMIN — PANTOPRAZOLE SODIUM 40 MG: 40 TABLET, DELAYED RELEASE ORAL at 06:23

## 2020-01-10 NOTE — PAYOR COMM NOTE
"Ref [id number]    [facility]  MICHELLE,  [phone]  OR   FAX   [phone]     Parker Arias (64 y.o. Male)     Date of Birth Social Security Number Address Home Phone MRN    1955  [address] [place] [id number] [phone] [id number]    Zoroastrian Marital Status          Shinto        Admission Date Admission Type Admitting Provider Attending Provider Department, Room/Bed    1/4/20 Emergency Bipin Burr DO Robinson, Maurice S, DO [address], 480/1    Discharge Date Discharge Disposition Discharge Destination                       Attending Provider:  Bipin Burr DO    Allergies:  Amoxicillin, Penicillins, Clindamycin/lincomycin, Doxycycline, Hepatitis B Virus Vaccine, Latex, Levofloxacin    Isolation:  None   Infection:  None   Code Status:  CPR    Ht:  177.8 cm (70\")   Wt:  89.5 kg (197 lb 5 oz)    Admission Cmt:  None   Principal Problem:  Acute renal failure with tubular necrosis in the setting of solitary kidney [N17.0]                 Active Insurance as of 1/4/2020     Primary Coverage     Payor Plan Insurance Group Employer/Plan Group    MISC COMMERCIAL MISC COMMERCIAL [id number]     Coverage Address Coverage Phone Number Coverage Fax Number Effective Dates    [address] [phone]  9/2/2018 - None Entered    [facility] [id number]       Subscriber Name Subscriber Birth Date Member ID       ANG ARIAS 8/13/1967 [id number]           Secondary Coverage     Payor Plan Insurance Group Employer/Plan Group    MEDICARE MEDICARE A & B      Payor Plan Address Payor Plan Phone Number Payor Plan Fax Number Effective Dates    [address] [phone]  3/1/2019 - None Entered    [facility] [id number]       Subscriber Name Subscriber Birth Date Member ID       PARKER ARIAS 1955 [id number]                 Emergency Contacts      (Rel.) Home Phone Work Phone Mobile Phone    HUGOANG (Spouse) -- -- [phone] "            Vital Signs (last day)     Date/Time   Temp   Temp src   Pulse   Resp   BP   Patient Position   SpO2    01/10/20 1111   98.2 (36.8)   Oral   70   18   134/76   Sitting   100    01/10/20 0729   97.9 (36.6)   Oral   60   18   101/57   Lying   98    01/10/20 0300   98.1 (36.7)   Oral   61   18   106/60   Lying   99    01/09/20 1947   98.2 (36.8)   Oral   70   18   144/71   Lying   98    01/09/20 1127   98.1 (36.7)   Oral   82   18   127/74   Sitting   99    01/09/20 0725   97.7 (36.5)   Oral   81   18   111/67   Lying   99    01/09/20 0508   97.9 (36.6)   Oral   63   18   158/81   Lying   97              Intake & Output (last day)       01/09 0701 - 01/10 0700 01/10 0701 - 01/11 0700    P.O. 720 480    I.V. (mL/kg)      Total Intake(mL/kg) 720 (8) 480 (5.4)    Urine (mL/kg/hr) 500 (0.2) 1825 (2.9)    Stool 0     Total Output 500 1825    Net +220 -1345          Urine Unmeasured Occurrence 1 x     Stool Unmeasured Occurrence 1 x         Current Facility-Administered Medications   Medication Dose Route Frequency Provider Last Rate Last Dose   • acetaminophen (TYLENOL) tablet 650 mg  650 mg Oral Q4H PRN Savage Holloway DO        Or   • acetaminophen (TYLENOL) 160 MG/5ML solution 650 mg  650 mg Oral Q4H PRN Savage Holloway DO        Or   • acetaminophen (TYLENOL) suppository 650 mg  650 mg Rectal Q4H PRN Savage Holloway DO       • calcitriol (ROCALTROL) capsule 0.25 mcg  0.25 mcg Oral Daily Satinder Grant MD   0.25 mcg at 01/10/20 1011   • calcium gluconate 1 g in sodium chloride 0.9 % 100 mL IVPB  1 g Intravenous PRN Bipin Burr DO        And   • calcium gluconate 6 g in sodium chloride 0.9 % 500 mL IVPB  6 g Intravenous PRN Bipin Burr DO 83.3 mL/hr at 01/09/20 1907 6 g at 01/09/20 1907   • finasteride (PROSCAR) tablet 5 mg  5 mg Oral Daily Savage Holloway DO   5 mg at 01/10/20 1011   • ondansetron (ZOFRAN) injection 4 mg  4 mg Intravenous Q6H PRN Savage Holloway DO       • pantoprazole  (PROTONIX) EC tablet 40 mg  40 mg Oral Q AM Savage Holloway, DO   40 mg at 01/10/20 0623   • predniSONE (DELTASONE) tablet 40 mg  40 mg Oral Daily With Breakfast Won Ferrer MD   40 mg at 01/10/20 1012   • sodium bicarbonate tablet 650 mg  650 mg Oral TID Leo Dumont, APRN   650 mg at 01/10/20 1011   • sodium chloride 0.9 % flush 10 mL  10 mL Intravenous PRN Castleman, Danna D, PA       • sodium chloride 0.9 % flush 10 mL  10 mL Intravenous Q12H Savage Holloway, DO   10 mL at 01/10/20 1012   • sodium chloride 0.9 % flush 10 mL  10 mL Intravenous PRN Savage Holloway DO       • sodium chloride 0.9 % infusion  75 mL/hr Intravenous Continuous Leo Dumont, APRLEODAN 75 mL/hr at 01/09/20 1113 75 mL/hr at 01/09/20 1113     Orders (last 24 hrs)      Start     Ordered    01/10/20 0732  Calcium  Once,   Status:  Canceled     Comments:  Per protocol. 6 hours after Calcium infusion complete      01/10/20 0731    01/10/20 0700  Calcium  Once,   Status:  Canceled      01/09/20 1908    01/09/20 1714  Calcium  Once      01/09/20 1713    01/09/20 1630  Patient Currently On Electrolyte Replacement Protocol - Please Refer to MAR for Protocol Details  Misc Nursing Order (Specify)  Daily     Comments:  Patient Currently On Electrolyte Replacement Protocol - Please Refer to MAR for Protocol Details    01/09/20 1629    01/09/20 1629  calcium gluconate 1 g in sodium chloride 0.9 % 100 mL IVPB  As Needed      01/09/20 1629    01/09/20 1629  calcium gluconate 6 g in sodium chloride 0.9 % 500 mL IVPB  As Needed      01/09/20 1629    01/09/20 1130  sodium chloride 0.9 % infusion  Continuous      01/09/20 1042    01/08/20 1715  calcitriol (ROCALTROL) capsule 0.25 mcg  Daily      01/08/20 1623    01/07/20 0600  Comprehensive Metabolic Panel  Daily      01/06/20 1021    01/06/20 1600  sodium bicarbonate tablet 650 mg  3 Times Daily      01/06/20 1021    01/04/20 2000  predniSONE (DELTASONE) tablet 40 mg  Daily With  Breakfast      01/04/20 1907    01/04/20 1215  pantoprazole (PROTONIX) EC tablet 40 mg  Every Early Morning      01/04/20 1118    01/04/20 1200  sodium chloride 0.9 % flush 10 mL  Every 12 Hours Scheduled      01/04/20 1107    01/04/20 1200  finasteride (PROSCAR) tablet 5 mg  Daily      01/04/20 1107    01/04/20 1107  ondansetron (ZOFRAN) injection 4 mg  Every 6 Hours PRN      01/04/20 1107    01/04/20 1107  acetaminophen (TYLENOL) tablet 650 mg  Every 4 Hours PRN      01/04/20 1107    01/04/20 1107  acetaminophen (TYLENOL) 160 MG/5ML solution 650 mg  Every 4 Hours PRN      01/04/20 1107    01/04/20 1107  acetaminophen (TYLENOL) suppository 650 mg  Every 4 Hours PRN      01/04/20 1107    01/04/20 1106  sodium chloride 0.9 % flush 10 mL  As Needed      01/04/20 1107    01/04/20 0804  sodium chloride 0.9 % flush 10 mL  As Needed      01/04/20 0804    Unscheduled  Oxygen Therapy- Nasal Cannula; Titrate for SPO2: 90% - 95%  Continuous PRN      01/06/20 0901    Unscheduled  Magnesium  As Needed      01/09/20 1629    Unscheduled  Potassium  As Needed      01/09/20 1629    Unscheduled  Calcium  As Needed     Comments:  6 hours after infusion complete      01/09/20 1629                   Physician Progress Notes (last 48 hours) (Notes from 01/08/20 1404 through 01/10/20 1404)      Leo Dumont APRN at 01/10/20 1002          Nephrology ([facility]) Progress Note      Patient:  Parker Arnold  YOB: 1955  Date of Service: 1/10/2020  MRN: [id number]   Acct: [id number]   Primary Care Physician: Betty Almaraz APRN  Advance Directive:   Code Status and Medical Interventions:   Ordered at: 01/04/20 1107     Level Of Support Discussed With:    Patient     Code Status:    CPR     Medical Interventions (Level of Support Prior to Arrest):    Full     Admit Date: 1/4/2020       Hospital Day: 6  Referring Provider: Savage C Holloway, DO      Patient personally seen and  examined.  Complete chart including Consults, Notes, Operative Reports, Labs, Cardiology, and Radiology studies reviewed as able.        Subjective:  Parker Arnold is a 64 y.o. male  whom we were consulted for acute kidney injury.  Baseline chronic kidney disease stage 3, baseline creatinine approximately 2.4.  History of renal cell carcinoma status post right nephrectomy.  Was treated with chemotherapy including Sutent and Opdivo; subsequently developed acute interstitial nephritis (clinical diagnosis) of the left kidney.  He was treated with steroids with improvement and the risk of biopsy was felt to outweigh the benefits.  He was tapered off steroids about ten days ago.  Over the last several days began feeling poorly along with nausea/vomiting and decreased appetite, also had noted diffuse skin itching.  Outpatient labs on 12/30 creatinine 5.3; given IV fluids. Repeat labs on 1/02 did now show any improvement.  He had not noted any decreased urine output but was having urinary frequency; Renal US on 1/03 was unremarkable.  Hospital course remarkable for resumption of Prednisone with ongoing improvement of renal function    Today has no complaint.  No new overnight issues.  Patient eager for discharge soon.  Urine output nonoliguric    Allergies:  Amoxicillin; Penicillins; Clindamycin/lincomycin; Doxycycline; Hepatitis b virus vaccine; Latex; and Levofloxacin    Home Meds:  Medications Prior to Admission   Medication Sig Dispense Refill Last Dose   • allopurinol (ZYLOPRIM) 100 MG tablet Take 100 mg by mouth 2 (Two) Times a Day.   Past Week at Unknown time   • benzonatate (TESSALON) 100 MG capsule Take 100 mg by mouth 4 (Four) Times a Day.   Past Week at Unknown time   • Calcium Carb-Cholecalciferol (CALCIUM + D3) 600-200 MG-UNIT tablet Take 1 tablet by mouth 2 (Two) Times a Day. 60 tablet 5 Past Week at Unknown time   • finasteride (PROSCAR) 5 MG tablet Take 5 mg by mouth Daily.  3 Past Week at Unknown  time   • omeprazole (priLOSEC) 20 MG capsule Take 20 mg by mouth 2 (Two) Times a Day.  2 Past Week at Unknown time   • ondansetron (ZOFRAN) 8 MG tablet Take 8 mg by mouth Every 8 (Eight) Hours.   Past Week at Unknown time       Medicines:  Current Facility-Administered Medications   Medication Dose Route Frequency Provider Last Rate Last Dose   • acetaminophen (TYLENOL) tablet 650 mg  650 mg Oral Q4H PRN Savage Holloway DO        Or   • acetaminophen (TYLENOL) 160 MG/5ML solution 650 mg  650 mg Oral Q4H PRN Savage Holloway DO        Or   • acetaminophen (TYLENOL) suppository 650 mg  650 mg Rectal Q4H PRN Savage Holloway DO       • calcitriol (ROCALTROL) capsule 0.25 mcg  0.25 mcg Oral Daily Satinder Grant MD   0.25 mcg at 01/09/20 0905   • calcium gluconate 1 g in sodium chloride 0.9 % 100 mL IVPB  1 g Intravenous PRN Bipin Burr DO        And   • calcium gluconate 6 g in sodium chloride 0.9 % 500 mL IVPB  6 g Intravenous PRN Bipin Burr DO 83.3 mL/hr at 01/09/20 1907 6 g at 01/09/20 1907   • finasteride (PROSCAR) tablet 5 mg  5 mg Oral Daily Savage Holloway DO   5 mg at 01/09/20 0905   • ondansetron (ZOFRAN) injection 4 mg  4 mg Intravenous Q6H PRN Savage Holloway DO       • pantoprazole (PROTONIX) EC tablet 40 mg  40 mg Oral Q AM Savage Holloway DO   40 mg at 01/10/20 0623   • predniSONE (DELTASONE) tablet 40 mg  40 mg Oral Daily With Breakfast Won Ferrer MD   40 mg at 01/09/20 0905   • sodium bicarbonate tablet 650 mg  650 mg Oral TID Leo Dumont, JACLYN   650 mg at 01/09/20 2018   • sodium chloride 0.9 % flush 10 mL  10 mL Intravenous PRN Castleman, Danna D, PA       • sodium chloride 0.9 % flush 10 mL  10 mL Intravenous Q12H Savage Holloway DO   10 mL at 01/09/20 0905   • sodium chloride 0.9 % flush 10 mL  10 mL Intravenous PRN Savage Holloway DO       • sodium chloride 0.9 % infusion  75 mL/hr Intravenous Continuous Leo Dumont APRN 75 mL/hr at 01/09/20  1113 75 mL/hr at 20 1113       Past Medical History:  Past Medical History:   Diagnosis Date   • Dehydration 2019   • Nephritis and nephropathy 2020   • Renal cell carcinoma (CMS/HCC)    • Renal cell carcinoma of right kidney (CMS/HCC) 10/18/2019    Overview:  Diagnoses 14. Path report in C.E at [facility]. Tissue has been requested.    • Toxicity, chemicals 2020       Past Surgical History:  Past Surgical History:   Procedure Laterality Date   • HERNIA REPAIR     • LUNG BIOPSY  2018   • NEPHRECTOMY     • SINUS SURGERY         Family History  Family History   Problem Relation Age of Onset   • Other Mother         blood disease unknown   • Cancer Father         unknown   • No Known Problems Sister    • Heart disease Maternal Grandmother    • Heart disease Maternal Grandfather    • No Known Problems Paternal Grandmother    • No Known Problems Paternal Grandfather    • No Known Problems Sister        Social History  Social History     Socioeconomic History   • Marital status:      Spouse name: Not on file   • Number of children: Not on file   • Years of education: Not on file   • Highest education level: Not on file   Tobacco Use   • Smoking status: Former Smoker     Packs/day: 1.00     Years: 15.00     Pack years: 15.00     Types: Cigarettes     Last attempt to quit: 1984     Years since quittin.0   • Smokeless tobacco: Never Used   Substance and Sexual Activity   • Alcohol use: No     Frequency: Never   • Drug use: No   • Sexual activity: Defer         Review of Systems:  History obtained from chart review and the patient  General ROS: No fever or chills  Respiratory ROS: No cough, shortness of breath, wheezing  Cardiovascular ROS: No chest pain or palpitations  Gastrointestinal ROS: No abdominal pain or melena  Genito-Urinary ROS: No dysuria or hematuria  Neurological ROS: no headache or dizziness    Objective:  Patient Vitals for the past 24  "hrs:   BP Temp Temp src Pulse Resp SpO2 Height Weight   01/10/20 0828 -- -- -- -- -- -- 177.8 cm (70\") 89.5 kg (197 lb 5 oz)   01/10/20 0729 101/57 97.9 °F (36.6 °C) Oral 60 18 98 % -- --   01/10/20 0300 106/60 98.1 °F (36.7 °C) Oral 61 18 99 % -- --   01/09/20 1947 144/71 98.2 °F (36.8 °C) Oral 70 18 98 % -- --   01/09/20 1127 127/74 98.1 °F (36.7 °C) Oral 82 18 99 % -- --       Intake/Output Summary (Last 24 hours) at 1/10/2020 1002  Last data filed at 1/10/2020 0729  Gross per 24 hour   Intake 480 ml   Output 1050 ml   Net -570 ml     General: awake/alert    Neck: supple, no JVD  Chest:  clear to auscultation bilaterally without respiratory distress  CVS: regular rate and rhythm  Abdominal: soft, nontender, positive bowel sounds  Extremities: no cyanosis or edema  Skin: warm and dry without rash  Neuro: no focal motor deficits    Labs:  Results from last 7 days   Lab Units 01/09/20  0432 01/08/20  0501 01/06/20  0611   WBC 10*3/mm3 7.14 8.81 10.12   HEMOGLOBIN g/dL 7.8* 8.3* 8.4*   HEMATOCRIT % 22.2* 23.7* 24.3*   PLATELETS 10*3/mm3 188 199 197         Results from last 7 days   Lab Units 01/10/20  0412 01/09/20  1801 01/09/20  0432 01/08/20  0501   SODIUM mmol/L 144  --  144 143   POTASSIUM mmol/L 3.8  --  3.7 4.1   CHLORIDE mmol/L 107  --  109* 111*   CO2 mmol/L 25.0  --  25.0 21.0*   BUN mg/dL 51*  --  52* 56*   CREATININE mg/dL 3.51*  --  3.39* 3.91*   CALCIUM mg/dL 7.0* 6.2* 5.8* 6.3*   BILIRUBIN mg/dL 0.2  --  <0.2* 0.2   ALK PHOS U/L 37*  --  38* 44   ALT (SGPT) U/L 9  --  7 9   AST (SGOT) U/L 8  --  8 8   GLUCOSE mg/dL 108*  --  107* 110*       Radiology:   Imaging Results (Last 72 Hours)     ** No results found for the last 72 hours. **          Culture:  Urine Culture   Date Value Ref Range Status   01/04/2020 No growth  Final         Assessment   1.  Acute kidney injury; allergic interstitial nephritis--improving  2.  Baseline chronic kidney disease stage 3  3.  Status post right nephrectomy  4.  Prior " right renal cell carcinoma with current metastatic disease to lung  5.  Metabolic acidosis--improved  6.  BPH with chronic urinary frequency  7.  Hypocalcemia--improving    Plan:  1.  Renal function slightly worse today; patient very anxious for discharge and is clinically quite stable. He does understand the risk of going home with such a low renal function and is willing to stay in hospital if it is necessary.  2.  Continue IV fluids  3.  Monitor labs      JACLYN Story  1/10/2020  10:02 AM    Electronically signed by Leo Dumont APRN at 01/10/20 1006     Bipin Burr DO at 01/09/20 9866              [facility] Medicine Services  INPATIENT PROGRESS NOTE    Length of Stay: 5  Date of Admission: 1/4/2020  Primary Care Physician: Betty Almaraz APRN    Subjective     Chief Complaint:     No complaints    HPI     The patient remains alert and interactive with no specific complaints.  He was anxious to go home.  His renal function continues to improve with a creatinine of 3.39 today.  Goal creatinine for discharge is less than 3.  He will continue on p.o. steroids.  Labs will be rechecked tomorrow and hopefully he can be discharged at that time if his creatinine is less than 3.0.  Fluids have been changed from bicarbonate containing fluids to normal saline.    Review of Systems     All pertinent negatives and positives are as above. All other systems have been reviewed and are negative unless otherwise stated.     Objective    Temp:  [97.7 °F (36.5 °C)-98.1 °F (36.7 °C)] 98.1 °F (36.7 °C)  Heart Rate:  [63-82] 82  Resp:  [18] 18  BP: (111-158)/(67-81) 127/74    Lab Results (last 24 hours)     Procedure Component Value Units Date/Time    Comprehensive Metabolic Panel [[id number]]  (Abnormal) Collected:  01/09/20 0432    Specimen:  Blood Updated:  01/09/20 0546     Glucose 107 mg/dL      BUN 52 mg/dL      Creatinine 3.39 mg/dL      Sodium 144 mmol/L       Potassium 3.7 mmol/L      Chloride 109 mmol/L      CO2 25.0 mmol/L      Calcium 5.8 mg/dL      Total Protein 5.1 g/dL      Albumin 3.30 g/dL      ALT (SGPT) 7 U/L      AST (SGOT) 8 U/L      Alkaline Phosphatase 38 U/L      Total Bilirubin <0.2 mg/dL      eGFR Non African Amer 18 mL/min/1.73      Globulin 1.8 gm/dL      A/G Ratio 1.8 g/dL      BUN/Creatinine Ratio 15.3     Anion Gap 10.0 mmol/L     Narrative:       GFR Normal >60  Chronic Kidney Disease <60  Kidney Failure <15      CBC & Differential [[id number]] Collected:  01/09/20 0432    Specimen:  Blood Updated:  01/09/20 0503    Narrative:       The following orders were created for panel order CBC & Differential.  Procedure                               Abnormality         Status                     ---------                               -----------         ------                     CBC Auto Differential[[id number]]        Abnormal            Final result                 Please view results for these tests on the individual orders.    CBC Auto Differential [[id number]]  (Abnormal) Collected:  01/09/20 0432    Specimen:  Blood Updated:  01/09/20 0503     WBC 7.14 10*3/mm3      RBC 2.48 10*6/mm3      Hemoglobin 7.8 g/dL      Hematocrit 22.2 %      MCV 89.5 fL      MCH 31.5 pg      MCHC 35.1 g/dL      RDW 13.6 %      RDW-SD 44.7 fl      MPV 9.6 fL      Platelets 188 10*3/mm3      Neutrophil % 71.7 %      Lymphocyte % 18.1 %      Monocyte % 9.0 %      Eosinophil % 0.3 %      Basophil % 0.1 %      Immature Grans % 0.8 %      Neutrophils, Absolute 5.12 10*3/mm3      Lymphocytes, Absolute 1.29 10*3/mm3      Monocytes, Absolute 0.64 10*3/mm3      Eosinophils, Absolute 0.02 10*3/mm3      Basophils, Absolute 0.01 10*3/mm3      Immature Grans, Absolute 0.06 10*3/mm3      nRBC 0.0 /100 WBC           Imaging Results (Last 24 Hours)     ** No results found for the last 24 hours. **             Intake/Output Summary (Last 24 hours) at 1/9/2020 4145  Last data filed at  1/9/2020 1300  Gross per 24 hour   Intake 720 ml   Output 2175 ml   Net -1455 ml       Physical Exam    Constitutional: He is oriented to person, place, and time. He appears well-developed and well-nourished.  Sitting in a chair at the foot of the bed. No distress.  His wife is present with him.  Head: Normocephalic and atraumatic.   Eyes: Conjunctivae and EOM are normal.   Neck: Neck supple. No JVD present.   Cardiovascular: Normal rate, regular rhythm, normal heart sounds and intact distal pulses. No murmur heard.  Pulmonary/Chest: Effort normal and breath sounds normal.   Abdominal: Soft. Bowel sounds are normal. He exhibits no distension. There is no tenderness.   Musculoskeletal: Normal range of motion. He exhibits no edema, tenderness or deformity.   Neurological: He is alert and oriented to person, place, and time.  Skin: Skin is warm and dry. No rash noted.   Psychiatric: He has a normal mood and affect.        Results Review:  I have reviewed the labs, radiology results, and diagnostic studies since my last progress note and made treatment changes reflective of the results.   I have reviewed the current medications.    Assessment/Plan     Active Hospital Problems    Diagnosis   • **Acute renal failure with tubular necrosis in the setting of solitary kidney   • Stage 3 chronic kidney disease (CMS/HCC)   • Nephritis and nephropathy   • Dehydration   • Metabolic acidosis   • Hyperkalemia   • Hyperglycemia   • Lung metastases (CMS/HCC)   • Renal cell carcinoma of right kidney (CMS/HCC)     Overview:   Diagnoses 11/11/14. Path report in C[facility]E at [facility]. Tissue has been requested.      • Benign prostatic hyperplasia       PLAN:  Continue IV fluid management per nephrology  Continue oral steroids  Continue serial BMPs    Bipin Burr DO   01/09/20   5:37 PM      Electronically signed by Bipin Burr DO at 01/09/20 5195     Leo Dumont, APRN at 01/09/20 1043     Attestation  signed by Satinder Grant MD at 01/09/20 0221    I have personally examined the patient and reviewed all the data.  I also agree with history and physical examination as well as plan generated by my nurse practitioner.    Chief complaint: Abnormal labs.    Patient is 64 years old very pleasant gentleman who has chronic kidney disease baseline secondary to single kidney and history of right nephrectomy for treatment of renal cell carcinoma.  Patient has metastatic disease to lungs and has received Opdivo.  Patient has suffered acute kidney injury related to acute allergic interstitial nephritis caused by Opdivo.  Previously responded well to steroids.  Now presented with weakness, lack of energy and tiredness.  He was found to have a creatinine of 5.3 mg.  Hospital course remarkable for resumption of steroid and IV fluid.  His renal function is slowly improving.    Assessment:  1.  Acute kidney injury/improving.  2.  History of acute allergic interstitial nephritis.  3.  History of right nephrectomy.  4.  Metastatic renal cell carcinoma.  5.  Metabolic acidosis resolved.  6.  BPH.    Plan:  1.  P.o. steroids.  2.  Continue IV fluid.  3.  Possible discharge soon.                    Nephrology ([facility]) Progress Note      Patient:  Parker Arnold  YOB: 1955  Date of Service: 1/9/2020  MRN: [id number]   Acct: [id number]   Primary Care Physician: Betty Almaraz APRN  Advance Directive:   Code Status and Medical Interventions:   Ordered at: 01/04/20 1107     Level Of Support Discussed With:    Patient     Code Status:    CPR     Medical Interventions (Level of Support Prior to Arrest):    Full     Admit Date: 1/4/2020       Hospital Day: 5  Referring Provider: Savage Holloway DO      Patient personally seen and examined.  Complete chart including Consults, Notes, Operative Reports, Labs, Cardiology, and Radiology studies reviewed as able.        Subjective:  Parker  Jaxson Arnold is a 64 y.o. male  whom we were consulted for acute kidney injury.  Baseline chronic kidney disease stage 3, baseline creatinine approximately 2.4.  History of renal cell carcinoma status post right nephrectomy.  Was treated with chemotherapy including Sutent and Opdivo; subsequently developed acute interstitial nephritis (clinical diagnosis) of the left kidney.  He was treated with steroids with improvement and the risk of biopsy was felt to outweigh the benefits.  He was tapered off steroids about ten days ago.  Over the last several days began feeling poorly along with nausea/vomiting and decreased appetite, also had noted diffuse skin itching.  Outpatient labs on 12/30 creatinine 5.3; given IV fluids. Repeat labs on 1/02 did now show any improvement.  He had not noted any decreased urine output but was having urinary frequency; Renal US on 1/03 was unremarkable.  Hospital course remarkable for resumption of Prednisone with ongoing improvement of renal function    Today has no complaint.  No new overnight issues.  Urine output nonoliguric    Allergies:  Amoxicillin; Penicillins; Clindamycin/lincomycin; Doxycycline; Hepatitis b virus vaccine; Latex; and Levofloxacin    Home Meds:  Medications Prior to Admission   Medication Sig Dispense Refill Last Dose   • allopurinol (ZYLOPRIM) 100 MG tablet Take 100 mg by mouth 2 (Two) Times a Day.   Past Week at Unknown time   • benzonatate (TESSALON) 100 MG capsule Take 100 mg by mouth 4 (Four) Times a Day.   Past Week at Unknown time   • Calcium Carb-Cholecalciferol (CALCIUM + D3) 600-200 MG-UNIT tablet Take 1 tablet by mouth 2 (Two) Times a Day. 60 tablet 5 Past Week at Unknown time   • finasteride (PROSCAR) 5 MG tablet Take 5 mg by mouth Daily.  3 Past Week at Unknown time   • omeprazole (priLOSEC) 20 MG capsule Take 20 mg by mouth 2 (Two) Times a Day.  2 Past Week at Unknown time   • ondansetron (ZOFRAN) 8 MG tablet Take 8 mg by mouth Every 8 (Eight) Hours.    Past Week at Unknown time       Medicines:  Current Facility-Administered Medications   Medication Dose Route Frequency Provider Last Rate Last Dose   • acetaminophen (TYLENOL) tablet 650 mg  650 mg Oral Q4H PRN Savage Holloway DO        Or   • acetaminophen (TYLENOL) 160 MG/5ML solution 650 mg  650 mg Oral Q4H PRN Savage Holloway DO        Or   • acetaminophen (TYLENOL) suppository 650 mg  650 mg Rectal Q4H PRN Savage Holloway DO       • calcitriol (ROCALTROL) capsule 0.25 mcg  0.25 mcg Oral Daily Satinder Grant MD   0.25 mcg at 01/09/20 0905   • finasteride (PROSCAR) tablet 5 mg  5 mg Oral Daily Savage Holloway DO   5 mg at 01/09/20 0905   • ondansetron (ZOFRAN) injection 4 mg  4 mg Intravenous Q6H PRN Savage Holloway DO       • pantoprazole (PROTONIX) EC tablet 40 mg  40 mg Oral Q AM Savage Holloway DO   40 mg at 01/09/20 0649   • predniSONE (DELTASONE) tablet 40 mg  40 mg Oral Daily With Breakfast Won Ferrer MD   40 mg at 01/09/20 0905   • sodium bicarbonate tablet 650 mg  650 mg Oral TID Leo Dumont APRN   650 mg at 01/09/20 0905   • sodium chloride 0.45 % 925 mL with sodium bicarbonate 8.4 % 75 mEq infusion   Intravenous Continuous Satinder Grant  mL/hr at 01/08/20 1622     • sodium chloride 0.9 % flush 10 mL  10 mL Intravenous PRN Castleman, Danna D, PA       • sodium chloride 0.9 % flush 10 mL  10 mL Intravenous Q12H Savage Holloway DO   10 mL at 01/09/20 0905   • sodium chloride 0.9 % flush 10 mL  10 mL Intravenous PRN Savage Holloway DO           Past Medical History:  Past Medical History:   Diagnosis Date   • Dehydration 12/30/2019   • Nephritis and nephropathy 1/2/2020   • Renal cell carcinoma (CMS/HCC)    • Renal cell carcinoma of right kidney (CMS/HCC) 10/18/2019    Overview:  Diagnoses 11/11/14. Path report in C.E at [facility]. Tissue has been requested.    • Toxicity, chemicals 1/2/2020       Past Surgical History:  Past Surgical History:    Procedure Laterality Date   • HERNIA REPAIR     • LUNG BIOPSY  2018   • NEPHRECTOMY     • SINUS SURGERY         Family History  Family History   Problem Relation Age of Onset   • Other Mother         blood disease unknown   • Cancer Father         unknown   • No Known Problems Sister    • Heart disease Maternal Grandmother    • Heart disease Maternal Grandfather    • No Known Problems Paternal Grandmother    • No Known Problems Paternal Grandfather    • No Known Problems Sister        Social History  Social History     Socioeconomic History   • Marital status:      Spouse name: Not on file   • Number of children: Not on file   • Years of education: Not on file   • Highest education level: Not on file   Tobacco Use   • Smoking status: Former Smoker     Packs/day: 1.00     Years: 15.00     Pack years: 15.00     Types: Cigarettes     Last attempt to quit:      Years since quittin.0   • Smokeless tobacco: Never Used   Substance and Sexual Activity   • Alcohol use: No     Frequency: Never   • Drug use: No   • Sexual activity: Defer         Review of Systems:  History obtained from chart review and the patient  General ROS: No fever or chills  Respiratory ROS: No cough, shortness of breath, wheezing  Cardiovascular ROS: No chest pain or palpitations  Gastrointestinal ROS: No abdominal pain or melena  Genito-Urinary ROS: No dysuria or hematuria  Neurological ROS: no headache or dizziness    Objective:  Patient Vitals for the past 24 hrs:   BP Temp Temp src Pulse Resp SpO2 Weight   20 0725 111/67 97.7 °F (36.5 °C) Oral 81 18 99 % --   20 0508 158/81 97.9 °F (36.6 °C) Oral 63 18 97 % --   205 -- -- -- -- -- -- 89.5 kg (197 lb 6.4 oz)   20 1852 143/74 97.8 °F (36.6 °C) Oral 78 18 98 % --   20 1607 126/80 97.8 °F (36.6 °C) Oral 78 18 97 % --   20 1115 131/78 98.1 °F (36.7 °C) Oral 76 18 98 % --       Intake/Output Summary (Last 24 hours) at 2020  1040  Last data filed at 1/9/2020 0905  Gross per 24 hour   Intake 2075 ml   Output 2775 ml   Net -700 ml     General: awake/alert    Neck: supple, no JVD  Chest:  clear to auscultation bilaterally without respiratory distress  CVS: regular rate and rhythm  Abdominal: soft, nontender, positive bowel sounds  Extremities: no cyanosis or edema  Skin: warm and dry without rash  Neuro: no focal motor deficits    Labs:  Results from last 7 days   Lab Units 01/09/20  0432 01/08/20  0501 01/06/20  0611   WBC 10*3/mm3 7.14 8.81 10.12   HEMOGLOBIN g/dL 7.8* 8.3* 8.4*   HEMATOCRIT % 22.2* 23.7* 24.3*   PLATELETS 10*3/mm3 188 199 197         Results from last 7 days   Lab Units 01/09/20  0432 01/08/20  0501 01/07/20  0534   SODIUM mmol/L 144 143 144   POTASSIUM mmol/L 3.7 4.1 4.6   CHLORIDE mmol/L 109* 111* 112*   CO2 mmol/L 25.0 21.0* 19.0*   BUN mg/dL 52* 56* 59*   CREATININE mg/dL 3.39* 3.91* 4.68*   CALCIUM mg/dL 5.8* 6.3* 6.4*   BILIRUBIN mg/dL <0.2* 0.2 0.2   ALK PHOS U/L 38* 44 48   ALT (SGPT) U/L 7 9 8   AST (SGOT) U/L 8 8 10   GLUCOSE mg/dL 107* 110* 114*       Radiology:   Imaging Results (Last 72 Hours)     ** No results found for the last 72 hours. **          Culture:  Urine Culture   Date Value Ref Range Status   01/04/2020 No growth  Final         Assessment   1.  Acute kidney injury; allergic interstitial nephritis--improving  2.  Baseline chronic kidney disease stage 3  3.  Status post right nephrectomy  4.  Prior right renal cell carcinoma with current metastatic disease to lung  5.  Metabolic acidosis--improved  6.  BPH with chronic urinary frequency  7.  Hypocalcemia     Plan:  1.  Renal function continues to improve; once creatinine <3 should be OK for discharge  2.  Change IV fluids to normal saline at 75 ml/hour  3.  Agree with replacement of calcium      JACLYN Story  1/9/2020  10:40 AM    Electronically signed by Satinder Grant MD at 01/09/20 1701     Bipin Burr DO at 01/08/20 5203               [facility]  INPATIENT PROGRESS NOTE    Length of Stay: 4  Date of Admission: 1/4/2020  Primary Care Physician: Betty Almaraz APRN    Subjective     Chief Complaint:     No complaint today.    HPI     Patient is alert, calm with no complaints today.  Renal function has improved to a GFR of 16 and a creatinine of 3.91.  I discussed the patient's treatment with Dr. Grant at length yesterday.  Goal creatinine that would be allowable for discharge would be less than 3.  Patient is doing well with steroids.  He continues on IV fluids with bicarbonate.    Review of Systems     All pertinent negatives and positives are as above. All other systems have been reviewed and are negative unless otherwise stated.     Objective    Temp:  [97.9 °F (36.6 °C)-98.1 °F (36.7 °C)] 98.1 °F (36.7 °C)  Heart Rate:  [] 76  Resp:  [16-18] 18  BP: (115-154)/(65-90) 131/78    Lab Results (last 24 hours)     Procedure Component Value Units Date/Time    Comprehensive Metabolic Panel [[id number]]  (Abnormal) Collected:  01/08/20 0501    Specimen:  Blood Updated:  01/08/20 0538     Glucose 110 mg/dL      BUN 56 mg/dL      Creatinine 3.91 mg/dL      Sodium 143 mmol/L      Potassium 4.1 mmol/L      Chloride 111 mmol/L      CO2 21.0 mmol/L      Calcium 6.3 mg/dL      Total Protein 5.4 g/dL      Albumin 3.40 g/dL      ALT (SGPT) 9 U/L      AST (SGOT) 8 U/L      Alkaline Phosphatase 44 U/L      Total Bilirubin 0.2 mg/dL      eGFR Non African Amer 16 mL/min/1.73      Globulin 2.0 gm/dL      A/G Ratio 1.7 g/dL      BUN/Creatinine Ratio 14.3     Anion Gap 11.0 mmol/L     Narrative:       GFR Normal >60  Chronic Kidney Disease <60  Kidney Failure <15      CBC & Differential [[id number]] Collected:  01/08/20 0501    Specimen:  Blood Updated:  01/08/20 0519    Narrative:       The following orders were created for panel order CBC & Differential.  Procedure                                Abnormality         Status                     ---------                               -----------         ------                     CBC Auto Differential[[id number]]        Abnormal            Final result                 Please view results for these tests on the individual orders.    CBC Auto Differential [[id number]]  (Abnormal) Collected:  01/08/20 0501    Specimen:  Blood Updated:  01/08/20 0519     WBC 8.81 10*3/mm3      RBC 2.62 10*6/mm3      Hemoglobin 8.3 g/dL      Hematocrit 23.7 %      MCV 90.5 fL      MCH 31.7 pg      MCHC 35.0 g/dL      RDW 13.9 %      RDW-SD 45.7 fl      MPV 9.5 fL      Platelets 199 10*3/mm3      Neutrophil % 78.2 %      Lymphocyte % 13.3 %      Monocyte % 7.6 %      Eosinophil % 0.2 %      Basophil % 0.1 %      Immature Grans % 0.6 %      Neutrophils, Absolute 6.89 10*3/mm3      Lymphocytes, Absolute 1.17 10*3/mm3      Monocytes, Absolute 0.67 10*3/mm3      Eosinophils, Absolute 0.02 10*3/mm3      Basophils, Absolute 0.01 10*3/mm3      Immature Grans, Absolute 0.05 10*3/mm3      nRBC 0.0 /100 WBC           Imaging Results (Last 24 Hours)     ** No results found for the last 24 hours. **             Intake/Output Summary (Last 24 hours) at 1/8/2020 1516  Last data filed at 1/8/2020 1400  Gross per 24 hour   Intake 600 ml   Output 2450 ml   Net -1850 ml       Physical Exam    Constitutional: He is oriented to person, place, and time. He appears well-developed and well-nourished.  Sitting up in bed. No distress.  His wife is present with him.  Head: Normocephalic and atraumatic.   Eyes: Conjunctivae and EOM are normal.   Neck: Neck supple. No JVD present.   Cardiovascular: Normal rate, regular rhythm, normal heart sounds and intact distal pulses. No murmur heard.  Pulmonary/Chest: Effort normal and breath sounds normal.   Abdominal: Soft. Bowel sounds are normal. He exhibits no distension. There is no tenderness.   Musculoskeletal: Normal range of motion. He exhibits  no edema, tenderness or deformity.   Neurological: He is alert and oriented to person, place, and time.  Skin: Skin is warm and dry. No rash noted.   Psychiatric: He has a normal mood and affect.     Results Review:  I have reviewed the labs, radiology results, and diagnostic studies since my last progress note and made treatment changes reflective of the results.   I have reviewed the current medications.    Assessment/Plan     Active Hospital Problems    Diagnosis   • **Acute renal failure with tubular necrosis in the setting of solitary kidney   • Stage 3 chronic kidney disease (CMS/HCC)   • Nephritis and nephropathy   • Dehydration   • Metabolic acidosis   • Hyperkalemia   • Hyperglycemia   • Lung metastases (CMS/HCC)   • Renal cell carcinoma of right kidney (CMS/HCC)     Overview:   Diagnoses 11/11/14. Path report in C.E at [facility]. Tissue has been requested.      • Benign prostatic hyperplasia       PLAN:  Continue IV fluid management per nephrology  Continue oral steroids  Continue serial BMPs    Bipin Burr DO   01/08/20   3:16 PM      Electronically signed by Bipin Burr DO at 01/08/20 0257

## 2020-01-10 NOTE — DISCHARGE SUMMARY
[facility] Medicine Services  DISCHARGE SUMMARY       Date of Admission: 1/4/2020  Date of Discharge:  1/10/2020  Primary Care Physician: Betty Almaraz APRN    Discharge Diagnoses:  Patient Active Problem List   Diagnosis   • Renal cell carcinoma of right kidney (CMS/HCC)   • Multiple nodules of lung   • Lung anomaly   • TIA (transient ischemic attack)   • Acute renal failure with tubular necrosis in the setting of solitary kidney   • Lung metastases (CMS/HCC)   • Pyuria   • Hyperkalemia   • Metabolic acidosis   • Hyperglycemia   • Hypercalcemia   • Benign prostatic hyperplasia   • Increased frequency of urination   • Nocturia   • Retention of urine   • Dehydration   • Nephritis and nephropathy   • Toxicity, chemicals   • Stage 3 chronic kidney disease (CMS/HCC)         Presenting Problem/History of Present Illness:  AMAURI (acute kidney injury) (CMS/HCC) [N17.9]     Chief Complaint on Day of Discharge:   No complaints    History of Present Illness on Day of Discharge:   The patient is very anxious for discharge.  He understands that his renal function has not recovered back to its usual baseline and that everyone would prefer that his creatinine improve to less than 3.05 to discharge.  He understands there is an element of risk to being discharged today and is willing to accept that risk.  He has promised to follow-up with the nephrology office next week for reassessment of his renal function.  He has been instructed to drink sufficient fluids with a goal of no less than 2 L/day.    Hospital Course  This is a 64-year-old  gentleman who resides in [place], [place].  He sees Betty Hernadez for primary care.  He follows with Dr. Burleson for history of metastatic renal cell carcinoma.  He had a right nephrectomy in 2014 and has had recurrence in his lungs.  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  He was treated with a prolonged course of oral steroids, prednisone.  His renal function had improved with this.  There was some thought given to the possibility of a biopsy, but it was felt that the risk outweighed the benefits as his renal function was improving on steroids and he only has 1 kidney.  He was seen by Dr. Grant during his stay in the hospital in October.     He has been doing well as an outpatient recently.  He has been followed in the nephrology clinic by Leo Dumont.  He was not feeling well over about the last week.  He did not feel that his mentation was a sharp.  He has been nauseous.  He has had vomiting at times.  He was concerned that there was a new problem with his labs.  He ultimately had new labs drawn on 12/30.  His serum creatinine was found to be 5.32 with a CO2 of 17, but normal potassium.  An attempt was made to hydrate him as an outpatient, but his renal function has been no better on repeat labs on 1/2.  He was contacted yesterday and told to come to the hospital by Leo Dumont.  However, he waited until this morning.  Serum creatinine is 5.58 at present with a bicarb of 18 and a normal potassium level.     He does not complain of a decrease in his urine output.     He tells me that he finally tapered off prednisone about a week prior to [place].     His serum creatinine was 3.55 when we discharged him on 10/22.  He was followed subsequently in the nephrology clinic and ultimately had a serum creatinine that got down to 2.46 on 11/1.  He had labs repeated on 11/15 and had a creatinine of 3.2.  Creatinine was then 2.36 on 12/2.     He has not had any further treatment of his metastatic renal cell carcinoma since September.  Plan:   He will be admitted to my service for [facility].  He has a history of solitary kidney after undergoing a nephrectomy of the right kidney in 2014 for renal cell carcinoma.  Unfortunately, he has had  recurrence with metastatic disease to the lung.  He has started seeing Dr. Burleson after following with oncology in [place], [place].  He had an episode acute renal failure in October 2019 that was felt secondary to Sutent and Opdivo.  He presents today with worsening renal function on an outpatient basis.  Nephrology has tried to give him outpatient fluids with no improvement in his labs.  He had an outpatient renal ultrasound yesterday that showed prior right nephrectomy and a left renal cyst with no hydronephrosis.  He is admitted for nephrology consultation and management.     There was some question as to whether or not we need to proceed with a biopsy the last time that this happened.  Dr. Grant elected not to given the fact that he had solitary kidney and improving numbers on prednisone.  Nephrology may want to entertain a biopsy at this point.  I will hold on further steroids until he is evaluated by Dr. Ferrer.  In the meantime, will obtain a urinalysis and urine electrolytes.  IV fluids.    The patient was restarted on oral steroids.  There is no consideration for biopsy at this point given that the risk is greater than the benefit currently particularly since the patient has only 1 kidney.  Bicarbonate was started at 650 mg and uptitrated to 3 times a day dosing to manage metabolic acidosis.  Following admission, after treatment, the patient's potassium had returned to normal range.  IV fluids were continued throughout his hospitalization.  The patient was started on Calcitrol as well oral prednisone was continued throughout his hospital stay.    The patient is very anxious for discharge.  He understands that his renal function has not recovered back to its usual baseline and that everyone would prefer that his creatinine improve to less than 3.05 to discharge.  He understands there is an element of risk to being discharged today and is willing to accept that risk.  He has promised to follow-up with the  nephrology office next week for reassessment of his renal function.  He has been instructed to drink sufficient fluids with a goal of no less than 2 L/day.      Consults:   Nephrology:  Assessment   Acute renal failure  Chronic kidney disease stage III  Solitary left kidney  Renal cell carcinoma of the right kidney status post nephrectomy with lung metastases  Metabolic acidosis  Nausea vomiting  BPH with lower urinary tract symptoms     Plan:  Discussed with patient, nursing, family, Dr. Holloway  Work-up ordered and ongoing  IV fluids  Steroids  Defer any consideration of biopsy at this point given the risk for now the same as they were previously and monitor response to therapy.  We would not be able to do a renal biopsy until Monday at this point anyway        Thank you for the consult, we appreciate the opportunity to provide care to your patients.  Feel free to contact me if I can be of any further assistance.       Won Ferrer MD      Pertinent Test Results:   Lab Results (last 7 days)     Procedure Component Value Units Date/Time    Comprehensive Metabolic Panel [[id number]]  (Abnormal) Collected:  01/10/20 0412    Specimen:  Blood Updated:  01/10/20 0508     Glucose 108 mg/dL      BUN 51 mg/dL      Creatinine 3.51 mg/dL      Sodium 144 mmol/L      Potassium 3.8 mmol/L      Chloride 107 mmol/L      CO2 25.0 mmol/L      Calcium 7.0 mg/dL      Total Protein 5.1 g/dL      Albumin 3.40 g/dL      ALT (SGPT) 9 U/L      AST (SGOT) 8 U/L      Alkaline Phosphatase 37 U/L      Total Bilirubin 0.2 mg/dL      eGFR Non African Amer 18 mL/min/1.73      Globulin 1.7 gm/dL      A/G Ratio 2.0 g/dL      BUN/Creatinine Ratio 14.5     Anion Gap 12.0 mmol/L     Narrative:       GFR Normal >60  Chronic Kidney Disease <60  Kidney Failure <15      Calcium [[id number]]  (Abnormal) Collected:  01/09/20 1801    Specimen:  Blood Updated:  01/09/20 1825     Calcium 6.2 mg/dL     Comprehensive Metabolic Panel [[id number]]   (Abnormal) Collected:  01/09/20 0432    Specimen:  Blood Updated:  01/09/20 0546     Glucose 107 mg/dL      BUN 52 mg/dL      Creatinine 3.39 mg/dL      Sodium 144 mmol/L      Potassium 3.7 mmol/L      Chloride 109 mmol/L      CO2 25.0 mmol/L      Calcium 5.8 mg/dL      Total Protein 5.1 g/dL      Albumin 3.30 g/dL      ALT (SGPT) 7 U/L      AST (SGOT) 8 U/L      Alkaline Phosphatase 38 U/L      Total Bilirubin <0.2 mg/dL      eGFR Non African Amer 18 mL/min/1.73      Globulin 1.8 gm/dL      A/G Ratio 1.8 g/dL      BUN/Creatinine Ratio 15.3     Anion Gap 10.0 mmol/L     Narrative:       GFR Normal >60  Chronic Kidney Disease <60  Kidney Failure <15      CBC & Differential [[id number]] Collected:  01/09/20 0432    Specimen:  Blood Updated:  01/09/20 0503    Narrative:       The following orders were created for panel order CBC & Differential.  Procedure                               Abnormality         Status                     ---------                               -----------         ------                     CBC Auto Differential[[id number]]        Abnormal            Final result                 Please view results for these tests on the individual orders.    CBC Auto Differential [[id number]]  (Abnormal) Collected:  01/09/20 0432    Specimen:  Blood Updated:  01/09/20 0503     WBC 7.14 10*3/mm3      RBC 2.48 10*6/mm3      Hemoglobin 7.8 g/dL      Hematocrit 22.2 %      MCV 89.5 fL      MCH 31.5 pg      MCHC 35.1 g/dL      RDW 13.6 %      RDW-SD 44.7 fl      MPV 9.6 fL      Platelets 188 10*3/mm3      Neutrophil % 71.7 %      Lymphocyte % 18.1 %      Monocyte % 9.0 %      Eosinophil % 0.3 %      Basophil % 0.1 %      Immature Grans % 0.8 %      Neutrophils, Absolute 5.12 10*3/mm3      Lymphocytes, Absolute 1.29 10*3/mm3      Monocytes, Absolute 0.64 10*3/mm3      Eosinophils, Absolute 0.02 10*3/mm3      Basophils, Absolute 0.01 10*3/mm3      Immature Grans, Absolute 0.06 10*3/mm3      nRBC 0.0 /100 WBC      Comprehensive Metabolic Panel [[id number]]  (Abnormal) Collected:  01/08/20 0501    Specimen:  Blood Updated:  01/08/20 0538     Glucose 110 mg/dL      BUN 56 mg/dL      Creatinine 3.91 mg/dL      Sodium 143 mmol/L      Potassium 4.1 mmol/L      Chloride 111 mmol/L      CO2 21.0 mmol/L      Calcium 6.3 mg/dL      Total Protein 5.4 g/dL      Albumin 3.40 g/dL      ALT (SGPT) 9 U/L      AST (SGOT) 8 U/L      Alkaline Phosphatase 44 U/L      Total Bilirubin 0.2 mg/dL      eGFR Non African Amer 16 mL/min/1.73      Globulin 2.0 gm/dL      A/G Ratio 1.7 g/dL      BUN/Creatinine Ratio 14.3     Anion Gap 11.0 mmol/L     Narrative:       GFR Normal >60  Chronic Kidney Disease <60  Kidney Failure <15      CBC & Differential [[id number]] Collected:  01/08/20 0501    Specimen:  Blood Updated:  01/08/20 0519    Narrative:       The following orders were created for panel order CBC & Differential.  Procedure                               Abnormality         Status                     ---------                               -----------         ------                     CBC Auto Differential[[id number]]        Abnormal            Final result                 Please view results for these tests on the individual orders.    CBC Auto Differential [[id number]]  (Abnormal) Collected:  01/08/20 0501    Specimen:  Blood Updated:  01/08/20 0519     WBC 8.81 10*3/mm3      RBC 2.62 10*6/mm3      Hemoglobin 8.3 g/dL      Hematocrit 23.7 %      MCV 90.5 fL      MCH 31.7 pg      MCHC 35.0 g/dL      RDW 13.9 %      RDW-SD 45.7 fl      MPV 9.5 fL      Platelets 199 10*3/mm3      Neutrophil % 78.2 %      Lymphocyte % 13.3 %      Monocyte % 7.6 %      Eosinophil % 0.2 %      Basophil % 0.1 %      Immature Grans % 0.6 %      Neutrophils, Absolute 6.89 10*3/mm3      Lymphocytes, Absolute 1.17 10*3/mm3      Monocytes, Absolute 0.67 10*3/mm3      Eosinophils, Absolute 0.02 10*3/mm3      Basophils, Absolute 0.01 10*3/mm3      Immature Grans, Absolute 0.05  10*3/mm3      nRBC 0.0 /100 WBC     Comprehensive Metabolic Panel [[id number]]  (Abnormal) Collected:  01/07/20 0534    Specimen:  Blood Updated:  01/07/20 0643     Glucose 114 mg/dL      BUN 59 mg/dL      Creatinine 4.68 mg/dL      Sodium 144 mmol/L      Potassium 4.6 mmol/L      Chloride 112 mmol/L      CO2 19.0 mmol/L      Calcium 6.4 mg/dL      Total Protein 5.5 g/dL      Albumin 3.50 g/dL      ALT (SGPT) 8 U/L      AST (SGOT) 10 U/L      Alkaline Phosphatase 48 U/L      Total Bilirubin 0.2 mg/dL      eGFR Non African Amer 13 mL/min/1.73      Comment: <15 Indicative of kidney failure.        eGFR   Amer --     Comment: <15 Indicative of kidney failure.        Globulin 2.0 gm/dL      A/G Ratio 1.8 g/dL      BUN/Creatinine Ratio 12.6     Anion Gap 13.0 mmol/L     Narrative:       GFR Normal >60  Chronic Kidney Disease <60  Kidney Failure <15      Basic Metabolic Panel [[id number]]  (Abnormal) Collected:  01/06/20 0611    Specimen:  Blood Updated:  01/06/20 0704     Glucose 135 mg/dL      BUN 65 mg/dL      Creatinine 4.87 mg/dL      Sodium 141 mmol/L      Potassium 4.8 mmol/L      Chloride 111 mmol/L      CO2 17.0 mmol/L      Calcium 6.9 mg/dL      eGFR   Amer --     Comment: <15 Indicative of kidney failure.        eGFR Non African Amer 12 mL/min/1.73      Comment: <15 Indicative of kidney failure.        BUN/Creatinine Ratio 13.3     Anion Gap 13.0 mmol/L     Narrative:       GFR Normal >60  Chronic Kidney Disease <60  Kidney Failure <15      CBC (No Diff) [[id number]]  (Abnormal) Collected:  01/06/20 0611    Specimen:  Blood Updated:  01/06/20 0702     WBC 10.12 10*3/mm3      RBC 2.66 10*6/mm3      Hemoglobin 8.4 g/dL      Hematocrit 24.3 %      MCV 91.4 fL      MCH 31.6 pg      MCHC 34.6 g/dL      RDW 13.8 %      RDW-SD 46.1 fl      MPV 9.8 fL      Platelets 197 10*3/mm3     Basic Metabolic Panel [[id number]]  (Abnormal) Collected:  01/05/20 1414    Specimen:  Blood Updated:  01/05/20 1515     Glucose  133 mg/dL      BUN 66 mg/dL      Creatinine 5.45 mg/dL      Sodium 141 mmol/L      Potassium 5.7 mmol/L      Chloride 112 mmol/L      CO2 16.0 mmol/L      Calcium 7.4 mg/dL      eGFR   Amer --     Comment: <15 Indicative of kidney failure.        eGFR Non African Amer 11 mL/min/1.73      Comment: <15 Indicative of kidney failure.        BUN/Creatinine Ratio 12.1     Anion Gap 13.0 mmol/L     Narrative:       GFR Normal >60  Chronic Kidney Disease <60  Kidney Failure <15      Vitamin D 25 Hydroxy [[id number]]  (Normal) Collected:  01/05/20 0520    Specimen:  Blood Updated:  01/05/20 1330     25 Hydroxy, Vitamin D 30.8 ng/ml     Narrative:       Reference Range for Total Vitamin D 25(OH)     Deficiency <20.0 ng/mL   Insufficiency 21-29 ng/mL   Sufficiency  ng/mL  Toxicity >100 ng/ml    Creatinine, Urine, Random - Urine, Clean Catch [[id number]] Collected:  01/04/20 1649    Specimen:  Urine, Clean Catch Updated:  01/05/20 1327     Creatinine, Urine 38.1 mg/dL     Narrative:       Reference intervals for random urine have not been established.  Clinical usage is dependent upon physician's interpretation in combination with other laboratory tests.       Urine Culture - Urine, Urine, Clean Catch [[id number]]  (Normal) Collected:  01/04/20 0830    Specimen:  Urine, Clean Catch Updated:  01/05/20 1207     Urine Culture No growth    Phosphorus [[id number]]  (Normal) Collected:  01/05/20 0520    Specimen:  Blood Updated:  01/05/20 0623     Phosphorus 2.6 mg/dL     Basic Metabolic Panel [[id number]]  (Abnormal) Collected:  01/05/20 0520    Specimen:  Blood Updated:  01/05/20 0623     Glucose 167 mg/dL      BUN 64 mg/dL      Creatinine 5.29 mg/dL      Sodium 140 mmol/L      Potassium 6.2 mmol/L      Chloride 111 mmol/L      CO2 16.0 mmol/L      Calcium 7.5 mg/dL      eGFR   Amer --     Comment: <15 Indicative of kidney failure.        eGFR Non African Amer 11 mL/min/1.73      Comment: <15 Indicative of kidney  failure.        BUN/Creatinine Ratio 12.1     Anion Gap 13.0 mmol/L     Narrative:       GFR Normal >60  Chronic Kidney Disease <60  Kidney Failure <15      Magnesium [[id number]]  (Normal) Collected:  01/05/20 0520    Specimen:  Blood Updated:  01/05/20 0609     Magnesium 1.7 mg/dL     Uric Acid [[id number]]  (Normal) Collected:  01/05/20 0520    Specimen:  Blood Updated:  01/05/20 0609     Uric Acid 4.7 mg/dL     PTH, Intact [[id number]]  (Abnormal) Collected:  01/05/20 0520    Specimen:  Blood Updated:  01/05/20 0603     PTH, Intact 285.9 pg/mL     CBC (No Diff) [[id number]]  (Abnormal) Collected:  01/05/20 0520    Specimen:  Blood Updated:  01/05/20 0546     WBC 6.65 10*3/mm3      RBC 3.19 10*6/mm3      Hemoglobin 10.0 g/dL      Hematocrit 28.6 %      MCV 89.7 fL      MCH 31.3 pg      MCHC 35.0 g/dL      RDW 13.8 %      RDW-SD 45.8 fl      MPV 9.5 fL      Platelets 214 10*3/mm3     Sodium, Urine, Random - Urine, Clean Catch [[id number]] Collected:  01/04/20 1649    Specimen:  Urine, Clean Catch Updated:  01/04/20 1708     Sodium, Urine 97 mmol/L     Narrative:       Reference intervals for random urine have not been established.  Clinical usage is dependent upon physician's interpretation in combination with other laboratory tests.       Urinalysis With Culture If Indicated - Urine, Clean Catch [[id number]]  (Abnormal) Collected:  01/04/20 0830    Specimen:  Urine, Clean Catch Updated:  01/04/20 0855     Color, UA Yellow     Appearance, UA Clear     pH, UA 5.5     Specific Gravity, UA 1.012     Glucose, UA Negative     Ketones, UA Negative     Bilirubin, UA Negative     Blood, UA Negative     Protein, UA 30 mg/dL (1+)     Leuk Esterase, UA Small (1+)     Nitrite, UA Negative     Urobilinogen, UA 0.2 E.U./dL    Urinalysis, Microscopic Only - Urine, Clean Catch [[id number]]  (Abnormal) Collected:  01/04/20 0830    Specimen:  Urine, Clean Catch Updated:  01/04/20 0855     RBC, UA None Seen /HPF      WBC, UA 6-12 /HPF       Bacteria, UA None Seen /HPF      Squamous Epithelial Cells, UA 0-2 /HPF      Hyaline Casts, UA 0-2 /LPF      Methodology Manual Light Microscopy    Comprehensive Metabolic Panel [[id number]]  (Abnormal) Collected:  01/04/20 0825    Specimen:  Blood Updated:  01/04/20 0854     Glucose 101 mg/dL      BUN 69 mg/dL      Creatinine 5.58 mg/dL      Sodium 138 mmol/L      Potassium 5.2 mmol/L      Chloride 108 mmol/L      CO2 18.0 mmol/L      Calcium 8.2 mg/dL      Total Protein 6.6 g/dL      Albumin 4.00 g/dL      ALT (SGPT) 8 U/L      AST (SGOT) 12 U/L      Comment: Specimen hemolyzed.  Results may be affected.        Alkaline Phosphatase 64 U/L      Total Bilirubin 0.3 mg/dL      eGFR Non African Amer 10 mL/min/1.73      Comment: <15 Indicative of kidney failure.        eGFR   Amer --     Comment: <15 Indicative of kidney failure.        Globulin 2.6 gm/dL      A/G Ratio 1.5 g/dL      BUN/Creatinine Ratio 12.4     Anion Gap 12.0 mmol/L     Narrative:       GFR Normal >60  Chronic Kidney Disease <60  Kidney Failure <15      CBC & Differential [[id number]] Collected:  01/04/20 0825    Specimen:  Blood Updated:  01/04/20 0833    Narrative:       The following orders were created for panel order CBC & Differential.  Procedure                               Abnormality         Status                     ---------                               -----------         ------                     CBC Auto Differential[[id number]]        Abnormal            Final result                 Please view results for these tests on the individual orders.    CBC Auto Differential [[id number]]  (Abnormal) Collected:  01/04/20 0825    Specimen:  Blood Updated:  01/04/20 0833     WBC 7.43 10*3/mm3      RBC 3.23 10*6/mm3      Hemoglobin 10.2 g/dL      Hematocrit 29.5 %      MCV 91.3 fL      MCH 31.6 pg      MCHC 34.6 g/dL      RDW 14.1 %      RDW-SD 47.6 fl      MPV 9.3 fL      Platelets 238 10*3/mm3      Neutrophil % 68.9 %      Lymphocyte %  "16.4 %      Monocyte % 10.9 %      Eosinophil % 3.1 %      Basophil % 0.3 %      Immature Grans % 0.4 %      Neutrophils, Absolute 5.12 10*3/mm3      Lymphocytes, Absolute 1.22 10*3/mm3      Monocytes, Absolute 0.81 10*3/mm3      Eosinophils, Absolute 0.23 10*3/mm3      Basophils, Absolute 0.02 10*3/mm3      Immature Grans, Absolute 0.03 10*3/mm3      nRBC 0.0 /100 WBC         Imaging Results (Last 7 Days)     ** No results found for the last 168 hours. **          Condition on Discharge:    Stable    Physical Exam on Discharge:  /80 (BP Location: Right arm, Patient Position: Sitting)   Pulse 72   Temp 98.4 °F (36.9 °C) (Oral)   Resp 18   Ht 177.8 cm (70\")   Wt 89.5 kg (197 lb 5 oz)   SpO2 97%   BMI 28.31 kg/m²   Physical Exam  Constitutional: He is oriented to person, place, and time. He appears well-developed and well-nourished.  Sitting in a chair at the foot of the bed. No distress.  His wife is present with him.  Head: Normocephalic and atraumatic.   Eyes: Conjunctivae and EOM are normal.   Neck: Neck supple. No JVD present.   Cardiovascular: Normal rate, regular rhythm, normal heart sounds and intact distal pulses. No murmur heard.  Pulmonary/Chest: Effort normal and breath sounds normal.   Abdominal: Soft. Bowel sounds are normal. He exhibits no distension. There is no tenderness.   Musculoskeletal: Normal range of motion. He exhibits no edema, tenderness or deformity.   Neurological: He is alert and oriented to person, place, and time.  Skin: Skin is warm and dry. No rash noted.   Psychiatric: He has a normal mood and affect.     Discharge Disposition:  Home or Self Care    Discharge Medications:     Discharge Medications      New Medications      Instructions Start Date   calcitriol 0.25 MCG capsule  Commonly known as:  ROCALTROL   0.25 mcg, Oral, Daily   Start Date:  January 11, 2020     pantoprazole 40 MG EC tablet  Commonly known as:  PROTONIX   40 mg, Oral, Daily      predniSONE 20 MG " tablet  Commonly known as:  DELTASONE   40 mg, Oral, Daily With Breakfast   Start Date:  January 11, 2020     sodium bicarbonate 650 MG tablet   650 mg, Oral, 3 Times Daily         Continue These Medications      Instructions Start Date   allopurinol 100 MG tablet  Commonly known as:  ZYLOPRIM   100 mg, Oral, 2 Times Daily      benzonatate 100 MG capsule  Commonly known as:  TESSALON   100 mg, Oral, 4 Times Daily      Calcium + D3 600-200 MG-UNIT tablet   1 tablet, Oral, 2 Times Daily      finasteride 5 MG tablet  Commonly known as:  PROSCAR   5 mg, Oral, Daily      ZOFRAN 8 MG tablet  Generic drug:  ondansetron   8 mg, Oral, Every 8 Hours         Stop These Medications    omeprazole 20 MG capsule  Commonly known as:  priLOSEC            Discharge Diet:   Diet Instructions     Diet: Regular; Thin      Discharge Diet:  Regular    Fluid Consistency:  Thin          Discharge Care Plan / Instructions:   Discharge home    Activity at Discharge:   Activity Instructions     Activity as Tolerated            Follow-up Appointments:  Follow-up the middle of next week with the nephrology office for reassessment of renal function       Bipin Burr DO  01/10/20  3:59 PM    Time: Discharge over 30 min    Please note that part of this note may be an electronic transcription/translation of spoken language to printed text using the Dragon Dictation System. Efforts were made to edit the dictations, but occasionally words are mistranscribed.

## 2020-01-10 NOTE — PLAN OF CARE
Problem: Patient Care Overview  Goal: Plan of Care Review  Flowsheets  Taken 1/10/2020 0420  Progress: improving  Taken 1/9/2020 2000  Plan of Care Reviewed With: patient  Taken 1/9/2020 0319  Outcome Summary: pt no c/o pain. IV fluids continue. vss. pt resting comfortably     Problem: Renal Failure/Kidney Injury, Acute (Adult)  Goal: Signs and Symptoms of Listed Potential Problems Will be Absent, Minimized or Managed (Renal Failure/Kidney Injury, Acute)  Flowsheets  Taken 1/8/2020 0312 by Connie Yao RN  Problems Assessed (Acute Renal Failure/Kidney Injury): all  Taken 1/8/2020 0243 by Connie Yao RN  Problems Present (ARF/Kidney Injury): electrolyte imbalance;fluid imbalance

## 2020-01-10 NOTE — PROGRESS NOTES
Continued Stay Note   [facility]     Patient Name: Parker Arnold  MRN: [id number]  Today's Date: 1/10/2020    Admit Date: 1/4/2020    Discharge Plan     Row Name 01/10/20 0834       Plan    Plan  Home    Patient/Family in Agreement with Plan  yes    Plan Comments  Pt will return home with spouse as before at discharge. No home needs identified. Will follow.         Discharge Codes    No documentation.             DANIEL Bradley

## 2020-01-10 NOTE — PROGRESS NOTES
Nephrology ([facility]) Progress Note      Patient:  Parker Arnold  YOB: 1955  Date of Service: 1/10/2020  MRN: [id number]   Acct: [id number]   Primary Care Physician: Betty Almaraz APRN  Advance Directive:   Code Status and Medical Interventions:   Ordered at: 01/04/20 1107     Level Of Support Discussed With:    Patient     Code Status:    CPR     Medical Interventions (Level of Support Prior to Arrest):    Full     Admit Date: 1/4/2020       Hospital Day: 6  Referring Provider: Savage Holloway DO      Patient personally seen and examined.  Complete chart including Consults, Notes, Operative Reports, Labs, Cardiology, and Radiology studies reviewed as able.        Subjective:  Parker Arnold is a 64 y.o. male  whom we were consulted for acute kidney injury.  Baseline chronic kidney disease stage 3, baseline creatinine approximately 2.4.  History of renal cell carcinoma status post right nephrectomy.  Was treated with chemotherapy including Sutent and Opdivo; subsequently developed acute interstitial nephritis (clinical diagnosis) of the left kidney.  He was treated with steroids with improvement and the risk of biopsy was felt to outweigh the benefits.  He was tapered off steroids about ten days ago.  Over the last several days began feeling poorly along with nausea/vomiting and decreased appetite, also had noted diffuse skin itching.  Outpatient labs on 12/30 creatinine 5.3; given IV fluids. Repeat labs on 1/02 did now show any improvement.  He had not noted any decreased urine output but was having urinary frequency; Renal US on 1/03 was unremarkable.  Hospital course remarkable for resumption of Prednisone with ongoing improvement of renal function    Today has no complaint.  No new overnight issues.  Patient eager for discharge soon.  Urine output nonoliguric    Allergies:  Amoxicillin; Penicillins; Clindamycin/lincomycin; Doxycycline; Hepatitis b  virus vaccine; Latex; and Levofloxacin    Home Meds:  Medications Prior to Admission   Medication Sig Dispense Refill Last Dose   • allopurinol (ZYLOPRIM) 100 MG tablet Take 100 mg by mouth 2 (Two) Times a Day.   Past Week at Unknown time   • benzonatate (TESSALON) 100 MG capsule Take 100 mg by mouth 4 (Four) Times a Day.   Past Week at Unknown time   • Calcium Carb-Cholecalciferol (CALCIUM + D3) 600-200 MG-UNIT tablet Take 1 tablet by mouth 2 (Two) Times a Day. 60 tablet 5 Past Week at Unknown time   • finasteride (PROSCAR) 5 MG tablet Take 5 mg by mouth Daily.  3 Past Week at Unknown time   • omeprazole (priLOSEC) 20 MG capsule Take 20 mg by mouth 2 (Two) Times a Day.  2 Past Week at Unknown time   • ondansetron (ZOFRAN) 8 MG tablet Take 8 mg by mouth Every 8 (Eight) Hours.   Past Week at Unknown time       Medicines:  Current Facility-Administered Medications   Medication Dose Route Frequency Provider Last Rate Last Dose   • acetaminophen (TYLENOL) tablet 650 mg  650 mg Oral Q4H PRN Savage Holloway DO        Or   • acetaminophen (TYLENOL) 160 MG/5ML solution 650 mg  650 mg Oral Q4H PRN Savage Holloway DO        Or   • acetaminophen (TYLENOL) suppository 650 mg  650 mg Rectal Q4H PRN Savage Holloway DO       • calcitriol (ROCALTROL) capsule 0.25 mcg  0.25 mcg Oral Daily Satinder Grant MD   0.25 mcg at 01/09/20 0905   • calcium gluconate 1 g in sodium chloride 0.9 % 100 mL IVPB  1 g Intravenous PRN Bipin Burr DO        And   • calcium gluconate 6 g in sodium chloride 0.9 % 500 mL IVPB  6 g Intravenous PRN Bipin Burr DO 83.3 mL/hr at 01/09/20 1907 6 g at 01/09/20 1907   • finasteride (PROSCAR) tablet 5 mg  5 mg Oral Daily Savage Holloway DO   5 mg at 01/09/20 0905   • ondansetron (ZOFRAN) injection 4 mg  4 mg Intravenous Q6H PRN Savage Holloway DO       • pantoprazole (PROTONIX) EC tablet 40 mg  40 mg Oral Q AM Savage Holloway DO   40 mg at 01/10/20 0623   • predniSONE (DELTASONE) tablet 40  mg  40 mg Oral Daily With Breakfast Won Ferrer MD   40 mg at 01/09/20 0905   • sodium bicarbonate tablet 650 mg  650 mg Oral TID Leo Dumont APRN   650 mg at 01/09/20 2018   • sodium chloride 0.9 % flush 10 mL  10 mL Intravenous PRN Castleman, Danna D, PA       • sodium chloride 0.9 % flush 10 mL  10 mL Intravenous Q12H Savage Holloway, DO   10 mL at 01/09/20 0905   • sodium chloride 0.9 % flush 10 mL  10 mL Intravenous PRN Savage Holloway, DO       • sodium chloride 0.9 % infusion  75 mL/hr Intravenous Continuous Leo Dumont, APRLEODAN 75 mL/hr at 01/09/20 1113 75 mL/hr at 01/09/20 1113       Past Medical History:  Past Medical History:   Diagnosis Date   • Dehydration 12/30/2019   • Nephritis and nephropathy 1/2/2020   • Renal cell carcinoma (CMS/HCC)    • Renal cell carcinoma of right kidney (CMS/HCC) 10/18/2019    Overview:  Diagnoses 11/11/14. Path report in [facility] at [facility]. Tissue has been requested.    • Toxicity, chemicals 1/2/2020       Past Surgical History:  Past Surgical History:   Procedure Laterality Date   • HERNIA REPAIR  2008   • LUNG BIOPSY  11/2018   • NEPHRECTOMY  2014   • SINUS SURGERY  2005       Family History  Family History   Problem Relation Age of Onset   • Other Mother         blood disease unknown   • Cancer Father         unknown   • No Known Problems Sister    • Heart disease Maternal Grandmother    • Heart disease Maternal Grandfather    • No Known Problems Paternal Grandmother    • No Known Problems Paternal Grandfather    • No Known Problems Sister        Social History  Social History     Socioeconomic History   • Marital status:      Spouse name: Not on file   • Number of children: Not on file   • Years of education: Not on file   • Highest education level: Not on file   Tobacco Use   • Smoking status: Former Smoker     Packs/day: 1.00     Years: 15.00     Pack years: 15.00     Types: Cigarettes     Last attempt to quit: 1984     " Years since quittin.0   • Smokeless tobacco: Never Used   Substance and Sexual Activity   • Alcohol use: No     Frequency: Never   • Drug use: No   • Sexual activity: Defer         Review of Systems:  History obtained from chart review and the patient  General ROS: No fever or chills  Respiratory ROS: No cough, shortness of breath, wheezing  Cardiovascular ROS: No chest pain or palpitations  Gastrointestinal ROS: No abdominal pain or melena  Genito-Urinary ROS: No dysuria or hematuria  Neurological ROS: no headache or dizziness    Objective:  Patient Vitals for the past 24 hrs:   BP Temp Temp src Pulse Resp SpO2 Height Weight   01/10/20 0828 -- -- -- -- -- -- 177.8 cm (70\") 89.5 kg (197 lb 5 oz)   01/10/20 0729 101/57 97.9 °F (36.6 °C) Oral 60 18 98 % -- --   01/10/20 0300 106/60 98.1 °F (36.7 °C) Oral 61 18 99 % -- --   20 1947 144/71 98.2 °F (36.8 °C) Oral 70 18 98 % -- --   20 1127 127/74 98.1 °F (36.7 °C) Oral 82 18 99 % -- --       Intake/Output Summary (Last 24 hours) at 1/10/2020 1002  Last data filed at 1/10/2020 0729  Gross per 24 hour   Intake 480 ml   Output 1050 ml   Net -570 ml     General: awake/alert    Neck: supple, no JVD  Chest:  clear to auscultation bilaterally without respiratory distress  CVS: regular rate and rhythm  Abdominal: soft, nontender, positive bowel sounds  Extremities: no cyanosis or edema  Skin: warm and dry without rash  Neuro: no focal motor deficits    Labs:  Results from last 7 days   Lab Units 20  0432 20  0501 20  0611   WBC 10*3/mm3 7.14 8.81 10.12   HEMOGLOBIN g/dL 7.8* 8.3* 8.4*   HEMATOCRIT % 22.2* 23.7* 24.3*   PLATELETS 10*3/mm3 188 199 197         Results from last 7 days   Lab Units 01/10/20  0412 20  1801 20  0432 20  0501   SODIUM mmol/L 144  --  144 143   POTASSIUM mmol/L 3.8  --  3.7 4.1   CHLORIDE mmol/L 107  --  109* 111*   CO2 mmol/L 25.0  --  25.0 21.0*   BUN mg/dL 51*  --  52* 56*   CREATININE mg/dL 3.51*  " --  3.39* 3.91*   CALCIUM mg/dL 7.0* 6.2* 5.8* 6.3*   BILIRUBIN mg/dL 0.2  --  <0.2* 0.2   ALK PHOS U/L 37*  --  38* 44   ALT (SGPT) U/L 9  --  7 9   AST (SGOT) U/L 8  --  8 8   GLUCOSE mg/dL 108*  --  107* 110*       Radiology:   Imaging Results (Last 72 Hours)     ** No results found for the last 72 hours. **          Culture:  Urine Culture   Date Value Ref Range Status   01/04/2020 No growth  Final         Assessment   1.  Acute kidney injury; allergic interstitial nephritis--improving  2.  Baseline chronic kidney disease stage 3  3.  Status post right nephrectomy  4.  Prior right renal cell carcinoma with current metastatic disease to lung  5.  Metabolic acidosis--improved  6.  BPH with chronic urinary frequency  7.  Hypocalcemia--improving    Plan:  1.  Renal function slightly worse today; patient very anxious for discharge and is clinically quite stable. He does understand the risk of going home with such a low renal function and is willing to stay in hospital if it is necessary.  2.  Continue IV fluids  3.  Monitor labs      Leo Dumont, APRN  1/10/2020  10:02 AM

## 2020-01-11 ENCOUNTER — READMISSION MANAGEMENT (OUTPATIENT)
Dept: CALL CENTER | Facility: HOSPITAL | Age: 65
End: 2020-01-11

## 2020-01-11 NOTE — OUTREACH NOTE
Prep Survey      Responses   Facility patient discharged from?  [facility]   Is patient eligible?  Yes   Discharge diagnosis  Renal cell carcinoma of right kidney    Does the patient have one of the following disease processes/diagnoses(primary or secondary)?  Other   Does the patient have Home health ordered?  No   Is there a DME ordered?  No   Prep survey completed?  Yes          Carolyn Gonzalez RN

## 2020-01-11 NOTE — PAYOR COMM NOTE
"[facility] 1-10-20  [id number]  Rosa Arias (64 y.o. Male)     Date of Birth Social Security Number Address Home Phone MRN    [id number]  [address] [facility] [address] [phone] [id number]    Gnosticist Marital Status          Zoroastrianism        Admission Date Admission Type Admitting Provider Attending Provider Department, Room/Bed    1/4/20 Emergency Bipin Burr DO  [address], 480/1    Discharge Date Discharge Disposition Discharge Destination        1/10/2020 Home or Self Care Home             Attending Provider:  (none)   Allergies:  Amoxicillin, Penicillins, Clindamycin/lincomycin, Doxycycline, Hepatitis B Virus Vaccine, Latex, Levofloxacin    Isolation:  None   Infection:  None   Code Status:  Prior    Ht:  177.8 cm (70\")   Wt:  89.5 kg (197 lb 5 oz)    Admission Cmt:  None   Principal Problem:  Acute renal failure with tubular necrosis in the setting of solitary kidney [N17.0]                 Active Insurance as of 1/4/2020     Primary Coverage     Payor Plan Insurance Group Employer/Plan Group    MISC COMMERCIAL MISC COMMERCIAL [id number]     Coverage Address Coverage Phone Number Coverage Fax Number Effective Dates    [address] [phone]  9/2/2018 - None Entered    [facility] [id number]       Subscriber Name Subscriber Birth Date Member ID       ANG ARIAS 8/13/1967 [id number]           Secondary Coverage     Payor Plan Insurance Group Employer/Plan Group    MEDICARE MEDICARE A & B      Payor Plan Address Payor Plan Phone Number Payor Plan Fax Number Effective Dates    [address] [phone]  3/1/2019 - None Entered    [facility] [id number]       Subscriber Name Subscriber Birth Date Member ID       ROSA ARIAS 1955 [id number]                 Emergency Contacts      (Rel.) Home Phone Work Phone Mobile Phone    ANG ARIAS (Spouse) -- -- [phone]               Discharge Summary      Bipin Burr DO at 01/10/20 " 1559              [facility] Medicine Services  DISCHARGE SUMMARY       Date of Admission: 1/4/2020  Date of Discharge:  1/10/2020  Primary Care Physician: Betty Almaraz APRN    Discharge Diagnoses:  Patient Active Problem List   Diagnosis   • Renal cell carcinoma of right kidney (CMS/HCC)   • Multiple nodules of lung   • Lung anomaly   • TIA (transient ischemic attack)   • Acute renal failure with tubular necrosis in the setting of solitary kidney   • Lung metastases (CMS/HCC)   • Pyuria   • Hyperkalemia   • Metabolic acidosis   • Hyperglycemia   • Hypercalcemia   • Benign prostatic hyperplasia   • Increased frequency of urination   • Nocturia   • Retention of urine   • Dehydration   • Nephritis and nephropathy   • Toxicity, chemicals   • Stage 3 chronic kidney disease (CMS/HCC)         Presenting Problem/History of Present Illness:  AMAURI (acute kidney injury) (CMS/HCC) [N17.9]     Chief Complaint on Day of Discharge:   No complaints    History of Present Illness on Day of Discharge:   The patient is very anxious for discharge.  He understands that his renal function has not recovered back to its usual baseline and that everyone would prefer that his creatinine improve to less than 3.05 to discharge.  He understands there is an element of risk to being discharged today and is willing to accept that risk.  He has promised to follow-up with the nephrology office next week for reassessment of his renal function.  He has been instructed to drink sufficient fluids with a goal of no less than 2 L/day.    Hospital Course  This is a 64-year-old  gentleman who resides in [place], [place].  He sees Betty Hernadez for primary care.  He follows with Dr. Burleson for history of metastatic renal cell carcinoma.  He had a right nephrectomy in 2014 and has had recurrence in his lungs.  He was treated with Sutent and Opdivo last fall and developed what was thought to be interstitial  nephritis of the remaining left kidney.  He was treated with a prolonged course of oral steroids, prednisone.  His renal function had improved with this.  There was some thought given to the possibility of a biopsy, but it was felt that the risk outweighed the benefits as his renal function was improving on steroids and he only has 1 kidney.  He was seen by Dr. Grant during his stay in the hospital in October.     He has been doing well as an outpatient recently.  He has been followed in the nephrology clinic by Leo Dumont.  He was not feeling well over about the last week.  He did not feel that his mentation was a sharp.  He has been nauseous.  He has had vomiting at times.  He was concerned that there was a new problem with his labs.  He ultimately had new labs drawn on 12/30.  His serum creatinine was found to be 5.32 with a CO2 of 17, but normal potassium.  An attempt was made to hydrate him as an outpatient, but his renal function has been no better on repeat labs on 1/2.  He was contacted yesterday and told to come to the hospital by Leo Dumont.  However, he waited until this morning.  Serum creatinine is 5.58 at present with a bicarb of 18 and a normal potassium level.     He does not complain of a decrease in his urine output.     He tells me that he finally tapered off prednisone about a week prior to [place].     His serum creatinine was 3.55 when we discharged him on 10/22.  He was followed subsequently in the nephrology clinic and ultimately had a serum creatinine that got down to 2.46 on 11/1.  He had labs repeated on 11/15 and had a creatinine of 3.2.  Creatinine was then 2.36 on 12/2.     He has not had any further treatment of his metastatic renal cell carcinoma since September.  Plan:   He will be admitted to my service for [facility].  He has a history of solitary kidney after undergoing a nephrectomy of the right kidney in 2014 for renal cell carcinoma.  Unfortunately, he  has had recurrence with metastatic disease to the lung.  He has started seeing Dr. Burleson after following with oncology in [place], [place].  He had an episode acute renal failure in October 2019 that was felt secondary to Sutent and Opdivo.  He presents today with worsening renal function on an outpatient basis.  Nephrology has tried to give him outpatient fluids with no improvement in his labs.  He had an outpatient renal ultrasound yesterday that showed prior right nephrectomy and a left renal cyst with no hydronephrosis.  He is admitted for nephrology consultation and management.     There was some question as to whether or not we need to proceed with a biopsy the last time that this happened.  Dr. Grant elected not to given the fact that he had solitary kidney and improving numbers on prednisone.  Nephrology may want to entertain a biopsy at this point.  I will hold on further steroids until he is evaluated by Dr. Ferrer.  In the meantime, will obtain a urinalysis and urine electrolytes.  IV fluids.    The patient was restarted on oral steroids.  There is no consideration for biopsy at this point given that the risk is greater than the benefit currently particularly since the patient has only 1 kidney.  Bicarbonate was started at 650 mg and uptitrated to 3 times a day dosing to manage metabolic acidosis.  Following admission, after treatment, the patient's potassium had returned to normal range.  IV fluids were continued throughout his hospitalization.  The patient was started on Calcitrol as well oral prednisone was continued throughout his hospital stay.    The patient is very anxious for discharge.  He understands that his renal function has not recovered back to its usual baseline and that everyone would prefer that his creatinine improve to less than 3.05 to discharge.  He understands there is an element of risk to being discharged today and is willing to accept that risk.  He has promised to follow-up  with the nephrology office next week for reassessment of his renal function.  He has been instructed to drink sufficient fluids with a goal of no less than 2 L/day.      Consults:   Nephrology:  Assessment   Acute renal failure  Chronic kidney disease stage III  Solitary left kidney  Renal cell carcinoma of the right kidney status post nephrectomy with lung metastases  Metabolic acidosis  Nausea vomiting  BPH with lower urinary tract symptoms     Plan:  Discussed with patient, nursing, family, Dr. Holloway  Work-up ordered and ongoing  IV fluids  Steroids  Defer any consideration of biopsy at this point given the risk for now the same as they were previously and monitor response to therapy.  We would not be able to do a renal biopsy until Monday at this point anyway        Thank you for the consult, we appreciate the opportunity to provide care to your patients.  Feel free to contact me if I can be of any further assistance.       Won Ferrer MD      Pertinent Test Results:   Lab Results (last 7 days)     Procedure Component Value Units Date/Time    Comprehensive Metabolic Panel [[id number]]  (Abnormal) Collected:  01/10/20 0412    Specimen:  Blood Updated:  01/10/20 0508     Glucose 108 mg/dL      BUN 51 mg/dL      Creatinine 3.51 mg/dL      Sodium 144 mmol/L      Potassium 3.8 mmol/L      Chloride 107 mmol/L      CO2 25.0 mmol/L      Calcium 7.0 mg/dL      Total Protein 5.1 g/dL      Albumin 3.40 g/dL      ALT (SGPT) 9 U/L      AST (SGOT) 8 U/L      Alkaline Phosphatase 37 U/L      Total Bilirubin 0.2 mg/dL      eGFR Non African Amer 18 mL/min/1.73      Globulin 1.7 gm/dL      A/G Ratio 2.0 g/dL      BUN/Creatinine Ratio 14.5     Anion Gap 12.0 mmol/L     Narrative:       GFR Normal >60  Chronic Kidney Disease <60  Kidney Failure <15      Calcium [[id number]]  (Abnormal) Collected:  01/09/20 1801    Specimen:  Blood Updated:  01/09/20 1825     Calcium 6.2 mg/dL     Comprehensive Metabolic Panel  [[id number]]  (Abnormal) Collected:  01/09/20 0432    Specimen:  Blood Updated:  01/09/20 0546     Glucose 107 mg/dL      BUN 52 mg/dL      Creatinine 3.39 mg/dL      Sodium 144 mmol/L      Potassium 3.7 mmol/L      Chloride 109 mmol/L      CO2 25.0 mmol/L      Calcium 5.8 mg/dL      Total Protein 5.1 g/dL      Albumin 3.30 g/dL      ALT (SGPT) 7 U/L      AST (SGOT) 8 U/L      Alkaline Phosphatase 38 U/L      Total Bilirubin <0.2 mg/dL      eGFR Non African Amer 18 mL/min/1.73      Globulin 1.8 gm/dL      A/G Ratio 1.8 g/dL      BUN/Creatinine Ratio 15.3     Anion Gap 10.0 mmol/L     Narrative:       GFR Normal >60  Chronic Kidney Disease <60  Kidney Failure <15      CBC & Differential [[id number]] Collected:  01/09/20 0432    Specimen:  Blood Updated:  01/09/20 0503    Narrative:       The following orders were created for panel order CBC & Differential.  Procedure                               Abnormality         Status                     ---------                               -----------         ------                     CBC Auto Differential[[id number]]        Abnormal            Final result                 Please view results for these tests on the individual orders.    CBC Auto Differential [[id number]]  (Abnormal) Collected:  01/09/20 0432    Specimen:  Blood Updated:  01/09/20 0503     WBC 7.14 10*3/mm3      RBC 2.48 10*6/mm3      Hemoglobin 7.8 g/dL      Hematocrit 22.2 %      MCV 89.5 fL      MCH 31.5 pg      MCHC 35.1 g/dL      RDW 13.6 %      RDW-SD 44.7 fl      MPV 9.6 fL      Platelets 188 10*3/mm3      Neutrophil % 71.7 %      Lymphocyte % 18.1 %      Monocyte % 9.0 %      Eosinophil % 0.3 %      Basophil % 0.1 %      Immature Grans % 0.8 %      Neutrophils, Absolute 5.12 10*3/mm3      Lymphocytes, Absolute 1.29 10*3/mm3      Monocytes, Absolute 0.64 10*3/mm3      Eosinophils, Absolute 0.02 10*3/mm3      Basophils, Absolute 0.01 10*3/mm3      Immature Grans, Absolute 0.06 10*3/mm3      nRBC 0.0 /100  WBC     Comprehensive Metabolic Panel [[id number]]  (Abnormal) Collected:  01/08/20 0501    Specimen:  Blood Updated:  01/08/20 0538     Glucose 110 mg/dL      BUN 56 mg/dL      Creatinine 3.91 mg/dL      Sodium 143 mmol/L      Potassium 4.1 mmol/L      Chloride 111 mmol/L      CO2 21.0 mmol/L      Calcium 6.3 mg/dL      Total Protein 5.4 g/dL      Albumin 3.40 g/dL      ALT (SGPT) 9 U/L      AST (SGOT) 8 U/L      Alkaline Phosphatase 44 U/L      Total Bilirubin 0.2 mg/dL      eGFR Non African Amer 16 mL/min/1.73      Globulin 2.0 gm/dL      A/G Ratio 1.7 g/dL      BUN/Creatinine Ratio 14.3     Anion Gap 11.0 mmol/L     Narrative:       GFR Normal >60  Chronic Kidney Disease <60  Kidney Failure <15      CBC & Differential [[id number]] Collected:  01/08/20 0501    Specimen:  Blood Updated:  01/08/20 0519    Narrative:       The following orders were created for panel order CBC & Differential.  Procedure                               Abnormality         Status                     ---------                               -----------         ------                     CBC Auto Differential[[id number]]        Abnormal            Final result                 Please view results for these tests on the individual orders.    CBC Auto Differential [[id number]]  (Abnormal) Collected:  01/08/20 0501    Specimen:  Blood Updated:  01/08/20 0519     WBC 8.81 10*3/mm3      RBC 2.62 10*6/mm3      Hemoglobin 8.3 g/dL      Hematocrit 23.7 %      MCV 90.5 fL      MCH 31.7 pg      MCHC 35.0 g/dL      RDW 13.9 %      RDW-SD 45.7 fl      MPV 9.5 fL      Platelets 199 10*3/mm3      Neutrophil % 78.2 %      Lymphocyte % 13.3 %      Monocyte % 7.6 %      Eosinophil % 0.2 %      Basophil % 0.1 %      Immature Grans % 0.6 %      Neutrophils, Absolute 6.89 10*3/mm3      Lymphocytes, Absolute 1.17 10*3/mm3      Monocytes, Absolute 0.67 10*3/mm3      Eosinophils, Absolute 0.02 10*3/mm3      Basophils, Absolute 0.01 10*3/mm3      Immature Grans,  Absolute 0.05 10*3/mm3      nRBC 0.0 /100 WBC     Comprehensive Metabolic Panel [[id number]]  (Abnormal) Collected:  01/07/20 0534    Specimen:  Blood Updated:  01/07/20 0643     Glucose 114 mg/dL      BUN 59 mg/dL      Creatinine 4.68 mg/dL      Sodium 144 mmol/L      Potassium 4.6 mmol/L      Chloride 112 mmol/L      CO2 19.0 mmol/L      Calcium 6.4 mg/dL      Total Protein 5.5 g/dL      Albumin 3.50 g/dL      ALT (SGPT) 8 U/L      AST (SGOT) 10 U/L      Alkaline Phosphatase 48 U/L      Total Bilirubin 0.2 mg/dL      eGFR Non African Amer 13 mL/min/1.73      Comment: <15 Indicative of kidney failure.        eGFR   Amer --     Comment: <15 Indicative of kidney failure.        Globulin 2.0 gm/dL      A/G Ratio 1.8 g/dL      BUN/Creatinine Ratio 12.6     Anion Gap 13.0 mmol/L     Narrative:       GFR Normal >60  Chronic Kidney Disease <60  Kidney Failure <15      Basic Metabolic Panel [[id number]]  (Abnormal) Collected:  01/06/20 0611    Specimen:  Blood Updated:  01/06/20 0704     Glucose 135 mg/dL      BUN 65 mg/dL      Creatinine 4.87 mg/dL      Sodium 141 mmol/L      Potassium 4.8 mmol/L      Chloride 111 mmol/L      CO2 17.0 mmol/L      Calcium 6.9 mg/dL      eGFR   Amer --     Comment: <15 Indicative of kidney failure.        eGFR Non African Amer 12 mL/min/1.73      Comment: <15 Indicative of kidney failure.        BUN/Creatinine Ratio 13.3     Anion Gap 13.0 mmol/L     Narrative:       GFR Normal >60  Chronic Kidney Disease <60  Kidney Failure <15      CBC (No Diff) [[id number]]  (Abnormal) Collected:  01/06/20 0611    Specimen:  Blood Updated:  01/06/20 0702     WBC 10.12 10*3/mm3      RBC 2.66 10*6/mm3      Hemoglobin 8.4 g/dL      Hematocrit 24.3 %      MCV 91.4 fL      MCH 31.6 pg      MCHC 34.6 g/dL      RDW 13.8 %      RDW-SD 46.1 fl      MPV 9.8 fL      Platelets 197 10*3/mm3     Basic Metabolic Panel [[id number]]  (Abnormal) Collected:  01/05/20 1414    Specimen:  Blood Updated:  01/05/20  1515     Glucose 133 mg/dL      BUN 66 mg/dL      Creatinine 5.45 mg/dL      Sodium 141 mmol/L      Potassium 5.7 mmol/L      Chloride 112 mmol/L      CO2 16.0 mmol/L      Calcium 7.4 mg/dL      eGFR   Amer --     Comment: <15 Indicative of kidney failure.        eGFR Non African Amer 11 mL/min/1.73      Comment: <15 Indicative of kidney failure.        BUN/Creatinine Ratio 12.1     Anion Gap 13.0 mmol/L     Narrative:       GFR Normal >60  Chronic Kidney Disease <60  Kidney Failure <15      Vitamin D 25 Hydroxy [[id number]]  (Normal) Collected:  01/05/20 0520    Specimen:  Blood Updated:  01/05/20 1330     25 Hydroxy, Vitamin D 30.8 ng/ml     Narrative:       Reference Range for Total Vitamin D 25(OH)     Deficiency <20.0 ng/mL   Insufficiency 21-29 ng/mL   Sufficiency  ng/mL  Toxicity >100 ng/ml    Creatinine, Urine, Random - Urine, Clean Catch [[id number]] Collected:  01/04/20 1649    Specimen:  Urine, Clean Catch Updated:  01/05/20 1327     Creatinine, Urine 38.1 mg/dL     Narrative:       Reference intervals for random urine have not been established.  Clinical usage is dependent upon physician's interpretation in combination with other laboratory tests.       Urine Culture - Urine, Urine, Clean Catch [[id number]]  (Normal) Collected:  01/04/20 0830    Specimen:  Urine, Clean Catch Updated:  01/05/20 1207     Urine Culture No growth    Phosphorus [[id number]]  (Normal) Collected:  01/05/20 0520    Specimen:  Blood Updated:  01/05/20 0623     Phosphorus 2.6 mg/dL     Basic Metabolic Panel [[id number]]  (Abnormal) Collected:  01/05/20 0520    Specimen:  Blood Updated:  01/05/20 0623     Glucose 167 mg/dL      BUN 64 mg/dL      Creatinine 5.29 mg/dL      Sodium 140 mmol/L      Potassium 6.2 mmol/L      Chloride 111 mmol/L      CO2 16.0 mmol/L      Calcium 7.5 mg/dL      eGFR   Amer --     Comment: <15 Indicative of kidney failure.        eGFR Non African Amer 11 mL/min/1.73      Comment: <15  Indicative of kidney failure.        BUN/Creatinine Ratio 12.1     Anion Gap 13.0 mmol/L     Narrative:       GFR Normal >60  Chronic Kidney Disease <60  Kidney Failure <15      Magnesium [[id number]]  (Normal) Collected:  01/05/20 0520    Specimen:  Blood Updated:  01/05/20 0609     Magnesium 1.7 mg/dL     Uric Acid [[id number]]  (Normal) Collected:  01/05/20 0520    Specimen:  Blood Updated:  01/05/20 0609     Uric Acid 4.7 mg/dL     PTH, Intact [[id number]]  (Abnormal) Collected:  01/05/20 0520    Specimen:  Blood Updated:  01/05/20 0603     PTH, Intact 285.9 pg/mL     CBC (No Diff) [[id number]]  (Abnormal) Collected:  01/05/20 0520    Specimen:  Blood Updated:  01/05/20 0546     WBC 6.65 10*3/mm3      RBC 3.19 10*6/mm3      Hemoglobin 10.0 g/dL      Hematocrit 28.6 %      MCV 89.7 fL      MCH 31.3 pg      MCHC 35.0 g/dL      RDW 13.8 %      RDW-SD 45.8 fl      MPV 9.5 fL      Platelets 214 10*3/mm3     Sodium, Urine, Random - Urine, Clean Catch [[id number]] Collected:  01/04/20 1649    Specimen:  Urine, Clean Catch Updated:  01/04/20 1708     Sodium, Urine 97 mmol/L     Narrative:       Reference intervals for random urine have not been established.  Clinical usage is dependent upon physician's interpretation in combination with other laboratory tests.       Urinalysis With Culture If Indicated - Urine, Clean Catch [[id number]]  (Abnormal) Collected:  01/04/20 0830    Specimen:  Urine, Clean Catch Updated:  01/04/20 0855     Color, UA Yellow     Appearance, UA Clear     pH, UA 5.5     Specific Gravity, UA 1.012     Glucose, UA Negative     Ketones, UA Negative     Bilirubin, UA Negative     Blood, UA Negative     Protein, UA 30 mg/dL (1+)     Leuk Esterase, UA Small (1+)     Nitrite, UA Negative     Urobilinogen, UA 0.2 E.U./dL    Urinalysis, Microscopic Only - Urine, Clean Catch [[id number]]  (Abnormal) Collected:  01/04/20 0830    Specimen:  Urine, Clean Catch Updated:  01/04/20 0855     RBC, UA None Seen /HPF       WBC, UA 6-12 /HPF      Bacteria, UA None Seen /HPF      Squamous Epithelial Cells, UA 0-2 /HPF      Hyaline Casts, UA 0-2 /LPF      Methodology Manual Light Microscopy    Comprehensive Metabolic Panel [[id number]]  (Abnormal) Collected:  01/04/20 0825    Specimen:  Blood Updated:  01/04/20 0854     Glucose 101 mg/dL      BUN 69 mg/dL      Creatinine 5.58 mg/dL      Sodium 138 mmol/L      Potassium 5.2 mmol/L      Chloride 108 mmol/L      CO2 18.0 mmol/L      Calcium 8.2 mg/dL      Total Protein 6.6 g/dL      Albumin 4.00 g/dL      ALT (SGPT) 8 U/L      AST (SGOT) 12 U/L      Comment: Specimen hemolyzed.  Results may be affected.        Alkaline Phosphatase 64 U/L      Total Bilirubin 0.3 mg/dL      eGFR Non African Amer 10 mL/min/1.73      Comment: <15 Indicative of kidney failure.        eGFR   Amer --     Comment: <15 Indicative of kidney failure.        Globulin 2.6 gm/dL      A/G Ratio 1.5 g/dL      BUN/Creatinine Ratio 12.4     Anion Gap 12.0 mmol/L     Narrative:       GFR Normal >60  Chronic Kidney Disease <60  Kidney Failure <15      CBC & Differential [[id number]] Collected:  01/04/20 0825    Specimen:  Blood Updated:  01/04/20 0833    Narrative:       The following orders were created for panel order CBC & Differential.  Procedure                               Abnormality         Status                     ---------                               -----------         ------                     CBC Auto Differential[[id number]]        Abnormal            Final result                 Please view results for these tests on the individual orders.    CBC Auto Differential [[id number]]  (Abnormal) Collected:  01/04/20 0825    Specimen:  Blood Updated:  01/04/20 0833     WBC 7.43 10*3/mm3      RBC 3.23 10*6/mm3      Hemoglobin 10.2 g/dL      Hematocrit 29.5 %      MCV 91.3 fL      MCH 31.6 pg      MCHC 34.6 g/dL      RDW 14.1 %      RDW-SD 47.6 fl      MPV 9.3 fL      Platelets 238 10*3/mm3      Neutrophil %  "68.9 %      Lymphocyte % 16.4 %      Monocyte % 10.9 %      Eosinophil % 3.1 %      Basophil % 0.3 %      Immature Grans % 0.4 %      Neutrophils, Absolute 5.12 10*3/mm3      Lymphocytes, Absolute 1.22 10*3/mm3      Monocytes, Absolute 0.81 10*3/mm3      Eosinophils, Absolute 0.23 10*3/mm3      Basophils, Absolute 0.02 10*3/mm3      Immature Grans, Absolute 0.03 10*3/mm3      nRBC 0.0 /100 WBC         Imaging Results (Last 7 Days)     ** No results found for the last 168 hours. **          Condition on Discharge:    Stable    Physical Exam on Discharge:  /80 (BP Location: Right arm, Patient Position: Sitting)   Pulse 72   Temp 98.4 °F (36.9 °C) (Oral)   Resp 18   Ht 177.8 cm (70\")   Wt 89.5 kg (197 lb 5 oz)   SpO2 97%   BMI 28.31 kg/m²    Physical Exam  Constitutional: He is oriented to person, place, and time. He appears well-developed and well-nourished.  Sitting in a chair at the foot of the bed. No distress.  His wife is present with him.  Head: Normocephalic and atraumatic.   Eyes: Conjunctivae and EOM are normal.   Neck: Neck supple. No JVD present.   Cardiovascular: Normal rate, regular rhythm, normal heart sounds and intact distal pulses. No murmur heard.  Pulmonary/Chest: Effort normal and breath sounds normal.   Abdominal: Soft. Bowel sounds are normal. He exhibits no distension. There is no tenderness.   Musculoskeletal: Normal range of motion. He exhibits no edema, tenderness or deformity.   Neurological: He is alert and oriented to person, place, and time.  Skin: Skin is warm and dry. No rash noted.   Psychiatric: He has a normal mood and affect.     Discharge Disposition:  Home or Self Care    Discharge Medications:     Discharge Medications      New Medications      Instructions Start Date   calcitriol 0.25 MCG capsule  Commonly known as:  ROCALTROL   0.25 mcg, Oral, Daily   Start Date:  January 11, 2020     pantoprazole 40 MG EC tablet  Commonly known as:  PROTONIX   40 mg, Oral, Daily   "    predniSONE 20 MG tablet  Commonly known as:  DELTASONE   40 mg, Oral, Daily With Breakfast   Start Date:  January 11, 2020     sodium bicarbonate 650 MG tablet   650 mg, Oral, 3 Times Daily         Continue These Medications      Instructions Start Date   allopurinol 100 MG tablet  Commonly known as:  ZYLOPRIM   100 mg, Oral, 2 Times Daily      benzonatate 100 MG capsule  Commonly known as:  TESSALON   100 mg, Oral, 4 Times Daily      Calcium + D3 600-200 MG-UNIT tablet   1 tablet, Oral, 2 Times Daily      finasteride 5 MG tablet  Commonly known as:  PROSCAR   5 mg, Oral, Daily      ZOFRAN 8 MG tablet  Generic drug:  ondansetron   8 mg, Oral, Every 8 Hours         Stop These Medications    omeprazole 20 MG capsule  Commonly known as:  priLOSEC            Discharge Diet:   Diet Instructions     Diet: Regular; Thin      Discharge Diet:  Regular    Fluid Consistency:  Thin          Discharge Care Plan / Instructions:   Discharge home    Activity at Discharge:   Activity Instructions     Activity as Tolerated            Follow-up Appointments:  Follow-up the middle of next week with the nephrology office for reassessment of renal function       Bipin Burr DO  01/10/20  3:59 PM    Time: Discharge over 30 min    Please note that part of this note may be an electronic transcription/translation of spoken language to printed text using the Dragon Dictation System. Efforts were made to edit the dictations, but occasionally words are mistranscribed.            Electronically signed by Bipin Burr DO at 01/10/20 [id number]

## 2020-01-14 ENCOUNTER — READMISSION MANAGEMENT (OUTPATIENT)
Dept: CALL CENTER | Facility: HOSPITAL | Age: 65
End: 2020-01-14

## 2020-01-14 NOTE — OUTREACH NOTE
Medical Week 1 Survey      Responses   Facility patient discharged from?  [place]   Does the patient have one of the following disease processes/diagnoses(primary or secondary)?  Other   Is there a successful TCM telephone encounter documented?  No   Week 1 attempt successful?  Yes   Call start time  1533   Call end time  1534   Discharge diagnosis  Renal cell carcinoma of right kidney    Meds reviewed with patient/caregiver?  Yes   Is the patient having any side effects they believe may be caused by any medication additions or changes?  No   Does the patient have all medications ordered at discharge?  Yes   Is the patient taking all medications as directed (includes completed medication regime)?  Yes   Does the patient have a primary care provider?   Yes   Does the patient have an appointment with their PCP within 7 days of discharge?  Yes   Has the patient kept scheduled appointments due by today?  Yes   Psychosocial issues?  No   Did the patient receive a copy of their discharge instructions?  Yes   Nursing interventions  Reviewed instructions with patient   What is the patient's perception of their health status since discharge?  Same   Is the patient/caregiver able to teach back signs and symptoms related to disease process for when to call PCP?  Yes   Is the patient/caregiver able to teach back signs and symptoms related to disease process for when to call 911?  Yes   Is the patient/caregiver able to teach back the hierarchy of who to call/visit for symptoms/problems? PCP, Specialist, Home health nurse, Urgent Care, ED, 911  Yes   Week 1 call completed?  Yes          Erica Jackson RN

## 2020-01-16 ENCOUNTER — HOSPITAL ENCOUNTER (OUTPATIENT)
Dept: CT IMAGING | Facility: HOSPITAL | Age: 65
Discharge: HOME | End: 2020-01-16

## 2020-01-16 ENCOUNTER — HOSPITAL ENCOUNTER (OUTPATIENT)
Dept: CT IMAGING | Facility: HOSPITAL | Age: 65
Discharge: HOME | End: 2020-01-16
Admitting: INTERNAL MEDICINE

## 2020-01-16 DIAGNOSIS — C64.1 RENAL CELL CARCINOMA OF RIGHT KIDNEY (HCC): ICD-10-CM

## 2020-01-16 PROCEDURE — A9552 F18 FDG: HCPCS | Performed by: INTERNAL MEDICINE

## 2020-01-16 PROCEDURE — 78815 PET IMAGE W/CT SKULL-THIGH: CPT

## 2020-01-16 PROCEDURE — 0 FLUDEOXYGLUCOSE F18 SOLUTION: Performed by: INTERNAL MEDICINE

## 2020-01-16 RX ADMIN — FLUDEOXYGLUCOSE F18 1 DOSE: 300 INJECTION INTRAVENOUS at 11:20

## 2020-01-21 ENCOUNTER — READMISSION MANAGEMENT (OUTPATIENT)
Dept: CALL CENTER | Facility: HOSPITAL | Age: 65
End: 2020-01-21

## 2020-01-21 NOTE — OUTREACH NOTE
Medical Week 2 Survey      Responses   Facility patient discharged from?  [place]   Does the patient have one of the following disease processes/diagnoses(primary or secondary)?  Other   Week 2 attempt successful?  Yes   Call start time  1713   Call end time  1714   Meds reviewed with patient/caregiver?  Yes   Is the patient taking all medications as directed (includes completed medication regime)?  Yes   Has the patient kept scheduled appointments due by today?  Yes   What is the patient's perception of their health status since discharge?  Improving   Week 2 Call Completed?  Yes          Cari Harris RN

## 2020-01-23 ENCOUNTER — OFFICE VISIT (OUTPATIENT)
Dept: ONCOLOGY | Facility: CLINIC | Age: 65
End: 2020-01-23

## 2020-01-23 ENCOUNTER — APPOINTMENT (OUTPATIENT)
Dept: LAB | Facility: HOSPITAL | Age: 65
End: 2020-01-23

## 2020-01-23 VITALS
HEART RATE: 70 BPM | WEIGHT: 200.3 LBS | TEMPERATURE: 98.3 F | SYSTOLIC BLOOD PRESSURE: 160 MMHG | HEIGHT: 70 IN | OXYGEN SATURATION: 99 % | BODY MASS INDEX: 28.67 KG/M2 | DIASTOLIC BLOOD PRESSURE: 90 MMHG | RESPIRATION RATE: 16 BRPM

## 2020-01-23 DIAGNOSIS — C64.9 RENAL CELL CARCINOMA, UNSPECIFIED LATERALITY (HCC): Primary | ICD-10-CM

## 2020-01-23 LAB
ALBUMIN SERPL-MCNC: 4.1 G/DL (ref 3.5–5.2)
ALBUMIN/GLOB SERPL: 2 G/DL
ALP SERPL-CCNC: 43 U/L (ref 39–117)
ALT SERPL W P-5'-P-CCNC: 32 U/L (ref 1–41)
ANION GAP SERPL CALCULATED.3IONS-SCNC: 11 MMOL/L (ref 5–15)
AST SERPL-CCNC: 8 U/L (ref 1–40)
BASOPHILS # BLD AUTO: 0.01 10*3/MM3 (ref 0–0.2)
BASOPHILS NFR BLD AUTO: 0.1 % (ref 0–1.5)
BILIRUB SERPL-MCNC: 0.4 MG/DL (ref 0.2–1.2)
BUN BLD-MCNC: 57 MG/DL (ref 8–23)
BUN/CREAT SERPL: 20.1 (ref 7–25)
CALCIUM SPEC-SCNC: 9.5 MG/DL (ref 8.6–10.5)
CHLORIDE SERPL-SCNC: 104 MMOL/L (ref 98–107)
CO2 SERPL-SCNC: 26 MMOL/L (ref 22–29)
CREAT BLD-MCNC: 2.83 MG/DL (ref 0.76–1.27)
DEPRECATED RDW RBC AUTO: 50.3 FL (ref 37–54)
EOSINOPHIL # BLD AUTO: 0.02 10*3/MM3 (ref 0–0.4)
EOSINOPHIL NFR BLD AUTO: 0.2 % (ref 0.3–6.2)
ERYTHROCYTE [DISTWIDTH] IN BLOOD BY AUTOMATED COUNT: 14.4 % (ref 12.3–15.4)
GFR SERPL CREATININE-BSD FRML MDRD: 23 ML/MIN/1.73
GLOBULIN UR ELPH-MCNC: 2.1 GM/DL
GLUCOSE BLD-MCNC: 120 MG/DL (ref 65–99)
HCT VFR BLD AUTO: 34.1 % (ref 37.5–51)
HGB BLD-MCNC: 11.2 G/DL (ref 13–17.7)
HOLD SPECIMEN: NORMAL
HOLD SPECIMEN: NORMAL
IMM GRANULOCYTES # BLD AUTO: 0.1 10*3/MM3 (ref 0–0.05)
IMM GRANULOCYTES NFR BLD AUTO: 0.9 % (ref 0–0.5)
LYMPHOCYTES # BLD AUTO: 1.35 10*3/MM3 (ref 0.7–3.1)
LYMPHOCYTES NFR BLD AUTO: 11.5 % (ref 19.6–45.3)
MCH RBC QN AUTO: 31.4 PG (ref 26.6–33)
MCHC RBC AUTO-ENTMCNC: 32.8 G/DL (ref 31.5–35.7)
MCV RBC AUTO: 95.5 FL (ref 79–97)
MONOCYTES # BLD AUTO: 0.75 10*3/MM3 (ref 0.1–0.9)
MONOCYTES NFR BLD AUTO: 6.4 % (ref 5–12)
NEUTROPHILS # BLD AUTO: 9.52 10*3/MM3 (ref 1.7–7)
NEUTROPHILS NFR BLD AUTO: 80.9 % (ref 42.7–76)
NRBC BLD AUTO-RTO: 0 /100 WBC (ref 0–0.2)
PLATELET # BLD AUTO: 175 10*3/MM3 (ref 140–450)
PMV BLD AUTO: 10.1 FL (ref 6–12)
POTASSIUM BLD-SCNC: 4.1 MMOL/L (ref 3.5–5.2)
PROT SERPL-MCNC: 6.2 G/DL (ref 6–8.5)
RBC # BLD AUTO: 3.57 10*6/MM3 (ref 4.14–5.8)
SODIUM BLD-SCNC: 141 MMOL/L (ref 136–145)
WBC NRBC COR # BLD: 11.75 10*3/MM3 (ref 3.4–10.8)

## 2020-01-23 PROCEDURE — 80053 COMPREHEN METABOLIC PANEL: CPT | Performed by: INTERNAL MEDICINE

## 2020-01-23 PROCEDURE — 99214 OFFICE O/P EST MOD 30 MIN: CPT | Performed by: INTERNAL MEDICINE

## 2020-01-23 PROCEDURE — 36415 COLL VENOUS BLD VENIPUNCTURE: CPT | Performed by: INTERNAL MEDICINE

## 2020-01-23 PROCEDURE — 85025 COMPLETE CBC W/AUTO DIFF WBC: CPT | Performed by: INTERNAL MEDICINE

## 2020-01-23 NOTE — PROGRESS NOTES
[facility]  HEMATOLOGY & ONCOLOGY        Subjective     VISIT DIAGNOSIS:   Encounter Diagnosis   Name Primary?   • Renal cell carcinoma, unspecified laterality (CMS/HCC) Yes       REASON FOR VISIT:     Chief Complaint   Patient presents with   • renal cell carcinoma     here for chemo, no complaints. hospital a week ago. nephrology said he had an allergic reaction to the opdivo        HEMATOLOGY / ONCOLOGY HISTORY:      Renal cell carcinoma of right kidney (CMS/HCC)    10/18/2019 Initial Diagnosis     Renal cell carcinoma (CMS/HCC)      12/31/2019 -  Chemotherapy     OP SUPPORTIVE HYDRATION + ANTIEMETICS      2/3/2020 -  Chemotherapy     OP RENAL Cyclophosphamide Q28D       Chemotherapy Treatment History     Treatment Plan:  Cyclophosphamide -immunotherapy induced nephrotoxicity    Medications: predniSONE (DELTASONE) tablet 123 mg, 60 mg/m2, 0 of 1 cycle    vinCRIStine (ONCOVIN) 2 mg in sodium chloride 0.9 % 52 mL chemo IVPB, 2 mg, 0 of 1 cycle    hydrocortisone sodium succinate (Solu-CORTEF) 50 mg, methotrexate PF 12 mg in sodium chloride 0.9 % 5 mL chemo syringe, , 0 of 1 cycle    cyclophosphamide (CYTOXAN) 1,640 mg in sodium chloride 0.9 % 332 mL chemo IVPB, 800 mg/m2, 0 of 1 cycle      Reason treatment was stopped:  Change in Level of Care     Chemotherapy Treatment History     Treatment Plan: Cytoxan IV every 28 days    Medications: [No matching medication found in this treatment plan]    Reason treatment was stopped:  Entered in error     Chemotherapy Treatment History     Treatment Plan: OP RENAL Cyclophosphamide Q28D    Medications: cyclophosphamide (CYTOXAN) 1,000 mg in sodium chloride 0.9 % 300 mL chemo IVPB, 500 mg/m2 = 1,000 mg (original dose ), 0 of 5 cycles  Dose modification: 500 mg/m2 (Cycle 1, Reason: Other (See Comments))      Reason treatment was stopped:  [Plan is still active]     Cancer Staging Information:  Cancer Staging  No matching staging information was found for the  patient.      INTERVAL HISTORY  Patient ID: Parker Arnold is a 64 y.o. year old male with a diagnosis of clear cell renal cancer in 2014. S/p Rh nephrectomy. He was under surveillance until Nov 2018  When he underwent  Wedge resection for a pulm nodule. Pathology c/w metastatic renal cell carcinoma. He was not able to tolerate sutent due to kidney failure, started yervoy/opdivo 5/13/2019 to 8/7/2019, then single agent opdivo 8/28 to 9/9/19. Therapy stopped due to renal failure. He has been on and off prednisone for 2 weeks.  -at today's visit, cr is 6.43,eGFR 7  -he has lost 20lbs, have mid epigastric pain, takes prilosec. He saw nephrologist and was told uric acid is elevated. Allopurinol started. He is complaining of urgency at night, he was started on flomax and passed out, hence that was discontinued.   11/1/19: he is tolerating 40mg of prednisone. His stomach stays quizzy due to being on a lot of medications. Antiacid bid helps. Ho hallucination, insomnia or manic behavior.  He saw urologist for urinary issues and some meds was rxed, he does not know the name of it.  1/2/20: complaining of just not feeling right, muscle cramps and multiple joint pains with no swelling. Labs show elevated cr>5, He received fluid without much improvement. He will see nephrology tomorrow. He is on 600/200 bola/vit once a day  1/23/2020: pt was admitted for cr of 5 . Steroid started while admitted and he was followed by nephrology. Biopsy deferred until creatinine stabilizes with plan to get it done at higher level of care due to his one kidney and h/o RCC/  PET neg for any dz. Currently feels better.  -denies n/v, sob, cp, dizziness, diarrhea, constipation, rash,  focal weakness.  PE remains the same except for slight stoney.    Review of Systems     See above otherwise neg    Medications:    Current Outpatient Medications   Medication Sig Dispense Refill   • allopurinol (ZYLOPRIM) 100 MG tablet Take 100 mg by mouth 2 (Two)  Times a Day.     • benzonatate (TESSALON) 100 MG capsule Take 100 mg by mouth 4 (Four) Times a Day.     • calcitriol (ROCALTROL) 0.25 MCG capsule Take 1 capsule by mouth Daily. 30 capsule 2   • Calcium Carb-Cholecalciferol (CALCIUM + D3) 600-200 MG-UNIT tablet Take 1 tablet by mouth 2 (Two) Times a Day. 60 tablet 5   • finasteride (PROSCAR) 5 MG tablet Take 5 mg by mouth Daily.  3   • ondansetron (ZOFRAN) 8 MG tablet Take 8 mg by mouth Every 8 (Eight) Hours.     • pantoprazole (PROTONIX) 40 MG EC tablet Take 1 tablet by mouth Daily. 30 tablet 2   • predniSONE (DELTASONE) 20 MG tablet Take 2 tablets by mouth Daily With Breakfast. 30 tablet 0   • sodium bicarbonate 650 MG tablet Take 1 tablet by mouth 3 (Three) Times a Day. 90 tablet 2     No current facility-administered medications for this visit.        ALLERGIES:    Allergies   Allergen Reactions   • Amoxicillin Hives   • Nivolumab Provider Review Needed     Renal failure   • Penicillins Hives and Rash   • Clindamycin/Lincomycin Rash     pustules  Rash - pustules     • Doxycycline Rash   • Hepatitis B Virus Vaccine Rash     Pustules     • Latex Rash   • Levofloxacin Rash       Objective      Vitals:    01/23/20 0904   BP: 160/90   Pulse: 70   Resp: 16   Temp: 98.3 °F (36.8 °C)   SpO2: 99%       Current Status 1/2/2020   ECOG score 0       General Appearance: Patient is awake, alert, oriented and in no acute distress. Patient is welldeveloped, wellnourished, and appears stated age.  HEENT: Normocephalic. Sclerae clear, conjunctiva pink, extraocular movements intact, pupils, round, reactive to light and  accommodation. Mouth and throat are clear with moist oral mucosa.  NECK: Supple, no jugular venous distention, thyroid not enlarged.  LYMPH: No cervical, supraclavicular, axillary, or inguinal lymphadenopathy.  CHEST: Equal bilateral expansion, AP  diameter normal, resonant percussion note  LUNGS: Good air movement, no rales, rhonchi, rubs or wheezes with  auscultation  CARDIO: Regular sinus rhythm, no murmurs, gallops or rubs.  ABDOMEN: Nondistended, soft, No tenderness, no guarding, no rebound, No hepatosplenomegaly. No abdominal masses. Bowel sounds positive. No hernia  GENITALIA: Not examined.  BREASTS: Not examined.  MUSKEL: No joint swelling, decreased motion, or inflammation  EXTREMS: slight juan LE edema, No clubbing, cyanosis, No varicose veins.  NEURO: Grossly nonfocal, Gait is coordinated and smooth, Cognition is preserved.  SKIN: No rashes, no ecchymoses, no petechia.  PSYCH: Oriented to time, place and person. Memory is preserved. Mood and affect appear normal      RECENT LABS:  Office Visit on 01/23/2020   Component Date Value Ref Range Status   • Glucose 01/23/2020 120* 65 - 99 mg/dL Final   • BUN 01/23/2020 57* 8 - 23 mg/dL Final   • Creatinine 01/23/2020 2.83* 0.76 - 1.27 mg/dL Final   • Sodium 01/23/2020 141  136 - 145 mmol/L Final   • Potassium 01/23/2020 4.1  3.5 - 5.2 mmol/L Final   • Chloride 01/23/2020 104  98 - 107 mmol/L Final   • CO2 01/23/2020 26.0  22.0 - 29.0 mmol/L Final   • Calcium 01/23/2020 9.5  8.6 - 10.5 mg/dL Final   • Total Protein 01/23/2020 6.2  6.0 - 8.5 g/dL Final   • Albumin 01/23/2020 4.10  3.50 - 5.20 g/dL Final   • ALT (SGPT) 01/23/2020 32  1 - 41 U/L Final   • AST (SGOT) 01/23/2020 8  1 - 40 U/L Final   • Alkaline Phosphatase 01/23/2020 43  39 - 117 U/L Final   • Total Bilirubin 01/23/2020 0.4  0.2 - 1.2 mg/dL Final   • eGFR Non African Amer 01/23/2020 23* >60 mL/min/1.73 Final   • Globulin 01/23/2020 2.1  gm/dL Final   • A/G Ratio 01/23/2020 2.0  g/dL Final   • BUN/Creatinine Ratio 01/23/2020 20.1  7.0 - 25.0 Final   • Anion Gap 01/23/2020 11.0  5.0 - 15.0 mmol/L Final   • WBC 01/23/2020 11.75* 3.40 - 10.80 10*3/mm3 Final   • RBC 01/23/2020 3.57* 4.14 - 5.80 10*6/mm3 Final   • Hemoglobin 01/23/2020 11.2* 13.0 - 17.7 g/dL Final   • Hematocrit 01/23/2020 34.1* 37.5 - 51.0 % Final   • MCV 01/23/2020 95.5  79.0 - 97.0 fL Final    • MCH 01/23/2020 31.4  26.6 - 33.0 pg Final   • MCHC 01/23/2020 32.8  31.5 - 35.7 g/dL Final   • RDW 01/23/2020 14.4  12.3 - 15.4 % Final   • RDW-SD 01/23/2020 50.3  37.0 - 54.0 fl Final   • MPV 01/23/2020 10.1  6.0 - 12.0 fL Final   • Platelets 01/23/2020 175  140 - 450 10*3/mm3 Final   • Neutrophil % 01/23/2020 80.9* 42.7 - 76.0 % Final   • Lymphocyte % 01/23/2020 11.5* 19.6 - 45.3 % Final   • Monocyte % 01/23/2020 6.4  5.0 - 12.0 % Final   • Eosinophil % 01/23/2020 0.2* 0.3 - 6.2 % Final   • Basophil % 01/23/2020 0.1  0.0 - 1.5 % Final   • Immature Grans % 01/23/2020 0.9* 0.0 - 0.5 % Final   • Neutrophils, Absolute 01/23/2020 9.52* 1.70 - 7.00 10*3/mm3 Final   • Lymphocytes, Absolute 01/23/2020 1.35  0.70 - 3.10 10*3/mm3 Final   • Monocytes, Absolute 01/23/2020 0.75  0.10 - 0.90 10*3/mm3 Final   • Eosinophils, Absolute 01/23/2020 0.02  0.00 - 0.40 10*3/mm3 Final   • Basophils, Absolute 01/23/2020 0.01  0.00 - 0.20 10*3/mm3 Final   • Immature Grans, Absolute 01/23/2020 0.10* 0.00 - 0.05 10*3/mm3 Final   • nRBC 01/23/2020 0.0  0.0 - 0.2 /100 WBC Final   Appointment on 01/23/2020   Component Date Value Ref Range Status   • Extra Tube 01/23/2020 Hold for add-ons.   Final    Auto resulted.   • Extra Tube 01/23/2020 Hold for add-ons.   Final    Auto resulted.       RADIOLOGY:  Ct Head Without Contrast    Result Date: 12/31/2019  Narrative: CT HEAD WO CONTRAST- 12/30/2019 12:12 PM CST  HISTORY: loss of balance; metastatic renal cell ca; R26.89-Other abnormalities of gait and mobility; C79.9-Secondary malignant neoplasm of unspecified site   DOSE LENGTH PRODUCT: 762 mGy cm. Automated exposure control was also utilized to decrease patient radiation dose.  Technique: Axial CT of the brain without IV contrast. Sagittal and coronal reformations are also provided for review. Soft tissue and bone kernels are available for interpretation.  Comparison: None.  Findings:  There is no evidence of acute large vascular territory  infarct. No intra-axial or extra-axial hemorrhage. No visualized mass lesion or mass effect. The ventricles, cortical sulci and basal cisterns are symmetric and age appropriate.  Posterior fossa structures are unremarkable. The scalp and calvarium are intact. Visualized paranasal sinuses and mastoids are clear.      Impression: Impression:  1. No acute intracranial process. This report was finalized on 12/31/2019 07:18 by Dr Enzo Ovalle, .    Nm Pet Skull Base To Mid Thigh    Result Date: 1/16/2020  Narrative: EXAMINATION: NM PET SKULL BASE TO MID THIGH-  1/16/2020 11:09 AM CST  HISTORY: surveillance renal cell cancer with worsening kidney function. Not able to get CT due to AKD; C64.1-Malignant neoplasm of right kidney, except renal pelvis  Comparison is made with abdomen/pelvis CT from 04/16/2015.  Dose: 11.20 mCi F-18 FDG. Route of administration: Right antecubital intravenous injection.  Blood glucose level = 96. Body weight =  199.  Skull base to mid thigh PET imaging. Noncontrast CT imaging for attenuation correction. DLP in mGycm= 935. PET/CT image fusion performed.  Normal background soft tissue uptake.  Expected physiologic uptake within the heart, liver, spleen, kidneys, bowel loops, and urinary bladder.  Right nephrectomy changes noted.  Some nodular uptake within the right lower quadrant shows low-level FDG uptake with maximum SUV of 3.96. Comparing with the remote CT from 04/16/2015 this nodular focus is stable and presumed be related to postsurgical change.  Summary: 1. No neoplastic FDG uptake is seen within the chest, abdomen, or pelvis.   This report was finalized on 01/16/2020 13:56 by Dr. Serjio Menjivar MD.           Assessment/Plan 65 yo male with met RCC developed grade 4 renal toxicity from opdivo.pt here for f/o and is stable.    1. Renal failure due to opdivo toxicity: pt on HD-steroid in the hospital and currently down to 40mg of prednisone. Cr down to 2.46. His baseline was arround  2.2.  -will decrease dose of pr predisone by 10 every week until he is down to 10mg/day , then reduce by 5mg every 5days then stop.  -stop acyclovir, bactrim and fluconazole a week after stopping steroid  -dexa scan showed 11/2019 showed osteopenia. Advised gurinder/vid 600/200 BID  -currently off steroid. Not sure if this is his new baseline  -he has appointment with nephrology  -hopefully biopsy of the kidney will be obtained to help guide therapy  -avoid all nephrotoxins. If uric acid normal, will stop allopurinol    1/23/2020: hold off on cytoxan until we get a biopsy or if creatinine worsens after stopping prednisone. Cr improved to 2.83.  -PET 1/16/2020 neg for evidence of dz  -pt has appointment with nephrology and they will manage his steroids.  2. FEN: low calcium. rx gurinder and vit D  3. Gerd: on omeprazole  4. Osteopenia: on gurinder+vit D kathy. Gurinder level still low, advised 2 tabs instead of one as he was taking      Obiageli Scottie Burleson MD    1/23/2020    3:31 PM

## 2020-01-29 ENCOUNTER — READMISSION MANAGEMENT (OUTPATIENT)
Dept: CALL CENTER | Facility: HOSPITAL | Age: 65
End: 2020-01-29

## 2020-01-29 NOTE — OUTREACH NOTE
Medical Week 3 Survey      Responses   Facility patient discharged from?  [place]   Does the patient have one of the following disease processes/diagnoses(primary or secondary)?  Other   Week 3 attempt successful?  No   Unsuccessful attempts  Attempt 1          Avelina Garcia RN

## 2020-01-31 ENCOUNTER — READMISSION MANAGEMENT (OUTPATIENT)
Dept: CALL CENTER | Facility: HOSPITAL | Age: 65
End: 2020-01-31

## 2020-01-31 NOTE — OUTREACH NOTE
Medical Week 3 Survey      Responses   Facility patient discharged from?  [place]   Does the patient have one of the following disease processes/diagnoses(primary or secondary)?  Other   Week 3 attempt successful?  Yes   Call start time  1516   Call end time  1522   Discharge diagnosis  Renal cell carcinoma of right kidney    Is patient permission given to speak with other caregiver?  Yes   List who call center can speak with  Spouse, Nelda Cook reviewed with patient/caregiver?  Yes   Is the patient having any side effects they believe may be caused by any medication additions or changes?  No   Does the patient have all medications ordered at discharge?  Yes   Prescription comments  The doctor is weaning him off the predisone.     Is the patient taking all medications as directed (includes completed medication regime)?  Yes   Does the patient have a primary care provider?   Yes   Comments regarding PCP  Has had all of his follow up visits with physicians.    Does the patient have an appointment with their PCP within 7 days of discharge?  Yes   Has the patient kept scheduled appointments due by today?  Yes   Has home health visited the patient within 72 hours of discharge?  N/A   Did the patient receive a copy of their discharge instructions?  Yes   Nursing interventions  Reviewed instructions with patient   What is the patient's perception of their health status since discharge?  Improving   Is the patient/caregiver able to teach back signs and symptoms related to disease process for when to call PCP?  Yes   Is the patient/caregiver able to teach back signs and symptoms related to disease process for when to call 911?  Yes   Is the patient/caregiver able to teach back the hierarchy of who to call/visit for symptoms/problems? PCP, Specialist, Home health nurse, Urgent Care, ED, 911  Yes   Week 3 Call Completed?  Yes   Wrap up additional comments  He states he is appreciative of our calls.  He is improving.             Lida Reno, RN

## 2020-02-07 ENCOUNTER — READMISSION MANAGEMENT (OUTPATIENT)
Dept: CALL CENTER | Facility: HOSPITAL | Age: 65
End: 2020-02-07

## 2020-02-07 NOTE — OUTREACH NOTE
Medical Week 4 Survey      Responses   Facility patient discharged from?  [place]   Does the patient have one of the following disease processes/diagnoses(primary or secondary)?  Other   Week 4 attempt successful?  Yes   Call start time  1510   Call end time  1515   Discharge diagnosis  Renal cell carcinoma of right kidney    Is the patient taking all medications as directed (includes completed medication regime)?  Yes   Has the patient kept scheduled appointments due by today?  Yes   Is the patient still receiving Home Health Services?  N/A   Psychosocial issues?  No   What is the patient's perception of their health status since discharge?  Improving   Week 4 Call Completed?  Yes   Would the patient like one additional call?  No   Graduated  Yes   Did the patient feel the follow up calls were helpful during their recovery period?  Yes   Was the number of calls appropriate?  Yes   Wrap up additional comments  Thankful for the care and concern and doing very well.          Lizett Guajardo RN

## 2020-02-20 DIAGNOSIS — C64.9 RENAL CELL CARCINOMA, UNSPECIFIED LATERALITY (HCC): Primary | ICD-10-CM

## 2020-02-24 ENCOUNTER — APPOINTMENT (OUTPATIENT)
Dept: LAB | Facility: HOSPITAL | Age: 65
End: 2020-02-24

## 2020-02-24 ENCOUNTER — OFFICE VISIT (OUTPATIENT)
Dept: ONCOLOGY | Facility: CLINIC | Age: 65
End: 2020-02-24

## 2020-02-24 VITALS
HEART RATE: 72 BPM | SYSTOLIC BLOOD PRESSURE: 150 MMHG | OXYGEN SATURATION: 98 % | TEMPERATURE: 98 F | HEIGHT: 70 IN | DIASTOLIC BLOOD PRESSURE: 92 MMHG | BODY MASS INDEX: 28.49 KG/M2 | WEIGHT: 199 LBS | RESPIRATION RATE: 16 BRPM

## 2020-02-24 DIAGNOSIS — C64.9 RENAL CELL CARCINOMA, UNSPECIFIED LATERALITY (HCC): Primary | ICD-10-CM

## 2020-02-24 LAB
ALBUMIN SERPL-MCNC: 4.1 G/DL (ref 3.5–5.2)
ALBUMIN/GLOB SERPL: 1.9 G/DL
ALP SERPL-CCNC: 39 U/L (ref 39–117)
ALT SERPL W P-5'-P-CCNC: 14 U/L (ref 1–41)
ANION GAP SERPL CALCULATED.3IONS-SCNC: 11 MMOL/L (ref 5–15)
AST SERPL-CCNC: 12 U/L (ref 1–40)
BASOPHILS # BLD AUTO: 0.02 10*3/MM3 (ref 0–0.2)
BASOPHILS NFR BLD AUTO: 0.2 % (ref 0–1.5)
BILIRUB SERPL-MCNC: 0.4 MG/DL (ref 0.2–1.2)
BUN BLD-MCNC: 53 MG/DL (ref 8–23)
BUN/CREAT SERPL: 18.1 (ref 7–25)
CALCIUM SPEC-SCNC: 9.8 MG/DL (ref 8.6–10.5)
CHLORIDE SERPL-SCNC: 107 MMOL/L (ref 98–107)
CO2 SERPL-SCNC: 23 MMOL/L (ref 22–29)
CREAT BLD-MCNC: 2.93 MG/DL (ref 0.76–1.27)
DEPRECATED RDW RBC AUTO: 45.4 FL (ref 37–54)
EOSINOPHIL # BLD AUTO: 0.06 10*3/MM3 (ref 0–0.4)
EOSINOPHIL NFR BLD AUTO: 0.6 % (ref 0.3–6.2)
ERYTHROCYTE [DISTWIDTH] IN BLOOD BY AUTOMATED COUNT: 13.2 % (ref 12.3–15.4)
GFR SERPL CREATININE-BSD FRML MDRD: 22 ML/MIN/1.73
GLOBULIN UR ELPH-MCNC: 2.2 GM/DL
GLUCOSE BLD-MCNC: 96 MG/DL (ref 65–99)
HCT VFR BLD AUTO: 37.9 % (ref 37.5–51)
HGB BLD-MCNC: 12.9 G/DL (ref 13–17.7)
HOLD SPECIMEN: NORMAL
HOLD SPECIMEN: NORMAL
IMM GRANULOCYTES # BLD AUTO: 0.1 10*3/MM3 (ref 0–0.05)
IMM GRANULOCYTES NFR BLD AUTO: 1 % (ref 0–0.5)
LYMPHOCYTES # BLD AUTO: 0.93 10*3/MM3 (ref 0.7–3.1)
LYMPHOCYTES NFR BLD AUTO: 9.3 % (ref 19.6–45.3)
MCH RBC QN AUTO: 31.6 PG (ref 26.6–33)
MCHC RBC AUTO-ENTMCNC: 34 G/DL (ref 31.5–35.7)
MCV RBC AUTO: 92.9 FL (ref 79–97)
MONOCYTES # BLD AUTO: 0.66 10*3/MM3 (ref 0.1–0.9)
MONOCYTES NFR BLD AUTO: 6.6 % (ref 5–12)
NEUTROPHILS # BLD AUTO: 8.22 10*3/MM3 (ref 1.7–7)
NEUTROPHILS NFR BLD AUTO: 82.3 % (ref 42.7–76)
NRBC BLD AUTO-RTO: 0 /100 WBC (ref 0–0.2)
PLATELET # BLD AUTO: 174 10*3/MM3 (ref 140–450)
PMV BLD AUTO: 9.4 FL (ref 6–12)
POTASSIUM BLD-SCNC: 4.9 MMOL/L (ref 3.5–5.2)
PROT SERPL-MCNC: 6.3 G/DL (ref 6–8.5)
RBC # BLD AUTO: 4.08 10*6/MM3 (ref 4.14–5.8)
SODIUM BLD-SCNC: 141 MMOL/L (ref 136–145)
WBC NRBC COR # BLD: 9.99 10*3/MM3 (ref 3.4–10.8)

## 2020-02-24 PROCEDURE — 36415 COLL VENOUS BLD VENIPUNCTURE: CPT | Performed by: INTERNAL MEDICINE

## 2020-02-24 PROCEDURE — 85025 COMPLETE CBC W/AUTO DIFF WBC: CPT | Performed by: INTERNAL MEDICINE

## 2020-02-24 PROCEDURE — 80053 COMPREHEN METABOLIC PANEL: CPT | Performed by: INTERNAL MEDICINE

## 2020-02-24 PROCEDURE — 99214 OFFICE O/P EST MOD 30 MIN: CPT | Performed by: INTERNAL MEDICINE

## 2020-02-24 NOTE — PROGRESS NOTES
[facility]  HEMATOLOGY & ONCOLOGY        Subjective     VISIT DIAGNOSIS:   No diagnosis found.    REASON FOR VISIT:     Chief Complaint   Patient presents with   • Renal Cancer     Here for f/u        HEMATOLOGY / ONCOLOGY HISTORY:      Renal cell carcinoma of right kidney (CMS/HCC)    10/18/2019 Initial Diagnosis     Renal cell carcinoma (CMS/HCC)      12/31/2019 -  Chemotherapy     OP SUPPORTIVE HYDRATION + ANTIEMETICS      2/3/2020 -  Chemotherapy     OP RENAL Cyclophosphamide Q28D       Chemotherapy Treatment History     Treatment Plan:  Cyclophosphamide -immunotherapy induced nephrotoxicity    Medications: predniSONE (DELTASONE) tablet 123 mg, 60 mg/m2, 0 of 1 cycle    vinCRIStine (ONCOVIN) 2 mg in sodium chloride 0.9 % 52 mL chemo IVPB, 2 mg, 0 of 1 cycle    hydrocortisone sodium succinate (Solu-CORTEF) 50 mg, methotrexate PF 12 mg in sodium chloride 0.9 % 5 mL chemo syringe, , 0 of 1 cycle    cyclophosphamide (CYTOXAN) 1,640 mg in sodium chloride 0.9 % 332 mL chemo IVPB, 800 mg/m2, 0 of 1 cycle      Reason treatment was stopped:  Change in Level of Care     Chemotherapy Treatment History     Treatment Plan: Cytoxan IV every 28 days    Medications: [No matching medication found in this treatment plan]    Reason treatment was stopped:  Entered in error     Chemotherapy Treatment History     Treatment Plan: OP RENAL Cyclophosphamide Q28D    Medications: cyclophosphamide (CYTOXAN) 1,000 mg in sodium chloride 0.9 % 300 mL chemo IVPB, 500 mg/m2 = 1,000 mg (original dose ), 0 of 5 cycles  Dose modification: 500 mg/m2 (Cycle 1, Reason: Other (See Comments))      Reason treatment was stopped:  [Plan is still active]     Cancer Staging Information:  Cancer Staging  No matching staging information was found for the patient.      INTERVAL HISTORY  Patient ID: Parker Arnold is a 64 y.o. year old male with a diagnosis of clear cell renal cancer in 2014. S/p Rh nephrectomy. He was under  surveillance until Nov 2018  When he underwent  Wedge resection for a pulm nodule. Pathology c/w metastatic renal cell carcinoma. He was not able to tolerate sutent due to kidney failure, started yervoy/opdivo 5/13/2019 to 8/7/2019, then single agent opdivo 8/28 to 9/9/19. Therapy stopped due to renal failure. He has been on and off prednisone for 2 weeks.  -at today's visit, cr is 6.43,eGFR 7  -he has lost 20lbs, have mid epigastric pain, takes prilosec. He saw nephrologist and was told uric acid is elevated. Allopurinol started. He is complaining of urgency at night, he was started on flomax and passed out, hence that was discontinued.   11/1/19: he is tolerating 40mg of prednisone. His stomach stays quizzy due to being on a lot of medications. Antiacid bid helps. Ho hallucination, insomnia or manic behavior.  He saw urologist for urinary issues and some meds was rxed, he does not know the name of it.  1/2/20: complaining of just not feeling right, muscle cramps and multiple joint pains with no swelling. Labs show elevated cr>5, He received fluid without much improvement. He will see nephrology tomorrow. He is on 600/200 bola/vit once a day  1/23/2020: pt was admitted for cr of 5 . Steroid started while admitted and he was followed by nephrology. Biopsy deferred until creatinine stabilizes with plan to get it done at higher level of care due to his one kidney and h/o RCC/  PET neg for any dz. Currently feels better.  2/24/2020: currently on prednisone 30mg daily, being managed by Dr jain. Having weird dreams. Also follows with  Nephrologist. Kidney bx not planned  If numbers do not improve will consider  -denies n/v, sob, cp, dizziness, diarrhea, constipation, rash,  focal weakness.  PE non focal    Review of Systems     See above otherwise neg    Medications:    Current Outpatient Medications   Medication Sig Dispense Refill   • allopurinol (ZYLOPRIM) 100 MG tablet Take 100 mg by mouth 2 (Two) Times a Day.     •  benzonatate (TESSALON) 100 MG capsule Take 100 mg by mouth 4 (Four) Times a Day.     • calcitriol (ROCALTROL) 0.25 MCG capsule Take 1 capsule by mouth Daily. 30 capsule 2   • Calcium Carb-Cholecalciferol (CALCIUM + D3) 600-200 MG-UNIT tablet Take 1 tablet by mouth 2 (Two) Times a Day. 60 tablet 5   • finasteride (PROSCAR) 5 MG tablet Take 5 mg by mouth Daily.  3   • ondansetron (ZOFRAN) 8 MG tablet Take 8 mg by mouth Every 8 (Eight) Hours.     • pantoprazole (PROTONIX) 40 MG EC tablet Take 1 tablet by mouth Daily. 30 tablet 2   • predniSONE (DELTASONE) 20 MG tablet Take 2 tablets by mouth Daily With Breakfast. (Patient taking differently: Take 30 mg by mouth Daily.) 30 tablet 0   • sodium bicarbonate 650 MG tablet Take 1 tablet by mouth 3 (Three) Times a Day. 90 tablet 2     No current facility-administered medications for this visit.        ALLERGIES:    Allergies   Allergen Reactions   • Amoxicillin Hives   • Nivolumab Provider Review Needed     Renal failure   • Penicillins Hives and Rash   • Clindamycin/Lincomycin Rash     pustules  Rash - pustules     • Doxycycline Rash   • Hepatitis B Virus Vaccines Rash     Pustules     • Hepatitis B Vaccine Rash   • Latex Rash   • Levofloxacin Rash       Objective      Vitals:    02/24/20 1009   BP: 150/92   Pulse:    Resp:    Temp:    SpO2:        Current Status 2/24/2020   ECOG score 0       General Appearance: Patient is awake, alert, oriented and in no acute distress. Patient is welldeveloped, wellnourished, and appears stated age.  HEENT: Normocephalic. Sclerae clear, conjunctiva pink, extraocular movements intact, pupils, round, reactive to light and  accommodation. Mouth and throat are clear with moist oral mucosa.  NECK: Supple, no jugular venous distention, thyroid not enlarged.  LYMPH: No cervical, supraclavicular, axillary, or inguinal lymphadenopathy.  CHEST: Equal bilateral expansion, AP  diameter normal, resonant percussion note  LUNGS: Good air movement, no  rales, rhonchi, rubs or wheezes with auscultation  CARDIO: Regular sinus rhythm, no murmurs, gallops or rubs.  ABDOMEN: Nondistended, soft, No tenderness, no guarding, no rebound, No hepatosplenomegaly. No abdominal masses. Bowel sounds positive. No hernia  GENITALIA: Not examined.  BREASTS: Not examined.  MUSKEL: No joint swelling, decreased motion, or inflammation  EXTREMS:  No edema, clubbing, cyanosis, No varicose veins.  NEURO: Grossly nonfocal, Gait is coordinated and smooth, Cognition is preserved.  SKIN: No rashes, no ecchymoses, no petechia.  PSYCH: Oriented to time, place and person. Memory is preserved. Mood and affect appear normal      RECENT LABS:  Orders Only on 02/20/2020   Component Date Value Ref Range Status   • Glucose 02/24/2020 96  65 - 99 mg/dL Final   • BUN 02/24/2020 53* 8 - 23 mg/dL Final   • Creatinine 02/24/2020 2.93* 0.76 - 1.27 mg/dL Final   • Sodium 02/24/2020 141  136 - 145 mmol/L Final   • Potassium 02/24/2020 4.9  3.5 - 5.2 mmol/L Final   • Chloride 02/24/2020 107  98 - 107 mmol/L Final   • CO2 02/24/2020 23.0  22.0 - 29.0 mmol/L Final   • Calcium 02/24/2020 9.8  8.6 - 10.5 mg/dL Final   • Total Protein 02/24/2020 6.3  6.0 - 8.5 g/dL Final   • Albumin 02/24/2020 4.10  3.50 - 5.20 g/dL Final   • ALT (SGPT) 02/24/2020 14  1 - 41 U/L Final   • AST (SGOT) 02/24/2020 12  1 - 40 U/L Final   • Alkaline Phosphatase 02/24/2020 39  39 - 117 U/L Final   • Total Bilirubin 02/24/2020 0.4  0.2 - 1.2 mg/dL Final   • eGFR Non African Amer 02/24/2020 22* >60 mL/min/1.73 Final   • Globulin 02/24/2020 2.2  gm/dL Final   • A/G Ratio 02/24/2020 1.9  g/dL Final   • BUN/Creatinine Ratio 02/24/2020 18.1  7.0 - 25.0 Final   • Anion Gap 02/24/2020 11.0  5.0 - 15.0 mmol/L Final   • WBC 02/24/2020 9.99  3.40 - 10.80 10*3/mm3 Final   • RBC 02/24/2020 4.08* 4.14 - 5.80 10*6/mm3 Final   • Hemoglobin 02/24/2020 12.9* 13.0 - 17.7 g/dL Final   • Hematocrit 02/24/2020 37.9  37.5 - 51.0 % Final   • MCV 02/24/2020 92.9   79.0 - 97.0 fL Final   • MCH 02/24/2020 31.6  26.6 - 33.0 pg Final   • MCHC 02/24/2020 34.0  31.5 - 35.7 g/dL Final   • RDW 02/24/2020 13.2  12.3 - 15.4 % Final   • RDW-SD 02/24/2020 45.4  37.0 - 54.0 fl Final   • MPV 02/24/2020 9.4  6.0 - 12.0 fL Final   • Platelets 02/24/2020 174  140 - 450 10*3/mm3 Final   • Neutrophil % 02/24/2020 82.3* 42.7 - 76.0 % Final   • Lymphocyte % 02/24/2020 9.3* 19.6 - 45.3 % Final   • Monocyte % 02/24/2020 6.6  5.0 - 12.0 % Final   • Eosinophil % 02/24/2020 0.6  0.3 - 6.2 % Final   • Basophil % 02/24/2020 0.2  0.0 - 1.5 % Final   • Immature Grans % 02/24/2020 1.0* 0.0 - 0.5 % Final   • Neutrophils, Absolute 02/24/2020 8.22* 1.70 - 7.00 10*3/mm3 Final   • Lymphocytes, Absolute 02/24/2020 0.93  0.70 - 3.10 10*3/mm3 Final   • Monocytes, Absolute 02/24/2020 0.66  0.10 - 0.90 10*3/mm3 Final   • Eosinophils, Absolute 02/24/2020 0.06  0.00 - 0.40 10*3/mm3 Final   • Basophils, Absolute 02/24/2020 0.02  0.00 - 0.20 10*3/mm3 Final   • Immature Grans, Absolute 02/24/2020 0.10* 0.00 - 0.05 10*3/mm3 Final   • nRBC 02/24/2020 0.0  0.0 - 0.2 /100 WBC Final       RADIOLOGY:  No results found.         Assessment/Plan 65 yo male with met RCC developed grade 4 renal toxicity from opdivo.pt here for f/u and is stable.    1. 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 He saw Nephrologist and bx not planned  -will not start cytoxan unless biopsy done to determine etiology of kidney failure. Has been off opdivo since September 2019.    2. FEN: low calcium. rx gurinder and vit D  3. Gerd: on omeprazole  4. Osteopenia: on gurinder+vit D kathy. Gurinder level still low, advised 2 tabs instead of one as he was taking      Obiageli Scottie Burleson MD    2/24/2020    10:28 AM

## 2020-04-24 ENCOUNTER — APPOINTMENT (OUTPATIENT)
Dept: LAB | Facility: HOSPITAL | Age: 65
End: 2020-04-24

## 2020-04-24 ENCOUNTER — OFFICE VISIT (OUTPATIENT)
Dept: ONCOLOGY | Facility: CLINIC | Age: 65
End: 2020-04-24

## 2020-04-24 VITALS
WEIGHT: 200 LBS | SYSTOLIC BLOOD PRESSURE: 162 MMHG | HEART RATE: 84 BPM | TEMPERATURE: 97.6 F | HEIGHT: 70 IN | OXYGEN SATURATION: 97 % | RESPIRATION RATE: 18 BRPM | BODY MASS INDEX: 28.63 KG/M2 | DIASTOLIC BLOOD PRESSURE: 80 MMHG

## 2020-04-24 DIAGNOSIS — C64.1 RENAL CELL CARCINOMA OF RIGHT KIDNEY (HCC): Primary | ICD-10-CM

## 2020-04-24 DIAGNOSIS — D50.8 OTHER IRON DEFICIENCY ANEMIA: Primary | ICD-10-CM

## 2020-04-24 LAB
ALBUMIN SERPL-MCNC: 3.7 G/DL (ref 3.5–5.2)
ALBUMIN/GLOB SERPL: 1.6 G/DL
ALP SERPL-CCNC: 48 U/L (ref 39–117)
ALT SERPL W P-5'-P-CCNC: 11 U/L (ref 1–41)
ANION GAP SERPL CALCULATED.3IONS-SCNC: 13 MMOL/L (ref 5–15)
AST SERPL-CCNC: 10 U/L (ref 1–40)
BASOPHILS # BLD AUTO: 0.03 10*3/MM3 (ref 0–0.2)
BASOPHILS NFR BLD AUTO: 0.3 % (ref 0–1.5)
BILIRUB SERPL-MCNC: 0.3 MG/DL (ref 0.2–1.2)
BUN BLD-MCNC: 50 MG/DL (ref 8–23)
BUN/CREAT SERPL: 13.9 (ref 7–25)
CALCIUM SPEC-SCNC: 9.4 MG/DL (ref 8.6–10.5)
CHLORIDE SERPL-SCNC: 107 MMOL/L (ref 98–107)
CO2 SERPL-SCNC: 21 MMOL/L (ref 22–29)
CREAT BLD-MCNC: 3.61 MG/DL (ref 0.76–1.27)
DEPRECATED RDW RBC AUTO: 45 FL (ref 37–54)
EOSINOPHIL # BLD AUTO: 0.29 10*3/MM3 (ref 0–0.4)
EOSINOPHIL NFR BLD AUTO: 3.3 % (ref 0.3–6.2)
ERYTHROCYTE [DISTWIDTH] IN BLOOD BY AUTOMATED COUNT: 13.5 % (ref 12.3–15.4)
FERRITIN SERPL-MCNC: 292.7 NG/ML (ref 30–400)
GFR SERPL CREATININE-BSD FRML MDRD: 17 ML/MIN/1.73
GLOBULIN UR ELPH-MCNC: 2.3 GM/DL
GLUCOSE BLD-MCNC: 128 MG/DL (ref 65–99)
HCT VFR BLD AUTO: 34 % (ref 37.5–51)
HGB BLD-MCNC: 11.1 G/DL (ref 13–17.7)
IMM GRANULOCYTES # BLD AUTO: 0.1 10*3/MM3 (ref 0–0.05)
IMM GRANULOCYTES NFR BLD AUTO: 1.2 % (ref 0–0.5)
IRON 24H UR-MRATE: 65 MCG/DL (ref 59–158)
IRON SATN MFR SERPL: 25 % (ref 20–50)
LYMPHOCYTES # BLD AUTO: 1.03 10*3/MM3 (ref 0.7–3.1)
LYMPHOCYTES NFR BLD AUTO: 11.9 % (ref 19.6–45.3)
MCH RBC QN AUTO: 29.7 PG (ref 26.6–33)
MCHC RBC AUTO-ENTMCNC: 32.6 G/DL (ref 31.5–35.7)
MCV RBC AUTO: 90.9 FL (ref 79–97)
MONOCYTES # BLD AUTO: 0.57 10*3/MM3 (ref 0.1–0.9)
MONOCYTES NFR BLD AUTO: 6.6 % (ref 5–12)
NEUTROPHILS # BLD AUTO: 6.65 10*3/MM3 (ref 1.7–7)
NEUTROPHILS NFR BLD AUTO: 76.7 % (ref 42.7–76)
NRBC BLD AUTO-RTO: 0 /100 WBC (ref 0–0.2)
PLATELET # BLD AUTO: 172 10*3/MM3 (ref 140–450)
PMV BLD AUTO: 9.3 FL (ref 6–12)
POTASSIUM BLD-SCNC: 4.7 MMOL/L (ref 3.5–5.2)
PROT SERPL-MCNC: 6 G/DL (ref 6–8.5)
RBC # BLD AUTO: 3.74 10*6/MM3 (ref 4.14–5.8)
SODIUM BLD-SCNC: 141 MMOL/L (ref 136–145)
TIBC SERPL-MCNC: 262 MCG/DL (ref 298–536)
TRANSFERRIN SERPL-MCNC: 176 MG/DL (ref 200–360)
WBC NRBC COR # BLD: 8.67 10*3/MM3 (ref 3.4–10.8)

## 2020-04-24 PROCEDURE — 99214 OFFICE O/P EST MOD 30 MIN: CPT | Performed by: INTERNAL MEDICINE

## 2020-04-24 PROCEDURE — 85025 COMPLETE CBC W/AUTO DIFF WBC: CPT | Performed by: INTERNAL MEDICINE

## 2020-04-24 PROCEDURE — 84466 ASSAY OF TRANSFERRIN: CPT | Performed by: INTERNAL MEDICINE

## 2020-04-24 PROCEDURE — 82728 ASSAY OF FERRITIN: CPT | Performed by: INTERNAL MEDICINE

## 2020-04-24 PROCEDURE — 80053 COMPREHEN METABOLIC PANEL: CPT | Performed by: INTERNAL MEDICINE

## 2020-04-24 PROCEDURE — 36415 COLL VENOUS BLD VENIPUNCTURE: CPT | Performed by: INTERNAL MEDICINE

## 2020-04-24 PROCEDURE — 83540 ASSAY OF IRON: CPT | Performed by: INTERNAL MEDICINE

## 2020-04-24 RX ORDER — CARVEDILOL 12.5 MG/1
3.12 TABLET ORAL 2 TIMES DAILY WITH MEALS
Status: ON HOLD | COMMUNITY
End: 2021-11-30

## 2020-04-24 RX ORDER — LISINOPRIL 2.5 MG/1
2.5 TABLET ORAL DAILY
COMMUNITY
End: 2020-10-28 | Stop reason: ALTCHOICE

## 2020-04-24 NOTE — PROGRESS NOTES
[facility]  HEMATOLOGY & ONCOLOGY        Subjective     VISIT DIAGNOSIS:   Encounter Diagnosis   Name Primary?   • Other iron deficiency anemia Yes       REASON FOR VISIT:     Chief Complaint   Patient presents with   • Renal Cancer     He is here for f/u visit today, to review his labs no new c/o today. Was seen by Dr Tim banuelos 3 weeks ago         HEMATOLOGY / ONCOLOGY HISTORY:      Renal cell carcinoma of right kidney (CMS/HCC)    10/18/2019 Initial Diagnosis     Renal cell carcinoma (CMS/HCC)      12/31/2019 -  Chemotherapy     OP SUPPORTIVE HYDRATION + ANTIEMETICS      2/3/2020 - 2/3/2020 Chemotherapy     OP RENAL Cyclophosphamide Q28D       Chemotherapy Treatment History     Treatment Plan:  Cyclophosphamide -immunotherapy induced nephrotoxicity    Medications: predniSONE (DELTASONE) tablet 123 mg, 60 mg/m2, 0 of 1 cycle    vinCRIStine (ONCOVIN) 2 mg in sodium chloride 0.9 % 52 mL chemo IVPB, 2 mg, 0 of 1 cycle    hydrocortisone sodium succinate (Solu-CORTEF) 50 mg, methotrexate PF 12 mg in sodium chloride 0.9 % 5 mL chemo syringe, , 0 of 1 cycle    cyclophosphamide (CYTOXAN) 1,640 mg in sodium chloride 0.9 % 332 mL chemo IVPB, 800 mg/m2, 0 of 1 cycle      Reason treatment was stopped:  Change in Level of Care     Chemotherapy Treatment History     Treatment Plan: Cytoxan IV every 28 days    Medications: [No matching medication found in this treatment plan]    Reason treatment was stopped:  Entered in error     Chemotherapy Treatment History     Treatment Plan: OP RENAL Cyclophosphamide Q28D    Medications: cyclophosphamide (CYTOXAN) 1,000 mg in sodium chloride 0.9 % 300 mL chemo IVPB, 500 mg/m2 = 1,000 mg (original dose ), 0 of 5 cycles  Dose modification: 500 mg/m2 (Cycle 1, Reason: Other (See Comments))      Reason treatment was stopped:  Change in Level of Care     Cancer Staging Information:  Cancer Staging  No matching staging information was found for the patient.      INTERVAL  HISTORY  Patient ID: Parker Arnold is a 65 y.o. year old male with a diagnosis of clear cell renal cancer in 2014. S/p Rh nephrectomy. He was under surveillance until Nov 2018  When he underwent  Wedge resection for a pulm nodule. Pathology c/w metastatic renal cell carcinoma. He was not able to tolerate sutent due to kidney failure, started yervoy/opdivo 5/13/2019 to 8/7/2019, then single agent opdivo 8/28 to 9/9/19. Therapy stopped due to renal failure. He has been on and off prednisone for 2 weeks.  -at today's visit, cr is 6.43,eGFR 7  -he has lost 20lbs, have mid epigastric pain, takes prilosec. He saw nephrologist and was told uric acid is elevated. Allopurinol started. He is complaining of urgency at night, he was started on flomax and passed out, hence that was discontinued.   11/1/19: he is tolerating 40mg of prednisone. His stomach stays quizzy due to being on a lot of medications. Antiacid bid helps. Ho hallucination, insomnia or manic behavior.  He saw urologist for urinary issues and some meds was rxed, he does not know the name of it.  1/2/20: complaining of just not feeling right, muscle cramps and multiple joint pains with no swelling. Labs show elevated cr>5, He received fluid without much improvement. He will see nephrology tomorrow. He is on 600/200 bola/vit once a day  1/23/2020: pt was admitted for cr of 5 . Steroid started while admitted and he was followed by nephrology. Biopsy deferred until creatinine stabilizes with plan to get it done at higher level of care due to his one kidney and h/o RCC/  PET neg for any dz. Currently feels better.  4/24/2020: currently on prednisone 20mg daily, being managed by Dr jain. Having weird dreams. Also follows with  Nephrologist. Kidney bx not planned  If numbers do not improve will consider  -he was admitted in the hospital at OSH due to diarrhea and got some fluid, antibiotics and CT scan. Scan was unremarkable, stool studies neg. currently  resolved.  -denies n/v, sob, cp, dizziness, diarrhea, constipation, rash,  focal weakness.  PE non focal    Review of Systems     See above otherwise neg    Medications:    Current Outpatient Medications   Medication Sig Dispense Refill   • calcitriol (ROCALTROL) 0.25 MCG capsule Take 1 capsule by mouth Daily. 30 capsule 2   • Calcium Carb-Cholecalciferol (CALCIUM + D3) 600-200 MG-UNIT tablet Take 1 tablet by mouth 2 (Two) Times a Day. 60 tablet 5   • carvedilol (COREG) 12.5 MG tablet Take 12.5 mg by mouth 2 (Two) Times a Day With Meals.     • finasteride (PROSCAR) 5 MG tablet Take 5 mg by mouth Daily.  3   • ondansetron (ZOFRAN) 8 MG tablet Take 8 mg by mouth Every 8 (Eight) Hours.     • pantoprazole (PROTONIX) 40 MG EC tablet Take 1 tablet by mouth Daily. 30 tablet 2   • predniSONE (DELTASONE) 20 MG tablet Take 2 tablets by mouth Daily With Breakfast. (Patient taking differently: Take 30 mg by mouth Daily.) 30 tablet 0   • sodium bicarbonate 650 MG tablet Take 1 tablet by mouth 3 (Three) Times a Day. 90 tablet 2   • allopurinol (ZYLOPRIM) 100 MG tablet Take 100 mg by mouth 2 (Two) Times a Day.     • benzonatate (TESSALON) 100 MG capsule Take 100 mg by mouth 4 (Four) Times a Day.     • lisinopril (PRINIVIL,ZESTRIL) 2.5 MG tablet Take 2.5 mg by mouth Daily.       No current facility-administered medications for this visit.        ALLERGIES:    Allergies   Allergen Reactions   • Amoxicillin Hives   • Nivolumab Provider Review Needed     Renal failure   • Penicillins Hives and Rash   • Clindamycin/Lincomycin Rash     pustules  Rash - pustules     • Doxycycline Rash   • Hepatitis B Virus Vaccines Rash     Pustules     • Hepatitis B Vaccine Rash   • Latex Rash   • Levofloxacin Rash       Objective      Vitals:    04/24/20 1041   BP: 162/80   Pulse: 84   Resp: 18   Temp: 97.6 °F (36.4 °C)   SpO2: 97%       Current Status 4/24/2020   ECOG score 0       General Appearance: Patient is awake, alert, oriented and in no acute  distress. Patient is welldeveloped, wellnourished, and appears stated age.  HEENT: Normocephalic. Sclerae clear, conjunctiva pink, extraocular movements intact, pupils, round, reactive to light and  accommodation. Mouth and throat are clear with moist oral mucosa.  NECK: Supple, no jugular venous distention, thyroid not enlarged.  LYMPH: No cervical, supraclavicular, axillary, or inguinal lymphadenopathy.  CHEST: Equal bilateral expansion, AP  diameter normal, resonant percussion note  LUNGS: Good air movement, no rales, rhonchi, rubs or wheezes with auscultation  CARDIO: Regular sinus rhythm, no murmurs, gallops or rubs.  ABDOMEN: Nondistended, soft, No tenderness, no guarding, no rebound, No hepatosplenomegaly. No abdominal masses. Bowel sounds positive. No hernia  GENITALIA: Not examined.  BREASTS: Not examined.  MUSKEL: No joint swelling, decreased motion, or inflammation  EXTREMS:  No edema, clubbing, cyanosis, No varicose veins.  NEURO: Grossly nonfocal, Gait is coordinated and smooth, Cognition is preserved.  SKIN: No rashes, no ecchymoses, no petechia.  PSYCH: Oriented to time, place and person. Memory is preserved. Mood and affect appear normal      RECENT LABS:  Orders Only on 04/24/2020   Component Date Value Ref Range Status   • WBC 04/24/2020 8.67  3.40 - 10.80 10*3/mm3 Final   • RBC 04/24/2020 3.74* 4.14 - 5.80 10*6/mm3 Final   • Hemoglobin 04/24/2020 11.1* 13.0 - 17.7 g/dL Final   • Hematocrit 04/24/2020 34.0* 37.5 - 51.0 % Final   • MCV 04/24/2020 90.9  79.0 - 97.0 fL Final   • MCH 04/24/2020 29.7  26.6 - 33.0 pg Final   • MCHC 04/24/2020 32.6  31.5 - 35.7 g/dL Final   • RDW 04/24/2020 13.5  12.3 - 15.4 % Final   • RDW-SD 04/24/2020 45.0  37.0 - 54.0 fl Final   • MPV 04/24/2020 9.3  6.0 - 12.0 fL Final   • Platelets 04/24/2020 172  140 - 450 10*3/mm3 Final   • Neutrophil % 04/24/2020 76.7* 42.7 - 76.0 % Final   • Lymphocyte % 04/24/2020 11.9* 19.6 - 45.3 % Final   • Monocyte % 04/24/2020 6.6  5.0 -  12.0 % Final   • Eosinophil % 04/24/2020 3.3  0.3 - 6.2 % Final   • Basophil % 04/24/2020 0.3  0.0 - 1.5 % Final   • Immature Grans % 04/24/2020 1.2* 0.0 - 0.5 % Final   • Neutrophils, Absolute 04/24/2020 6.65  1.70 - 7.00 10*3/mm3 Final   • Lymphocytes, Absolute 04/24/2020 1.03  0.70 - 3.10 10*3/mm3 Final   • Monocytes, Absolute 04/24/2020 0.57  0.10 - 0.90 10*3/mm3 Final   • Eosinophils, Absolute 04/24/2020 0.29  0.00 - 0.40 10*3/mm3 Final   • Basophils, Absolute 04/24/2020 0.03  0.00 - 0.20 10*3/mm3 Final   • Immature Grans, Absolute 04/24/2020 0.10* 0.00 - 0.05 10*3/mm3 Final   • nRBC 04/24/2020 0.0  0.0 - 0.2 /100 WBC Final       RADIOLOGY:  No results found.         Assessment/Plan 63 yo male with met RCC developed grade 4 renal toxicity from opdivo.pt here for f/u and is stable.    1. 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 He saw Nephrologist and bx not planned  -will not start cytoxan unless biopsy done to determine etiology of kidney failure. Has been off opdivo since September 2019.  4/24/20: still on prednisone. Am not sure how long he will be on it. Management per nephrology.  -started on coreg by pcp.   -wbc 8.67, Hg 11.1, plt 172, cr 3.61, eGFR 17. He is at his baseline new normal for kidney function  -labs today show anemia Hg 11.1. Checking iron profile, ferritin, b12, folate and will act accordingly.     2. FEN: low calcium. rx gurinder and vit D  3. Gerd: on omeprazole  4. Osteopenia: on gurinder+vit D kathy. Gurinder level still low, advised 2 tabs instead of one as he was taking      Obiageli Scotite Burleson MD    4/24/2020    11:02

## 2020-07-06 ENCOUNTER — HOSPITAL ENCOUNTER (INPATIENT)
Age: 65
LOS: 2 days | Discharge: HOME | DRG: 392 | End: 2020-07-08
Attending: EMERGENCY MEDICINE | Admitting: INTERNAL MEDICINE
Payer: COMMERCIAL

## 2020-07-06 ENCOUNTER — APPOINTMENT (OUTPATIENT)
Dept: CT IMAGING | Age: 65
DRG: 392 | End: 2020-07-06
Payer: COMMERCIAL

## 2020-07-06 PROBLEM — K57.32 SIGMOID DIVERTICULITIS: Status: ACTIVE | Noted: 2020-07-06

## 2020-07-06 PROBLEM — N17.9 ACUTE KIDNEY INJURY SUPERIMPOSED ON CHRONIC KIDNEY DISEASE (HCC): Status: ACTIVE | Noted: 2020-07-06

## 2020-07-06 PROBLEM — N40.0 BENIGN PROSTATIC HYPERPLASIA: Status: ACTIVE | Noted: 2019-07-23

## 2020-07-06 PROBLEM — N18.9 ACUTE KIDNEY INJURY SUPERIMPOSED ON CHRONIC KIDNEY DISEASE (HCC): Status: ACTIVE | Noted: 2020-07-06

## 2020-07-06 LAB
ALBUMIN SERPL-MCNC: 3.6 G/DL (ref 3.5–5.2)
ALP BLD-CCNC: 49 U/L (ref 40–130)
ALT SERPL-CCNC: 9 U/L (ref 5–41)
ANION GAP SERPL CALCULATED.3IONS-SCNC: 11 MMOL/L (ref 7–19)
AST SERPL-CCNC: 8 U/L (ref 5–40)
BASOPHILS ABSOLUTE: 0.1 K/UL (ref 0–0.2)
BASOPHILS RELATIVE PERCENT: 0.6 % (ref 0–1)
BILIRUB SERPL-MCNC: 0.4 MG/DL (ref 0.2–1.2)
BILIRUBIN URINE: NEGATIVE
BLOOD, URINE: NEGATIVE
BUN BLDV-MCNC: 44 MG/DL (ref 8–23)
C DIFF TOXIN/ANTIGEN: NORMAL
CALCIUM SERPL-MCNC: 10.7 MG/DL (ref 8.8–10.2)
CHLORIDE BLD-SCNC: 108 MMOL/L (ref 98–111)
CLARITY: CLEAR
CO2: 22 MMOL/L (ref 22–29)
COLOR: YELLOW
CREAT SERPL-MCNC: 3.3 MG/DL (ref 0.5–1.2)
EOSINOPHILS ABSOLUTE: 0.2 K/UL (ref 0–0.6)
EOSINOPHILS RELATIVE PERCENT: 3 % (ref 0–5)
GFR NON-AFRICAN AMERICAN: 19
GLUCOSE BLD-MCNC: 107 MG/DL (ref 74–109)
GLUCOSE URINE: NEGATIVE MG/DL
HCT VFR BLD CALC: 39 % (ref 42–52)
HEMOGLOBIN: 12.8 G/DL (ref 14–18)
IMMATURE GRANULOCYTES #: 0.1 K/UL
INR BLD: 1.06 (ref 0.88–1.18)
KETONES, URINE: NEGATIVE MG/DL
LEUKOCYTE ESTERASE, URINE: NEGATIVE
LIPASE: 113 U/L (ref 13–60)
LYMPHOCYTES ABSOLUTE: 1.5 K/UL (ref 1.1–4.5)
LYMPHOCYTES RELATIVE PERCENT: 18.4 % (ref 20–40)
MCH RBC QN AUTO: 29.8 PG (ref 27–31)
MCHC RBC AUTO-ENTMCNC: 32.8 G/DL (ref 33–37)
MCV RBC AUTO: 90.9 FL (ref 80–94)
MONOCYTES ABSOLUTE: 0.9 K/UL (ref 0–0.9)
MONOCYTES RELATIVE PERCENT: 11.3 % (ref 0–10)
NEUTROPHILS ABSOLUTE: 5.4 K/UL (ref 1.5–7.5)
NEUTROPHILS RELATIVE PERCENT: 66 % (ref 50–65)
NITRITE, URINE: NEGATIVE
PARATHYROID HORMONE INTACT: 10.1 PG/ML (ref 15–65)
PDW BLD-RTO: 13.6 % (ref 11.5–14.5)
PH UA: 5.5 (ref 5–8)
PLATELET # BLD: 170 K/UL (ref 130–400)
PMV BLD AUTO: 9.3 FL (ref 9.4–12.4)
POTASSIUM REFLEX MAGNESIUM: 4.2 MMOL/L (ref 3.5–5)
PROTEIN UA: NEGATIVE MG/DL
PROTHROMBIN TIME: 13.7 SEC (ref 12–14.6)
RBC # BLD: 4.29 M/UL (ref 4.7–6.1)
SODIUM BLD-SCNC: 141 MMOL/L (ref 136–145)
SPECIFIC GRAVITY UA: 1.02 (ref 1–1.03)
TOTAL PROTEIN: 5.9 G/DL (ref 6.6–8.7)
URIC ACID, SERUM: 9.9 MG/DL (ref 3.4–7)
UROBILINOGEN, URINE: 0.2 E.U./DL
VITAMIN D 25-HYDROXY: 46.8 NG/ML
WBC # BLD: 8.1 K/UL (ref 4.8–10.8)

## 2020-07-06 PROCEDURE — 6360000002 HC RX W HCPCS: Performed by: INTERNAL MEDICINE

## 2020-07-06 PROCEDURE — 87899 AGENT NOS ASSAY W/OPTIC: CPT

## 2020-07-06 PROCEDURE — 96374 THER/PROPH/DIAG INJ IV PUSH: CPT

## 2020-07-06 PROCEDURE — 6370000000 HC RX 637 (ALT 250 FOR IP): Performed by: INTERNAL MEDICINE

## 2020-07-06 PROCEDURE — 82306 VITAMIN D 25 HYDROXY: CPT

## 2020-07-06 PROCEDURE — 36415 COLL VENOUS BLD VENIPUNCTURE: CPT

## 2020-07-06 PROCEDURE — 87449 NOS EACH ORGANISM AG IA: CPT

## 2020-07-06 PROCEDURE — 85610 PROTHROMBIN TIME: CPT

## 2020-07-06 PROCEDURE — 87427 SHIGA-LIKE TOXIN AG IA: CPT

## 2020-07-06 PROCEDURE — 83690 ASSAY OF LIPASE: CPT

## 2020-07-06 PROCEDURE — 87324 CLOSTRIDIUM AG IA: CPT

## 2020-07-06 PROCEDURE — 93005 ELECTROCARDIOGRAM TRACING: CPT | Performed by: NURSE PRACTITIONER

## 2020-07-06 PROCEDURE — 80053 COMPREHEN METABOLIC PANEL: CPT

## 2020-07-06 PROCEDURE — 84550 ASSAY OF BLOOD/URIC ACID: CPT

## 2020-07-06 PROCEDURE — 1210000000 HC MED SURG R&B

## 2020-07-06 PROCEDURE — 85025 COMPLETE CBC W/AUTO DIFF WBC: CPT

## 2020-07-06 PROCEDURE — 87015 SPECIMEN INFECT AGNT CONCNTJ: CPT

## 2020-07-06 PROCEDURE — 87186 SC STD MICRODIL/AGAR DIL: CPT

## 2020-07-06 PROCEDURE — 6360000002 HC RX W HCPCS: Performed by: NURSE PRACTITIONER

## 2020-07-06 PROCEDURE — 74176 CT ABD & PELVIS W/O CONTRAST: CPT

## 2020-07-06 PROCEDURE — 81003 URINALYSIS AUTO W/O SCOPE: CPT

## 2020-07-06 PROCEDURE — 2500000003 HC RX 250 WO HCPCS: Performed by: NURSE PRACTITIONER

## 2020-07-06 PROCEDURE — 2580000003 HC RX 258: Performed by: INTERNAL MEDICINE

## 2020-07-06 PROCEDURE — 99285 EMERGENCY DEPT VISIT HI MDM: CPT

## 2020-07-06 PROCEDURE — 87045 FECES CULTURE AEROBIC BACT: CPT

## 2020-07-06 PROCEDURE — 83970 ASSAY OF PARATHORMONE: CPT

## 2020-07-06 PROCEDURE — 2580000003 HC RX 258: Performed by: NURSE PRACTITIONER

## 2020-07-06 RX ORDER — 0.9 % SODIUM CHLORIDE 0.9 %
1000 INTRAVENOUS SOLUTION INTRAVENOUS ONCE
Status: COMPLETED | OUTPATIENT
Start: 2020-07-06 | End: 2020-07-06

## 2020-07-06 RX ORDER — FINASTERIDE 5 MG/1
5 TABLET, FILM COATED ORAL DAILY
Status: DISCONTINUED | OUTPATIENT
Start: 2020-07-07 | End: 2020-07-08 | Stop reason: HOSPADM

## 2020-07-06 RX ORDER — ONDANSETRON 2 MG/ML
4 INJECTION INTRAMUSCULAR; INTRAVENOUS EVERY 6 HOURS PRN
Status: DISCONTINUED | OUTPATIENT
Start: 2020-07-06 | End: 2020-07-08 | Stop reason: HOSPADM

## 2020-07-06 RX ORDER — SODIUM BICARBONATE 325 MG/1
325 TABLET ORAL 3 TIMES DAILY
COMMUNITY

## 2020-07-06 RX ORDER — PREDNISONE 10 MG/1
10 TABLET ORAL DAILY
Status: DISCONTINUED | OUTPATIENT
Start: 2020-07-06 | End: 2020-07-08 | Stop reason: HOSPADM

## 2020-07-06 RX ORDER — CARVEDILOL 3.12 MG/1
3.12 TABLET ORAL 2 TIMES DAILY WITH MEALS
Status: DISCONTINUED | OUTPATIENT
Start: 2020-07-06 | End: 2020-07-08 | Stop reason: HOSPADM

## 2020-07-06 RX ORDER — SODIUM CHLORIDE 0.9 % (FLUSH) 0.9 %
10 SYRINGE (ML) INJECTION PRN
Status: DISCONTINUED | OUTPATIENT
Start: 2020-07-06 | End: 2020-07-08 | Stop reason: HOSPADM

## 2020-07-06 RX ORDER — POTASSIUM CHLORIDE 7.45 MG/ML
10 INJECTION INTRAVENOUS PRN
Status: DISCONTINUED | OUTPATIENT
Start: 2020-07-06 | End: 2020-07-08 | Stop reason: HOSPADM

## 2020-07-06 RX ORDER — ONDANSETRON 4 MG/1
4 TABLET, ORALLY DISINTEGRATING ORAL EVERY 8 HOURS PRN
Status: DISCONTINUED | OUTPATIENT
Start: 2020-07-06 | End: 2020-07-08 | Stop reason: HOSPADM

## 2020-07-06 RX ORDER — PREDNISONE 10 MG/1
10 TABLET ORAL DAILY
COMMUNITY

## 2020-07-06 RX ORDER — PANTOPRAZOLE SODIUM 20 MG/1
20 TABLET, DELAYED RELEASE ORAL DAILY
COMMUNITY
End: 2021-06-07

## 2020-07-06 RX ORDER — CARVEDILOL 3.12 MG/1
3.12 TABLET ORAL 2 TIMES DAILY WITH MEALS
COMMUNITY
End: 2021-06-07

## 2020-07-06 RX ORDER — PANTOPRAZOLE SODIUM 20 MG/1
20 TABLET, DELAYED RELEASE ORAL
Status: DISCONTINUED | OUTPATIENT
Start: 2020-07-07 | End: 2020-07-08 | Stop reason: HOSPADM

## 2020-07-06 RX ORDER — SODIUM CHLORIDE 0.9 % (FLUSH) 0.9 %
10 SYRINGE (ML) INJECTION EVERY 12 HOURS SCHEDULED
Status: DISCONTINUED | OUTPATIENT
Start: 2020-07-06 | End: 2020-07-08 | Stop reason: HOSPADM

## 2020-07-06 RX ORDER — HEPARIN SODIUM 5000 [USP'U]/ML
5000 INJECTION, SOLUTION INTRAVENOUS; SUBCUTANEOUS EVERY 8 HOURS SCHEDULED
Status: DISCONTINUED | OUTPATIENT
Start: 2020-07-06 | End: 2020-07-08 | Stop reason: HOSPADM

## 2020-07-06 RX ORDER — POTASSIUM CHLORIDE 20 MEQ/1
40 TABLET, EXTENDED RELEASE ORAL PRN
Status: DISCONTINUED | OUTPATIENT
Start: 2020-07-06 | End: 2020-07-08 | Stop reason: HOSPADM

## 2020-07-06 RX ORDER — FINASTERIDE 5 MG/1
5 TABLET, FILM COATED ORAL DAILY
COMMUNITY

## 2020-07-06 RX ORDER — SODIUM CHLORIDE 9 MG/ML
INJECTION, SOLUTION INTRAVENOUS CONTINUOUS
Status: DISCONTINUED | OUTPATIENT
Start: 2020-07-06 | End: 2020-07-08 | Stop reason: HOSPADM

## 2020-07-06 RX ADMIN — SODIUM CHLORIDE: 9 INJECTION, SOLUTION INTRAVENOUS at 15:26

## 2020-07-06 RX ADMIN — HEPARIN SODIUM 5000 UNITS: 5000 INJECTION INTRAVENOUS; SUBCUTANEOUS at 16:40

## 2020-07-06 RX ADMIN — PREDNISONE 10 MG: 10 TABLET ORAL at 16:39

## 2020-07-06 RX ADMIN — METRONIDAZOLE 500 MG: 500 INJECTION, SOLUTION INTRAVENOUS at 13:01

## 2020-07-06 RX ADMIN — CARVEDILOL 3.12 MG: 3.12 TABLET, FILM COATED ORAL at 16:39

## 2020-07-06 RX ADMIN — CEFTRIAXONE 1 G: 1 INJECTION, POWDER, FOR SOLUTION INTRAMUSCULAR; INTRAVENOUS at 13:00

## 2020-07-06 RX ADMIN — HEPARIN SODIUM 5000 UNITS: 5000 INJECTION INTRAVENOUS; SUBCUTANEOUS at 22:17

## 2020-07-06 RX ADMIN — SODIUM CHLORIDE 1000 ML: 9 INJECTION, SOLUTION INTRAVENOUS at 10:37

## 2020-07-06 RX ADMIN — SODIUM CHLORIDE, PRESERVATIVE FREE 10 ML: 5 INJECTION INTRAVENOUS at 22:17

## 2020-07-06 ASSESSMENT — ENCOUNTER SYMPTOMS
DIARRHEA: 1
ABDOMINAL PAIN: 1

## 2020-07-06 NOTE — H&P
[facility]      History & Physical    [id number]  PCP: Sydni Mattson    Date of Admission:7/6/2020    Patient:  Indira Estrada  MRN: [id number]    Date of Service: Pt seen/examined on 7/6/2020 and Admitted to Inpatient with expected LOS greater than two midnights due to medical therapy. CHIEF COMPLAINT:     Chief Complaint   Patient presents with    Diarrhea       History Obtained From:  patient, electronic medical record  Primary Care Physician: Sydni Mattson    HISTORY OF PRESENT ILLNESS:    The patient is a 76 y.o. male with a history of CKD IV, right renal cell carcinoma (s/pright nephrectomy,, presenting to the ED on account of moderate to severe 5-day history of persistent diarrhea. Patient reports that on several days, the frequency of diarrhea was almost about every 2 hours. Denies any recent changes in meals. Denies any sick contacts. No relieving factors reported. Associated symptom reported include intermittent crampy lower abdominal pain, as well as generalized weakness. No recent antibiotic use reported. No other associated symptoms reported. No nausea or vomiting. No fever or chills    Initial work-up significant for;  BUN/creatinine 44/3.3  Initial noncontrast CT abdomen pelvis, reporting evidence of acute sigmoid diverticulitis. Patient admitted for further work-up and management. PAST MEDICAL & SURGICAL HISTORY    Past Medical History:      Diagnosis Date    Cancer [facility])     Chronic kidney disease     TIA (transient ischemic attack)          Past Surgical History:      Procedure Laterality Date    HERNIA REPAIR  2012    KIDNEY REMOVAL Right 2014    SINUS SURGERY  2004          SOCIAL & FAMILY HISTORY:    Social History:   TOBACCO:   reports that he quit smoking about 36 years ago. His smoking use included cigarettes. He has never used smokeless tobacco.  ETOH:   reports no history of alcohol use.     Family History:       Problem Relation Age of Onset    Heart Attack Paternal Uncle     Heart Attack Maternal Grandfather 79        MEDICATIONS:    Medications Prior to Admission:    Prior to Admission medications    Medication Sig Start Date End Date Taking? Authorizing Provider   finasteride (PROSCAR) 5 MG tablet Take 5 mg by mouth daily   Yes Historical Provider, MD   pantoprazole (PROTONIX) 20 MG tablet Take 20 mg by mouth daily   Yes Historical Provider, MD   predniSONE (DELTASONE) 10 MG tablet Take 10 mg by mouth daily   Yes Historical Provider, MD   carvedilol (COREG) 3.125 MG tablet Take 3.125 mg by mouth 2 times daily (with meals)   Yes Historical Provider, MD   sodium bicarbonate 325 MG tablet Take 325 mg by mouth 3 times daily    Yes Historical Provider, MD   Cholecalciferol (VITAMIN D) 2000 units CAPS capsule Take 2,000 Units by mouth daily   Yes Historical Provider, MD        ALLERGIES:    Allergies:  Latex; Clindamycin/lincomycin; Doxycycline; Levaquin [levofloxacin in d5w]; and Pcn [penicillins]       REVIEW OF SYSTEMS :    Review of Systems  14 point review of systems is negative except as specifically addressed above. PHYSICAL EXAM:    Physical Exam:    Vitals:   BP (!) 148/85   Pulse 82   Temp 98.9 °F (37.2 °C) (Temporal)   Resp 18   Ht 5' 10\" (1.778 m)   Wt 200 lb (90.7 kg)   SpO2 98%   BMI 28.70 kg/m²     Physical Exam  Vitals signs and nursing note reviewed. Constitutional:       General: He is not in acute distress. Appearance: Normal appearance. He is obese. He is not ill-appearing, toxic-appearing or diaphoretic. HENT:      Head: Normocephalic and atraumatic. Right Ear: External ear normal.      Left Ear: External ear normal.      Nose: Nose normal. No congestion or rhinorrhea. Mouth/Throat:      Mouth: Mucous membranes are moist.      Pharynx: No oropharyngeal exudate or posterior oropharyngeal erythema. Eyes:      General: No scleral icterus. Right eye: No discharge. Left eye: No discharge. WBC 8.1   HGB 12.8*        Recent Labs     07/06/20  1035      K 4.2      CO2 22   BUN 44*   CREATININE 3.3*   GLUCOSE 107   AST 8   ALT 9   BILITOT 0.4   ALKPHOS 49     Troponin T: No results for input(s): TROPONINI in the last 72 hours. Pro-BNP: No results found for: BNP  Lactate: No results found for: LACTA      ABGs: No results found for: PHART, PO2ART, PBN1SUD  INR:   Recent Labs     07/06/20  1035   INR 1.06     TSH: No results found for: TSH, TSHREFLEX  URINALYSIS:  Recent Labs     07/06/20  1119   COLORU YELLOW   PHUR 5.5   CLARITYU Clear   SPECGRAV 1.016   LEUKOCYTESUR Negative   UROBILINOGEN 0.2   BILIRUBINUR Negative   BLOODU Negative   GLUCOSEU Negative          RADIOLOGY / IMAGING REPORTS:     Ct Abdomen Pelvis Wo Contrast Additional Contrast? None    Result Date: 7/6/2020  Examination. CT ABDOMEN PELVIS WO CONTRAST 7/6/2020 9:30 AM History: Left lower quadrant abdominal pain and diarrhea. DLP: 1228 mGycm. The CT scan of the abdomen and pelvis is performed without intravenous contrast enhancement. The images are acquired in axial plane with subsequent reconstruction in coronal and sagittal planes. There is no previous study for comparison. The lung bases included in the study show scattered areas of scarring and granulomas. There is a noncalcified small subpleural nodule located medially in the right lower lobe, image #16 in axial plane, measuring 1 cm in diameter. The unenhanced liver and spleen appear unremarkable. The gallbladder is moderately distended. No radiopaque gallstones. Unenhanced pancreas is unremarkable. The adrenal glands bilaterally are normal. The right kidney is absent. There is a low-density nodule located posterior laterally in the mid left kidney measuring 2.5 cm in diameter. The CT density suggest a cyst. No radiopaque calculi in the left kidney. The left ureter appears normal. The urinary bladder is decompressed with moderate thickening of the walls.  No Andrew Patino MD, MPH  Internal Medicine Hospitalist   7/6/2020 3:46 PM      EMR Dragon/Transcription disclaimer:   Much of this encounter note is an electronic transcription/translation of spoken language to printed text.  The electronic translation of spoken language may permit erroneous, or at times, nonsensical words or phrases to be inadvertently transcribed; although attempts have made to review the note for such errors, some may still exist.

## 2020-07-06 NOTE — ED PROVIDER NOTES
[facility]  eMERGENCY dEPARTMENT eNCOUnter      Pt Name: Brayden Stinson  MRN: [id number]  [place] 1955  Date of evaluation: 7/6/2020  Provider: LON Mckeon    CHIEF COMPLAINT       Chief Complaint   Patient presents with    Diarrhea         HISTORY OF PRESENT ILLNESS   (Location/Symptom, Timing/Onset,Context/Setting, Quality, Duration, Modifying Factors, Severity)  Note limiting factors. Kim Grandchild a 76 y.o. male who presents to the emergency department for evaluation of diarrhea. Pt tells me that he had has intermittent episodes of lower abdominal pain associated with diarrhea over past 5 days. He has had no fevers or vomiting. He denies bloody diarrhea to me. He does admit to some blood on toilet tissue when wiping reporting due to being \"raw\". He denies history of gi bleeding. He has history of renal cancer having had nephrectomy. He tells me that his renal function has likely worsened as he has had some mental \"slowing\" and general weakness similar to previous. He denies recent antibiotic use, hospitalization as well as bad food exposure. HPI    Nursing Notes were reviewed. REVIEW OF SYSTEMS    (2-9 systems for level 4, 10 or more for level 5)     Review of Systems   Gastrointestinal: Positive for abdominal pain and diarrhea. Neurological: Positive for weakness (generalized). All other systems reviewed and are negative. A complete review of systems was performed and is negative except as noted above in the HPI.        PAST MEDICAL HISTORY     Past Medical History:   Diagnosis Date    Cancer [facility])     Chronic kidney disease     TIA (transient ischemic attack)          SURGICAL HISTORY       Past Surgical History:   Procedure Laterality Date    HERNIA REPAIR  2012    KIDNEY REMOVAL Right 2014    SINUS SURGERY  2004         CURRENT MEDICATIONS       Current Discharge Medication List      CONTINUE these medications which have NOT CHANGED    Details   finasteride (PROSCAR) 5 MG tablet Take 5 mg by mouth daily      pantoprazole (PROTONIX) 20 MG tablet Take 20 mg by mouth daily      predniSONE (DELTASONE) 10 MG tablet Take 10 mg by mouth daily      carvedilol (COREG) 3.125 MG tablet Take 3.125 mg by mouth 2 times daily (with meals)      sodium bicarbonate 325 MG tablet Take 325 mg by mouth 3 times daily       Cholecalciferol (VITAMIN D) 2000 units CAPS capsule Take 2,000 Units by mouth daily             ALLERGIES     Latex; Clindamycin/lincomycin;  Doxycycline; Levaquin [levofloxacin in d5w]; and Pcn [penicillins]    FAMILY HISTORY       Family History   Problem Relation Age of Onset    Heart Attack Paternal Uncle     Heart Attack Maternal Grandfather 79          SOCIAL HISTORY       Social History     Socioeconomic History    Marital status:      Spouse name: None    Number of children: None    Years of education: None    Highest education level: None   Occupational History    None   Social Needs    Financial resource strain: None    Food insecurity     Worry: None     Inability: None    Transportation needs     Medical: None     Non-medical: None   Tobacco Use    Smoking status: Former Smoker     Types: Cigarettes     Last attempt to quit: 1984     Years since quittin.5    Smokeless tobacco: Never Used   Substance and Sexual Activity    Alcohol use: No    Drug use: None    Sexual activity: None   Lifestyle    Physical activity     Days per week: None     Minutes per session: None    Stress: None   Relationships    Social connections     Talks on phone: None     Gets together: None     Attends Yazidi service: None     Active member of club or organization: None     Attends meetings of clubs or organizations: None     Relationship status: None    Intimate partner violence     Fear of current or ex partner: None     Emotionally abused: None     Physically abused: None     Forced sexual activity: None   Other Topics Concern    None   Social History Narrative    None       SCREENINGS    Shahab Coma Scale  Eye Opening: Spontaneous  Best Verbal Response: Oriented  Best Motor Response: Obeys commands  Kistler Coma Scale Score: 15        PHYSICAL EXAM    (up to 7 for level 4, 8 or more for level 5)     ED Triage Vitals   BP Temp Temp src Pulse Resp SpO2 Height Weight   -- -- -- -- -- -- -- --     Vitals:    07/06/20 1837 07/06/20 2140 07/07/20 0225 07/07/20 0711   BP: 132/79 128/74 124/67 (!) 155/83   Pulse: 79 80 81 72   Resp: 18 18 16 16   Temp: 99.4 °F (37.4 °C) 98.6 °F (37 °C) 99.4 °F (37.4 °C)    TempSrc:  Temporal Temporal    SpO2: 91% 92% 94% 97%   Weight:       Height:             Physical Exam  Vitals signs reviewed. HENT:      Head: Normocephalic. Right Ear: External ear normal.      Left Ear: External ear normal.   Eyes:      Conjunctiva/sclera: Conjunctivae normal.      Pupils: Pupils are equal, round, and reactive to light. Neck:      Musculoskeletal: Normal range of motion. Cardiovascular:      Rate and Rhythm: Normal rate and regular rhythm. Heart sounds: Normal heart sounds. Pulmonary:      Effort: Pulmonary effort is normal.      Breath sounds: Normal breath sounds. Abdominal:      General: Bowel sounds are normal.      Palpations: Abdomen is soft. Tenderness: There is abdominal tenderness (mild ttp LLQ abdomen). Musculoskeletal: Normal range of motion. Skin:     General: Skin is warm and dry. Neurological:      Mental Status: He is alert and oriented to person, place, and time.          DIAGNOSTIC RESULTS     EKG: All EKG's are interpreted by the Emergency Department Physician who either signs or Co-signs this chart in the absence of acardiologist.        RADIOLOGY:   Non-plain film images such as CT, Ultrasound andMRI are read by the radiologist. Plain radiographic images are visualized and preliminarily interpreted by the emergency physician with the below findings:        Interpretation per the Radiologist below, if available at the time of this note:    CT ABDOMEN PELVIS WO CONTRAST Additional Contrast? None   Final Result   The diverticulosis of the colon. Evidence of acute   diverticulitis of the sigmoid colon and the adjacent distal descending   colon. No free air or fluid in the area. A 1 cm noncalcified subpleural nodule in the left lower lobe. This   needs to be further evaluated. A follow-up examination in 6 months. Above findings are recorded on a digital voice clip in PACS.    Signed by Dr Juana Riddle on 7/6/2020 11:02 AM            ED BEDSIDE ULTRASOUND:   Performed by ED Physician - none    LABS:  Labs Reviewed   CBC WITH AUTO DIFFERENTIAL - Abnormal; Notable for the following components:       Result Value    RBC 4.29 (*)     Hemoglobin 12.8 (*)     Hematocrit 39.0 (*)     MCHC 32.8 (*)     MPV 9.3 (*)     Neutrophils % 66.0 (*)     Lymphocytes % 18.4 (*)     Monocytes % 11.3 (*)     All other components within normal limits   COMPREHENSIVE METABOLIC PANEL W/ REFLEX TO MG FOR LOW K - Abnormal; Notable for the following components:    BUN 44 (*)     CREATININE 3.3 (*)     GFR Non-African American 19 (*)     Calcium 10.7 (*)     Total Protein 5.9 (*)     All other components within normal limits   LIPASE - Abnormal; Notable for the following components:    Lipase 113 (*)     All other components within normal limits   URIC ACID - Abnormal; Notable for the following components:    Uric Acid, Serum 9.9 (*)     All other components within normal limits   PTH, INTACT - Abnormal; Notable for the following components:    PTH 10.1 (*)     All other components within normal limits   BASIC METABOLIC PANEL W/ REFLEX TO MG FOR LOW K - Abnormal; Notable for the following components:    Potassium reflex Magnesium 5.2 (*)     CO2 20 (*)     BUN 42 (*)     CREATININE 3.2 (*)     GFR Non- 20 (*)     All other components within normal limits   CBC WITH AUTO DIFFERENTIAL - Abnormal; Notable for the following components:    RBC 3.78 (*)     Hemoglobin 11.4 (*)     Hematocrit 34.3 (*)     Neutrophils % 73.6 (*)     Lymphocytes % 15.3 (*)     All other components within normal limits   CULTURE, STOOL    Narrative:     ORDER#: [id number]                          ORDERED BY: KANE XIONG  SOURCE: Stool                              COLLECTED:  07/06/20 12:39  ANTIBIOTICS AT [facility]ATH[facility].:                      RECEIVED :  07/06/20 12:54   C DIFF TOXIN/ANTIGEN    Narrative:     ORDER#: [id number]                          ORDERED BY: KANE XIONG  SOURCE: Stool Stool                        COLLECTED:  07/06/20 12:39  ANTIBIOTICS AT [facility]ATHAN.:                      RECEIVED :  07/06/20 12:55  Collect in clean container   PROTIME-INR   URINALYSIS   CREATININE, RANDOM URINE   EOSINOPHIL SMEAR URINE   UREA NITROGEN, URINE   VITAMIN D 25 HYDROXY   POTASSIUM       All other labs were within normal range or not returned as of this dictation. RE-ASSESSMENT     Pt tells me that he has CKD creatinine at baseline per patient is 2.3  Discussed with Dr. Malcolm Aleman who will admit patient to hospitalist service. EMERGENCY DEPARTMENT COURSE and DIFFERENTIALDIAGNOSIS/MDM:   Vitals:    Vitals:    07/06/20 1837 07/06/20 2140 07/07/20 0225 07/07/20 0711   BP: 132/79 128/74 124/67 (!) 155/83   Pulse: 79 80 81 72   Resp: 18 18 16 16   Temp: 99.4 °F (37.4 °C) 98.6 °F (37 °C) 99.4 °F (37.4 °C)    TempSrc:  Temporal Temporal    SpO2: 91% 92% 94% 97%   Weight:       Height:           MDM      CONSULTS:  IP CONSULT TO SOCIAL WORK  IP CONSULT TO NEPHROLOGY    PROCEDURES:  Unless otherwise notedbelow, none     Procedures    FINAL IMPRESSION     1. Colitis    2.  MARIELLA (acute kidney injury) [facility])          DISPOSITION/PLAN   DISPOSITION        PATIENT REFERRED TO:  Tiki Noonan  [address] [place], [place] [id number] [phone]            DISCHARGE MEDICATIONS:       Current Discharge Medication List          (Pleasenote that portions of this note were completed with a voice recognition program.  Efforts were made to edit the dictations but occasionally words are mis-transcribed.)              Isabelle Nielsen, LON  07/07/20 [id number]

## 2020-07-07 PROBLEM — A04.5 CAMPYLOBACTER GASTROENTERITIS: Status: ACTIVE | Noted: 2020-07-07

## 2020-07-07 LAB
ANION GAP SERPL CALCULATED.3IONS-SCNC: 9 MMOL/L (ref 7–19)
BASOPHILS ABSOLUTE: 0 K/UL (ref 0–0.2)
BASOPHILS RELATIVE PERCENT: 0.4 % (ref 0–1)
BUN BLDV-MCNC: 42 MG/DL (ref 8–23)
CALCIUM SERPL-MCNC: 9.5 MG/DL (ref 8.8–10.2)
CHLORIDE BLD-SCNC: 111 MMOL/L (ref 98–111)
CO2: 20 MMOL/L (ref 22–29)
CREAT SERPL-MCNC: 3.2 MG/DL (ref 0.5–1.2)
CREATININE URINE: 106.7 MG/DL (ref 4.2–622)
EOSINOPHIL,URINE: NORMAL
EOSINOPHILS ABSOLUTE: 0.1 K/UL (ref 0–0.6)
EOSINOPHILS RELATIVE PERCENT: 1.3 % (ref 0–5)
GFR NON-AFRICAN AMERICAN: 20
GLUCOSE BLD-MCNC: 96 MG/DL (ref 74–109)
HCT VFR BLD CALC: 34.3 % (ref 42–52)
HEMOGLOBIN: 11.4 G/DL (ref 14–18)
IMMATURE GRANULOCYTES #: 0 K/UL
LYMPHOCYTES ABSOLUTE: 1.2 K/UL (ref 1.1–4.5)
LYMPHOCYTES RELATIVE PERCENT: 15.3 % (ref 20–40)
MCH RBC QN AUTO: 30.2 PG (ref 27–31)
MCHC RBC AUTO-ENTMCNC: 33.2 G/DL (ref 33–37)
MCV RBC AUTO: 90.7 FL (ref 80–94)
MONOCYTES ABSOLUTE: 0.7 K/UL (ref 0–0.9)
MONOCYTES RELATIVE PERCENT: 8.9 % (ref 0–10)
NEUTROPHILS ABSOLUTE: 5.7 K/UL (ref 1.5–7.5)
NEUTROPHILS RELATIVE PERCENT: 73.6 % (ref 50–65)
PDW BLD-RTO: 13.4 % (ref 11.5–14.5)
PLATELET # BLD: 151 K/UL (ref 130–400)
PMV BLD AUTO: 9.6 FL (ref 9.4–12.4)
POTASSIUM REFLEX MAGNESIUM: 5.2 MMOL/L (ref 3.5–5)
POTASSIUM SERPL-SCNC: 4.4 MMOL/L (ref 3.5–5)
RBC # BLD: 3.78 M/UL (ref 4.7–6.1)
SODIUM BLD-SCNC: 140 MMOL/L (ref 136–145)
UREA NITROGEN, UR: 549 MG/DL
WBC # BLD: 7.7 K/UL (ref 4.8–10.8)

## 2020-07-07 PROCEDURE — 6360000002 HC RX W HCPCS: Performed by: INTERNAL MEDICINE

## 2020-07-07 PROCEDURE — 6370000000 HC RX 637 (ALT 250 FOR IP): Performed by: INTERNAL MEDICINE

## 2020-07-07 PROCEDURE — 85025 COMPLETE CBC W/AUTO DIFF WBC: CPT

## 2020-07-07 PROCEDURE — 1210000000 HC MED SURG R&B

## 2020-07-07 PROCEDURE — 36415 COLL VENOUS BLD VENIPUNCTURE: CPT

## 2020-07-07 PROCEDURE — 84132 ASSAY OF SERUM POTASSIUM: CPT

## 2020-07-07 PROCEDURE — 80048 BASIC METABOLIC PNL TOTAL CA: CPT

## 2020-07-07 PROCEDURE — 87205 SMEAR GRAM STAIN: CPT

## 2020-07-07 PROCEDURE — 82570 ASSAY OF URINE CREATININE: CPT

## 2020-07-07 PROCEDURE — 2580000003 HC RX 258: Performed by: INTERNAL MEDICINE

## 2020-07-07 PROCEDURE — 2500000003 HC RX 250 WO HCPCS: Performed by: INTERNAL MEDICINE

## 2020-07-07 PROCEDURE — 84540 ASSAY OF URINE/UREA-N: CPT

## 2020-07-07 RX ORDER — ALLOPURINOL 100 MG/1
100 TABLET ORAL DAILY
Status: DISCONTINUED | OUTPATIENT
Start: 2020-07-07 | End: 2020-07-08 | Stop reason: HOSPADM

## 2020-07-07 RX ORDER — SODIUM BICARBONATE 650 MG/1
650 TABLET ORAL 2 TIMES DAILY
Status: DISCONTINUED | OUTPATIENT
Start: 2020-07-07 | End: 2020-07-08

## 2020-07-07 RX ADMIN — ALLOPURINOL 100 MG: 100 TABLET ORAL at 12:35

## 2020-07-07 RX ADMIN — HEPARIN SODIUM 5000 UNITS: 5000 INJECTION INTRAVENOUS; SUBCUTANEOUS at 21:36

## 2020-07-07 RX ADMIN — CARVEDILOL 3.12 MG: 3.12 TABLET, FILM COATED ORAL at 07:58

## 2020-07-07 RX ADMIN — SODIUM CHLORIDE, PRESERVATIVE FREE 10 ML: 5 INJECTION INTRAVENOUS at 21:36

## 2020-07-07 RX ADMIN — HEPARIN SODIUM 5000 UNITS: 5000 INJECTION INTRAVENOUS; SUBCUTANEOUS at 05:51

## 2020-07-07 RX ADMIN — CEFEPIME HYDROCHLORIDE 1 G: 1 INJECTION, POWDER, FOR SOLUTION INTRAMUSCULAR; INTRAVENOUS at 21:36

## 2020-07-07 RX ADMIN — METRONIDAZOLE 500 MG: 500 INJECTION, SOLUTION INTRAVENOUS at 08:55

## 2020-07-07 RX ADMIN — METRONIDAZOLE 500 MG: 500 INJECTION, SOLUTION INTRAVENOUS at 17:28

## 2020-07-07 RX ADMIN — PREDNISONE 10 MG: 10 TABLET ORAL at 07:58

## 2020-07-07 RX ADMIN — HEPARIN SODIUM 5000 UNITS: 5000 INJECTION INTRAVENOUS; SUBCUTANEOUS at 13:53

## 2020-07-07 RX ADMIN — CEFEPIME HYDROCHLORIDE 1 G: 1 INJECTION, POWDER, FOR SOLUTION INTRAMUSCULAR; INTRAVENOUS at 08:55

## 2020-07-07 RX ADMIN — SODIUM BICARBONATE 650 MG: 650 TABLET ORAL at 12:35

## 2020-07-07 RX ADMIN — PANTOPRAZOLE SODIUM 20 MG: 20 TABLET, DELAYED RELEASE ORAL at 05:51

## 2020-07-07 RX ADMIN — CARVEDILOL 3.12 MG: 3.12 TABLET, FILM COATED ORAL at 17:28

## 2020-07-07 RX ADMIN — AZITHROMYCIN 500 MG: 500 INJECTION, POWDER, LYOPHILIZED, FOR SOLUTION INTRAVENOUS at 14:54

## 2020-07-07 RX ADMIN — FINASTERIDE 5 MG: 5 TABLET, FILM COATED ORAL at 07:58

## 2020-07-07 RX ADMIN — SODIUM BICARBONATE 650 MG: 650 TABLET ORAL at 21:36

## 2020-07-07 NOTE — PROGRESS NOTES
Pharmacy Renal Adjustment    Carlos Fallon is a 76 y.o. male. Pharmacy has renally adjusted Maxipime per protocol. Recent Labs     07/06/20  1035 07/07/20  0457   BUN 44* 42*       Recent Labs     07/06/20  1035 07/07/20  0457   CREATININE 3.3* 3.2*       Estimated Creatinine Clearance: 26 mL/min (A) (based on SCr of 3.2 mg/dL (H)). Height:   Ht Readings from Last 1 Encounters:   07/06/20 5' 10\" (1.778 m)     Weight:  Wt Readings from Last 1 Encounters:   07/06/20 200 lb (90.7 kg)         Plan: Adjust Cefepime to 1 g IV q12h.     Electronically signed by Vani Ramey RPh, BCPS, 7/7/2020,8:17 AM

## 2020-07-07 NOTE — CONSULTS
Renal Consult Note    Thank you to requesting provider: Dr Curt Núñez, for asking us to see Mega Mensah    Reason for consultation:  CKD stage 4    Chief Complaint:  Diarrhea    History of Presenting Illness      Patient is a 77 yo pleasant man who is s/p right nephrectomy 6 years ago for RCC. His course was marked with CKD. He was seeing the nephrologist at the [address] [place] [address]. 2 years ago, his PET scan showed abnormal spots on his lungs and he had to receive chemo/ immunotherapy for metastatic disease. His course was then marked with worsening renal function and he was then in stage 4. Patient was then believed to be having some nephrotoxicity due to nivolumab (Opdivo). He came under the care of JOLIE Hays at our office and patient was placed on steroids. His renal function has fluctuated between 15-20. Patient is currently admitted for 5 days diarrhea and upset stomach. He is known to have diverticulosis and was diagnosed with acute diverticulitis of the sigmoid colon on his CT scan. His serum creatinine was 3.3 and renal service was consulted to manage his CKD stage 4. Patient has no dysuria and denies NSAIDs use/abuse.     Past Medical/Surgical History      Active Ambulatory Problems     Diagnosis Date Noted    TIA (transient ischemic attack) 06/13/2018    Chronic kidney disease      Resolved Ambulatory Problems     Diagnosis Date Noted    No Resolved Ambulatory Problems     Past Medical History:   Diagnosis Date    [facility]          Review of Systems     Constitutional:  No weight loss, no fever/chills  Eyes:  No eye pain, no eye redness  Cardiovascular:  No chest pain, no worsening of edema  Respiratory:  No hemoptysis, no stidor  Gastrointestinal:  No blood in stool, no n/v, + diarrhea  Genitoruinary:  No hematuria, no difficulty with urination  Musculoskeletal:  No joint swelling, no redness  Integumentary:  No Rash, no itching  Neurological:  No focal weakness, No new sensory deficit  Psychiatric:  No Taking for the 20 encounter [facility] Encounter)   Medication Sig Dispense Refill    finasteride (PROSCAR) 5 MG tablet Take 5 mg by mouth daily      pantoprazole (PROTONIX) 20 MG tablet Take 20 mg by mouth daily      predniSONE (DELTASONE) 10 MG tablet Take 10 mg by mouth daily      carvedilol (COREG) 3.125 MG tablet Take 3.125 mg by mouth 2 times daily (with meals)      sodium bicarbonate 325 MG tablet Take 325 mg by mouth 3 times daily       Cholecalciferol (VITAMIN D) 2000 units CAPS capsule Take 2,000 Units by mouth daily         Allergies   Latex; Clindamycin/lincomycin; Doxycycline; Levaquin [levofloxacin in d5w]; and Pcn [penicillins]    Family History       Family History   Problem Relation Age of Onset    Heart Attack Paternal Uncle     Heart Attack Maternal Grandfather 79     Family history negative for kidney disease.      Social History      Social History     Socioeconomic History    Marital status:      Spouse name: None    Number of children: None    Years of education: None    Highest education level: None   Occupational History    None   Social Needs    Financial resource strain: None    Food insecurity     Worry: None     Inability: None    Transportation needs     Medical: None     Non-medical: None   Tobacco Use    Smoking status: Former Smoker     Types: Cigarettes     Last attempt to quit:      Years since quittin.5    Smokeless tobacco: Never Used   Substance and Sexual Activity    Alcohol use: No    Drug use: None    Sexual activity: None   Lifestyle    Physical activity     Days per week: None     Minutes per session: None    Stress: None   Relationships    Social connections     Talks on phone: None     Gets together: None     Attends Orthodoxy service: None     Active member of club or organization: None     Attends meetings of clubs or organizations: None     Relationship status: None    Intimate partner violence     Fear of current or ex partner: None     Emotionally abused: None     Physically abused: None     Forced sexual activity: None   Other Topics Concern    None   Social History Narrative    None       Physical Exam     Blood pressure (!) 155/83, pulse 72, temperature 98.7 °F (37.1 °C), resp. rate 16, height 5' 10\" (1.778 m), weight 200 lb (90.7 kg), SpO2 97 %. General:  NAD, A+Ox3, ill-appearing, normal body habitus  HEENT:  PERRL, EOMI  Neck:  Supple, normal range of movement  Chest:  CTAB, good respiratory effort, good air movement  CV:  RRR, no murmurs   Abdomen:  NTND, soft, +BS, no hepatosplenomegaly  Extremities:  No peripheral edema  Neurological:  Moving all four extremities  Lymphatics:  No palpable lymph nodes   Skin:  No rash, no jaundice  Psychiatric:  Normal insight and judgement, good recall    Data     Recent Labs     07/06/20  1035 07/07/20  0457   WBC 8.1 7.7   HGB 12.8* 11.4*   HCT 39.0* 34.3*   MCV 90.9 90.7    151     Recent Labs     07/06/20  1035 07/07/20  0457    140   K 4.2 5.2*    111   CO2 22 20*   GLUCOSE 107 96   BUN 44* 42*   CREATININE 3.3* 3.2*   LABGLOM 19* 20*       Assessment:  1. CKD stage 4  2. S/p right nephrectomy for RCC  3. Renal cell carcinoma with metastatic disease (s/p surgery, chemo, immuno)  4. Acute diverticulitis of the sigmoid colon  5. Hyperkalemia  6. Metabolic acidosis  7. Anemia of CKD    Plan:  · Agree with Iv hydration and antibiotics for now. Continue oral prednisone. Follow up labs. Provide low K diet. Patient may benefit from dialysis education and planning for dialysis access in the near future. Will check PTH, vit D level and uric acid level. No signs of overt uremia.      Thank you for asking us to participate in the management of your patient, please do not hesitate to contact me for any concerns regarding my recommendations as outlined above.    -----------------------------  Electronically signed by Armand Anders MD on 7/7/20 at 11:08 AM CDT

## 2020-07-08 VITALS
HEIGHT: 70 IN | SYSTOLIC BLOOD PRESSURE: 144 MMHG | RESPIRATION RATE: 16 BRPM | TEMPERATURE: 97.8 F | WEIGHT: 200 LBS | HEART RATE: 73 BPM | BODY MASS INDEX: 28.63 KG/M2 | DIASTOLIC BLOOD PRESSURE: 81 MMHG | OXYGEN SATURATION: 96 %

## 2020-07-08 PROBLEM — R91.1 LEFT LOWER LOBE PULMONARY NODULE: Status: ACTIVE | Noted: 2020-07-08

## 2020-07-08 LAB
ANION GAP SERPL CALCULATED.3IONS-SCNC: 10 MMOL/L (ref 7–19)
BASOPHILS ABSOLUTE: 0 K/UL (ref 0–0.2)
BASOPHILS RELATIVE PERCENT: 0.3 % (ref 0–1)
BUN BLDV-MCNC: 41 MG/DL (ref 8–23)
CALCIUM SERPL-MCNC: 8.7 MG/DL (ref 8.8–10.2)
CHLORIDE BLD-SCNC: 113 MMOL/L (ref 98–111)
CO2: 20 MMOL/L (ref 22–29)
CREAT SERPL-MCNC: 3.4 MG/DL (ref 0.5–1.2)
EKG P AXIS: 58 DEGREES
EKG P-R INTERVAL: 136 MS
EKG Q-T INTERVAL: 360 MS
EKG QRS DURATION: 138 MS
EKG QTC CALCULATION (BAZETT): 388 MS
EKG T AXIS: 121 DEGREES
EOSINOPHILS ABSOLUTE: 0.2 K/UL (ref 0–0.6)
EOSINOPHILS RELATIVE PERCENT: 2.9 % (ref 0–5)
GFR NON-AFRICAN AMERICAN: 18
GLUCOSE BLD-MCNC: 94 MG/DL (ref 74–109)
HCT VFR BLD CALC: 31.5 % (ref 42–52)
HEMOGLOBIN: 10.2 G/DL (ref 14–18)
IMMATURE GRANULOCYTES #: 0 K/UL
IRON SATURATION: 30 % (ref 14–50)
IRON: 60 UG/DL (ref 59–158)
LYMPHOCYTES ABSOLUTE: 1.2 K/UL (ref 1.1–4.5)
LYMPHOCYTES RELATIVE PERCENT: 19.1 % (ref 20–40)
MCH RBC QN AUTO: 29.7 PG (ref 27–31)
MCHC RBC AUTO-ENTMCNC: 32.4 G/DL (ref 33–37)
MCV RBC AUTO: 91.8 FL (ref 80–94)
MONOCYTES ABSOLUTE: 0.7 K/UL (ref 0–0.9)
MONOCYTES RELATIVE PERCENT: 10.8 % (ref 0–10)
NEUTROPHILS ABSOLUTE: 4.2 K/UL (ref 1.5–7.5)
NEUTROPHILS RELATIVE PERCENT: 66.4 % (ref 50–65)
PDW BLD-RTO: 13.4 % (ref 11.5–14.5)
PLATELET # BLD: 140 K/UL (ref 130–400)
PMV BLD AUTO: 9.5 FL (ref 9.4–12.4)
POTASSIUM REFLEX MAGNESIUM: 4.6 MMOL/L (ref 3.5–5)
RBC # BLD: 3.43 M/UL (ref 4.7–6.1)
SODIUM BLD-SCNC: 143 MMOL/L (ref 136–145)
TOTAL IRON BINDING CAPACITY: 202 UG/DL (ref 250–400)
WBC # BLD: 6.3 K/UL (ref 4.8–10.8)

## 2020-07-08 PROCEDURE — 85025 COMPLETE CBC W/AUTO DIFF WBC: CPT

## 2020-07-08 PROCEDURE — 36415 COLL VENOUS BLD VENIPUNCTURE: CPT

## 2020-07-08 PROCEDURE — 2500000003 HC RX 250 WO HCPCS: Performed by: INTERNAL MEDICINE

## 2020-07-08 PROCEDURE — 83550 IRON BINDING TEST: CPT

## 2020-07-08 PROCEDURE — 2580000003 HC RX 258: Performed by: INTERNAL MEDICINE

## 2020-07-08 PROCEDURE — 6370000000 HC RX 637 (ALT 250 FOR IP): Performed by: INTERNAL MEDICINE

## 2020-07-08 PROCEDURE — 83540 ASSAY OF IRON: CPT

## 2020-07-08 PROCEDURE — 93010 ELECTROCARDIOGRAM REPORT: CPT | Performed by: INTERNAL MEDICINE

## 2020-07-08 PROCEDURE — 6360000002 HC RX W HCPCS: Performed by: INTERNAL MEDICINE

## 2020-07-08 PROCEDURE — 80048 BASIC METABOLIC PNL TOTAL CA: CPT

## 2020-07-08 RX ORDER — SULFAMETHOXAZOLE AND TRIMETHOPRIM 800; 160 MG/1; MG/1
1 TABLET ORAL 2 TIMES DAILY
Qty: 10 TABLET | Refills: 0 | Status: SHIPPED | OUTPATIENT
Start: 2020-07-08 | End: 2020-07-13

## 2020-07-08 RX ORDER — METRONIDAZOLE 500 MG/1
500 TABLET ORAL 3 TIMES DAILY
Qty: 15 TABLET | Refills: 0 | Status: SHIPPED | OUTPATIENT
Start: 2020-07-08 | End: 2020-07-13

## 2020-07-08 RX ORDER — ALLOPURINOL 100 MG/1
100 TABLET ORAL DAILY
Qty: 30 TABLET | Refills: 0 | Status: SHIPPED | OUTPATIENT
Start: 2020-07-09 | End: 2020-08-08

## 2020-07-08 RX ORDER — SODIUM BICARBONATE 650 MG/1
1300 TABLET ORAL 2 TIMES DAILY
Status: DISCONTINUED | OUTPATIENT
Start: 2020-07-08 | End: 2020-07-08 | Stop reason: HOSPADM

## 2020-07-08 RX ORDER — AZITHROMYCIN 500 MG/1
500 TABLET, FILM COATED ORAL DAILY
Qty: 3 TABLET | Refills: 0 | Status: SHIPPED | OUTPATIENT
Start: 2020-07-08 | End: 2020-07-11

## 2020-07-08 RX ADMIN — SODIUM CHLORIDE, PRESERVATIVE FREE 10 ML: 5 INJECTION INTRAVENOUS at 07:39

## 2020-07-08 RX ADMIN — PANTOPRAZOLE SODIUM 20 MG: 20 TABLET, DELAYED RELEASE ORAL at 07:38

## 2020-07-08 RX ADMIN — CARVEDILOL 3.12 MG: 3.12 TABLET, FILM COATED ORAL at 07:38

## 2020-07-08 RX ADMIN — METRONIDAZOLE 500 MG: 500 INJECTION, SOLUTION INTRAVENOUS at 05:16

## 2020-07-08 RX ADMIN — HEPARIN SODIUM 5000 UNITS: 5000 INJECTION INTRAVENOUS; SUBCUTANEOUS at 05:16

## 2020-07-08 RX ADMIN — SODIUM BICARBONATE 650 MG: 650 TABLET ORAL at 07:38

## 2020-07-08 RX ADMIN — METRONIDAZOLE 500 MG: 500 INJECTION, SOLUTION INTRAVENOUS at 08:56

## 2020-07-08 RX ADMIN — PREDNISONE 10 MG: 10 TABLET ORAL at 07:38

## 2020-07-08 RX ADMIN — ALLOPURINOL 100 MG: 100 TABLET ORAL at 07:38

## 2020-07-08 RX ADMIN — CEFEPIME HYDROCHLORIDE 1 G: 1 INJECTION, POWDER, FOR SOLUTION INTRAMUSCULAR; INTRAVENOUS at 07:38

## 2020-07-08 RX ADMIN — FINASTERIDE 5 MG: 5 TABLET, FILM COATED ORAL at 07:38

## 2020-07-08 NOTE — DISCHARGE SUMMARY
Jalyn Breath  :  1955  MRN:  [id number]    Admit date:  2020  Discharge date:  2020       Admitting Physician:  Deisy Bryant MD    Advance Directive: Full Code    Consults Made:   IP CONSULT TO SOCIAL WORK  IP CONSULT TO NEPHROLOGY      Primary Care Physician:  Eugenio Clarke    Discharge Diagnoses:  Principal Problem:    Sigmoid diverticulitis  Active Problems:    Campylobacter gastroenteritis    Acute kidney injury superimposed on chronic kidney disease ([address]    Benign prostatic hyperplasia    Left lower lobe pulmonary nodule  Resolved Problems:    * No resolved hospital problems. *            Pertinent Labs:  CBC with DIFF:  Recent Labs     20  1035 20  0457 20  0509   WBC 8.1 7.7 6.3   RBC 4.29* 3.78* 3.43*   HGB 12.8* 11.4* 10.2*   HCT 39.0* 34.3* 31.5*   MCV 90.9 90.7 91.8   MCH 29.8 30.2 29.7   MCHC 32.8* 33.2 32.4*   RDW 13.6 13.4 13.4    151 140   MPV 9.3* 9.6 9.5   NEUTOPHILPCT 66.0* 73.6* 66.4*   LYMPHOPCT 18.4* 15.3* 19.1*   MONOPCT 11.3* 8.9 10.8*   EOSRELPCT 3.0 1.3 2.9   BASOPCT 0.6 0.4 0.3   NEUTROABS 5.4 5.7 4.2   LYMPHSABS 1.5 1.2 1.2   MONOSABS 0.90 0.70 0.70   EOSABS 0.20 0.10 0.20   BASOSABS 0.10 0.00 0.00       CMP/BMP:  Recent Labs     20  1035 20  0457 20  1028 20  0509    140  --  143   K 4.2 5.2* 4.4 4.6    111  --  113*   CO2 22 20*  --  20*   ANIONGAP 11 9  --  10   GLUCOSE 107 96  --  94   BUN 44* 42*  --  41*   CREATININE 3.3* 3.2*  --  3.4*   LABGLOM 19* 20*  --  18*   CALCIUM 10.7* 9.5  --  8.7*   PROT 5.9*  --   --   --    LABALBU 3.6  --   --   --    BILITOT 0.4  --   --   --    ALKPHOS 49  --   --   --    ALT 9  --   --   --    AST 8  --   --   --          CRP:  No results for input(s): CRP in the last 72 hours. Sed Rate:  No results for input(s): SEDRATE in the last 72 hours.       HgBA1c:  No components found for: HGBA1C  FLP:  No results found for: TRIG, HDL, LDLCALC, LDLDIRECT, LABVLDL  TSH:  No results found for: TSH  Troponin T: No results for input(s): TROPONINI in the last 72 hours. Pro-BNP: No results for input(s): BNP in the last 72 hours. INR:   Recent Labs     07/06/20  1035   INR 1.06     ABGs: No results found for: PHART, PO2ART, HEV2LHO  UA:  Recent Labs     07/06/20  1119   COLORU YELLOW   PHUR 5.5   CLARITYU Clear   SPECGRAV 1.016   LEUKOCYTESUR Negative   UROBILINOGEN 0.2   BILIRUBINUR Negative   BLOODU Negative   GLUCOSEU Negative         Culture Results:    No results for input(s): CXSURG in the last 720 hours. Blood Culture Recent: No results for input(s): BC in the last 720 hours. Cultures:   No results for input(s): CULTURE in the last 72 hours. No results for input(s): BC, Lonia Colt in the last 72 hours. No results for input(s): CXSURG in the last 72 hours. No results for input(s): MG, PHOS in the last 72 hours. Recent Labs     07/06/20  1035   AST 8   ALT 9   BILITOT 0.4   ALKPHOS 49           Significant Diagnostic Studies:   Ct Abdomen Pelvis Wo Contrast Additional Contrast? None    Result Date: 7/6/2020  Examination. CT ABDOMEN PELVIS WO CONTRAST 7/6/2020 9:30 AM History: Left lower quadrant abdominal pain and diarrhea. DLP: 1228 mGycm. The CT scan of the abdomen and pelvis is performed without intravenous contrast enhancement. The images are acquired in axial plane with subsequent reconstruction in coronal and sagittal planes. There is no previous study for comparison. The lung bases included in the study show scattered areas of scarring and granulomas. There is a noncalcified small subpleural nodule located medially in the right lower lobe, image #16 in axial plane, measuring 1 cm in diameter. The unenhanced liver and spleen appear unremarkable. The gallbladder is moderately distended. No radiopaque gallstones. Unenhanced pancreas is unremarkable. The adrenal glands bilaterally are normal. The right kidney is absent.  There is a low-density nodule located posterior laterally in the mid left kidney measuring 2.5 cm in diameter. The CT density suggest a cyst. No radiopaque calculi in the left kidney. The left ureter appears normal. The urinary bladder is decompressed with moderate thickening of the walls. No intrinsic abnormality. The prostate is borderline enlarged with intrinsic calcifications. There are bilateral fat-containing inguinal hernias. There is a tiny fat-containing umbilical hernia. The stomach and duodenum appear normal. Small bowel is normal. Appendix is normal. Small amount of gas and stool in the colon is seen. There is diverticulosis of the colon. There is evidence of a diverticulitis of the sigmoid: And adjacent and distal descending colon. No evidence of free air or free fluid. Atheromatous changes of the abdominal aorta and iliac arteries. No aneurysmal dilatation. There is no evidence of abdominal or pelvic lymphadenopathy. The images reviewed in bone window show no acute bony abnormality. Moderate chronic degenerative changes of the lumbar spine are noted. No focal bony lesion. The diverticulosis of the colon. Evidence of acute diverticulitis of the sigmoid colon and the adjacent distal descending colon. No free air or fluid in the area. A 1 cm noncalcified subpleural nodule in the left lower lobe. This needs to be further evaluated. A follow-up examination in 6 months. Above findings are recorded on a digital voice clip in PACS. Signed by Dr Maikel Joshi on 7/6/2020 11:02 AM          Hospital Course:   Mr. Aníbal Ly, 76 y. o. male with a history of CKD IV, right renal cell carcinoma (s/pright nephrectomy,, presenting to the ED on account of moderate to severe 5-day history of persistent diarrhea.  Patient reports that on several days, the frequency of diarrhea was almost about every 2 hours.  Denies any recent changes in meals.  Denies any sick contacts.  No relieving factors reported.      Associated symptom reported include intermittent crampy lower abdominal pain, as well as generalized weakness.     No recent antibiotic use reported.  No other associated symptoms reported.  No nausea or vomiting.  No fever or chills     Initial work-up significant for;  BUN/creatinine 44/3.3  Initial noncontrast CT abdomen pelvis, reporting evidence of acute sigmoid diverticulitis.     Patient admitted for further work-up and management.       Hyperkalemia -resolved  · Monitored potassium level        Diarrhea-resolved  Acute Sigmoid Diverticulitis  Campylobacter Gastroenteritis  Left lower lobe pulmonary nodule  · Stool for C. difficile antigen/toxin negative  · Stool with detection of Campylobacter Antigen   · CT abdomen pelvis: - 07/06/2020: Impression: The diverticulosis of the colon. Evidence of acute diverticulitis of the sigmoid colon and the adjacent distal descending Colon. No free air or fluid in the area. A 1 cm noncalcified subpleural nodule in the left lower lobe. This needs to be further evaluated. A follow-up examination in 6 months. · Initially started on ceftriaxone and metronidazole in the ED, given patient allergic to fluoroquinolones. · Initial started empirically on cefepime and metronidazole for acute sigmoid diverticulitis. · Given positive Campylobacter antigen in stool  · Added Azithromycin 500 mg IV daily. - 07/07/2020  ? Cefepime, Metronidazole and Azithromycin  · Patient allergic to penicillin  · Discharged home on;  ? Trimethoprim- sulfamethoxazole 800-160 mg p.o. twice daily, for 5 days. ? Metronidazole 500 mg p.o. every 8 hours x5 days  ? Azithromycin 500 mg p.o. daily x3 days. · Continued supportive and symptomatic management.     Addendum  · Stool now growing Plesiomonas Shigelloides (Found in soil and water)  · Diarrhea currently improved prior to discharge. · Patient encouraged to continue supportive and symptomatic management and continue antibiotic, for treatment of diverticulitis as well as Campylobacter.   · Lab to notify infection control. MARIELLA on CKD stage IV  History of right renal cell carcinoma (s/p Right Nephrectomy)  · -Creatinine level on presentation 3.3  · -Likely secondary to dehydration from reported persistent diarrhea  · -IV hydration  · -Most recent Renal ultrasound complete: 01/03/2020:  Left renal cyst. No hydronephrosis. Moderate enlargement of the prostate. Right nephrectomy. · -Avoid hypotension and nephrotoxins   · -Nephrology consulted        History of BPH  · Finasteride 5 mg p.o. daily  ·       Hx of HTN  · Carvedilol 3.125 mg p.o. daily        History of GERD  · Pantoprazole 20 mg p.o. daily        Continued management of other chronic medical conditions       Physical Exam:  Vital Signs: BP (!) 144/81   Pulse 73   Temp 97.8 °F (36.6 °C) (Temporal)   Resp 16   Ht 5' 10\" (1.778 m)   Wt 200 lb (90.7 kg)   SpO2 96%   BMI 28.70 kg/m²   Physical Exam  General appearance: alert, appears stated age and cooperative  HEENT: Head: Normocephalic, no lesions, without obvious abnormality. Neck: no carotid bruit, no JVD and supple, symmetrical, trachea midline  Lungs: Patient in no acute respiratory distress, No increased work of breathing clear to auscultation bilaterally  Heart: regular rate and rhythm, S1, S2 normal, no murmur, click, rub or gallop  Abdomen: soft, non-tender; bowel sounds normal; no masses,  no organomegaly  Extremities: extremities normal, atraumatic, no cyanosis or edema  2+ pulses in bilateral lower extremities  Neurologic: Mental status: Alert, oriented, thought content appropriate  Skin: Skin Warm, dry with , color, texture, turgor normal. No rashes or lesions or nodules.      Discharge Medications:       Catina Hickey   Home Medication Instructions JBS:[id number]    Printed on:07/08/20 1113   Medication Information                      allopurinol (ZYLOPRIM) 100 MG tablet  Take 1 tablet by mouth daily             azithromycin (ZITHROMAX) 500 MG tablet  Take 1 tablet by mouth daily for 3 days             carvedilol (COREG) 3.125 MG tablet  Take 3.125 mg by mouth 2 times daily (with meals)             Cholecalciferol (VITAMIN D) 2000 units CAPS capsule  Take 2,000 Units by mouth daily             finasteride (PROSCAR) 5 MG tablet  Take 5 mg by mouth daily             metroNIDAZOLE (FLAGYL) 500 MG tablet  Take 1 tablet by mouth 3 times daily for 5 days             pantoprazole (PROTONIX) 20 MG tablet  Take 20 mg by mouth daily             predniSONE (DELTASONE) 10 MG tablet  Take 10 mg by mouth daily             sodium bicarbonate 325 MG tablet  Take 325 mg by mouth 3 times daily              sulfamethoxazole-trimethoprim (BACTRIM DS;SEPTRA DS) 800-160 MG per tablet  Take 1 tablet by mouth 2 times daily for 5 days                 Discharge Instructions: Follow up with Jonel Brunner in 7 days. Take medications as directed. Resume activity as tolerated. Diet: DIET RENAL;        DISCHARGE STATUS:    Condition: Good  Disposition: Patient is medically stable and will be discharged home    Extended Emergency Contact Information  Primary Emergency Contact: [address] [place] [address] Phone: [phone]  Relation: Spouse       Time Spent on discharge is more than 34 mins in the examination, evaluation, counseling and review of medications and discharge plan. Electronically signed by   Renetta Mathew MD, MPH,   Internal Medicine Hospitalist   7/8/2020 11:13 AM      Thank you Jonel Brunner for the opportunity to be involved in this patient's care. If you have any questions or concerns please feel free to contact me at [phone]        EMR Dragon/Transcription disclaimer:   Much of this encounter note is an electronic transcription/translation of spoken language to printed text.  The electronic translation of spoken language may permit erroneous, or at times, nonsensical words or phrases to be inadvertently transcribed; although attempts have made to review the note for such errors, some may still exist.

## 2020-07-08 NOTE — PROGRESS NOTES
Nephrology ([facility]) Progress Note    Patient:  Romana Curry  YOB: 1955  Date of Service: 7/8/2020  MRN: [id number]   Acct: [de-identified]   Primary Care Physician: Rigo Duggan  Advance Directive: Full Code  Admit Date: 7/6/2020       Hospital Day: 2  Referring Provider: Lonnie Scanlon MD    Patient Seen, Chart, Consults notes, Labs, Radiology studies reviewed. Subjective:  Patient is a 75 yo pleasant man who is s/p right nephrectomy 6 years ago for RCC. His course was marked with CKD. He was seeing the nephrologist at the [place]. 2 years ago, his PET scan showed abnormal spots on his lungs and he had to receive chemo/ immunotherapy for metastatic disease. His course was then marked with worsening renal function and he was then in stage 4. Patient was then believed to be having some nephrotoxicity due to nivolumab (Opdivo). He came under the care of JOLIE Shepherd at our office and patient was placed on steroids. His renal function has fluctuated between 15-20. Patient is currently admitted for 5 days diarrhea and upset stomach. He is known to have diverticulosis and was diagnosed with acute diverticulitis of the sigmoid colon on his CT scan. His serum creatinine was 3.3 and renal service was consulted to manage his CKD stage 4. Patient has no dysuria and denies NSAIDs use/abuse. Today, he was feeling better and was anxious to go home. Allergies:  Latex; Clindamycin/lincomycin;  Doxycycline; Levaquin [levofloxacin in d5w]; and Pcn [penicillins]    Medicines:  Current Facility-Administered Medications   Medication Dose Route Frequency Provider Last Rate Last Dose    metronidazole (FLAGYL) 500 mg in NaCl 100 mL IVPB premix  500 mg Intravenous Q8H Lonnie Scanlon MD   Stopped at 07/08/20 0956    cefepime (MAXIPIME) 1 g in sterile water 10 mL IV syringe  1 g Intravenous Q12H Lonnie Scanlon MD   1 g at 07/08/20 0738    azithromycin (ZITHROMAX) 500 mg in D5W 250ml Vial Mate  500 mg Intravenous Q24H Renetta Mathew MD   Stopped at 07/07/20 1554    sodium bicarbonate tablet 650 mg  650 mg Oral BID Jason Chavez MD   650 mg at 07/08/20 2530    allopurinol (ZYLOPRIM) tablet 100 mg  100 mg Oral Daily Jason Chavez MD   100 mg at 07/08/20 0738    sodium chloride flush 0.9 % injection 10 mL  10 mL Intravenous 2 times per day Renetta Mathew MD   10 mL at 07/08/20 0739    sodium chloride flush 0.9 % injection 10 mL  10 mL Intravenous PRN Renetta Mathew MD        potassium chloride (KLOR-CON M) extended release tablet 40 mEq  40 mEq Oral PRN Renetta Mathew MD        Or    potassium bicarb-citric acid (EFFER-K) effervescent tablet 40 mEq  40 mEq Oral PRN Renetta Mathew MD        Or    potassium chloride 10 mEq/100 mL IVPB (Peripheral Line)  10 mEq Intravenous PRN Renetta Mathew MD        heparin (porcine) injection 5,000 Units  5,000 Units Subcutaneous 3 times per day Renetta Mathew MD   5,000 Units at 07/08/20 0516    carvedilol (COREG) tablet 3.125 mg  3.125 mg Oral BID WC Renetta Mathew MD   3.125 mg at 07/08/20 3722    finasteride (PROSCAR) tablet 5 mg  5 mg Oral Daily Renetta Mathew MD   5 mg at 07/08/20 0738    pantoprazole (PROTONIX) tablet 20 mg  20 mg Oral QAM AC Renetta Mathew MD   20 mg at 07/08/20 0738    ondansetron (ZOFRAN-ODT) disintegrating tablet 4 mg  4 mg Oral Q8H PRN Renetta Mathew MD        Or    [facility]ron [facility] injection 4 mg  4 mg Intravenous Q6H PRN Renetta Mathew MD        0.9 % sodium chloride infusion   Intravenous Continuous Jason Chavez MD   Stopped at 07/08/20 0925    predniSONE (DELTASONE) tablet 10 mg  10 mg Oral Daily Renetta Mathew MD   10 mg at 07/08/20 7996       Past Medical History:  Past Medical History:   Diagnosis Date    Cancer [facility])     Chronic kidney disease     TIA (transient ischemic attack)        Past Surgical History:  Past Surgical History:   Procedure Laterality Date    HERNIA REPAIR  2012    KIDNEY REMOVAL Right 2014    SINUS SURGERY  2004       Family History  Family History   Problem Relation Age of Onset    Heart Attack Paternal Uncle     Heart Attack Maternal Grandfather 79       Social History  Social History     Socioeconomic History    Marital status:      Spouse name: Not on file    Number of children: Not on file    Years of education: Not on file    Highest education level: Not on file   Occupational History    Not on file   Social Needs    Financial resource strain: Not on file    Food insecurity     Worry: Not on file     Inability: Not on file    Transportation needs     Medical: Not on file     Non-medical: Not on file   Tobacco Use    Smoking status: Former Smoker     Types: Cigarettes     Last attempt to quit:      Years since quittin.5    Smokeless tobacco: Never Used   Substance and Sexual Activity    Alcohol use: No    Drug use: Not on file    Sexual activity: Not on file   Lifestyle    Physical activity     Days per week: Not on file     Minutes per session: Not on file    Stress: Not on file   Relationships    Social connections     Talks on phone: Not on file     Gets together: Not on file     Attends Voodoo service: Not on file     Active member of club or organization: Not on file     Attends meetings of clubs or organizations: Not on file     Relationship status: Not on file    Intimate partner violence     Fear of current or ex partner: Not on file     Emotionally abused: Not on file     Physically abused: Not on file     Forced sexual activity: Not on file   Other Topics Concern    Not on file   Social History Narrative    Not on file         Review of Systems:  History obtained from chart review and the patient  General ROS: No fever or chills  Respiratory ROS: No cough, shortness of breath, wheezing  Cardiovascular ROS: no chest pain or dyspnea on exertion  Gastrointestinal ROS: No abdominal pain or melena  Genito-Urinary ROS: No dysuria or hematuria  Musculoskeletal ROS: No joint pain or swelling         Objective:  Blood pressure (!) 144/81, pulse 73, temperature 97.8 °F (36.6 °C), temperature source Temporal, resp. rate 16, height 5' 10\" (1.778 m), weight 200 lb (90.7 kg), SpO2 96 %. Intake/Output Summary (Last 24 hours) at 7/8/2020 1124  Last data filed at 7/7/2020 1902  Gross per 24 hour   Intake 627.4 ml   Output 750 ml   Net -122.6 ml     General: alert and oriented x3   Chest:  clear to auscultation bilaterally without respiratory distress  CVS: regular rate and rhythm  Abdominal: soft, nontender, normal bowel sounds  Extremities: no cyanosis or edema  Skin: warm and dry without rash    Labs:  BMP:   Recent Labs     07/06/20  1035 07/07/20  0457 07/07/20  1028 07/08/20  0509    140  --  143   K 4.2 5.2* 4.4 4.6    111  --  113*   CO2 22 20*  --  20*   BUN 44* 42*  --  41*   CREATININE 3.3* 3.2*  --  3.4*   CALCIUM 10.7* 9.5  --  8.7*     CBC:   Recent Labs     07/06/20  1035 07/07/20  0457 07/08/20  0509   WBC 8.1 7.7 6.3   HGB 12.8* 11.4* 10.2*   HCT 39.0* 34.3* 31.5*   MCV 90.9 90.7 91.8    151 140     LIVER PROFILE:   Recent Labs     07/06/20  1035   AST 8   ALT 9   LIPASE 113*   BILITOT 0.4   ALKPHOS 49     PT/INR:   Recent Labs     07/06/20  1035   PROTIME 13.7   INR 1.06     APTT: No results for input(s): APTT in the last 72 hours. BNP:  No results for input(s): BNP in the last 72 hours. Ionized Calcium:No results for input(s): IONCA in the last 72 hours. Magnesium:No results for input(s): MG in the last 72 hours. Phosphorus:No results for input(s): PHOS in the last 72 hours. HgbA1C: No results for input(s): LABA1C in the last 72 hours.   Hepatic:   Recent Labs     07/06/20  1035   ALKPHOS 49   ALT 9   AST 8   PROT 5.9*   BILITOT 0.4   LABALBU 3.6     Lactic Acid: No results for input(s): LACTA in the last 72 hours.  Troponin: No results for input(s): CKTOTAL, CKMB, TROPONINT in the last 72 hours. ABGs: No results for input(s): PH, PCO2, PO2, HCO3, O2SAT in the last 72 hours. CRP:  No results for input(s): CRP in the last 72 hours. Sed Rate:  No results for input(s): SEDRATE in the last 72 hours. Cultures:   No results for input(s): CULTURE in the last 72 hours. Radiology reports as per the Radiologist  Radiology: Ct Abdomen Pelvis Wo Contrast Additional Contrast? None    Result Date: 7/6/2020  Examination. CT ABDOMEN PELVIS WO CONTRAST 7/6/2020 9:30 AM History: Left lower quadrant abdominal pain and diarrhea. DLP: 1228 mGycm. The CT scan of the abdomen and pelvis is performed without intravenous contrast enhancement. The images are acquired in axial plane with subsequent reconstruction in coronal and sagittal planes. There is no previous study for comparison. The lung bases included in the study show scattered areas of scarring and granulomas. There is a noncalcified small subpleural nodule located medially in the right lower lobe, image #16 in axial plane, measuring 1 cm in diameter. The unenhanced liver and spleen appear unremarkable. The gallbladder is moderately distended. No radiopaque gallstones. Unenhanced pancreas is unremarkable. The adrenal glands bilaterally are normal. The right kidney is absent. There is a low-density nodule located posterior laterally in the mid left kidney measuring 2.5 cm in diameter. The CT density suggest a cyst. No radiopaque calculi in the left kidney. The left ureter appears normal. The urinary bladder is decompressed with moderate thickening of the walls. No intrinsic abnormality. The prostate is borderline enlarged with intrinsic calcifications. There are bilateral fat-containing inguinal hernias. There is a tiny fat-containing umbilical hernia.  The stomach and duodenum appear normal. Small bowel is normal. Appendix is normal. Small amount of gas and stool in the colon is seen. There is diverticulosis of the colon. There is evidence of a diverticulitis of the sigmoid: And adjacent and distal descending colon. No evidence of free air or free fluid. Atheromatous changes of the abdominal aorta and iliac arteries. No aneurysmal dilatation. There is no evidence of abdominal or pelvic lymphadenopathy. The images reviewed in bone window show no acute bony abnormality. Moderate chronic degenerative changes of the lumbar spine are noted. No focal bony lesion. The diverticulosis of the colon. Evidence of acute diverticulitis of the sigmoid colon and the adjacent distal descending colon. No free air or fluid in the area. A 1 cm noncalcified subpleural nodule in the left lower lobe. This needs to be further evaluated. A follow-up examination in 6 months. Above findings are recorded on a digital voice clip in PACS. Signed by Dr Dawn Bueno on 7/6/2020 11:02 AM       Assessment   1. CKD stage 4  2. S/p right nephrectomy for RCC  3. Renal cell carcinoma with metastatic disease (s/p surgery, chemo, immuno)  4. Acute diverticulitis of the sigmoid colon  5. Hyperkalemia  6. Metabolic acidosis  7. Anemia of CKD      Plan:  Increase sodium bicarbonate dose by mouth. Continue oral steroids and antibiotics. Low K diet and follow up at the renal clinic within 2 weeks.

## 2020-07-10 LAB
CAMPYLOBACTER ANTIGEN: ABNORMAL
CULTURE, STOOL: ABNORMAL
CULTURE, STOOL: ABNORMAL
E COLI SHIGA TOXIN ASSAY: ABNORMAL
ORGANISM: ABNORMAL
ORGANISM: ABNORMAL

## 2020-07-22 NOTE — PROGRESS NOTES
[facility]  HEMATOLOGY & ONCOLOGY    [facility] HEMATOLOGY AND ONCOLOGY  [address]  [place] [address] [phone]    Patient Name: Parker Arnold  Encounter Date: 12/30/2019  YOB: 1955  Patient Number: [id number]    Chief Complaint   Patient presents with   • Follow-up       REASON FOR VISIT: Mr. Parker Arnold is a 64-year-old gentleman with metastatic renal cell carcinoma.  He underwent Right Radical Nephrectomy on 11/11/2014.  The pathology was noted to be clear cell type, [id number]. Margins free, grade 2.  This was reviewed at [facility]  And noted to be a T3a,NxMx, Clear cell, grade 3.  He did well until he was found to have pulmonary nodules on CT scan in October 2018.  He had a biopsy of the lung nodule biopsied at [facility] on 11/26/2018 that was consistent with metastatic renal cell ca.     His initial line of therapy was with Sutent for which was started but discontinued due to intolerance.  Therefore he was switched to Opdivo/Yervoy on 5/13/2019 and he completed 4 cycles on 8/7/2019.  He went through the Yervoy/Opdivo without complications. He was changed to the single agent Opdivo on 8/28/2019 and unfortunately developed grade 4 renal toxicity felt to be secondary to Opdivo.  It was held on 9/9/19.  He was placed on high-dose prednisone and it was slowly tapered but had regression of the the renal function once prednisone was near discontinuing and it was restarted and he was sent to nephrology.     He has been following with Dr Burleson and off therapy completely since September 2019.  His renal function has been followed by Dr Grant, nephrologist. He had a PET scan on 1/16/2020 that was negative and a CT of the head 12/30/2019 that was negative as well.     Labs today show a WBC 7.36, Hgb 12.2, Hct 36.7 and plts of 237,000.   BUN of 51, creatinine 3.32 and GFR of 19ml/min, slightly improved  from 17ml/min 3 months ago. K+ and Calcium is normal. Over the last 6 months his GFR has been between 18 and 23ml/min.  He is still on prednisone for the nephritis at 10mg daily.  Nephrology is still trying to wean him off. He did have a CT of the AP on 7/6/2020 at [facility] after being admitted for a Gastrointestinal infection it showed diverticulosis and a 1cm noncalcified subpleural nodule in the LLL that recommended follow up. Again, this was not noted on the January PET scan.         Renal cell carcinoma of right kidney (CMS/HCC)    10/2014 Initial Diagnosis     Renal cell carcinoma (CMS/HCC)      10/2014 Surgery     Right nephrectomy, Dr Morales        10/1/2018 Progression     Increasing lung Nodules on f/u CT      10/2/2018 -  Chemotherapy     Sutent  Discontinued due to intolerance      5/13/2019 - 8/7/2019 Chemotherapy     Opdivo Yervoy x 4 cycles      8/28/2019 - 9/9/2019 Chemotherapy     Single agent Opdivo    Opdivo held 9/9/2019 due to Renal Failure / NephritisToxicity      1/16/2020 Imaging     PET SCAN  Negative for neoplastic uptake           PAST MEDICAL HISTORY:  ALLERGIES:  Allergies   Allergen Reactions   • Amoxicillin Hives   • Nivolumab Provider Review Needed     Renal failure   • Penicillins Hives and Rash   • Clindamycin/Lincomycin Rash     pustules  Rash - pustules     • Doxycycline Rash   • Hepatitis B Virus Vaccines Rash     Pustules     • Hepatitis B Vaccine Rash   • Latex Rash   • Levofloxacin Rash       CURRENT MEDICATIONS:  Outpatient Encounter Medications as of 7/24/2020   Medication Sig Dispense Refill   • allopurinol (ZYLOPRIM) 100 MG tablet Take 100 mg by mouth 2 (Two) Times a Day.     • calcitriol (ROCALTROL) 0.25 MCG capsule Take 1 capsule by mouth Daily. 30 capsule 2   • Calcium Carb-Cholecalciferol (CALCIUM + D3) 600-200 MG-UNIT tablet Take 1 tablet by mouth 2 (Two) Times a Day. 60 tablet 5   • carvedilol (COREG) 12.5 MG tablet Take 12.5 mg by mouth 2 (Two) Times a Day  With Meals.     • finasteride (PROSCAR) 5 MG tablet Take 5 mg by mouth Daily.  3   • ondansetron (ZOFRAN) 8 MG tablet Take 8 mg by mouth Every 8 (Eight) Hours.     • pantoprazole (PROTONIX) 40 MG EC tablet Take 1 tablet by mouth Daily. 30 tablet 2   • predniSONE (DELTASONE) 20 MG tablet Take 2 tablets by mouth Daily With Breakfast. (Patient taking differently: Take 30 mg by mouth Daily.) 30 tablet 0   • sodium bicarbonate 650 MG tablet Take 1 tablet by mouth 3 (Three) Times a Day. 90 tablet 2   • benzonatate (TESSALON) 100 MG capsule Take 100 mg by mouth 4 (Four) Times a Day.     • lisinopril (PRINIVIL,ZESTRIL) 2.5 MG tablet Take 2.5 mg by mouth Daily.       No facility-administered encounter medications on file as of 7/24/2020.      ADULT ILLNESSES:  Patient Active Problem List   Diagnosis Code   • Renal cell carcinoma of right kidney (CMS/HCC) C64.1   • Multiple nodules of lung R91.8   • Lung anomaly Q33.9   • TIA (transient ischemic attack) G45.9   • Acute renal failure with tubular necrosis in the setting of solitary kidney N17.0   • Lung metastases (CMS/HCC) C78.00   • Pyuria R82.81   • Hyperkalemia E87.5   • Metabolic acidosis E87.2   • Hyperglycemia R73.9   • Hypercalcemia E83.52   • Benign prostatic hyperplasia N40.0   • Increased frequency of urination R35.0   • Nocturia R35.1   • Retention of urine R33.9   • Dehydration E86.0   • Nephritis and nephropathy N05.9   • Toxicity, chemicals T65.91XA   • Stage 3 chronic kidney disease (CMS/HCC) N18.3     SURGERIES:  Past Surgical History:   Procedure Laterality Date   • HERNIA REPAIR  2008   • LUNG BIOPSY  11/2018   • NEPHRECTOMY  2014   • SINUS SURGERY  2005     HEALTH MAINTENANCE ITEMS:    Last Completed Colonoscopy       Status Date      COLONOSCOPY  [facility] - January 2019 normal other then small fissure per patient.        FAMILY HISTORY:  Family History   Problem Relation Age of Onset   • Other Mother         blood disease unknown   • Cancer Father          unknown   • No Known Problems Sister    • Heart disease Maternal Grandmother    • Heart disease Maternal Grandfather    • No Known Problems Paternal Grandmother    • No Known Problems Paternal Grandfather    • No Known Problems Sister      SOCIAL HISTORY:  Social History     Socioeconomic History   • Marital status:      Spouse name: Not on file   • Number of children: Not on file   • Years of education: Not on file   • Highest education level: Not on file   Tobacco Use   • Smoking status: Former Smoker     Packs/day: 1.00     Years: 15.00     Pack years: 15.00     Types: Cigarettes     Last attempt to quit:      Years since quittin.5   • Smokeless tobacco: Never Used   Substance and Sexual Activity   • Alcohol use: No     Frequency: Never   • Drug use: No   • Sexual activity: Defer       REVIEW OF SYSTEMS:  Review of Systems   Constitutional: Positive for fatigue. Negative for appetite change, chills, diaphoresis and fever.        ECOG 1   HENT:   Negative for hearing loss, mouth sores, nosebleeds, sore throat and trouble swallowing.    Eyes: Negative for eye problems.   Respiratory: Negative for chest tightness, cough and shortness of breath.    Cardiovascular: Negative for chest pain, leg swelling and palpitations.   Gastrointestinal: Negative for abdominal distention, abdominal pain, constipation, diarrhea, nausea and vomiting.   Genitourinary: Positive for nocturia. Negative for bladder incontinence, difficulty urinating, dysuria, frequency and hematuria.    Musculoskeletal: Positive for arthralgias and myalgias. Negative for back pain, gait problem and neck pain.   Skin: Negative for itching, rash and wound.   Neurological: Negative for dizziness, gait problem, headaches, numbness, seizures and speech difficulty.   Hematological: Negative for adenopathy. Does not bruise/bleed easily.   Psychiatric/Behavioral: Negative for confusion and decreased concentration. The patient is not  "nervous/anxious.         /74   Pulse 76   Temp 98 °F (36.7 °C) (Tympanic)   Resp 18   Ht 177.8 cm (70\")   Wt 89.9 kg (198 lb 4.8 oz)   SpO2 95%   BMI 28.45 kg/m²  Body surface area is 2.08 meters squared.  Pain Score    07/24/20 1103   PainSc: 0-No pain       Physical Exam:  Physical Exam   Constitutional: He is oriented to person, place, and time. He appears well-developed and well-nourished.   Weight stable.    HENT:   Head: Normocephalic and atraumatic.   Mouth/Throat: Oropharynx is clear and moist.   Eyes: Pupils are equal, round, and reactive to light. Conjunctivae and EOM are normal.   Neck: Normal range of motion. Neck supple.   Cardiovascular: Normal rate, regular rhythm and normal heart sounds.   No murmur heard.  Pulmonary/Chest: Effort normal and breath sounds normal. No respiratory distress. He has no wheezes. He has no rales.   Abdominal: Soft. Bowel sounds are normal. He exhibits no distension. There is no tenderness.   Musculoskeletal: Normal range of motion. He exhibits no edema.   Neurological: He is alert and oriented to person, place, and time.   Skin: Skin is warm and dry. No rash noted.   Psychiatric: He has a normal mood and affect. His behavior is normal. Judgment and thought content normal.       Parker Arnold reports a pain score of   Pain Score    07/24/20 1103   PainSc: 0-No pain     Patient's Body mass index is 28.45 kg/m². BMI is above normal parameters. Recommendations include: nutrition counseling.      LABS    Lab Results - Last 18 Months   Lab Units 07/24/20  1033 07/08/20  0509 07/07/20  0457 07/06/20  1035 04/24/20  1037 02/24/20  0945 01/23/20  0851 01/09/20  0432 01/08/20  0501   HEMOGLOBIN g/dL 12.2* 10.2* 11.4* 12.8* 11.1* 12.9* 11.2* 7.8* 8.3*   HEMATOCRIT % 36.7* 31.5* 34.3* 39.0* 34.0* 37.9 34.1* 22.2* 23.7*   MCV fL 88.0 91.8 90.7 90.9 90.9 92.9 95.5 89.5 90.5   WBC 10*3/mm3 7.36 6.3 7.7 8.1 8.67 9.99 11.75* 7.14 8.81   RDW % 14.1 13.4 13.4 13.6 13.5 " 13.2 14.4 13.6 13.9   MPV fL 9.1 9.5 9.6 9.3* 9.3 9.4 10.1 9.6 9.5   PLATELETS 10*3/mm3 237 140 151 170 172 174 175 188 199   IMM GRAN % % 1.2*  --   --   --  1.2* 1.0* 0.9* 0.8* 0.6*   NEUTROS ABS 10*3/mm3 4.73 4.2 5.7 5.4 6.65 8.22* 9.52* 5.12 6.89   LYMPHS ABS 10*3/mm3 1.54 1.2 1.2 1.5 1.03 0.93 1.35 1.29 1.17   MONOS ABS 10*3/mm3 0.79 0.70 0.70 0.90 0.57 0.66 0.75 0.64 0.67   EOS ABS 10*3/mm3 0.15 0.20 0.10 0.20 0.29 0.06 0.02 0.02 0.02   BASOS ABS 10*3/mm3 0.06 0.00 0.00 0.10 0.03 0.02 0.01 0.01 0.01   IMMATURE GRANS (ABS) 10*3/mm3 0.09* 0.0 0.0 0.1 0.10* 0.10* 0.10* 0.06* 0.05   NRBC /100 WBC 0.0  --   --   --  0.0 0.0 0.0 0.0 0.0       Lab Results - Last 18 Months   Lab Units 07/24/20  1033 07/07/20  1028 04/24/20  1037 02/24/20  0945 01/23/20  0851 01/10/20  0412 01/09/20  1801 01/09/20  0432   GLUCOSE mg/dL 100*  --  128* 96 120* 108*  --  107*   SODIUM mmol/L 140  --  141 141 141 144  --  144   POTASSIUM mmol/L 4.8 4.4 4.7 4.9 4.1 3.8  --  3.7   CO2 mmol/L 23.0  --  21.0* 23.0 26.0 25.0  --  25.0   CHLORIDE mmol/L 105  --  107 107 104 107  --  109*   ANION GAP mmol/L 12.0  --  13.0 11.0 11.0 12.0  --  10.0   CREATININE mg/dL 3.32*  --  3.61* 2.93* 2.83* 3.51*  --  3.39*   BUN mg/dL 51*  --  50* 53* 57* 51*  --  52*   BUN / CREAT RATIO  15.4  --  13.9 18.1 20.1 14.5  --  15.3   CALCIUM mg/dL 9.7  --  9.4 9.8 9.5 7.0* 6.2* 5.8*   EGFR IF NONAFRICN AM mL/min/1.73 19*  --  17* 22* 23* 18*  --  18*   ALK PHOS U/L 52  --  48 39 43 37*  --  38*   TOTAL PROTEIN g/dL 6.4  --  6.0 6.3 6.2 5.1*  --  5.1*   ALT (SGPT) U/L 10  --  11 14 32 9  --  7   AST (SGOT) U/L 11  --  10 12 8 8  --  8   BILIRUBIN mg/dL 0.4  --  0.3 0.4 0.4 0.2  --  <0.2*   ALBUMIN g/dL 4.20  --  3.70 4.10 4.10 3.40*  --  3.30*   GLOBULIN gm/dL 2.2  --  2.3 2.2 2.1 1.7  --  1.8       Lab Results - Last 18 Months   Lab Units 07/06/20  1035 01/05/20  0520 01/02/20  0824 10/19/19  0627 10/18/19  0739   URIC ACID mg/dL 9.9* 4.7 4.6 3.5 3.9       Lab  Results - Last 18 Months   Lab Units 07/08/20  0509 04/24/20  1037 10/19/19  0627   IRON ug/dL 60 65  --    TIBC ug/dL 202* 262*  --    IRON SATURATION % 30 25  --    FERRITIN ng/mL  --  292.70  --    TSH uIU/mL  --   --  0.367     ASSESSMENT  1.  Metastatic Renal Cell Carcinoma, Clear Cell type initially diagnosed 2014  · Initial Staging: T3a,Nx,M0, grade 3  · Initial Treatment: Right Radical Nephrectomy 11/11/2014  · Lung Mets diagnosed 10/2018  · Treatment status : Sutent discontinued due to intolerance, Yervoy/Opdivo x4 cycles, single agent Opdivo discontiued due to severe nephritis. Off therapy since 9/9/2019  · Disease status: PET negative 1/16/2020; 1 cm subpleural nodule LLL found on CT abd/pel 7/6/2020. Has not had chest imaging.   2. Chronic Kidney disease Stage IV - GFR 19ml/min 7/24/20 ( range - 10ml/min, 1/2/2020 to 28ml/min, 12/2019  3.  Anemia likely secondary to renal insufficiency stable at 11.3  4.  GERD - symptoms controlled on prilosec  5.  Hypocalcemia - resolved at present.   6.  Nephritis secondary to Opdivo therapy. Currently still on Prednisone at 10mg daily. Nephrology managing.  7.  Nocturia - states nocturia has worsened over last few weeks.  Pt thinks it is the allopurinol. Has h/o BPH and on finasteride.  Has not had recent PSA    PLAN  1. Counseled Re: CBC findings of slightly low Hgb, Normal otherwise. CMP results of low GFR and elevated BUN, Cre, Normal Ca and K+  2. Obtain screening PSA  3. Encouraged patient to continue to follow with PCP and other specialists  4. Discussed with the patient the need of routine surveillance studies as he is off therapy with known Stage IV RCC. We will use the CT AP he had at [facility] 7/6/2020 but will need to scheduled CT of chest. He verbalized understanding.   5. Schedule CT of the chest without contrast due to poor renal function  6. Return in 3 months to see Dr Smith to establish care with oncologist.  Will bring patient in sooner if needed  depending on CT results.  He will need preoffice CBC, CMP    I have spent a total of  __32_  minutes in face-to-face encounter with the patient, out of which more than 50% was counseling the patient and family regarding coordination of care.  Counseling included but not limited to time spent reviewing labs, treatment plan as well as answering questions.     Demetrice Lowery, APRN   07/24/2020  1:07 PM

## 2020-07-24 ENCOUNTER — OFFICE VISIT (OUTPATIENT)
Dept: ONCOLOGY | Facility: CLINIC | Age: 65
End: 2020-07-24

## 2020-07-24 ENCOUNTER — LAB (OUTPATIENT)
Dept: LAB | Facility: HOSPITAL | Age: 65
End: 2020-07-24

## 2020-07-24 VITALS
DIASTOLIC BLOOD PRESSURE: 74 MMHG | HEIGHT: 70 IN | RESPIRATION RATE: 18 BRPM | HEART RATE: 76 BPM | BODY MASS INDEX: 28.39 KG/M2 | SYSTOLIC BLOOD PRESSURE: 154 MMHG | WEIGHT: 198.3 LBS | OXYGEN SATURATION: 95 % | TEMPERATURE: 98 F

## 2020-07-24 DIAGNOSIS — C64.1 RENAL CELL CARCINOMA OF RIGHT KIDNEY (HCC): Primary | ICD-10-CM

## 2020-07-24 DIAGNOSIS — N05.9 NEPHRITIS AND NEPHROPATHY: ICD-10-CM

## 2020-07-24 DIAGNOSIS — Z12.5 SCREENING PSA (PROSTATE SPECIFIC ANTIGEN): ICD-10-CM

## 2020-07-24 DIAGNOSIS — C78.00 MALIGNANT NEOPLASM METASTATIC TO LUNG, UNSPECIFIED LATERALITY (HCC): ICD-10-CM

## 2020-07-24 DIAGNOSIS — C64.1 RENAL CELL CARCINOMA OF RIGHT KIDNEY (HCC): ICD-10-CM

## 2020-07-24 DIAGNOSIS — N18.4 CKD (CHRONIC KIDNEY DISEASE), STAGE IV (HCC): ICD-10-CM

## 2020-07-24 DIAGNOSIS — C64.9 RENAL CELL CARCINOMA, UNSPECIFIED LATERALITY (HCC): Primary | ICD-10-CM

## 2020-07-24 LAB
ALBUMIN SERPL-MCNC: 4.2 G/DL (ref 3.5–5.2)
ALBUMIN/GLOB SERPL: 1.9 G/DL
ALP SERPL-CCNC: 52 U/L (ref 39–117)
ALT SERPL W P-5'-P-CCNC: 10 U/L (ref 1–41)
ANION GAP SERPL CALCULATED.3IONS-SCNC: 12 MMOL/L (ref 5–15)
AST SERPL-CCNC: 11 U/L (ref 1–40)
BASOPHILS # BLD AUTO: 0.06 10*3/MM3 (ref 0–0.2)
BASOPHILS NFR BLD AUTO: 0.8 % (ref 0–1.5)
BILIRUB SERPL-MCNC: 0.4 MG/DL (ref 0–1.2)
BUN SERPL-MCNC: 51 MG/DL (ref 8–23)
BUN/CREAT SERPL: 15.4 (ref 7–25)
CALCIUM SPEC-SCNC: 9.7 MG/DL (ref 8.6–10.5)
CHLORIDE SERPL-SCNC: 105 MMOL/L (ref 98–107)
CO2 SERPL-SCNC: 23 MMOL/L (ref 22–29)
CREAT SERPL-MCNC: 3.32 MG/DL (ref 0.76–1.27)
DEPRECATED RDW RBC AUTO: 44.7 FL (ref 37–54)
EOSINOPHIL # BLD AUTO: 0.15 10*3/MM3 (ref 0–0.4)
EOSINOPHIL NFR BLD AUTO: 2 % (ref 0.3–6.2)
ERYTHROCYTE [DISTWIDTH] IN BLOOD BY AUTOMATED COUNT: 14.1 % (ref 12.3–15.4)
GFR SERPL CREATININE-BSD FRML MDRD: 19 ML/MIN/1.73
GLOBULIN UR ELPH-MCNC: 2.2 GM/DL
GLUCOSE SERPL-MCNC: 100 MG/DL (ref 65–99)
HCT VFR BLD AUTO: 36.7 % (ref 37.5–51)
HGB BLD-MCNC: 12.2 G/DL (ref 13–17.7)
HOLD SPECIMEN: NORMAL
IMM GRANULOCYTES # BLD AUTO: 0.09 10*3/MM3 (ref 0–0.05)
IMM GRANULOCYTES NFR BLD AUTO: 1.2 % (ref 0–0.5)
LYMPHOCYTES # BLD AUTO: 1.54 10*3/MM3 (ref 0.7–3.1)
LYMPHOCYTES NFR BLD AUTO: 20.9 % (ref 19.6–45.3)
MCH RBC QN AUTO: 29.3 PG (ref 26.6–33)
MCHC RBC AUTO-ENTMCNC: 33.2 G/DL (ref 31.5–35.7)
MCV RBC AUTO: 88 FL (ref 79–97)
MONOCYTES # BLD AUTO: 0.79 10*3/MM3 (ref 0.1–0.9)
MONOCYTES NFR BLD AUTO: 10.7 % (ref 5–12)
NEUTROPHILS NFR BLD AUTO: 4.73 10*3/MM3 (ref 1.7–7)
NEUTROPHILS NFR BLD AUTO: 64.4 % (ref 42.7–76)
NRBC BLD AUTO-RTO: 0 /100 WBC (ref 0–0.2)
PLATELET # BLD AUTO: 237 10*3/MM3 (ref 140–450)
PMV BLD AUTO: 9.1 FL (ref 6–12)
POTASSIUM SERPL-SCNC: 4.8 MMOL/L (ref 3.5–5.2)
PROT SERPL-MCNC: 6.4 G/DL (ref 6–8.5)
RBC # BLD AUTO: 4.17 10*6/MM3 (ref 4.14–5.8)
SODIUM SERPL-SCNC: 140 MMOL/L (ref 136–145)
WBC # BLD AUTO: 7.36 10*3/MM3 (ref 3.4–10.8)

## 2020-07-24 PROCEDURE — G0103 PSA SCREENING: HCPCS

## 2020-07-24 PROCEDURE — 85025 COMPLETE CBC W/AUTO DIFF WBC: CPT

## 2020-07-24 PROCEDURE — 80053 COMPREHEN METABOLIC PANEL: CPT

## 2020-07-24 PROCEDURE — 99214 OFFICE O/P EST MOD 30 MIN: CPT | Performed by: NURSE PRACTITIONER

## 2020-07-24 PROCEDURE — 36415 COLL VENOUS BLD VENIPUNCTURE: CPT

## 2020-07-25 LAB — PSA SERPL-MCNC: 2.84 NG/ML (ref 0–4)

## 2020-08-04 ENCOUNTER — HOSPITAL ENCOUNTER (OUTPATIENT)
Dept: CT IMAGING | Facility: HOSPITAL | Age: 65
Discharge: HOME | End: 2020-08-04
Admitting: NURSE PRACTITIONER

## 2020-08-04 DIAGNOSIS — C78.00 MALIGNANT NEOPLASM METASTATIC TO LUNG, UNSPECIFIED LATERALITY (HCC): ICD-10-CM

## 2020-08-04 DIAGNOSIS — C64.1 RENAL CELL CARCINOMA OF RIGHT KIDNEY (HCC): ICD-10-CM

## 2020-08-04 PROCEDURE — 71250 CT THORAX DX C-: CPT

## 2020-10-21 ENCOUNTER — LAB (OUTPATIENT)
Dept: LAB | Facility: HOSPITAL | Age: 65
End: 2020-10-21

## 2020-10-21 ENCOUNTER — TELEPHONE (OUTPATIENT)
Dept: ONCOLOGY | Facility: CLINIC | Age: 65
End: 2020-10-21

## 2020-10-21 DIAGNOSIS — C78.00 MALIGNANT NEOPLASM METASTATIC TO LUNG, UNSPECIFIED LATERALITY (HCC): ICD-10-CM

## 2020-10-21 DIAGNOSIS — C64.1 RENAL CELL CARCINOMA OF RIGHT KIDNEY (HCC): ICD-10-CM

## 2020-10-21 DIAGNOSIS — N18.4 CKD (CHRONIC KIDNEY DISEASE), STAGE IV (HCC): ICD-10-CM

## 2020-10-21 DIAGNOSIS — N05.9 NEPHRITIS AND NEPHROPATHY: ICD-10-CM

## 2020-10-21 LAB
ALBUMIN SERPL-MCNC: 4.4 G/DL (ref 3.5–5.2)
ALBUMIN/GLOB SERPL: 2.2 G/DL
ALP SERPL-CCNC: 62 U/L (ref 39–117)
ALT SERPL W P-5'-P-CCNC: 10 U/L (ref 1–41)
ANION GAP SERPL CALCULATED.3IONS-SCNC: 9 MMOL/L (ref 5–15)
AST SERPL-CCNC: 14 U/L (ref 1–40)
BASOPHILS # BLD AUTO: 0.04 10*3/MM3 (ref 0–0.2)
BASOPHILS NFR BLD AUTO: 0.6 % (ref 0–1.5)
BILIRUB SERPL-MCNC: 0.3 MG/DL (ref 0–1.2)
BUN SERPL-MCNC: 50 MG/DL (ref 8–23)
BUN/CREAT SERPL: 14.1 (ref 7–25)
CALCIUM SPEC-SCNC: 9.9 MG/DL (ref 8.6–10.5)
CHLORIDE SERPL-SCNC: 111 MMOL/L (ref 98–107)
CO2 SERPL-SCNC: 22 MMOL/L (ref 22–29)
CREAT SERPL-MCNC: 3.55 MG/DL (ref 0.76–1.27)
DEPRECATED RDW RBC AUTO: 41 FL (ref 37–54)
EOSINOPHIL # BLD AUTO: 0.17 10*3/MM3 (ref 0–0.4)
EOSINOPHIL NFR BLD AUTO: 2.7 % (ref 0.3–6.2)
ERYTHROCYTE [DISTWIDTH] IN BLOOD BY AUTOMATED COUNT: 13.2 % (ref 12.3–15.4)
GFR SERPL CREATININE-BSD FRML MDRD: 17 ML/MIN/1.73
GLOBULIN UR ELPH-MCNC: 2 GM/DL
GLUCOSE SERPL-MCNC: 104 MG/DL (ref 65–99)
HCT VFR BLD AUTO: 39.8 % (ref 37.5–51)
HGB BLD-MCNC: 13.4 G/DL (ref 13–17.7)
IMM GRANULOCYTES # BLD AUTO: 0.07 10*3/MM3 (ref 0–0.05)
IMM GRANULOCYTES NFR BLD AUTO: 1.1 % (ref 0–0.5)
LYMPHOCYTES # BLD AUTO: 1.59 10*3/MM3 (ref 0.7–3.1)
LYMPHOCYTES NFR BLD AUTO: 25.1 % (ref 19.6–45.3)
MCH RBC QN AUTO: 29.1 PG (ref 26.6–33)
MCHC RBC AUTO-ENTMCNC: 33.7 G/DL (ref 31.5–35.7)
MCV RBC AUTO: 86.3 FL (ref 79–97)
MONOCYTES # BLD AUTO: 0.62 10*3/MM3 (ref 0.1–0.9)
MONOCYTES NFR BLD AUTO: 9.8 % (ref 5–12)
NEUTROPHILS NFR BLD AUTO: 3.84 10*3/MM3 (ref 1.7–7)
NEUTROPHILS NFR BLD AUTO: 60.7 % (ref 42.7–76)
NRBC BLD AUTO-RTO: 0 /100 WBC (ref 0–0.2)
PLATELET # BLD AUTO: 219 10*3/MM3 (ref 140–450)
PMV BLD AUTO: 9.5 FL (ref 6–12)
POTASSIUM SERPL-SCNC: 4.6 MMOL/L (ref 3.5–5.2)
PROT SERPL-MCNC: 6.4 G/DL (ref 6–8.5)
RBC # BLD AUTO: 4.61 10*6/MM3 (ref 4.14–5.8)
SODIUM SERPL-SCNC: 142 MMOL/L (ref 136–145)
WBC # BLD AUTO: 6.33 10*3/MM3 (ref 3.4–10.8)

## 2020-10-21 PROCEDURE — 85025 COMPLETE CBC W/AUTO DIFF WBC: CPT

## 2020-10-21 PROCEDURE — 80053 COMPREHEN METABOLIC PANEL: CPT

## 2020-10-21 PROCEDURE — 36415 COLL VENOUS BLD VENIPUNCTURE: CPT

## 2020-10-21 NOTE — TELEPHONE ENCOUNTER
Faxed to Dr Dumont at [facility]. [phone]    ----- Message from JACLYN Benitez sent at 10/21/2020  1:06 PM CDT -----  Please fax to his nephrologist

## 2020-10-25 NOTE — PROGRESS NOTES
[facility] HEMATOLOGY AND ONCOLOGY  [address]  [place] [address] [phone]    Patient Name: Parker Arnold  Encounter Date: 10/28/2020  YOB: 1955  Patient Number: [id number]      REASON FOR VISIT: Parker Arnold is a 65 y.o. year old male with a diagnosis of clear cell renal cancer in 11/11/2014.  Underwent right nephrectomy. Was under surveillance until 11/26/2018  When he underwent  wedge resection of a right pulm nodule. Pathology:  metastatic renal cell carcinoma. He was not able to tolerate Sutent due to kidney failure, thus received ipilumomab/nivolumab, 05/13/2019 to 08/07/2019, then single agent nivolumab, 08/28/2019 to 09/09/2019. Therapy stopped due to renal failure. He has been on and off prednisone since.      Problem List Items Addressed This Visit        Respiratory    Lung metastases (CMS/HCC) - Primary       Genitourinary    Renal cell carcinoma of right kidney (CMS/HCC)    Overview     Overview:   Diagnoses 11/11/14. Path report in [facility] at [facility]. Tissue has been requested.              Oncology/Hematology History   Renal cell carcinoma of right kidney (CMS/HCC)   10/2014 Initial Diagnosis    Renal cell carcinoma (CMS/HCC)     10/2014 Surgery    Right nephrectomy, Dr Morales       10/1/2018 Progression    Increasing lung Nodules on f/u CT     10/2/2018 -  Chemotherapy    Sutent  Discontinued due to intolerance     5/13/2019 - 8/7/2019 Chemotherapy    Opdivo Yervoy x 4 cycles     8/28/2019 - 9/9/2019 Chemotherapy    Single agent Opdivo    Opdivo held 9/9/2019 due to Renal Failure / NephritisToxicity     1/16/2020 Imaging    PET SCAN  Negative for neoplastic uptake         PAST MEDICAL HISTORY:  ALLERGIES:  Allergies   Allergen Reactions   • Amoxicillin Hives   • Nivolumab Provider Review Needed     Renal failure   • Penicillins Hives and Rash   • Clindamycin/Lincomycin Rash      pustules  Rash - pustules     • Doxycycline Rash   • Hepatitis B Virus Vaccines Rash     Pustules     • Hepatitis B Vaccine Rash   • Latex Rash   • Levofloxacin Rash     CURRENT MEDICATIONS:  Outpatient Encounter Medications as of 10/28/2020   Medication Sig Dispense Refill   • calcitriol (ROCALTROL) 0.25 MCG capsule Take 1 capsule by mouth Daily. 30 capsule 2   • CALTRATE 600+D3 600-800 MG-UNIT tablet Take 1 tablet by mouth twice daily 60 tablet 3   • carvedilol (COREG) 12.5 MG tablet Take 12.5 mg by mouth 2 (Two) Times a Day With Meals.     • finasteride (PROSCAR) 5 MG tablet Take 5 mg by mouth Daily.  3   • ondansetron (ZOFRAN) 8 MG tablet Take 8 mg by mouth Every 8 (Eight) Hours.     • pantoprazole (PROTONIX) 40 MG EC tablet Take 1 tablet by mouth Daily. 30 tablet 2   • predniSONE (DELTASONE) 5 MG tablet Take 5 mg by mouth Daily.     • sodium bicarbonate 650 MG tablet Take 1 tablet by mouth 3 (Three) Times a Day. 90 tablet 2   • [DISCONTINUED] allopurinol (ZYLOPRIM) 100 MG tablet Take 100 mg by mouth 2 (Two) Times a Day.     • [DISCONTINUED] benzonatate (TESSALON) 100 MG capsule Take 100 mg by mouth 4 (Four) Times a Day.     • [DISCONTINUED] lisinopril (PRINIVIL,ZESTRIL) 2.5 MG tablet Take 2.5 mg by mouth Daily.     • [DISCONTINUED] predniSONE (DELTASONE) 20 MG tablet Take 2 tablets by mouth Daily With Breakfast. (Patient taking differently: Take 30 mg by mouth Daily.) 30 tablet 0     No facility-administered encounter medications on file as of 10/28/2020.      ADULT ILLNESSES:  Patient Active Problem List   Diagnosis Code   • Renal cell carcinoma of right kidney (CMS/HCC) C64.1   • Multiple nodules of lung R91.8   • Lung anomaly Q33.9   • TIA (transient ischemic attack) G45.9   • Acute renal failure with tubular necrosis in the setting of solitary kidney N17.0   • Lung metastases (CMS/HCC) C78.00   • Pyuria R82.81   • Hyperkalemia E87.5   • Metabolic acidosis E87.2   • Hyperglycemia R73.9   • Hypercalcemia  E83.52   • Benign prostatic hyperplasia N40.0   • Increased frequency of urination R35.0   • Nocturia R35.1   • Retention of urine R33.9   • Dehydration E86.0   • Nephritis and nephropathy N05.9   • Toxicity, chemicals T65.91XA   • Stage 3 chronic kidney disease N18.30   • Campylobacter gastroenteritis A04.5   • Sigmoid diverticulitis K57.32     SURGERIES:  Past Surgical History:   Procedure Laterality Date   • HERNIA REPAIR     • LUNG BIOPSY  2018   • NEPHRECTOMY     • SINUS SURGERY       HEALTH MAINTENANCE ITEMS:  Health Maintenance Due   Topic Date Due   • COLONOSCOPY  1955   • TDAP/TD VACCINES (1 - Tdap) 1973   • ZOSTER VACCINE (1 of 2) 2004   • Pneumococcal Vaccine 65+ (1 of 1 - PPSV23) 2019   • HEPATITIS C SCREENING  2019   • ANNUAL WELLNESS VISIT  2019   • INFLUENZA VACCINE  2020       <no information>  Last Completed Colonoscopy       Status Date      COLONOSCOPY No completions recorded          There is no immunization history on file for this patient.  Last Completed Mammogram     Patient has no health maintenance due at this time            FAMILY HISTORY:  Family History   Problem Relation Age of Onset   • Other Mother         blood disease unknown   • Cancer Father         unknown   • No Known Problems Sister    • Heart disease Maternal Grandmother    • Heart disease Maternal Grandfather    • No Known Problems Paternal Grandmother    • No Known Problems Paternal Grandfather    • No Known Problems Sister      SOCIAL HISTORY:  Social History     Socioeconomic History   • Marital status:      Spouse name: Not on file   • Number of children: Not on file   • Years of education: Not on file   • Highest education level: Not on file   Tobacco Use   • Smoking status: Former Smoker     Packs/day: 1.00     Years: 15.00     Pack years: 15.00     Types: Cigarettes     Quit date:      Years since quittin.8   • Smokeless tobacco: Never Used  "  Substance and Sexual Activity   • Alcohol use: No     Frequency: Never   • Drug use: No   • Sexual activity: Defer       REVIEW OF SYSTEMS:  Constitutional: Manages ADLs, chores, errands and driving.  Drove himself in today.  \"I can go 4 hours then I need a break.\"  Appetite is good.  \"Too good.\" Energy is fair.  \"Some days better than others but not bad.\"  No unexpected weight change. No fever, no chills nor drenching nights.  HENT: Negative.  No sore throat.  Has seasonal sinus symptoms/rhinorrhea. \"Some.\" No headaches.  Eyes: Negative.    Respiratory:  No SOB at rest nor TENORIO.  No cough. No wheezing. No tobacco use.    Cardiovascular: Negative.  No chest pain.  No palpitations.  No orthopnea  Gastrointestinal: No dysphagia.  No nausea.  No vomiting.  No dyspepsia.  Infrequent constipation, does not need stool softeners sometimes.  No diarrhea.  No melena. No hematochezia. No recurrent diverticulitis since 07/2020.  Endocrine: No hot flashes.  Genitourinary:  No dysuria.  No incontinence. No hematuria.  Occasional urgency.  Nocturia much improved on Finasteride.  Musculoskeletal:  No arthralgias.  No edema  Skin: No new rash.    Neurological:  No dizziness.  No facial asymmetry. No headaches.  Mild sensory neuropathy of the hands.  Hematological: Negative for new adenopathy.  Says he bruises easily.  Psychiatric/Behavioral:  No anxiety.  No depression.        VITAL SIGNS: /90   Pulse 67   Temp 98 °F (36.7 °C)   Resp 16   Ht 177.8 cm (70\")   Wt 92.4 kg (203 lb 12.8 oz)   SpO2 97%   BMI 29.24 kg/m² Body surface area is 2.1 meters squared.  Pain Score    10/28/20 1011   PainSc: 0-No pain         PHYSICAL EXAMINATION:   General Appearance: Patient is a pleasant, cooperative, obese, modestly kept male who is awake, alert, oriented and in no acute distress. ECOG 1  HEENT: Normocephalic. Sclerae clear, conjunctiva pink, extraocular movements intact, pupils, round, reactive to light and  accommodation. Mouth " and throat are clear with moist oral mucosa.  NECK: Supple, no jugular venous distention, thyroid not enlarged.  LYMPH: No cervical, supraclavicular, axillary, or inguinal lymphadenopathy.  LUNGS: Good air movement, no rales, rhonchi, rubs or wheezes with auscultation  CARDIO: Regular sinus rhythm, no murmurs, gallops or rubs.  ABDOMEN:  Obese, nondistended, soft, No tenderness, no guarding, no rebound, No hepatosplenomegaly. No abdominal masses. Bowel sounds positive. No hernia  MUSKEL: No joint swelling, decreased motion, or inflammation  EXTREMS:  No edema, clubbing, cyanosis, No varicose veins.  NEURO: Grossly nonfocal, Gait is coordinated and smooth, Cognition is preserved.  SKIN: No rashes, no ecchymoses, no petechia.  PSYCH: Oriented to time, place and person. Memory is preserved. Mood and affect appear normal         LABS    Lab Results - Last 18 Months   Lab Units 10/21/20  1228 07/24/20  1033 07/08/20  0509 07/07/20  0457 07/06/20  1035 04/24/20  1037 02/24/20  0945 01/23/20  0851 01/09/20  0432   HEMOGLOBIN g/dL 13.4 12.2* 10.2* 11.4* 12.8* 11.1* 12.9* 11.2* 7.8*   HEMATOCRIT % 39.8 36.7* 31.5* 34.3* 39.0* 34.0* 37.9 34.1* 22.2*   MCV fL 86.3 88.0 91.8 90.7 90.9 90.9 92.9 95.5 89.5   WBC 10*3/mm3 6.33 7.36 6.3 7.7 8.1 8.67 9.99 11.75* 7.14   RDW % 13.2 14.1 13.4 13.4 13.6 13.5 13.2 14.4 13.6   MPV fL 9.5 9.1 9.5 9.6 9.3* 9.3 9.4 10.1 9.6   PLATELETS 10*3/mm3 219 237 140 151 170 172 174 175 188   IMM GRAN % % 1.1* 1.2*  --   --   --  1.2* 1.0* 0.9* 0.8*   NEUTROS ABS 10*3/mm3 3.84 4.73 4.2 5.7 5.4 6.65 8.22* 9.52* 5.12   LYMPHS ABS 10*3/mm3 1.59 1.54 1.2 1.2 1.5 1.03 0.93 1.35 1.29   MONOS ABS 10*3/mm3 0.62 0.79 0.70 0.70 0.90 0.57 0.66 0.75 0.64   EOS ABS 10*3/mm3 0.17 0.15 0.20 0.10 0.20 0.29 0.06 0.02 0.02   BASOS ABS 10*3/mm3 0.04 0.06 0.00 0.00 0.10 0.03 0.02 0.01 0.01   IMMATURE GRANS (ABS) 10*3/mm3 0.07* 0.09* 0.0 0.0 0.1 0.10* 0.10* 0.10* 0.06*   NRBC /100 WBC 0.0 0.0  --   --   --  0.0 0.0 0.0 0.0        Lab Results - Last 18 Months   Lab Units 10/21/20  1228 07/24/20  1033 07/07/20  1028 04/24/20  1037 02/24/20  0945 01/23/20  0851 01/10/20  0412   GLUCOSE mg/dL 104* 100*  --  128* 96 120* 108*   SODIUM mmol/L 142 140  --  141 141 141 144   POTASSIUM mmol/L 4.6 4.8 4.4 4.7 4.9 4.1 3.8   CO2 mmol/L 22.0 23.0  --  21.0* 23.0 26.0 25.0   CHLORIDE mmol/L 111* 105  --  107 107 104 107   ANION GAP mmol/L 9.0 12.0  --  13.0 11.0 11.0 12.0   CREATININE mg/dL 3.55* 3.32*  --  3.61* 2.93* 2.83* 3.51*   BUN mg/dL 50* 51*  --  50* 53* 57* 51*   BUN / CREAT RATIO  14.1 15.4  --  13.9 18.1 20.1 14.5   CALCIUM mg/dL 9.9 9.7  --  9.4 9.8 9.5 7.0*   EGFR IF NONAFRICN AM mL/min/1.73 17* 19*  --  17* 22* 23* 18*   ALK PHOS U/L 62 52  --  48 39 43 37*   TOTAL PROTEIN g/dL 6.4 6.4  --  6.0 6.3 6.2 5.1*   ALT (SGPT) U/L 10 10  --  11 14 32 9   AST (SGOT) U/L 14 11  --  10 12 8 8   BILIRUBIN mg/dL 0.3 0.4  --  0.3 0.4 0.4 0.2   ALBUMIN g/dL 4.40 4.20  --  3.70 4.10 4.10 3.40*   GLOBULIN gm/dL 2.0 2.2  --  2.3 2.2 2.1 1.7       Lab Results - Last 18 Months   Lab Units 07/06/20  1035 01/05/20  0520 01/02/20  0824 10/19/19  0627 10/18/19  0739   URIC ACID mg/dL 9.9* 4.7 4.6 3.5 3.9       Lab Results - Last 18 Months   Lab Units 07/08/20  0509 04/24/20  1037 10/19/19  0627   IRON ug/dL 60 65  --    TIBC ug/dL 202* 262*  --    IRON SATURATION % 30 25  --    FERRITIN ng/mL  --  292.70  --    TSH uIU/mL  --   --  0.367       Assessment:  1.  Renal cell adenocarcinoma, diagnosed 11/11/2014  -2014, right nephrectomy.  -11/26/2018-right lower lobe VATS wedge resection: Metastatic renal cell carcinoma, 0.9 cm; margins free of malignancy. Dr. Hu  2.  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  GFR 17 mL/min, 10/21/2020 (prior:  9 - 28)  -s/p multiple doses of HD steroid in the past and remains on daily 5 mg prednisone per nephrology (Dr. Grant)  -will not start cytoxan unless biopsy done to determine etiology of kidney failure. Has been off  opdivo since September 2019.  -01/16/2020-PET scan.  No neoplastic FDG uptake within the chest, abdomen or pelvis.  -07/06/2020-CT abdomen/pelvis with diverticulosis of the colon.  Evidence of acute diverticulitis of the sigmoid colon and adjacent distal descending colon.  No free air or fluid seen.  1 cm noncalcified subpleural nodule in the left lower lobe.  Follow-up exam in 6 months recommended.  -08/04/2020-chest CT.  Scattered subcentimeter bilateral pulmonary nodules similar to 1/16/2020 with mild increase in the 5 mm left upper lobe nodule.  Continued follow-up recommended.  No pathologic lymphadenopathy.  Right nephrectomy.    3.  Normocytic anemia.  Hgb 13.4; MCV 86.3, 10/21/2020 (prior: Hgb 10.2-12.9; MCV 88-95.5)  4. Gerd: on omeprazole  5. Osteopenia: on bola+vit D  6.  Acute diverticulitis.  Resolved.  CT abdomen/pelvis, 07/06/2020 with diverticulosis of the colon.  Evidence of acute diverticulitis of the sigmoid colon and adjacent distal descending colon.  No free air or fluid seen.  1 cm noncalcified subpleural nodule in the left lower lobe.  Follow-up exam in 6 months recommended.    Plan:  1.  Review of available medical records, to include available pathology and imaging studies.  Therapeutic history also reviewed.   2.  Apprised of labs, 10/21/2020 to include the stable anemia otherwise normal CBC, depressed GFR (stable) otherwise normal calcium, normal potassium and normal LFTs.  3.  Reviewed report of PET scan, 01/16/2020, CT abdomen/pelvis, 07/06/2020 and CT chest, 08/04/2020 (above).  Will need follow-up imaging.  4.  Continue observation for now.  5.  Continue management per primary care and other specialists.  6.  Return to office in 12 weeks with preoffice PET scan neck to thighs, CBC with differential and CMP.    I spent 49 total minutes, face-to-face, caring for Parker today.  Greater than 50% of this time involved counseling and/or coordination of care as documented within this note  regarding the patient's illness(es), pros and cons of various treatment options, instructions and/or risk reduction.

## 2020-10-28 ENCOUNTER — OFFICE VISIT (OUTPATIENT)
Dept: ONCOLOGY | Facility: CLINIC | Age: 65
End: 2020-10-28

## 2020-10-28 VITALS
BODY MASS INDEX: 29.18 KG/M2 | DIASTOLIC BLOOD PRESSURE: 90 MMHG | SYSTOLIC BLOOD PRESSURE: 142 MMHG | TEMPERATURE: 98 F | OXYGEN SATURATION: 97 % | RESPIRATION RATE: 16 BRPM | HEART RATE: 67 BPM | WEIGHT: 203.8 LBS | HEIGHT: 70 IN

## 2020-10-28 DIAGNOSIS — C78.00 MALIGNANT NEOPLASM METASTATIC TO LUNG, UNSPECIFIED LATERALITY (HCC): Primary | ICD-10-CM

## 2020-10-28 DIAGNOSIS — C64.1 RENAL CELL CARCINOMA OF RIGHT KIDNEY (HCC): ICD-10-CM

## 2020-10-28 PROBLEM — K57.32 SIGMOID DIVERTICULITIS: Status: ACTIVE | Noted: 2020-07-06

## 2020-10-28 PROBLEM — A04.5 CAMPYLOBACTER GASTROENTERITIS: Status: ACTIVE | Noted: 2020-07-07

## 2020-10-28 PROCEDURE — 99215 OFFICE O/P EST HI 40 MIN: CPT | Performed by: INTERNAL MEDICINE

## 2020-10-28 RX ORDER — PREDNISONE 1 MG/1
2.5 TABLET ORAL DAILY
COMMUNITY

## 2020-12-28 LAB
ALBUMIN SERPL-MCNC: 4.1 G/DL (ref 3.5–5.2)
ALP BLD-CCNC: 74 U/L (ref 40–130)
ALT SERPL-CCNC: 13 U/L (ref 5–41)
ANION GAP SERPL CALCULATED.3IONS-SCNC: 10 MMOL/L (ref 7–19)
AST SERPL-CCNC: 12 U/L (ref 5–40)
BASOPHILS ABSOLUTE: 0 K/UL (ref 0–0.2)
BASOPHILS RELATIVE PERCENT: 0.5 % (ref 0–1)
BILIRUB SERPL-MCNC: 0.4 MG/DL (ref 0.2–1.2)
BILIRUBIN URINE: NEGATIVE
BLOOD, URINE: NEGATIVE
BUN BLDV-MCNC: 43 MG/DL (ref 8–23)
CALCIUM SERPL-MCNC: 9.6 MG/DL (ref 8.8–10.2)
CHLORIDE BLD-SCNC: 105 MMOL/L (ref 98–111)
CLARITY: CLEAR
CO2: 25 MMOL/L (ref 22–29)
COLOR: YELLOW
CREAT SERPL-MCNC: 3.2 MG/DL (ref 0.5–1.2)
CREATININE URINE: 187.8 MG/DL (ref 4.2–622)
EOSINOPHILS ABSOLUTE: 0.2 K/UL (ref 0–0.6)
EOSINOPHILS RELATIVE PERCENT: 2.4 % (ref 0–5)
GFR AFRICAN AMERICAN: 24
GFR NON-AFRICAN AMERICAN: 19
GLUCOSE BLD-MCNC: 105 MG/DL (ref 74–109)
GLUCOSE URINE: NEGATIVE MG/DL
HCT VFR BLD CALC: 42.3 % (ref 42–52)
HEMOGLOBIN: 13.5 G/DL (ref 14–18)
IMMATURE GRANULOCYTES #: 0 K/UL
KETONES, URINE: NEGATIVE MG/DL
LEUKOCYTE ESTERASE, URINE: NEGATIVE
LYMPHOCYTES ABSOLUTE: 1.6 K/UL (ref 1.1–4.5)
LYMPHOCYTES RELATIVE PERCENT: 20.8 % (ref 20–40)
MAGNESIUM: 1.9 MG/DL (ref 1.6–2.4)
MCH RBC QN AUTO: 29.2 PG (ref 27–31)
MCHC RBC AUTO-ENTMCNC: 31.9 G/DL (ref 33–37)
MCV RBC AUTO: 91.4 FL (ref 80–94)
MONOCYTES ABSOLUTE: 0.8 K/UL (ref 0–0.9)
MONOCYTES RELATIVE PERCENT: 10.5 % (ref 0–10)
NEUTROPHILS ABSOLUTE: 5 K/UL (ref 1.5–7.5)
NEUTROPHILS RELATIVE PERCENT: 65.3 % (ref 50–65)
NITRITE, URINE: NEGATIVE
PARATHYROID HORMONE INTACT: 44.4 PG/ML (ref 15–65)
PDW BLD-RTO: 13.5 % (ref 11.5–14.5)
PH UA: 6 (ref 5–8)
PHOSPHORUS: 3.7 MG/DL (ref 2.5–4.5)
PLATELET # BLD: 170 K/UL (ref 130–400)
PMV BLD AUTO: 9.5 FL (ref 9.4–12.4)
POTASSIUM SERPL-SCNC: 4.6 MMOL/L (ref 3.5–5)
PROTEIN PROTEIN: 11 MG/DL (ref 15–45)
PROTEIN UA: NEGATIVE MG/DL
RBC # BLD: 4.63 M/UL (ref 4.7–6.1)
SODIUM BLD-SCNC: 140 MMOL/L (ref 136–145)
SPECIFIC GRAVITY UA: 1.02 (ref 1–1.03)
TOTAL PROTEIN: 6.5 G/DL (ref 6.6–8.7)
URIC ACID, SERUM: 8.8 MG/DL (ref 3.4–7)
UROBILINOGEN, URINE: 0.2 E.U./DL
VITAMIN D 25-HYDROXY: 44.3 NG/ML
WBC # BLD: 7.6 K/UL (ref 4.8–10.8)

## 2021-01-08 DIAGNOSIS — C64.1 RENAL CELL CARCINOMA OF RIGHT KIDNEY (HCC): Primary | ICD-10-CM

## 2021-01-12 ENCOUNTER — APPOINTMENT (OUTPATIENT)
Dept: CT IMAGING | Facility: HOSPITAL | Age: 66
End: 2021-01-12

## 2021-01-12 ENCOUNTER — TELEPHONE (OUTPATIENT)
Dept: ONCOLOGY | Facility: CLINIC | Age: 66
End: 2021-01-12

## 2021-01-12 ENCOUNTER — LAB (OUTPATIENT)
Dept: LAB | Facility: HOSPITAL | Age: 66
End: 2021-01-12

## 2021-01-12 DIAGNOSIS — C78.00 MALIGNANT NEOPLASM METASTATIC TO LUNG, UNSPECIFIED LATERALITY (HCC): ICD-10-CM

## 2021-01-12 DIAGNOSIS — C64.1 RENAL CELL CARCINOMA OF RIGHT KIDNEY (HCC): Primary | ICD-10-CM

## 2021-01-12 LAB
ALBUMIN SERPL-MCNC: 4.3 G/DL (ref 3.5–5.2)
ALBUMIN/GLOB SERPL: 1.7 G/DL
ALP SERPL-CCNC: 73 U/L (ref 39–117)
ALT SERPL W P-5'-P-CCNC: 12 U/L (ref 1–41)
ANION GAP SERPL CALCULATED.3IONS-SCNC: 8 MMOL/L (ref 5–15)
AST SERPL-CCNC: 14 U/L (ref 1–40)
BASOPHILS # BLD AUTO: 0.04 10*3/MM3 (ref 0–0.2)
BASOPHILS NFR BLD AUTO: 0.6 % (ref 0–1.5)
BILIRUB SERPL-MCNC: 0.3 MG/DL (ref 0–1.2)
BUN SERPL-MCNC: 48 MG/DL (ref 8–23)
BUN/CREAT SERPL: 13.7 (ref 7–25)
CALCIUM SPEC-SCNC: 9.5 MG/DL (ref 8.6–10.5)
CHLORIDE SERPL-SCNC: 106 MMOL/L (ref 98–107)
CO2 SERPL-SCNC: 27 MMOL/L (ref 22–29)
CREAT SERPL-MCNC: 3.5 MG/DL (ref 0.76–1.27)
DEPRECATED RDW RBC AUTO: 42.6 FL (ref 37–54)
EOSINOPHIL # BLD AUTO: 0.15 10*3/MM3 (ref 0–0.4)
EOSINOPHIL NFR BLD AUTO: 2.3 % (ref 0.3–6.2)
ERYTHROCYTE [DISTWIDTH] IN BLOOD BY AUTOMATED COUNT: 13.4 % (ref 12.3–15.4)
GFR SERPL CREATININE-BSD FRML MDRD: 18 ML/MIN/1.73
GLOBULIN UR ELPH-MCNC: 2.5 GM/DL
GLUCOSE SERPL-MCNC: 77 MG/DL (ref 65–99)
HCT VFR BLD AUTO: 40.7 % (ref 37.5–51)
HGB BLD-MCNC: 14.1 G/DL (ref 13–17.7)
HOLD SPECIMEN: NORMAL
IMM GRANULOCYTES # BLD AUTO: 0.03 10*3/MM3 (ref 0–0.05)
IMM GRANULOCYTES NFR BLD AUTO: 0.5 % (ref 0–0.5)
LYMPHOCYTES # BLD AUTO: 1.59 10*3/MM3 (ref 0.7–3.1)
LYMPHOCYTES NFR BLD AUTO: 24 % (ref 19.6–45.3)
MCH RBC QN AUTO: 30.2 PG (ref 26.6–33)
MCHC RBC AUTO-ENTMCNC: 34.6 G/DL (ref 31.5–35.7)
MCV RBC AUTO: 87.2 FL (ref 79–97)
MONOCYTES # BLD AUTO: 0.81 10*3/MM3 (ref 0.1–0.9)
MONOCYTES NFR BLD AUTO: 12.2 % (ref 5–12)
NEUTROPHILS NFR BLD AUTO: 4.01 10*3/MM3 (ref 1.7–7)
NEUTROPHILS NFR BLD AUTO: 60.4 % (ref 42.7–76)
NRBC BLD AUTO-RTO: 0 /100 WBC (ref 0–0.2)
PLATELET # BLD AUTO: 175 10*3/MM3 (ref 140–450)
PMV BLD AUTO: 9.2 FL (ref 6–12)
POTASSIUM SERPL-SCNC: 4.6 MMOL/L (ref 3.5–5.2)
PROT SERPL-MCNC: 6.8 G/DL (ref 6–8.5)
RBC # BLD AUTO: 4.67 10*6/MM3 (ref 4.14–5.8)
SODIUM SERPL-SCNC: 141 MMOL/L (ref 136–145)
WBC # BLD AUTO: 6.63 10*3/MM3 (ref 3.4–10.8)

## 2021-01-12 PROCEDURE — 80053 COMPREHEN METABOLIC PANEL: CPT

## 2021-01-12 PROCEDURE — 85025 COMPLETE CBC W/AUTO DIFF WBC: CPT

## 2021-01-12 PROCEDURE — 36415 COLL VENOUS BLD VENIPUNCTURE: CPT

## 2021-01-12 NOTE — TELEPHONE ENCOUNTER
----- Message from Leonardo Smith MD sent at 1/12/2021  2:17 PM CST -----  Please fax these labs to his PCP and to Dr. Grant (nephrology).  Thank

## 2021-01-14 ENCOUNTER — HOSPITAL ENCOUNTER (OUTPATIENT)
Dept: CT IMAGING | Facility: HOSPITAL | Age: 66
Discharge: HOME | End: 2021-01-14
Admitting: INTERNAL MEDICINE

## 2021-01-14 DIAGNOSIS — C64.1 RENAL CELL CARCINOMA OF RIGHT KIDNEY (HCC): ICD-10-CM

## 2021-01-14 PROCEDURE — 71250 CT THORAX DX C-: CPT

## 2021-01-14 PROCEDURE — 74176 CT ABD & PELVIS W/O CONTRAST: CPT

## 2021-01-19 ENCOUNTER — OFFICE VISIT (OUTPATIENT)
Dept: ONCOLOGY | Facility: CLINIC | Age: 66
End: 2021-01-19

## 2021-01-19 VITALS
WEIGHT: 211 LBS | HEIGHT: 70 IN | BODY MASS INDEX: 30.21 KG/M2 | DIASTOLIC BLOOD PRESSURE: 100 MMHG | RESPIRATION RATE: 16 BRPM | HEART RATE: 58 BPM | OXYGEN SATURATION: 93 % | TEMPERATURE: 97.4 F | SYSTOLIC BLOOD PRESSURE: 142 MMHG

## 2021-01-19 DIAGNOSIS — C64.1 RENAL CELL CARCINOMA OF RIGHT KIDNEY (HCC): Primary | ICD-10-CM

## 2021-01-19 PROCEDURE — 99215 OFFICE O/P EST HI 40 MIN: CPT | Performed by: INTERNAL MEDICINE

## 2021-01-26 ENCOUNTER — HOSPITAL ENCOUNTER (OUTPATIENT)
Dept: CT IMAGING | Facility: HOSPITAL | Age: 66
Discharge: HOME | End: 2021-01-26

## 2021-01-26 DIAGNOSIS — C64.1 RENAL CELL CARCINOMA OF RIGHT KIDNEY (HCC): ICD-10-CM

## 2021-01-26 PROCEDURE — A9552 F18 FDG: HCPCS | Performed by: INTERNAL MEDICINE

## 2021-01-26 PROCEDURE — 0 FLUDEOXYGLUCOSE F18 SOLUTION: Performed by: INTERNAL MEDICINE

## 2021-01-26 PROCEDURE — 78815 PET IMAGE W/CT SKULL-THIGH: CPT

## 2021-01-26 RX ADMIN — FLUDEOXYGLUCOSE F18 1 DOSE: 300 INJECTION INTRAVENOUS at 11:31

## 2021-02-08 ENCOUNTER — TELEPHONE (OUTPATIENT)
Dept: ONCOLOGY | Facility: CLINIC | Age: 66
End: 2021-02-08

## 2021-02-08 NOTE — TELEPHONE ENCOUNTER
----- Message from Parker Arnold sent at 2/7/2021 10:01 AM CST -----  Regarding: Referral Request  Contact: [phone]  Could someone please contact me about an upcoming referral appointment in [facility] [place], operated by [facility]?  It is Feb. 9th. I am making sure we have everything sent and ready. I was not aware of this appt   Thank you

## 2021-02-08 NOTE — TELEPHONE ENCOUNTER
I called and spoke with Raven and he is aware of the appt. He moved the appt to this Friday. He asks if his follow up with DR. Rodriguez should be moved up sooner.

## 2021-02-09 ENCOUNTER — TELEPHONE (OUTPATIENT)
Dept: ONCOLOGY | Facility: CLINIC | Age: 66
End: 2021-02-09

## 2021-02-18 NOTE — PROGRESS NOTES
[facility]Y  [address]  [place] [address] [phone]    Patient Name: Parker Arnold  Encounter Date: 02/22/2021  YOB: 1955  Patient Number: [id number]      REASON FOR VISIT: Parker Arnold is a 65 y.o. year old male with a diagnosis of clear cell renal cancer in 11/11/2014.  Underwent right nephrectomy. Was under surveillance until 11/26/2018  When he underwent  wedge resection of a right pulm nodule. Pathology:  metastatic renal cell carcinoma. He was not able to tolerate Sutent due to kidney failure, thus received ipilumomab/nivolumab, 05/13/2019 to 08/07/2019, then single agent nivolumab, 08/28/2019 to 09/09/2019 (17.5 months since cessation of therapy). Therapy stopped due to renal failure. He has been on and off prednisone since. He is here alone.    I have reviewed the HPI and verified with the patient the accuracy of it. No changes to interval history since the information was documented. Leonardo Smith MD 02/22/21       Problem List Items Addressed This Visit     None        Oncology/Hematology History   Renal cell carcinoma of right kidney (CMS/HCC)   10/2014 Initial Diagnosis    Renal cell carcinoma (CMS/HCC)     10/2014 Surgery    Right nephrectomy, Dr Morales       10/1/2018 Progression    Increasing lung Nodules on f/u CT     10/2/2018 -  Chemotherapy    Sutent  Discontinued due to intolerance     5/13/2019 - 8/7/2019 Chemotherapy    Opdivo Yervoy x 4 cycles     8/28/2019 - 9/9/2019 Chemotherapy    Single agent Opdivo    Opdivo held 9/9/2019 due to Renal Failure / NephritisToxicity     1/16/2020 Imaging    PET SCAN  Negative for neoplastic uptake         PAST MEDICAL HISTORY:  ALLERGIES:  Allergies   Allergen Reactions   • Amoxicillin Hives   • Opdivo [Nivolumab] Provider Review Needed     Renal failure   • Penicillins Hives and Rash   • Clindamycin/Lincomycin Rash      pustules  Rash - pustules     • Doxycycline Rash   • Hepatitis B Virus Vaccines Rash     Pustules     • Hepatitis B Vaccine Rash   • Latex Rash   • Levofloxacin Rash     CURRENT MEDICATIONS:  Outpatient Encounter Medications as of 2/22/2021   Medication Sig Dispense Refill   • calcitriol (ROCALTROL) 0.25 MCG capsule Take 1 capsule by mouth Daily. 30 capsule 2   • calcium carb-cholecalciferol (Caltrate 600+D3) 600-800 MG-UNIT tablet Take 1 tablet by mouth 2 (Two) Times a Day. 60 tablet 3   • carvedilol (COREG) 12.5 MG tablet Take 12.5 mg by mouth 2 (Two) Times a Day With Meals.     • finasteride (PROSCAR) 5 MG tablet Take 5 mg by mouth Daily.  3   • pantoprazole (PROTONIX) 40 MG EC tablet Take 1 tablet by mouth Daily. 30 tablet 2   • predniSONE (DELTASONE) 5 MG tablet Take 5 mg by mouth Daily.     • sodium bicarbonate 650 MG tablet Take 1 tablet by mouth 3 (Three) Times a Day. 90 tablet 2     No facility-administered encounter medications on file as of 2/22/2021.      ADULT ILLNESSES:  Patient Active Problem List   Diagnosis Code   • Renal cell carcinoma of right kidney (CMS/HCC) C64.1   • Multiple nodules of lung R91.8   • Lung anomaly Q33.9   • TIA (transient ischemic attack) G45.9   • Acute renal failure with tubular necrosis in the setting of solitary kidney N17.0   • Lung metastases (CMS/HCC) C78.00   • Pyuria R82.81   • Hyperkalemia E87.5   • Metabolic acidosis E87.2   • Hyperglycemia R73.9   • Hypercalcemia E83.52   • Benign prostatic hyperplasia N40.0   • Increased frequency of urination R35.0   • Nocturia R35.1   • Retention of urine R33.9   • Dehydration E86.0   • Nephritis and nephropathy N05.9   • Toxicity, chemicals T65.91XA   • Stage 3 chronic kidney disease (CMS/HCC) N18.30   • Campylobacter gastroenteritis A04.5   • Sigmoid diverticulitis K57.32     SURGERIES:  Past Surgical History:   Procedure Laterality Date   • HERNIA REPAIR  2008   • LUNG BIOPSY  11/2018   • NEPHRECTOMY  2014   • SINUS SURGERY   "     HEALTH MAINTENANCE ITEMS:  Health Maintenance Due   Topic Date Due   • COLONOSCOPY  1955   • TDAP/TD VACCINES (1 - Tdap) 1973   • ZOSTER VACCINE (1 of 2) 2004   • Pneumococcal Vaccine 65+ (1 of 1 - PPSV23) 2019   • HEPATITIS C SCREENING  2019   • ANNUAL WELLNESS VISIT  2019   • INFLUENZA VACCINE  2020       <no information>  Last Completed Colonoscopy       Status Date      COLONOSCOPY No completions recorded          There is no immunization history on file for this patient.  Last Completed Mammogram     Patient has no health maintenance due at this time            FAMILY HISTORY:  Family History   Problem Relation Age of Onset   • Other Mother         blood disease unknown   • Cancer Father         unknown   • No Known Problems Sister    • Heart disease Maternal Grandmother    • Heart disease Maternal Grandfather    • No Known Problems Paternal Grandmother    • No Known Problems Paternal Grandfather    • No Known Problems Sister      SOCIAL HISTORY:  Social History     Socioeconomic History   • Marital status:      Spouse name: Not on file   • Number of children: Not on file   • Years of education: Not on file   • Highest education level: Not on file   Tobacco Use   • Smoking status: Former Smoker     Packs/day: 1.00     Years: 15.00     Pack years: 15.00     Types: Cigarettes     Quit date:      Years since quittin.1   • Smokeless tobacco: Never Used   Substance and Sexual Activity   • Alcohol use: No     Frequency: Never   • Drug use: No   • Sexual activity: Defer       REVIEW OF SYSTEMS:  Constitutional: Manages ADLs, chores, errands and driving.  Again drove himself in today.  Appetite is good.  \"Too good.\" Energy is fair.  \"Still with some days better than others, with good days mostly.\"  He has gained 4 pounds (in addition to 8 pounds at his prior visit) since his last visit. No fever, no chills nor drenching nights.  HENT: Negative.  No sore " "throat.  Has seasonal sinus symptoms/rhinorrhea. \"Some.\" No headaches.  Eyes: Negative.    Respiratory:  No SOB at rest nor TENORIO.  No cough. No wheezing. No tobacco use.    Cardiovascular: Negative.  No chest pain.  No palpitations.  No orthopnea  Gastrointestinal: No dysphagia.  No nausea.  No vomiting.  No dyspepsia.  Infrequent constipation, does not need stool softeners sometimes.  No diarrhea.  No melena. No hematochezia. No recurrent diverticulitis since 07/2020.  Endocrine: No hot flashes.  Genitourinary:  No dysuria.  No incontinence. No hematuria.  Occasional urgency.  Nocturia much improved on Finasteride.  Musculoskeletal:  No arthralgias.  No edema  Skin: No new rash.    Neurological:  No dizziness.  No facial asymmetry. No headaches.  Mild sensory neuropathy of the hands. \"You know it hasn't been bothering me this year.\"  Hematological: Negative for new adenopathy.  Says he bruises easily.  Psychiatric/Behavioral:  No anxiety.  No depression.      VITAL SIGNS: /86   Pulse 70   Temp 98 °F (36.7 °C)   Resp 16   Ht 177.8 cm (70\")   Wt 97.5 kg (215 lb)   SpO2 97%   BMI 30.85 kg/m² Body surface area is 2.15 meters squared.  Pain Score    02/22/21 0921   PainSc: 0-No pain         PHYSICAL EXAMINATION:   General Appearance: Patient is a pleasant, cooperative, slightly more heavyset-obese, modestly kept male who is awake, alert, oriented and in no acute distress. ECOG 1  HEENT: Normocephalic. Sclerae clear, conjunctiva pink, extraocular movements intact, pupils, round, reactive to light and  accommodation. Is wearing a surgical mask today  NECK: Supple, no jugular venous distention, thyroid not enlarged.  LYMPH: No cervical, supraclavicular, axillary, or inguinal lymphadenopathy.  LUNGS: Good air movement, no rales, rhonchi, rubs or wheezes with auscultation  CARDIO: Regular sinus rhythm, no murmurs, gallops or rubs.  ABDOMEN:  Obese, nondistended, soft, No tenderness, no guarding, no rebound, No " hepatosplenomegaly. No abdominal masses. Bowel sounds positive. No hernia  MUSKEL: No joint swelling, decreased motion, or inflammation  EXTREMS:  No edema, clubbing, cyanosis, No varicose veins.  NEURO: Grossly nonfocal, Gait is coordinated and smooth, Cognition is preserved.  SKIN: No rashes, no ecchymoses, no petechia.  PSYCH: Oriented to time, place and person. Memory is preserved. Mood and affect appear normal         LABS    Lab Results - Last 18 Months   Lab Units 01/12/21  1337 12/28/20  0912 10/21/20  1228 07/24/20  1033 07/08/20  0509 07/07/20  0457  04/24/20  1037 02/24/20  0945 01/23/20  0851   HEMOGLOBIN g/dL 14.1 13.5* 13.4 12.2* 10.2* 11.4*   < > 11.1* 12.9* 11.2*   HEMATOCRIT % 40.7 42.3 39.8 36.7* 31.5* 34.3*   < > 34.0* 37.9 34.1*   MCV fL 87.2 91.4 86.3 88.0 91.8 90.7   < > 90.9 92.9 95.5   WBC 10*3/mm3 6.63 7.6 6.33 7.36 6.3 7.7   < > 8.67 9.99 11.75*   RDW % 13.4 13.5 13.2 14.1 13.4 13.4   < > 13.5 13.2 14.4   MPV fL 9.2 9.5 9.5 9.1 9.5 9.6   < > 9.3 9.4 10.1   PLATELETS 10*3/mm3 175 170 219 237 140 151   < > 172 174 175   IMM GRAN % % 0.5  --  1.1* 1.2*  --   --   --  1.2* 1.0* 0.9*   NEUTROS ABS 10*3/mm3 4.01 5.0 3.84 4.73 4.2 5.7   < > 6.65 8.22* 9.52*   LYMPHS ABS 10*3/mm3 1.59 1.6 1.59 1.54 1.2 1.2   < > 1.03 0.93 1.35   MONOS ABS 10*3/mm3 0.81 0.80 0.62 0.79 0.70 0.70   < > 0.57 0.66 0.75   EOS ABS 10*3/mm3 0.15 0.20 0.17 0.15 0.20 0.10   < > 0.29 0.06 0.02   BASOS ABS 10*3/mm3 0.04 0.00 0.04 0.06 0.00 0.00   < > 0.03 0.02 0.01   IMMATURE GRANS (ABS) 10*3/mm3 0.03 0.0 0.07* 0.09* 0.0 0.0   < > 0.10* 0.10* 0.10*   NRBC /100 WBC 0.0  --  0.0 0.0  --   --   --  0.0 0.0 0.0    < > = values in this interval not displayed.       Lab Results - Last 18 Months   Lab Units 01/12/21  1337 12/28/20  0912 10/21/20  1228 07/24/20  1033 07/07/20  1028 04/24/20  1037 02/24/20  0945 01/23/20  0851   GLUCOSE mg/dL 77 105 104* 100*  --  128* 96 120*   SODIUM mmol/L 141 140 142 140  --  141 141 141    POTASSIUM mmol/L 4.6 4.6 4.6 4.8 4.4 4.7 4.9 4.1   TOTAL CO2 mmol/L  --  25  --   --   --   --   --   --    CO2 mmol/L 27.0  --  22.0 23.0  --  21.0* 23.0 26.0   CHLORIDE mmol/L 106 105 111* 105  --  107 107 104   ANION GAP mmol/L 8.0 10 9.0 12.0  --  13.0 11.0 11.0   CREATININE mg/dL 3.50* 3.2* 3.55* 3.32*  --  3.61* 2.93* 2.83*   BUN mg/dL 48* 43* 50* 51*  --  50* 53* 57*   BUN / CREAT RATIO  13.7  --  14.1 15.4  --  13.9 18.1 20.1   CALCIUM mg/dL 9.5 9.6 9.9 9.7  --  9.4 9.8 9.5   EGFR IF NONAFRICN AM mL/min/1.73 18* 19* 17* 19*  --  17* 22* 23*   ALK PHOS U/L 73 74 62 52  --  48 39 43   TOTAL PROTEIN g/dL 6.8 6.5* 6.4 6.4  --  6.0 6.3 6.2   ALT (SGPT) U/L 12 13 10 10  --  11 14 32   AST (SGOT) U/L 14 12 14 11  --  10 12 8   BILIRUBIN mg/dL 0.3 0.4 0.3 0.4  --  0.3 0.4 0.4   ALBUMIN g/dL 4.30 4.1 4.40 4.20  --  3.70 4.10 4.10   GLOBULIN gm/dL 2.5  --  2.0 2.2  --  2.3 2.2 2.1       Lab Results - Last 18 Months   Lab Units 12/28/20  0912 07/06/20  1035 01/05/20  0520 01/02/20  0824 10/19/19  0627 10/18/19  0739   URIC ACID mg/dL 8.8* 9.9* 4.7 4.6 3.5 3.9       Lab Results - Last 18 Months   Lab Units 07/08/20  0509 04/24/20  1037 10/19/19  0627   IRON ug/dL 60 65  --    TIBC ug/dL 202* 262*  --    IRON SATURATION % 30 25  --    FERRITIN ng/mL  --  292.70  --    TSH uIU/mL  --   --  0.367       Assessment:  1.  Renal cell carcinoma, diagnosed 11/11/2014  -2014, right nephrectomy.  -11/26/2018-right lower lobe VATS wedge resection: Metastatic renal cell carcinoma, 0.9 cm; margins free of malignancy. Dr. Hu  2.  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  GFR 18 mL/min, 01/12/2021 (prior:  9 - 28)  --s/p multiple doses of HD steroids in the past and remains on daily 5 mg prednisone per nephrology (Dr. Grant)  --will not start cytoxan unless biopsy done to determine etiology of kidney failure. Has been off Opdivo since September 2019.  --01/16/2020-PET scan.  No neoplastic FDG uptake within the chest, abdomen or  pelvis.  --07/06/2020-CT abdomen/pelvis with diverticulosis of the colon.  Evidence of acute diverticulitis of the sigmoid colon and adjacent distal descending colon.  No free air or fluid seen.  1 cm noncalcified subpleural nodule in the left lower lobe.  Follow-up exam in 6 months recommended.  --08/04/2020-chest CT.  Scattered subcentimeter bilateral pulmonary nodules similar to 1/16/2020 with mild increase in the 5 mm left upper lobe nodule.  Continued follow-up recommended.  No pathologic lymphadenopathy.  Right nephrectomy.  --01/14/2020-CT chest.  Increased size of 1.2 cm right lower lobe superior segment pulmonary nodule (previously 0.9 cm)-representing 33% growth.  Multiple other bilateral pulmonary nodules have not significantly changed in size.  Findings are in keeping with disease progression.  --01/14/2020-CT abdomen/pelvis.  No evidence of recurrent or metastatic disease in the abdomen or pelvis.  --01/26/2021-PET/CT.  Impression: Minimal metabolic activity associated with the enlarging pulmonary nodule within the superior segment of the right lower lobe with a maximum SUV of 1.35.  The additional nodules noted within both lungs also demonstrate no abnormal metabolic activity which would favor benignity but continued radiographic follow-up will be suggested.  No scintigraphic evidence of metastatic disease on current exam.  Increased uptake overlying the right groin and more distal external iliac vessels felt to be related to previous hernia repair.    3.  Normocytic anemia.  Hgb 14.1; MCV 87.2, 01/12/2021 (prior: Hgb 10.2-13.4; MCV 86.3-95.5)  4.  Gerd: on omeprazole  5.  Osteopenia: on bola+vit D  6.  History of acute diverticulitis.  Resolved.  CT abdomen/pelvis, 07/06/2020 with diverticulosis of the colon.  Evidence of acute diverticulitis of the sigmoid colon and adjacent distal descending colon.  No free air or fluid seen.  1 cm noncalcified subpleural nodule in the left lower lobe.  Follow-up  exam in 6 months recommended.    Plan:  1.  Apprised of PET scan findings, 01/26/2021 (above).  Explained that there is minimal metabolic activity associated with the enlarging pulmonary nodule within the superior segment right lower lobe with no abnormal metabolic activity in the additional nodules within both lungs favoring benignity.  2.  Review encounter from Dr. Mayito Arteaga, medical oncology at [facility] last 02/14/2021.  History reviewed.  Most recent CT chest, 01/14/2021 (above) with increasing size of 1.2 cm right lower lobe superior segment pulmonary nodule (previously 0.9 cm) and PET scan, 01/26/2021 (above) noted including minimal metabolic activity associated with the enlarging pulmonary nodule within the superior segment of the right lower lobe.  Discussion: Biopsy right lower lobe lung nodule solitary lesion that is growing on PET scan which will be done at that institution.  Contemplate either surgical resection, ablation or SBRT.  We will focus on local therapy for now.  Multiple systemic options down the road but if renal function improves can contemplate trial with HIF-alpha inhibitor.  3.  Keep appointment with Dr. Arteaga (above).  Bring in discs of his imaging studies as requested.  4.  Continue management per primary care and other specialists.  5.  Return to office in 8 weeks with preoffice CT scans of the chest, abdomen/pelvis without IV contrast, CBC with differential and CMP.    I spent - 68 total minutes, face-to-face, caring for Parker ledezma.  Greater than 50% of this time involved counseling and/or coordination of care as documented within this note regarding the patient's illness(es), pros and cons of various treatment options, instructions and/or risk reduction.

## 2021-02-22 ENCOUNTER — TELEPHONE (OUTPATIENT)
Dept: ONCOLOGY | Facility: CLINIC | Age: 66
End: 2021-02-22

## 2021-02-22 ENCOUNTER — LAB (OUTPATIENT)
Dept: LAB | Facility: HOSPITAL | Age: 66
End: 2021-02-22

## 2021-02-22 ENCOUNTER — OFFICE VISIT (OUTPATIENT)
Dept: ONCOLOGY | Facility: CLINIC | Age: 66
End: 2021-02-22

## 2021-02-22 VITALS
HEART RATE: 70 BPM | RESPIRATION RATE: 16 BRPM | TEMPERATURE: 98 F | BODY MASS INDEX: 30.78 KG/M2 | HEIGHT: 70 IN | WEIGHT: 215 LBS | OXYGEN SATURATION: 97 % | DIASTOLIC BLOOD PRESSURE: 86 MMHG | SYSTOLIC BLOOD PRESSURE: 138 MMHG

## 2021-02-22 DIAGNOSIS — C64.1 RENAL CELL CARCINOMA OF RIGHT KIDNEY (HCC): ICD-10-CM

## 2021-02-22 DIAGNOSIS — C64.1 RENAL CELL CARCINOMA OF RIGHT KIDNEY (HCC): Primary | ICD-10-CM

## 2021-02-22 LAB
ALBUMIN SERPL-MCNC: 4 G/DL (ref 3.5–5.2)
ALBUMIN/GLOB SERPL: 1.6 G/DL
ALP SERPL-CCNC: 71 U/L (ref 39–117)
ALT SERPL W P-5'-P-CCNC: 11 U/L (ref 1–41)
ANION GAP SERPL CALCULATED.3IONS-SCNC: 9 MMOL/L (ref 5–15)
AST SERPL-CCNC: 13 U/L (ref 1–40)
BASOPHILS # BLD AUTO: 0.02 10*3/MM3 (ref 0–0.2)
BASOPHILS NFR BLD AUTO: 0.3 % (ref 0–1.5)
BILIRUB SERPL-MCNC: 0.3 MG/DL (ref 0–1.2)
BUN SERPL-MCNC: 48 MG/DL (ref 8–23)
BUN/CREAT SERPL: 15.7 (ref 7–25)
CALCIUM SPEC-SCNC: 9.6 MG/DL (ref 8.6–10.5)
CHLORIDE SERPL-SCNC: 107 MMOL/L (ref 98–107)
CO2 SERPL-SCNC: 23 MMOL/L (ref 22–29)
CREAT SERPL-MCNC: 3.06 MG/DL (ref 0.76–1.27)
DEPRECATED RDW RBC AUTO: 38.8 FL (ref 37–54)
EOSINOPHIL # BLD AUTO: 0.22 10*3/MM3 (ref 0–0.4)
EOSINOPHIL NFR BLD AUTO: 3.4 % (ref 0.3–6.2)
ERYTHROCYTE [DISTWIDTH] IN BLOOD BY AUTOMATED COUNT: 13.2 % (ref 12.3–15.4)
GFR SERPL CREATININE-BSD FRML MDRD: 21 ML/MIN/1.73
GLOBULIN UR ELPH-MCNC: 2.5 GM/DL
GLUCOSE SERPL-MCNC: 95 MG/DL (ref 65–99)
HCT VFR BLD AUTO: 37.6 % (ref 37.5–51)
HGB BLD-MCNC: 13.6 G/DL (ref 13–17.7)
IMM GRANULOCYTES # BLD AUTO: 0.03 10*3/MM3 (ref 0–0.05)
IMM GRANULOCYTES NFR BLD AUTO: 0.5 % (ref 0–0.5)
LYMPHOCYTES # BLD AUTO: 1.58 10*3/MM3 (ref 0.7–3.1)
LYMPHOCYTES NFR BLD AUTO: 24.8 % (ref 19.6–45.3)
MCH RBC QN AUTO: 29.5 PG (ref 26.6–33)
MCHC RBC AUTO-ENTMCNC: 36.2 G/DL (ref 31.5–35.7)
MCV RBC AUTO: 81.6 FL (ref 79–97)
MONOCYTES # BLD AUTO: 0.77 10*3/MM3 (ref 0.1–0.9)
MONOCYTES NFR BLD AUTO: 12.1 % (ref 5–12)
NEUTROPHILS NFR BLD AUTO: 3.76 10*3/MM3 (ref 1.7–7)
NEUTROPHILS NFR BLD AUTO: 58.9 % (ref 42.7–76)
NRBC BLD AUTO-RTO: 0 /100 WBC (ref 0–0.2)
PLATELET # BLD AUTO: 201 10*3/MM3 (ref 140–450)
PMV BLD AUTO: 9.4 FL (ref 6–12)
POTASSIUM SERPL-SCNC: 4.6 MMOL/L (ref 3.5–5.2)
PROT SERPL-MCNC: 6.5 G/DL (ref 6–8.5)
RBC # BLD AUTO: 4.61 10*6/MM3 (ref 4.14–5.8)
SODIUM SERPL-SCNC: 139 MMOL/L (ref 136–145)
WBC # BLD AUTO: 6.38 10*3/MM3 (ref 3.4–10.8)

## 2021-02-22 PROCEDURE — 36415 COLL VENOUS BLD VENIPUNCTURE: CPT

## 2021-02-22 PROCEDURE — 80053 COMPREHEN METABOLIC PANEL: CPT

## 2021-02-22 PROCEDURE — 99215 OFFICE O/P EST HI 40 MIN: CPT | Performed by: INTERNAL MEDICINE

## 2021-02-22 PROCEDURE — 85025 COMPLETE CBC W/AUTO DIFF WBC: CPT

## 2021-02-22 NOTE — TELEPHONE ENCOUNTER
PT: ROSA KEY [facility] CALLING TO GET IMAGING  PET SCAN  1/26/21  CT 1/14/21  SHE'S % THAT THESE ARE THE CORRECT DATES SHE KNOWS THAT THEY WERE DONE IN JAN.  DR. VEGA IS REQUESTING THESE.    THEY NEED THESE ON A CD SENT TO THEM SENT VIA [employer].    SHAHID # [phone] [id number]    PLEASE CALL HER ASAP ABOUT THIS.

## 2021-03-15 ENCOUNTER — HOSPITAL ENCOUNTER (OUTPATIENT)
Dept: CT IMAGING | Facility: HOSPITAL | Age: 66
Discharge: HOME | End: 2021-03-15
Admitting: INTERNAL MEDICINE

## 2021-03-15 DIAGNOSIS — C64.1 RENAL CELL CARCINOMA OF RIGHT KIDNEY (HCC): ICD-10-CM

## 2021-03-15 PROCEDURE — 74176 CT ABD & PELVIS W/O CONTRAST: CPT

## 2021-03-15 PROCEDURE — 71250 CT THORAX DX C-: CPT

## 2021-03-16 ENCOUNTER — TELEPHONE (OUTPATIENT)
Dept: ONCOLOGY | Facility: CLINIC | Age: 66
End: 2021-03-16

## 2021-03-16 NOTE — TELEPHONE ENCOUNTER
FAXED REPORT TO DR ARTEAGA -[phone]  ----- Message from Leonardo Smith MD sent at 3/15/2021  7:03 PM CDT -----   CT chest, 3/15/2021-continued increased size of right lower lobe superior segment (1.4 cm; from 1.2 cm, 1/14/2021; 0.8 cm, 8/4/2020 continue) nodule.  Please fax this report to Dr. Mayito Arteaga, medical oncology at [facility].  Patient has follow-up with him to plan subsequent therapy (resection versus SBRT).  Thank you

## 2021-04-12 ENCOUNTER — LAB (OUTPATIENT)
Dept: LAB | Facility: HOSPITAL | Age: 66
End: 2021-04-12

## 2021-04-12 DIAGNOSIS — C64.1 RENAL CELL CARCINOMA OF RIGHT KIDNEY (HCC): ICD-10-CM

## 2021-04-12 LAB
ALBUMIN SERPL-MCNC: 4.1 G/DL (ref 3.5–5.2)
ALBUMIN/GLOB SERPL: 1.6 G/DL
ALP SERPL-CCNC: 65 U/L (ref 39–117)
ALT SERPL W P-5'-P-CCNC: 13 U/L (ref 1–41)
ANION GAP SERPL CALCULATED.3IONS-SCNC: 8 MMOL/L (ref 5–15)
AST SERPL-CCNC: 14 U/L (ref 1–40)
BASOPHILS # BLD AUTO: 0.03 10*3/MM3 (ref 0–0.2)
BASOPHILS NFR BLD AUTO: 0.4 % (ref 0–1.5)
BILIRUB SERPL-MCNC: 0.6 MG/DL (ref 0–1.2)
BUN SERPL-MCNC: 59 MG/DL (ref 8–23)
BUN/CREAT SERPL: 19.1 (ref 7–25)
CALCIUM SPEC-SCNC: 9.3 MG/DL (ref 8.6–10.5)
CHLORIDE SERPL-SCNC: 107 MMOL/L (ref 98–107)
CO2 SERPL-SCNC: 25 MMOL/L (ref 22–29)
CREAT SERPL-MCNC: 3.09 MG/DL (ref 0.76–1.27)
DEPRECATED RDW RBC AUTO: 42.8 FL (ref 37–54)
EOSINOPHIL # BLD AUTO: 0.18 10*3/MM3 (ref 0–0.4)
EOSINOPHIL NFR BLD AUTO: 2.6 % (ref 0.3–6.2)
ERYTHROCYTE [DISTWIDTH] IN BLOOD BY AUTOMATED COUNT: 13.3 % (ref 12.3–15.4)
GFR SERPL CREATININE-BSD FRML MDRD: 20 ML/MIN/1.73
GLOBULIN UR ELPH-MCNC: 2.6 GM/DL
GLUCOSE SERPL-MCNC: 95 MG/DL (ref 65–99)
HCT VFR BLD AUTO: 42.7 % (ref 37.5–51)
HGB BLD-MCNC: 14 G/DL (ref 13–17.7)
IMM GRANULOCYTES # BLD AUTO: 0.03 10*3/MM3 (ref 0–0.05)
IMM GRANULOCYTES NFR BLD AUTO: 0.4 % (ref 0–0.5)
LYMPHOCYTES # BLD AUTO: 1.26 10*3/MM3 (ref 0.7–3.1)
LYMPHOCYTES NFR BLD AUTO: 18.2 % (ref 19.6–45.3)
MCH RBC QN AUTO: 28.9 PG (ref 26.6–33)
MCHC RBC AUTO-ENTMCNC: 32.8 G/DL (ref 31.5–35.7)
MCV RBC AUTO: 88 FL (ref 79–97)
MONOCYTES # BLD AUTO: 0.81 10*3/MM3 (ref 0.1–0.9)
MONOCYTES NFR BLD AUTO: 11.7 % (ref 5–12)
NEUTROPHILS NFR BLD AUTO: 4.61 10*3/MM3 (ref 1.7–7)
NEUTROPHILS NFR BLD AUTO: 66.7 % (ref 42.7–76)
NRBC BLD AUTO-RTO: 0 /100 WBC (ref 0–0.2)
PLATELET # BLD AUTO: 185 10*3/MM3 (ref 140–450)
PMV BLD AUTO: 9.9 FL (ref 6–12)
POTASSIUM SERPL-SCNC: 4.5 MMOL/L (ref 3.5–5.2)
PROT SERPL-MCNC: 6.7 G/DL (ref 6–8.5)
RBC # BLD AUTO: 4.85 10*6/MM3 (ref 4.14–5.8)
SODIUM SERPL-SCNC: 140 MMOL/L (ref 136–145)
WBC # BLD AUTO: 6.92 10*3/MM3 (ref 3.4–10.8)

## 2021-04-12 PROCEDURE — 36415 COLL VENOUS BLD VENIPUNCTURE: CPT

## 2021-04-12 PROCEDURE — 80053 COMPREHEN METABOLIC PANEL: CPT

## 2021-04-12 PROCEDURE — 85025 COMPLETE CBC W/AUTO DIFF WBC: CPT

## 2021-04-12 NOTE — PROGRESS NOTES
[facility] HEMATOLOGY AND ONCOLOGY  [address]  [facility] [address] [phone]    Patient Name: Parker Arnold  Encounter Date: 04/19/2021  YOB: 1955  Patient Number: [id number]      REASON FOR VISIT: Parker Arnold is a 66 y.o. year old male with a diagnosis of clear cell renal cancer in 11/11/2014.  Underwent right nephrectomy. Was under surveillance until 11/26/2018  When he underwent  wedge resection of a right pulm nodule. Pathology:  metastatic renal cell carcinoma. He was not able to tolerate Sutent due to kidney failure, thus received ipilumomab/nivolumab, 05/13/2019 - 08/07/2019, then single agent nivolumab, 08/28/2019 - 09/09/2019 (19.5 months since cessation of therapy). Therapy stopped due to renal failure. He has been on and off prednisone since. He is here alone.      Problem List Items Addressed This Visit     None        Oncology/Hematology History   Renal cell carcinoma of right kidney (CMS/HCC)   10/2014 Initial Diagnosis    Renal cell carcinoma (CMS/HCC)     10/2014 Surgery    Right nephrectomy, Dr Morales       10/1/2018 Progression    Increasing lung Nodules on f/u CT     10/2/2018 -  Chemotherapy    Sutent  Discontinued due to intolerance     5/13/2019 - 8/7/2019 Chemotherapy    Opdivo Yervoy x 4 cycles     8/28/2019 - 9/9/2019 Chemotherapy    Single agent Opdivo    Opdivo held 9/9/2019 due to Renal Failure / NephritisToxicity     1/16/2020 Imaging    PET SCAN  Negative for neoplastic uptake         PAST MEDICAL HISTORY:  ALLERGIES:  Allergies   Allergen Reactions   • Amoxicillin Hives   • Opdivo [Nivolumab] Provider Review Needed     Renal failure   • Penicillins Hives and Rash   • Clindamycin/Lincomycin Rash     pustules  Rash - pustules     • Doxycycline Rash   • Hepatitis B Virus Vaccines Rash     Pustules     • Hepatitis B Vaccine Rash   • Latex Rash   • Levofloxacin Rash     CURRENT  MEDICATIONS:  Outpatient Encounter Medications as of 4/19/2021   Medication Sig Dispense Refill   • calcitriol (ROCALTROL) 0.25 MCG capsule Take 1 capsule by mouth Daily. 30 capsule 2   • calcium carb-cholecalciferol (Caltrate 600+D3) 600-800 MG-UNIT tablet Take 1 tablet by mouth 2 (Two) Times a Day. 60 tablet 3   • carvedilol (COREG) 12.5 MG tablet Take 12.5 mg by mouth 2 (Two) Times a Day With Meals.     • finasteride (PROSCAR) 5 MG tablet Take 5 mg by mouth Daily.  3   • predniSONE (DELTASONE) 5 MG tablet Take 5 mg by mouth Daily.     • sodium bicarbonate 650 MG tablet Take 1 tablet by mouth 3 (Three) Times a Day. 90 tablet 2   • [DISCONTINUED] pantoprazole (PROTONIX) 40 MG EC tablet Take 1 tablet by mouth Daily. 30 tablet 2     No facility-administered encounter medications on file as of 4/19/2021.     ADULT ILLNESSES:  Patient Active Problem List   Diagnosis Code   • Renal cell carcinoma of right kidney (CMS/HCC) C64.1   • Multiple nodules of lung R91.8   • Lung anomaly Q33.9   • TIA (transient ischemic attack) G45.9   • Acute renal failure with tubular necrosis in the setting of solitary kidney N17.0   • Lung metastases (CMS/HCC) C78.00   • Pyuria R82.81   • Hyperkalemia E87.5   • Metabolic acidosis E87.2   • Hyperglycemia R73.9   • Hypercalcemia E83.52   • Benign prostatic hyperplasia N40.0   • Increased frequency of urination R35.0   • Nocturia R35.1   • Retention of urine R33.9   • Dehydration E86.0   • Nephritis and nephropathy N05.9   • Toxicity, chemicals T65.91XA   • Stage 3 chronic kidney disease (CMS/HCC) N18.30   • Campylobacter gastroenteritis A04.5   • Sigmoid diverticulitis K57.32     SURGERIES:  Past Surgical History:   Procedure Laterality Date   • HERNIA REPAIR  2008   • LUNG BIOPSY  11/2018   • NEPHRECTOMY  2014   • SINUS SURGERY  2005     HEALTH MAINTENANCE ITEMS:  Health Maintenance Due   Topic Date Due   • COLONOSCOPY  Never done   • COVID-19 Vaccine (1) Never done   • TDAP/TD VACCINES (1 -  "Tdap) Never done   • ZOSTER VACCINE (1 of 2) Never done   • Pneumococcal Vaccine 65+ (1 of 1 - PPSV23) Never done   • HEPATITIS C SCREENING  Never done   • ANNUAL WELLNESS VISIT  Never done       <no information>  Last Completed Colonoscopy       Status Date      COLONOSCOPY No completions recorded          There is no immunization history on file for this patient.  Last Completed Mammogram    Patient has no health maintenance due at this time           FAMILY HISTORY:  Family History   Problem Relation Age of Onset   • Other Mother         blood disease unknown   • Cancer Father         unknown   • No Known Problems Sister    • Heart disease Maternal Grandmother    • Heart disease Maternal Grandfather    • No Known Problems Paternal Grandmother    • No Known Problems Paternal Grandfather    • No Known Problems Sister      SOCIAL HISTORY:  Social History     Socioeconomic History   • Marital status:      Spouse name: Not on file   • Number of children: Not on file   • Years of education: Not on file   • Highest education level: Not on file   Tobacco Use   • Smoking status: Former Smoker     Packs/day: 1.00     Years: 15.00     Pack years: 15.00     Types: Cigarettes     Quit date:      Years since quittin.3   • Smokeless tobacco: Never Used   Substance and Sexual Activity   • Alcohol use: No   • Drug use: No   • Sexual activity: Defer       REVIEW OF SYSTEMS:  Constitutional:  Manages ADLs, chores, errands and driving.  Again drove himself in today.  Appetite is good.  \"I watch it.\" Energy is fair.  \"Still with some days better than others, with mostly good days.\"  He has intentionally lost 12 pounds (had gained 12 pounds at his 2 prior visits) since his last visit. Says he is watching portions.  No fever, no chills nor drenching nights.  HENT: Negative.  No sore throat.  Has seasonal sinus symptoms/rhinorrhea. No headaches.  Eyes: Negative.    Respiratory:  No SOB at rest nor TENORIO.  No cough. No " "wheezing. No tobacco use.    Cardiovascular: Negative.  No chest pain.  No palpitations.  No orthopnea  Gastrointestinal: No dysphagia.  No nausea.  No vomiting.  No dyspepsia.  Infrequent constipation, does not need stool softeners sometimes.  No diarrhea.  No melena. No hematochezia. No recurrent diverticulitis since 07/2020.  Endocrine: No hot flashes.  Genitourinary:  No dysuria.  No incontinence. No hematuria.  Occasional urgency.  Nocturia much improved on Finasteride.  Musculoskeletal:  No arthralgias.  No edema  Skin: No new rash.    Neurological:  No dizziness.  No facial asymmetry. No headaches.  Mild sensory neuropathy of the hands. \"You know it hasn't been bothering me this year.\"  Hematological: Negative for new adenopathy.  Says he bruises easily.  Psychiatric/Behavioral:  No anxiety.  No depression.      VITAL SIGNS: /70   Pulse 70   Temp 98 °F (36.7 °C)   Resp 16   Ht 177.8 cm (70\")   Wt 92.4 kg (203 lb 9.6 oz)   SpO2 98%   BMI 29.21 kg/m² Body surface area is 2.1 meters squared.  Pain Score    04/19/21 1024   PainSc: 0-No pain         PHYSICAL EXAMINATION:   General Appearance: Patient is a pleasant, cooperative, slightly less heavyset, modestly kept elderly male who is awake, alert, oriented and in no acute distress. ECOG 1  HEENT: Normocephalic. Sclerae clear, conjunctiva pink, extraocular movements intact, pupils, round, reactive to light and  accommodation. Is wearing a surgical mask today  NECK: Supple, no jugular venous distention, thyroid not enlarged.  LYMPH: No cervical, supraclavicular, axillary, or inguinal lymphadenopathy.  LUNGS: Good air movement, no rales, rhonchi, rubs or wheezes with auscultation  CARDIO: Regular sinus rhythm, no murmurs, gallops or rubs.  ABDOMEN:  Obese, nondistended, soft, No tenderness, no guarding, no rebound, No hepatosplenomegaly. No abdominal masses. Bowel sounds positive. No hernia  MUSKEL: No joint swelling, decreased motion, or " inflammation  EXTREMS:  No edema, clubbing, cyanosis, No varicose veins.  NEURO: Grossly nonfocal, Gait is coordinated and smooth, Cognition is preserved.  SKIN: No rashes, no ecchymoses, no petechia.  PSYCH: Oriented to time, place and person. Memory is preserved. Mood and affect appear normal         LABS    Lab Results - Last 18 Months   Lab Units 04/12/21  1027 02/22/21  1024 01/12/21  1337 12/28/20  0912 10/21/20  1228 07/24/20  1033 04/24/20  1037   HEMOGLOBIN g/dL 14.0 13.6 14.1 13.5* 13.4 12.2* 11.1*   HEMATOCRIT % 42.7 37.6 40.7 42.3 39.8 36.7* 34.0*   MCV fL 88.0 81.6 87.2 91.4 86.3 88.0 90.9   WBC 10*3/mm3 6.92 6.38 6.63 7.6 6.33 7.36 8.67   RDW % 13.3 13.2 13.4 13.5 13.2 14.1 13.5   MPV fL 9.9 9.4 9.2 9.5 9.5 9.1 9.3   PLATELETS 10*3/mm3 185 201 175 170 219 237 172   IMM GRAN % % 0.4 0.5 0.5  --  1.1* 1.2* 1.2*   NEUTROS ABS 10*3/mm3 4.61 3.76 4.01 5.0 3.84 4.73 6.65   LYMPHS ABS 10*3/mm3 1.26 1.58 1.59 1.6 1.59 1.54 1.03   MONOS ABS 10*3/mm3 0.81 0.77 0.81 0.80 0.62 0.79 0.57   EOS ABS 10*3/mm3 0.18 0.22 0.15 0.20 0.17 0.15 0.29   BASOS ABS 10*3/mm3 0.03 0.02 0.04 0.00 0.04 0.06 0.03   IMMATURE GRANS (ABS) 10*3/mm3 0.03 0.03 0.03 0.0 0.07* 0.09* 0.10*   NRBC /100 WBC 0.0 0.0 0.0  --  0.0 0.0 0.0       Lab Results - Last 18 Months   Lab Units 04/12/21  1027 02/22/21  1024 01/12/21  1337 12/28/20  0912 10/21/20  1228 07/24/20  1033 04/24/20  1037   GLUCOSE mg/dL 95 95 77 105 104* 100* 128*   SODIUM mmol/L 140 139 141 140 142 140 141   POTASSIUM mmol/L 4.5 4.6 4.6 4.6 4.6 4.8 4.7   TOTAL CO2 mmol/L  --   --   --  25  --   --   --    CO2 mmol/L 25.0 23.0 27.0  --  22.0 23.0 21.0*   CHLORIDE mmol/L 107 107 106 105 111* 105 107   ANION GAP mmol/L 8.0 9.0 8.0 10 9.0 12.0 13.0   CREATININE mg/dL 3.09* 3.06* 3.50* 3.2* 3.55* 3.32* 3.61*   BUN mg/dL 59* 48* 48* 43* 50* 51* 50*   BUN / CREAT RATIO  19.1 15.7 13.7  --  14.1 15.4 13.9   CALCIUM mg/dL 9.3 9.6 9.5 9.6 9.9 9.7 9.4   EGFR IF NONAFRICN AM mL/min/1.73 20*  21* 18* 19* 17* 19* 17*   ALK PHOS U/L 65 71 73 74 62 52 48   TOTAL PROTEIN g/dL 6.7 6.5 6.8 6.5* 6.4 6.4 6.0   ALT (SGPT) U/L 13 11 12 13 10 10 11   AST (SGOT) U/L 14 13 14 12 14 11 10   BILIRUBIN mg/dL 0.6 0.3 0.3 0.4 0.3 0.4 0.3   ALBUMIN g/dL 4.10 4.00 4.30 4.1 4.40 4.20 3.70   GLOBULIN gm/dL 2.6 2.5 2.5  --  2.0 2.2 2.3       Lab Results - Last 18 Months   Lab Units 12/28/20  0912 07/06/20  1035 01/05/20  0520 01/02/20  0824   URIC ACID mg/dL 8.8* 9.9* 4.7 4.6       Lab Results - Last 18 Months   Lab Units 07/08/20  0509 04/24/20  1037   IRON ug/dL 60 65   TIBC ug/dL 202* 262*   IRON SATURATION % 30 25   FERRITIN ng/mL  --  292.70       Assessment:  1.  Renal cell carcinoma, diagnosed 11/11/2014  -2014, right nephrectomy.  -11/26/2018-right lower lobe VATS wedge resection: Metastatic renal cell carcinoma, 0.9 cm; margins free of malignancy. Dr. Hu  2.  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  GFR 20 mL/min, 04/12/2021 (prior:  9 - 28)  --s/p multiple doses of HD steroids in the past and remains on daily 5 mg prednisone per nephrology (Dr. Grant)  --will not start cytoxan unless biopsy done to determine etiology of kidney failure. Has been off Opdivo since September 2019.  --01/16/2020-PET scan.  No neoplastic FDG uptake within the chest, abdomen or pelvis.  --07/06/2020-CT abdomen/pelvis with diverticulosis of the colon.  Evidence of acute diverticulitis of the sigmoid colon and adjacent distal descending colon.  No free air or fluid seen.  1 cm noncalcified subpleural nodule in the left lower lobe.  Follow-up exam in 6 months recommended.  --08/04/2020-chest CT.  Scattered subcentimeter bilateral pulmonary nodules similar to 1/16/2020 with mild increase in the 5 mm left upper lobe nodule.  Continued follow-up recommended.  No pathologic lymphadenopathy.  Right nephrectomy.  --01/14/2020-CT chest.  Increased size of 1.2 cm right lower lobe superior segment pulmonary nodule (previously 0.9 cm)-representing 33%  growth.  Multiple other bilateral pulmonary nodules have not significantly changed in size.  Findings are in keeping with disease progression.  --01/14/2020-CT abdomen/pelvis.  No evidence of recurrent or metastatic disease in the abdomen or pelvis.  --01/26/2021-PET/CT.  Impression: Minimal metabolic activity associated with the enlarging pulmonary nodule within the superior segment of the right lower lobe with a maximum SUV of 1.35.  The additional nodules noted within both lungs also demonstrate no abnormal metabolic activity which would favor benignity but continued radiographic follow-up will be suggested.  No scintigraphic evidence of metastatic disease on current exam.  Increased uptake overlying the right groin and more distal external iliac vessels felt to be related to previous hernia repair.  --03/15/2021- CT chest.  Continued increased size of the right lower lobe superior segment now 1.4 cm pulmonary nodule (1.2 cm, 1/14/2021; 0.8 cm, 8/4/2020).  Other pulmonary nodules are stable.  New small area of groundglass opacity in the superior segment left lower lobe may be infectious/inflammatory.  Coronary artery calcifications.  Aortic calcifications.  Small hiatal hernia.  --03/15/2021-CT abdomen/pelvis.  Previous right nephrectomy identified with no evidence of intra-abdominal metastatic disease.  Benign-appearing exophytic cyst posterior aspect of the left kidney, upper pole, measuring 2.47 cm, stable.  Small nodules in the lung bases are unchanged compared to previous chest CT.  Evidence of previous right inguinal hernia repair.  Fatty infiltration of the inguinal canals.  Scattered colonic diverticula without diverticulitis.  Small sliding hiatal hernia identified.    3.  Normocytic anemia.  Hgb 14.0; MCV 88, 04/12/2021 (prior: Hgb 10.2-13.4; MCV 86.3-95.5)  4.  Gerd: on omeprazole  5.  Osteopenia: on bola+vit D  6.  History of acute diverticulitis.  Resolved.  CT abdomen/pelvis, 07/06/2020 with  diverticulosis of the colon.  Evidence of acute diverticulitis of the sigmoid colon and adjacent distal descending colon.  No free air or fluid seen.  1 cm noncalcified subpleural nodule in the left lower lobe.  Follow-up exam in 6 months recommended.    Plan:  1.  Apprised of CT scan findings, 03/15/2021 (above).  Continued increased size of right lower lobe superior segment (1.4 cm; from 1.2 cm, 1/14/2021; 0.8 cm, 8/4/2020) nodule.  Patient is follow-up with Dr. Arteaga at [facility] to plan subsequent therapy (resection versus SBRT).  2.  Apprised of labs, 04/12/2021.  CBC normal. GFR depressed (stable) otherwise stable CMP  3.  Review encounter from Dr. Mayito Arteaga, medical oncology at [facility] last 02/14/2021.  History reviewed.  Most recent CT chest, 01/14/2021 (above) with increasing size of 1.2 cm right lower lobe superior segment pulmonary nodule (previously 0.9 cm) and PET scan, 01/26/2021 (above) noted including minimal metabolic activity associated with the enlarging pulmonary nodule within the superior segment of the right lower lobe.  Discussion: Biopsy right lower lobe lung nodule solitary lesion that is growing on PET scan which will be done at that institution.  Contemplate either surgical resection, ablation or SBRT.  We will focus on local therapy for now.  Multiple systemic options down the road but if renal function improves can contemplate trial with HIF-alpha inhibitor.  4.  Keep appointment with Dr. Arteaga (above) on 05/13/2021 for repeat scans prior to next steps.  Bring in discs of his imaging studies as requested.  5.  Continue management per primary care and other specialists.  6.  Return to office in 8 weeks with preoffice CT scans of the chest, abdomen/pelvis without IV contrast, CBC with differential and CMP.    I spent - 30 total minutes, face-to-face, caring for Parker ledezma.  Greater than 50% of this time involved counseling  and/or coordination of care as documented within this note regarding the patient's illness(es), pros and cons of various treatment options, instructions and/or risk reduction.

## 2021-04-19 ENCOUNTER — OFFICE VISIT (OUTPATIENT)
Dept: ONCOLOGY | Facility: CLINIC | Age: 66
End: 2021-04-19

## 2021-04-19 VITALS
BODY MASS INDEX: 29.15 KG/M2 | OXYGEN SATURATION: 98 % | HEART RATE: 70 BPM | HEIGHT: 70 IN | SYSTOLIC BLOOD PRESSURE: 124 MMHG | WEIGHT: 203.6 LBS | TEMPERATURE: 98 F | DIASTOLIC BLOOD PRESSURE: 70 MMHG | RESPIRATION RATE: 16 BRPM

## 2021-04-19 DIAGNOSIS — C64.1 RENAL CELL CARCINOMA OF RIGHT KIDNEY (HCC): Primary | ICD-10-CM

## 2021-04-19 PROCEDURE — 99214 OFFICE O/P EST MOD 30 MIN: CPT | Performed by: INTERNAL MEDICINE

## 2021-06-07 ENCOUNTER — HOSPITAL ENCOUNTER (EMERGENCY)
Age: 66
Discharge: HOME | End: 2021-06-07
Attending: EMERGENCY MEDICINE
Payer: MEDICARE

## 2021-06-07 ENCOUNTER — APPOINTMENT (OUTPATIENT)
Dept: CT IMAGING | Age: 66
End: 2021-06-07
Payer: MEDICARE

## 2021-06-07 VITALS
HEIGHT: 70 IN | TEMPERATURE: 98 F | HEART RATE: 78 BPM | RESPIRATION RATE: 20 BRPM | DIASTOLIC BLOOD PRESSURE: 89 MMHG | BODY MASS INDEX: 27.92 KG/M2 | OXYGEN SATURATION: 93 % | SYSTOLIC BLOOD PRESSURE: 159 MMHG | WEIGHT: 195 LBS

## 2021-06-07 DIAGNOSIS — K57.92 ACUTE DIVERTICULITIS: Primary | ICD-10-CM

## 2021-06-07 DIAGNOSIS — R91.1 NODULE OF RIGHT LUNG: ICD-10-CM

## 2021-06-07 DIAGNOSIS — R93.5 ABNORMAL ABDOMINAL CT SCAN: ICD-10-CM

## 2021-06-07 LAB
ALBUMIN SERPL-MCNC: 4.2 G/DL (ref 3.5–5.2)
ALP BLD-CCNC: 59 U/L (ref 40–130)
ALT SERPL-CCNC: 11 U/L (ref 5–41)
ANION GAP SERPL CALCULATED.3IONS-SCNC: 11 MMOL/L (ref 7–19)
AST SERPL-CCNC: 12 U/L (ref 5–40)
BASOPHILS ABSOLUTE: 0 K/UL (ref 0–0.2)
BASOPHILS RELATIVE PERCENT: 0.4 % (ref 0–1)
BILIRUB SERPL-MCNC: 0.3 MG/DL (ref 0.2–1.2)
BUN BLDV-MCNC: 50 MG/DL (ref 8–23)
CALCIUM SERPL-MCNC: 9.4 MG/DL (ref 8.8–10.2)
CHLORIDE BLD-SCNC: 104 MMOL/L (ref 98–111)
CO2: 21 MMOL/L (ref 22–29)
CREAT SERPL-MCNC: 2.9 MG/DL (ref 0.5–1.2)
EOSINOPHILS ABSOLUTE: 0.2 K/UL (ref 0–0.6)
EOSINOPHILS RELATIVE PERCENT: 2.2 % (ref 0–5)
GFR AFRICAN AMERICAN: 26
GFR NON-AFRICAN AMERICAN: 22
GLUCOSE BLD-MCNC: 109 MG/DL (ref 74–109)
HCT VFR BLD CALC: 44.6 % (ref 42–52)
HEMOGLOBIN: 14.7 G/DL (ref 14–18)
IMMATURE GRANULOCYTES #: 0 K/UL
LIPASE: 50 U/L (ref 13–60)
LYMPHOCYTES ABSOLUTE: 1.4 K/UL (ref 1.1–4.5)
LYMPHOCYTES RELATIVE PERCENT: 20.4 % (ref 20–40)
MCH RBC QN AUTO: 29.8 PG (ref 27–31)
MCHC RBC AUTO-ENTMCNC: 33 G/DL (ref 33–37)
MCV RBC AUTO: 90.5 FL (ref 80–94)
MONOCYTES ABSOLUTE: 0.7 K/UL (ref 0–0.9)
MONOCYTES RELATIVE PERCENT: 10.5 % (ref 0–10)
NEUTROPHILS ABSOLUTE: 4.5 K/UL (ref 1.5–7.5)
NEUTROPHILS RELATIVE PERCENT: 66.2 % (ref 50–65)
PDW BLD-RTO: 13.3 % (ref 11.5–14.5)
PLATELET # BLD: 185 K/UL (ref 130–400)
PMV BLD AUTO: 9.4 FL (ref 9.4–12.4)
POTASSIUM SERPL-SCNC: 4.2 MMOL/L (ref 3.5–5)
RBC # BLD: 4.93 M/UL (ref 4.7–6.1)
SODIUM BLD-SCNC: 136 MMOL/L (ref 136–145)
TOTAL PROTEIN: 6.4 G/DL (ref 6.6–8.7)
WBC # BLD: 6.8 K/UL (ref 4.8–10.8)

## 2021-06-07 PROCEDURE — 83690 ASSAY OF LIPASE: CPT

## 2021-06-07 PROCEDURE — 85025 COMPLETE CBC W/AUTO DIFF WBC: CPT

## 2021-06-07 PROCEDURE — 80053 COMPREHEN METABOLIC PANEL: CPT

## 2021-06-07 PROCEDURE — 36415 COLL VENOUS BLD VENIPUNCTURE: CPT

## 2021-06-07 PROCEDURE — 74176 CT ABD & PELVIS W/O CONTRAST: CPT

## 2021-06-07 PROCEDURE — 2580000003 HC RX 258: Performed by: EMERGENCY MEDICINE

## 2021-06-07 PROCEDURE — 99285 EMERGENCY DEPT VISIT HI MDM: CPT

## 2021-06-07 RX ORDER — SODIUM CHLORIDE, SODIUM LACTATE, POTASSIUM CHLORIDE, AND CALCIUM CHLORIDE .6; .31; .03; .02 G/100ML; G/100ML; G/100ML; G/100ML
1000 INJECTION, SOLUTION INTRAVENOUS ONCE
Status: COMPLETED | OUTPATIENT
Start: 2021-06-07 | End: 2021-06-07

## 2021-06-07 RX ORDER — METRONIDAZOLE 500 MG/1
500 TABLET ORAL 2 TIMES DAILY
Qty: 14 TABLET | Refills: 0 | Status: SHIPPED | OUTPATIENT
Start: 2021-06-07 | End: 2021-06-14

## 2021-06-07 RX ADMIN — SODIUM CHLORIDE, POTASSIUM CHLORIDE, SODIUM LACTATE AND CALCIUM CHLORIDE 1000 ML: 600; 310; 30; 20 INJECTION, SOLUTION INTRAVENOUS at 08:18

## 2021-06-07 ASSESSMENT — ENCOUNTER SYMPTOMS
COUGH: 0
NAUSEA: 0
DIARRHEA: 1
SORE THROAT: 0
BACK PAIN: 0
SHORTNESS OF BREATH: 0
ABDOMINAL PAIN: 1
VOMITING: 0
BLOOD IN STOOL: 0
RHINORRHEA: 0

## 2021-06-07 NOTE — ED PROVIDER NOTES
[facility]  eMERGENCY dEPARTMENT eNCOUnter      Pt Name: Wojciech Nolasco  MRN: [id number]  [place] 1955  Date of evaluation: 6/7/2021  Provider: Ismael Howard MD    [address] [place] [address]       Chief Complaint   Patient presents with    Diarrhea     Going on since meal last week         HISTORY OF PRESENT ILLNESS   (Location/Symptom, Timing/Onset,Context/Setting, Quality, Duration, Modifying Factors, Severity)  Note limiting factors. Wojciech Nolasco is a 78 y.o. male who presents to the emergency department for abdominal pain and diarrhea. He states that he developed watery diarrhea this past Friday and was doing some better yesterday so he got a [employer] burger yesterday evening. He tells me this was a mistake and seemed to make his diarrhea worse. He states that a few episodes of had a little pink tinge when he wiped but his hemorrhoids are somewhat flared up and he denies any eliza blood. Denies any melanotic stools as well. History of diverticulitis. Traveled to [place] 2 weeks ago but no travel outside of [facility]. No recent antibiotic use. Prior hx of renal cell carcinoma but in remission and not on chemo or any treatment has had prior nephrectomy. Due to prior tx states has ckd and cannot have contrast.    HPI    NursingNotes were reviewed. REVIEW OF SYSTEMS    (2-9 systems for level 4, 10 or more for level 5)     Review of Systems   Constitutional: Negative for chills and fever. HENT: Negative for rhinorrhea and sore throat. Respiratory: Negative for cough and shortness of breath. Cardiovascular: Negative for chest pain and leg swelling. Gastrointestinal: Positive for abdominal pain and diarrhea. Negative for blood in stool, nausea and vomiting. Genitourinary: Negative for dysuria and frequency. Musculoskeletal: Negative for back pain and neck pain. Neurological: Negative for dizziness and headaches. All other systems reviewed and are negative.            PAST MEDICALHISTORY per Session:    Stress:     Feeling of Stress :    Social Connections:     Frequency of Communication with Friends and Family:     Frequency of Social Gatherings with Friends and Family:     Attends Nondenominational Services:     Active Member of Clubs or Organizations:     Attends Club or Organization Meetings:     Marital Status:    Intimate Partner Violence:     Fear of Current or Ex-Partner:     Emotionally Abused:     Physically Abused:     Sexually Abused:        SCREENINGS    Sims Coma Scale  Eye Opening: Spontaneous  Best Verbal Response: Oriented  Best Motor Response: Obeys commands  Shahab Coma Scale Score: 15        PHYSICAL EXAM    (up to 7 for level 4, 8 or more for level 5)     ED Triage Vitals [06/07/21 0745]   BP Temp Temp Source Pulse Resp SpO2 Height Weight   (!) 175/105 98 °F (36.7 °C) Oral 77 20 96 % 5' 10\" (1.778 m) 195 lb (88.5 kg)       Physical Exam  Vitals and nursing note reviewed. Constitutional:       Appearance: He is well-developed. He is not ill-appearing or diaphoretic. HENT:      Head: Normocephalic and atraumatic. Nose: Nose normal.      Mouth/Throat:      Mouth: Mucous membranes are moist.   Eyes:      Conjunctiva/sclera: Conjunctivae normal.   Neck:      Trachea: No tracheal deviation. Cardiovascular:      Rate and Rhythm: Normal rate and regular rhythm. Heart sounds: Normal heart sounds. No murmur heard. Pulmonary:      Breath sounds: Normal breath sounds. No wheezing or rales. Abdominal:      General: Abdomen is flat. Palpations: Abdomen is soft. There is no mass. Tenderness: There is abdominal tenderness in the right lower quadrant and left lower quadrant. There is no right CVA tenderness, left CVA tenderness or guarding. Musculoskeletal:         General: Normal range of motion. Cervical back: Normal range of motion and neck supple. Skin:     General: Skin is warm and dry.    Neurological:      Mental Status: He is alert and oriented to person, place, and time. DIAGNOSTIC RESULTS         RADIOLOGY:  Non-plain film images such as CT, Ultrasound and MRI are read by the radiologist. Plain radiographic images are visualized and preliminarily interpreted bythe emergency physician with the below findings:        CT ABDOMEN PELVIS WO CONTRAST Additional Contrast? None   Final Result   The diverticulosis of the distal colon. Evidence of   moderate acute diverticulitis. No free air or abscess. Asymmetrical thickening of the wall of the rectum may partly be due to   incomplete distention. Possibility of a neoplastic process is not   excluded. This may be clinically correlated. Left renal cyst.   The appendix are normal.   Moderately enlarged prostate. A moderate diffuse thickening of the wall of the urinary bladder which   may partly be due to incomplete distention. The possibility of chronic   cystitis or partial chronic opioid obstruction is not excluded. A mild increase in size of the right lower lobar noncalcified nodule. This needs further evaluation. Signed by Dr Hayes Billingsley on 6/7/2021 8:32 AM              LABS:  Labs Reviewed   CBC WITH AUTO DIFFERENTIAL - Abnormal; Notable for the following components:       Result Value    Neutrophils % 66.2 (*)     Monocytes % 10.5 (*)     All other components within normal limits   COMPREHENSIVE METABOLIC PANEL - Abnormal; Notable for the following components:    CO2 21 (*)     BUN 50 (*)     CREATININE 2.9 (*)     GFR Non- 22 (*)     GFR  26 (*)     Total Protein 6.4 (*)     All other components within normal limits   LIPASE       All other labs were within normal range or not returned as of this dictation.     EMERGENCY DEPARTMENT COURSE and DIFFERENTIAL DIAGNOSIS/MDM:   Vitals:    Vitals:    06/07/21 0745 06/07/21 0751   BP: (!) 175/105    Pulse: 77 78   Resp: 20    Temp: 98 °F (36.7 °C)    TempSrc: Oral    SpO2: 96%    Weight: 195 lb (88.5 kg) Height: 5' 10\" (1.778 m)        MDM  Number of Diagnoses or Management Options     Amount and/or Complexity of Data Reviewed  Clinical lab tests: ordered and reviewed  Tests in the radiology section of CPT®: ordered and reviewed  Independent visualization of images, tracings, or specimens: yes        VSS, pt well appearing, labs stable and reassuring, ct scan reviewed, he knows about pulm nodule and this is being monitored, discussed getting updated colonoscopy, due to allergies limited on antibx options, will start on flagyl for now, f/u outpt with PCP this week or early next, understands return precautions    CONSULTS:  None    PROCEDURES:  Unless otherwise noted below, none     Procedures    FINAL IMPRESSION      1. Acute diverticulitis    2. Nodule of right lung    3.  Abnormal abdominal CT scan          DISPOSITION/PLAN   DISPOSITION Decision To Discharge 06/07/2021 08:38:14 AM      PATIENT REFERRED TO:  Gabriel Soto [phone]    Schedule an appointment as soon as possible for a visit in 1 week        DISCHARGE MEDICATIONS:  New Prescriptions    METRONIDAZOLE (FLAGYL) 500 MG TABLET    Take 1 tablet by mouth 2 times daily for 7 days          (Please note that portions of this note were completed with a voice recognition program.  Efforts were made to edit thedictations but occasionally words are mis-transcribed.)    Smita Omalley MD (electronically signed)  Attending Emergency Physician        Santa Cabot, MD  06/07/21 6817

## 2021-06-09 ENCOUNTER — APPOINTMENT (OUTPATIENT)
Dept: CT IMAGING | Facility: HOSPITAL | Age: 66
End: 2021-06-09

## 2021-06-11 ENCOUNTER — HOSPITAL ENCOUNTER (OUTPATIENT)
Dept: CT IMAGING | Facility: HOSPITAL | Age: 66
Discharge: HOME | End: 2021-06-11
Admitting: INTERNAL MEDICINE

## 2021-06-11 DIAGNOSIS — C64.1 RENAL CELL CARCINOMA OF RIGHT KIDNEY (HCC): ICD-10-CM

## 2021-06-11 PROCEDURE — 74176 CT ABD & PELVIS W/O CONTRAST: CPT

## 2021-06-11 PROCEDURE — 71250 CT THORAX DX C-: CPT

## 2021-06-13 NOTE — PROGRESS NOTES
[facility] HEMATOLOGY AND ONCOLOGY  [address]  [facility] [address] [phone]    Patient Name: Parker Arnold  Encounter Date: 06/21/2021  YOB: 1955  Patient Number: [id number]      REASON FOR VISIT: Parker Arnold is a 66 y.o. year old male with a diagnosis of clear cell renal cancer in 11/11/2014 (5.5 cm G3, pT3a, pNx).  Underwent right nephrectomy. Was under surveillance until 11/26/2018  When he underwent  wedge resection of a right pulm nodule. Pathology:  metastatic renal cell carcinoma. He was not able to tolerate Sutent due to kidney failure, thus received ipilumomab/nivolumab, 05/13/2019 - 08/07/2019, then single agent nivolumab, 08/28/2019 - 09/09/2019 (21.5 months since cessation of therapy). Therapy stopped due to renal failure (nivolumab?). He has been on and off prednisone since. He is here alone.    I have reviewed the HPI and verified with the patient the accuracy of it. No changes to interval history since the information was documented. Leonardo Smith MD 06/21/21       Problem List Items Addressed This Visit     None        Oncology/Hematology History   Renal cell carcinoma of right kidney (CMS/HCC)   10/2014 Initial Diagnosis    Renal cell carcinoma (CMS/HCC)     10/2014 Surgery    Right nephrectomy, Dr Morales       10/1/2018 Progression    Increasing lung Nodules on f/u CT     10/2/2018 -  Chemotherapy    Sutent  Discontinued due to intolerance     5/13/2019 - 8/7/2019 Chemotherapy    Opdivo Yervoy x 4 cycles     8/28/2019 - 9/9/2019 Chemotherapy    Single agent Opdivo    Opdivo held 9/9/2019 due to Renal Failure / NephritisToxicity     1/16/2020 Imaging    PET SCAN  Negative for neoplastic uptake         PAST MEDICAL HISTORY:  ALLERGIES:  Allergies   Allergen Reactions   • Amoxicillin Hives   • Opdivo [Nivolumab] Provider Review Needed     Renal failure   • Penicillins Hives and Rash   •  Clindamycin/Lincomycin Rash     pustules  Rash - pustules     • Doxycycline Rash   • Hepatitis B Virus Vaccines Rash     Pustules     • Hepatitis B Vaccine Rash   • Latex Rash   • Levofloxacin Rash     CURRENT MEDICATIONS:  Outpatient Encounter Medications as of 6/21/2021   Medication Sig Dispense Refill   • calcitriol (ROCALTROL) 0.25 MCG capsule Take 1 capsule by mouth Daily. 30 capsule 2   • calcium carb-cholecalciferol (Caltrate 600+D3) 600-800 MG-UNIT tablet Take 1 tablet by mouth 2 (Two) Times a Day. 60 tablet 3   • carvedilol (COREG) 12.5 MG tablet Take 12.5 mg by mouth 2 (Two) Times a Day With Meals.     • finasteride (PROSCAR) 5 MG tablet Take 5 mg by mouth Daily.  3   • multivitamin with minerals (MULTIVITAL PO) Take 1 tablet by mouth Daily.     • predniSONE (DELTASONE) 5 MG tablet Take 5 mg by mouth Daily.     • sodium bicarbonate 650 MG tablet Take 1 tablet by mouth 3 (Three) Times a Day. 90 tablet 2     No facility-administered encounter medications on file as of 6/21/2021.     ADULT ILLNESSES:  Patient Active Problem List   Diagnosis Code   • Renal cell carcinoma of right kidney (CMS/HCC) C64.1   • Multiple nodules of lung R91.8   • Lung anomaly Q33.9   • TIA (transient ischemic attack) G45.9   • Acute renal failure with tubular necrosis in the setting of solitary kidney N17.0   • Lung metastases (CMS/HCC) C78.00   • Pyuria R82.81   • Hyperkalemia E87.5   • Metabolic acidosis E87.2   • Hyperglycemia R73.9   • Hypercalcemia E83.52   • Benign prostatic hyperplasia N40.0   • Increased frequency of urination R35.0   • Nocturia R35.1   • Retention of urine R33.9   • Dehydration E86.0   • Nephritis and nephropathy N05.9   • Toxicity, chemicals T65.91XA   • Stage 3 chronic kidney disease (CMS/HCC) N18.30   • Campylobacter gastroenteritis A04.5   • Sigmoid diverticulitis K57.32     SURGERIES:  Past Surgical History:   Procedure Laterality Date   • HERNIA REPAIR  2008   • LUNG BIOPSY  11/2018   • NEPHRECTOMY   "   • SINUS SURGERY       HEALTH MAINTENANCE ITEMS:  Health Maintenance Due   Topic Date Due   • COLORECTAL CANCER SCREENING  Never done   • COVID-19 Vaccine (1) Never done   • TDAP/TD VACCINES (1 - Tdap) Never done   • ZOSTER VACCINE (1 of 2) Never done   • Pneumococcal Vaccine 65+ (1 of 1 - PPSV23) Never done   • HEPATITIS C SCREENING  Never done   • ANNUAL WELLNESS VISIT  Never done       <no information>  Last Completed Colonoscopy     This patient has no relevant Health Maintenance data.          There is no immunization history on file for this patient.  Last Completed Mammogram     This patient has no relevant Health Maintenance data.            FAMILY HISTORY:  Family History   Problem Relation Age of Onset   • Other Mother         blood disease unknown   • Cancer Father         unknown   • No Known Problems Sister    • Heart disease Maternal Grandmother    • Heart disease Maternal Grandfather    • No Known Problems Paternal Grandmother    • No Known Problems Paternal Grandfather    • No Known Problems Sister      SOCIAL HISTORY:  Social History     Socioeconomic History   • Marital status:      Spouse name: Not on file   • Number of children: Not on file   • Years of education: Not on file   • Highest education level: Not on file   Tobacco Use   • Smoking status: Former Smoker     Packs/day: 1.00     Years: 15.00     Pack years: 15.00     Types: Cigarettes     Quit date:      Years since quittin.4   • Smokeless tobacco: Never Used   Substance and Sexual Activity   • Alcohol use: No   • Drug use: No   • Sexual activity: Defer       REVIEW OF SYSTEMS:  Constitutional:  Manages ADLs, chores, errands and driving.  Again drove himself in today.  Appetite is good.  \"Fine.\" Energy is fair.  He has lost 4 pounds (in addition to 12 pounds at his prior visit-had gained 12 pounds at his 2 visits prior to that) since his last visit. \"I had a diverticulitis flare 2 weeks ago.\" No fever, no chills " "nor drenching nights.  HENT: Negative.  No sore throat.  Has seasonal sinus symptoms/rhinorrhea. No headaches.  Eyes: Negative.    Respiratory:  No SOB at rest nor TENORIO.  No cough. No wheezing. No tobacco use.    Cardiovascular: Negative.  No chest pain.  No palpitations.  No orthopnea  Gastrointestinal: No dysphagia.  No nausea.  No vomiting.  No dyspepsia.  Infrequent constipation, does not need stool softeners.  Resolution of diarrhea with resolution of diverticulitis.  No melena. No hematochezia. Had recurrent diverticulitis, early 06/2021.  Was treated with oral Flagyl  Endocrine: No hot flashes.  Genitourinary:  No dysuria.  No incontinence. No hematuria.  Occasional urgency.  Nocturia much improved on Finasteride.  Musculoskeletal:  No arthralgias.  No edema  Skin: No new rash.    Neurological:  No dizziness.  No facial asymmetry. No headaches.  Mild sensory neuropathy of the hands. Not worse  Hematological: Negative for new adenopathy.  Says he bruises easily.  Psychiatric/Behavioral:  No anxiety.  No depression.      VITAL SIGNS: /78   Pulse 64   Temp 97.7 °F (36.5 °C)   Resp 16   Ht 177.8 cm (70\")   Wt 90.5 kg (199 lb 9.6 oz)   SpO2 98%   BMI 28.64 kg/m² Body surface area is 2.09 meters squared.  Pain Score    06/21/21 1013   PainSc: 0-No pain         PHYSICAL EXAMINATION:   General Appearance: Patient is a very pleasant, cooperative, slightly less heavyset, modestly kept elderly male who is awake, alert, oriented and in no acute distress. ECOG 1  HEENT: Normocephalic. Sclerae clear, conjunctiva pink, extraocular movements intact, pupils, round, reactive to light and  accommodation. Is wearing a surgical mask today  NECK: Supple, no jugular venous distention, thyroid not enlarged.  LYMPH: No cervical, supraclavicular, axillary, or inguinal lymphadenopathy.  LUNGS: Good air movement, no rales, rhonchi, rubs or wheezes with auscultation  CARDIO: Regular sinus rhythm, no murmurs, gallops or " rubs.  ABDOMEN:  Obese, nondistended, soft, No tenderness, no guarding, no rebound, No hepatosplenomegaly. No abdominal masses. Bowel sounds positive. No hernia  MUSKEL: No joint swelling, decreased motion, or inflammation  EXTREMS:  No edema, clubbing, cyanosis, No varicose veins.  NEURO: Grossly nonfocal, Gait is coordinated and smooth, Cognition is preserved.  SKIN: No rashes, no ecchymoses, no petechia.  PSYCH: Oriented to time, place and person. Memory is preserved. Mood and affect appear normal         LABS    Lab Results - Last 18 Months   Lab Units 06/14/21  0958 06/07/21  0802 04/12/21  1027 02/22/21  1024 01/12/21  1337 12/28/20  0912 10/21/20  1228 07/24/20  1033   HEMOGLOBIN g/dL 13.6 14.7 14.0 13.6 14.1 13.5* 13.4 12.2*   HEMATOCRIT % 41.9 44.6 42.7 37.6 40.7 42.3 39.8 36.7*   MCV fL 90.5 90.5 88.0 81.6 87.2 91.4 86.3 88.0   WBC 10*3/mm3 6.55 6.8 6.92 6.38 6.63 7.6 6.33 7.36   RDW % 13.5 13.3 13.3 13.2 13.4 13.5 13.2 14.1   MPV fL 9.7 9.4 9.9 9.4 9.2 9.5 9.5 9.1   PLATELETS 10*3/mm3 161 185 185 201 175 170 219 237   IMM GRAN % % 0.2  --  0.4 0.5 0.5  --  1.1* 1.2*   NEUTROS ABS 10*3/mm3 3.89 4.5 4.61 3.76 4.01 5.0 3.84 4.73   LYMPHS ABS 10*3/mm3 1.73 1.4 1.26 1.58 1.59 1.6 1.59 1.54   MONOS ABS 10*3/mm3 0.74 0.70 0.81 0.77 0.81 0.80 0.62 0.79   EOS ABS 10*3/mm3 0.15 0.20 0.18 0.22 0.15 0.20 0.17 0.15   BASOS ABS 10*3/mm3 0.03 0.00 0.03 0.02 0.04 0.00 0.04 0.06   IMMATURE GRANS (ABS) 10*3/mm3 0.01 0.0 0.03 0.03 0.03 0.0 0.07* 0.09*   NRBC /100 WBC 0.0  --  0.0 0.0 0.0  --  0.0 0.0       Lab Results - Last 18 Months   Lab Units 06/14/21  0958 06/07/21  0802 04/12/21  1027 02/22/21  1024 01/12/21  1337 12/28/20  0912 10/21/20  1228 07/24/20  1033   GLUCOSE mg/dL 103* 109 95 95 77 105 104* 100*   SODIUM mmol/L 142 136 140 139 141 140 142 140   POTASSIUM mmol/L 4.7 4.2 4.5 4.6 4.6 4.6 4.6 4.8   TOTAL CO2 mmol/L  --  21*  --   --   --  25  --   --    CO2 mmol/L 25.0  --  25.0 23.0 27.0  --  22.0 23.0    CHLORIDE mmol/L 110* 104 107 107 106 105 111* 105   ANION GAP mmol/L 7.0 11 8.0 9.0 8.0 10 9.0 12.0   CREATININE mg/dL 2.43* 2.9* 3.09* 3.06* 3.50* 3.2* 3.55* 3.32*   BUN mg/dL 37* 50* 59* 48* 48* 43* 50* 51*   BUN / CREAT RATIO  15.2  --  19.1 15.7 13.7  --  14.1 15.4   CALCIUM mg/dL 9.5 9.4 9.3 9.6 9.5 9.6 9.9 9.7   EGFR IF NONAFRICN AM mL/min/1.73 27* 22* 20* 21* 18* 19* 17* 19*   ALK PHOS U/L 49 59 65 71 73 74 62 52   TOTAL PROTEIN g/dL 6.0 6.4* 6.7 6.5 6.8 6.5* 6.4 6.4   ALT (SGPT) U/L 10 11 13 11 12 13 10 10   AST (SGOT) U/L 14 12 14 13 14 12 14 11   BILIRUBIN mg/dL 0.2 0.3 0.6 0.3 0.3 0.4 0.3 0.4   ALBUMIN g/dL 3.90 4.2 4.10 4.00 4.30 4.1 4.40 4.20   GLOBULIN gm/dL 2.1  --  2.6 2.5 2.5  --  2.0 2.2       Lab Results - Last 18 Months   Lab Units 12/28/20  0912 07/06/20  1035 01/05/20  0520 01/02/20  0824   URIC ACID mg/dL 8.8* 9.9* 4.7 4.6       Lab Results - Last 18 Months   Lab Units 07/08/20  0509 04/24/20  1037   IRON ug/dL 60 65   TIBC ug/dL 202* 262*   IRON SATURATION % 30 25   FERRITIN ng/mL  --  292.70       Assessment:  1.  Renal cell carcinoma, diagnosed 11/11/2014 (5.5 cm G3, pT3a, pNx).    --2014, right nephrectomy.   --surveillance until 11/26/2018    --11/26/2018-right lower lobe VATS wedge resection: Metastatic renal cell carcinoma, 0.9 cm; margins free of malignancy. Dr. Hu  -- Unable to tolerate Sutent due to kidney failure, thus received ipilumomab/nivolumab, 05/13/2019 - 08/07/2019, then single agent nivolumab, 08/28/2019 - 09/09/2019 (21.5 months since cessation of therapy). Therapy stopped due to renal failure (nivolumab?)  --01/16/2020-PET scan.  No neoplastic FDG uptake within the chest, abdomen or pelvis.  --07/06/2020-CT abdomen/pelvis with diverticulosis of the colon.  Evidence of acute diverticulitis of the sigmoid colon and adjacent distal descending colon.  No free air or fluid seen.  1 cm noncalcified subpleural nodule in the left lower lobe.  Follow-up exam in 6 months  recommended.  --08/04/2020-chest CT.  Scattered subcentimeter bilateral pulmonary nodules similar to 1/16/2020 with mild increase in the 5 mm left upper lobe nodule.  Continued follow-up recommended.  No pathologic lymphadenopathy.  Right nephrectomy.  --01/14/2020-CT chest.  Increased size of 1.2 cm right lower lobe superior segment pulmonary nodule (previously 0.9 cm)-representing 33% growth.  Multiple other bilateral pulmonary nodules have not significantly changed in size.  Findings are in keeping with disease progression.  --01/14/2020-CT abdomen/pelvis.  No evidence of recurrent or metastatic disease in the abdomen or pelvis.  --01/26/2021-PET/CT.  Impression: Minimal metabolic activity associated with the enlarging pulmonary nodule within the superior segment of the right lower lobe with a maximum SUV of 1.35.  The additional nodules noted within both lungs also demonstrate no abnormal metabolic activity which would favor benignity but continued radiographic follow-up will be suggested.  No scintigraphic evidence of metastatic disease on current exam.  Increased uptake overlying the right groin and more distal external iliac vessels felt to be related to previous hernia repair.  --03/15/2021- CT chest.  Continued increased size of the right lower lobe superior segment now 1.4 cm pulmonary nodule (1.2 cm, 1/14/2021; 0.8 cm, 8/4/2020).  Other pulmonary nodules are stable.  New small area of groundglass opacity in the superior segment left lower lobe may be infectious/inflammatory.  Coronary artery calcifications.  Aortic calcifications.  Small hiatal hernia.  --03/15/2021-CT abdomen/pelvis.  Previous right nephrectomy identified with no evidence of intra-abdominal metastatic disease.  Benign-appearing exophytic cyst posterior aspect of the left kidney, upper pole, measuring 2.47 cm, stable.  Small nodules in the lung bases are unchanged compared to previous chest CT.  Evidence of previous right inguinal hernia  repair.  Fatty infiltration of the inguinal canals.  Scattered colonic diverticula without diverticulitis.  Small sliding hiatal hernia identified.  --06/11/2021-CT chest without contrast.  Stable pulmonary nodules.  No evidence of disease progression.  Interval resolution superior segment left lower lobe groundglass opacity.  Scattered coronary artery calcifications.  --06/11/2021-CT abdomen/pelvis.  History of right renal cell carcinoma nephrectomy.  No CT evidence of tumor recurrence or metastatic disease in the abdomen or pelvis.    2.  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    --GFR 27 mL/min, 06/14/2021 (prior:  9 - 28)  --s/p multiple doses of HD steroids in the past and remains on daily 5 mg prednisone per nephrology (Dr. Grant)  --will not start Cytoxan unless biopsy done to determine etiology of kidney failure. Has been off Opdivo since September 2019.    3.  Normocytic anemia.    --Hgb 13.6; MCV 90.5, 06/14/2021 (prior: Hgb 10.2-13.4; MCV 86.3-95.5)  4.  Gerd: on omeprazole  5.  Osteopenia: on bola+vit D  6.  History of acute diverticulitis.   --06/11/2021-CT abdomen/pelvis.  History of right renal cell carcinoma nephrectomy.  No CT evidence of tumor recurrence or metastatic disease in the abdomen or pelvis.  --06/07/2021-  CT adomen/pelvis.  The diverticulosis of the distal colon. Evidence of  moderate acute diverticulitis. No free air or abscess. Asymmetrical thickening of the wall of the rectum may partly be due to  incomplete distention. Possibility of a neoplastic process is not  excluded. This may be clinically correlated. Left renal cyst. The appendix are normal. Moderately enlarged prostate. A moderate diffuse thickening of the wall of the urinary bladder which may partly be due to incomplete distention. The possibility of chronic cystitis or partial chronic opioid obstruction is not excluded. A mild increase in size of the right lower lobar noncalcified nodule. This needs further evaluation.   --CT  abdomen/pelvis, 07/06/2020 with diverticulosis of the colon.  Evidence of acute diverticulitis of the sigmoid colon and adjacent distal descending colon.  No free air or fluid seen.  1 cm noncalcified subpleural nodule in the left lower lobe.  Follow-up exam in 6 months recommended.    Plan:  1.  Apprised of CT scan findings, 06/07/2021 ([facility]) with acute diverticulitis and 06/11/2021 (above at {).  Stable pulmonary nodules.  No evidence of disease progression. Negative A/P.  ER visit for acute diverticulitis discussed  2.  Apprised of labs, 06/07/2021 and 06/14/2021.  CBC stable. GFR depressed (stable) otherwise stable CMP.  3.  Review encounter from Dr. Mayito Arteaga, medical oncology at [facility] last 02/14/2021.  History reviewed.  Most recent CT chest, 01/14/2021 (above) with increasing size of 1.2 cm right lower lobe superior segment pulmonary nodule (previously 0.9 cm) and PET scan, 01/26/2021 (above) noted including minimal metabolic activity associated with the enlarging pulmonary nodule within the superior segment of the right lower lobe.  Discussion: Biopsy right lower lobe lung nodule solitary lesion that is growing on PET scan which will be done at that institution.  Contemplate either surgical resection, ablation or SBRT.  We will focus on local therapy for now.  Multiple systemic options down the road but if renal function improves can contemplate trial with HIF-alpha inhibitor.    4.  Review encounter office encounter from GIOVANNY Edward with Dr. Arteaga's office, 05/27/2021.  Impression/plan: CT CAP, 05/26/2021: Stable examination including stable bilateral lung nodules compared to 03/15/2021.  Progression of right lower lobe lesion?  Discussed systemic therapy option with cabozantinib at 20 mg.  However patient concerned due to his history of substantial AEs in the past.  Will arrange for lesion to be ablated.  However patient says multiple lesions were there for 6  years and unchanged.-Consider ablation or SBRT of the lesion and monitor and consider cabozantinib when failed.  Multiple systemic options down the road but if renal function improves can contemplate HIF-alpha inhibitor.  Continue surveillance for now.  Repeat scans in 3 months.    5.  Continue management per primary care and other specialists.  6.  Return to office in 12 weeks with preoffice CT scans of the chest, abdomen/pelvis without IV contrast, CBC with differential and CMP.    I spent - 41 total minutes, face-to-face, caring for Parker ledezma.  Greater than 50% of this time involved counseling and/or coordination of care as documented within this note regarding the patient's illness(es), pros and cons of various treatment options, instructions and/or risk reduction.

## 2021-06-14 ENCOUNTER — LAB (OUTPATIENT)
Dept: LAB | Facility: HOSPITAL | Age: 66
End: 2021-06-14

## 2021-06-14 DIAGNOSIS — C64.1 RENAL CELL CARCINOMA OF RIGHT KIDNEY (HCC): ICD-10-CM

## 2021-06-14 LAB
ALBUMIN SERPL-MCNC: 3.9 G/DL (ref 3.5–5.2)
ALBUMIN/GLOB SERPL: 1.9 G/DL
ALP SERPL-CCNC: 49 U/L (ref 39–117)
ALT SERPL W P-5'-P-CCNC: 10 U/L (ref 1–41)
ANION GAP SERPL CALCULATED.3IONS-SCNC: 7 MMOL/L (ref 5–15)
AST SERPL-CCNC: 14 U/L (ref 1–40)
BASOPHILS # BLD AUTO: 0.03 10*3/MM3 (ref 0–0.2)
BASOPHILS NFR BLD AUTO: 0.5 % (ref 0–1.5)
BILIRUB SERPL-MCNC: 0.2 MG/DL (ref 0–1.2)
BUN SERPL-MCNC: 37 MG/DL (ref 8–23)
BUN/CREAT SERPL: 15.2 (ref 7–25)
CALCIUM SPEC-SCNC: 9.5 MG/DL (ref 8.6–10.5)
CHLORIDE SERPL-SCNC: 110 MMOL/L (ref 98–107)
CO2 SERPL-SCNC: 25 MMOL/L (ref 22–29)
CREAT SERPL-MCNC: 2.43 MG/DL (ref 0.76–1.27)
DEPRECATED RDW RBC AUTO: 44.8 FL (ref 37–54)
EOSINOPHIL # BLD AUTO: 0.15 10*3/MM3 (ref 0–0.4)
EOSINOPHIL NFR BLD AUTO: 2.3 % (ref 0.3–6.2)
ERYTHROCYTE [DISTWIDTH] IN BLOOD BY AUTOMATED COUNT: 13.5 % (ref 12.3–15.4)
GFR SERPL CREATININE-BSD FRML MDRD: 27 ML/MIN/1.73
GLOBULIN UR ELPH-MCNC: 2.1 GM/DL
GLUCOSE SERPL-MCNC: 103 MG/DL (ref 65–99)
HCT VFR BLD AUTO: 41.9 % (ref 37.5–51)
HGB BLD-MCNC: 13.6 G/DL (ref 13–17.7)
IMM GRANULOCYTES # BLD AUTO: 0.01 10*3/MM3 (ref 0–0.05)
IMM GRANULOCYTES NFR BLD AUTO: 0.2 % (ref 0–0.5)
LYMPHOCYTES # BLD AUTO: 1.73 10*3/MM3 (ref 0.7–3.1)
LYMPHOCYTES NFR BLD AUTO: 26.4 % (ref 19.6–45.3)
MCH RBC QN AUTO: 29.4 PG (ref 26.6–33)
MCHC RBC AUTO-ENTMCNC: 32.5 G/DL (ref 31.5–35.7)
MCV RBC AUTO: 90.5 FL (ref 79–97)
MONOCYTES # BLD AUTO: 0.74 10*3/MM3 (ref 0.1–0.9)
MONOCYTES NFR BLD AUTO: 11.3 % (ref 5–12)
NEUTROPHILS NFR BLD AUTO: 3.89 10*3/MM3 (ref 1.7–7)
NEUTROPHILS NFR BLD AUTO: 59.3 % (ref 42.7–76)
NRBC BLD AUTO-RTO: 0 /100 WBC (ref 0–0.2)
PLATELET # BLD AUTO: 161 10*3/MM3 (ref 140–450)
PMV BLD AUTO: 9.7 FL (ref 6–12)
POTASSIUM SERPL-SCNC: 4.7 MMOL/L (ref 3.5–5.2)
PROT SERPL-MCNC: 6 G/DL (ref 6–8.5)
RBC # BLD AUTO: 4.63 10*6/MM3 (ref 4.14–5.8)
SODIUM SERPL-SCNC: 142 MMOL/L (ref 136–145)
WBC # BLD AUTO: 6.55 10*3/MM3 (ref 3.4–10.8)

## 2021-06-14 PROCEDURE — 85025 COMPLETE CBC W/AUTO DIFF WBC: CPT

## 2021-06-14 PROCEDURE — 36415 COLL VENOUS BLD VENIPUNCTURE: CPT

## 2021-06-14 PROCEDURE — 80053 COMPREHEN METABOLIC PANEL: CPT

## 2021-06-21 ENCOUNTER — OFFICE VISIT (OUTPATIENT)
Dept: ONCOLOGY | Facility: CLINIC | Age: 66
End: 2021-06-21

## 2021-06-21 VITALS
RESPIRATION RATE: 16 BRPM | TEMPERATURE: 97.7 F | SYSTOLIC BLOOD PRESSURE: 134 MMHG | DIASTOLIC BLOOD PRESSURE: 78 MMHG | HEART RATE: 64 BPM | BODY MASS INDEX: 28.58 KG/M2 | OXYGEN SATURATION: 98 % | WEIGHT: 199.6 LBS | HEIGHT: 70 IN

## 2021-06-21 DIAGNOSIS — C64.1 RENAL CELL CARCINOMA OF RIGHT KIDNEY (HCC): Primary | ICD-10-CM

## 2021-06-21 PROCEDURE — 99215 OFFICE O/P EST HI 40 MIN: CPT | Performed by: INTERNAL MEDICINE

## 2021-06-21 RX ORDER — MULTIPLE VITAMINS W/ MINERALS TAB 9MG-400MCG
1 TAB ORAL DAILY
COMMUNITY
End: 2022-03-21 | Stop reason: ALTCHOICE

## 2021-09-07 ENCOUNTER — TELEPHONE (OUTPATIENT)
Dept: ONCOLOGY | Facility: CLINIC | Age: 66
End: 2021-09-07

## 2021-09-07 ENCOUNTER — HOSPITAL ENCOUNTER (OUTPATIENT)
Dept: CT IMAGING | Facility: HOSPITAL | Age: 66
Discharge: HOME | End: 2021-09-07
Admitting: INTERNAL MEDICINE

## 2021-09-07 DIAGNOSIS — C64.1 RENAL CELL CARCINOMA OF RIGHT KIDNEY (HCC): ICD-10-CM

## 2021-09-07 DIAGNOSIS — R93.3 ABNORMAL CT SCAN, SIGMOID COLON: Primary | ICD-10-CM

## 2021-09-07 PROCEDURE — 74176 CT ABD & PELVIS W/O CONTRAST: CPT

## 2021-09-07 PROCEDURE — 71250 CT THORAX DX C-: CPT

## 2021-09-07 NOTE — TELEPHONE ENCOUNTER
Called and spoke with patient Parker Arnold, he was informed hard copy of todays results are to be faxed to Dr Mayito Arteaga for review.pt v/u and says that he actually had copy of todays scan put on disc and will mail it to his physician Dr Arteaga for review. He says he will e-mail Dr Arteaga to be expecting the Disc for review and once he has seen the new scan he will be called with is recommendations.   Patient has apt chela: 9/21/21

## 2021-09-07 NOTE — TELEPHONE ENCOUNTER
----- Message from Leonardo Smith MD sent at 9/7/2021 12:31 PM CDT -----  Pls fax this report to Dr. Mayito Arteaga, medical oncology at [facility] - then confirm that pt has follow-up there soon (was last seen 05/2021). Tx u

## 2021-09-14 ENCOUNTER — LAB (OUTPATIENT)
Dept: LAB | Facility: HOSPITAL | Age: 66
End: 2021-09-14

## 2021-09-14 DIAGNOSIS — C64.1 RENAL CELL CARCINOMA OF RIGHT KIDNEY (HCC): ICD-10-CM

## 2021-09-14 LAB
ALBUMIN SERPL-MCNC: 4.2 G/DL (ref 3.5–5.2)
ALBUMIN/GLOB SERPL: 2 G/DL
ALP SERPL-CCNC: 65 U/L (ref 39–117)
ALT SERPL W P-5'-P-CCNC: 14 U/L (ref 1–41)
ANION GAP SERPL CALCULATED.3IONS-SCNC: 10 MMOL/L (ref 5–15)
AST SERPL-CCNC: 14 U/L (ref 1–40)
BASOPHILS # BLD AUTO: 0.03 10*3/MM3 (ref 0–0.2)
BASOPHILS NFR BLD AUTO: 0.3 % (ref 0–1.5)
BILIRUB SERPL-MCNC: 0.5 MG/DL (ref 0–1.2)
BUN SERPL-MCNC: 40 MG/DL (ref 8–23)
BUN/CREAT SERPL: 16.9 (ref 7–25)
CALCIUM SPEC-SCNC: 9.2 MG/DL (ref 8.6–10.5)
CHLORIDE SERPL-SCNC: 107 MMOL/L (ref 98–107)
CO2 SERPL-SCNC: 24 MMOL/L (ref 22–29)
CREAT SERPL-MCNC: 2.36 MG/DL (ref 0.76–1.27)
DEPRECATED RDW RBC AUTO: 43.2 FL (ref 37–54)
EOSINOPHIL # BLD AUTO: 0.36 10*3/MM3 (ref 0–0.4)
EOSINOPHIL NFR BLD AUTO: 4 % (ref 0.3–6.2)
ERYTHROCYTE [DISTWIDTH] IN BLOOD BY AUTOMATED COUNT: 13.3 % (ref 12.3–15.4)
GFR SERPL CREATININE-BSD FRML MDRD: 28 ML/MIN/1.73
GLOBULIN UR ELPH-MCNC: 2.1 GM/DL
GLUCOSE SERPL-MCNC: 104 MG/DL (ref 65–99)
HCT VFR BLD AUTO: 42.1 % (ref 37.5–51)
HGB BLD-MCNC: 14.4 G/DL (ref 13–17.7)
IMM GRANULOCYTES # BLD AUTO: 0.03 10*3/MM3 (ref 0–0.05)
IMM GRANULOCYTES NFR BLD AUTO: 0.3 % (ref 0–0.5)
LYMPHOCYTES # BLD AUTO: 1.48 10*3/MM3 (ref 0.7–3.1)
LYMPHOCYTES NFR BLD AUTO: 16.6 % (ref 19.6–45.3)
MCH RBC QN AUTO: 30.1 PG (ref 26.6–33)
MCHC RBC AUTO-ENTMCNC: 34.2 G/DL (ref 31.5–35.7)
MCV RBC AUTO: 88.1 FL (ref 79–97)
MONOCYTES # BLD AUTO: 1.02 10*3/MM3 (ref 0.1–0.9)
MONOCYTES NFR BLD AUTO: 11.5 % (ref 5–12)
NEUTROPHILS NFR BLD AUTO: 5.98 10*3/MM3 (ref 1.7–7)
NEUTROPHILS NFR BLD AUTO: 67.3 % (ref 42.7–76)
NRBC BLD AUTO-RTO: 0 /100 WBC (ref 0–0.2)
PLATELET # BLD AUTO: 196 10*3/MM3 (ref 140–450)
PMV BLD AUTO: 9.6 FL (ref 6–12)
POTASSIUM SERPL-SCNC: 4.4 MMOL/L (ref 3.5–5.2)
PROT SERPL-MCNC: 6.3 G/DL (ref 6–8.5)
RBC # BLD AUTO: 4.78 10*6/MM3 (ref 4.14–5.8)
SODIUM SERPL-SCNC: 141 MMOL/L (ref 136–145)
WBC # BLD AUTO: 8.9 10*3/MM3 (ref 3.4–10.8)

## 2021-09-14 PROCEDURE — 36415 COLL VENOUS BLD VENIPUNCTURE: CPT

## 2021-09-14 PROCEDURE — 80053 COMPREHEN METABOLIC PANEL: CPT

## 2021-09-14 PROCEDURE — 85025 COMPLETE CBC W/AUTO DIFF WBC: CPT

## 2021-09-15 NOTE — PROGRESS NOTES
[facility] GROUP HEMATOLOGY AND ONCOLOGY  [address]  [facility] [address] [phone]    Patient Name: Parker Arnold  Encounter Date: 09/21/2021  YOB: 1955  Patient Number: [id number]    REASON FOR VISIT: Parker Arnold is a 66 y.o. year old male with a diagnosis of clear cell renal cancer in 11/11/2014 (5.5 cm G3, pT3a, pNx).  Underwent right nephrectomy. Was under surveillance until 11/26/2018  When he underwent  wedge resection of a right pulm nodule. Pathology:  metastatic renal cell carcinoma. He was not able to tolerate Sutent due to kidney failure, thus received ipilumomab/nivolumab, 05/13/2019 - 08/07/2019, then single agent nivolumab, 08/28/2019 - 09/09/2019 (24.5 months since cessation of therapy). Therapy stopped due to renal failure (nivolumab?). He has been on and off prednisone since. He is here alone.    I have reviewed the HPI and verified with the patient the accuracy of it. No changes to interval history since the information was documented. Leonardo Smith MD 09/21/21       Problem List Items Addressed This Visit     None        Oncology/Hematology History   Renal cell carcinoma of right kidney (CMS/HCC)   10/2014 Initial Diagnosis    Renal cell carcinoma (CMS/HCC)     10/2014 Surgery    Right nephrectomy, Dr Morales       10/1/2018 Progression    Increasing lung Nodules on f/u CT     10/2/2018 -  Chemotherapy    Sutent  Discontinued due to intolerance     5/13/2019 - 8/7/2019 Chemotherapy    Opdivo Yervoy x 4 cycles     8/28/2019 - 9/9/2019 Chemotherapy    Single agent Opdivo    Opdivo held 9/9/2019 due to Renal Failure / NephritisToxicity     1/16/2020 Imaging    PET SCAN  Negative for neoplastic uptake         PAST MEDICAL HISTORY:  ALLERGIES:  Allergies   Allergen Reactions   • Amoxicillin Hives   • Opdivo [Nivolumab] Provider Review Needed     Renal failure   • Penicillins Hives and Rash   •  Clindamycin/Lincomycin Rash     pustules  Rash - pustules     • Doxycycline Rash   • Hepatitis B Virus Vaccines Rash     Pustules     • Hepatitis B Vaccine Rash   • Latex Rash   • Levofloxacin Rash     CURRENT MEDICATIONS:  Outpatient Encounter Medications as of 9/21/2021   Medication Sig Dispense Refill   • calcitriol (ROCALTROL) 0.25 MCG capsule Take 1 capsule by mouth Daily. 30 capsule 2   • calcium carb-cholecalciferol (Caltrate 600+D3) 600-800 MG-UNIT tablet Take 1 tablet by mouth 2 (Two) Times a Day. 60 tablet 3   • carvedilol (COREG) 12.5 MG tablet Take 12.5 mg by mouth 2 (Two) Times a Day With Meals.     • finasteride (PROSCAR) 5 MG tablet Take 5 mg by mouth Daily.  3   • multivitamin with minerals (MULTIVITAL PO) Take 1 tablet by mouth Daily.     • predniSONE (DELTASONE) 5 MG tablet Take 5 mg by mouth Daily.     • sodium bicarbonate 650 MG tablet Take 1 tablet by mouth 3 (Three) Times a Day. 90 tablet 2     No facility-administered encounter medications on file as of 9/21/2021.     ADULT ILLNESSES:  Patient Active Problem List   Diagnosis Code   • Renal cell carcinoma of right kidney (CMS/HCC) C64.1   • Multiple nodules of lung R91.8   • Lung anomaly Q33.9   • TIA (transient ischemic attack) G45.9   • Acute renal failure with tubular necrosis in the setting of solitary kidney N17.0   • Lung metastases (CMS/HCC) C78.00   • Pyuria R82.81   • Hyperkalemia E87.5   • Metabolic acidosis E87.2   • Hyperglycemia R73.9   • Hypercalcemia E83.52   • Benign prostatic hyperplasia N40.0   • Increased frequency of urination R35.0   • Nocturia R35.1   • Retention of urine R33.9   • Dehydration E86.0   • Nephritis and nephropathy N05.9   • Toxicity, chemicals T65.91XA   • Stage 3 chronic kidney disease (CMS/HCC) N18.30   • Campylobacter gastroenteritis A04.5   • Sigmoid diverticulitis K57.32     SURGERIES:  Past Surgical History:   Procedure Laterality Date   • HERNIA REPAIR  2008   • LUNG BIOPSY  11/2018   • NEPHRECTOMY   "   • SINUS SURGERY       HEALTH MAINTENANCE ITEMS:  Health Maintenance Due   Topic Date Due   • COLORECTAL CANCER SCREENING  Never done   • COVID-19 Vaccine (1) Never done   • TDAP/TD VACCINES (1 - Tdap) Never done   • ZOSTER VACCINE (1 of 2) Never done   • Pneumococcal Vaccine 65+ (1 of 1 - PPSV23) Never done   • HEPATITIS C SCREENING  Never done   • ANNUAL WELLNESS VISIT  Never done       <no information>  Last Completed Colonoscopy     This patient has no relevant Health Maintenance data.          There is no immunization history on file for this patient.  Last Completed Mammogram     This patient has no relevant Health Maintenance data.            FAMILY HISTORY:  Family History   Problem Relation Age of Onset   • Other Mother         blood disease unknown   • Cancer Father         unknown   • No Known Problems Sister    • Heart disease Maternal Grandmother    • Heart disease Maternal Grandfather    • No Known Problems Paternal Grandmother    • No Known Problems Paternal Grandfather    • No Known Problems Sister      SOCIAL HISTORY:  Social History     Socioeconomic History   • Marital status:      Spouse name: Not on file   • Number of children: Not on file   • Years of education: Not on file   • Highest education level: Not on file   Tobacco Use   • Smoking status: Former Smoker     Packs/day: 1.00     Years: 15.00     Pack years: 15.00     Types: Cigarettes     Quit date:      Years since quittin.7   • Smokeless tobacco: Never Used   Substance and Sexual Activity   • Alcohol use: No   • Drug use: No   • Sexual activity: Defer       REVIEW OF SYSTEMS:  Constitutional:  Manages ADLs, chores, errands and driving.  Again drove himself in today.  Appetite is good.  \"All fine.\" Energy is fairly good.  He remains active.  \"I fish a lot.\"  He has regained 4 pounds (had lost 16 pounds at his 2 prior visits -had gained 12 pounds at his 2 visits prior to those) since his last visit. \"I had a " "diverticulitis flare 2 weeks ago.\" No fever, no chills nor drenching nights.  HENT: Negative.  No sore throat.  Has seasonal sinus symptoms/rhinorrhea. No headaches.  Eyes: Negative.    Respiratory:  No SOB at rest nor TENORIO.  No cough. No wheezing. No tobacco use.    Cardiovascular: Negative.  No chest pain.  No palpitations.  No orthopnea  Gastrointestinal: No dysphagia.  No nausea.  No vomiting.  No dyspepsia.  No constipation.  Resolution of diarrhea with resolution of diverticulitis.  No melena. No hematochezia. Had recurrent diverticulitis, early 06/2021.  Was treated with oral Flagyl to good effect.  \"Cleared it up within 24h.\"  Endocrine: No hot flashes.  Genitourinary:  No dysuria.  No incontinence. No hematuria.  Occasional urgency.  \"Sometimes gotta go when I gotta go.\" Nocturia much improved on Finasteride - up 2x- from every 90 min  Musculoskeletal:  No arthralgias.  No edema  Skin: No new rash.    Neurological:  No dizziness.  No facial asymmetry. No headaches.  Mild sensory neuropathy of the hands. Not worse  Hematological: Negative for new adenopathy.  Says he bruises easily.  Psychiatric/Behavioral:  No anxiety.  No depression.    VITAL SIGNS: /88   Pulse 74   Temp 97.2 °F (36.2 °C)   Resp 16   Ht 177.8 cm (70\")   Wt 92.2 kg (203 lb 3.2 oz)   SpO2 98%   BMI 29.16 kg/m² Body surface area is 2.1 meters squared.  Pain Score    09/21/21 1025   PainSc: 0-No pain     I have reexamined the patient and the results are consistent with the previously documented exam. Leonardo Smith MD     PHYSICAL EXAMINATION:   General Appearance: Patient is a very pleasant, cooperative, heavyset, modestly kept elderly male who is awake, alert, oriented and in no acute distress. ECOG 1  HEENT: Normocephalic. Sclerae clear, conjunctiva pink, extraocular movements intact, pupils, round, reactive to light and  accommodation. Is wearing a surgical mask today  NECK: Supple, no jugular venous distention, thyroid not " enlarged.  LYMPH: No cervical, supraclavicular, axillary, or inguinal lymphadenopathy.  LUNGS: Good air movement, no rales, rhonchi, rubs or wheezes with auscultation  CARDIO: Regular sinus rhythm, no murmurs, gallops or rubs.  ABDOMEN:  Obese, nondistended, soft, No tenderness, no guarding, no rebound, No hepatosplenomegaly. No abdominal masses. Bowel sounds positive. No hernia  MUSKEL: No joint swelling, decreased motion, or inflammation  EXTREMS:  No edema, clubbing, cyanosis, No varicose veins.  NEURO: Grossly nonfocal, Gait is coordinated and smooth, Cognition is preserved.  SKIN: No rashes, no ecchymoses, no petechia.  PSYCH: Oriented to time, place and person. Memory is preserved. Mood and affect appear normal         LABS    Lab Results - Last 18 Months   Lab Units 09/14/21  1016 06/14/21  0958 06/07/21  0802 04/12/21  1027 02/22/21  1024 01/12/21  1337 12/28/20  0912 10/21/20  1228   HEMOGLOBIN g/dL 14.4 13.6 14.7 14.0 13.6 14.1   < > 13.4   HEMATOCRIT % 42.1 41.9 44.6 42.7 37.6 40.7   < > 39.8   MCV fL 88.1 90.5 90.5 88.0 81.6 87.2   < > 86.3   WBC 10*3/mm3 8.90 6.55 6.8 6.92 6.38 6.63   < > 6.33   RDW % 13.3 13.5 13.3 13.3 13.2 13.4   < > 13.2   MPV fL 9.6 9.7 9.4 9.9 9.4 9.2   < > 9.5   PLATELETS 10*3/mm3 196 161 185 185 201 175   < > 219   IMM GRAN % % 0.3 0.2  --  0.4 0.5 0.5  --  1.1*   NEUTROS ABS 10*3/mm3 5.98 3.89 4.5 4.61 3.76 4.01   < > 3.84   LYMPHS ABS 10*3/mm3 1.48 1.73 1.4 1.26 1.58 1.59   < > 1.59   MONOS ABS 10*3/mm3 1.02* 0.74 0.70 0.81 0.77 0.81   < > 0.62   EOS ABS 10*3/mm3 0.36 0.15 0.20 0.18 0.22 0.15   < > 0.17   BASOS ABS 10*3/mm3 0.03 0.03 0.00 0.03 0.02 0.04   < > 0.04   IMMATURE GRANS (ABS) 10*3/mm3 0.03 0.01 0.0 0.03 0.03 0.03   < > 0.07*   NRBC /100 WBC 0.0 0.0  --  0.0 0.0 0.0  --  0.0    < > = values in this interval not displayed.       Lab Results - Last 18 Months   Lab Units 09/14/21  1016 06/14/21  0958 06/07/21  0802 04/12/21  1027 02/22/21  1024 01/12/21  1337  12/28/20  0912 10/21/20  1228   GLUCOSE mg/dL 104* 103* 109 95 95 77   < > 104*   SODIUM mmol/L 141 142 136 140 139 141   < > 142   POTASSIUM mmol/L 4.4 4.7 4.2 4.5 4.6 4.6   < > 4.6   TOTAL CO2 mmol/L  --   --  21*  --   --   --    < >  --    CO2 mmol/L 24.0 25.0  --  25.0 23.0 27.0  --  22.0   CHLORIDE mmol/L 107 110* 104 107 107 106   < > 111*   ANION GAP mmol/L 10.0 7.0 11 8.0 9.0 8.0   < > 9.0   CREATININE mg/dL 2.36* 2.43* 2.9* 3.09* 3.06* 3.50*   < > 3.55*   BUN mg/dL 40* 37* 50* 59* 48* 48*   < > 50*   BUN / CREAT RATIO  16.9 15.2  --  19.1 15.7 13.7  --  14.1   CALCIUM mg/dL 9.2 9.5 9.4 9.3 9.6 9.5   < > 9.9   EGFR IF NONAFRICN AM mL/min/1.73 28* 27* 22* 20* 21* 18*   < > 17*   ALK PHOS U/L 65 49 59 65 71 73   < > 62   TOTAL PROTEIN g/dL 6.3 6.0 6.4* 6.7 6.5 6.8   < > 6.4   ALT (SGPT) U/L 14 10 11 13 11 12   < > 10   AST (SGOT) U/L 14 14 12 14 13 14   < > 14   BILIRUBIN mg/dL 0.5 0.2 0.3 0.6 0.3 0.3   < > 0.3   ALBUMIN g/dL 4.20 3.90 4.2 4.10 4.00 4.30   < > 4.40   GLOBULIN gm/dL 2.1 2.1  --  2.6 2.5 2.5  --  2.0    < > = values in this interval not displayed.       Lab Results - Last 18 Months   Lab Units 12/28/20  0912 07/06/20  1035   URIC ACID mg/dL 8.8* 9.9*       Lab Results - Last 18 Months   Lab Units 07/08/20  0509 04/24/20  1037   IRON ug/dL 60 65   TIBC ug/dL 202* 262*   IRON SATURATION % 30 25   FERRITIN ng/mL  --  292.70       Assessment:  1.  Renal cell carcinoma, diagnosed 11/11/2014 (5.5 cm G3, pT3a, pNx).  --2014, right nephrectomy.   --surveillance until 11/26/2018    --11/26/2018-right lower lobe VATS wedge resection: Metastatic renal cell carcinoma, 0.9 cm; margins free of malignancy. Dr. Hu  -- Unable to tolerate Sutent due to kidney failure, thus received ipilumomab/nivolumab, 05/13/2019 - 08/07/2019, then single agent nivolumab, 08/28/2019 - 09/09/2019 (21.5 months since cessation of therapy). Therapy stopped due to renal failure (nivolumab?)  --01/16/2020-PET scan.  No  neoplastic FDG uptake within the chest, abdomen or pelvis.  --07/06/2020-CT abdomen/pelvis with diverticulosis of the colon.  Evidence of acute diverticulitis of the sigmoid colon and adjacent distal descending colon.  No free air or fluid seen.  1 cm noncalcified subpleural nodule in the left lower lobe.  Follow-up exam in 6 months recommended.  --08/04/2020-chest CT.  Scattered subcentimeter bilateral pulmonary nodules similar to 1/16/2020 with mild increase in the 5 mm left upper lobe nodule.  Continued follow-up recommended.  No pathologic lymphadenopathy.  Right nephrectomy.  --01/14/2020-CT chest.  Increased size of 1.2 cm right lower lobe superior segment pulmonary nodule (previously 0.9 cm)-representing 33% growth.  Multiple other bilateral pulmonary nodules have not significantly changed in size.  Findings are in keeping with disease progression.  --01/14/2020-CT abdomen/pelvis.  No evidence of recurrent or metastatic disease in the abdomen or pelvis.  --01/26/2021-PET/CT.  Impression: Minimal metabolic activity associated with the enlarging pulmonary nodule within the superior segment of the right lower lobe with a maximum SUV of 1.35.  The additional nodules noted within both lungs also demonstrate no abnormal metabolic activity which would favor benignity but continued radiographic follow-up will be suggested.  No scintigraphic evidence of metastatic disease on current exam.  Increased uptake overlying the right groin and more distal external iliac vessels felt to be related to previous hernia repair.  --03/15/2021- CT chest.  Continued increased size of the right lower lobe superior segment now 1.4 cm pulmonary nodule (1.2 cm, 1/14/2021; 0.8 cm, 8/4/2020).  Other pulmonary nodules are stable.  New small area of groundglass opacity in the superior segment left lower lobe may be infectious/inflammatory.  Coronary artery calcifications.  Aortic calcifications.  Small hiatal hernia.  --03/15/2021-CT  abdomen/pelvis.  Previous right nephrectomy identified with no evidence of intra-abdominal metastatic disease.  Benign-appearing exophytic cyst posterior aspect of the left kidney, upper pole, measuring 2.47 cm, stable.  Small nodules in the lung bases are unchanged compared to previous chest CT.  Evidence of previous right inguinal hernia repair.  Fatty infiltration of the inguinal canals.  Scattered colonic diverticula without diverticulitis.  Small sliding hiatal hernia identified.  --06/11/2021-CT chest without contrast.  Stable pulmonary nodules.  No evidence of disease progression.  Interval resolution superior segment left lower lobe groundglass opacity.  Scattered coronary artery calcifications.  --06/11/2021-CT abdomen/pelvis.  History of right renal cell carcinoma nephrectomy.  No CT evidence of tumor recurrence or metastatic disease in the abdomen or pelvis.  --09/07/2021-CT chest wo contrast.  Pulmonary nodules noted.  Slightly increased in size compared to 6/11/2021 (left upper lobe 7.2 mm-prior 6.7 mm; posterior right lung, 19 mm-prior 13.9 mm; posterior medial right lung, 14 mm-prior 11 mm; right lung, 7.7 mm-prior 7.1 mm).  However no new nodules identified.  --09/07/2021-CT abdomen/pelvis wo contrast.  No evidence of intra-abdominal or pelvic metastasis.  Prior right nephrectomy.  Stable left renal cyst.  Colonic diverticulosis with questionable wall thickening of the sigmoid colon may be artifactual.  Correlation to any recent colonoscopy recommended.    2.  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    --GFR 28 mL/min, 09/14/2021 (prior:  9 - 28)  --s/p multiple doses of HD steroids in the past and remains on daily 5 mg prednisone per nephrology (Dr. Grant)  --will not start Cytoxan unless biopsy done to determine etiology of kidney failure. Has been off Opdivo since September 2019.    3.  Normocytic anemia.    --Hgb 14.4; MCV 88.1, 09/14/2021 (prior: Hgb 10.2-13.4; MCV 86.3-95.5)  4.  Gerd: modulated by  omeprazole  5.  Osteopenia: on bola+vit D  6.  History of acute diverticulitis.   --06/11/2021-CT abdomen/pelvis.  History of right renal cell carcinoma nephrectomy.  No CT evidence of tumor recurrence or metastatic disease in the abdomen or pelvis.  --06/07/2021-  CT adomen/pelvis.  The diverticulosis of the distal colon. Evidence of  moderate acute diverticulitis. No free air or abscess. Asymmetrical thickening of the wall of the rectum may partly be due to  incomplete distention. Possibility of a neoplastic process is not  excluded. This may be clinically correlated. Left renal cyst. The appendix are normal. Moderately enlarged prostate. A moderate diffuse thickening of the wall of the urinary bladder which may partly be due to incomplete distention. The possibility of chronic cystitis or partial chronic opioid obstruction is not excluded. A mild increase in size of the right lower lobar noncalcified nodule. This needs further evaluation.   --CT abdomen/pelvis, 07/06/2020 with diverticulosis of the colon.  Evidence of acute diverticulitis of the sigmoid colon and adjacent distal descending colon.  No free air or fluid seen.  1 cm noncalcified subpleural nodule in the left lower lobe.  Follow-up exam in 6 months recommended.  --09/07/2021-CT abdomen/pelvis (above).  Colonic diverticulosis.  Questionable wall thickening of the sigmoid colon.    Plan:  1.  Apprised of CT scan findings, 09/07/2021.  Slightly enlarged pulmonary nodules (above).  Diverticulosis with questionable wall thickening of the sigmoid colon.  Needs colonoscopy?  Discussed the option of cabozantinib as outlined by Dr. Arteaga previously (see #4 below).  Patient remains wary of starting any type of therapy given previous AE's.    2.  Apprised of labs, 09/14/2021.  CBC stable. GFR depressed (stable) otherwise stable CMP.  3.  Schedule PET scan neck to thighs at [facility].  Compared to CT scans, 09/07/2021.    4.  Review encounter from Dr. Mayito Arteaga,  medical oncology at [facility] last 02/14/2021.  History reviewed.  Most recent CT chest, 01/14/2021 (above) with increasing size of 1.2 cm right lower lobe superior segment pulmonary nodule (previously 0.9 cm) and PET scan, 01/26/2021 (above) noted including minimal metabolic activity associated with the enlarging pulmonary nodule within the superior segment of the right lower lobe.  Discussion: Biopsy right lower lobe lung nodule solitary lesion that is growing on PET scan which will be done at that institution.  Contemplate either surgical resection, ablation or SBRT.  We will focus on local therapy for now.  Multiple systemic options down the road but if renal function improves can contemplate trial with HIF-alpha inhibitor.    5.  Review encounter office encounter from GIOVANNY Edward with Dr. Arteaga's office, 05/27/2021.  Impression/plan: CT CAP, 05/26/2021: Stable examination including stable bilateral lung nodules compared to 03/15/2021.  Progression of right lower lobe lesion?  Discussed systemic therapy option with cabozantinib at 20 mg.  However patient concerned due to his history of substantial AEs in the past.  Will arrange for lesion to be ablated.  However patient says multiple lesions were there for 6 years and unchanged.-Consider ablation or SBRT of the lesion and monitor and consider cabozantinib when failed.  Multiple systemic options down the road but if renal function improves can contemplate HIF-alpha inhibitor.  Continue surveillance for now.  Repeat scans in 3 months.    6.  Appoint to GI - Dr. Ledezma ([place]) Re: Abnormal CT abdomen/pelvis.  Needs colonoscopy clarification.  7.  Return to office in 12 weeks with preoffice CT scans of the chest, abdomen/pelvis without IV contrast (in 11 weeks), CBC with differential and CMP.    I spent - 40 total minutes, face-to-face, caring for Parker today.  Greater than 50% of this time involved counseling and/or  coordination of care as documented within this note regarding the patient's illness(es), pros and cons of various treatment options, instructions and/or risk reduction.

## 2021-09-21 ENCOUNTER — OFFICE VISIT (OUTPATIENT)
Dept: ONCOLOGY | Facility: CLINIC | Age: 66
End: 2021-09-21

## 2021-09-21 VITALS
OXYGEN SATURATION: 98 % | DIASTOLIC BLOOD PRESSURE: 88 MMHG | HEIGHT: 70 IN | HEART RATE: 74 BPM | RESPIRATION RATE: 16 BRPM | BODY MASS INDEX: 29.09 KG/M2 | SYSTOLIC BLOOD PRESSURE: 150 MMHG | WEIGHT: 203.2 LBS | TEMPERATURE: 97.2 F

## 2021-09-21 DIAGNOSIS — C78.00 MALIGNANT NEOPLASM METASTATIC TO LUNG, UNSPECIFIED LATERALITY (HCC): ICD-10-CM

## 2021-09-21 DIAGNOSIS — C64.1 RENAL CELL CARCINOMA OF RIGHT KIDNEY (HCC): Primary | ICD-10-CM

## 2021-09-21 PROCEDURE — 99215 OFFICE O/P EST HI 40 MIN: CPT | Performed by: INTERNAL MEDICINE

## 2021-09-28 ENCOUNTER — HOSPITAL ENCOUNTER (OUTPATIENT)
Dept: CT IMAGING | Facility: HOSPITAL | Age: 66
Discharge: HOME | End: 2021-09-28

## 2021-09-28 ENCOUNTER — TELEPHONE (OUTPATIENT)
Dept: ONCOLOGY | Facility: CLINIC | Age: 66
End: 2021-09-28

## 2021-09-28 DIAGNOSIS — C78.00 MALIGNANT NEOPLASM METASTATIC TO LUNG, UNSPECIFIED LATERALITY (HCC): ICD-10-CM

## 2021-09-28 DIAGNOSIS — R91.8 MULTIPLE NODULES OF LUNG: Primary | ICD-10-CM

## 2021-09-28 DIAGNOSIS — C64.1 RENAL CELL CARCINOMA OF RIGHT KIDNEY (HCC): ICD-10-CM

## 2021-09-28 PROCEDURE — 78815 PET IMAGE W/CT SKULL-THIGH: CPT

## 2021-09-28 PROCEDURE — A9552 F18 FDG: HCPCS | Performed by: INTERNAL MEDICINE

## 2021-09-28 PROCEDURE — 0 FLUDEOXYGLUCOSE F18 SOLUTION: Performed by: INTERNAL MEDICINE

## 2021-09-28 RX ADMIN — FLUDEOXYGLUCOSE F18 1 DOSE: 300 INJECTION INTRAVENOUS at 08:22

## 2021-09-28 NOTE — TELEPHONE ENCOUNTER
"Spoke with patient. He is hesitant to have a referral to another doctor. He reports that he has so many doctors and currently has 8 appt. On his refrigerator. I explained that each doctor specializes in different areas of the body. He was irritated that the spot hasn't been removed sooner stating \" that's what paty wanted to do this whole time but I was told it was too small.\" I let him know that it was too small but it has grown now and Dr. Smith believes that it may be removed now. Parker has agreed to the referral. He will call us back if he has not heard from Dr. Tolentino's office.   "

## 2021-09-28 NOTE — TELEPHONE ENCOUNTER
----- Message from Leonardo Smith MD sent at 9/28/2021 12:46 PM CDT -----  Appoint to Dr. Tolentino or Dr. Abreu of CT surgery Re: Increasing size right lower lobe lung nodule in patient with known renal cell carcinoma.  Need assessment for resection.

## 2021-10-04 ENCOUNTER — TELEPHONE (OUTPATIENT)
Dept: CARDIAC SURGERY | Facility: CLINIC | Age: 66
End: 2021-10-04

## 2021-10-04 NOTE — TELEPHONE ENCOUNTER
Pt was informed these records are still with Dr Tolentino for review but I would put a message in to check on this/kahm

## 2021-10-13 ENCOUNTER — OFFICE VISIT (OUTPATIENT)
Dept: CARDIAC SURGERY | Facility: CLINIC | Age: 66
End: 2021-10-13

## 2021-10-13 VITALS
HEIGHT: 70 IN | HEART RATE: 92 BPM | OXYGEN SATURATION: 97 % | DIASTOLIC BLOOD PRESSURE: 83 MMHG | WEIGHT: 204.4 LBS | SYSTOLIC BLOOD PRESSURE: 131 MMHG | BODY MASS INDEX: 29.26 KG/M2

## 2021-10-13 DIAGNOSIS — C64.1 RENAL CELL CARCINOMA OF RIGHT KIDNEY (HCC): Primary | ICD-10-CM

## 2021-10-13 DIAGNOSIS — N18.30 STAGE 3 CHRONIC KIDNEY DISEASE, UNSPECIFIED WHETHER STAGE 3A OR 3B CKD (HCC): ICD-10-CM

## 2021-10-13 DIAGNOSIS — C78.01 MALIGNANT NEOPLASM METASTATIC TO BOTH LUNGS (HCC): ICD-10-CM

## 2021-10-13 DIAGNOSIS — C78.02 MALIGNANT NEOPLASM METASTATIC TO BOTH LUNGS (HCC): ICD-10-CM

## 2021-10-13 DIAGNOSIS — R91.8 MULTIPLE NODULES OF LUNG: ICD-10-CM

## 2021-10-13 PROCEDURE — 99204 OFFICE O/P NEW MOD 45 MIN: CPT | Performed by: THORACIC SURGERY (CARDIOTHORACIC VASCULAR SURGERY)

## 2021-11-01 NOTE — PROGRESS NOTES
Chief Complaint   Patient presents with   • Lung Nodule     New pt from [place]         Subjective     History of Present Illness  66-year-old male with a history of renal cell carcinoma, suspected lung metastasis, stage III chronic kidney disease in the setting of solitary kidney with a history of TIA presents today for evaluation of multiple lung nodules.  They have been followed with slow increase in size generally but repeat pet imaging compared to previous PET in January of this year as shown a right lower lobe lung nodule that has increased in size it is not hypermetabolic however.  He denies unintended weight loss, hoarseness of voice, chest pain, hemoptysis, history of pneumonia, or cough.        Review of Systems   Constitutional: Positive for fatigue. Negative for activity change, appetite change, chills, diaphoresis, fever and unexpected weight change.   HENT: Negative for dental problem, hearing loss, nosebleeds, sore throat, trouble swallowing and voice change.    Eyes: Negative for photophobia, redness and visual disturbance.   Respiratory: Negative for apnea, cough, chest tightness, shortness of breath, wheezing and stridor.    Cardiovascular: Negative for chest pain, palpitations and leg swelling.   Gastrointestinal: Negative for abdominal distention, abdominal pain, blood in stool, constipation, diarrhea, nausea and vomiting.   Endocrine: Negative for cold intolerance, heat intolerance, polyphagia and polyuria.   Genitourinary: Negative for decreased urine volume, difficulty urinating, dysuria, flank pain, frequency, hematuria and urgency.   Musculoskeletal: Negative for arthralgias, back pain, gait problem, joint swelling, myalgias and neck pain.   Skin: Negative for pallor, rash and wound.   Allergic/Immunologic: Negative for immunocompromised state.   Neurological: Negative for dizziness, tremors, seizures, syncope, speech difficulty, weakness, light-headedness, numbness and headaches.    Hematological: Bruises/bleeds easily.   Psychiatric/Behavioral: Negative for confusion, sleep disturbance and suicidal ideas. The patient is not nervous/anxious.           Past Medical History:   Diagnosis Date   • Dehydration 2019   • Nephritis and nephropathy 2020   • Renal cell carcinoma (HCC)    • Renal cell carcinoma of right kidney (HCC) 10/18/2019    Overview:  Diagnoses 14. Path report in [facility] at [facility]. Tissue has been requested.    • Toxicity, chemicals 2020     Past Surgical History:   Procedure Laterality Date   • HERNIA REPAIR     • LUNG BIOPSY  2018   • NEPHRECTOMY     • SINUS SURGERY       Family History   Problem Relation Age of Onset   • Other Mother         blood disease unknown   • Cancer Father         unknown   • No Known Problems Sister    • Heart disease Maternal Grandmother    • Heart disease Maternal Grandfather    • No Known Problems Paternal Grandmother    • No Known Problems Paternal Grandfather    • No Known Problems Sister      Social History     Tobacco Use   • Smoking status: Former Smoker     Packs/day: 1.00     Years: 15.00     Pack years: 15.00     Types: Cigarettes     Quit date:      Years since quittin.8   • Smokeless tobacco: Never Used   Substance Use Topics   • Alcohol use: No   • Drug use: No     Current Outpatient Medications   Medication Sig Dispense Refill   • calcitriol (ROCALTROL) 0.25 MCG capsule Take 1 capsule by mouth Daily. 30 capsule 2   • calcium carb-cholecalciferol (Caltrate 600+D3) 600-800 MG-UNIT tablet Take 1 tablet by mouth 2 (Two) Times a Day. 60 tablet 3   • carvedilol (COREG) 12.5 MG tablet Take 12.5 mg by mouth 2 (Two) Times a Day With Meals.     • finasteride (PROSCAR) 5 MG tablet Take 5 mg by mouth Daily.  3   • multivitamin with minerals (MULTIVITAL PO) Take 1 tablet by mouth Daily.     • predniSONE (DELTASONE) 5 MG tablet Take 5 mg by mouth Daily.     • sodium bicarbonate 650 MG tablet  "Take 1 tablet by mouth 3 (Three) Times a Day. 90 tablet 2     No current facility-administered medications for this visit.     Allergies:  Amoxicillin, Opdivo [nivolumab], Penicillins, Clindamycin/lincomycin, Doxycycline, Hepatitis b virus vaccines, Hepatitis b vaccine, Latex, and Levofloxacin    Objective      Vital Signs  Visit Vitals  /83 (BP Location: Right arm, Patient Position: Sitting, Cuff Size: Adult)   Pulse 92   Ht 177.8 cm (70\")   Wt 92.7 kg (204 lb 6.4 oz)   SpO2 97%   BMI 29.33 kg/m²         Physical Exam  Constitutional:       Appearance: He is well-developed.   HENT:      Head: Normocephalic and atraumatic.   Eyes:      Pupils: Pupils are equal, round, and reactive to light.   Neck:      Thyroid: No thyromegaly.      Vascular: No JVD.      Trachea: No tracheal deviation.   Cardiovascular:      Rate and Rhythm: Normal rate and regular rhythm.      Heart sounds: Normal heart sounds. No murmur heard.  No friction rub. No gallop.    Pulmonary:      Effort: Pulmonary effort is normal. No respiratory distress.      Breath sounds: Normal breath sounds. No wheezing or rales.   Chest:      Chest wall: No tenderness.   Abdominal:      General: There is no distension.      Palpations: Abdomen is soft.      Tenderness: There is no abdominal tenderness.   Musculoskeletal:         General: Normal range of motion.      Cervical back: Normal range of motion and neck supple.   Lymphadenopathy:      Cervical: No cervical adenopathy.   Skin:     General: Skin is warm and dry.   Neurological:      Mental Status: He is alert and oriented to person, place, and time.      Cranial Nerves: No cranial nerve deficit.         Results Review:     Narrative & Impression   EXAMINATION:   CT CHEST WO CONTRAST DIAGNOSTIC-  9/7/2021 10:17 AM CDT     HISTORY: CT CHEST WO CONTRAST DIAGNOSTIC- 9/7/2021 9:37 AM CDT     HISTORY: renal cell carcinoma; C64.1-Malignant neoplasm of right kidney,  except renal pelvis     COMPARISON: June " 11, 2021     DOSE LENGTH PRODUCT: 452 mGy cm. Automated exposure control was also  utilized to decrease patient radiation dose.     TECHNIQUE: Serial helical tomographic images of the chest were acquired.  Multiplanar reformatted images were provided for review.     FINDINGS:  The imaged portion of the neck and thyroid gland is unremarkable.      Nodules are present in the pulmonary parenchyma. The left upper lobe 26  series 3 a 7.2 mm nodule is present. Previously this was approximately  6.7 mm.     The posterior right lung a 19 mm nodules present on image 54 series 3  previously this was 13.9 mm..     In the posterior medial right lung a 14 mm nodule present previously  this was 11 mm. This is on image 129     In the right lung on image 88 series 3 a 7.7 mm nodule present  previously this was 7.1 mm.      No new pulmonary nodules are identified. No pleural effusion.. The  trachea and bronchial tree are patent.     The heart, great vessels, and pulmonary vessels are normal in appearance  within limits of a noncontrast study. There is no pericardial effusion.  No enlarged axillary or mediastinal lymph nodes are present.      No acute findings are seen in the bones or surrounding soft tissues.     The abdominal findings will be dictated under separate report..     IMPRESSION:  1. Pulmonary nodules are noted as described above. These are slightly  increased in size compared to June 11, 2021. However no new nodules are  identified.        This report was finalized on 09/07/2021 10:36 by Dr. Myke Swartz MD.     EXAMINATION: NM PET/CT SKULL BASE TO MID THIGH-    9/28/2021 8:04 AM CDT    HISTORY: abnormal chest CT in pt w metastatic renal cell ca;  C64.1-Malignant neoplasm of right kidney, except renal pelvis;  C78.00-Secondary malignant neoplasm of unspecified lung    Comparison is made with abdomen/pelvis CTs from September 7, 2021 and  June 11, 2021.    Comparison is made with a PET/CT from January 26,  2021.    Dose: 11.93 mCi F-18 FDG.   Route of administration: Right antecubital intravenous injection.    Blood glucose level = 93. Body weight = 195.    Skull base to mid thigh PET imaging.  Noncontrast CT imaging for attenuation correction.  DLP in mGycm= 1059.   PET/CT image fusion performed.    Normal background soft tissue uptake.    Expected physiologic uptake within the heart, liver, spleen, kidneys,  bowel loops, and urinary bladder.    20 x 17 pleural-based soft tissue nodule within the superior segment  right lower lobe has increased in size over the past 3 months.  Minimal FDG uptake within this lesion with SUV max = 1.4.  This compares with background normal muscle SUV max = 1.0.    Stable 8-9 mm nodule within the anterior lateral left upper lobe.  No appreciable FDG uptake within this finding.    No abnormal FDG uptake is seen within the abdomen or pelvis.    Summary:  1. There has been increased size of a right lower lobe lung nodule (21 x  15 mm compared with 14 x 14 mm 3 months ago) though this finding shows  no significant FDG uptake.  2. Otherwise stable PET CT exam  This report was finalized on 09/28/2021 11:37 by Dr. Serjio Menjivar MD.      I reviewed the patient's new clinical results.  Discussed with patient      Assessment/Plan       Diagnoses and all orders for this visit:    1. Renal cell carcinoma of right kidney (HCC) (Primary)    2. Stage 3 chronic kidney disease, unspecified whether stage 3a or 3b CKD (HCC)    3. 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 Multiple nodules of lung          I discussed the patients findings and my recommendations with patient.      We discussed the differential diagnoses possible with malignancy high in the differential.  We discussed the importance of staging such that best therapy can be selected and portend prognosis accurately.  We discussed options for care including continued surveillance verses efforts towards diagnosis and treatment.   We discussed options for diagnosis including bronchoscopy, CT-guided needle biopsy, or surgical biopsy with either cervical mediastinoscopy or Thoracoscopy with resection.We discussed the value of clinical staging with a CT/PET scan.  The pros and cons of each option were discussed at length.  I believe the best option for treatment is Right thoracoscopic wedge resection of every a viable nodule amenable to that treatment course.  The risks of the procedure were discussed to included but not limited to bleeding, infection, stroke, heart attack, anesthesia risks, need for additional procedures, great vessel injury, injury to nerve with resultant hoarseness of voice, mechanical ventilation, ICU stay, chronic pain syndromes, need for thoracotomy, need for prolonged chest tube use, and/or death.  Additionally an assessment of lung function status is warranted to evaluate his candidacy for a resection. All questions have been answered to the best of my ability and he is agreeable to the aforementioned plan.  A date for surgery will be selected.      He is a non-smoker.

## 2021-11-03 DIAGNOSIS — R91.8 MULTIPLE NODULES OF LUNG: Primary | ICD-10-CM

## 2021-11-04 ENCOUNTER — TELEPHONE (OUTPATIENT)
Dept: CARDIAC SURGERY | Facility: CLINIC | Age: 66
End: 2021-11-04

## 2021-11-04 DIAGNOSIS — Z20.822 ENCOUNTER FOR PREOPERATIVE SCREENING LABORATORY TESTING FOR COVID-19 VIRUS: Primary | ICD-10-CM

## 2021-11-04 DIAGNOSIS — Z01.812 ENCOUNTER FOR PREOPERATIVE SCREENING LABORATORY TESTING FOR COVID-19 VIRUS: Primary | ICD-10-CM

## 2021-11-04 NOTE — TELEPHONE ENCOUNTER
Pt calling.  States he saw Dr Tolentino 3 weeks ago and he was going to be getting pt set up for surgery but pt has not heard anything further re: this.  Pt is aware Dr Tolentino was on vacation for a time but he is wondering if he can check on the status of this.  Can reach pt at #[phone] - OK to leave message as pt is working in a remote area and does not always have cell service during the day./misti

## 2021-11-04 NOTE — TELEPHONE ENCOUNTER
PFT have been ordered and called patient to update. He is agreeable to the plan. Advised to call back tomorrow if he has not heard scheduling.

## 2021-11-05 NOTE — TELEPHONE ENCOUNTER
Patient aware of PFT date/time. He would like an OR date after Thanksgiving. He is currently building a home and states that right now is just not a good time, but he doesn't want to put it off too long. I explained I would let Dr Tolentino know and would get back to him next week with a different date. He voiced understanding.

## 2021-11-06 ENCOUNTER — LAB (OUTPATIENT)
Dept: LAB | Facility: HOSPITAL | Age: 66
End: 2021-11-06

## 2021-11-06 DIAGNOSIS — Z01.812 ENCOUNTER FOR PREOPERATIVE SCREENING LABORATORY TESTING FOR COVID-19 VIRUS: ICD-10-CM

## 2021-11-06 DIAGNOSIS — Z20.822 ENCOUNTER FOR PREOPERATIVE SCREENING LABORATORY TESTING FOR COVID-19 VIRUS: ICD-10-CM

## 2021-11-06 LAB — SARS-COV-2 ORF1AB RESP QL NAA+PROBE: NOT DETECTED

## 2021-11-06 PROCEDURE — C9803 HOPD COVID-19 SPEC COLLECT: HCPCS

## 2021-11-06 PROCEDURE — U0004 COV-19 TEST NON-CDC HGH THRU: HCPCS

## 2021-11-09 ENCOUNTER — HOSPITAL ENCOUNTER (OUTPATIENT)
Dept: PULMONOLOGY | Facility: HOSPITAL | Age: 66
Discharge: HOME | End: 2021-11-09
Admitting: NURSE PRACTITIONER

## 2021-11-09 DIAGNOSIS — R91.8 MULTIPLE NODULES OF LUNG: ICD-10-CM

## 2021-11-09 LAB
ARTERIAL PATENCY WRIST A: POSITIVE
ATMOSPHERIC PRESS: 753 MMHG
BASE EXCESS BLDA CALC-SCNC: -3.9 MMOL/L (ref 0–2)
BDY SITE: ABNORMAL
BODY TEMPERATURE: 37 C
HCO3 BLDA-SCNC: 21 MMOL/L (ref 20–26)
Lab: ABNORMAL
MODALITY: ABNORMAL
PCO2 BLDA: 37.2 MM HG (ref 35–45)
PCO2 TEMP ADJ BLD: 37.2 MM HG (ref 35–45)
PH BLDA: 7.36 PH UNITS (ref 7.35–7.45)
PH, TEMP CORRECTED: 7.36 PH UNITS (ref 7.35–7.45)
PO2 BLDA: 91.4 MM HG (ref 83–108)
PO2 TEMP ADJ BLD: 91.4 MM HG (ref 83–108)
SAO2 % BLDCOA: 97.1 % (ref 94–99)
VENTILATOR MODE: ABNORMAL

## 2021-11-09 PROCEDURE — 94060 EVALUATION OF WHEEZING: CPT | Performed by: INTERNAL MEDICINE

## 2021-11-09 PROCEDURE — 94729 DIFFUSING CAPACITY: CPT | Performed by: INTERNAL MEDICINE

## 2021-11-09 PROCEDURE — 94729 DIFFUSING CAPACITY: CPT

## 2021-11-09 PROCEDURE — 94726 PLETHYSMOGRAPHY LUNG VOLUMES: CPT | Performed by: INTERNAL MEDICINE

## 2021-11-09 PROCEDURE — 82803 BLOOD GASES ANY COMBINATION: CPT

## 2021-11-09 PROCEDURE — 94060 EVALUATION OF WHEEZING: CPT

## 2021-11-09 PROCEDURE — 94726 PLETHYSMOGRAPHY LUNG VOLUMES: CPT

## 2021-11-09 PROCEDURE — 36600 WITHDRAWAL OF ARTERIAL BLOOD: CPT

## 2021-11-09 RX ORDER — ALBUTEROL SULFATE 2.5 MG/3ML
2.5 SOLUTION RESPIRATORY (INHALATION) ONCE
Status: COMPLETED | OUTPATIENT
Start: 2021-11-09 | End: 2021-11-09

## 2021-11-09 RX ADMIN — ALBUTEROL SULFATE 2.5 MG: 2.5 SOLUTION RESPIRATORY (INHALATION) at 15:17

## 2021-11-09 NOTE — TELEPHONE ENCOUNTER
Patient is agreeable to OR on 11/29. I explained I will call back with further information re: prework and OR date/time

## 2021-11-11 ENCOUNTER — PREP FOR SURGERY (OUTPATIENT)
Dept: OTHER | Facility: HOSPITAL | Age: 66
End: 2021-11-11

## 2021-11-11 DIAGNOSIS — R91.1 LUNG NODULE: Primary | ICD-10-CM

## 2021-11-11 DIAGNOSIS — D49.9 NEOPLASM: ICD-10-CM

## 2021-11-11 DIAGNOSIS — Z85.528 HISTORY OF RENAL CELL CANCER: ICD-10-CM

## 2021-11-11 DIAGNOSIS — C64.1 RENAL CELL CARCINOMA OF RIGHT KIDNEY (HCC): ICD-10-CM

## 2021-11-11 RX ORDER — ALBUTEROL SULFATE 1.25 MG/3ML
1.25 SOLUTION RESPIRATORY (INHALATION)
Status: CANCELLED | OUTPATIENT
Start: 2021-11-11

## 2021-11-11 RX ORDER — SODIUM CHLORIDE 0.9 % (FLUSH) 0.9 %
10 SYRINGE (ML) INJECTION AS NEEDED
Status: CANCELLED | OUTPATIENT
Start: 2021-11-11

## 2021-11-11 RX ORDER — SODIUM CHLORIDE 0.9 % (FLUSH) 0.9 %
10 SYRINGE (ML) INJECTION EVERY 12 HOURS SCHEDULED
Status: CANCELLED | OUTPATIENT
Start: 2021-11-11

## 2021-11-11 NOTE — TELEPHONE ENCOUNTER
Returned call to patient. He states that he is currently taking prednisone for his kidney failure. He is aware of prework date/time and OR date/arrival time 0500/npo/no meds.

## 2021-11-11 NOTE — TELEPHONE ENCOUNTER
Please check with patient. He has prednisone listed on home med list. Ask him if this is current med and why he is on this

## 2021-11-22 ENCOUNTER — TRANSCRIBE ORDERS (OUTPATIENT)
Dept: LAB | Facility: HOSPITAL | Age: 66
End: 2021-11-22

## 2021-11-24 ENCOUNTER — PRE-ADMISSION TESTING (OUTPATIENT)
Dept: PREADMISSION TESTING | Facility: HOSPITAL | Age: 66
End: 2021-11-24

## 2021-11-24 ENCOUNTER — TELEPHONE (OUTPATIENT)
Dept: CARDIAC SURGERY | Facility: CLINIC | Age: 66
End: 2021-11-24

## 2021-11-24 ENCOUNTER — HOSPITAL ENCOUNTER (OUTPATIENT)
Dept: GENERAL RADIOLOGY | Facility: HOSPITAL | Age: 66
Discharge: HOME | End: 2021-11-24

## 2021-11-24 ENCOUNTER — LAB (OUTPATIENT)
Dept: LAB | Facility: HOSPITAL | Age: 66
End: 2021-11-24

## 2021-11-24 VITALS
RESPIRATION RATE: 14 BRPM | WEIGHT: 200.18 LBS | SYSTOLIC BLOOD PRESSURE: 142 MMHG | OXYGEN SATURATION: 99 % | BODY MASS INDEX: 29.65 KG/M2 | HEART RATE: 72 BPM | DIASTOLIC BLOOD PRESSURE: 82 MMHG | HEIGHT: 69 IN

## 2021-11-24 DIAGNOSIS — R91.1 LUNG NODULE: ICD-10-CM

## 2021-11-24 DIAGNOSIS — C78.00 MALIGNANT NEOPLASM METASTATIC TO LUNG, UNSPECIFIED LATERALITY (HCC): ICD-10-CM

## 2021-11-24 DIAGNOSIS — C64.1 RENAL CELL CARCINOMA OF RIGHT KIDNEY (HCC): ICD-10-CM

## 2021-11-24 DIAGNOSIS — E87.5 HYPERKALEMIA: Primary | ICD-10-CM

## 2021-11-24 DIAGNOSIS — D49.9 NEOPLASM: ICD-10-CM

## 2021-11-24 DIAGNOSIS — E87.5 HYPERKALEMIA: ICD-10-CM

## 2021-11-24 LAB
ALBUMIN SERPL-MCNC: 4.6 G/DL (ref 3.5–5.2)
ALBUMIN SERPL-MCNC: 4.6 G/DL (ref 3.5–5.2)
ALBUMIN/GLOB SERPL: 2.2 G/DL
ALBUMIN/GLOB SERPL: 2.4 G/DL
ALP SERPL-CCNC: 67 U/L (ref 39–117)
ALP SERPL-CCNC: 72 U/L (ref 39–117)
ALT SERPL W P-5'-P-CCNC: 19 U/L (ref 1–41)
ALT SERPL W P-5'-P-CCNC: 20 U/L (ref 1–41)
ANION GAP SERPL CALCULATED.3IONS-SCNC: 10 MMOL/L (ref 5–15)
ANION GAP SERPL CALCULATED.3IONS-SCNC: 12 MMOL/L (ref 5–15)
APTT PPP: 27.1 SECONDS (ref 24.1–35)
AST SERPL-CCNC: 16 U/L (ref 1–40)
AST SERPL-CCNC: 17 U/L (ref 1–40)
BASOPHILS # BLD AUTO: 0.03 10*3/MM3 (ref 0–0.2)
BASOPHILS NFR BLD AUTO: 0.5 % (ref 0–1.5)
BILIRUB SERPL-MCNC: 0.4 MG/DL (ref 0–1.2)
BILIRUB SERPL-MCNC: 0.4 MG/DL (ref 0–1.2)
BUN SERPL-MCNC: 44 MG/DL (ref 8–23)
BUN SERPL-MCNC: 46 MG/DL (ref 8–23)
BUN/CREAT SERPL: 17.6 (ref 7–25)
BUN/CREAT SERPL: 18.2 (ref 7–25)
CALCIUM SPEC-SCNC: 9.4 MG/DL (ref 8.6–10.5)
CALCIUM SPEC-SCNC: 9.8 MG/DL (ref 8.6–10.5)
CHLORIDE SERPL-SCNC: 106 MMOL/L (ref 98–107)
CHLORIDE SERPL-SCNC: 106 MMOL/L (ref 98–107)
CO2 SERPL-SCNC: 21 MMOL/L (ref 22–29)
CO2 SERPL-SCNC: 23 MMOL/L (ref 22–29)
CREAT SERPL-MCNC: 2.5 MG/DL (ref 0.76–1.27)
CREAT SERPL-MCNC: 2.53 MG/DL (ref 0.76–1.27)
DEPRECATED RDW RBC AUTO: 43.9 FL (ref 37–54)
EOSINOPHIL # BLD AUTO: 0.11 10*3/MM3 (ref 0–0.4)
EOSINOPHIL NFR BLD AUTO: 1.7 % (ref 0.3–6.2)
ERYTHROCYTE [DISTWIDTH] IN BLOOD BY AUTOMATED COUNT: 13.1 % (ref 12.3–15.4)
GFR SERPL CREATININE-BSD FRML MDRD: 26 ML/MIN/1.73
GFR SERPL CREATININE-BSD FRML MDRD: 26 ML/MIN/1.73
GLOBULIN UR ELPH-MCNC: 1.9 GM/DL
GLOBULIN UR ELPH-MCNC: 2.1 GM/DL
GLUCOSE SERPL-MCNC: 106 MG/DL (ref 65–99)
GLUCOSE SERPL-MCNC: 187 MG/DL (ref 65–99)
HCT VFR BLD AUTO: 46.1 % (ref 37.5–51)
HGB BLD-MCNC: 15.1 G/DL (ref 13–17.7)
IMM GRANULOCYTES # BLD AUTO: 0.02 10*3/MM3 (ref 0–0.05)
IMM GRANULOCYTES NFR BLD AUTO: 0.3 % (ref 0–0.5)
INR PPP: 1.03 (ref 0.91–1.09)
LYMPHOCYTES # BLD AUTO: 1.17 10*3/MM3 (ref 0.7–3.1)
LYMPHOCYTES NFR BLD AUTO: 17.8 % (ref 19.6–45.3)
MCH RBC QN AUTO: 30 PG (ref 26.6–33)
MCHC RBC AUTO-ENTMCNC: 32.8 G/DL (ref 31.5–35.7)
MCV RBC AUTO: 91.7 FL (ref 79–97)
MONOCYTES # BLD AUTO: 0.62 10*3/MM3 (ref 0.1–0.9)
MONOCYTES NFR BLD AUTO: 9.4 % (ref 5–12)
NEUTROPHILS NFR BLD AUTO: 4.64 10*3/MM3 (ref 1.7–7)
NEUTROPHILS NFR BLD AUTO: 70.3 % (ref 42.7–76)
NRBC BLD AUTO-RTO: 0 /100 WBC (ref 0–0.2)
PLATELET # BLD AUTO: 232 10*3/MM3 (ref 140–450)
PMV BLD AUTO: 9.8 FL (ref 6–12)
POTASSIUM SERPL-SCNC: 5.2 MMOL/L (ref 3.5–5.2)
POTASSIUM SERPL-SCNC: 6.2 MMOL/L (ref 3.5–5.2)
PROT SERPL-MCNC: 6.5 G/DL (ref 6–8.5)
PROT SERPL-MCNC: 6.7 G/DL (ref 6–8.5)
PROTHROMBIN TIME: 13.1 SECONDS (ref 11.9–14.6)
RBC # BLD AUTO: 5.03 10*6/MM3 (ref 4.14–5.8)
SODIUM SERPL-SCNC: 139 MMOL/L (ref 136–145)
SODIUM SERPL-SCNC: 139 MMOL/L (ref 136–145)
WBC NRBC COR # BLD: 6.59 10*3/MM3 (ref 3.4–10.8)

## 2021-11-24 PROCEDURE — 85730 THROMBOPLASTIN TIME PARTIAL: CPT

## 2021-11-24 PROCEDURE — 85610 PROTHROMBIN TIME: CPT

## 2021-11-24 PROCEDURE — 71046 X-RAY EXAM CHEST 2 VIEWS: CPT

## 2021-11-24 PROCEDURE — 80053 COMPREHEN METABOLIC PANEL: CPT

## 2021-11-24 PROCEDURE — 36415 COLL VENOUS BLD VENIPUNCTURE: CPT

## 2021-11-24 PROCEDURE — 85025 COMPLETE CBC W/AUTO DIFF WBC: CPT

## 2021-11-24 RX ORDER — CETIRIZINE HYDROCHLORIDE 10 MG/1
10 TABLET ORAL DAILY
COMMUNITY
End: 2022-03-21 | Stop reason: ALTCHOICE

## 2021-11-24 NOTE — TELEPHONE ENCOUNTER
Called patient to discuss pre work labs: K 6.2 and creat 2.5. states he ate a banana today. Asked him to return to the outpatient lab to repeat a CMP. Says he will turn around and come back to the hospital. He will call us after this is completed.

## 2021-11-24 NOTE — TELEPHONE ENCOUNTER
Reviewed labs. K 5.2. Called patient to inform him but no answer and left message. Surgery on for Monday.

## 2021-11-24 NOTE — DISCHARGE INSTRUCTIONS
DAY OF SURGERY INSTRUCTIONS          ARRIVAL TIME: AS DIRECTED BY OFFICE    PLEASE TAKE CARVEDILOL THE MORNING OF SURGERY WITH A SIP OF WATER      ALL OTHER HOME MEDICATION CHECK WITH YOUR PHYSICIAN      DO NOT TAKE ANY ERECTILE DYSFUNCTION MEDICATIONS (EX: CIALIS, VIAGRA) 24 HOURS PRIOR TO SURGERY                      MANAGING PAIN AFTER SURGERY    We know you are probably wondering what your pain will be like after surgery.  Following surgery it is unrealistic to expect you will not have pain.   Pain is how our bodies let us know that something is wrong or cautions us to be careful.  That said, our goal is to make your pain tolerable.    Methods we may use to treat your pain include (oral or IV medications, PCAs, epidurals, nerve blocks, etc.)   While some procedures require IV pain medications for a short time after surgery, transitioning to pain medications by mouth allows for better management of pain.   Your nurse will encourage you to take oral pain medications whenever possible.  IV medications work almost immediately, but only last a short while.  Taking medications by mouth allows for a more constant level of medication in your blood stream for a longer period of time.      Once your pain is out of control it is harder to get back under control.  It is important you are aware when your next dose of pain medication is due.  If you are admitted, your nurse may write the time of your next dose on the white board in your room to help you remember.      We are interested in your pain and encourage you to inform us about aggravating factors during your visit.   Many times a simple repositioning every few hours can make a big difference.    If your physician says it is okay, do not let your pain prevent you from getting out of bed. Be sure to call your nurse for assistance prior to getting up so you do not fall.      Before surgery, please decide your tolerable pain goal.  These faces help describe the pain  ratings we use on a 0-10 scale.   Be prepared to tell us your goal and whether or not you take pain or anxiety medications at home.          BEFORE YOU COME TO THE HOSPITAL  (Pre-op instructions)  • Do not eat, drink, smoke or chew gum after midnight the night before surgery.  This also includes no mints.  • Morning of surgery take only the medicines you have been instructed with a sip of water unless otherwise instructed  by your physician.  • Do not shave, wear makeup or dark nail polish.  • Remove all jewelry including rings.  • Leave anything you consider valuable at home.  • Leave your suitcase in the car until after your surgery.  • Bring the following with you if applicable:  o Picture ID and insurance, Medicare or Medicaid cards  o Co-pay/deductible required by insurance (cash, check, credit card)  o Copy of advance directive, living will or power-of- documents if not brought to pre-work  o CPAP or BIPAP mask and tubing  o Relaxation aids ( book, magazine), etc.  o Hearing aids                        ON THE DAY OF SURGERY  · On the day of surgery check in at registration located at the main entrance of the hospital. Only one family member or friend are allowed per patient.  ? You will be registered and given a beeper with instructions where to wait in the main lobby.  ? When your beeper lights up and vibrates a member of the Outpatient Surgery staff will meet you at the double doors under the stair steps and escort you to your preoperative room.   · You may have cloth compression devices placed on your legs. These help to prevent blood clots and reduce swelling in your legs.  · An IV may be inserted into one of your veins.  · In the operating room, you may be given one or more of the following:  ? A medicine to help you relax (sedative).  ? A medicine to numb the area (local anesthetic).  ? A medicine to make you fall asleep (general anesthetic).  ? A medicine that is injected into an area of your  "body to numb everything below the injection site (regional anesthetic).  · Your surgical site will be marked or identified.  · You may be given an antibiotic through your IV to help prevent infection.  Contact a health care provider if you:  · Develop a fever of more than 100.4°F (38°C) or other feelings of illness during the 48 hours before your surgery.  · Have symptoms that get worse.  Have questions or concerns about your surgery    General Anesthesia/Surgery, Adult  General anesthesia is the use of medicines to make a person \"go to sleep\" (unconscious) for a medical procedure. General anesthesia must be used for certain procedures, and is often recommended for procedures that:  · Last a long time.  · Require you to be still or in an unusual position.  · Are major and can cause blood loss.  The medicines used for general anesthesia are called general anesthetics. As well as making you unconscious for a certain amount of time, these medicines:  · Prevent pain.  · Control your blood pressure.  · Relax your muscles.  Tell a health care provider about:  · Any allergies you have.  · All medicines you are taking, including vitamins, herbs, eye drops, creams, and over-the-counter medicines.  · Any problems you or family members have had with anesthetic medicines.  · Types of anesthetics you have had in the past.  · Any blood disorders you have.  · Any surgeries you have had.  · Any medical conditions you have.  · Any recent upper respiratory, chest, or ear infections.  · Any history of:  ? Heart or lung conditions, such as heart failure, sleep apnea, asthma, or chronic obstructive pulmonary disease (COPD).  ?  service.  ? Depression or anxiety.  · Any tobacco or drug use, including marijuana or alcohol use.  · Whether you are pregnant or may be pregnant.  What are the risks?  Generally, this is a safe procedure. However, problems may occur, including:  · Allergic reaction.  · Lung and heart " problems.  · Inhaling food or liquid from the stomach into the lungs (aspiration).  · Nerve injury.  · Air in the bloodstream, which can lead to stroke.  · Extreme agitation or confusion (delirium) when you wake up from the anesthetic.  · Waking up during your procedure and being unable to move. This is rare.  These problems are more likely to develop if you are having a major surgery or if you have an advanced or serious medical condition. You can prevent some of these complications by answering all of your health care provider's questions thoroughly and by following all instructions before your procedure.  General anesthesia can cause side effects, including:  · Nausea or vomiting.  · A sore throat from the breathing tube.  · Hoarseness.  · Wheezing or coughing.  · Shaking chills.  · Tiredness.  · Body aches.  · Anxiety.  · Sleepiness or drowsiness.  · Confusion or agitation.  RISKS AND COMPLICATIONS OF SURGERY  Your health care provider will discuss possible risks and complications with you before surgery. Common risks and complications include:    · Problems due to the use of anesthetics.  · Blood loss and replacement (does not apply to minor surgical procedures).  · Temporary increase in pain due to surgery.  · Uncorrected pain or problems that the surgery was meant to correct.  · Infection.  · New damage.    What happens before the procedure?    Medicines  Ask your health care provider about:  · Changing or stopping your regular medicines. This is especially important if you are taking diabetes medicines or blood thinners.  · Taking medicines such as aspirin and ibuprofen. These medicines can thin your blood. Do not take these medicines unless your health care provider tells you to take them.  · Taking over-the-counter medicines, vitamins, herbs, and supplements. Do not take these during the week before your procedure unless your health care provider approves them.  General instructions  · Starting 3-6 weeks  before the procedure, do not use any products that contain nicotine or tobacco, such as cigarettes and e-cigarettes. If you need help quitting, ask your health care provider.  · If you brush your teeth on the morning of the procedure, make sure to spit out all of the toothpaste.  · Tell your health care provider if you become ill or develop a cold, cough, or fever.  · If instructed by your health care provider, bring your sleep apnea device with you on the day of your surgery (if applicable).  · Ask your health care provider if you will be going home the same day, the following day, or after a longer hospital stay.  ? Plan to have someone take you home from the hospital or clinic.  ? Plan to have a responsible adult care for you for at least 24 hours after you leave the hospital or clinic. This is important.  What happens during the procedure?  · You will be given anesthetics through both of the following:  ? A mask placed over your nose and mouth.  ? An IV in one of your veins.  · You may receive a medicine to help you relax (sedative).  · After you are unconscious, a breathing tube may be inserted down your throat to help you breathe. This will be removed before you wake up.  · An anesthesia specialist will stay with you throughout your procedure. He or she will:  ? Keep you comfortable and safe by continuing to give you medicines and adjusting the amount of medicine that you get.  ? Monitor your blood pressure, pulse, and oxygen levels to make sure that the anesthetics do not cause any problems.  The procedure may vary among health care providers and hospitals.  What happens after the procedure?  · Your blood pressure, temperature, heart rate, breathing rate, and blood oxygen level will be monitored until the medicines you were given have worn off.  · You will wake up in a recovery area. You may wake up slowly.  · If you feel anxious or agitated, you may be given medicine to help you calm down.  · If you will be  going home the same day, your health care provider may check to make sure you can walk, drink, and urinate.  · Your health care provider will treat any pain or side effects you have before you go home.  · Do not drive for 24 hours if you were given a sedative.  Summary  · General anesthesia is used to keep you still and prevent pain during a procedure.  · It is important to tell your healthcare provider about your medical history and any surgeries you have had, and previous experience with anesthesia.  · Follow your healthcare provider’s instructions about when to stop eating, drinking, or taking certain medicines before your procedure.  · Plan to have someone take you home from the hospital or clinic.  This information is not intended to replace advice given to you by your health care provider. Make sure you discuss any questions you have with your health care provider.  Document Released: 03/26/2009 Document Revised: 08/03/2018 Document Reviewed: 08/03/2018  [employer] Interactive Patient Education © 2019 [employer]       Fall Prevention in Hospitals, Adult  As a hospital patient, your condition and the treatments you receive can increase your risk for falls. Some additional risk factors for falls in a hospital include:  · Being in an unfamiliar environment.  · Being on bed rest.  · Your surgery.  · Taking certain medicines.  · Your tubing requirements, such as intravenous (IV) therapy or catheters.  It is important that you learn how to decrease fall risks while at the hospital. Below are important tips that can help prevent falls.  SAFETY TIPS FOR PREVENTING FALLS  Talk about your risk of falling.  · Ask your health care provider why you are at risk for falling. Is it your medicine, illness, tubing placement, or something else?  · Make a plan with your health care provider to keep you safe from falls.  · Ask your health care provider or pharmacist about side effects of your medicines. Some medicines can make  you dizzy or affect your coordination.  Ask for help.  · Ask for help before getting out of bed. You may need to press your call button.  · Ask for assistance in getting safely to the toilet.  · Ask for a walker or cane to be put at your bedside. Ask that most of the side rails on your bed be placed up before your health care provider leaves the room.  · Ask family or friends to sit with you.  · Ask for things that are out of your reach, such as your glasses, hearing aids, telephone, bedside table, or call button.  Follow these tips to avoid falling:  · Stay lying or seated, rather than standing, while waiting for help.  · Wear rubber-soled slippers or shoes whenever you walk in the hospital.  · Avoid quick, sudden movements.  ¨ Change positions slowly.  ¨ Sit on the side of your bed before standing.  ¨ Stand up slowly and wait before you start to walk.  · Let your health care provider know if there is a spill on the floor.  · Pay careful attention to the medical equipment, electrical cords, and tubes around you.  · When you need help, use your call button by your bed or in the bathroom. Wait for one of your health care providers to help you.  · If you feel dizzy or unsure of your footing, return to bed and wait for assistance.  · Avoid being distracted by the TV, telephone, or another person in your room.  · Do not lean or support yourself on rolling objects, such as IV poles or bedside tables.     This information is not intended to replace advice given to you by your health care provider. Make sure you discuss any questions you have with your health care provider.     Document Released: 12/15/2001 Document Revised: 01/08/2016 Document Reviewed: 08/25/2013  [employer] Interactive Patient Education ©2016 [employer]       [facility]  CHG 4% Patient Instruction Sheet    Chlorhexidine Before Surgery  Chlorhexidine gluconate (CHG) is a germ-killing (antiseptic) solution that is used to clean the skin. It  gets rid of the bacteria that normally live on the skin. Cleaning your skin with CHG before surgery helps lower the risk for infection after surgery.    How to use CHG solution  · You will take 2 showers, one shower the night before surgery, the second shower the morning of surgery before coming to the hospital.  · Use CHG only as told by your health care provider, and follow the instructions on the label.  · Use CHG solution while taking a shower. Follow these steps when using CHG solution (unless your health care provider gives you different instructions):  1. Start the shower.  2. Use your normal soap and shampoo to wash your face and hair.  3. Turn off the shower or move out of the shower stream.  4. Pour the CHG onto a clean washcloth. Do not use any type of brush or rough-edged sponge.  5. Starting at your neck, lather your body down to your toes. Make sure you:  6. Pay special attention to the part of your body where you will be having surgery. Scrub this area for at least 1 minute.  7. Use the full amount of CHG as directed. Usually, this is one half bottle for each shower.  8. Do not use CHG on your head or face. If the solution gets into your ears or eyes, rinse them well with water.  9. Avoid your genital area.  10. Avoid any areas of skin that have broken skin, cuts, or scrapes.  11. Scrub your back and under your arms. Make sure to wash skin folds.  12. Let the lather sit on your skin for 1-2 minutes or as long as told by your health care  provider.  13. Thoroughly rinse your entire body in the shower. Make sure that all body creases and crevices are rinsed well.  14. Dry off with a clean towel. Do not put any substances on your body afterward, such as powder, lotion, or perfume.  15. Put on clean clothes or pajamas.  16. If it is the night before your surgery, sleep in clean sheets.    What are the risks?  Risks of using CHG include:  · A skin reaction.  · Hearing loss, if CHG gets in your ears.  · Eye  injury, if CHG gets in your eyes and is not rinsed out.  · The CHG product catching fire.  Make sure that you avoid smoking and flames after applying CHG to your skin.  Do not use CHG:  · If you have a chlorhexidine allergy or have previously reacted to chlorhexidine.  · On babies younger than 2 months of age.      On the day of surgery, when you are taken to your room in Outpatient Surgery you will be given a CHG prepackaged cloth to wipe the site for your surgery.  How to use CHG prepackaged cloths  · Follow the instructions on the label.  · Use the CHG cloth on clean, dry skin. Follow these steps when using a CHG cloth (unless your health care provider gives you different instructions):  1. Using the CHG cloth, vigorously scrub the part of your body where you will be having surgery. Scrub using a back-and-forth motion for 3 minutes. The area on your body should be completely wet with CHG when you are finished scrubbing.  2. Do not rinse. Discard the cloth and let the area air-dry for 1 minute. Do not put any substances on your body afterward, such as powder, lotion, or perfume.  Contact a health care provider if:  · Your skin gets irritated after scrubbing.  · You have questions about using your solution or cloth.  Get help right away if:  · Your eyes become very red or swollen.  · Your eyes itch badly.  · Your skin itches badly and is red or swollen.  · Your hearing changes.  · You have trouble seeing.  · You have swelling or tingling in your mouth or throat.  · You have trouble breathing.  · You swallow any chlorhexidine.  Summary  · Chlorhexidine gluconate (CHG) is a germ-killing (antiseptic) solution that is used to clean the skin. Cleaning your skin with CHG before surgery helps lower the risk for infection after surgery.  · You may be given CHG to use at home. It may be in a bottle or in a prepackaged cloth to use on your skin. Carefully follow your health care provider's instructions and the instructions  on the product label.  · Do not use CHG if you have a chlorhexidine allergy.  · Contact your health care provider if your skin gets irritated after scrubbing.  This information is not intended to replace advice given to you by your health care provider. Make sure you discuss any questions you have with your health care provider.  Document Released: 09/11/2013 Document Revised: 11/15/2018 Document Reviewed: 11/15/2018  [employer] Interactive Patient Education © 2019 [employer]          PATIENT/FAMILY/RESPONSIBLE PARTY VERBALIZES UNDERSTANDING OF ABOVE EDUCATION.  COPY OF PAIN SCALE GIVEN AND REVIEWED WITH VERBALIZED UNDERSTANDING.

## 2021-11-24 NOTE — TELEPHONE ENCOUNTER
Pt called back to report he just left the lab after completing the add'l testing requested today/misti

## 2021-11-26 ENCOUNTER — APPOINTMENT (OUTPATIENT)
Dept: PULMONOLOGY | Facility: HOSPITAL | Age: 66
End: 2021-11-26

## 2021-11-26 ENCOUNTER — LAB (OUTPATIENT)
Dept: LAB | Facility: HOSPITAL | Age: 66
End: 2021-11-26

## 2021-11-26 DIAGNOSIS — R91.1 LUNG NODULE: ICD-10-CM

## 2021-11-26 DIAGNOSIS — C64.1 RENAL CELL CARCINOMA OF RIGHT KIDNEY (HCC): ICD-10-CM

## 2021-11-26 LAB — SARS-COV-2 ORF1AB RESP QL NAA+PROBE: NOT DETECTED

## 2021-11-26 PROCEDURE — C9803 HOPD COVID-19 SPEC COLLECT: HCPCS

## 2021-11-26 PROCEDURE — U0004 COV-19 TEST NON-CDC HGH THRU: HCPCS

## 2021-11-29 ENCOUNTER — APPOINTMENT (OUTPATIENT)
Dept: GENERAL RADIOLOGY | Facility: HOSPITAL | Age: 66
End: 2021-11-29

## 2021-11-29 ENCOUNTER — ANESTHESIA EVENT (OUTPATIENT)
Dept: PERIOP | Facility: HOSPITAL | Age: 66
End: 2021-11-29

## 2021-11-29 ENCOUNTER — ANESTHESIA (OUTPATIENT)
Dept: PERIOP | Facility: HOSPITAL | Age: 66
End: 2021-11-29

## 2021-11-29 ENCOUNTER — HOSPITAL ENCOUNTER (INPATIENT)
Facility: HOSPITAL | Age: 66
LOS: 1 days | Discharge: HOME | End: 2021-11-30
Attending: THORACIC SURGERY (CARDIOTHORACIC VASCULAR SURGERY) | Admitting: THORACIC SURGERY (CARDIOTHORACIC VASCULAR SURGERY)

## 2021-11-29 ENCOUNTER — TELEPHONE (OUTPATIENT)
Dept: ONCOLOGY | Facility: CLINIC | Age: 66
End: 2021-11-29

## 2021-11-29 DIAGNOSIS — R91.1 LUNG NODULE: ICD-10-CM

## 2021-11-29 DIAGNOSIS — C64.1 RENAL CELL CARCINOMA OF RIGHT KIDNEY (HCC): ICD-10-CM

## 2021-11-29 DIAGNOSIS — C64.9 METASTATIC RENAL CELL CARCINOMA, UNSPECIFIED LATERALITY (HCC): Primary | ICD-10-CM

## 2021-11-29 LAB
A-A DO2: 27.6 MMHG
A-A DO2: ABNORMAL
ABO GROUP BLD: NORMAL
ARTERIAL PATENCY WRIST A: ABNORMAL
ARTERIAL PATENCY WRIST A: ABNORMAL
ATMOSPHERIC PRESS: 756 MMHG
ATMOSPHERIC PRESS: 756 MMHG
BASE EXCESS BLDA CALC-SCNC: -3.1 MMOL/L (ref 0–2)
BASE EXCESS BLDA CALC-SCNC: -4.5 MMOL/L (ref 0–2)
BDY SITE: ABNORMAL
BDY SITE: ABNORMAL
BLD GP AB SCN SERPL QL: NEGATIVE
BODY TEMPERATURE: 35.5 C
BODY TEMPERATURE: 37 C
CA-I BLD-MCNC: 4.86 MG/DL (ref 4.6–5.4)
CA-I BLD-MCNC: 5.07 MG/DL (ref 4.6–5.4)
COHGB MFR BLD: 0.9 % (ref 0–5)
COHGB MFR BLD: 1 % (ref 0–5)
GAS FLOW AIRWAY: 2 LPM
GLUCOSE BLDC GLUCOMTR-MCNC: 98 MG/DL (ref 70–130)
HCO3 BLDA-SCNC: 20.9 MMOL/L (ref 20–26)
HCO3 BLDA-SCNC: 21.9 MMOL/L (ref 20–26)
HCT VFR BLD CALC: 40.5 % (ref 38–51)
HCT VFR BLD CALC: 43.3 % (ref 38–51)
HGB BLDA-MCNC: 13.2 G/DL (ref 14–18)
HGB BLDA-MCNC: 14.1 G/DL (ref 14–18)
Lab: ABNORMAL
Lab: ABNORMAL
METHGB BLD QL: 0.9 % (ref 0–3)
METHGB BLD QL: 1.1 % (ref 0–3)
MODALITY: ABNORMAL
MODALITY: ABNORMAL
NOTE: ABNORMAL
NOTE: ABNORMAL
OXYHGB MFR BLDV: 94.4 % (ref 94–99)
OXYHGB MFR BLDV: 98.1 % (ref 94–99)
PCO2 BLDA: 38.2 MM HG (ref 35–45)
PCO2 BLDA: 39.2 MM HG (ref 35–45)
PCO2 TEMP ADJ BLD: 36.7 MM HG (ref 35–45)
PCO2 TEMP ADJ BLD: 38.2 MM HG (ref 35–45)
PH BLDA: 7.34 PH UNITS (ref 7.35–7.45)
PH BLDA: 7.37 PH UNITS (ref 7.35–7.45)
PH, TEMP CORRECTED: 7.36 PH UNITS (ref 7.35–7.45)
PH, TEMP CORRECTED: 7.37 PH UNITS (ref 7.35–7.45)
PO2 BLDA: 278 MM HG (ref 83–108)
PO2 BLDA: 78.1 MM HG (ref 83–108)
PO2 TEMP ADJ BLD: 271 MM HG (ref 83–108)
PO2 TEMP ADJ BLD: 78.1 MM HG (ref 83–108)
POTASSIUM BLDA-SCNC: 4.7 MMOL/L (ref 3.5–5.2)
POTASSIUM BLDA-SCNC: 4.8 MMOL/L (ref 3.5–5.2)
RH BLD: POSITIVE
SAO2 % BLDCOA: 100 % (ref 94–99)
SAO2 % BLDCOA: 96.3 % (ref 94–99)
SODIUM BLDA-SCNC: 139 MMOL/L (ref 136–145)
SODIUM BLDA-SCNC: 140 MMOL/L (ref 136–145)
T&S EXPIRATION DATE: NORMAL
VENTILATOR MODE: ABNORMAL
VENTILATOR MODE: AC

## 2021-11-29 PROCEDURE — C1889 IMPLANT/INSERT DEVICE, NOC: HCPCS | Performed by: THORACIC SURGERY (CARDIOTHORACIC VASCULAR SURGERY)

## 2021-11-29 PROCEDURE — 25010000002 FENTANYL CITRATE (PF) 50 MCG/ML SOLUTION: Performed by: ANESTHESIOLOGY

## 2021-11-29 PROCEDURE — 82962 GLUCOSE BLOOD TEST: CPT

## 2021-11-29 PROCEDURE — 25010000002 PROPOFOL 10 MG/ML EMULSION: Performed by: NURSE ANESTHETIST, CERTIFIED REGISTERED

## 2021-11-29 PROCEDURE — 94799 UNLISTED PULMONARY SVC/PX: CPT

## 2021-11-29 PROCEDURE — 86900 BLOOD TYPING SEROLOGIC ABO: CPT | Performed by: NURSE PRACTITIONER

## 2021-11-29 PROCEDURE — 63710000001 PREDNISONE PER 5 MG: Performed by: THORACIC SURGERY (CARDIOTHORACIC VASCULAR SURGERY)

## 2021-11-29 PROCEDURE — 0BJ08ZZ INSPECTION OF TRACHEOBRONCHIAL TREE, VIA NATURAL OR ARTIFICIAL OPENING ENDOSCOPIC: ICD-10-PCS | Performed by: THORACIC SURGERY (CARDIOTHORACIC VASCULAR SURGERY)

## 2021-11-29 PROCEDURE — 71045 X-RAY EXAM CHEST 1 VIEW: CPT

## 2021-11-29 PROCEDURE — 88307 TISSUE EXAM BY PATHOLOGIST: CPT | Performed by: THORACIC SURGERY (CARDIOTHORACIC VASCULAR SURGERY)

## 2021-11-29 PROCEDURE — 86850 RBC ANTIBODY SCREEN: CPT | Performed by: NURSE PRACTITIONER

## 2021-11-29 PROCEDURE — 0BBF4ZZ EXCISION OF RIGHT LOWER LUNG LOBE, PERCUTANEOUS ENDOSCOPIC APPROACH: ICD-10-PCS | Performed by: THORACIC SURGERY (CARDIOTHORACIC VASCULAR SURGERY)

## 2021-11-29 PROCEDURE — 25010000002 MIDAZOLAM PER 1 MG: Performed by: ANESTHESIOLOGY

## 2021-11-29 PROCEDURE — 82805 BLOOD GASES W/O2 SATURATION: CPT

## 2021-11-29 PROCEDURE — 88331 PATH CONSLTJ SURG 1 BLK 1SPC: CPT | Performed by: PATHOLOGY

## 2021-11-29 PROCEDURE — 25010000002 VANCOMYCIN 10 G RECONSTITUTED SOLUTION: Performed by: NURSE PRACTITIONER

## 2021-11-29 PROCEDURE — 25010000002 HYDROMORPHONE PER 4 MG: Performed by: ANESTHESIOLOGY

## 2021-11-29 PROCEDURE — 31622 DX BRONCHOSCOPE/WASH: CPT | Performed by: THORACIC SURGERY (CARDIOTHORACIC VASCULAR SURGERY)

## 2021-11-29 PROCEDURE — 0BBD4ZZ EXCISION OF RIGHT MIDDLE LUNG LOBE, PERCUTANEOUS ENDOSCOPIC APPROACH: ICD-10-PCS | Performed by: THORACIC SURGERY (CARDIOTHORACIC VASCULAR SURGERY)

## 2021-11-29 PROCEDURE — 86901 BLOOD TYPING SEROLOGIC RH(D): CPT | Performed by: NURSE PRACTITIONER

## 2021-11-29 PROCEDURE — C1729 CATH, DRAINAGE: HCPCS | Performed by: THORACIC SURGERY (CARDIOTHORACIC VASCULAR SURGERY)

## 2021-11-29 PROCEDURE — 25010000002 DEXAMETHASONE PER 1 MG: Performed by: NURSE ANESTHETIST, CERTIFIED REGISTERED

## 2021-11-29 PROCEDURE — 25010000002 DEXAMETHASONE PER 1 MG: Performed by: ANESTHESIOLOGY

## 2021-11-29 PROCEDURE — 82375 ASSAY CARBOXYHB QUANT: CPT

## 2021-11-29 PROCEDURE — 32608 THORACOSCOPY W/BX NODULE: CPT | Performed by: THORACIC SURGERY (CARDIOTHORACIC VASCULAR SURGERY)

## 2021-11-29 PROCEDURE — S0260 H&P FOR SURGERY: HCPCS | Performed by: THORACIC SURGERY (CARDIOTHORACIC VASCULAR SURGERY)

## 2021-11-29 PROCEDURE — 25010000002 ONDANSETRON PER 1 MG: Performed by: NURSE ANESTHETIST, CERTIFIED REGISTERED

## 2021-11-29 PROCEDURE — 83050 HGB METHEMOGLOBIN QUAN: CPT

## 2021-11-29 DEVICE — THE ECHELON, ECHELON ENDOPATH™ AND ECHELON FLEX™ FAMILIES OF ENDOSCOPIC LINEAR CUTTERS AND RELOADS ARE STERILE, SINGLE PATIENT USE INSTRUMENTS THAT SIMULTANEOUSLY CUT AND STAPLE TISSUE. THERE ARE SIX STAGGERED ROWS OF STAPLES, THREE ON EITHER SIDE OF THE CUT LINE. THE 45 MM INSTRUMENTS HAVE A STAPLE LINE THATIS APPROXIMATELY 45 MM LONG AND A CUT LINE THAT IS APPROXIMATELY 42 MM LONG. THE SHAFT CAN ROTATE FREELY IN BOTH DIRECTIONS AND AN ARTICULATION MECHANISM ON ARTICULATING INSTRUMENTS ENABLES BENDING THE DISTAL PORTIONOF THE SHAFT TO FACILITATE LATERAL ACCESS OF THE OPERATIVE SITE.THE INSTRUMENTS ARE SHIPPED WITHOUT A RELOAD AND MUST BE LOADED PRIOR TO USE. A STAPLE RETAINING CAP ON THE RELOAD PROTECTS THE STAPLE LEG POINTS DURING SHIPPING AND TRANSPORTATION. THE INSTRUMENTS’ LOCK-OUT FEATURE IS DESIGNED TO PREVENT A USED RELOAD FROM BEING REFIRED.
Type: IMPLANTABLE DEVICE | Site: LUNG | Status: FUNCTIONAL
Brand: ECHELON ENDOPATH

## 2021-11-29 RX ORDER — IPRATROPIUM BROMIDE AND ALBUTEROL SULFATE 2.5; .5 MG/3ML; MG/3ML
3 SOLUTION RESPIRATORY (INHALATION)
Status: DISCONTINUED | OUTPATIENT
Start: 2021-11-29 | End: 2021-11-30 | Stop reason: HOSPADM

## 2021-11-29 RX ORDER — LABETALOL HYDROCHLORIDE 5 MG/ML
5 INJECTION, SOLUTION INTRAVENOUS
Status: DISCONTINUED | OUTPATIENT
Start: 2021-11-29 | End: 2021-11-29 | Stop reason: HOSPADM

## 2021-11-29 RX ORDER — SODIUM CHLORIDE 0.9 % (FLUSH) 0.9 %
10 SYRINGE (ML) INJECTION AS NEEDED
Status: DISCONTINUED | OUTPATIENT
Start: 2021-11-29 | End: 2021-11-29 | Stop reason: HOSPADM

## 2021-11-29 RX ORDER — CETIRIZINE HYDROCHLORIDE 10 MG/1
5 TABLET ORAL DAILY
Status: DISCONTINUED | OUTPATIENT
Start: 2021-11-29 | End: 2021-11-30 | Stop reason: HOSPADM

## 2021-11-29 RX ORDER — SODIUM CHLORIDE 0.9 % (FLUSH) 0.9 %
3-10 SYRINGE (ML) INJECTION AS NEEDED
Status: DISCONTINUED | OUTPATIENT
Start: 2021-11-29 | End: 2021-11-29 | Stop reason: HOSPADM

## 2021-11-29 RX ORDER — MIDAZOLAM HYDROCHLORIDE 1 MG/ML
0.5 INJECTION INTRAMUSCULAR; INTRAVENOUS
Status: DISCONTINUED | OUTPATIENT
Start: 2021-11-29 | End: 2021-11-29 | Stop reason: HOSPADM

## 2021-11-29 RX ORDER — ALBUTEROL SULFATE 1.25 MG/3ML
1.25 SOLUTION RESPIRATORY (INHALATION)
Status: DISCONTINUED | OUTPATIENT
Start: 2021-11-29 | End: 2021-11-29 | Stop reason: HOSPADM

## 2021-11-29 RX ORDER — SODIUM CHLORIDE 9 MG/ML
80 INJECTION, SOLUTION INTRAVENOUS CONTINUOUS
Status: DISCONTINUED | OUTPATIENT
Start: 2021-11-29 | End: 2021-11-30

## 2021-11-29 RX ORDER — ONDANSETRON 2 MG/ML
INJECTION INTRAMUSCULAR; INTRAVENOUS AS NEEDED
Status: DISCONTINUED | OUTPATIENT
Start: 2021-11-29 | End: 2021-11-29 | Stop reason: SURG

## 2021-11-29 RX ORDER — BISACODYL 10 MG
10 SUPPOSITORY, RECTAL RECTAL DAILY PRN
Status: DISCONTINUED | OUTPATIENT
Start: 2021-11-29 | End: 2021-11-30 | Stop reason: HOSPADM

## 2021-11-29 RX ORDER — FENTANYL CITRATE 50 UG/ML
25 INJECTION, SOLUTION INTRAMUSCULAR; INTRAVENOUS
Status: DISCONTINUED | OUTPATIENT
Start: 2021-11-29 | End: 2021-11-29 | Stop reason: HOSPADM

## 2021-11-29 RX ORDER — SODIUM BICARBONATE 650 MG/1
650 TABLET ORAL 3 TIMES DAILY
Status: DISCONTINUED | OUTPATIENT
Start: 2021-11-29 | End: 2021-11-30 | Stop reason: HOSPADM

## 2021-11-29 RX ORDER — PREDNISONE 1 MG/1
5 TABLET ORAL DAILY
Status: DISCONTINUED | OUTPATIENT
Start: 2021-11-29 | End: 2021-11-30 | Stop reason: HOSPADM

## 2021-11-29 RX ORDER — ONDANSETRON 4 MG/1
4 TABLET, FILM COATED ORAL EVERY 6 HOURS PRN
Status: DISCONTINUED | OUTPATIENT
Start: 2021-11-29 | End: 2021-11-30 | Stop reason: HOSPADM

## 2021-11-29 RX ORDER — SODIUM CHLORIDE 0.9 % (FLUSH) 0.9 %
10 SYRINGE (ML) INJECTION EVERY 12 HOURS SCHEDULED
Status: DISCONTINUED | OUTPATIENT
Start: 2021-11-29 | End: 2021-11-29 | Stop reason: HOSPADM

## 2021-11-29 RX ORDER — SODIUM CHLORIDE 0.9 % (FLUSH) 0.9 %
3 SYRINGE (ML) INJECTION EVERY 12 HOURS SCHEDULED
Status: DISCONTINUED | OUTPATIENT
Start: 2021-11-29 | End: 2021-11-29 | Stop reason: HOSPADM

## 2021-11-29 RX ORDER — HYDROMORPHONE HYDROCHLORIDE 1 MG/ML
0.5 INJECTION, SOLUTION INTRAMUSCULAR; INTRAVENOUS; SUBCUTANEOUS
Status: DISCONTINUED | OUTPATIENT
Start: 2021-11-29 | End: 2021-11-29 | Stop reason: HOSPADM

## 2021-11-29 RX ORDER — OXYCODONE AND ACETAMINOPHEN 10; 325 MG/1; MG/1
1 TABLET ORAL ONCE AS NEEDED
Status: COMPLETED | OUTPATIENT
Start: 2021-11-29 | End: 2021-11-29

## 2021-11-29 RX ORDER — SODIUM CHLORIDE, SODIUM LACTATE, POTASSIUM CHLORIDE, CALCIUM CHLORIDE 600; 310; 30; 20 MG/100ML; MG/100ML; MG/100ML; MG/100ML
100 INJECTION, SOLUTION INTRAVENOUS CONTINUOUS
Status: DISCONTINUED | OUTPATIENT
Start: 2021-11-29 | End: 2021-11-29 | Stop reason: HOSPADM

## 2021-11-29 RX ORDER — ACETAMINOPHEN 500 MG
1000 TABLET ORAL ONCE
Status: COMPLETED | OUTPATIENT
Start: 2021-11-29 | End: 2021-11-29

## 2021-11-29 RX ORDER — SODIUM CHLORIDE, SODIUM LACTATE, POTASSIUM CHLORIDE, CALCIUM CHLORIDE 600; 310; 30; 20 MG/100ML; MG/100ML; MG/100ML; MG/100ML
1000 INJECTION, SOLUTION INTRAVENOUS CONTINUOUS
Status: DISCONTINUED | OUTPATIENT
Start: 2021-11-29 | End: 2021-11-29 | Stop reason: HOSPADM

## 2021-11-29 RX ORDER — PROPOFOL 10 MG/ML
VIAL (ML) INTRAVENOUS AS NEEDED
Status: DISCONTINUED | OUTPATIENT
Start: 2021-11-29 | End: 2021-11-29 | Stop reason: SURG

## 2021-11-29 RX ORDER — EPHEDRINE SULFATE 50 MG/ML
INJECTION, SOLUTION INTRAVENOUS AS NEEDED
Status: DISCONTINUED | OUTPATIENT
Start: 2021-11-29 | End: 2021-11-29 | Stop reason: SURG

## 2021-11-29 RX ORDER — PREGABALIN 25 MG/1
25 CAPSULE ORAL EVERY 12 HOURS SCHEDULED
Status: DISCONTINUED | OUTPATIENT
Start: 2021-11-29 | End: 2021-11-30 | Stop reason: HOSPADM

## 2021-11-29 RX ORDER — ONDANSETRON 2 MG/ML
4 INJECTION INTRAMUSCULAR; INTRAVENOUS AS NEEDED
Status: DISCONTINUED | OUTPATIENT
Start: 2021-11-29 | End: 2021-11-29 | Stop reason: HOSPADM

## 2021-11-29 RX ORDER — FLUMAZENIL 0.1 MG/ML
0.2 INJECTION INTRAVENOUS AS NEEDED
Status: DISCONTINUED | OUTPATIENT
Start: 2021-11-29 | End: 2021-11-29 | Stop reason: HOSPADM

## 2021-11-29 RX ORDER — SUFENTANIL CITRATE 50 UG/ML
INJECTION EPIDURAL; INTRAVENOUS AS NEEDED
Status: DISCONTINUED | OUTPATIENT
Start: 2021-11-29 | End: 2021-11-29 | Stop reason: SURG

## 2021-11-29 RX ORDER — ONDANSETRON 2 MG/ML
4 INJECTION INTRAMUSCULAR; INTRAVENOUS EVERY 6 HOURS PRN
Status: DISCONTINUED | OUTPATIENT
Start: 2021-11-29 | End: 2021-11-30 | Stop reason: HOSPADM

## 2021-11-29 RX ORDER — LIDOCAINE HYDROCHLORIDE 20 MG/ML
INJECTION, SOLUTION EPIDURAL; INFILTRATION; INTRACAUDAL; PERINEURAL AS NEEDED
Status: DISCONTINUED | OUTPATIENT
Start: 2021-11-29 | End: 2021-11-29 | Stop reason: SURG

## 2021-11-29 RX ORDER — LIDOCAINE 50 MG/G
2 PATCH TOPICAL
Status: DISCONTINUED | OUTPATIENT
Start: 2021-11-29 | End: 2021-11-30 | Stop reason: HOSPADM

## 2021-11-29 RX ORDER — FINASTERIDE 5 MG/1
5 TABLET, FILM COATED ORAL DAILY
Status: DISCONTINUED | OUTPATIENT
Start: 2021-11-29 | End: 2021-11-30 | Stop reason: HOSPADM

## 2021-11-29 RX ORDER — ACETAMINOPHEN 325 MG/1
650 TABLET ORAL EVERY 4 HOURS PRN
Status: DISCONTINUED | OUTPATIENT
Start: 2021-11-29 | End: 2021-11-30 | Stop reason: HOSPADM

## 2021-11-29 RX ORDER — SODIUM CHLORIDE 0.9 % (FLUSH) 0.9 %
3 SYRINGE (ML) INJECTION AS NEEDED
Status: DISCONTINUED | OUTPATIENT
Start: 2021-11-29 | End: 2021-11-29 | Stop reason: HOSPADM

## 2021-11-29 RX ORDER — DEXAMETHASONE SODIUM PHOSPHATE 4 MG/ML
INJECTION, SOLUTION INTRA-ARTICULAR; INTRALESIONAL; INTRAMUSCULAR; INTRAVENOUS; SOFT TISSUE AS NEEDED
Status: DISCONTINUED | OUTPATIENT
Start: 2021-11-29 | End: 2021-11-29 | Stop reason: SURG

## 2021-11-29 RX ORDER — CARVEDILOL 3.12 MG/1
3.12 TABLET ORAL 2 TIMES DAILY WITH MEALS
Status: DISCONTINUED | OUTPATIENT
Start: 2021-11-29 | End: 2021-11-30 | Stop reason: HOSPADM

## 2021-11-29 RX ORDER — ACETYLCYSTEINE 200 MG/ML
3 SOLUTION ORAL; RESPIRATORY (INHALATION)
Status: DISCONTINUED | OUTPATIENT
Start: 2021-11-29 | End: 2021-11-30 | Stop reason: HOSPADM

## 2021-11-29 RX ORDER — DOCUSATE SODIUM 100 MG/1
100 CAPSULE, LIQUID FILLED ORAL 2 TIMES DAILY
Status: DISCONTINUED | OUTPATIENT
Start: 2021-11-29 | End: 2021-11-30 | Stop reason: HOSPADM

## 2021-11-29 RX ORDER — NALOXONE HCL 0.4 MG/ML
0.04 VIAL (ML) INJECTION AS NEEDED
Status: DISCONTINUED | OUTPATIENT
Start: 2021-11-29 | End: 2021-11-29 | Stop reason: HOSPADM

## 2021-11-29 RX ORDER — DEXAMETHASONE SODIUM PHOSPHATE 4 MG/ML
4 INJECTION, SOLUTION INTRA-ARTICULAR; INTRALESIONAL; INTRAMUSCULAR; INTRAVENOUS; SOFT TISSUE ONCE AS NEEDED
Status: COMPLETED | OUTPATIENT
Start: 2021-11-29 | End: 2021-11-29

## 2021-11-29 RX ORDER — MAGNESIUM HYDROXIDE 1200 MG/15ML
LIQUID ORAL AS NEEDED
Status: DISCONTINUED | OUTPATIENT
Start: 2021-11-29 | End: 2021-11-29 | Stop reason: HOSPADM

## 2021-11-29 RX ORDER — ROCURONIUM BROMIDE 10 MG/ML
INJECTION, SOLUTION INTRAVENOUS AS NEEDED
Status: DISCONTINUED | OUTPATIENT
Start: 2021-11-29 | End: 2021-11-29 | Stop reason: SURG

## 2021-11-29 RX ORDER — OXYCODONE HYDROCHLORIDE AND ACETAMINOPHEN 5; 325 MG/1; MG/1
1 TABLET ORAL
Status: DISCONTINUED | OUTPATIENT
Start: 2021-11-29 | End: 2021-11-30 | Stop reason: HOSPADM

## 2021-11-29 RX ORDER — LIDOCAINE HYDROCHLORIDE 10 MG/ML
0.5 INJECTION, SOLUTION EPIDURAL; INFILTRATION; INTRACAUDAL; PERINEURAL ONCE AS NEEDED
Status: DISCONTINUED | OUTPATIENT
Start: 2021-11-29 | End: 2021-11-29 | Stop reason: HOSPADM

## 2021-11-29 RX ADMIN — MIDAZOLAM 0.5 MG: 1 INJECTION INTRAMUSCULAR; INTRAVENOUS at 06:48

## 2021-11-29 RX ADMIN — PREGABALIN 25 MG: 25 CAPSULE ORAL at 21:18

## 2021-11-29 RX ADMIN — SODIUM CHLORIDE 80 ML/HR: 9 INJECTION, SOLUTION INTRAVENOUS at 21:19

## 2021-11-29 RX ADMIN — PREDNISONE 5 MG: 5 TABLET ORAL at 11:00

## 2021-11-29 RX ADMIN — PREGABALIN 25 MG: 25 CAPSULE ORAL at 11:09

## 2021-11-29 RX ADMIN — VASOPRESSIN 1 UNITS: 20 INJECTION INTRAVENOUS at 07:35

## 2021-11-29 RX ADMIN — ROCURONIUM BROMIDE 50 MG: 10 INJECTION INTRAVENOUS at 07:38

## 2021-11-29 RX ADMIN — EPHEDRINE SULFATE 20 MG: 50 INJECTION INTRAVENOUS at 07:47

## 2021-11-29 RX ADMIN — DEXMEDETOMIDINE HYDROCHLORIDE 20 MCG: 4 INJECTION, SOLUTION INTRAVENOUS at 08:58

## 2021-11-29 RX ADMIN — DEXMEDETOMIDINE HYDROCHLORIDE 20 MCG: 4 INJECTION, SOLUTION INTRAVENOUS at 06:55

## 2021-11-29 RX ADMIN — FINASTERIDE 5 MG: 5 TABLET, FILM COATED ORAL at 11:09

## 2021-11-29 RX ADMIN — OXYCODONE AND ACETAMINOPHEN 1 TABLET: 325; 10 TABLET ORAL at 09:53

## 2021-11-29 RX ADMIN — DOCUSATE SODIUM 100 MG: 100 CAPSULE, LIQUID FILLED ORAL at 21:18

## 2021-11-29 RX ADMIN — SODIUM BICARBONATE 650 MG: 650 TABLET ORAL at 11:00

## 2021-11-29 RX ADMIN — OXYCODONE HYDROCHLORIDE AND ACETAMINOPHEN 1 TABLET: 5; 325 TABLET ORAL at 15:11

## 2021-11-29 RX ADMIN — LIDOCAINE 2 PATCH: 50 PATCH CUTANEOUS at 11:00

## 2021-11-29 RX ADMIN — SODIUM BICARBONATE 650 MG: 650 TABLET ORAL at 15:12

## 2021-11-29 RX ADMIN — SODIUM CHLORIDE 500 ML: 9 INJECTION, SOLUTION INTRAVENOUS at 11:09

## 2021-11-29 RX ADMIN — CARVEDILOL 3.12 MG: 3.12 TABLET, FILM COATED ORAL at 17:43

## 2021-11-29 RX ADMIN — FENTANYL CITRATE 25 MCG: 50 INJECTION INTRAMUSCULAR; INTRAVENOUS at 09:14

## 2021-11-29 RX ADMIN — LIDOCAINE HYDROCHLORIDE 100 MG: 20 INJECTION, SOLUTION EPIDURAL; INFILTRATION; INTRACAUDAL; PERINEURAL at 07:18

## 2021-11-29 RX ADMIN — OXYCODONE HYDROCHLORIDE AND ACETAMINOPHEN 1 TABLET: 5; 325 TABLET ORAL at 21:18

## 2021-11-29 RX ADMIN — PROPOFOL 200 MG: 10 INJECTION, EMULSION INTRAVENOUS at 07:18

## 2021-11-29 RX ADMIN — DEXAMETHASONE SODIUM PHOSPHATE 8 MG: 4 INJECTION, SOLUTION INTRA-ARTICULAR; INTRALESIONAL; INTRAMUSCULAR; INTRAVENOUS; SOFT TISSUE at 07:58

## 2021-11-29 RX ADMIN — IPRATROPIUM BROMIDE AND ALBUTEROL SULFATE 3 ML: 2.5; .5 SOLUTION RESPIRATORY (INHALATION) at 14:13

## 2021-11-29 RX ADMIN — HYDROMORPHONE HYDROCHLORIDE 0.5 MG: 1 INJECTION, SOLUTION INTRAMUSCULAR; INTRAVENOUS; SUBCUTANEOUS at 09:13

## 2021-11-29 RX ADMIN — ACETAMINOPHEN 1000 MG: 500 TABLET, FILM COATED ORAL at 06:46

## 2021-11-29 RX ADMIN — SODIUM CHLORIDE, POTASSIUM CHLORIDE, SODIUM LACTATE AND CALCIUM CHLORIDE 1000 ML: 600; 310; 30; 20 INJECTION, SOLUTION INTRAVENOUS at 06:20

## 2021-11-29 RX ADMIN — EPHEDRINE SULFATE 25 MG: 50 INJECTION INTRAVENOUS at 07:42

## 2021-11-29 RX ADMIN — DOCUSATE SODIUM 100 MG: 100 CAPSULE, LIQUID FILLED ORAL at 12:29

## 2021-11-29 RX ADMIN — SUFENTANIL CITRATE 25 MCG: 50 INJECTION EPIDURAL; INTRAVENOUS at 07:20

## 2021-11-29 RX ADMIN — FENTANYL CITRATE 25 MCG: 50 INJECTION INTRAMUSCULAR; INTRAVENOUS at 09:19

## 2021-11-29 RX ADMIN — DEXAMETHASONE SODIUM PHOSPHATE 4 MG: 4 INJECTION, SOLUTION INTRA-ARTICULAR; INTRALESIONAL; INTRAMUSCULAR; INTRAVENOUS; SOFT TISSUE at 06:46

## 2021-11-29 RX ADMIN — ONDANSETRON 4 MG: 2 INJECTION INTRAMUSCULAR; INTRAVENOUS at 08:48

## 2021-11-29 RX ADMIN — VASOPRESSIN 0.5 UNITS: 20 INJECTION INTRAVENOUS at 08:27

## 2021-11-29 RX ADMIN — CETIRIZINE HYDROCHLORIDE 5 MG: 10 TABLET ORAL at 11:00

## 2021-11-29 RX ADMIN — VANCOMYCIN HYDROCHLORIDE 1500 MG: 10 INJECTION, POWDER, LYOPHILIZED, FOR SOLUTION INTRAVENOUS at 06:36

## 2021-11-29 RX ADMIN — OXYCODONE HYDROCHLORIDE AND ACETAMINOPHEN 1 TABLET: 5; 325 TABLET ORAL at 18:14

## 2021-11-29 RX ADMIN — HYDROMORPHONE HYDROCHLORIDE 0.5 MG: 1 INJECTION, SOLUTION INTRAMUSCULAR; INTRAVENOUS; SUBCUTANEOUS at 09:23

## 2021-11-29 RX ADMIN — SODIUM CHLORIDE 80 ML/HR: 9 INJECTION, SOLUTION INTRAVENOUS at 11:09

## 2021-11-29 RX ADMIN — ACETYLCYSTEINE 1.5 ML: 200 INHALANT RESPIRATORY (INHALATION) at 14:13

## 2021-11-29 RX ADMIN — SUGAMMADEX 200 MG: 100 INJECTION, SOLUTION INTRAVENOUS at 08:51

## 2021-11-29 RX ADMIN — SODIUM BICARBONATE 650 MG: 650 TABLET ORAL at 21:18

## 2021-11-29 RX ADMIN — ROCURONIUM BROMIDE 50 MG: 10 INJECTION INTRAVENOUS at 07:18

## 2021-11-29 NOTE — ANESTHESIA PROCEDURE NOTES
Airway  Urgency: elective    Date/Time: 11/29/2021 7:23 AM  Airway not difficult    General Information and Staff    Patient location during procedure: OR  CRNA: LILIANE Garcia CRNA    Indications and Patient Condition  Indications for airway management: airway protection    Preoxygenated: yes  MILS maintained throughout  Mask difficulty assessment: 1 - vent by mask    Final Airway Details  Final airway type: endotracheal airway      Successful airway: ETT and EBT - double lumen left  Cuffed: yes   Successful intubation technique: direct laryngoscopy and video laryngoscopy  Facilitating devices/methods: intubating stylet  Endotracheal tube insertion site: oral  Blade: Arroyo  Blade size: 3  EBT DL size (fr): 41  Placement verified by: chest auscultation and capnometry   Measured from: gums  Number of attempts at approach: 1

## 2021-11-29 NOTE — ANESTHESIA POSTPROCEDURE EVALUATION
Patient: Parker Arnold    Procedure Summary     Date: 11/29/21 Room / Location:  [facility] OR 15 /  [facility] OR    Anesthesia Start: 0712 Anesthesia Stop: 0922    Procedures:       BRONCHOSCOPY (N/A Bronchus)      RIGHT THORACOSCOPY WITH RIGHT MIDDLE AND RIGHT LOWER LOBE WEDGE RESECTIONS (Right Chest) Diagnosis:       Lung nodule      Renal cell carcinoma of right kidney (HCC)      (Lung nodule [R91.1])      (Renal cell carcinoma of right kidney (HCC) [C64.1])    Surgeons: Jarrod Tolentino MD Provider: LILIANE Garcia CRNA    Anesthesia Type: general ASA Status: 3          Anesthesia Type: general    Vitals  Vitals Value Taken Time   /74 11/29/21 1000   Temp 97.3 °F (36.3 °C) 11/29/21 1010   Pulse 58 11/29/21 1014   Resp 14 11/29/21 1000   SpO2 98 % 11/29/21 1014   Vitals shown include unvalidated device data.        Post Anesthesia Care and Evaluation    Patient location during evaluation: PACU  Patient participation: complete - patient participated  Level of consciousness: awake and alert  Pain management: adequate  Airway patency: patent  Anesthetic complications: No anesthetic complications    Cardiovascular status: acceptable  Respiratory status: acceptable  Hydration status: acceptable    Comments: Blood pressure 119/66, pulse 68, temperature 97.5 °F (36.4 °C), temperature source Oral, resp. rate 16, SpO2 96 %.    Pt discharged from PACU based on alida score >8

## 2021-11-29 NOTE — ANESTHESIA PREPROCEDURE EVALUATION
Anesthesia Evaluation     Patient summary reviewed   no history of anesthetic complications:  NPO Solid Status: > 8 hours  NPO Liquid Status: > 8 hours           Airway   Mallampati: II  TM distance: >3 FB  No difficulty expected  Dental      Pulmonary    (+) a smoker Former, lung cancer,   Cardiovascular   Exercise tolerance: good (4-7 METS)    (+) hypertension,   (-) past MI, CAD, dysrhythmias, cardiac stents, hyperlipidemia      Neuro/Psych  (+) TIA,     (-) seizures  GI/Hepatic/Renal/Endo    (+)   renal disease (s/p right nephrectomy for RCC) CRI,   (-) liver disease, diabetes    Musculoskeletal     Abdominal    Substance History      OB/GYN          Other   chronic steroid use (5 mg prednisone daily)    history of cancer (metastatic RCC)                    Anesthesia Plan    ASA 3     general   (Arterial line with preop ABG with coox)  intravenous induction     Anesthetic plan, all risks, benefits, and alternatives have been provided, discussed and informed consent has been obtained with: patient.  Use of blood products discussed with patient  Consented to blood products.

## 2021-11-30 ENCOUNTER — APPOINTMENT (OUTPATIENT)
Dept: GENERAL RADIOLOGY | Facility: HOSPITAL | Age: 66
End: 2021-11-30

## 2021-11-30 VITALS
HEIGHT: 69 IN | WEIGHT: 200.18 LBS | HEART RATE: 62 BPM | SYSTOLIC BLOOD PRESSURE: 132 MMHG | TEMPERATURE: 97.8 F | DIASTOLIC BLOOD PRESSURE: 71 MMHG | OXYGEN SATURATION: 97 % | RESPIRATION RATE: 16 BRPM | BODY MASS INDEX: 29.65 KG/M2

## 2021-11-30 PROBLEM — Z86.73 HISTORY OF TIA (TRANSIENT ISCHEMIC ATTACK): Status: ACTIVE | Noted: 2021-11-30

## 2021-11-30 PROBLEM — C64.9 METASTATIC RENAL CELL CARCINOMA (HCC): Status: ACTIVE | Noted: 2021-11-30

## 2021-11-30 PROBLEM — Z90.5 SOLITARY KIDNEY, ACQUIRED: Status: ACTIVE | Noted: 2021-11-30

## 2021-11-30 PROBLEM — C64.9 METASTATIC RENAL CELL CARCINOMA: Status: RESOLVED | Noted: 2021-11-30 | Resolved: 2021-11-30

## 2021-11-30 LAB
ANION GAP SERPL CALCULATED.3IONS-SCNC: 11 MMOL/L (ref 5–15)
BUN SERPL-MCNC: 47 MG/DL (ref 8–23)
BUN/CREAT SERPL: 21.1 (ref 7–25)
CALCIUM SPEC-SCNC: 8.7 MG/DL (ref 8.6–10.5)
CHLORIDE SERPL-SCNC: 104 MMOL/L (ref 98–107)
CO2 SERPL-SCNC: 20 MMOL/L (ref 22–29)
CREAT SERPL-MCNC: 2.23 MG/DL (ref 0.76–1.27)
CYTO UR: NORMAL
DEPRECATED RDW RBC AUTO: 43.3 FL (ref 37–54)
ERYTHROCYTE [DISTWIDTH] IN BLOOD BY AUTOMATED COUNT: 13.1 % (ref 12.3–15.4)
GFR SERPL CREATININE-BSD FRML MDRD: 30 ML/MIN/1.73
GLUCOSE SERPL-MCNC: 137 MG/DL (ref 65–99)
HCT VFR BLD AUTO: 40.7 % (ref 37.5–51)
HGB BLD-MCNC: 13 G/DL (ref 13–17.7)
LAB AP CASE REPORT: NORMAL
Lab: NORMAL
MCH RBC QN AUTO: 29 PG (ref 26.6–33)
MCHC RBC AUTO-ENTMCNC: 31.9 G/DL (ref 31.5–35.7)
MCV RBC AUTO: 90.6 FL (ref 79–97)
PATH REPORT.FINAL DX SPEC: NORMAL
PATH REPORT.GROSS SPEC: NORMAL
PLATELET # BLD AUTO: 211 10*3/MM3 (ref 140–450)
PMV BLD AUTO: 9.5 FL (ref 6–12)
POTASSIUM SERPL-SCNC: 5.3 MMOL/L (ref 3.5–5.2)
RBC # BLD AUTO: 4.49 10*6/MM3 (ref 4.14–5.8)
SODIUM SERPL-SCNC: 135 MMOL/L (ref 136–145)
WBC NRBC COR # BLD: 13.86 10*3/MM3 (ref 3.4–10.8)

## 2021-11-30 PROCEDURE — 71045 X-RAY EXAM CHEST 1 VIEW: CPT

## 2021-11-30 PROCEDURE — 85027 COMPLETE CBC AUTOMATED: CPT | Performed by: THORACIC SURGERY (CARDIOTHORACIC VASCULAR SURGERY)

## 2021-11-30 PROCEDURE — 71046 X-RAY EXAM CHEST 2 VIEWS: CPT

## 2021-11-30 PROCEDURE — 80048 BASIC METABOLIC PNL TOTAL CA: CPT | Performed by: THORACIC SURGERY (CARDIOTHORACIC VASCULAR SURGERY)

## 2021-11-30 PROCEDURE — 25010000002 ENOXAPARIN PER 10 MG: Performed by: THORACIC SURGERY (CARDIOTHORACIC VASCULAR SURGERY)

## 2021-11-30 PROCEDURE — 99231 SBSQ HOSP IP/OBS SF/LOW 25: CPT | Performed by: NURSE PRACTITIONER

## 2021-11-30 PROCEDURE — 63710000001 PREDNISONE PER 5 MG: Performed by: THORACIC SURGERY (CARDIOTHORACIC VASCULAR SURGERY)

## 2021-11-30 RX ORDER — CARVEDILOL 3.12 MG/1
3.12 TABLET ORAL 2 TIMES DAILY WITH MEALS
COMMUNITY

## 2021-11-30 RX ORDER — PANTOPRAZOLE SODIUM 40 MG/1
40 TABLET, DELAYED RELEASE ORAL DAILY
COMMUNITY
End: 2021-12-21

## 2021-11-30 RX ORDER — OXYCODONE HYDROCHLORIDE AND ACETAMINOPHEN 5; 325 MG/1; MG/1
1 TABLET ORAL EVERY 6 HOURS PRN
Qty: 20 TABLET | Refills: 0 | Status: SHIPPED | OUTPATIENT
Start: 2021-11-30 | End: 2021-12-09

## 2021-11-30 RX ADMIN — OXYCODONE HYDROCHLORIDE AND ACETAMINOPHEN 1 TABLET: 5; 325 TABLET ORAL at 17:05

## 2021-11-30 RX ADMIN — FINASTERIDE 5 MG: 5 TABLET, FILM COATED ORAL at 08:40

## 2021-11-30 RX ADMIN — OXYCODONE HYDROCHLORIDE AND ACETAMINOPHEN 1 TABLET: 5; 325 TABLET ORAL at 04:13

## 2021-11-30 RX ADMIN — SODIUM BICARBONATE 650 MG: 650 TABLET ORAL at 15:38

## 2021-11-30 RX ADMIN — PREDNISONE 5 MG: 5 TABLET ORAL at 08:40

## 2021-11-30 RX ADMIN — LIDOCAINE 2 PATCH: 50 PATCH CUTANEOUS at 08:41

## 2021-11-30 RX ADMIN — PREGABALIN 25 MG: 25 CAPSULE ORAL at 08:46

## 2021-11-30 RX ADMIN — DOCUSATE SODIUM 100 MG: 100 CAPSULE, LIQUID FILLED ORAL at 08:40

## 2021-11-30 RX ADMIN — CARVEDILOL 3.12 MG: 3.12 TABLET, FILM COATED ORAL at 08:40

## 2021-11-30 RX ADMIN — CETIRIZINE HYDROCHLORIDE 5 MG: 10 TABLET ORAL at 08:40

## 2021-11-30 RX ADMIN — SODIUM CHLORIDE 80 ML/HR: 9 INJECTION, SOLUTION INTRAVENOUS at 08:50

## 2021-11-30 RX ADMIN — SODIUM BICARBONATE 650 MG: 650 TABLET ORAL at 08:40

## 2021-11-30 RX ADMIN — OXYCODONE HYDROCHLORIDE AND ACETAMINOPHEN 1 TABLET: 5; 325 TABLET ORAL at 00:13

## 2021-11-30 RX ADMIN — ENOXAPARIN SODIUM 30 MG: 30 INJECTION SUBCUTANEOUS at 08:46

## 2021-11-30 NOTE — TELEPHONE ENCOUNTER
Discussed with Dr. Tolentino who brought the patient to surgery today for open biopsy/wedge resection of lung nodules.  Initial frozen section impression consistent with metastatic renal cell carcinoma.  Awaiting final pathology.  Anticipate hospital discharge tomorrow for outpatient follow-up.

## 2021-12-01 ENCOUNTER — READMISSION MANAGEMENT (OUTPATIENT)
Dept: CALL CENTER | Facility: HOSPITAL | Age: 66
End: 2021-12-01

## 2021-12-01 NOTE — PAYOR COMM NOTE
"REF: [id number]    [facility]  MICHELLE,  [phone]  OR  FAX  [phone]      Parker Arnold (66 y.o. Male)             Date of Birth Social Security Number Address Home Phone MRN    1955  [address] [place] [id number] [phone] [id number]    Spiritism Marital Status             Congregation        Admission Date Admission Type Admitting Provider Attending Provider Department, Room/Bed    11/29/21 Elective Jarrod Tolentino MD  [facility] 3C, 394/1    Discharge Date Discharge Disposition Discharge Destination          11/30/2021 Home or Self Care              Attending Provider: (none)   Allergies: Amoxicillin, Opdivo [Nivolumab], Penicillins, Clindamycin/lincomycin, Doxycycline, Hepatitis B Virus Vaccines, Hepatitis B Vaccine, Latex, Levofloxacin    Isolation: None   Infection: None   Code Status: Prior   Advance Care Planning Activity    Ht: 176 cm (69.29\")   Wt: 90.8 kg (200 lb 2.8 oz)    Admission Cmt: None   Principal Problem: Multiple nodules of lung [R91.8]                 Active Insurance as of 11/29/2021     Primary Coverage     Payor Plan Insurance Group Employer/Plan Group    ANTHEM MEDICARE REPLACEMENT ANTHEM MEDICARE ADVANTAGE [id number]     Payor Plan Address Payor Plan Phone Number Payor Plan Fax Number Effective Dates    [address] [phone]  12/1/2020 - None Entered    [facility] [id number]       Subscriber Name Subscriber Birth Date Member ID       PARKER ARNOLD 1955 [id number]                 Emergency Contacts      (Rel.) Home Phone Work Phone Mobile Phone    ANG ARNOLD (Spouse) [phone] -- [phone]               Operative/Procedure Notes (last 72 hours)      Jarrod Tolentino MD at 11/29/21 0758          Pre-op diagnosis:   1. Lung nodules bilateral  2. Renal cell carcinoma     Post-op diagnosis:   Same    Procedure:  1. Bronchoscopy  2. Right thoracoscopy  3. Right lower lobe wedge " resection  4. Right middle lobe wedge resection  5. Right lower lobe wedge - basilar resection      Surgeon: Jarrod Tolentino M.D.  Assistant: Jailyn Gonzalez  Anesthesia: GETA- double lumen    EBL: min  Drains: 28 Faroese straight pleural tube    Specimens:  1. Right lower lobe wedge resection  2. Right middle lobe wedge resection  3. Right lower lobe wedge - basilar resection     Intraoperative Consultation P    FROZEN SECTION DIAGNOSIS:   1.  Lung, right lower lobe, wedge resection:  Carcinoma, morphologically consistent with metastatic renal cell carcinoma.  Parenchymal margin free of tumor.     2.  Lung, right middle lobe, wedge resection:  Carcinoma, morphologically consistent with metastatic renal cell carcinoma.  Parenchymal margin free of tumor.         Operative findings:  Renal cell carcinoma is identified on frozen section    Operative description in detail:  The patient was taken to the operative suite where he was placed in a supine position. Induction of general anesthesia and placement of a double lumen endotracheal tube was placed by anesthesia. A timeout was performed. Perioperative antibiotics were administered.  With that bronchoscopy was performed demonstrating no endobronchial lesion, normal mucosa, and standard tracheobronchial anatomy. The double lumen was positioned appropriately. With that he was positioned in left lateral decubitus position.  All pressure points are protected. Single lung ventilation was initiated following confirmation of a satisfactory position of the double lumen endotracheal tube once again. He was then prepped and draped in the usual and sterile fashion.    A limited incision was made at approximately the eigth intercostal space was made sharply. The chest was accessed.   With that, the thoracoport and then subsequently the thoracoscope were advanced to surveyed the chest. No pleural studding was evident. No pleural fluid of remark was identified. To that end  additional ports were placed just anterior to the anterior border of the latissimus dorsi at approximately the fifth intercostal space as well as approximately the auscultatory triangle inferior to the scapula. Thoracoscopy was performed.    Posteriorly along the superior segment a nodules identified that appeared grossly consistent with a renal cell carcinoma.  Using multiple Ethicon 45 mm green staple loads a wedge resection performed of the right lower lobe and this was extracted from the chest using a small specimen Endo Catch bag. This was labeled right lower lobe wedge resection.  Additional wedge resection of the right middle lobe and the basal aspect of the right lower lobe were performed using described techniques, staple loads and extracted with a Endo Catch bag.  To perform the wedge resection of the right lower lobe basilar wedge resection the inferior pulmonary ligament was divided to the level of the fear pulmonary vein.    During this time we surveyed the staple lines and they were hemostatic and pneumostatic. Each access site was hemostatic. I did speak with Dr. Burgos who reported renal cell carcinoma.  The chest was drained with a 28 Uzbek straight pleural chest tube placed within the first access site. Deep musculature was tapered down with 2-0 Vicryl suture. The two remaining sites were closed in layered fashion with absorbable suture.  Instruments, sharps, and sponge counts were reported as correct at the completion of the case. Hemostasis was pristine.  Complications: None  Disposition: Transfer to PACU extubated and in stable condition.        Electronically signed by Jarrod Tolentino MD at 11/29/21 1036          Discharge Summary      Kanchan Gutiérrez APRN at 11/30/21 1601                        [facility] Cardiothoracic Surgery  DISCHARGE SUMMARY        Date of Admission: 11/29/2021  Date of Discharge:  11/30/2021  Primary Care Physician: Trey Osborne,  MD    Discharge Diagnoses:  Active Hospital Problems    Diagnosis    • **Multiple nodules of lung    • History of TIA (transient ischemic attack)    • Solitary kidney, acquired    • Stage 3 chronic kidney disease (HCC)    • Renal cell carcinoma of right kidney (HCC)      Overview:   Diagnoses 11/11/14. Path report in [facility] at [facility]. Tissue has been requested.        Procedures Performed: Bronchoscopy, Right thoracoscopy, Right lower lobe wedge resection, Right middle lobe wedge resection, Right lower lobe wedge-basilar resection performed on 11/29/2021 by Dr. Tolentino.     HPI:  Mr. Parker Arnold is a 66 y.o. male with a history of renal cell carcinoma, suspected lung metastasis, stage III chronic kidney disease in the setting of solitary kidney with a history of TIA who presents for evaluation of multiple lung nodules.  They have been followed with slow increase in size generally but repeat pet imaging compared to previous PET in January of this year as shown a right lower lobe lung nodule that has increased in size. It is not hypermetabolic. He denies unintended weight loss, hoarseness of voice, chest pain, hemoptysis, history of pneumonia, or cough. He was seen in the office previously and was agreeable to proceed for resection for diagnosis and treatment. No changes in history since last office visit.  Of note he did have hyperkalemia on recent lab values but with repeat testing potassium was 5.2.  He reports eating a banana prior to testing. Renal function has been stable per patient. The best option for treatment is right thoracoscopic wedge resection of an available nodule amenable to that treatment course.     Hospital Course: Mr. Arnold was admitted on the morning of surgery on 11/29/2021. Please see a separate op note by Dr. Tolentino. Operative findings are remarkable for frozen section consistent with metastatic renal cell carcinoma. Following surgery, he was transferred to PACU  extubated and in stable condition. On post op day 1 he was up to the chair and ambulating in the hallway. There was no air leak and the chest tube was removed without remark. Follow up chest x ray shows a trace right apical pneumothorax. Repeat imaging was obtained later in the day that showed a stable trace right apical pneumothorax. He is saturating appropriately on room air. He has good pain control. He meets criteria for discharge home on post op day 1 and is agreeable to discharge home with his spouse.     Final pathology pending at time of discharge. Patient is ok for our office to call him with results once finalized.     Condition on Discharge:  Neurologically intact and has good pain.  control.  He is eating well. All thoracic incisions are healing nicely without evidence of thoracic hernia.  The heart is in normal sinus rhythm.         Discharge Disposition:    Home     Discharge Medications:     Discharge Medications      New Medications      Instructions Start Date   oxyCODONE-acetaminophen 5-325 MG per tablet  Commonly known as: PERCOCET   1 tablet, Oral, Every 6 Hours PRN         Continue These Medications      Instructions Start Date   Caltrate 600+D3 600-800 MG-UNIT tablet  Generic drug: calcium carb-cholecalciferol   1 tablet, Oral, 2 Times Daily With Meals      carvedilol 3.125 MG tablet  Commonly known as: COREG   3.125 mg, Oral, 2 Times Daily With Meals      cetirizine 10 MG tablet  Commonly known as: zyrTEC   10 mg, Oral, Daily      finasteride 5 MG tablet  Commonly known as: PROSCAR   5 mg, Oral, Daily      multivitamin with minerals tablet tablet   1 tablet, Oral, Daily      pantoprazole 40 MG EC tablet  Commonly known as: PROTONIX   40 mg, Oral, Daily      predniSONE 5 MG tablet  Commonly known as: DELTASONE   5 mg, Oral, Daily      sodium bicarbonate 650 MG tablet   650 mg, Oral, 3 Times Daily           Discharge Diet: Regular diet     Discharge Care Plan / Instructions:   Keep incisions  clean and open to air.  May wash with soap and water.  Chest tube sites with dressings to keep on for 48 hours.  Ok to reapply dressing as needed after removal.      Activity at Discharge:   No heavy lifting greater than 5 pounds or a gallon of milk and refrain from driving until cleared.      Tobacco: The patient does not use tobacco products and therefore does not need tobacco cessation education.    BMI: Patient's Body mass index is 29.31 kg/m². indicating that he is overweight (BMI 25-29.9). Obesity-related health conditions include the following: none.     Follow-up Appointments: Parker Arnold  is requested to see Trey Osborne MD within 1-2 weeks from time of discharge, to follow-up with Dr. Tolentino in 4 weeks. He is to follow-up with Dr. Smith of the oncology service in a few weeks.     I spent less than 30 minutes on the discharge planning.         Electronically signed by Kanchan Gutiérrez APRN at 11/30/21 1608       Discharge Order (From admission, onward)     Start     Ordered    11/30/21 1606  Discharge patient  Once        Expected Discharge Date: 11/30/21    Discharge Disposition: Home or Self Care    Physician of Record for Attribution - Please select from Treatment Team: ALIYAH TOLENTINO [[id number]]    Review needed by CMO to determine Physician of Record: No       Question Answer Comment   Physician of Record for Attribution - Please select from Treatment Team ALIYAH TOLENTINO    Review needed by CMO to determine Physician of Record No        11/30/21 1606    11/30/21 1606  Discharge patient  Once        Expected Discharge Date: 11/30/21    Discharge Disposition: Home or Self Care    Physician of Record for Attribution - Please select from Treatment Team: ALIYAH TOLENTINO [[id number]]    Review needed by CMO to determine Physician of Record: No       Question Answer Comment   Physician of Record for Attribution - Please select from Treatment Team ALIYAH TOLENTINO    Review needed by CMO  to determine Physician of Record No        11/30/21 1607

## 2021-12-01 NOTE — OUTREACH NOTE
Prep Survey      Responses   [facility] patient discharged from? [place]   Is LACE score < 7 ? No   Emergency Room discharge w/ pulse ox? No   Eligibility Readm Mgmt   Discharge diagnosis Multiple nodules of lung, Right lower lobe wedge resection, Right middle lobe wedge resection, Right lower lobe wedge-basilar resection    Does the patient have one of the following disease processes/diagnoses(primary or secondary)? General Surgery   Does the patient have Home health ordered? No   Is there a DME ordered? No   Prep survey completed? Yes          Lisseth Spangler RN

## 2021-12-03 ENCOUNTER — READMISSION MANAGEMENT (OUTPATIENT)
Dept: CALL CENTER | Facility: HOSPITAL | Age: 66
End: 2021-12-03

## 2021-12-03 NOTE — OUTREACH NOTE
General Surgery Week 1 Survey      Responses   [facility] [place], operated by [facility] patient discharged from? [place]   Does the patient have one of the following disease processes/diagnoses(primary or secondary)? General Surgery   Week 1 attempt successful? Yes   Call start time 1304   Call end time 1309   Discharge diagnosis Multiple nodules of lung, Right lower lobe wedge resection, Right middle lobe wedge resection, Right lower lobe wedge-basilar resection    Is patient permission given to speak with other caregiver? Yes   List who call center can speak with Spouse- Nelda Cook reviewed with patient/caregiver? Yes   Is the patient having any side effects they believe may be caused by any medication additions or changes? No   Does the patient have all medications related to this admission filled (includes all antibiotics, pain medications, etc.) Yes   Prescription comments States he has not needed the pain medication   Is the patient taking all medications as directed (includes completed medication regime)? Yes   Does the patient have a follow up appointment scheduled with their surgeon? Yes   Has the patient kept scheduled appointments due by today? N/A   Comments Has FU with PCP 12/09/2021   Has home health visited the patient within 72 hours of discharge? N/A   Did the patient receive a copy of their discharge instructions? Yes   Nursing interventions Reviewed instructions with patient   What is the patient's perception of their health status since discharge? Improving   Nursing interventions Nurse provided patient education   Is the patient /caregiver able to teach back basic post-op care? Lifting as instructed by MD in discharge instructions,  Keep incision areas clean,dry and protected,  No tub bath, swimming, or hot tub until instructed by MD   Is the patient/caregiver able to teach back signs and symptoms of incisional infection? Increased redness, swelling or pain at the incisonal site,  Increased drainage or bleeding,   Incisional warmth,  Pus or odor from incision   Is the patient/caregiver able to teach back steps to recovery at home? Rest and rebuild strength, gradually increase activity,  Set small, achievable goals for return to baseline health,  Eat a well-balance diet,  Make a list of questions for surgeon's appointment   If the patient is a current smoker, are they able to teach back resources for cessation? Not a smoker   Is the patient/caregiver able to teach back the hierarchy of who to call/visit for symptoms/problems? PCP, Specialist, Home health nurse, Urgent Care, ED, 911 Yes   Week 1 call completed? Yes   Wrap up additional comments No needs identified. Pt is aware of FU apptments.           Lizett Carpenter RN

## 2021-12-12 ENCOUNTER — READMISSION MANAGEMENT (OUTPATIENT)
Dept: CALL CENTER | Facility: HOSPITAL | Age: 66
End: 2021-12-12

## 2021-12-12 NOTE — OUTREACH NOTE
"General Surgery Week 2 Survey      Responses   [facility] patient discharged from? [place]   Does the patient have one of the following disease processes/diagnoses(primary or secondary)? General Surgery   Week 2 attempt successful? Yes   Call start time 1337   Call end time 1339   Discharge diagnosis Multiple nodules of lung, Right lower lobe wedge resection, Right middle lobe wedge resection, Right lower lobe wedge-basilar resection    Meds reviewed with patient/caregiver? Yes   Is the patient taking all medications as directed (includes completed medication regime)? Yes   Has the patient kept scheduled appointments due by today? Yes   Comments oncology with labs and scan appt on 12/14/21   Psychosocial issues? No   What is the patient's perception of their health status since discharge? Improving   Is the patient/caregiver able to teach back signs and symptoms of incisional infection? --  [incisions are healing ]   Is the patient/caregiver able to teach back steps to recovery at home? Rest and rebuild strength, gradually increase activity,  Eat a well-balance diet   Week 2 call completed? Yes   Revoked No further contact(revokes)-requires comment   Is the patient interested in additional calls from an ambulatory ?  NOTE:  applies to high risk patients requiring additional follow-up. No   Graduated/Revoked comments Pt states, \"I'm pretty good\" when asked about a call next week          Linnette Sprague RN  "

## 2021-12-13 DIAGNOSIS — C64.1 RENAL CELL CARCINOMA OF RIGHT KIDNEY (HCC): Primary | ICD-10-CM

## 2021-12-14 ENCOUNTER — LAB (OUTPATIENT)
Dept: LAB | Facility: HOSPITAL | Age: 66
End: 2021-12-14

## 2021-12-14 ENCOUNTER — HOSPITAL ENCOUNTER (OUTPATIENT)
Dept: CT IMAGING | Facility: HOSPITAL | Age: 66
Discharge: HOME | End: 2021-12-14
Admitting: INTERNAL MEDICINE

## 2021-12-14 DIAGNOSIS — C64.1 RENAL CELL CARCINOMA OF RIGHT KIDNEY (HCC): ICD-10-CM

## 2021-12-14 DIAGNOSIS — C78.00 MALIGNANT NEOPLASM METASTATIC TO LUNG, UNSPECIFIED LATERALITY (HCC): ICD-10-CM

## 2021-12-14 LAB
ALBUMIN SERPL-MCNC: 4.5 G/DL (ref 3.5–5.2)
ALBUMIN/GLOB SERPL: 2.3 G/DL
ALP SERPL-CCNC: 68 U/L (ref 39–117)
ALT SERPL W P-5'-P-CCNC: 16 U/L (ref 1–41)
ANION GAP SERPL CALCULATED.3IONS-SCNC: 8 MMOL/L (ref 5–15)
AST SERPL-CCNC: 18 U/L (ref 1–40)
BASOPHILS # BLD AUTO: 0.05 10*3/MM3 (ref 0–0.2)
BASOPHILS NFR BLD AUTO: 0.6 % (ref 0–1.5)
BILIRUB SERPL-MCNC: 0.4 MG/DL (ref 0–1.2)
BUN SERPL-MCNC: 48 MG/DL (ref 8–23)
BUN/CREAT SERPL: 18.5 (ref 7–25)
CALCIUM SPEC-SCNC: 9.3 MG/DL (ref 8.6–10.5)
CHLORIDE SERPL-SCNC: 109 MMOL/L (ref 98–107)
CO2 SERPL-SCNC: 26 MMOL/L (ref 22–29)
CREAT SERPL-MCNC: 2.6 MG/DL (ref 0.76–1.27)
DEPRECATED RDW RBC AUTO: 43.2 FL (ref 37–54)
EOSINOPHIL # BLD AUTO: 0.33 10*3/MM3 (ref 0–0.4)
EOSINOPHIL NFR BLD AUTO: 3.8 % (ref 0.3–6.2)
ERYTHROCYTE [DISTWIDTH] IN BLOOD BY AUTOMATED COUNT: 13.1 % (ref 12.3–15.4)
GFR SERPL CREATININE-BSD FRML MDRD: 25 ML/MIN/1.73
GLOBULIN UR ELPH-MCNC: 2 GM/DL
GLUCOSE SERPL-MCNC: 95 MG/DL (ref 65–99)
HCT VFR BLD AUTO: 44.9 % (ref 37.5–51)
HGB BLD-MCNC: 14 G/DL (ref 13–17.7)
IMM GRANULOCYTES # BLD AUTO: 0.03 10*3/MM3 (ref 0–0.05)
IMM GRANULOCYTES NFR BLD AUTO: 0.3 % (ref 0–0.5)
LYMPHOCYTES # BLD AUTO: 1.17 10*3/MM3 (ref 0.7–3.1)
LYMPHOCYTES NFR BLD AUTO: 13.6 % (ref 19.6–45.3)
MCH RBC QN AUTO: 28.6 PG (ref 26.6–33)
MCHC RBC AUTO-ENTMCNC: 31.2 G/DL (ref 31.5–35.7)
MCV RBC AUTO: 91.6 FL (ref 79–97)
MONOCYTES # BLD AUTO: 0.79 10*3/MM3 (ref 0.1–0.9)
MONOCYTES NFR BLD AUTO: 9.2 % (ref 5–12)
NEUTROPHILS NFR BLD AUTO: 6.21 10*3/MM3 (ref 1.7–7)
NEUTROPHILS NFR BLD AUTO: 72.5 % (ref 42.7–76)
NRBC BLD AUTO-RTO: 0 /100 WBC (ref 0–0.2)
PLATELET # BLD AUTO: 262 10*3/MM3 (ref 140–450)
PMV BLD AUTO: 9.2 FL (ref 6–12)
POTASSIUM SERPL-SCNC: 4.9 MMOL/L (ref 3.5–5.2)
PROT SERPL-MCNC: 6.5 G/DL (ref 6–8.5)
RBC # BLD AUTO: 4.9 10*6/MM3 (ref 4.14–5.8)
SODIUM SERPL-SCNC: 143 MMOL/L (ref 136–145)
WBC NRBC COR # BLD: 8.58 10*3/MM3 (ref 3.4–10.8)

## 2021-12-14 PROCEDURE — 36415 COLL VENOUS BLD VENIPUNCTURE: CPT

## 2021-12-14 PROCEDURE — 74176 CT ABD & PELVIS W/O CONTRAST: CPT

## 2021-12-14 PROCEDURE — 80053 COMPREHEN METABOLIC PANEL: CPT

## 2021-12-14 PROCEDURE — 85025 COMPLETE CBC W/AUTO DIFF WBC: CPT

## 2021-12-14 PROCEDURE — 71250 CT THORAX DX C-: CPT

## 2021-12-15 ENCOUNTER — TELEPHONE (OUTPATIENT)
Dept: ONCOLOGY | Facility: CLINIC | Age: 66
End: 2021-12-15

## 2021-12-15 DIAGNOSIS — C64.1 RENAL CELL CARCINOMA OF RIGHT KIDNEY (HCC): Primary | ICD-10-CM

## 2021-12-15 NOTE — PROGRESS NOTES
[facility] HEMATOLOGY AND ONCOLOGY  [address]  [place] [place] [address] [phone]    Patient Name: Parker Arnold  Encounter Date: 12/21/2021  YOB: 1955  Patient Number: [id number]       REASON FOR VISIT: Parker Arnold is a 66 y.o. year old male with a diagnosis of clear cell renal cancer in 11/11/2014 (5.5 cm G3, pT3a, pNx).  Underwent right nephrectomy. Was under surveillance until 11/26/2018  When he underwent  wedge resection of a right pulm nodule. Pathology:  metastatic renal cell carcinoma. He was not able to tolerate Sutent due to kidney failure, thus received ipilumomab/nivolumab, 05/13/2019 - 08/07/2019, then single agent nivolumab, 08/28/2019 - 09/09/2019 (27.5 months since cessation of therapy). Therapy stopped due to renal failure (nivolumab?). He has been on and off prednisone since.  Last 11/29/2021 underwent right thoracoscopic RLL and RML wedge resection with final path consistent with metastatic renal cell carcinoma.  He is here alone.    I have reviewed the HPI and verified with the patient the accuracy of it. No changes to interval history since the information was documented. Leonardo Smith MD 12/21/21          Problem List Items Addressed This Visit     None        Oncology/Hematology History   Renal cell carcinoma of right kidney (HCC)   10/2014 Initial Diagnosis    Renal cell carcinoma (CMS/HCC)     10/2014 Surgery    Right nephrectomy, Dr Morales       10/1/2018 Progression    Increasing lung Nodules on f/u CT     10/2/2018 -  Chemotherapy    Sutent  Discontinued due to intolerance     5/13/2019 - 8/7/2019 Chemotherapy    Opdivo Yervoy x 4 cycles     8/28/2019 - 9/9/2019 Chemotherapy    Single agent Opdivo    Opdivo held 9/9/2019 due to Renal Failure / NephritisToxicity     1/16/2020 Imaging    PET SCAN  Negative for neoplastic uptake         PAST MEDICAL HISTORY:  ALLERGIES:  Allergies    Allergen Reactions   • Amoxicillin Hives   • Opdivo [Nivolumab] Provider Review Needed     Renal failure   • Penicillins Hives and Rash   • Clindamycin/Lincomycin Rash     pustules  Rash - pustules     • Doxycycline Rash   • Hepatitis B Virus Vaccines Rash     Pustules     • Hepatitis B Vaccine Rash   • Latex Rash   • Levofloxacin Rash     CURRENT MEDICATIONS:  Outpatient Encounter Medications as of 12/21/2021   Medication Sig Dispense Refill   • calcium carb-cholecalciferol (Caltrate 600+D3) 600-800 MG-UNIT tablet Take 1 tablet by mouth 2 (Two) Times a Day With Meals.     • carvedilol (COREG) 3.125 MG tablet Take 3.125 mg by mouth 2 (Two) Times a Day With Meals.     • cetirizine (zyrTEC) 10 MG tablet Take 10 mg by mouth Daily.     • finasteride (PROSCAR) 5 MG tablet Take 5 mg by mouth Daily.  3   • multivitamin with minerals (MULTIVITAL PO) Take 1 tablet by mouth Daily.     • predniSONE (DELTASONE) 5 MG tablet Take 5 mg by mouth Daily.     • sodium bicarbonate 650 MG tablet Take 1 tablet by mouth 3 (Three) Times a Day. 90 tablet 2   • pantoprazole (PROTONIX) 40 MG EC tablet Take 40 mg by mouth Daily.       No facility-administered encounter medications on file as of 12/21/2021.     ADULT ILLNESSES:  Patient Active Problem List   Diagnosis Code   • Renal cell carcinoma of right kidney (HCC) C64.1   • Multiple nodules of lung R91.8   • Lung anomaly Q33.9   • TIA (transient ischemic attack) G45.9   • Acute renal failure with tubular necrosis in the setting of solitary kidney N17.0   • Lung metastases (HCC) C78.00   • Pyuria R82.81   • Hyperkalemia E87.5   • Metabolic acidosis E87.2   • Hyperglycemia R73.9   • Hypercalcemia E83.52   • Benign prostatic hyperplasia N40.0   • Increased frequency of urination R35.0   • Nocturia R35.1   • Retention of urine R33.9   • Dehydration E86.0   • Nephritis and nephropathy N05.9   • Toxicity, chemicals T65.91XA   • Stage 3 chronic kidney disease (HCC) N18.30   • Campylobacter  gastroenteritis A04.5   • Sigmoid diverticulitis K57.32   • History of TIA (transient ischemic attack) Z86.73   • Solitary kidney, acquired Z90.5     SURGERIES:  Past Surgical History:   Procedure Laterality Date   • BRONCHOSCOPY N/A 11/29/2021    Procedure: BRONCHOSCOPY;  Surgeon: Jarrod Tolentino MD;  Location:  [facility] OR;  Service: Cardiothoracic;  Laterality: N/A;   • HERNIA REPAIR  2008   • LUNG BIOPSY  11/2018   • NEPHRECTOMY  2014   • SINUS SURGERY  2005   • THORACOSCOPY Right 11/29/2021    Procedure: RIGHT THORACOSCOPY WITH RIGHT MIDDLE AND RIGHT LOWER LOBE WEDGE RESECTIONS;  Surgeon: Jarrod Tolentino MD;  Location:  [facility] OR;  Service: Cardiothoracic;  Laterality: Right;     HEALTH MAINTENANCE ITEMS:  Health Maintenance Due   Topic Date Due   • COLORECTAL CANCER SCREENING  Never done   • COVID-19 Vaccine (1) Never done   • TDAP/TD VACCINES (1 - Tdap) Never done   • ZOSTER VACCINE (1 of 2) Never done   • Pneumococcal Vaccine 65+ (1 of 1 - PPSV23) Never done   • HEPATITIS C SCREENING  Never done   • ANNUAL WELLNESS VISIT  Never done   • INFLUENZA VACCINE  Never done       <no information>  Last Completed Colonoscopy     This patient has no relevant Health Maintenance data.          There is no immunization history on file for this patient.  Last Completed Mammogram     This patient has no relevant Health Maintenance data.            FAMILY HISTORY:  Family History   Problem Relation Age of Onset   • Other Mother         blood disease unknown   • Cancer Father         unknown   • No Known Problems Sister    • Heart disease Maternal Grandmother    • Heart disease Maternal Grandfather    • No Known Problems Paternal Grandmother    • No Known Problems Paternal Grandfather    • No Known Problems Sister      SOCIAL HISTORY:  Social History     Socioeconomic History   • Marital status:    Tobacco Use   • Smoking status: Former Smoker     Packs/day: 1.00     Years: 15.00     Pack years: 15.00     Types:  "Cigarettes     Quit date:      Years since quittin.9   • Smokeless tobacco: Never Used   Vaping Use   • Vaping Use: Never used   Substance and Sexual Activity   • Alcohol use: No   • Drug use: No   • Sexual activity: Defer       REVIEW OF SYSTEMS:  Constitutional:  Manages ADLs, chores, errands and driving.  Again drove himself in today.  Appetite is good.  \"Fine.\" Energy is fairly good.  He remains active.  \"Been working on my new house.\"  He has lost 3 pounds (had gained 4 pounds at his prior visit) since his last visit. No fever, no chills nor drenching nights.  HENT: Negative.  No sore throat.  Has seasonal sinus symptoms/rhinorrhea. No headaches.  Eyes: Negative.    Respiratory:  No SOB at rest nor TENORIO.  No cough. No wheezing. No tobacco use.    Cardiovascular: Negative.  Has had post op chest wall \"soreness.\"  Has needed Tylenol but no narcotics.  No palpitations.  No orthopnea  Gastrointestinal: Increasing RUQ pain \"since the surgery.\"  Has seen Dr. Calhoun and is scheduled for gallbladder surgery tomorrow.  No dysphagia.  No nausea.  No vomiting.  No dyspepsia.  Has had post-op constipation.  \"All better now.\"  No diarrhea since resolution of diverticulitis.  No melena. No hematochezia. Had recurrent diverticulitis, early 2021.    Endocrine: No hot flashes.  Genitourinary:  No dysuria.  No incontinence. No hematuria.  Occasional urgency.  \"Sometimes gotta go when I gotta go.\" Nocturia much improved on Finasteride - up 2x- from every 90 min  Musculoskeletal:  No arthralgias.  No edema  Skin: No new rash.    Neurological:  No dizziness.  No facial asymmetry. No headaches.  Mild sensory neuropathy of the hands. Not worse  Hematological: Negative for new adenopathy.  Says he bruises easily.  Psychiatric/Behavioral:  No anxiety.  No depression.    VITAL SIGNS: BP (!) 178/102 Comment: Reported to Dr. Smith.  Pulse 65   Temp 97.8 °F (36.6 °C)   Resp 16   Ht 177.8 cm (70\")   Wt 90.7 kg (200 lb) "   SpO2 98%   BMI 28.70 kg/m² Body surface area is 2.09 meters squared.  Pain Score    12/21/21 1007   PainSc:   5         PHYSICAL EXAMINATION:   General Appearance: Patient is a very pleasant, cooperative, heavyset, modestly kept elderly male who is awake, alert, oriented and in no acute distress. ECOG 1  HEENT: Normocephalic. Sclerae clear, conjunctiva pink, extraocular movements intact, pupils, round, reactive to light and  accommodation. Is wearing a surgical mask today  NECK: Supple, no jugular venous distention, thyroid not enlarged.  LYMPH: No cervical, supraclavicular, axillary, or inguinal lymphadenopathy.  LUNGS: Good air movement, no rales, rhonchi, rubs or wheezes with auscultation.  Right thoracoscopic incisions are healing nicely.  CARDIO: Regular sinus rhythm, no murmurs, gallops or rubs.  ABDOMEN:  Obese, nondistended, soft, with positive right upper quadrant tenderness, and guarding, but no rebound. No obvious abdominal masses, but the tenderness precludes an adequate exam for same. Bowel sounds positive. No hernia  MUSKEL: No joint swelling, decreased motion, or inflammation  EXTREMS:  No edema, clubbing, cyanosis, No varicose veins.  NEURO: Grossly nonfocal, Gait is coordinated and smooth, Cognition is preserved.  SKIN: No rashes, no ecchymoses, no petechia.  PSYCH: Oriented to time, place and person. Memory is preserved. Mood and affect appear normal         LABS    Lab Results - Last 18 Months   Lab Units 12/14/21  1006 11/30/21  0625 11/24/21  1327 09/14/21  1016 06/14/21  0958 06/07/21  0802 04/12/21  1027 02/22/21  1024 02/22/21  1024 12/28/20  0912   HEMOGLOBIN g/dL 14.0 13.0 15.1 14.4 13.6 14.7 14.0   < > 13.6   < >   HEMATOCRIT % 44.9 40.7 46.1 42.1 41.9 44.6 42.7   < > 37.6   < >   MCV fL 91.6 90.6 91.7 88.1 90.5 90.5 88.0   < > 81.6   < >   WBC 10*3/mm3 8.58 13.86* 6.59 8.90 6.55 6.8 6.92   < > 6.38   < >   RDW % 13.1 13.1 13.1 13.3 13.5 13.3 13.3   < > 13.2   < >   MPV fL 9.2 9.5 9.8  9.6 9.7 9.4 9.9   < > 9.4   < >   PLATELETS 10*3/mm3 262 211 232 196 161 185 185   < > 201   < >   IMM GRAN % % 0.3  --  0.3 0.3 0.2  --  0.4  --  0.5  --    NEUTROS ABS 10*3/mm3 6.21  --  4.64 5.98 3.89 4.5 4.61   < > 3.76  --    LYMPHS ABS 10*3/mm3 1.17  --  1.17 1.48 1.73 1.4 1.26   < > 1.58  --    MONOS ABS 10*3/mm3 0.79  --  0.62 1.02* 0.74 0.70 0.81   < > 0.77  --    EOS ABS 10*3/mm3 0.33  --  0.11 0.36 0.15 0.20 0.18   < > 0.22  --    BASOS ABS 10*3/mm3 0.05  --  0.03 0.03 0.03 0.00 0.03   < > 0.02  --    IMMATURE GRANS (ABS) 10*3/mm3 0.03  --  0.02 0.03 0.01 0.0 0.03   < > 0.03  --    NRBC /100 WBC 0.0  --  0.0 0.0 0.0  --  0.0  --  0.0  --     < > = values in this interval not displayed.       Lab Results - Last 18 Months   Lab Units 12/14/21  1006 11/30/21  0625 11/29/21  0844 11/29/21  0707 11/24/21  1521 11/24/21  1327 09/14/21  1016 09/14/21  1016 06/14/21  0958 06/14/21  0958 06/07/21  0802 04/12/21  1027 04/12/21  1027 12/28/20  0912   GLUCOSE mg/dL 95 137*  --   --  187* 106*  --  104*  --  103* 109   < > 95   < >   SODIUM mmol/L 143 135*  --   --  139 139   < > 141   < > 142 136   < > 140   < >   SODIUM, ARTERIAL mmol/L  --   --  139 140  --   --   --   --   --   --   --   --   --   --    POTASSIUM mmol/L 4.9 5.3*  --   --  5.2 6.2*  --  4.4  --  4.7 4.2   < > 4.5   < >   TOTAL CO2 mmol/L  --   --   --   --   --   --   --   --   --   --  21*  --   --    < >   CO2 mmol/L 26.0 20.0*  --   --  21.0* 23.0  --  24.0  --  25.0  --    < > 25.0  --    CHLORIDE mmol/L 109* 104  --   --  106 106  --  107  --  110* 104   < > 107   < >   ANION GAP mmol/L 8.0 11.0  --   --  12.0 10.0  --  10.0  --  7.0 11   < > 8.0   < >   CREATININE mg/dL 2.60* 2.23*  --   --  2.53* 2.50*  --  2.36*  --  2.43* 2.9*   < > 3.09*   < >   BUN mg/dL 48* 47*  --   --  46* 44*  --  40*  --  37* 50*   < > 59*   < >   BUN / CREAT RATIO  18.5 21.1  --   --  18.2 17.6  --  16.9  --  15.2  --    < > 19.1  --    CALCIUM mg/dL 9.3 8.7  --    --  9.4 9.8  --  9.2  --  9.5 9.4   < > 9.3   < >   EGFR IF NONAFRICN AM mL/min/1.73 25* 30*  --   --  26* 26*  --  28*  --  27* 22*   < > 20*   < >   ALK PHOS U/L 68  --   --   --  72 67  --  65  --  49 59  --  65   < >   TOTAL PROTEIN g/dL 6.5  --   --   --  6.5 6.7  --  6.3  --  6.0 6.4*  --  6.7   < >   ALT (SGPT) U/L 16  --   --   --  19 20  --  14  --  10 11  --  13   < >   AST (SGOT) U/L 18  --   --   --  16 17  --  14  --  14 12  --  14   < >   BILIRUBIN mg/dL 0.4  --   --   --  0.4 0.4  --  0.5  --  0.2 0.3  --  0.6   < >   ALBUMIN g/dL 4.50  --   --   --  4.60 4.60  --  4.20  --  3.90 4.2  --  4.10   < >   GLOBULIN gm/dL 2.0  --   --   --  1.9 2.1  --  2.1  --  2.1  --   --  2.6  --     < > = values in this interval not displayed.       Lab Results - Last 18 Months   Lab Units 12/28/20  0912 07/06/20  1035   URIC ACID mg/dL 8.8* 9.9*       Lab Results - Last 18 Months   Lab Units 07/08/20  0509   IRON ug/dL 60   TIBC ug/dL 202*   IRON SATURATION % 30       Assessment:  1.  Renal cell carcinoma, diagnosed 11/11/2014 (5.5 cm G3, pT3a, pNx).    --2014, right nephrectomy.   --surveillance until 11/26/2018    --11/26/2018-right lower lobe VATS wedge resection: Metastatic renal cell carcinoma, 0.9 cm; margins free of malignancy. Dr. Hu  -- Unable to tolerate Sutent due to kidney failure, thus received ipilumomab/nivolumab, 05/13/2019 - 08/07/2019, then single agent nivolumab, 08/28/2019 - 09/09/2019 (21.5 months since cessation of therapy). Therapy stopped due to renal failure (nivolumab?)  --01/16/2020-PET scan.  No neoplastic FDG uptake within the chest, abdomen or pelvis.  --07/06/2020-CT abdomen/pelvis with diverticulosis of the colon.  Evidence of acute diverticulitis of the sigmoid colon and adjacent distal descending colon.  No free air or fluid seen.  1 cm noncalcified subpleural nodule in the left lower lobe.  Follow-up exam in 6 months recommended.  --08/04/2020-chest CT.  Scattered subcentimeter  bilateral pulmonary nodules similar to 1/16/2020 with mild increase in the 5 mm left upper lobe nodule.  Continued follow-up recommended.  No pathologic lymphadenopathy.  Right nephrectomy.  --01/14/2020-CT chest.  Increased size of 1.2 cm right lower lobe superior segment pulmonary nodule (previously 0.9 cm)-representing 33% growth.  Multiple other bilateral pulmonary nodules have not significantly changed in size.  Findings are in keeping with disease progression.  --01/14/2020-CT abdomen/pelvis.  No evidence of recurrent or metastatic disease in the abdomen or pelvis.  --01/26/2021-PET/CT.  Impression: Minimal metabolic activity associated with the enlarging pulmonary nodule within the superior segment of the right lower lobe with a maximum SUV of 1.35.  The additional nodules noted within both lungs also demonstrate no abnormal metabolic activity which would favor benignity but continued radiographic follow-up will be suggested.  No scintigraphic evidence of metastatic disease on current exam.  Increased uptake overlying the right groin and more distal external iliac vessels felt to be related to previous hernia repair.  --03/15/2021- CT chest.  Continued increased size of the right lower lobe superior segment now 1.4 cm pulmonary nodule (1.2 cm, 1/14/2021; 0.8 cm, 8/4/2020).  Other pulmonary nodules are stable.  New small area of groundglass opacity in the superior segment left lower lobe may be infectious/inflammatory.  Coronary artery calcifications.  Aortic calcifications.  Small hiatal hernia.  --03/15/2021-CT abdomen/pelvis.  Previous right nephrectomy identified with no evidence of intra-abdominal metastatic disease.  Benign-appearing exophytic cyst posterior aspect of the left kidney, upper pole, measuring 2.47 cm, stable.  Small nodules in the lung bases are unchanged compared to previous chest CT.  Evidence of previous right inguinal hernia repair.  Fatty infiltration of the inguinal canals.  Scattered  colonic diverticula without diverticulitis.  Small sliding hiatal hernia identified.  --06/11/2021-CT chest without contrast.  Stable pulmonary nodules.  No evidence of disease progression.  Interval resolution superior segment left lower lobe groundglass opacity.  Scattered coronary artery calcifications.  --06/11/2021-CT abdomen/pelvis.  History of right renal cell carcinoma nephrectomy.  No CT evidence of tumor recurrence or metastatic disease in the abdomen or pelvis.  --09/07/2021-CT chest wo contrast.  Pulmonary nodules noted.  Slightly increased in size compared to 6/11/2021 (left upper lobe 7.2 mm-prior 6.7 mm; posterior right lung, 19 mm-prior 13.9 mm; posterior medial right lung, 14 mm-prior 11 mm; right lung, 7.7 mm-prior 7.1 mm).  However no new nodules identified.  --09/07/2021-CT abdomen/pelvis wo contrast.  No evidence of intra-abdominal or pelvic metastasis.  Prior right nephrectomy.  Stable left renal cyst.  Colonic diverticulosis with questionable wall thickening of the sigmoid colon may be artifactual.  Correlation to any recent colonoscopy recommended.  --09/28/2021-PET scan.  Increased size of right lower lobe lung nodule (21 x 15 mm compared to 14 x 14 mm 3 months ago) though this finding shows no significant FDG uptake.  Otherwise stable PET/CT exam.  --11/29/2021-RLL, RML wedge resections. Path consistent with metastatic renal cell carcinoma  --12/14/2021-CT chest.  Postoperative changes from wedge resections of prior right middle and lower lobe pulmonary nodules.  Stable left-sided pulmonary nodules, largest in the left upper lobe/apex measuring 8 mm.  Continued follow-up strongly recommended.  No pathologic lymphadenopathy.  --12/14/2021-CT abdomen/pelvis.  No convincing evidence of intra-abdominal or pelvic metastasis.  New diffuse abnormal gallbladder wall thickening.  Findings may be seen with cholecystitis.  Gallbladder ultrasound might be considered.  --12/20/2021-gallbladder  ultrasound-diffuse gallbladder wall thickening.  Gallbladder nondistended with no visible stones.  Unlikely acute cholecystitis.  Favor chronic cholecystitis.    2.  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    --GFR 25 mL/min, 12/14/2021 (prior:  9 - 28)  --s/p multiple doses of HD steroids in the past and remains on daily 5 mg prednisone per nephrology (Dr. Grant)  --will not start Cytoxan unless biopsy done to determine etiology of kidney failure. Has been off Opdivo since September 2019.    3.  Normocytic anemia.    --Hgb 14.0; MCV 91.6, 12/14/2021 (prior: Hgb 10.2-13.4; MCV 86.3-95.5)  4.  Gerd: modulated by omeprazole  5.  Osteopenia: on bola+vit D  6.  History of acute diverticulitis.   7.  Cholecystitis, acute on chronic?    Plan:  1.  Apprised of CT scan findings, 12/14/2021.  Stable lung nodules.  New diffuse abnormal gallbladder wall thickening.  Cholecystitis?  No convincing evidence of intra-abdominal or pelvic mets.  2.  Apprised of gallbladder ultrasound, 12/20/2021 (above).  Favor chronic cholecystitis.  Has been seen by Dr. Calhoun today.  Scheduled for gallbladder surgery on 12/21/2021.  3.  Again discussed the option of systemic therapy with cabozantinib as outlined by Dr. Arteaga (see #7 below) previously.  Patient remains wary of starting any type of therapy given previous AE's.    4.  Apprised of labs, 12/14/2021.  CBC stable/normal. GFR depressed (stable) otherwise stable CMP.  5.  Reviewed discharge summary, 11/30/2021.  Underwent bronchoscopy, right thoracoscopy, right lower lobe wedge resection, right middle lobe wedge resection, right lower lobe wedge basilar resection on 11/29/2021 by Dr. Tolentino.  Pathology revealed metastatic renal cell carcinoma.  6.  Apprised of PET scan, 09/28/2021 (above).  Increase size of RLL nodule with no significant FDG uptake.  7.  Review encounter from Dr. Mayito Arteaga, medical oncology at [facility] last 02/14/2021.  History reviewed.  Most  recent CT chest, 01/14/2021 (above) with increasing size of 1.2 cm right lower lobe superior segment pulmonary nodule (previously 0.9 cm) and PET scan, 01/26/2021 (above) noted including minimal metabolic activity associated with the enlarging pulmonary nodule within the superior segment of the right lower lobe.  Discussion: Biopsy right lower lobe lung nodule solitary lesion that is growing on PET scan which will be done at that institution.  Contemplate either surgical resection, ablation or SBRT.  We will focus on local therapy for now.  Multiple systemic options down the road but if renal function improves can contemplate trial with HIF-alpha inhibitor.    8.  Previously reviewed encounter office encounter from GIOVANNY Edward with Dr. Arteaga's office, 05/27/2021.  Impression/plan: CT CAP, 05/26/2021: Stable examination including stable bilateral lung nodules compared to 03/15/2021.  Progression of right lower lobe lesion?  Discussed systemic therapy option with cabozantinib at 20 mg.  However patient concerned due to his history of substantial AEs in the past.  Will arrange for lesion to be ablated.  However patient says multiple lesions were there for 6 years and unchanged.-Consider ablation or SBRT of the lesion and monitor and consider cabozantinib when failed.  Multiple systemic options down the road but if renal function improves can contemplate HIF-alpha inhibitor.  Continue surveillance for now.  Repeat scans in 3 months.    9.  Obtain report of colonoscopy from Dr. Ledezma ([place]) done sometime 10/2021 Re: Abnormal CT abdomen/pelvis.  Needs colonoscopy clarification.  10.  Return to office in 12 weeks with preoffice CT scans of the chest, abdomen/pelvis without IV contrast (in 11 weeks), CBC with differential and CMP.    I spent 56 minutes caring for Parker on this date of service. This time includes time spent by me in the following activities: preparing for the visit, reviewing tests, performing a  medically appropriate examination and/or evaluation, counseling and educating the patient/family/caregiver, ordering medications, tests, or procedures and documenting information in the medical record.

## 2021-12-20 ENCOUNTER — HOSPITAL ENCOUNTER (OUTPATIENT)
Dept: ULTRASOUND IMAGING | Facility: HOSPITAL | Age: 66
Discharge: HOME | End: 2021-12-20
Admitting: INTERNAL MEDICINE

## 2021-12-20 ENCOUNTER — TELEPHONE (OUTPATIENT)
Dept: ONCOLOGY | Facility: CLINIC | Age: 66
End: 2021-12-20

## 2021-12-20 DIAGNOSIS — C64.1 RENAL CELL CARCINOMA OF RIGHT KIDNEY (HCC): ICD-10-CM

## 2021-12-20 DIAGNOSIS — K81.9 CHOLECYSTITIS: Primary | ICD-10-CM

## 2021-12-20 PROCEDURE — 76705 ECHO EXAM OF ABDOMEN: CPT

## 2021-12-20 NOTE — TELEPHONE ENCOUNTER
----- Message from Leonardo Smith MD sent at 12/20/2021  9:44 AM CST -----  Refer to Dr. Layne Re: Diffuse gallbladder wall thickening, favor chronic cholecystitis    Called patient with the above information. He verbalized understanding and was informed he will be called with that appointment information.    rp

## 2021-12-21 ENCOUNTER — TRANSCRIBE ORDERS (OUTPATIENT)
Dept: LAB | Facility: HOSPITAL | Age: 66
End: 2021-12-21

## 2021-12-21 ENCOUNTER — LAB (OUTPATIENT)
Dept: LAB | Facility: HOSPITAL | Age: 66
End: 2021-12-21

## 2021-12-21 ENCOUNTER — ANESTHESIA EVENT (OUTPATIENT)
Dept: PERIOP | Facility: HOSPITAL | Age: 66
End: 2021-12-21

## 2021-12-21 ENCOUNTER — PRE-ADMISSION TESTING (OUTPATIENT)
Dept: PREADMISSION TESTING | Facility: HOSPITAL | Age: 66
End: 2021-12-21

## 2021-12-21 ENCOUNTER — OFFICE VISIT (OUTPATIENT)
Dept: ONCOLOGY | Facility: CLINIC | Age: 66
End: 2021-12-21

## 2021-12-21 VITALS
DIASTOLIC BLOOD PRESSURE: 85 MMHG | HEART RATE: 62 BPM | RESPIRATION RATE: 16 BRPM | HEIGHT: 69 IN | BODY MASS INDEX: 29.68 KG/M2 | OXYGEN SATURATION: 100 % | WEIGHT: 200.4 LBS | SYSTOLIC BLOOD PRESSURE: 161 MMHG

## 2021-12-21 VITALS
HEART RATE: 65 BPM | BODY MASS INDEX: 28.63 KG/M2 | RESPIRATION RATE: 16 BRPM | TEMPERATURE: 97.8 F | HEIGHT: 70 IN | WEIGHT: 200 LBS | DIASTOLIC BLOOD PRESSURE: 102 MMHG | OXYGEN SATURATION: 98 % | SYSTOLIC BLOOD PRESSURE: 178 MMHG

## 2021-12-21 DIAGNOSIS — Z11.59 SCREENING FOR VIRAL DISEASE: Primary | ICD-10-CM

## 2021-12-21 DIAGNOSIS — C78.00 MALIGNANT NEOPLASM METASTATIC TO LUNG, UNSPECIFIED LATERALITY (HCC): ICD-10-CM

## 2021-12-21 DIAGNOSIS — C64.1 RENAL CELL CARCINOMA OF RIGHT KIDNEY (HCC): Primary | ICD-10-CM

## 2021-12-21 LAB
AMYLASE SERPL-CCNC: 69 U/L (ref 28–100)
LIPASE SERPL-CCNC: 35 U/L (ref 13–60)
SARS-COV-2 RNA PNL SPEC NAA+PROBE: NOT DETECTED

## 2021-12-21 PROCEDURE — 99215 OFFICE O/P EST HI 40 MIN: CPT | Performed by: INTERNAL MEDICINE

## 2021-12-21 PROCEDURE — 36415 COLL VENOUS BLD VENIPUNCTURE: CPT

## 2021-12-21 PROCEDURE — 82150 ASSAY OF AMYLASE: CPT

## 2021-12-21 PROCEDURE — C9803 HOPD COVID-19 SPEC COLLECT: HCPCS | Performed by: SPECIALIST

## 2021-12-21 PROCEDURE — 93005 ELECTROCARDIOGRAM TRACING: CPT

## 2021-12-21 PROCEDURE — 93010 ELECTROCARDIOGRAM REPORT: CPT | Performed by: INTERNAL MEDICINE

## 2021-12-21 PROCEDURE — 87635 SARS-COV-2 COVID-19 AMP PRB: CPT | Performed by: SPECIALIST

## 2021-12-21 PROCEDURE — 83690 ASSAY OF LIPASE: CPT

## 2021-12-21 NOTE — DISCHARGE INSTRUCTIONS
DAY OF SURGERY INSTRUCTIONS          ARRIVAL TIME: AS DIRECTED BY OFFICE    YOU MAY TAKE THE FOLLOWING MEDICATION(S) THE MORNING OF SURGERY WITH A SIP OF WATER: carvedilol (COREG)        ALL OTHER HOME MEDICATION CHECK WITH YOUR PHYSICIAN      DO NOT TAKE ANY ERECTILE DYSFUNCTION MEDICATIONS (EX: CIALIS, VIAGRA) 24 HOURS PRIOR TO SURGERY                      MANAGING PAIN AFTER SURGERY    We know you are probably wondering what your pain will be like after surgery.  Following surgery it is unrealistic to expect you will not have pain.   Pain is how our bodies let us know that something is wrong or cautions us to be careful.  That said, our goal is to make your pain tolerable.    Methods we may use to treat your pain include (oral or IV medications, PCAs, epidurals, nerve blocks, etc.)   While some procedures require IV pain medications for a short time after surgery, transitioning to pain medications by mouth allows for better management of pain.   Your nurse will encourage you to take oral pain medications whenever possible.  IV medications work almost immediately, but only last a short while.  Taking medications by mouth allows for a more constant level of medication in your blood stream for a longer period of time.      Once your pain is out of control it is harder to get back under control.  It is important you are aware when your next dose of pain medication is due.  If you are admitted, your nurse may write the time of your next dose on the white board in your room to help you remember.      We are interested in your pain and encourage you to inform us about aggravating factors during your visit.   Many times a simple repositioning every few hours can make a big difference.    If your physician says it is okay, do not let your pain prevent you from getting out of bed. Be sure to call your nurse for assistance prior to getting up so you do not fall.      Before surgery, please decide your tolerable pain goal.   These faces help describe the pain ratings we use on a 0-10 scale.   Be prepared to tell us your goal and whether or not you take pain or anxiety medications at home.          BEFORE YOU COME TO THE HOSPITAL  (Pre-op instructions)  • Do not eat, drink, smoke or chew gum after midnight the night before surgery.  This also includes no mints.  • Morning of surgery take only the medicines you have been instructed with a sip of water unless otherwise instructed  by your physician.  • Do not shave, wear makeup or dark nail polish.  • Remove all jewelry including rings.  • Leave anything you consider valuable at home.  • Leave your suitcase in the car until after your surgery.  • Bring the following with you if applicable:  o Picture ID and insurance, Medicare or Medicaid cards  o Co-pay/deductible required by insurance (cash, check, credit card)  o Copy of advance directive, living will or power-of- documents if not brought to pre-work  o CPAP or BIPAP mask and tubing  o Relaxation aids ( book, magazine), etc.  o Hearing aids                        ON THE DAY OF SURGERY  · On the day of surgery check in at registration located at the main entrance of the hospital. Only one family member or friend are allowed per patient.  ? You will be registered and given a beeper with instructions where to wait in the main lobby.  ? When your beeper lights up and vibrates a member of the Outpatient Surgery staff will meet you at the double doors under the stair steps and escort you to your preoperative room.   · You may have cloth compression devices placed on your legs. These help to prevent blood clots and reduce swelling in your legs.  · An IV may be inserted into one of your veins.  · In the operating room, you may be given one or more of the following:  ? A medicine to help you relax (sedative).  ? A medicine to numb the area (local anesthetic).  ? A medicine to make you fall asleep (general anesthetic).  ? A medicine that  "is injected into an area of your body to numb everything below the injection site (regional anesthetic).  · Your surgical site will be marked or identified.  · You may be given an antibiotic through your IV to help prevent infection.  Contact a health care provider if you:  · Develop a fever of more than 100.4°F (38°C) or other feelings of illness during the 48 hours before your surgery.  · Have symptoms that get worse.  Have questions or concerns about your surgery    General Anesthesia/Surgery, Adult  General anesthesia is the use of medicines to make a person \"go to sleep\" (unconscious) for a medical procedure. General anesthesia must be used for certain procedures, and is often recommended for procedures that:  · Last a long time.  · Require you to be still or in an unusual position.  · Are major and can cause blood loss.  The medicines used for general anesthesia are called general anesthetics. As well as making you unconscious for a certain amount of time, these medicines:  · Prevent pain.  · Control your blood pressure.  · Relax your muscles.  Tell a health care provider about:  · Any allergies you have.  · All medicines you are taking, including vitamins, herbs, eye drops, creams, and over-the-counter medicines.  · Any problems you or family members have had with anesthetic medicines.  · Types of anesthetics you have had in the past.  · Any blood disorders you have.  · Any surgeries you have had.  · Any medical conditions you have.  · Any recent upper respiratory, chest, or ear infections.  · Any history of:  ? Heart or lung conditions, such as heart failure, sleep apnea, asthma, or chronic obstructive pulmonary disease (COPD).  ?  service.  ? Depression or anxiety.  · Any tobacco or drug use, including marijuana or alcohol use.  · Whether you are pregnant or may be pregnant.  What are the risks?  Generally, this is a safe procedure. However, problems may occur, including:  · Allergic " reaction.  · Lung and heart problems.  · Inhaling food or liquid from the stomach into the lungs (aspiration).  · Nerve injury.  · Air in the bloodstream, which can lead to stroke.  · Extreme agitation or confusion (delirium) when you wake up from the anesthetic.  · Waking up during your procedure and being unable to move. This is rare.  These problems are more likely to develop if you are having a major surgery or if you have an advanced or serious medical condition. You can prevent some of these complications by answering all of your health care provider's questions thoroughly and by following all instructions before your procedure.  General anesthesia can cause side effects, including:  · Nausea or vomiting.  · A sore throat from the breathing tube.  · Hoarseness.  · Wheezing or coughing.  · Shaking chills.  · Tiredness.  · Body aches.  · Anxiety.  · Sleepiness or drowsiness.  · Confusion or agitation.  RISKS AND COMPLICATIONS OF SURGERY  Your health care provider will discuss possible risks and complications with you before surgery. Common risks and complications include:    · Problems due to the use of anesthetics.  · Blood loss and replacement (does not apply to minor surgical procedures).  · Temporary increase in pain due to surgery.  · Uncorrected pain or problems that the surgery was meant to correct.  · Infection.  · New damage.    What happens before the procedure?    Medicines  Ask your health care provider about:  · Changing or stopping your regular medicines. This is especially important if you are taking diabetes medicines or blood thinners.  · Taking medicines such as aspirin and ibuprofen. These medicines can thin your blood. Do not take these medicines unless your health care provider tells you to take them.  · Taking over-the-counter medicines, vitamins, herbs, and supplements. Do not take these during the week before your procedure unless your health care provider approves them.  General  instructions  · Starting 3-6 weeks before the procedure, do not use any products that contain nicotine or tobacco, such as cigarettes and e-cigarettes. If you need help quitting, ask your health care provider.  · If you brush your teeth on the morning of the procedure, make sure to spit out all of the toothpaste.  · Tell your health care provider if you become ill or develop a cold, cough, or fever.  · If instructed by your health care provider, bring your sleep apnea device with you on the day of your surgery (if applicable).  · Ask your health care provider if you will be going home the same day, the following day, or after a longer hospital stay.  ? Plan to have someone take you home from the hospital or clinic.  ? Plan to have a responsible adult care for you for at least 24 hours after you leave the hospital or clinic. This is important.  What happens during the procedure?  · You will be given anesthetics through both of the following:  ? A mask placed over your nose and mouth.  ? An IV in one of your veins.  · You may receive a medicine to help you relax (sedative).  · After you are unconscious, a breathing tube may be inserted down your throat to help you breathe. This will be removed before you wake up.  · An anesthesia specialist will stay with you throughout your procedure. He or she will:  ? Keep you comfortable and safe by continuing to give you medicines and adjusting the amount of medicine that you get.  ? Monitor your blood pressure, pulse, and oxygen levels to make sure that the anesthetics do not cause any problems.  The procedure may vary among health care providers and hospitals.  What happens after the procedure?  · Your blood pressure, temperature, heart rate, breathing rate, and blood oxygen level will be monitored until the medicines you were given have worn off.  · You will wake up in a recovery area. You may wake up slowly.  · If you feel anxious or agitated, you may be given medicine to  help you calm down.  · If you will be going home the same day, your health care provider may check to make sure you can walk, drink, and urinate.  · Your health care provider will treat any pain or side effects you have before you go home.  · Do not drive for 24 hours if you were given a sedative.  Summary  · General anesthesia is used to keep you still and prevent pain during a procedure.  · It is important to tell your healthcare provider about your medical history and any surgeries you have had, and previous experience with anesthesia.  · Follow your healthcare provider’s instructions about when to stop eating, drinking, or taking certain medicines before your procedure.  · Plan to have someone take you home from the hospital or clinic.  This information is not intended to replace advice given to you by your health care provider. Make sure you discuss any questions you have with your health care provider.  Document Released: 03/26/2009 Document Revised: 08/03/2018 Document Reviewed: 08/03/2018  [employer] Interactive Patient Education © 2019 [employer]       Fall Prevention in Hospitals, Adult  As a hospital patient, your condition and the treatments you receive can increase your risk for falls. Some additional risk factors for falls in a hospital include:  · Being in an unfamiliar environment.  · Being on bed rest.  · Your surgery.  · Taking certain medicines.  · Your tubing requirements, such as intravenous (IV) therapy or catheters.  It is important that you learn how to decrease fall risks while at the hospital. Below are important tips that can help prevent falls.  SAFETY TIPS FOR PREVENTING FALLS  Talk about your risk of falling.  · Ask your health care provider why you are at risk for falling. Is it your medicine, illness, tubing placement, or something else?  · Make a plan with your health care provider to keep you safe from falls.  · Ask your health care provider or pharmacist about side effects of your  medicines. Some medicines can make you dizzy or affect your coordination.  Ask for help.  · Ask for help before getting out of bed. You may need to press your call button.  · Ask for assistance in getting safely to the toilet.  · Ask for a walker or cane to be put at your bedside. Ask that most of the side rails on your bed be placed up before your health care provider leaves the room.  · Ask family or friends to sit with you.  · Ask for things that are out of your reach, such as your glasses, hearing aids, telephone, bedside table, or call button.  Follow these tips to avoid falling:  · Stay lying or seated, rather than standing, while waiting for help.  · Wear rubber-soled slippers or shoes whenever you walk in the hospital.  · Avoid quick, sudden movements.  ¨ Change positions slowly.  ¨ Sit on the side of your bed before standing.  ¨ Stand up slowly and wait before you start to walk.  · Let your health care provider know if there is a spill on the floor.  · Pay careful attention to the medical equipment, electrical cords, and tubes around you.  · When you need help, use your call button by your bed or in the bathroom. Wait for one of your health care providers to help you.  · If you feel dizzy or unsure of your footing, return to bed and wait for assistance.  · Avoid being distracted by the TV, telephone, or another person in your room.  · Do not lean or support yourself on rolling objects, such as IV poles or bedside tables.     This information is not intended to replace advice given to you by your health care provider. Make sure you discuss any questions you have with your health care provider.     Document Released: 12/15/2001 Document Revised: 01/08/2016 Document Reviewed: 08/25/2013  [employer] Interactive Patient Education ©2016 [employer]       [facility]  CHG 4% Patient Instruction Sheet    Chlorhexidine Before Surgery  Chlorhexidine gluconate (CHG) is a germ-killing (antiseptic) solution  that is used to clean the skin. It gets rid of the bacteria that normally live on the skin. Cleaning your skin with CHG before surgery helps lower the risk for infection after surgery.    How to use CHG solution  · You will take 2 showers, one shower the night before surgery, the second shower the morning of surgery before coming to the hospital.  · Use CHG only as told by your health care provider, and follow the instructions on the label.  · Use CHG solution while taking a shower. Follow these steps when using CHG solution (unless your health care provider gives you different instructions):  1. Start the shower.  2. Use your normal soap and shampoo to wash your face and hair.  3. Turn off the shower or move out of the shower stream.  4. Pour the CHG onto a clean washcloth. Do not use any type of brush or rough-edged sponge.  5. Starting at your neck, lather your body down to your toes. Make sure you:  6. Pay special attention to the part of your body where you will be having surgery. Scrub this area for at least 1 minute.  7. Use the full amount of CHG as directed. Usually, this is one half bottle for each shower.  8. Do not use CHG on your head or face. If the solution gets into your ears or eyes, rinse them well with water.  9. Avoid your genital area.  10. Avoid any areas of skin that have broken skin, cuts, or scrapes.  11. Scrub your back and under your arms. Make sure to wash skin folds.  12. Let the lather sit on your skin for 1-2 minutes or as long as told by your health care  provider.  13. Thoroughly rinse your entire body in the shower. Make sure that all body creases and crevices are rinsed well.  14. Dry off with a clean towel. Do not put any substances on your body afterward, such as powder, lotion, or perfume.  15. Put on clean clothes or pajamas.  16. If it is the night before your surgery, sleep in clean sheets.    What are the risks?  Risks of using CHG include:  · A skin reaction.  · Hearing  loss, if CHG gets in your ears.  · Eye injury, if CHG gets in your eyes and is not rinsed out.  · The CHG product catching fire.  Make sure that you avoid smoking and flames after applying CHG to your skin.  Do not use CHG:  · If you have a chlorhexidine allergy or have previously reacted to chlorhexidine.  · On babies younger than 2 months of age.      On the day of surgery, when you are taken to your room in Outpatient Surgery you will be given a CHG prepackaged cloth to wipe the site for your surgery.  How to use CHG prepackaged cloths  · Follow the instructions on the label.  · Use the CHG cloth on clean, dry skin. Follow these steps when using a CHG cloth (unless your health care provider gives you different instructions):  1. Using the CHG cloth, vigorously scrub the part of your body where you will be having surgery. Scrub using a back-and-forth motion for 3 minutes. The area on your body should be completely wet with CHG when you are finished scrubbing.  2. Do not rinse. Discard the cloth and let the area air-dry for 1 minute. Do not put any substances on your body afterward, such as powder, lotion, or perfume.  Contact a health care provider if:  · Your skin gets irritated after scrubbing.  · You have questions about using your solution or cloth.  Get help right away if:  · Your eyes become very red or swollen.  · Your eyes itch badly.  · Your skin itches badly and is red or swollen.  · Your hearing changes.  · You have trouble seeing.  · You have swelling or tingling in your mouth or throat.  · You have trouble breathing.  · You swallow any chlorhexidine.  Summary  · Chlorhexidine gluconate (CHG) is a germ-killing (antiseptic) solution that is used to clean the skin. Cleaning your skin with CHG before surgery helps lower the risk for infection after surgery.  · You may be given CHG to use at home. It may be in a bottle or in a prepackaged cloth to use on your skin. Carefully follow your health care  provider's instructions and the instructions on the product label.  · Do not use CHG if you have a chlorhexidine allergy.  · Contact your health care provider if your skin gets irritated after scrubbing.  This information is not intended to replace advice given to you by your health care provider. Make sure you discuss any questions you have with your health care provider.  Document Released: 09/11/2013 Document Revised: 11/15/2018 Document Reviewed: 11/15/2018  [employer] Interactive Patient Education © 2019 [employer]          PATIENT/FAMILY/RESPONSIBLE PARTY VERBALIZES UNDERSTANDING OF ABOVE EDUCATION.  COPY OF PAIN SCALE GIVEN AND REVIEWED WITH VERBALIZED UNDERSTANDING.

## 2021-12-21 NOTE — PAT
Lipid panel was marked in error, no need to draw for surgery with Dr. Calhoun on 12/22/21 but needs a Amylase drawn via phone call Yana with Dr. Calhoun's office.

## 2021-12-22 ENCOUNTER — ANESTHESIA (OUTPATIENT)
Dept: PERIOP | Facility: HOSPITAL | Age: 66
End: 2021-12-22

## 2021-12-22 ENCOUNTER — HOSPITAL ENCOUNTER (OUTPATIENT)
Facility: HOSPITAL | Age: 66
Setting detail: HOSPITAL OUTPATIENT SURGERY
Discharge: HOME | End: 2021-12-22
Attending: SPECIALIST | Admitting: SPECIALIST

## 2021-12-22 VITALS
DIASTOLIC BLOOD PRESSURE: 78 MMHG | SYSTOLIC BLOOD PRESSURE: 149 MMHG | HEART RATE: 78 BPM | TEMPERATURE: 98.4 F | RESPIRATION RATE: 18 BRPM | OXYGEN SATURATION: 95 %

## 2021-12-22 DIAGNOSIS — K80.50 BILIARY COLIC SYMPTOM: ICD-10-CM

## 2021-12-22 PROCEDURE — 25010000002 DEXAMETHASONE PER 1 MG

## 2021-12-22 PROCEDURE — 25010000002 ONDANSETRON PER 1 MG

## 2021-12-22 PROCEDURE — 25010000002 PROPOFOL 10 MG/ML EMULSION

## 2021-12-22 PROCEDURE — 25010000002 FENTANYL CITRATE (PF) 100 MCG/2ML SOLUTION

## 2021-12-22 PROCEDURE — 88304 TISSUE EXAM BY PATHOLOGIST: CPT | Performed by: SPECIALIST

## 2021-12-22 PROCEDURE — 25010000002 VANCOMYCIN 1 G RECONSTITUTED SOLUTION 1 EACH VIAL: Performed by: SPECIALIST

## 2021-12-22 PROCEDURE — C1889 IMPLANT/INSERT DEVICE, NOC: HCPCS | Performed by: SPECIALIST

## 2021-12-22 PROCEDURE — 25010000002 HYDROMORPHONE PER 4 MG: Performed by: ANESTHESIOLOGY

## 2021-12-22 DEVICE — M/L-10 CLIP CARTRIDGE, 19 CLIPS, DISPOSABLE
Type: IMPLANTABLE DEVICE | Site: ABDOMEN | Status: FUNCTIONAL
Brand: M/L-10

## 2021-12-22 RX ORDER — LIDOCAINE HYDROCHLORIDE 20 MG/ML
INJECTION, SOLUTION EPIDURAL; INFILTRATION; INTRACAUDAL; PERINEURAL AS NEEDED
Status: DISCONTINUED | OUTPATIENT
Start: 2021-12-22 | End: 2021-12-22 | Stop reason: SURG

## 2021-12-22 RX ORDER — LIDOCAINE HYDROCHLORIDE 40 MG/ML
SOLUTION TOPICAL AS NEEDED
Status: DISCONTINUED | OUTPATIENT
Start: 2021-12-22 | End: 2021-12-22 | Stop reason: SURG

## 2021-12-22 RX ORDER — DEXTROSE MONOHYDRATE 25 G/50ML
12.5 INJECTION, SOLUTION INTRAVENOUS AS NEEDED
Status: DISCONTINUED | OUTPATIENT
Start: 2021-12-22 | End: 2021-12-22 | Stop reason: HOSPADM

## 2021-12-22 RX ORDER — DEXAMETHASONE SODIUM PHOSPHATE 4 MG/ML
INJECTION, SOLUTION INTRA-ARTICULAR; INTRALESIONAL; INTRAMUSCULAR; INTRAVENOUS; SOFT TISSUE AS NEEDED
Status: DISCONTINUED | OUTPATIENT
Start: 2021-12-22 | End: 2021-12-22 | Stop reason: SURG

## 2021-12-22 RX ORDER — EPHEDRINE SULFATE 50 MG/ML
INJECTION, SOLUTION INTRAVENOUS AS NEEDED
Status: DISCONTINUED | OUTPATIENT
Start: 2021-12-22 | End: 2021-12-22 | Stop reason: SURG

## 2021-12-22 RX ORDER — PROPOFOL 10 MG/ML
VIAL (ML) INTRAVENOUS AS NEEDED
Status: DISCONTINUED | OUTPATIENT
Start: 2021-12-22 | End: 2021-12-22 | Stop reason: SURG

## 2021-12-22 RX ORDER — IBUPROFEN 600 MG/1
600 TABLET ORAL ONCE AS NEEDED
Status: DISCONTINUED | OUTPATIENT
Start: 2021-12-22 | End: 2021-12-22 | Stop reason: HOSPADM

## 2021-12-22 RX ORDER — ACETAMINOPHEN 500 MG
500 TABLET ORAL EVERY 6 HOURS PRN
COMMUNITY

## 2021-12-22 RX ORDER — SODIUM CHLORIDE, SODIUM LACTATE, POTASSIUM CHLORIDE, CALCIUM CHLORIDE 600; 310; 30; 20 MG/100ML; MG/100ML; MG/100ML; MG/100ML
9 INJECTION, SOLUTION INTRAVENOUS CONTINUOUS
Status: DISCONTINUED | OUTPATIENT
Start: 2021-12-22 | End: 2021-12-22 | Stop reason: HOSPADM

## 2021-12-22 RX ORDER — SODIUM CHLORIDE 0.9 % (FLUSH) 0.9 %
10 SYRINGE (ML) INJECTION EVERY 12 HOURS SCHEDULED
Status: DISCONTINUED | OUTPATIENT
Start: 2021-12-22 | End: 2021-12-22 | Stop reason: HOSPADM

## 2021-12-22 RX ORDER — LIDOCAINE HYDROCHLORIDE 10 MG/ML
0.5 INJECTION, SOLUTION EPIDURAL; INFILTRATION; INTRACAUDAL; PERINEURAL ONCE AS NEEDED
Status: DISCONTINUED | OUTPATIENT
Start: 2021-12-22 | End: 2021-12-22 | Stop reason: HOSPADM

## 2021-12-22 RX ORDER — SODIUM CHLORIDE 9 MG/ML
500 INJECTION, SOLUTION INTRAVENOUS CONTINUOUS
Status: DISCONTINUED | OUTPATIENT
Start: 2021-12-22 | End: 2021-12-22 | Stop reason: HOSPADM

## 2021-12-22 RX ORDER — SCOLOPAMINE TRANSDERMAL SYSTEM 1 MG/1
1 PATCH, EXTENDED RELEASE TRANSDERMAL CONTINUOUS
Status: DISCONTINUED | OUTPATIENT
Start: 2021-12-22 | End: 2021-12-22 | Stop reason: HOSPADM

## 2021-12-22 RX ORDER — FLUMAZENIL 0.1 MG/ML
0.2 INJECTION INTRAVENOUS AS NEEDED
Status: DISCONTINUED | OUTPATIENT
Start: 2021-12-22 | End: 2021-12-22 | Stop reason: HOSPADM

## 2021-12-22 RX ORDER — OXYCODONE AND ACETAMINOPHEN 10; 325 MG/1; MG/1
1 TABLET ORAL ONCE AS NEEDED
Status: COMPLETED | OUTPATIENT
Start: 2021-12-22 | End: 2021-12-22

## 2021-12-22 RX ORDER — HYDROMORPHONE HYDROCHLORIDE 1 MG/ML
0.5 INJECTION, SOLUTION INTRAMUSCULAR; INTRAVENOUS; SUBCUTANEOUS
Status: DISCONTINUED | OUTPATIENT
Start: 2021-12-22 | End: 2021-12-22 | Stop reason: HOSPADM

## 2021-12-22 RX ORDER — BUPIVACAINE HYDROCHLORIDE AND EPINEPHRINE 5; 5 MG/ML; UG/ML
INJECTION, SOLUTION PERINEURAL AS NEEDED
Status: DISCONTINUED | OUTPATIENT
Start: 2021-12-22 | End: 2021-12-22 | Stop reason: HOSPADM

## 2021-12-22 RX ORDER — NALOXONE HCL 0.4 MG/ML
0.04 VIAL (ML) INJECTION AS NEEDED
Status: DISCONTINUED | OUTPATIENT
Start: 2021-12-22 | End: 2021-12-22 | Stop reason: HOSPADM

## 2021-12-22 RX ORDER — ROCURONIUM BROMIDE 10 MG/ML
INJECTION, SOLUTION INTRAVENOUS AS NEEDED
Status: DISCONTINUED | OUTPATIENT
Start: 2021-12-22 | End: 2021-12-22 | Stop reason: SURG

## 2021-12-22 RX ORDER — NEOSTIGMINE METHYLSULFATE 5 MG/5 ML
SYRINGE (ML) INTRAVENOUS AS NEEDED
Status: DISCONTINUED | OUTPATIENT
Start: 2021-12-22 | End: 2021-12-22 | Stop reason: SURG

## 2021-12-22 RX ORDER — FENTANYL CITRATE 50 UG/ML
INJECTION, SOLUTION INTRAMUSCULAR; INTRAVENOUS AS NEEDED
Status: DISCONTINUED | OUTPATIENT
Start: 2021-12-22 | End: 2021-12-22 | Stop reason: SURG

## 2021-12-22 RX ORDER — FENTANYL CITRATE 50 UG/ML
25 INJECTION, SOLUTION INTRAMUSCULAR; INTRAVENOUS
Status: DISCONTINUED | OUTPATIENT
Start: 2021-12-22 | End: 2021-12-22 | Stop reason: HOSPADM

## 2021-12-22 RX ORDER — MIDAZOLAM HYDROCHLORIDE 1 MG/ML
0.5 INJECTION INTRAMUSCULAR; INTRAVENOUS
Status: DISCONTINUED | OUTPATIENT
Start: 2021-12-22 | End: 2021-12-22 | Stop reason: HOSPADM

## 2021-12-22 RX ORDER — SODIUM CHLORIDE 0.9 % (FLUSH) 0.9 %
10 SYRINGE (ML) INJECTION AS NEEDED
Status: DISCONTINUED | OUTPATIENT
Start: 2021-12-22 | End: 2021-12-22 | Stop reason: HOSPADM

## 2021-12-22 RX ORDER — ONDANSETRON 2 MG/ML
INJECTION INTRAMUSCULAR; INTRAVENOUS AS NEEDED
Status: DISCONTINUED | OUTPATIENT
Start: 2021-12-22 | End: 2021-12-22 | Stop reason: SURG

## 2021-12-22 RX ORDER — DEXAMETHASONE SODIUM PHOSPHATE 4 MG/ML
4 INJECTION, SOLUTION INTRA-ARTICULAR; INTRALESIONAL; INTRAMUSCULAR; INTRAVENOUS; SOFT TISSUE ONCE AS NEEDED
Status: DISCONTINUED | OUTPATIENT
Start: 2021-12-22 | End: 2021-12-22 | Stop reason: HOSPADM

## 2021-12-22 RX ORDER — MAGNESIUM HYDROXIDE 1200 MG/15ML
LIQUID ORAL AS NEEDED
Status: DISCONTINUED | OUTPATIENT
Start: 2021-12-22 | End: 2021-12-22 | Stop reason: HOSPADM

## 2021-12-22 RX ORDER — HYDROCODONE BITARTRATE AND ACETAMINOPHEN 7.5; 325 MG/1; MG/1
1 TABLET ORAL EVERY 4 HOURS PRN
Qty: 30 TABLET | Refills: 0 | Status: SHIPPED | OUTPATIENT
Start: 2021-12-22 | End: 2022-03-21 | Stop reason: ALTCHOICE

## 2021-12-22 RX ORDER — LABETALOL HYDROCHLORIDE 5 MG/ML
5 INJECTION, SOLUTION INTRAVENOUS
Status: DISCONTINUED | OUTPATIENT
Start: 2021-12-22 | End: 2021-12-22 | Stop reason: HOSPADM

## 2021-12-22 RX ORDER — SODIUM CHLORIDE 0.9 % (FLUSH) 0.9 %
3 SYRINGE (ML) INJECTION AS NEEDED
Status: DISCONTINUED | OUTPATIENT
Start: 2021-12-22 | End: 2021-12-22 | Stop reason: HOSPADM

## 2021-12-22 RX ORDER — ONDANSETRON 2 MG/ML
4 INJECTION INTRAMUSCULAR; INTRAVENOUS AS NEEDED
Status: DISCONTINUED | OUTPATIENT
Start: 2021-12-22 | End: 2021-12-22 | Stop reason: HOSPADM

## 2021-12-22 RX ADMIN — FENTANYL CITRATE 100 MCG: 50 INJECTION, SOLUTION INTRAMUSCULAR; INTRAVENOUS at 07:13

## 2021-12-22 RX ADMIN — LIDOCAINE HYDROCHLORIDE 1 EACH: 40 SOLUTION TOPICAL at 07:16

## 2021-12-22 RX ADMIN — PROPOFOL 150 MG: 10 INJECTION, EMULSION INTRAVENOUS at 07:15

## 2021-12-22 RX ADMIN — SODIUM CHLORIDE 500 ML: 9 INJECTION, SOLUTION INTRAVENOUS at 07:01

## 2021-12-22 RX ADMIN — ROCURONIUM BROMIDE 30 MG: 10 INJECTION INTRAVENOUS at 07:15

## 2021-12-22 RX ADMIN — EPHEDRINE SULFATE 15 MG: 50 INJECTION INTRAVENOUS at 07:35

## 2021-12-22 RX ADMIN — DEXAMETHASONE SODIUM PHOSPHATE 4 MG: 4 INJECTION, SOLUTION INTRA-ARTICULAR; INTRALESIONAL; INTRAMUSCULAR; INTRAVENOUS; SOFT TISSUE at 07:24

## 2021-12-22 RX ADMIN — VASOPRESSIN 1 ML: 20 INJECTION INTRAVENOUS at 07:22

## 2021-12-22 RX ADMIN — LIDOCAINE HYDROCHLORIDE 100 MG: 20 INJECTION, SOLUTION EPIDURAL; INFILTRATION; INTRACAUDAL; PERINEURAL at 07:15

## 2021-12-22 RX ADMIN — EPHEDRINE SULFATE 15 MG: 50 INJECTION INTRAVENOUS at 07:27

## 2021-12-22 RX ADMIN — PROPOFOL 50 MG: 10 INJECTION, EMULSION INTRAVENOUS at 07:43

## 2021-12-22 RX ADMIN — HYDROMORPHONE HYDROCHLORIDE 0.5 MG: 1 INJECTION, SOLUTION INTRAMUSCULAR; INTRAVENOUS; SUBCUTANEOUS at 08:30

## 2021-12-22 RX ADMIN — SCOLOPAMINE TRANSDERMAL SYSTEM 1 PATCH: 1 PATCH, EXTENDED RELEASE TRANSDERMAL at 07:00

## 2021-12-22 RX ADMIN — Medication 3 MG: at 08:06

## 2021-12-22 RX ADMIN — OXYCODONE AND ACETAMINOPHEN 1 TABLET: 325; 10 TABLET ORAL at 09:00

## 2021-12-22 RX ADMIN — GLYCOPYRROLATE 0.4 MG: 0.2 INJECTION, SOLUTION INTRAMUSCULAR; INTRAVENOUS at 08:06

## 2021-12-22 RX ADMIN — ONDANSETRON 4 MG: 2 INJECTION INTRAMUSCULAR; INTRAVENOUS at 07:24

## 2021-12-22 RX ADMIN — VANCOMYCIN HYDROCHLORIDE 1000 MG: 1 INJECTION, POWDER, LYOPHILIZED, FOR SOLUTION INTRAVENOUS at 07:10

## 2021-12-22 RX ADMIN — ROCURONIUM BROMIDE 10 MG: 10 INJECTION INTRAVENOUS at 07:44

## 2021-12-22 NOTE — ANESTHESIA POSTPROCEDURE EVALUATION
Patient: Parker Arnold    Procedure Summary     Date: 12/22/21 Room / Location: [facility] OR  /  [facility] OR    Anesthesia Start: 0711 Anesthesia Stop: 0814    Procedure: LAPAROSCOPIC CHOLECYSTECTOMY (N/A Abdomen) Diagnosis: (BILIARY DYSKINESIA)    Surgeons: Vandana Calhoun MD Provider: Avelina Padilla CRNA    Anesthesia Type: general ASA Status: 3          Anesthesia Type: general    Vitals  Vitals Value Taken Time   /77 12/22/21 0900   Temp 98.4 °F (36.9 °C) 12/22/21 0900   Pulse 60 12/22/21 0900   Resp 10 12/22/21 0900   SpO2 98 % 12/22/21 0900           Post Anesthesia Care and Evaluation    Patient location during evaluation: PACU  Patient participation: complete - patient participated  Level of consciousness: awake and alert  Pain management: adequate  Airway patency: patent  Anesthetic complications: No anesthetic complications    Cardiovascular status: acceptable  Respiratory status: acceptable  Hydration status: acceptable    Comments: Blood pressure 144/77, pulse 60, temperature 98.4 °F (36.9 °C), temperature source Temporal, resp. rate 10, SpO2 98 %.    Pt discharged from PACU based on alida score >8

## 2021-12-22 NOTE — H&P
"  Vandana Calhoun MD  H&P    Patient Care Team:  Trey Osborne MD as PCP - General (Family Medicine)    Chief complaint symptomatic biliary colic    Subjective     Parker Arnold  is a 66 y.o. male with right upper quadrant pain.  Presented to the office.  Please refer to office notes for details of visit..      Review of Systems   Pertinent items are noted in HPI, all other systems reviewed and negative    History  Past Medical History:   Diagnosis Date   • Dehydration 12/30/2019   • GERD (gastroesophageal reflux disease)    • Hypertension    • Kidney disease, chronic, stage III (moderate, EGFR 30-59 ml/min) (HCC) 2018    Post infusion of Optivo, states GFR is 28%   • Nephritis and nephropathy 1/2/2020   • Renal cell carcinoma (HCC)    • Renal cell carcinoma of right kidney (HCC) 10/18/2019    Overview:  Diagnoses 11/11/14. Path report in [facility] at [facility]. Tissue has been requested.    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     Past Surgical History:   Procedure Laterality Date   • BRONCHOSCOPY N/A 11/29/2021    Procedure: BRONCHOSCOPY;  Surgeon: Jarrod Tolentino MD;  Location: [facility] OR;  Service: Cardiothoracic;  Laterality: N/A;   • COLONOSCOPY     • HERNIA REPAIR  2008   • LUNG BIOPSY  11/2018   • NEPHRECTOMY Right 2014   • SINUS SURGERY  2005   • THORACOSCOPY Right 11/29/2021    Procedure: RIGHT THORACOSCOPY WITH RIGHT MIDDLE AND RIGHT LOWER LOBE WEDGE RESECTIONS;  Surgeon: Jarrod Tolentino MD;  Location:  [facility] OR;  Service: Cardiothoracic;  Laterality: Right;     Family History   Problem Relation Age of Onset   • Other Mother         blood disease unknown   • Cancer Father         unknown   • No Known Problems Sister    • Heart disease Maternal Grandmother    • Heart disease Maternal Grandfather    • No Known Problems Paternal Grandmother    • No Known Problems Paternal Grandfather    • No Known Problems Sister  "     Social History     Tobacco Use   • Smoking status: Former Smoker     Packs/day: 1.00     Years: 15.00     Pack years: 15.00     Types: Cigarettes     Quit date:      Years since quittin.0   • Smokeless tobacco: Never Used   Vaping Use   • Vaping Use: Never used   Substance Use Topics   • Alcohol use: No   • Drug use: No     (Not in a hospital admission)    Allergies:  Amoxicillin, Opdivo [nivolumab], Penicillins, Clindamycin/lincomycin, Doxycycline, Hepatitis b virus vaccines, Hepatitis b vaccine, Latex, and Levofloxacin    Objective     Vital Signs  Temp:  [97.3 °F (36.3 °C)-97.8 °F (36.6 °C)] 97.3 °F (36.3 °C)  Heart Rate:  [62-65] 64  Resp:  [16] 16  BP: (143-178)/() 143/89    Physical Exam:      General Appearance:    Alert, cooperative, in no acute distress   Head:    Normocephalic, without obvious abnormality, atraumatic   Eyes:            Lids and lashes normal, conjunctivae and sclerae normal, no   icterus, no pallor, corneas clear, PERRLA   Ears:    Ears appear intact with no abnormalities noted   Neck:   No adenopathy, supple, trachea midline   Back:     No kyphosis present, no scoliosis present, no skin lesions,      erythema or scars, no tenderness to percussion or                   palpation,   range of motion normal   Lungs:     Clear to auscultation,respirations regular, even and                  unlabored    Heart:    Regular rhythm and normal rate, normal S1 and S2, no            murmur, no gallop, no rub, no click   Chest Wall:    No abnormalities observed   Abdomen:    Soft mildly tender right upper quadrant pain   Rectal:     Deferred   Extremities:   Moves all extremities well, no edema, no cyanosis, no             redness   Pulses:   Pulses palpable and equal bilaterally   Skin:   No bleeding, bruising or rash   Lymph nodes:   No palpable adenopathy   Neurologic:   No focal deficits       Results Review:      Lab Results (last 72 hours)     ** No results found for the last 72  hours. **        Imaging Results (Last 72 Hours)     ** No results found for the last 72 hours. **          Assessment/Plan       * No active hospital problems. *      We will proceed with laparoscopic cholecystectomy.  Risk benefits alternatives previously discussed.      Vandana Calhoun MD  12/22/21  07:10 CST

## 2021-12-22 NOTE — ANESTHESIA PROCEDURE NOTES
Airway  Urgency: elective    Date/Time: 12/22/2021 7:16 AM  Airway not difficult    General Information and Staff    Patient location during procedure: OR  CRNA: Avelina Padilla CRNA    Indications and Patient Condition  Indications for airway management: airway protection    Preoxygenated: yes  Mask difficulty assessment: 1 - vent by mask    Final Airway Details  Final airway type: endotracheal airway      Successful airway: ETT  Cuffed: yes   Successful intubation technique: direct laryngoscopy  Facilitating devices/methods: intubating stylet  Endotracheal tube insertion site: oral  Blade: Spangler  Blade size: 2  ETT size (mm): 7.5  Cormack-Lehane Classification: grade I - full view of glottis  Placement verified by: capnometry   Cuff volume (mL): 5  Measured from: teeth  ETT/EBT  to teeth (cm): 23  Number of attempts at approach: 1  Assessment: lips, teeth, and gum same as pre-op and atraumatic intubation

## 2021-12-22 NOTE — OP NOTE
CHOLECYSTECTOMY LAPAROSCOPIC INTRAOPERATIVE CHOLANGIOGRAM  Procedure Note    Parker Arnold  12/22/2021    Pre-op Diagnosis:   BILIARY DYSKINESIA    Post-op Diagnosis:     same    Procedure/CPT® Codes:  Laparoscopic cholecystectomy            Anesthesia: General    Staff:   Circulator: Avelina Fisher RN  Scrub Person: Carolina Colin  Assistant: Dayanara Allen    Estimated Blood Loss:  Minimal    Specimens:                Specimens     ID Source Type Tests Collected By Collected At Frozen?    A Gallbladder Tissue · TISSUE PATHOLOGY EXAM   Vandana Calhoun MD 12/22/21 0745 No    Description: Gallbladder and Contents            Drains:   [REMOVED] Chest Tube 1 Right Midaxillary (Removed)   Function Other (Comment) 11/29/21 2046   Air Leak/Fluctuation air leak not present 11/29/21 2046   Patency Intervention Tip/tilt 11/29/21 2046   Drainage Description Sanguineous 11/29/21 2046   Dressing Type Gauze 11/29/21 2046   Dressing Status Clean; Dry; Intact 11/29/21 2046   Dressing Intervention Dressing reinforced 11/29/21 0903   Site Assessment Tender 11/29/21 2046   Surrounding Skin Dry; Intact 11/29/21 2046   Securement tubing anchored to body distal to insertion site with tape 11/29/21 2046   Left Subcutaneous Emphysema none present 11/29/21 2046   Right Subcutaneous Emphysema none present 11/29/21 2046   Safety all connections secured; all tubing connections taped; 2 rubber-tipped hemostats with patient; suction checked 11/29/21 2046   Output (mL) 15 mL 11/30/21 0857       [REMOVED] Urethral Catheter Straight-tip 16 Fr. (Removed)   Daily Indications Selected surgeries ( tract, abdomen) 11/29/21 2046   Site Assessment Clean; Skin intact 11/29/21 2046   Collection Container Standard drainage bag 11/29/21 2046   Securement Method Securing device 11/29/21 2046   Catheter care complete Yes 11/29/21 2000   Output (mL) 300 mL 11/30/21 0530       Findings: Distended mildly inflamed with acute and chronic  cholecystitis    Complications: none          Date: 12/22/2021  Time: 08:14 CST        The patient was brought to the operating room and placed in supine position. After induction of anesthesia and infusion of antibiotics, the patient was prepped and draped in the usual sterile fashion. Veress needle technique was used. Standard 4 ports were placed. The gallbladder was grasped and retracted superiorly. The infundibulum was grasped and retracted laterally.  Adhesions dissected with cautery the cystic duct and cystic artery were identified, isolated, divided between clips.  Endoloop was placed on the duct stump the gallbladder was removed from its bed using electrocautery, placed in Endo bag and removed through the epigastric port. Irrigation was done until all clear. All ports were removed. Fascia at the 10 mm port site was closed with Vicryl. The skin was reapproximated with #4-0 subcuticular. Then 0.5% Marcaine was injected for local anesthesia. Dressing was placed. The patient was awakened and transferred to the recovery room in stable condition. The patient tolerated the procedure well. At the end of the procedure, all counts were correct.    Vandana Calhoun MD

## 2021-12-22 NOTE — ANESTHESIA PREPROCEDURE EVALUATION
Anesthesia Evaluation     Patient summary reviewed   no history of anesthetic complications:  NPO Solid Status: > 8 hours             Airway   Mallampati: II  TM distance: >3 FB  Neck ROM: full  Dental          Pulmonary    (-) COPD, asthma, sleep apnea, not a smoker  Cardiovascular   Exercise tolerance: excellent (>7 METS)    (+) hypertension,   (-) pacemaker, past MI, angina, cardiac stents      Neuro/Psych  (+) TIA,     (-) seizures, CVA  GI/Hepatic/Renal/Endo    (+)   renal disease CRI,   (-) GERD, liver disease, diabetes    Musculoskeletal     Abdominal    Substance History      OB/GYN          Other                        Anesthesia Plan    ASA 3     general     intravenous induction     Anesthetic plan, all risks, benefits, and alternatives have been provided, discussed and informed consent has been obtained with: patient.

## 2021-12-23 LAB
CYTO UR: NORMAL
LAB AP CASE REPORT: NORMAL
PATH REPORT.FINAL DX SPEC: NORMAL
PATH REPORT.GROSS SPEC: NORMAL

## 2021-12-26 LAB
QT INTERVAL: 432 MS
QTC INTERVAL: 427 MS

## 2022-01-09 ENCOUNTER — APPOINTMENT (OUTPATIENT)
Dept: GENERAL RADIOLOGY | Age: 67
End: 2022-01-09
Payer: MEDICARE

## 2022-01-09 ENCOUNTER — APPOINTMENT (OUTPATIENT)
Dept: CT IMAGING | Age: 67
End: 2022-01-09
Payer: MEDICARE

## 2022-01-09 ENCOUNTER — HOSPITAL ENCOUNTER (EMERGENCY)
Age: 67
Discharge: HOME | End: 2022-01-09
Attending: EMERGENCY MEDICINE
Payer: MEDICARE

## 2022-01-09 VITALS
HEIGHT: 70 IN | BODY MASS INDEX: 28.63 KG/M2 | WEIGHT: 200 LBS | HEART RATE: 87 BPM | SYSTOLIC BLOOD PRESSURE: 133 MMHG | DIASTOLIC BLOOD PRESSURE: 76 MMHG | RESPIRATION RATE: 20 BRPM | TEMPERATURE: 98.7 F | OXYGEN SATURATION: 92 %

## 2022-01-09 DIAGNOSIS — U07.1 COVID-19: Primary | ICD-10-CM

## 2022-01-09 DIAGNOSIS — R91.1 PULMONARY NODULE: ICD-10-CM

## 2022-01-09 DIAGNOSIS — R93.89 ABNORMAL CT SCAN: ICD-10-CM

## 2022-01-09 DIAGNOSIS — N18.9 CHRONIC KIDNEY DISEASE, UNSPECIFIED CKD STAGE: ICD-10-CM

## 2022-01-09 LAB
ALBUMIN SERPL-MCNC: 4.3 G/DL (ref 3.5–5.2)
ALP BLD-CCNC: 67 U/L (ref 40–130)
ALT SERPL-CCNC: 16 U/L (ref 5–41)
ANION GAP SERPL CALCULATED.3IONS-SCNC: 10 MMOL/L (ref 7–19)
AST SERPL-CCNC: 16 U/L (ref 5–40)
BILIRUB SERPL-MCNC: 0.4 MG/DL (ref 0.2–1.2)
BILIRUBIN URINE: NEGATIVE
BLOOD, URINE: NEGATIVE
BUN BLDV-MCNC: 33 MG/DL (ref 8–23)
CALCIUM SERPL-MCNC: 9.8 MG/DL (ref 8.8–10.2)
CHLORIDE BLD-SCNC: 105 MMOL/L (ref 98–111)
CLARITY: CLEAR
CO2: 25 MMOL/L (ref 22–29)
COLOR: YELLOW
CREAT SERPL-MCNC: 2.6 MG/DL (ref 0.5–1.2)
GFR AFRICAN AMERICAN: 30
GFR NON-AFRICAN AMERICAN: 25
GLUCOSE BLD-MCNC: 103 MG/DL (ref 74–109)
GLUCOSE URINE: NEGATIVE MG/DL
HCT VFR BLD CALC: 43.7 % (ref 42–52)
HEMOGLOBIN: 14.4 G/DL (ref 14–18)
KETONES, URINE: NEGATIVE MG/DL
LEUKOCYTE ESTERASE, URINE: NEGATIVE
LIPASE: 35 U/L (ref 13–60)
MCH RBC QN AUTO: 30.2 PG (ref 27–31)
MCHC RBC AUTO-ENTMCNC: 33 G/DL (ref 33–37)
MCV RBC AUTO: 91.6 FL (ref 80–94)
NITRITE, URINE: NEGATIVE
PDW BLD-RTO: 13.2 % (ref 11.5–14.5)
PH UA: 6.5 (ref 5–8)
PLATELET # BLD: 220 K/UL (ref 130–400)
PMV BLD AUTO: 9.2 FL (ref 9.4–12.4)
POTASSIUM REFLEX MAGNESIUM: 4.9 MMOL/L (ref 3.5–5)
PROTEIN UA: NEGATIVE MG/DL
RBC # BLD: 4.77 M/UL (ref 4.7–6.1)
SARS-COV-2, NAAT: DETECTED
SODIUM BLD-SCNC: 140 MMOL/L (ref 136–145)
SPECIFIC GRAVITY UA: 1.02 (ref 1–1.03)
TOTAL PROTEIN: 6.3 G/DL (ref 6.6–8.7)
UROBILINOGEN, URINE: 0.2 E.U./DL
WBC # BLD: 7.7 K/UL (ref 4.8–10.8)

## 2022-01-09 PROCEDURE — 36415 COLL VENOUS BLD VENIPUNCTURE: CPT

## 2022-01-09 PROCEDURE — 74176 CT ABD & PELVIS W/O CONTRAST: CPT

## 2022-01-09 PROCEDURE — 6360000002 HC RX W HCPCS: Performed by: EMERGENCY MEDICINE

## 2022-01-09 PROCEDURE — 81003 URINALYSIS AUTO W/O SCOPE: CPT

## 2022-01-09 PROCEDURE — 2580000003 HC RX 258: Performed by: EMERGENCY MEDICINE

## 2022-01-09 PROCEDURE — 71045 X-RAY EXAM CHEST 1 VIEW: CPT

## 2022-01-09 PROCEDURE — 87635 SARS-COV-2 COVID-19 AMP PRB: CPT

## 2022-01-09 PROCEDURE — 99283 EMERGENCY DEPT VISIT LOW MDM: CPT

## 2022-01-09 PROCEDURE — 96374 THER/PROPH/DIAG INJ IV PUSH: CPT

## 2022-01-09 PROCEDURE — 85027 COMPLETE CBC AUTOMATED: CPT

## 2022-01-09 PROCEDURE — 80053 COMPREHEN METABOLIC PANEL: CPT

## 2022-01-09 PROCEDURE — 70450 CT HEAD/BRAIN W/O DYE: CPT

## 2022-01-09 PROCEDURE — 83690 ASSAY OF LIPASE: CPT

## 2022-01-09 RX ORDER — ONDANSETRON 2 MG/ML
4 INJECTION INTRAMUSCULAR; INTRAVENOUS ONCE
Status: COMPLETED | OUTPATIENT
Start: 2022-01-09 | End: 2022-01-09

## 2022-01-09 RX ORDER — SODIUM CHLORIDE 9 MG/ML
INJECTION, SOLUTION INTRAVENOUS CONTINUOUS
Status: DISCONTINUED | OUTPATIENT
Start: 2022-01-09 | End: 2022-01-09 | Stop reason: HOSPADM

## 2022-01-09 RX ORDER — ONDANSETRON 4 MG/1
4 TABLET, ORALLY DISINTEGRATING ORAL EVERY 8 HOURS PRN
Qty: 12 TABLET | Refills: 0 | Status: SHIPPED | OUTPATIENT
Start: 2022-01-09

## 2022-01-09 RX ADMIN — SODIUM CHLORIDE: 9 INJECTION, SOLUTION INTRAVENOUS at 09:19

## 2022-01-09 RX ADMIN — ONDANSETRON 4 MG: 2 INJECTION INTRAMUSCULAR; INTRAVENOUS at 09:18

## 2022-01-09 ASSESSMENT — ENCOUNTER SYMPTOMS
VOMITING: 0
EYE PAIN: 0
COUGH: 1
ABDOMINAL PAIN: 1
SHORTNESS OF BREATH: 0
NAUSEA: 1
DIARRHEA: 0

## 2022-01-09 ASSESSMENT — PAIN SCALES - GENERAL: PAINLEVEL_OUTOF10: 4

## 2022-01-09 NOTE — ED PROVIDER NOTES
[facility]  eMERGENCY dEPARTMENT eNCOUnter      Pt Name: Angy Sommer  MRN: [id number]  [place] 1955  Date of evaluation: 1/9/2022  Provider: Nidia Lowery MD    [address] [place] [address]       Chief Complaint   Patient presents with    Fever     100.1 at home 98.7 now    Nausea     started yesterday    Headache     started yesterday    Cough     started yesterday         HISTORY OF PRESENT ILLNESS   (Location/Symptom, Timing/Onset,Context/Setting, Quality, Duration, Modifying Factors, Severity)  Note limiting factors. Angy Sommer is a 78 y.o. male who presents to the emergency department with multiple complaints. Patient said that since yesterday evening he has had low-grade fever, headache, nausea and mild abdominal discomfort. Very minimal cough. No chest pain or dyspnea. No hemoptysis. No unilateral leg swelling or pain. No history of DVT or PE. Headache described as global.  No vision changes numbness or weakness. No neck pain or stiffness. No sore throat or ear pain. Abdominal discomfort described as diffuse and achy with nausea. No vomiting or diarrhea. Denies any urinary symptoms except for some mild dysuria last night that has since resolved. Patient does have a history of renal cancer. First diagnosed 7 years ago. Had right-sided nephrectomy. Was unable to tolerate chemo and developed renal failure from one of his chemo treatments. Has a spot on his left kidney that is being followed. Also had metastasis of cancer to right lung and had those removed in November 2021 by Dr. Debora Chacon. Has been recovering uneventfully since that surgery but did develop cholecystitis and had to have cholecystectomy about a month ago. Has incisions from that that have been healing well but has had some lingering pain ever since surgery but all that has been gradually improving. Was not vaccinated against Covid because he was advised not to because of his multiple medical issues.   Takes daily prednisone because of his renal failure. HPI    NursingNotes were reviewed. REVIEW OF SYSTEMS    (2-9 systems for level 4, 10 or more for level 5)     Review of Systems   Constitutional: Positive for fever. Eyes: Negative for pain and visual disturbance. Respiratory: Positive for cough (mild). Negative for shortness of breath. Cardiovascular: Negative for chest pain, palpitations and leg swelling. Gastrointestinal: Positive for abdominal pain and nausea. Negative for diarrhea and vomiting. Genitourinary: Positive for dysuria (gone). Negative for difficulty urinating and flank pain. Musculoskeletal: Negative for neck pain and neck stiffness. Skin: Negative for rash. Neurological: Positive for headaches. Negative for weakness and numbness. All other systems reviewed and are negative. A complete review of systems was performed and is negative except as noted above in the HPI.        PAST MEDICAL HISTORY     Past Medical History:   Diagnosis Date    Cancer [facility])     Chronic kidney disease     TIA (transient ischemic attack)          SURGICAL HISTORY       Past Surgical History:   Procedure Laterality Date    HERNIA REPAIR  2012    KIDNEY REMOVAL Right 2014    SINUS SURGERY  2004         CURRENT MEDICATIONS       Discharge Medication List as of 1/9/2022 11:17 AM      CONTINUE these medications which have NOT CHANGED    Details   allopurinol (ZYLOPRIM) 100 MG tablet Take 1 tablet by mouth daily, Disp-30 tablet, R-0Normal      finasteride (PROSCAR) 5 MG tablet Take 5 mg by mouth dailyHistorical Med      predniSONE (DELTASONE) 10 MG tablet Take 10 mg by mouth dailyHistorical Med      sodium bicarbonate 325 MG tablet Take 325 mg by mouth 3 times daily Historical Med      Cholecalciferol (VITAMIN D) 2000 units CAPS capsule Take 2,000 Units by mouth dailyHistorical Med             ALLERGIES     Latex, Clindamycin/lincomycin, Doxycycline, Levaquin [levofloxacin in d5w], and Pcn [penicillins]    FAMILY HISTORY       Family History   Problem Relation Age of Onset    Heart Attack Paternal Uncle     Heart Attack Maternal Grandfather 79          SOCIAL HISTORY       Social History     Socioeconomic History    Marital status:      Spouse name: None    Number of children: None    Years of education: None    Highest education level: None   Occupational History    None   Tobacco Use    Smoking status: Former Smoker     Types: Cigarettes     Quit date:      Years since quittin.0    Smokeless tobacco: Never Used   Substance and Sexual Activity    Alcohol use: No    Drug use: None    Sexual activity: None   Other Topics Concern    None   Social History Narrative    None     Social Determinants of Health     Financial Resource Strain:     Difficulty of Paying Living Expenses: Not on file   Food Insecurity:     Worried About Running Out of Food in the Last Year: Not on file    Justin of Food in the Last Year: Not on file   Transportation Needs:     Lack of Transportation (Medical): Not on file    Lack of Transportation (Non-Medical):  Not on file   Physical Activity:     Days of Exercise per Week: Not on file    Minutes of Exercise per Session: Not on file   Stress:     Feeling of Stress : Not on file   Social Connections:     Frequency of Communication with Friends and Family: Not on file    Frequency of Social Gatherings with Friends and Family: Not on file    Attends Yazidism Services: Not on file    Active Member of Clubs or Organizations: Not on file    Attends Club or Organization Meetings: Not on file    Marital Status: Not on file   Intimate Partner Violence:     Fear of Current or Ex-Partner: Not on file    Emotionally Abused: Not on file    Physically Abused: Not on file    Sexually Abused: Not on file   Housing Stability:     Unable to Pay for Housing in the Last Year: Not on file    Number of [place] in the Last Year: Not on file    Unstable Housing in the Last Year: Not on file       SCREENINGS             PHYSICAL EXAM    (up to 7 for level 4, 8 or more for level 5)     ED Triage Vitals [01/09/22 0807]   BP Temp Temp Source Pulse Resp SpO2 Height Weight   (!) 169/100 98.7 °F (37.1 °C) Oral 87 20 95 % 5' 10\" (1.778 m) 200 lb (90.7 kg)       Physical Exam  Vitals reviewed. Constitutional:       General: He is not in acute distress. Appearance: He is well-developed. HENT:      Head: Normocephalic and atraumatic. Right Ear: Tympanic membrane, ear canal and external ear normal.      Left Ear: Tympanic membrane, ear canal and external ear normal.      Nose: Nose normal.      Mouth/Throat:      Mouth: Mucous membranes are moist.      Pharynx: Oropharynx is clear. No oropharyngeal exudate or posterior oropharyngeal erythema. Eyes:      General: No scleral icterus. Right eye: No discharge. Left eye: No discharge. Extraocular Movements: Extraocular movements intact. Conjunctiva/sclera: Conjunctivae normal.      Pupils: Pupils are equal, round, and reactive to light. Neck:      Vascular: No JVD. Cardiovascular:      Rate and Rhythm: Normal rate and regular rhythm. Pulses: Normal pulses. Heart sounds: Normal heart sounds. No murmur heard. Pulmonary:      Effort: Pulmonary effort is normal. No respiratory distress. Breath sounds: Normal breath sounds. Abdominal:      General: There is no distension. Palpations: Abdomen is soft. There is no pulsatile mass. Tenderness: There is generalized abdominal tenderness. There is no guarding or rebound. Musculoskeletal:         General: No swelling or tenderness. Normal range of motion. Cervical back: Normal range of motion and neck supple. No rigidity. Right lower leg: No edema. Left lower leg: No edema. Lymphadenopathy:      Cervical: No cervical adenopathy. Skin:     General: Skin is warm and dry.       Capillary Refill: Capillary refill takes less than 2 seconds. Neurological:      General: No focal deficit present. Mental Status: He is alert and oriented to person, place, and time. GCS: GCS eye subscore is 4. GCS verbal subscore is 5. GCS motor subscore is 6. Cranial Nerves: Cranial nerves are intact. Sensory: Sensation is intact. Motor: Motor function is intact. Psychiatric:         Mood and Affect: Mood normal.         Behavior: Behavior normal.         DIAGNOSTIC RESULTS       RADIOLOGY:   Non-plain film images such as CT, Ultrasound and MRI are read by the radiologist. [place] images are visualized and preliminarily interpreted by the emergency physician with the below findings:    Interpretation per the Radiologist below, if available at the time of this note:    XR CHEST PORTABLE   Final Result   1. 8 mm left upper lobe nodule is present. Prior exams aren't   available for comparison consider follow-up CT scan for further   evaluation. .   Signed by Dr Conde Learn Additional Contrast? None   Final Result   1. Diverticulosis. There is no evidence of diverticulitis. 2. Status post right nephrectomy. 3. 2.6 cm left renal cyst.    Signed by Dr Holli Lundy   Final Result   1. No acute intracranial process.    Signed by Dr Cristina Rogers            ED BEDSIDE ULTRASOUND:   Performed by ED Physician - none    LABS:  Labs Reviewed   COVID-19, RAPID - Abnormal; Notable for the following components:       Result Value    SARS-CoV-2, NAAT DETECTED (*)     All other components within normal limits    Narrative:     @called to [facility] rn er   CBC - Abnormal; Notable for the following components:    MPV 9.2 (*)     All other components within normal limits   COMPREHENSIVE METABOLIC PANEL W/ REFLEX TO MG FOR LOW K - Abnormal; Notable for the following components:    BUN 33 (*)     CREATININE 2.6 (*)     GFR Non- 25 TO:   @FUP@    DISCHARGE MEDICATIONS:  Discharge Medication List as of 1/9/2022 11:17 AM      START taking these medications    Details   ondansetron (ZOFRAN ODT) 4 MG disintegrating tablet Take 1 tablet by mouth every 8 hours as needed for Nausea or Vomiting, Disp-12 tablet, R-0Normal                (Please note that portions of this note were completed with a voice recognition program.  Efforts were made to edit the dictations butoccasionally words are mis-transcribed.)    Jose Luis Baum MD (electronically signed)  AttendingEmergency Physician          Jose Luis Baum MD  01/09/22 1257

## 2022-01-10 ENCOUNTER — CARE COORDINATION (OUTPATIENT)
Dept: CARE COORDINATION | Age: 67
End: 2022-01-10

## 2022-01-10 NOTE — CARE COORDINATION
Patient contacted regarding COVID-19 diagnosis. Discussed COVID-19 related testing which was available at this time. Test results were positive. Patient informed of results, if available? Yes. Ambulatory Care Manager contacted the patient by telephone to perform post discharge assessment. Call within 2 business days of discharge: Yes. Verified name with patient as identifier. Provided introduction to self, and explanation of the CTN/ACM role, and reason for call due to risk factors for infection and/or exposure to COVID-19. Symptoms reviewed with patient who verbalized the following symptoms: cough, diarrhea, no new symptoms and no worsening symptoms. Due to no new or worsening symptoms encounter was not routed to provider for escalation. Non-face-to-face services provided:  Obtained and reviewed discharge summary and/or continuity of care documents     Advance Care Planning:   Does patient have an Advance Directive:  not on file. Educated patient about risk for severe COVID-19 due to risk factors according to CDC guidelines. ACM reviewed discharge instructions, medical action plan and red flag symptoms with the patient who verbalized understanding. Discussed exposure protocols and quarantine with CDC Guidelines. Patient was given an opportunity to verbalize any questions and concerns and agrees to contact ACM or health care provider for questions related to their healthcare. Reviewed and educated patient on any new and changed medications related to discharge diagnosis       ACM provided contact information. Plan for follow-up call in 5-7 days based on severity of symptoms and risk factors. Patient states that he is feeling better today. Patient denies fever, shortness of breath, or any new/worsening symptoms. Patient states that he had diarrhea last night, but has not had diarrhea today.   ACM instructed patient to call PCP if his diarrhea returns or if he develops any new/worsening symptoms. ACM reviewed COVID-19 red flag symptoms and patient agrees to return to the ER if he develops any red flag symptoms. Patient agrees to self quarantine, drink plenty of fluids, and to eat nutritious meals,. Patient agrees to call PCP if experiencing new or worsening symptoms or will call 911 for severe or life threatening symptoms. Patient agrees to follow COVID-19 precautions.     Yo Escalona RN  Ambulatory Care Manager

## 2022-01-20 ENCOUNTER — CARE COORDINATION (OUTPATIENT)
Dept: CARE COORDINATION | Age: 67
End: 2022-01-20

## 2022-01-20 NOTE — CARE COORDINATION
Date/Time:  1/20/2022 3:46 PM  Attempted to reach patient by telephone. Left HIPAA compliant message requesting a return call.

## 2022-01-25 ENCOUNTER — HOSPITAL ENCOUNTER (OUTPATIENT)
Dept: GENERAL RADIOLOGY | Facility: HOSPITAL | Age: 67
Discharge: HOME | End: 2022-01-25
Admitting: NURSE PRACTITIONER

## 2022-01-25 ENCOUNTER — OFFICE VISIT (OUTPATIENT)
Dept: CARDIAC SURGERY | Facility: CLINIC | Age: 67
End: 2022-01-25

## 2022-01-25 VITALS
DIASTOLIC BLOOD PRESSURE: 68 MMHG | HEIGHT: 69 IN | WEIGHT: 194.2 LBS | BODY MASS INDEX: 28.76 KG/M2 | OXYGEN SATURATION: 96 % | SYSTOLIC BLOOD PRESSURE: 122 MMHG | HEART RATE: 77 BPM

## 2022-01-25 DIAGNOSIS — R91.8 MULTIPLE NODULES OF LUNG: Primary | ICD-10-CM

## 2022-01-25 DIAGNOSIS — N18.30 STAGE 3 CHRONIC KIDNEY DISEASE, UNSPECIFIED WHETHER STAGE 3A OR 3B CKD: ICD-10-CM

## 2022-01-25 DIAGNOSIS — C64.1 RENAL CELL CARCINOMA OF RIGHT KIDNEY: Primary | ICD-10-CM

## 2022-01-25 DIAGNOSIS — C78.00 MALIGNANT NEOPLASM METASTATIC TO LUNG, UNSPECIFIED LATERALITY: ICD-10-CM

## 2022-01-25 DIAGNOSIS — R91.8 MULTIPLE NODULES OF LUNG: ICD-10-CM

## 2022-01-25 PROCEDURE — 99024 POSTOP FOLLOW-UP VISIT: CPT | Performed by: THORACIC SURGERY (CARDIOTHORACIC VASCULAR SURGERY)

## 2022-01-25 PROCEDURE — 71046 X-RAY EXAM CHEST 2 VIEWS: CPT

## 2022-01-25 RX ORDER — SULFAMETHOXAZOLE AND TRIMETHOPRIM 800; 160 MG/1; MG/1
TABLET ORAL EVERY 12 HOURS
COMMUNITY
End: 2022-03-21 | Stop reason: ALTCHOICE

## 2022-01-31 LAB
ALBUMIN SERPL-MCNC: 4.3 G/DL (ref 3.5–5.2)
ALP BLD-CCNC: 75 U/L (ref 40–130)
ALT SERPL-CCNC: 22 U/L (ref 5–41)
ANION GAP SERPL CALCULATED.3IONS-SCNC: 14 MMOL/L (ref 7–19)
AST SERPL-CCNC: 19 U/L (ref 5–40)
BACTERIA: NEGATIVE /HPF
BASOPHILS ABSOLUTE: 0 K/UL (ref 0–0.2)
BASOPHILS RELATIVE PERCENT: 0.4 % (ref 0–1)
BILIRUB SERPL-MCNC: 0.3 MG/DL (ref 0.2–1.2)
BILIRUBIN URINE: NEGATIVE
BLOOD, URINE: NEGATIVE
BUN BLDV-MCNC: 45 MG/DL (ref 8–23)
CALCIUM SERPL-MCNC: 9.9 MG/DL (ref 8.8–10.2)
CHLORIDE BLD-SCNC: 110 MMOL/L (ref 98–111)
CLARITY: CLEAR
CO2: 18 MMOL/L (ref 22–29)
COLOR: YELLOW
CREAT SERPL-MCNC: 3.2 MG/DL (ref 0.5–1.2)
CREATININE URINE: 129.4 MG/DL (ref 4.2–622)
CRYSTALS, UA: ABNORMAL /HPF
EOSINOPHILS ABSOLUTE: 0.1 K/UL (ref 0–0.6)
EOSINOPHILS RELATIVE PERCENT: 0.6 % (ref 0–5)
EPITHELIAL CELLS, UA: 0 /HPF (ref 0–5)
GFR AFRICAN AMERICAN: 23
GFR NON-AFRICAN AMERICAN: 19
GLUCOSE BLD-MCNC: 105 MG/DL (ref 74–109)
GLUCOSE URINE: NEGATIVE MG/DL
HCT VFR BLD CALC: 44.4 % (ref 42–52)
HEMOGLOBIN: 14 G/DL (ref 14–18)
HYALINE CASTS: 1 /HPF (ref 0–8)
IMMATURE GRANULOCYTES #: 0.1 K/UL
KETONES, URINE: NEGATIVE MG/DL
LEUKOCYTE ESTERASE, URINE: ABNORMAL
LYMPHOCYTES ABSOLUTE: 1.6 K/UL (ref 1.1–4.5)
LYMPHOCYTES RELATIVE PERCENT: 14.4 % (ref 20–40)
MAGNESIUM: 1.8 MG/DL (ref 1.6–2.4)
MCH RBC QN AUTO: 29.4 PG (ref 27–31)
MCHC RBC AUTO-ENTMCNC: 31.5 G/DL (ref 33–37)
MCV RBC AUTO: 93.3 FL (ref 80–94)
MONOCYTES ABSOLUTE: 0.8 K/UL (ref 0–0.9)
MONOCYTES RELATIVE PERCENT: 6.9 % (ref 0–10)
NEUTROPHILS ABSOLUTE: 8.3 K/UL (ref 1.5–7.5)
NEUTROPHILS RELATIVE PERCENT: 77 % (ref 50–65)
NITRITE, URINE: NEGATIVE
PARATHYROID HORMONE INTACT: 30.5 PG/ML (ref 15–65)
PDW BLD-RTO: 13.8 % (ref 11.5–14.5)
PH UA: 5.5 (ref 5–8)
PHOSPHORUS: 2.3 MG/DL (ref 2.5–4.5)
PLATELET # BLD: 341 K/UL (ref 130–400)
PMV BLD AUTO: 9.4 FL (ref 9.4–12.4)
POTASSIUM SERPL-SCNC: 5.8 MMOL/L (ref 3.5–5)
PROTEIN PROTEIN: 9 MG/DL (ref 15–45)
PROTEIN UA: NEGATIVE MG/DL
RBC # BLD: 4.76 M/UL (ref 4.7–6.1)
RBC UA: 1 /HPF (ref 0–4)
SODIUM BLD-SCNC: 142 MMOL/L (ref 136–145)
SPECIFIC GRAVITY UA: 1.02 (ref 1–1.03)
TOTAL PROTEIN: 6.6 G/DL (ref 6.6–8.7)
URIC ACID, SERUM: 8.5 MG/DL (ref 3.4–7)
UROBILINOGEN, URINE: 0.2 E.U./DL
VITAMIN D 25-HYDROXY: 51.3 NG/ML
WBC # BLD: 10.8 K/UL (ref 4.8–10.8)
WBC UA: 1 /HPF (ref 0–5)

## 2022-02-21 NOTE — PROGRESS NOTES
"Subjective   Patient ID: Parker Arnold is a 66 y.o. male who is here for follow-up having had bronchoscopy, right thoracoscopy, wedge resection-multiple.  Date of surgery: November 29, 2021        History of Present Illness  Post operative recovery was unevent.  In the interim he has had need for cholecystectomy due to biliary dyskinesia.  He reports that operation was much harder to recover than his thoracoscopy.  He has bounced back at this time.  Sleep habits are good.  Pain control has been excellent.  No pain on chest.  Still has abdominal pain.  No fevers/sweats/chills.   No drainage from incisions.  No chest pain or shortness of breath.   His appetite is normal.   He is ambulating well.     The following portions of the patient's history were reviewed and updated as appropriate: allergies, current medications, past family history, past medical history, past social history, past surgical history and problem list.        Objective   Visit Vitals  /68 (BP Location: Right arm, Patient Position: Sitting, Cuff Size: Adult)   Pulse 77   Ht 176 cm (69.29\")   Wt 88.1 kg (194 lb 3.2 oz)   SpO2 96%   BMI 28.44 kg/m²       Physical Exam  Constitutional:       Appearance: He is well-developed.   HENT:      Head: Normocephalic and atraumatic.   Eyes:      Pupils: Pupils are equal, round, and reactive to light.   Neck:      Thyroid: No thyromegaly.      Vascular: No JVD.      Trachea: No tracheal deviation.   Cardiovascular:      Rate and Rhythm: Normal rate and regular rhythm.      Heart sounds: Normal heart sounds. No murmur heard.  No friction rub. No gallop.    Pulmonary:      Effort: Pulmonary effort is normal. No respiratory distress.      Breath sounds: Normal breath sounds. No wheezing or rales.   Chest:      Chest wall: No tenderness.   Abdominal:      General: There is no distension.      Palpations: Abdomen is soft.      Tenderness: There is no abdominal tenderness.   Musculoskeletal:         " General: Normal range of motion.      Cervical back: Normal range of motion and neck supple.   Lymphadenopathy:      Cervical: No cervical adenopathy.   Skin:     General: Skin is warm and dry.      Comments: Well healed thoracic incisions.  No thoracic hernias.    Neurological:      Mental Status: He is alert and oriented to person, place, and time.      Cranial Nerves: No cranial nerve deficit.           XR Chest 2 View    Result Date: 1/25/2022  Narrative: EXAMINATION: XR CHEST 2 VW- 1/25/2022 11:23 AM CST  HISTORY: lung nodule; R91.8-Other nonspecific abnormal finding of lung field.  REPORT: Frontal and lateral views of the chest were obtained.  COMPARISON: Chest x-rays 11/30/2021.  Lungs are free of infiltrates, small calcified granulomas are noted in the right upper lobe. There is a small stable nodule in the left lung apex that measures approximately 7 mm. There are calcified right hilar lymph nodes as before. Heart size is normal. No pneumothorax or pleural effusion is identified. The osseous structures and upper abdomen are unremarkable.      Impression: No acute cardiac pulmonary abnormality. Small stable 7 mm nodule in the left lung apex. Scattered calcified granulomas are present. This report was finalized on 01/25/2022 11:26 by Dr. Ty Rahman MD.        Assessment/Plan       Diagnoses and all orders for this visit:    1. Renal cell carcinoma of right kidney (HCC) (Primary)    2. Malignant neoplasm metastatic to lung, unspecified laterality (HCC)    3. Stage 3 chronic kidney disease, unspecified whether stage 3a or 3b CKD (HCC)        Overall, Parker Arnold is doing well.  We discussed his thoracoscopy restrictions have been discontinued.  We discussed his pathology which is consistent with metastatic renal cell carcinoma.  Patient has followup with Dr. Smith in a few weeks.    Although I have not provided a specific follow up appointment, should I be of further assistance, please do  not hesitate to contact me.      Many thanks again for the opportunity care for your patient.

## 2022-03-13 NOTE — PROGRESS NOTES
[facility] GROUP HEMATOLOGY AND ONCOLOGY  [address]  [place] [address] [phone]    Patient Name: Parker Arnold  Encounter Date: 03/21/2022  YOB: 1955  Patient Number: [id number]       REASON FOR VISIT:  Parker Arnold is a 67 y.o. 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  Underwent right nephrectomy. Was under surveillance until 11/26/2018  When he underwent  wedge resection of a right pulm nodule. Pathology:  metastatic renal cell carcinoma. He was not able to tolerate Sutent due to kidney failure, thus received ipilumomab/nivolumab, 05/13/2019 - 08/07/2019, then single agent nivolumab, 08/28/2019 - 09/09/2019 (27.5 months since cessation of therapy). Therapy stopped due to renal failure (nivolumab?). He has been on and off prednisone since.  Last 11/29/2021 underwent right thoracoscopic RLL and RML wedge resection with final path consistent with metastatic renal cell carcinoma.  He is here alone.    I have reviewed the HPI and verified with the patient the accuracy of it. No changes to interval history since the information was documented. Leonardo Smith MD 03/21/22       Problem List Items Addressed This Visit        Other    Renal cell carcinoma of right kidney (HCC) - Primary    Overview     Overview:   Diagnoses 11/11/14. Path report in C.E at [facility]. Tissue has been requested.                Oncology/Hematology History   Renal cell carcinoma of right kidney (HCC)   10/2014 Initial Diagnosis    Renal cell carcinoma (CMS/HCC)     10/2014 Surgery    Right nephrectomy, Dr Morales       10/1/2018 Progression    Increasing lung Nodules on f/u CT     10/2/2018 -  Chemotherapy    Sutent  Discontinued due to intolerance     5/13/2019 - 8/7/2019 Chemotherapy    Opdivo Yervoy x 4 cycles     8/28/2019 - 9/9/2019 Chemotherapy    Single agent  Opdivo    Opdivo held 9/9/2019 due to Renal Failure / NephritisToxicity     1/16/2020 Imaging    PET SCAN  Negative for neoplastic uptake         PAST MEDICAL HISTORY:  ALLERGIES:  Allergies   Allergen Reactions   • Amoxicillin Hives   • Opdivo [Nivolumab] Provider Review Needed     Renal failure   • Penicillins Hives and Rash   • Clindamycin/Lincomycin Rash     pustules  Rash - pustules     • Doxycycline Rash   • Hepatitis B Virus Vaccines Rash     Pustules     • Hepatitis B Vaccine Rash   • Latex Rash   • Levofloxacin Rash     CURRENT MEDICATIONS:  Outpatient Encounter Medications as of 3/21/2022   Medication Sig Dispense Refill   • acetaminophen (TYLENOL) 500 MG tablet Take 500 mg by mouth Every 6 (Six) Hours As Needed for Mild Pain .     • azithromycin (Zithromax Z-Ricky) 250 MG tablet Take 2 tablets by mouth on day 1, then 1 tablet daily on days 2-5 6 tablet 0   • calcium carb-cholecalciferol 600-800 MG-UNIT tablet Take 1 tablet by mouth 2 (Two) Times a Day With Meals.     • carvedilol (COREG) 3.125 MG tablet Take 3.125 mg by mouth 2 (Two) Times a Day With Meals.     • finasteride (PROSCAR) 5 MG tablet Take 5 mg by mouth Daily.  3   • predniSONE (DELTASONE) 5 MG tablet Take 5 mg by mouth Daily.     • sodium bicarbonate 650 MG tablet Take 1 tablet by mouth 3 (Three) Times a Day. 90 tablet 2   • [DISCONTINUED] cetirizine (zyrTEC) 10 MG tablet Take 10 mg by mouth Daily.     • [DISCONTINUED] HYDROcodone-acetaminophen (NORCO) 7.5-325 MG per tablet Take 1 tablet by mouth Every 4 (Four) Hours As Needed for Moderate Pain  (Pain). 30 tablet 0   • [DISCONTINUED] multivitamin with minerals tablet tablet Take 1 tablet by mouth Daily.     • [DISCONTINUED] sulfamethoxazole-trimethoprim (BACTRIM DS,SEPTRA DS) 800-160 MG per tablet Every 12 (Twelve) Hours.       No facility-administered encounter medications on file as of 3/21/2022.     ADULT ILLNESSES:  Patient Active Problem List   Diagnosis Code   • Renal cell carcinoma of  right kidney (HCC) C64.1   • Multiple nodules of lung R91.8   • Lung anomaly Q33.9   • TIA (transient ischemic attack) G45.9   • Acute renal failure with tubular necrosis in the setting of solitary kidney N17.0   • Lung metastases (HCC) C78.00   • Pyuria R82.81   • Hyperkalemia E87.5   • Metabolic acidosis E87.2   • Hyperglycemia R73.9   • Hypercalcemia E83.52   • Benign prostatic hyperplasia N40.0   • Increased frequency of urination R35.0   • Nocturia R35.1   • Retention of urine R33.9   • Dehydration E86.0   • Nephritis and nephropathy N05.9   • Toxicity, chemicals T65.91XA   • Stage 3 chronic kidney disease (HCC) N18.30   • Campylobacter gastroenteritis A04.5   • Sigmoid diverticulitis K57.32   • History of TIA (transient ischemic attack) Z86.73   • Solitary kidney, acquired Z90.5   • Benign essential hypertension I10   • Gastroesophageal reflux disease K21.9     SURGERIES:  Past Surgical History:   Procedure Laterality Date   • BRONCHOSCOPY N/A 11/29/2021    Procedure: BRONCHOSCOPY;  Surgeon: Jarrod Tolentino MD;  Location: [facility] OR;  Service: Cardiothoracic;  Laterality: N/A;   • CHOLECYSTECTOMY WITH INTRAOPERATIVE CHOLANGIOGRAM N/A 12/22/2021    Procedure: LAPAROSCOPIC CHOLECYSTECTOMY;  Surgeon: Vandana Calhoun MD;  Location: [facility] OR;  Service: General;  Laterality: N/A;   • COLONOSCOPY     • HERNIA REPAIR  2008   • LUNG BIOPSY  11/2018   • NEPHRECTOMY Right 2014   • SINUS SURGERY  2005   • THORACOSCOPY Right 11/29/2021    Procedure: RIGHT THORACOSCOPY WITH RIGHT MIDDLE AND RIGHT LOWER LOBE WEDGE RESECTIONS;  Surgeon: Jarrod Tolentino MD;  Location: [facility];  Service: Cardiothoracic;  Laterality: Right;     HEALTH MAINTENANCE ITEMS:  Health Maintenance Due   Topic Date Due   • COLORECTAL CANCER SCREENING  Never done   • COVID-19 Vaccine (1) Never done   • TDAP/TD VACCINES (1 - Tdap) Never done   • ZOSTER VACCINE (1 of 2) Never done   • Pneumococcal Vaccine 65+ (1 of 1 - PPSV23) Never done   •  "HEPATITIS C SCREENING  Never done   • ANNUAL WELLNESS VISIT  Never done   • INFLUENZA VACCINE  Never done       <no information>  Last Completed Colonoscopy     This patient has no relevant Health Maintenance data.          There is no immunization history on file for this patient.  Last Completed Mammogram     This patient has no relevant Health Maintenance data.            FAMILY HISTORY:  Family History   Problem Relation Age of Onset   • Other Mother         blood disease unknown   • Cancer Father         unknown   • No Known Problems Sister    • Heart disease Maternal Grandmother    • Heart disease Maternal Grandfather    • No Known Problems Paternal Grandmother    • No Known Problems Paternal Grandfather    • No Known Problems Sister      SOCIAL HISTORY:  Social History     Socioeconomic History   • Marital status:    Tobacco Use   • Smoking status: Former Smoker     Packs/day: 1.00     Years: 22.00     Pack years: 22.00     Types: Cigarettes     Start date:      Quit date:      Years since quittin.2   • Smokeless tobacco: Never Used   Vaping Use   • Vaping Use: Never used   Substance and Sexual Activity   • Alcohol use: Not Currently   • Drug use: No   • Sexual activity: Defer       REVIEW OF SYSTEMS:  Constitutional:  Manages ADLs, chores, errands and driving.  Again drove himself in today.  Appetite is good.  \"Fine.\" Energy is fairly good.  He remains active.  \"Still working on my new house.\"  He has lost 3 pounds (in addition to 3 pounds at his prior visit) since his last visit. No fever, no chills nor drenching nights.  HENT: Negative.  No sore throat.  Has seasonal sinus symptoms/rhinorrhea. No headaches.  Eyes: Negative.    Respiratory:  No SOB at rest nor TENORIO.  Some nonproductive cough. No wheezing. No tobacco use.    Cardiovascular: Negative.  Has had post op chest wall \"soreness.\" Now says, \"ll gone.\" No palpitations.  No orthopnea  Gastrointestinal: RUQ pain \"all gone.\" Has " "undergone gallbladder surgery.  No dysphagia.  No nausea.  No vomiting.  No dyspepsia.  Bowels moving regularly.  Had soft stools post-op, \"all better.\"  No melena. No hematochezia. Had recurrent diverticulitis, early 06/2021.  Wants to be referred for c-scope, \"I'd like it done here.\"  Endocrine: No hot flashes.  Genitourinary:  No dysuria.  No incontinence. No hematuria.  Occasional urgency.  \"Sometimes gotta go when I gotta go.\" Nocturia much improved on Finasteride - up 2x- from every 90 min  Musculoskeletal:  No arthralgias.  No edema  Skin: No new rash.    Neurological:  No dizziness.  No facial asymmetry. No headaches.  Mild sensory neuropathy of the hands. Not worse  Hematological: Negative for new adenopathy.  Says he bruises easily.  Psychiatric/Behavioral:  No anxiety.  No depression.        VITAL SIGNS: /94   Pulse 66   Temp 97.6 °F (36.4 °C)   Resp 16   Ht 177.8 cm (70\")   Wt 89.9 kg (198 lb 3.2 oz)   SpO2 99%   BMI 28.44 kg/m² Body surface area is 2.08 meters squared.  Pain Score    03/21/22 1020   PainSc: 0-No pain         PHYSICAL EXAMINATION:   General Appearance: Patient is a very pleasant, cooperative, heavyset, modestly kept elderly male who is awake, alert, oriented and in no acute distress. ECOG 1  HEENT: Normocephalic. Sclerae clear, conjunctiva pink, extraocular movements intact, pupils, round, reactive to light and  accommodation. Is wearing a surgical mask today  NECK: Supple, no jugular venous distention, thyroid not enlarged.  LYMPH: No cervical, supraclavicular, axillary, or inguinal lymphadenopathy.  LUNGS: Good air movement, no rales, rhonchi, rubs or wheezes with auscultation.  Right thoracoscopic incisions are healed  CARDIO: Regular sinus rhythm, no murmurs, gallops or rubs.  ABDOMEN:  Obese, nondistended, soft, with no right upper quadrant tenderness.  Laparoscopy wounds are all healed. No obvious abdominal masses. Bowel sounds positive. No hernia  MUSKEL: No joint " swelling, decreased motion, or inflammation  EXTREMS:  No edema, clubbing, cyanosis, No varicose veins.  NEURO: Grossly nonfocal, Gait is coordinated and smooth, Cognition is preserved.  SKIN: No rashes, no ecchymoses, no petechia.  PSYCH: Oriented to time, place and person. Memory is preserved. Mood and affect appear normal         LABS    Lab Results - Last 18 Months   Lab Units 03/21/22  0923 01/31/22  1536 01/09/22  0918 12/14/21  1006 11/30/21  0625 11/24/21  1327 09/14/21  1016 06/14/21  0958 06/07/21  0802 04/12/21  1027   HEMOGLOBIN g/dL 15.0 14.0 14.4 14.0 13.0 15.1 14.4 13.6   < > 14.0   HEMATOCRIT % 46.5 44.4 43.7 44.9 40.7 46.1 42.1 41.9   < > 42.7   MCV fL 93.2 93.3 91.6 91.6 90.6 91.7 88.1 90.5   < > 88.0   WBC 10*3/mm3 7.99 10.8 7.7 8.58 13.86* 6.59 8.90 6.55   < > 6.92   RDW % 14.3 13.8 13.2 13.1 13.1 13.1 13.3 13.5   < > 13.3   MPV fL 9.6 9.4 9.2* 9.2 9.5 9.8 9.6 9.7   < > 9.9   PLATELETS 10*3/mm3 220 341 220 262 211 232 196 161   < > 185   IMM GRAN % % 0.4  --   --  0.3  --  0.3 0.3 0.2  --  0.4   NEUTROS ABS 10*3/mm3 5.84 8.3*  --  6.21  --  4.64 5.98 3.89   < > 4.61   LYMPHS ABS 10*3/mm3 1.23 1.6  --  1.17  --  1.17 1.48 1.73   < > 1.26   MONOS ABS 10*3/mm3 0.67 0.80  --  0.79  --  0.62 1.02* 0.74   < > 0.81   EOS ABS 10*3/mm3 0.18 0.10  --  0.33  --  0.11 0.36 0.15   < > 0.18   BASOS ABS 10*3/mm3 0.04 0.00  --  0.05  --  0.03 0.03 0.03   < > 0.03   IMMATURE GRANS (ABS) 10*3/mm3 0.03 0.1  --  0.03  --  0.02 0.03 0.01   < > 0.03   NRBC /100 WBC 0.0  --   --  0.0  --  0.0 0.0 0.0  --  0.0    < > = values in this interval not displayed.       Lab Results - Last 18 Months   Lab Units 03/21/22  0923 01/31/22  1536 12/14/21  1006 11/30/21  0625 11/29/21  0844 11/29/21  0707 11/24/21  1521 11/24/21  1327 09/14/21  1016 06/14/21  0958   GLUCOSE mg/dL 113* 105 95 137*  --   --  187* 106* 104* 103*   SODIUM mmol/L 140 142 143 135*  --   --  139 139 141 142   SODIUM, ARTERIAL mmol/L  --   --   --   --  139  140  --   --   --   --    POTASSIUM mmol/L 5.0 5.8* 4.9 5.3*  --   --  5.2 6.2* 4.4 4.7   TOTAL CO2 mmol/L  --  18*  --   --   --   --   --   --   --   --    CO2 mmol/L 25.0  --  26.0 20.0*  --   --  21.0* 23.0 24.0 25.0   CHLORIDE mmol/L 106 110 109* 104  --   --  106 106 107 110*   ANION GAP mmol/L 9.0 14 8.0 11.0  --   --  12.0 10.0 10.0 7.0   CREATININE mg/dL 2.44* 3.2* 2.60* 2.23*  --   --  2.53* 2.50* 2.36* 2.43*   BUN mg/dL 38* 45* 48* 47*  --   --  46* 44* 40* 37*   BUN / CREAT RATIO  15.6  --  18.5 21.1  --   --  18.2 17.6 16.9 15.2   CALCIUM mg/dL 9.9 9.9 9.3 8.7  --   --  9.4 9.8 9.2 9.5   EGFR IF NONAFRICN AM   --  19* 25* 30*  --   --  26* 26* 28* 27*   ALK PHOS U/L 59 75 68  --   --   --  72 67 65 49   TOTAL PROTEIN g/dL 6.4 6.6 6.5  --   --   --  6.5 6.7 6.3 6.0   ALT (SGPT) U/L 13 22 16  --   --   --  19 20 14 10   AST (SGOT) U/L 15 19 18  --   --   --  16 17 14 14   BILIRUBIN mg/dL 0.3 0.3 0.4  --   --   --  0.4 0.4 0.5 0.2   ALBUMIN g/dL 4.40 4.3 4.50  --   --   --  4.60 4.60 4.20 3.90   GLOBULIN gm/dL 2.0  --  2.0  --   --   --  1.9 2.1 2.1 2.1       Lab Results - Last 18 Months   Lab Units 01/31/22  1536 12/28/20  0912   URIC ACID mg/dL 8.5* 8.8*       No results for input(s): IRON, TIBC, LABIRON, FERRITIN, I7UTVIX, TSH, FOLATE in the last [id number] hours.    Invalid input(s): VITB12    Assessment:  1.  Renal cell carcinoma, diagnosed 11/11/2014 (5.5 cm G3, pT3a, pNx). --2014, right nephrectomy.   --surveillance until 11/26/2018    --11/26/2018-right lower lobe VATS wedge resection: Metastatic renal cell carcinoma, 0.9 cm; margins free of malignancy. Dr. Hu  -- Unable to tolerate Sutent due to kidney failure, thus received ipilumomab/nivolumab, 05/13/2019 - 08/07/2019, then single agent nivolumab, 08/28/2019 - 09/09/2019 (21.5 months since cessation of therapy). Therapy stopped due to renal failure (nivolumab?)  --01/16/2020-PET scan.  No neoplastic FDG uptake within the chest, abdomen or  pelvis.  --07/06/2020-CT abdomen/pelvis with diverticulosis of the colon.  Evidence of acute diverticulitis of the sigmoid colon and adjacent distal descending colon.  No free air or fluid seen.  1 cm noncalcified subpleural nodule in the left lower lobe.  Follow-up exam in 6 months recommended.  --08/04/2020-chest CT.  Scattered subcentimeter bilateral pulmonary nodules similar to 1/16/2020 with mild increase in the 5 mm left upper lobe nodule.  Continued follow-up recommended.  No pathologic lymphadenopathy.  Right nephrectomy.  --01/14/2020-CT chest.  Increased size of 1.2 cm right lower lobe superior segment pulmonary nodule (previously 0.9 cm)-representing 33% growth.  Multiple other bilateral pulmonary nodules have not significantly changed in size.  Findings are in keeping with disease progression.  --01/14/2020-CT abdomen/pelvis.  No evidence of recurrent or metastatic disease in the abdomen or pelvis.  --01/26/2021-PET/CT.  Impression: Minimal metabolic activity associated with the enlarging pulmonary nodule within the superior segment of the right lower lobe with a maximum SUV of 1.35.  The additional nodules noted within both lungs also demonstrate no abnormal metabolic activity which would favor benignity but continued radiographic follow-up will be suggested.  No scintigraphic evidence of metastatic disease on current exam.  Increased uptake overlying the right groin and more distal external iliac vessels felt to be related to previous hernia repair.  --03/15/2021- CT chest.  Continued increased size of the right lower lobe superior segment now 1.4 cm pulmonary nodule (1.2 cm, 1/14/2021; 0.8 cm, 8/4/2020).  Other pulmonary nodules are stable.  New small area of groundglass opacity in the superior segment left lower lobe may be infectious/inflammatory.  Coronary artery calcifications.  Aortic calcifications.  Small hiatal hernia.  --03/15/2021-CT abdomen/pelvis.  Previous right nephrectomy identified  with no evidence of intra-abdominal metastatic disease.  Benign-appearing exophytic cyst posterior aspect of the left kidney, upper pole, measuring 2.47 cm, stable.  Small nodules in the lung bases are unchanged compared to previous chest CT.  Evidence of previous right inguinal hernia repair.  Fatty infiltration of the inguinal canals.  Scattered colonic diverticula without diverticulitis.  Small sliding hiatal hernia identified.  --06/11/2021-CT chest without contrast.  Stable pulmonary nodules.  No evidence of disease progression.  Interval resolution superior segment left lower lobe groundglass opacity.  Scattered coronary artery calcifications.  --06/11/2021-CT abdomen/pelvis.  History of right renal cell carcinoma nephrectomy.  No CT evidence of tumor recurrence or metastatic disease in the abdomen or pelvis.  --09/07/2021-CT chest wo contrast.  Pulmonary nodules noted.  Slightly increased in size compared to 6/11/2021 (left upper lobe 7.2 mm-prior 6.7 mm; posterior right lung, 19 mm-prior 13.9 mm; posterior medial right lung, 14 mm-prior 11 mm; right lung, 7.7 mm-prior 7.1 mm).  However no new nodules identified.  --09/07/2021-CT abdomen/pelvis wo contrast.  No evidence of intra-abdominal or pelvic metastasis.  Prior right nephrectomy.  Stable left renal cyst.  Colonic diverticulosis with questionable wall thickening of the sigmoid colon may be artifactual.  Correlation to any recent colonoscopy recommended.  --09/28/2021-PET scan.  Increased size of right lower lobe lung nodule (21 x 15 mm compared to 14 x 14 mm 3 months ago) though this finding shows no significant FDG uptake.  Otherwise stable PET/CT exam.  --11/29/2021-RLL, RML wedge resections. Path consistent with metastatic renal cell carcinoma  --12/14/2021-CT chest.  Postoperative changes from wedge resections of prior right middle and lower lobe pulmonary nodules.  Stable left-sided pulmonary nodules, largest in the left upper lobe/apex measuring 8  mm.  Continued follow-up strongly recommended.  No pathologic lymphadenopathy.  --12/14/2021-CT abdomen/pelvis.  No convincing evidence of intra-abdominal or pelvic metastasis.  New diffuse abnormal gallbladder wall thickening.  Findings may be seen with cholecystitis.  Gallbladder ultrasound might be considered.  --12/20/2021-gallbladder ultrasound-diffuse gallbladder wall thickening.  Gallbladder nondistended with no visible stones.  Unlikely acute cholecystitis.  Favor chronic cholecystitis.  --03/18/2022-CT chest-patchy infiltrate compatible with right middle lobe pneumonia.  2 tiny left lung nodule stable.  --03/18/2022-CT abdomen/pelvis.  No evidence of metastatic disease to the abdomen/pelvis.  Prior right nephrectomy.  Colonic diverticulosis without evidence of diverticulitis.  Mild splenomegaly, unchanged.    2.  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    --GFR 28 mL/min, 03/21/2022 (prior:  9 - 28)  --s/p multiple doses of HD steroids in the past and remains on daily 5 mg prednisone per nephrology (Dr. Grant)  --will not start Cytoxan unless biopsy done to determine etiology of kidney failure. Has been off Opdivo since September 2019.    3.  Normocytic anemia.    --Hgb 15; MCV 93, 03/21/2022 (prior: Hgb 10.2-15.1; MCV 86.3-95.5)  4.  Gerd: modulated by omeprazole  5.  Osteopenia: on bola+vit D  6.  History of acute diverticulitis.   7.  Cholecystitis  --12/14/2021-CT abdomen/pelvis.  Stable lung nodules.  New diffuse abnormal gallbladder wall thickening.  Cholecystitis?  No convincing evidence of intra-abdominal or pelvic mets.  --12/22/2021-cholecystectomy.  Final diagnosis: Chronic cholecystitis.    Plan:  1.  Apprised of labs, 03/21/2021-normal CBC.  Depressed GFR (stable) otherwise normal CMP.  2.  Apprised of surveillance CT scans of the chest, abdomen/pelvis, 03/18/2020-RML pneumonia.  Otherwise no metastatic disease.  Z-Ricky called in, 03/18/2022.  3.   Chest x-ray today.  4.   Again discussed the option  of systemic therapy with cabozantinib as outlined by Dr. Arteaga (see #5 below) previously.  Patient remains wary of starting any type of therapy given previous AE's.    5.  Reviewed discharge summary, 11/30/2021.  Underwent bronchoscopy, right thoracoscopy, right lower lobe wedge resection, right middle lobe wedge resection, right lower lobe wedge basilar resection on 11/29/2021 by Dr. Tolentino.  Pathology revealed metastatic renal cell carcinoma.  6.   Previously reviewed encounter from Dr. Mayito Arteaga, medical oncology at [facility] last 02/14/2021.  History reviewed.  Most recent CT chest, 01/14/2021 (above) with increasing size of 1.2 cm right lower lobe superior segment pulmonary nodule (previously 0.9 cm) and PET scan, 01/26/2021 (above) noted including minimal metabolic activity associated with the enlarging pulmonary nodule within the superior segment of the right lower lobe.  Discussion: Biopsy right lower lobe lung nodule solitary lesion that is growing on PET scan which will be done at that institution.  Contemplate either surgical resection, ablation or SBRT.  We will focus on local therapy for now.  Multiple systemic options down the road but if renal function improves can contemplate trial with HIF-alpha inhibitor.    7.  Previously reviewed encounter office encounter from GIOVANNY Edward with Dr. Arteaga's office, 05/27/2021.  Impression/plan: CT CAP, 05/26/2021: Stable examination including stable bilateral lung nodules compared to 03/15/2021.  Progression of right lower lobe lesion?  Discussed systemic therapy option with cabozantinib at 20 mg.  However patient concerned due to his history of substantial AEs in the past.  Will arrange for lesion to be ablated.  However patient says multiple lesions were there for 6 years and unchanged.-Consider ablation or SBRT of the lesion and monitor and consider cabozantinib when failed.  Multiple systemic options down the road but if  renal function improves can contemplate HIF-alpha inhibitor.  Continue surveillance for now.  Repeat scans in 3 months.    8.  Refer to GI at [facility] for c-scope- due last 10/2021 Re: Abnormal CT abdomen/pelvis.    9.  Return to office in 12 weeks with preoffice CT scans of the chest, abdomen/pelvis without IV contrast (in 11 weeks), CBC with differential and CMP.    I spent 40  minutes caring for Parker on this date of service. This time includes time spent by me in the following activities: preparing for the visit, reviewing tests, performing a medically appropriate examination and/or evaluation, counseling and educating the patient/family/caregiver, ordering medications, tests, or procedures and documenting information in the medical record.

## 2022-03-18 ENCOUNTER — HOSPITAL ENCOUNTER (OUTPATIENT)
Dept: CT IMAGING | Facility: HOSPITAL | Age: 67
Discharge: HOME | End: 2022-03-18
Admitting: INTERNAL MEDICINE

## 2022-03-18 DIAGNOSIS — C78.00 MALIGNANT NEOPLASM METASTATIC TO LUNG, UNSPECIFIED LATERALITY: ICD-10-CM

## 2022-03-18 DIAGNOSIS — C64.1 RENAL CELL CARCINOMA OF RIGHT KIDNEY: Primary | ICD-10-CM

## 2022-03-18 DIAGNOSIS — C64.1 RENAL CELL CARCINOMA OF RIGHT KIDNEY: ICD-10-CM

## 2022-03-18 PROCEDURE — 71250 CT THORAX DX C-: CPT

## 2022-03-18 PROCEDURE — 74176 CT ABD & PELVIS W/O CONTRAST: CPT

## 2022-03-18 RX ORDER — AZITHROMYCIN 250 MG/1
TABLET, FILM COATED ORAL
Qty: 6 TABLET | Refills: 0 | Status: ON HOLD | OUTPATIENT
Start: 2022-03-18 | End: 2022-04-18

## 2022-03-21 ENCOUNTER — TELEPHONE (OUTPATIENT)
Dept: GASTROENTEROLOGY | Facility: CLINIC | Age: 67
End: 2022-03-21

## 2022-03-21 ENCOUNTER — OFFICE VISIT (OUTPATIENT)
Dept: ONCOLOGY | Facility: CLINIC | Age: 67
End: 2022-03-21

## 2022-03-21 ENCOUNTER — HOSPITAL ENCOUNTER (OUTPATIENT)
Dept: GENERAL RADIOLOGY | Facility: HOSPITAL | Age: 67
Discharge: HOME | End: 2022-03-21
Admitting: INTERNAL MEDICINE

## 2022-03-21 ENCOUNTER — LAB (OUTPATIENT)
Dept: LAB | Facility: HOSPITAL | Age: 67
End: 2022-03-21

## 2022-03-21 VITALS
TEMPERATURE: 97.6 F | HEART RATE: 66 BPM | RESPIRATION RATE: 16 BRPM | WEIGHT: 198.2 LBS | DIASTOLIC BLOOD PRESSURE: 94 MMHG | OXYGEN SATURATION: 99 % | BODY MASS INDEX: 28.37 KG/M2 | SYSTOLIC BLOOD PRESSURE: 150 MMHG | HEIGHT: 70 IN

## 2022-03-21 DIAGNOSIS — C78.00 MALIGNANT NEOPLASM METASTATIC TO LUNG, UNSPECIFIED LATERALITY: ICD-10-CM

## 2022-03-21 DIAGNOSIS — C64.1 RENAL CELL CARCINOMA OF RIGHT KIDNEY: Primary | ICD-10-CM

## 2022-03-21 DIAGNOSIS — C64.1 RENAL CELL CARCINOMA OF RIGHT KIDNEY: ICD-10-CM

## 2022-03-21 PROBLEM — K21.9 GASTROESOPHAGEAL REFLUX DISEASE: Status: ACTIVE | Noted: 2022-03-21

## 2022-03-21 PROBLEM — I10 BENIGN ESSENTIAL HYPERTENSION: Status: ACTIVE | Noted: 2022-03-21

## 2022-03-21 LAB
ALBUMIN SERPL-MCNC: 4.4 G/DL (ref 3.5–5.2)
ALBUMIN/GLOB SERPL: 2.2 G/DL
ALP SERPL-CCNC: 59 U/L (ref 39–117)
ALT SERPL W P-5'-P-CCNC: 13 U/L (ref 1–41)
ANION GAP SERPL CALCULATED.3IONS-SCNC: 9 MMOL/L (ref 5–15)
AST SERPL-CCNC: 15 U/L (ref 1–40)
BASOPHILS # BLD AUTO: 0.04 10*3/MM3 (ref 0–0.2)
BASOPHILS NFR BLD AUTO: 0.5 % (ref 0–1.5)
BILIRUB SERPL-MCNC: 0.3 MG/DL (ref 0–1.2)
BUN SERPL-MCNC: 38 MG/DL (ref 8–23)
BUN/CREAT SERPL: 15.6 (ref 7–25)
CALCIUM SPEC-SCNC: 9.9 MG/DL (ref 8.6–10.5)
CHLORIDE SERPL-SCNC: 106 MMOL/L (ref 98–107)
CO2 SERPL-SCNC: 25 MMOL/L (ref 22–29)
CREAT SERPL-MCNC: 2.44 MG/DL (ref 0.76–1.27)
DEPRECATED RDW RBC AUTO: 49 FL (ref 37–54)
EGFRCR SERPLBLD CKD-EPI 2021: 28.1 ML/MIN/1.73
EOSINOPHIL # BLD AUTO: 0.18 10*3/MM3 (ref 0–0.4)
EOSINOPHIL NFR BLD AUTO: 2.3 % (ref 0.3–6.2)
ERYTHROCYTE [DISTWIDTH] IN BLOOD BY AUTOMATED COUNT: 14.3 % (ref 12.3–15.4)
GLOBULIN UR ELPH-MCNC: 2 GM/DL
GLUCOSE SERPL-MCNC: 113 MG/DL (ref 65–99)
HCT VFR BLD AUTO: 46.5 % (ref 37.5–51)
HGB BLD-MCNC: 15 G/DL (ref 13–17.7)
HOLD SPECIMEN: NORMAL
IMM GRANULOCYTES # BLD AUTO: 0.03 10*3/MM3 (ref 0–0.05)
IMM GRANULOCYTES NFR BLD AUTO: 0.4 % (ref 0–0.5)
LYMPHOCYTES # BLD AUTO: 1.23 10*3/MM3 (ref 0.7–3.1)
LYMPHOCYTES NFR BLD AUTO: 15.4 % (ref 19.6–45.3)
MCH RBC QN AUTO: 30.1 PG (ref 26.6–33)
MCHC RBC AUTO-ENTMCNC: 32.3 G/DL (ref 31.5–35.7)
MCV RBC AUTO: 93.2 FL (ref 79–97)
MONOCYTES # BLD AUTO: 0.67 10*3/MM3 (ref 0.1–0.9)
MONOCYTES NFR BLD AUTO: 8.4 % (ref 5–12)
NEUTROPHILS NFR BLD AUTO: 5.84 10*3/MM3 (ref 1.7–7)
NEUTROPHILS NFR BLD AUTO: 73 % (ref 42.7–76)
NRBC BLD AUTO-RTO: 0 /100 WBC (ref 0–0.2)
PLATELET # BLD AUTO: 220 10*3/MM3 (ref 140–450)
PMV BLD AUTO: 9.6 FL (ref 6–12)
POTASSIUM SERPL-SCNC: 5 MMOL/L (ref 3.5–5.2)
PROT SERPL-MCNC: 6.4 G/DL (ref 6–8.5)
RBC # BLD AUTO: 4.99 10*6/MM3 (ref 4.14–5.8)
SODIUM SERPL-SCNC: 140 MMOL/L (ref 136–145)
WBC NRBC COR # BLD: 7.99 10*3/MM3 (ref 3.4–10.8)

## 2022-03-21 PROCEDURE — 85025 COMPLETE CBC W/AUTO DIFF WBC: CPT

## 2022-03-21 PROCEDURE — 36415 COLL VENOUS BLD VENIPUNCTURE: CPT

## 2022-03-21 PROCEDURE — 99215 OFFICE O/P EST HI 40 MIN: CPT | Performed by: INTERNAL MEDICINE

## 2022-03-21 PROCEDURE — 80053 COMPREHEN METABOLIC PANEL: CPT

## 2022-03-21 PROCEDURE — 71046 X-RAY EXAM CHEST 2 VIEWS: CPT

## 2022-03-28 ENCOUNTER — OFFICE VISIT (OUTPATIENT)
Dept: GASTROENTEROLOGY | Facility: CLINIC | Age: 67
End: 2022-03-28

## 2022-03-28 ENCOUNTER — TELEPHONE (OUTPATIENT)
Dept: ONCOLOGY | Facility: CLINIC | Age: 67
End: 2022-03-28

## 2022-03-28 VITALS
TEMPERATURE: 97.5 F | HEART RATE: 82 BPM | SYSTOLIC BLOOD PRESSURE: 132 MMHG | OXYGEN SATURATION: 97 % | HEIGHT: 70 IN | WEIGHT: 199 LBS | DIASTOLIC BLOOD PRESSURE: 68 MMHG | BODY MASS INDEX: 28.49 KG/M2

## 2022-03-28 DIAGNOSIS — C64.1 RENAL CELL CARCINOMA OF RIGHT KIDNEY: Primary | ICD-10-CM

## 2022-03-28 DIAGNOSIS — Z12.11 ENCOUNTER FOR SCREENING FOR MALIGNANT NEOPLASM OF COLON: Primary | ICD-10-CM

## 2022-03-28 DIAGNOSIS — I10 HTN (HYPERTENSION), BENIGN: ICD-10-CM

## 2022-03-28 PROCEDURE — 99214 OFFICE O/P EST MOD 30 MIN: CPT | Performed by: CLINICAL NURSE SPECIALIST

## 2022-03-28 NOTE — PROGRESS NOTES
"Parker Arnold  1955      3/28/2022  Chief Complaint   Patient presents with   • Colonoscopy     New patient ref by Dr. Smith     Subjective   HPI  Parker Arnold is a 67 y.o. male who presents as a referral for preventative maintenance. He has no complaints of nausea or vomiting. No change in bowels. No wt loss. No BRBPR. No melena. There is NO family hx for colon cancer. No abdominal pain.  Past Medical History:   Diagnosis Date   • Dehydration 12/30/2019   • GERD (gastroesophageal reflux disease)    • Hypertension    • Kidney disease, chronic, stage III (moderate, EGFR 30-59 ml/min) (HCC) 2018    Post infusion of Optivo, states GFR is 28%   • Nephritis and nephropathy 1/2/2020   • Renal cell carcinoma (HCC)    • Renal cell carcinoma of right kidney (HCC) 10/18/2019    Overview:  Diagnoses 11/11/14. Path report in [facility] at [facility]. Tissue has been requested.    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     Past Surgical History:   Procedure Laterality Date   • BRONCHOSCOPY N/A 11/29/2021    Procedure: BRONCHOSCOPY;  Surgeon: Jarrod Tolentino MD;  Location:  [facility] OR;  Service: Cardiothoracic;  Laterality: N/A;   • CHOLECYSTECTOMY WITH INTRAOPERATIVE CHOLANGIOGRAM N/A 12/22/2021    Procedure: LAPAROSCOPIC CHOLECYSTECTOMY;  Surgeon: Vandana Calhoun MD;  Location: [facility] OR;  Service: General;  Laterality: N/A;   • HERNIA REPAIR  2008   • LUNG BIOPSY  11/2018   • NEPHRECTOMY Right 2014   • SINUS SURGERY  2005   • THORACOSCOPY Right 11/29/2021    Procedure: RIGHT THORACOSCOPY WITH RIGHT MIDDLE AND RIGHT LOWER LOBE WEDGE RESECTIONS;  Surgeon: Jarrod Tolentino MD;  Location: [facility] OR;  Service: Cardiothoracic;  Laterality: Right;     Outpatient Medications Marked as Taking for the 3/28/22 encounter (Office Visit) with Marsha Yu APRN   Medication Sig Dispense Refill   • acetaminophen (TYLENOL) 500 MG tablet " Take 500 mg by mouth Every 6 (Six) Hours As Needed for Mild Pain .     • azithromycin (Zithromax Z-Ricky) 250 MG tablet Take 2 tablets by mouth on day 1, then 1 tablet daily on days 2-5 6 tablet 0   • calcium carb-cholecalciferol 600-800 MG-UNIT tablet Take 1 tablet by mouth 2 (Two) Times a Day With Meals.     • carvedilol (COREG) 3.125 MG tablet Take 3.125 mg by mouth 2 (Two) Times a Day With Meals.     • finasteride (PROSCAR) 5 MG tablet Take 5 mg by mouth Daily.  3   • predniSONE (DELTASONE) 5 MG tablet Take 5 mg by mouth Daily.     • sodium bicarbonate 650 MG tablet Take 1 tablet by mouth 3 (Three) Times a Day. 90 tablet 2     Allergies   Allergen Reactions   • Amoxicillin Hives   • Opdivo [Nivolumab] Provider Review Needed     Renal failure   • Penicillins Hives and Rash   • Clindamycin/Lincomycin Rash     pustules  Rash - pustules     • Doxycycline Rash   • Hepatitis B Virus Vaccines Rash     Pustules     • Hepatitis B Vaccine Rash   • Latex Rash   • Levofloxacin Rash     Social History     Socioeconomic History   • Marital status:    Tobacco Use   • Smoking status: Former Smoker     Packs/day: 1.00     Years: 22.00     Pack years: 22.00     Types: Cigarettes     Start date:      Quit date:      Years since quittin.2   • Smokeless tobacco: Never Used   Vaping Use   • Vaping Use: Never used   Substance and Sexual Activity   • Alcohol use: Not Currently   • Drug use: No   • Sexual activity: Defer     Family History   Problem Relation Age of Onset   • Other Mother         blood disease unknown   • Cancer Father         unknown   • No Known Problems Sister    • No Known Problems Sister    • Heart disease Maternal Grandmother    • Heart disease Maternal Grandfather    • No Known Problems Paternal Grandmother    • No Known Problems Paternal Grandfather    • Colon polyps Neg Hx    • Colon cancer Neg Hx      Health Maintenance   Topic Date Due   • COLORECTAL CANCER SCREENING  Never done   • COVID-19  "Vaccine (1) Never done   • TDAP/TD VACCINES (1 - Tdap) Never done   • ZOSTER VACCINE (1 of 2) Never done   • Pneumococcal Vaccine 65+ (1 of 1 - PPSV23) Never done   • HEPATITIS C SCREENING  Never done   • ANNUAL WELLNESS VISIT  Never done   • INFLUENZA VACCINE  Never done       REVIEW OF SYSTEMS  General: well appearing, no fever chills or sweats, no unexplained wt loss  HEENT: no acute visual or hearing disturbances  Cardiovascular: No chest pain or palpitations  Pulmonary: No shortness of breath, coughing, wheezing or hemoptysis  : No burning, urgency, hematuria, or dysuria  Musculoskeletal: No joint pain or stiffness  Peripheral: no edema  Skin: No lesions or rashes  Neuro: No dizziness, headaches, stroke, syncope  Endocrine: No hot or cold intolerances  Hematological: No blood dyscrasias    Objective   Vitals:    03/28/22 0948   BP: 132/68   Pulse: 82   Temp: 97.5 °F (36.4 °C)   SpO2: 97%   Weight: 90.3 kg (199 lb)   Height: 177.8 cm (70\")     Body mass index is 28.55 kg/m².  Patient's Body mass index is 28.55 kg/m². indicating that he is overweight (BMI 25-29.9). Patient's (Body mass index is 28.55 kg/m².) indicates that they are overweight with health conditions that include hypertension . Weight is unchanged. BMI is is above average; BMI management plan is completed. We discussed portion control and increasing exercise. .      PHYSICAL EXAM  General: age appropriate well nourished well appearing, no acute distress  Head: normocephalic and atraumatic  Global assessment-supple  Neck-No JVD noted, no lymphadenopathy  Pulmonary-clear to auscultation bilaterally, normal respiratory effort  Cardiovascular-normal rate and rhythm, normal heart sounds, S1 and S2 noted  Abdomen-soft, non tender, non distended, normal bowel sounds all 4 quadrants, no hepatosplenomegaly noted  Extremities-No clubbing cyanosis or edema  Neuro-Non focal, converses appropriately, awake, alert, oriented    Assessment/Plan     Diagnoses and " all orders for this visit:    1. Encounter for screening for malignant neoplasm of colon (Primary)  -     polyethylene glycol (GoLYTELY) 236 g solution; Take as directed by office instructions.  Dispense: 4000 mL; Refill: 0  -     Case Request; Standing  -     Follow Anesthesia Guidelines / Standing Orders; Future  -     Obtain Informed Consent; Future  -     Case Request    2. HTN (hypertension), benign        COLONOSCOPY WITH ANESTHESIA (N/A)  Body mass index is 28.55 kg/m².    Patient instructions on prep prior to procedure provided to the patient.    All risks, benefits, alternatives, and indications of colonoscopy procedure have been discussed with the patient. Risks to include perforation of the colon requiring possible surgery or colostomy, risk of bleeding from biopsies or removal of colon tissue, possibility of missing a colon polyp or cancer, or adverse drug reaction.  Benefits to include the diagnosis and management of disease of the colon and rectum. Alternatives to include barium enema, radiographic evaluation, lab testing or no intervention. Pt verbalizes understanding and agrees.     Marsha Yu, APRN  3/28/2022  10:04 CDT      IF YOU SMOKE OR USE TOBACCO PLEASE READ THE FOLLOWIN minutes reading provided    Why is smoking bad for me?  Smoking increases the risk of heart disease, lung disease, vascular disease, stroke, and cancer.     If you smoke, STOP!    If you would like more information on quitting smoking, please visit the [place] website: [email or web address]   This link will provide additional resources including the QUIT line and the Beat the Pack support groups.     For more information:    Quit Now [place]  -QUIT-NOW  [email or web address]

## 2022-03-28 NOTE — TELEPHONE ENCOUNTER
I called and spoke with Parker. I let him know that I added Contrast dye to his allergy list so if anyone in the future puts in orders for CT scans with contrast then they will be alerted that he cannot have contrast. I also let him know that I needed to put in new orders for his CT scans and I would get the new orders put in and scheduled for June. Parker verbalized understanding.

## 2022-03-28 NOTE — TELEPHONE ENCOUNTER
Caller: JESE    Relationship to patient: Self    Best call back number: [phone]    Chief complaint: JOSHUA. CT SCAN CLOSER TO HIS June APPT. & HE CANNOT HAVE CONTRAST, STATES IT HAS BEEN [place]. TWICE WITH CONTRAST & REQUESTING IT TO BE PUT PERM. IN HIS CHART THAT HE CANNOT HAVE CONTRAST.    Type of visit: CT SCAN    Requested date: 6/2022    If rescheduling, when is the original appointment: 3/29/2022    Additional notes: SENDING TO CLINICAL FIRST DUE TO NEW ORDER CHANGE.

## 2022-03-29 ENCOUNTER — APPOINTMENT (OUTPATIENT)
Dept: CT IMAGING | Facility: HOSPITAL | Age: 67
End: 2022-03-29

## 2022-04-18 ENCOUNTER — TELEPHONE (OUTPATIENT)
Dept: GASTROENTEROLOGY | Facility: CLINIC | Age: 67
End: 2022-04-18

## 2022-04-18 ENCOUNTER — HOSPITAL ENCOUNTER (OUTPATIENT)
Facility: HOSPITAL | Age: 67
Setting detail: HOSPITAL OUTPATIENT SURGERY
Discharge: HOME | End: 2022-04-18
Attending: INTERNAL MEDICINE | Admitting: INTERNAL MEDICINE

## 2022-04-18 ENCOUNTER — ANESTHESIA EVENT (OUTPATIENT)
Dept: GASTROENTEROLOGY | Facility: HOSPITAL | Age: 67
End: 2022-04-18

## 2022-04-18 ENCOUNTER — ANESTHESIA (OUTPATIENT)
Dept: GASTROENTEROLOGY | Facility: HOSPITAL | Age: 67
End: 2022-04-18

## 2022-04-18 VITALS
SYSTOLIC BLOOD PRESSURE: 113 MMHG | RESPIRATION RATE: 18 BRPM | HEART RATE: 80 BPM | WEIGHT: 192 LBS | HEIGHT: 70 IN | TEMPERATURE: 97.1 F | BODY MASS INDEX: 27.49 KG/M2 | DIASTOLIC BLOOD PRESSURE: 89 MMHG | OXYGEN SATURATION: 98 %

## 2022-04-18 DIAGNOSIS — Z12.11 ENCOUNTER FOR SCREENING FOR MALIGNANT NEOPLASM OF COLON: ICD-10-CM

## 2022-04-18 PROCEDURE — 25010000002 PROPOFOL 10 MG/ML EMULSION: Performed by: NURSE ANESTHETIST, CERTIFIED REGISTERED

## 2022-04-18 PROCEDURE — G0105 COLORECTAL SCRN; HI RISK IND: HCPCS | Performed by: INTERNAL MEDICINE

## 2022-04-18 RX ORDER — LIDOCAINE HYDROCHLORIDE 10 MG/ML
0.5 INJECTION, SOLUTION EPIDURAL; INFILTRATION; INTRACAUDAL; PERINEURAL ONCE AS NEEDED
Status: DISCONTINUED | OUTPATIENT
Start: 2022-04-18 | End: 2022-04-18 | Stop reason: HOSPADM

## 2022-04-18 RX ORDER — SODIUM CHLORIDE 0.9 % (FLUSH) 0.9 %
10 SYRINGE (ML) INJECTION AS NEEDED
Status: DISCONTINUED | OUTPATIENT
Start: 2022-04-18 | End: 2022-04-18 | Stop reason: HOSPADM

## 2022-04-18 RX ORDER — LIDOCAINE HYDROCHLORIDE 20 MG/ML
INJECTION, SOLUTION EPIDURAL; INFILTRATION; INTRACAUDAL; PERINEURAL AS NEEDED
Status: DISCONTINUED | OUTPATIENT
Start: 2022-04-18 | End: 2022-04-18 | Stop reason: SURG

## 2022-04-18 RX ORDER — PROPOFOL 10 MG/ML
VIAL (ML) INTRAVENOUS AS NEEDED
Status: DISCONTINUED | OUTPATIENT
Start: 2022-04-18 | End: 2022-04-18 | Stop reason: SURG

## 2022-04-18 RX ORDER — SODIUM CHLORIDE 9 MG/ML
500 INJECTION, SOLUTION INTRAVENOUS CONTINUOUS PRN
Status: DISCONTINUED | OUTPATIENT
Start: 2022-04-18 | End: 2022-04-18 | Stop reason: HOSPADM

## 2022-04-18 RX ADMIN — PROPOFOL 200 MG: 10 INJECTION, EMULSION INTRAVENOUS at 13:45

## 2022-04-18 RX ADMIN — SODIUM CHLORIDE 500 ML: 9 INJECTION, SOLUTION INTRAVENOUS at 12:37

## 2022-04-18 RX ADMIN — LIDOCAINE HYDROCHLORIDE 100 MG: 20 INJECTION, SOLUTION EPIDURAL; INFILTRATION; INTRACAUDAL; PERINEURAL at 13:45

## 2022-04-18 NOTE — INTERVAL H&P NOTE
H&P updated. The patient was examined and the following changes are noted:        He reports his first colonoscopy was about 4 years ago and he had polyps removed by Dr. Ledezma.  He had an emergency colonoscopy done 2 years ago as a result of side effects of chemotherapy.

## 2022-04-18 NOTE — ANESTHESIA PREPROCEDURE EVALUATION
Anesthesia Evaluation     Patient summary reviewed   no history of anesthetic complications:  NPO Solid Status: > 8 hours             Airway   Mallampati: II  Dental      Pulmonary    (+) lung cancer (s/p lobectomy.  Renal call primary),   Cardiovascular   Exercise tolerance: good (4-7 METS)    (+) hypertension,       Neuro/Psych  (+) TIA,    GI/Hepatic/Renal/Endo    (+)   renal disease CRI,     Musculoskeletal     Abdominal    Substance History      OB/GYN          Other      history of cancer                    Anesthesia Plan    ASA 3     MAC       Anesthetic plan, all risks, benefits, and alternatives have been provided, discussed and informed consent has been obtained with: patient.        CODE STATUS:

## 2022-04-18 NOTE — ANESTHESIA POSTPROCEDURE EVALUATION
"Patient: Parker Arnold    Procedure Summary     Date: 04/18/22 Room / Location: [facility] ENDOSCOPY 6 /  [facility]    Anesthesia Start: 1341 Anesthesia Stop: 1402    Procedure: COLONOSCOPY WITH ANESTHESIA (N/A ) Diagnosis:       Encounter for screening for malignant neoplasm of colon      (Encounter for screening for malignant neoplasm of colon [Z12.11])    Surgeons: Zoila Robison MD Provider: Magali Austin CRNA    Anesthesia Type: MAC ASA Status: 3          Anesthesia Type: MAC    Vitals  Vitals Value Taken Time   /89 04/18/22 1415   Temp     Pulse 80 04/18/22 1415   Resp 18 04/18/22 1415   SpO2 98 % 04/18/22 1415           Post Anesthesia Care and Evaluation    Patient location during evaluation: PHASE II  Patient participation: complete - patient participated  Level of consciousness: awake and awake and alert  Pain score: 0  Pain management: adequate  Airway patency: patent  Anesthetic complications: No anesthetic complications  PONV Status: none  Cardiovascular status: acceptable  Respiratory status: acceptable  Hydration status: acceptable    Comments: Patient discharged according to acceptable Cliff score per RN assessment. See nursing records for further information.     Blood pressure 113/89, pulse 80, temperature 97.1 °F (36.2 °C), temperature source Temporal, resp. rate 18, height 177.8 cm (70\"), weight 87.1 kg (192 lb), SpO2 98 %.        "

## 2022-05-19 NOTE — OUTREACH NOTE
Prep Survey      Responses   Facility patient discharged from?  [place]   Is patient eligible?  Yes   Discharge diagnosis  ARF with tubular necrosis in setting of solitary kidney, hyperkalemia, Metabolic acidosis, Clear cell carcinoma of Kidney, lung metastases, pyuria, Hyperglycemia, hypocalcemia   Does the patient have one of the following disease processes/diagnoses(primary or secondary)?  Other   Does the patient have Home health ordered?  No   Is there a DME ordered?  No   Comments regarding appointments  See AVS   Prep survey completed?  Yes          Lizett Carpenter RN         no

## 2022-06-01 ENCOUNTER — APPOINTMENT (OUTPATIENT)
Dept: CT IMAGING | Facility: HOSPITAL | Age: 67
End: 2022-06-01

## 2022-06-14 ENCOUNTER — TELEPHONE (OUTPATIENT)
Dept: ONCOLOGY | Facility: CLINIC | Age: 67
End: 2022-06-14

## 2022-06-14 ENCOUNTER — LAB (OUTPATIENT)
Dept: LAB | Facility: HOSPITAL | Age: 67
End: 2022-06-14

## 2022-06-14 DIAGNOSIS — C64.1 RENAL CELL CARCINOMA OF RIGHT KIDNEY: ICD-10-CM

## 2022-06-14 LAB
ALBUMIN SERPL-MCNC: 4.5 G/DL (ref 3.5–5.2)
ALBUMIN/GLOB SERPL: 2.3 G/DL
ALP SERPL-CCNC: 59 U/L (ref 39–117)
ALT SERPL W P-5'-P-CCNC: 13 U/L (ref 1–41)
ANION GAP SERPL CALCULATED.3IONS-SCNC: 8 MMOL/L (ref 5–15)
AST SERPL-CCNC: 13 U/L (ref 1–40)
BASOPHILS # BLD AUTO: 0.03 10*3/MM3 (ref 0–0.2)
BASOPHILS NFR BLD AUTO: 0.5 % (ref 0–1.5)
BILIRUB SERPL-MCNC: 0.5 MG/DL (ref 0–1.2)
BUN SERPL-MCNC: 46 MG/DL (ref 8–23)
BUN/CREAT SERPL: 15.5 (ref 7–25)
CALCIUM SPEC-SCNC: 9.4 MG/DL (ref 8.6–10.5)
CHLORIDE SERPL-SCNC: 109 MMOL/L (ref 98–107)
CO2 SERPL-SCNC: 24 MMOL/L (ref 22–29)
CREAT SERPL-MCNC: 2.97 MG/DL (ref 0.76–1.27)
DEPRECATED RDW RBC AUTO: 44.8 FL (ref 37–54)
EGFRCR SERPLBLD CKD-EPI 2021: 22.2 ML/MIN/1.73
EOSINOPHIL # BLD AUTO: 0.22 10*3/MM3 (ref 0–0.4)
EOSINOPHIL NFR BLD AUTO: 3.7 % (ref 0.3–6.2)
ERYTHROCYTE [DISTWIDTH] IN BLOOD BY AUTOMATED COUNT: 13.2 % (ref 12.3–15.4)
GLOBULIN UR ELPH-MCNC: 2 GM/DL
GLUCOSE SERPL-MCNC: 114 MG/DL (ref 65–99)
HCT VFR BLD AUTO: 46.2 % (ref 37.5–51)
HGB BLD-MCNC: 14.9 G/DL (ref 13–17.7)
IMM GRANULOCYTES # BLD AUTO: 0.02 10*3/MM3 (ref 0–0.05)
IMM GRANULOCYTES NFR BLD AUTO: 0.3 % (ref 0–0.5)
LYMPHOCYTES # BLD AUTO: 1.6 10*3/MM3 (ref 0.7–3.1)
LYMPHOCYTES NFR BLD AUTO: 26.8 % (ref 19.6–45.3)
MCH RBC QN AUTO: 29.9 PG (ref 26.6–33)
MCHC RBC AUTO-ENTMCNC: 32.3 G/DL (ref 31.5–35.7)
MCV RBC AUTO: 92.8 FL (ref 79–97)
MONOCYTES # BLD AUTO: 0.71 10*3/MM3 (ref 0.1–0.9)
MONOCYTES NFR BLD AUTO: 11.9 % (ref 5–12)
NEUTROPHILS NFR BLD AUTO: 3.39 10*3/MM3 (ref 1.7–7)
NEUTROPHILS NFR BLD AUTO: 56.8 % (ref 42.7–76)
NRBC BLD AUTO-RTO: 0 /100 WBC (ref 0–0.2)
PLATELET # BLD AUTO: 187 10*3/MM3 (ref 140–450)
PMV BLD AUTO: 9.8 FL (ref 6–12)
POTASSIUM SERPL-SCNC: 4.8 MMOL/L (ref 3.5–5.2)
PROT SERPL-MCNC: 6.5 G/DL (ref 6–8.5)
RBC # BLD AUTO: 4.98 10*6/MM3 (ref 4.14–5.8)
SODIUM SERPL-SCNC: 141 MMOL/L (ref 136–145)
WBC NRBC COR # BLD: 5.97 10*3/MM3 (ref 3.4–10.8)

## 2022-06-14 PROCEDURE — 36415 COLL VENOUS BLD VENIPUNCTURE: CPT

## 2022-06-14 PROCEDURE — 80053 COMPREHEN METABOLIC PANEL: CPT

## 2022-06-14 PROCEDURE — 85025 COMPLETE CBC W/AUTO DIFF WBC: CPT

## 2022-06-14 NOTE — TELEPHONE ENCOUNTER
----- Message from Leonardo Smith MD sent at 6/14/2022  9:26 AM CDT -----  Fax these labs to Dr. Grant

## 2022-06-20 DIAGNOSIS — C64.1 RENAL CELL CARCINOMA OF RIGHT KIDNEY: Primary | ICD-10-CM

## 2022-06-23 ENCOUNTER — HOSPITAL ENCOUNTER (OUTPATIENT)
Dept: CT IMAGING | Facility: HOSPITAL | Age: 67
End: 2022-06-23

## 2022-06-24 ENCOUNTER — HOSPITAL ENCOUNTER (OUTPATIENT)
Dept: CT IMAGING | Facility: HOSPITAL | Age: 67
Discharge: HOME | End: 2022-06-24
Admitting: INTERNAL MEDICINE

## 2022-06-24 DIAGNOSIS — C64.1 RENAL CELL CARCINOMA OF RIGHT KIDNEY: ICD-10-CM

## 2022-06-24 PROCEDURE — 71250 CT THORAX DX C-: CPT

## 2022-06-24 PROCEDURE — 74176 CT ABD & PELVIS W/O CONTRAST: CPT

## 2022-06-24 NOTE — PROGRESS NOTES
[facility] HEMATOLOGY AND ONCOLOGY  [address]  [facility] [address] [phone]    Patient Name: Parker Arnold  Encounter Date: 06/27/2022  YOB: 1955  Patient Number: [id number]     REASON FOR VISIT:  Parker Arnold is a 67 y.o. 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  Underwent right nephrectomy. Was under surveillance until 11/26/2018  When he underwent  wedge resection of a right pulm nodule. Pathology:  metastatic renal cell carcinoma. He was not able to tolerate Sutent due to kidney failure, thus received ipilumomab/nivolumab, 05/13/2019 - 08/07/2019, then single agent nivolumab, 08/28/2019 - 09/09/2019 (33.5 months since cessation of therapy). Therapy stopped due to renal failure (nivolumab?). He has been on and off prednisone since.  Last 11/29/2021 underwent right thoracoscopic RLL and RML wedge resection with final path consistent with metastatic renal cell carcinoma.  He is here alone.    I have reviewed the HPI and verified with the patient the accuracy of it. No changes to interval history since the information was documented. Leonardo Smith MD 06/27/22     Problem List Items Addressed This Visit        Other    Renal cell carcinoma of right kidney (HCC) - Primary    Overview     Overview:   Diagnoses 11/11/14. Path report in C.E at [facility]. Tissue has been requested.                Oncology/Hematology History   Renal cell carcinoma of right kidney (HCC)   10/2014 Initial Diagnosis    Renal cell carcinoma (CMS/HCC)     10/2014 Surgery    Right nephrectomy, Dr Morales       10/1/2018 Progression    Increasing lung Nodules on f/u CT     10/2/2018 -  Chemotherapy    Sutent  Discontinued due to intolerance     5/13/2019 - 8/7/2019 Chemotherapy    Opdivo Yervoy x 4 cycles     8/28/2019 - 9/9/2019 Chemotherapy    Single agent Opdivo    Opdivo  held 9/9/2019 due to Renal Failure / NephritisToxicity     1/16/2020 Imaging    PET SCAN  Negative for neoplastic uptake         PAST MEDICAL HISTORY:  ALLERGIES:  Allergies   Allergen Reactions   • Amoxicillin Hives   • Contrast Dye Other (See Comments)     Due to only having 1 kidney   • Opdivo [Nivolumab] Provider Review Needed     Renal failure   • Penicillins Hives and Rash   • Clindamycin/Lincomycin Rash     pustules  Rash - pustules     • Doxycycline Rash   • Hepatitis B Virus Vaccines Rash     Pustules     • Hepatitis B Vaccine Rash   • Latex Rash   • Levofloxacin Rash     CURRENT MEDICATIONS:  Outpatient Encounter Medications as of 6/27/2022   Medication Sig Dispense Refill   • acetaminophen (TYLENOL) 500 MG tablet Take 500 mg by mouth Every 6 (Six) Hours As Needed for Mild Pain .     • calcium carb-cholecalciferol 600-800 MG-UNIT tablet Take 1 tablet by mouth 2 (Two) Times a Day With Meals.     • carvedilol (COREG) 3.125 MG tablet Take 3.125 mg by mouth 2 (Two) Times a Day With Meals.     • finasteride (PROSCAR) 5 MG tablet Take 5 mg by mouth Daily.  3   • predniSONE (DELTASONE) 5 MG tablet Take 5 mg by mouth Daily.     • sodium bicarbonate 650 MG tablet Take 1 tablet by mouth 3 (Three) Times a Day. 90 tablet 2     No facility-administered encounter medications on file as of 6/27/2022.     ADULT ILLNESSES:  Patient Active Problem List   Diagnosis Code   • Renal cell carcinoma of right kidney (HCC) C64.1   • Multiple nodules of lung R91.8   • Lung anomaly Q33.9   • TIA (transient ischemic attack) G45.9   • Acute renal failure with tubular necrosis in the setting of solitary kidney N17.0   • Lung metastases (HCC) C78.00   • Pyuria R82.81   • Hyperkalemia E87.5   • Metabolic acidosis E87.2   • Hyperglycemia R73.9   • Hypercalcemia E83.52   • Benign prostatic hyperplasia N40.0   • Increased frequency of urination R35.0   • Nocturia R35.1   • Retention of urine R33.9   • Dehydration E86.0   • Nephritis and  nephropathy N05.9   • Toxicity, chemicals T65.91XA   • Stage 3 chronic kidney disease (HCC) N18.30   • Campylobacter gastroenteritis A04.5   • Sigmoid diverticulitis K57.32   • History of TIA (transient ischemic attack) Z86.73   • Solitary kidney, acquired Z90.5   • Benign essential hypertension I10   • Gastroesophageal reflux disease K21.9   • Encounter for screening for malignant neoplasm of colon Z12.11     SURGERIES:  Past Surgical History:   Procedure Laterality Date   • BRONCHOSCOPY N/A 11/29/2021    Procedure: BRONCHOSCOPY;  Surgeon: Jarrod Tolentino MD;  Location: [facility] OR;  Service: Cardiothoracic;  Laterality: N/A;   • CHOLECYSTECTOMY WITH INTRAOPERATIVE CHOLANGIOGRAM N/A 12/22/2021    Procedure: LAPAROSCOPIC CHOLECYSTECTOMY;  Surgeon: Vandana Calhoun MD;  Location: [facility] OR;  Service: General;  Laterality: N/A;   • COLONOSCOPY N/A 4/18/2022    Procedure: COLONOSCOPY WITH ANESTHESIA;  Surgeon: Zoila Robison MD;  Location: [facility] ENDOSCOPY;  Service: Gastroenterology;  Laterality: N/A;  pre colon cancer  post  Dr. Osborne   • HERNIA REPAIR  2008   • LUNG BIOPSY  11/2018   • NEPHRECTOMY Right 2014   • SINUS SURGERY  2005   • THORACOSCOPY Right 11/29/2021    Procedure: RIGHT THORACOSCOPY WITH RIGHT MIDDLE AND RIGHT LOWER LOBE WEDGE RESECTIONS;  Surgeon: Jarrod Tolentino MD;  Location: [facility] OR;  Service: Cardiothoracic;  Laterality: Right;     HEALTH MAINTENANCE ITEMS:  Health Maintenance Due   Topic Date Due   • COVID-19 Vaccine (1) Never done   • TDAP/TD VACCINES (1 - Tdap) Never done   • ZOSTER VACCINE (1 of 2) Never done   • Pneumococcal Vaccine 65+ (1 - PCV) Never done   • HEPATITIS C SCREENING  Never done   • ANNUAL WELLNESS VISIT  Never done       <no information>  Last Completed Colonoscopy          COLORECTAL CANCER SCREENING (COLONOSCOPY - Every 5 Years) Next due on 4/18/2027 04/18/2022  COLONOSCOPY    04/18/2022  Surgical Procedure: COLONOSCOPY                There is no immunization  "history on file for this patient.  Last Completed Mammogram     This patient has no relevant Health Maintenance data.            FAMILY HISTORY:  Family History   Problem Relation Age of Onset   • Other Mother         blood disease unknown   • Cancer Father         unknown   • No Known Problems Sister    • No Known Problems Sister    • Heart disease Maternal Grandmother    • Heart disease Maternal Grandfather    • No Known Problems Paternal Grandmother    • No Known Problems Paternal Grandfather    • Colon polyps Neg Hx    • Colon cancer Neg Hx      SOCIAL HISTORY:  Social History     Socioeconomic History   • Marital status:    Tobacco Use   • Smoking status: Former Smoker     Packs/day: 1.00     Years: 22.00     Pack years: 22.00     Types: Cigarettes     Start date:      Quit date:      Years since quittin.5   • Smokeless tobacco: Never Used   Vaping Use   • Vaping Use: Never used   Substance and Sexual Activity   • Alcohol use: Not Currently   • Drug use: No   • Sexual activity: Defer       REVIEW OF SYSTEMS:  Constitutional:  Manages ADLs, chores, errands and driving.  Again drove himself in today.  Appetite is good.  \"All good.\" Energy is fairly good.  He remains active.  \"Am done working on my new house.\"  He has lost 4 pounds (in addition to 6 pounds at his 2 prior visits) since his last visit. No fever, no chills nor drenching nights.  HENT: Negative.  No sore throat.  Has seasonal sinus symptoms/rhinorrhea. No headaches.  Eyes: Negative.    Respiratory:  No SOB at rest nor TENORIO.  Resolution of nonproductive cough. No wheezing. No tobacco use.    Cardiovascular: Negative.  No palpitations.  No orthopnea  Gastrointestinal: RUQ pain resolved since cholecystectomy.  No dysphagia.  No nausea.  No vomiting.  No dyspepsia.  Bowels moving regularly.  Had soft stools post-op, \"all better.\"  No melena. No hematochezia. Has history recurrent diverticulitis, early 2021.  Had colonoscopy, " "04/18/2022- diverticulosis.  Nonbleeding internal hemorrhoids otherwise normal exam.  Endocrine: No hot flashes.  Genitourinary:  No dysuria.  No incontinence. No hematuria.  Occasional urgency.  \"Sometimes still gotta go when I gotta go.\" Nocturia much improved on Finasteride - up 2x- from every 90 min  Musculoskeletal:  No arthralgias.  No edema  Skin: No new rash.    Neurological:  No dizziness.  No facial asymmetry. No headaches.  Mild sensory neuropathy of the hands. Not worse  Hematological: Negative for new adenopathy.  Says he bruises easily.  Psychiatric/Behavioral:  Has baseline anxiety, \"steroids and stress.\"  No depression.      VITAL SIGNS: /84   Pulse 56   Temp 97.4 °F (36.3 °C) (Temporal)   Resp 16   Ht 177.8 cm (70\")   Wt 88.4 kg (194 lb 12.8 oz)   SpO2 98%   BMI 27.95 kg/m² Body surface area is 2.06 meters squared.  Pain Score    06/27/22 1405   PainSc: 0-No pain         PHYSICAL EXAMINATION:   General Appearance: Patient is a very pleasant, cooperative, heavyset, modestly kept elderly male who is awake, alert, oriented and in no acute distress. ECOG 0 (from 1)  HEENT: Normocephalic. Sclerae clear, conjunctiva pink, extraocular movements intact, pupils, round, reactive to light and  accommodation. Is wearing a surgical mask today  NECK: Supple, no jugular venous distention, thyroid not enlarged.  LYMPH: No cervical, supraclavicular, axillary, or inguinal lymphadenopathy.  LUNGS: Good air movement, no rales, rhonchi, rubs or wheezes with auscultation.  Right thoracoscopic incisions are healed  CARDIO: Regular sinus rhythm, no murmurs, gallops or rubs.  ABDOMEN:  Obese, nondistended, soft, with no right upper quadrant tenderness.  Laparoscopy wounds are all healed. No obvious abdominal masses. Bowel sounds positive. No hernia  MUSKEL: No joint swelling, decreased motion, or inflammation  EXTREMS:  No edema, clubbing, cyanosis, No varicose veins.  NEURO: Grossly nonfocal, Gait is " coordinated and smooth, Cognition is preserved.  SKIN: No rashes, no ecchymoses, no petechia.  PSYCH: Oriented to time, place and person. Memory is preserved. Mood and affect appear normal         LABS    Lab Results - Last 18 Months   Lab Units 06/14/22  0818 03/21/22  0923 01/31/22  1536 01/09/22  0918 12/14/21  1006 11/30/21  0625 11/24/21  1327 09/14/21  1016 06/14/21  0958   HEMOGLOBIN g/dL 14.9 15.0 14.0 14.4 14.0 13.0 15.1 14.4 13.6   HEMATOCRIT % 46.2 46.5 44.4 43.7 44.9 40.7 46.1 42.1 41.9   MCV fL 92.8 93.2 93.3 91.6 91.6 90.6 91.7 88.1 90.5   WBC 10*3/mm3 5.97 7.99 10.8 7.7 8.58 13.86* 6.59 8.90 6.55   RDW % 13.2 14.3 13.8 13.2 13.1 13.1 13.1 13.3 13.5   MPV fL 9.8 9.6 9.4 9.2* 9.2 9.5 9.8 9.6 9.7   PLATELETS 10*3/mm3 187 220 341 220 262 211 232 196 161   IMM GRAN % % 0.3 0.4  --   --  0.3  --  0.3 0.3 0.2   NEUTROS ABS 10*3/mm3 3.39 5.84 8.3*  --  6.21  --  4.64 5.98 3.89   LYMPHS ABS 10*3/mm3 1.60 1.23 1.6  --  1.17  --  1.17 1.48 1.73   MONOS ABS 10*3/mm3 0.71 0.67 0.80  --  0.79  --  0.62 1.02* 0.74   EOS ABS 10*3/mm3 0.22 0.18 0.10  --  0.33  --  0.11 0.36 0.15   BASOS ABS 10*3/mm3 0.03 0.04 0.00  --  0.05  --  0.03 0.03 0.03   IMMATURE GRANS (ABS) 10*3/mm3 0.02 0.03 0.1  --  0.03  --  0.02 0.03 0.01   NRBC /100 WBC 0.0 0.0  --   --  0.0  --  0.0 0.0 0.0       Lab Results - Last 18 Months   Lab Units 06/14/22  0818 03/21/22  0923 01/31/22  1536 12/14/21  1006 11/30/21  0625 11/29/21  0844 11/29/21  0707 11/24/21  1521 11/24/21  1327 09/14/21  1016   GLUCOSE mg/dL 114* 113* 105 95 137*  --   --  187* 106* 104*   SODIUM mmol/L 141 140 142 143 135*  --   --  139 139 141   SODIUM, ARTERIAL mmol/L  --   --   --   --   --  139   < >  --   --   --    POTASSIUM mmol/L 4.8 5.0 5.8* 4.9 5.3*  --   --  5.2 6.2* 4.4   TOTAL CO2 mmol/L  --   --  18*  --   --   --   --   --   --   --    CO2 mmol/L 24.0 25.0  --  26.0 20.0*  --   --  21.0* 23.0 24.0   CHLORIDE mmol/L 109* 106 110 109* 104  --   --  106 106 107    ANION GAP mmol/L 8.0 9.0 14 8.0 11.0  --   --  12.0 10.0 10.0   CREATININE mg/dL 2.97* 2.44* 3.2* 2.60* 2.23*  --   --  2.53* 2.50* 2.36*   BUN mg/dL 46* 38* 45* 48* 47*  --   --  46* 44* 40*   BUN / CREAT RATIO  15.5 15.6  --  18.5 21.1  --   --  18.2 17.6 16.9   CALCIUM mg/dL 9.4 9.9 9.9 9.3 8.7  --   --  9.4 9.8 9.2   EGFR IF NONAFRICN AM   --   --  19* 25* 30*  --   --  26* 26* 28*   ALK PHOS U/L 59 59 75 68  --   --   --  72 67 65   TOTAL PROTEIN g/dL 6.5 6.4 6.6 6.5  --   --   --  6.5 6.7 6.3   ALT (SGPT) U/L 13 13 22 16  --   --   --  19 20 14   AST (SGOT) U/L 13 15 19 18  --   --   --  16 17 14   BILIRUBIN mg/dL 0.5 0.3 0.3 0.4  --   --   --  0.4 0.4 0.5   ALBUMIN g/dL 4.50 4.40 4.3 4.50  --   --   --  4.60 4.60 4.20   GLOBULIN gm/dL 2.0 2.0  --  2.0  --   --   --  1.9 2.1 2.1    < > = values in this interval not displayed.       Lab Results - Last 18 Months   Lab Units 01/31/22  1536 12/28/20  0912   URIC ACID mg/dL 8.5* 8.8*       Assessment:  1.  Renal cell carcinoma, diagnosed 11/11/2014 (5.5 cm G3, pT3a, pNx). --2014, right nephrectomy.   --surveillance until 11/26/2018    --11/26/2018-right lower lobe VATS wedge resection: Metastatic renal cell carcinoma, 0.9 cm; margins free of malignancy. Dr. Hu  -- Unable to tolerate Sutent due to kidney failure, thus received ipilumomab/nivolumab, 05/13/2019 - 08/07/2019, then single agent nivolumab, 08/28/2019 - 09/09/2019 (21.5 months since cessation of therapy). Therapy stopped due to renal failure (nivolumab?)  --01/16/2020-PET scan.  No neoplastic FDG uptake within the chest, abdomen or pelvis.  --07/06/2020-CT abdomen/pelvis with diverticulosis of the colon.  Evidence of acute diverticulitis of the sigmoid colon and adjacent distal descending colon.  No free air or fluid seen.  1 cm noncalcified subpleural nodule in the left lower lobe.  Follow-up exam in 6 months recommended.  --08/04/2020-chest CT.  Scattered subcentimeter bilateral pulmonary nodules  similar to 1/16/2020 with mild increase in the 5 mm left upper lobe nodule.  Continued follow-up recommended.  No pathologic lymphadenopathy.  Right nephrectomy.  --01/14/2020-CT chest.  Increased size of 1.2 cm right lower lobe superior segment pulmonary nodule (previously 0.9 cm)-representing 33% growth.  Multiple other bilateral pulmonary nodules have not significantly changed in size.  Findings are in keeping with disease progression.  --01/14/2020-CT abdomen/pelvis.  No evidence of recurrent or metastatic disease in the abdomen or pelvis.  --01/26/2021-PET/CT.  Impression: Minimal metabolic activity associated with the enlarging pulmonary nodule within the superior segment of the right lower lobe with a maximum SUV of 1.35.  The additional nodules noted within both lungs also demonstrate no abnormal metabolic activity which would favor benignity but continued radiographic follow-up will be suggested.  No scintigraphic evidence of metastatic disease on current exam.  Increased uptake overlying the right groin and more distal external iliac vessels felt to be related to previous hernia repair.  --02/14/2021-  Encounter from Dr. Mayito Arteaga, medical oncology at [facility].  History reviewed.  Most recent CT chest, 01/14/2021 (above) with increasing size of 1.2 cm right lower lobe superior segment pulmonary nodule (previously 0.9 cm) and PET scan, 01/26/2021 (above) noted including minimal metabolic activity associated with the enlarging pulmonary nodule within the superior segment of the right lower lobe.  Discussion: Biopsy right lower lobe lung nodule solitary lesion that is growing on PET scan which will be done at that institution.  Contemplate either surgical resection, ablation or SBRT.  We will focus on local therapy for now.  Multiple systemic options down the road but if renal function improves can contemplate trial with HIF-alpha inhibitor.  --03/15/2021- CT chest.  Continued  increased size of the right lower lobe superior segment now 1.4 cm pulmonary nodule (1.2 cm, 1/14/2021; 0.8 cm, 8/4/2020).  Other pulmonary nodules are stable.  New small area of groundglass opacity in the superior segment left lower lobe may be infectious/inflammatory.  Coronary artery calcifications.  Aortic calcifications.  Small hiatal hernia.  --03/15/2021-CT abdomen/pelvis.  Previous right nephrectomy identified with no evidence of intra-abdominal metastatic disease.  Benign-appearing exophytic cyst posterior aspect of the left kidney, upper pole, measuring 2.47 cm, stable.  Small nodules in the lung bases are unchanged compared to previous chest CT.  Evidence of previous right inguinal hernia repair.  Fatty infiltration of the inguinal canals.  Scattered colonic diverticula without diverticulitis.  Small sliding hiatal hernia identified.  --06/11/2021-CT chest without contrast.  Stable pulmonary nodules.  No evidence of disease progression.  Interval resolution superior segment left lower lobe groundglass opacity.  Scattered coronary artery calcifications.  --06/11/2021-CT abdomen/pelvis.  History of right renal cell carcinoma nephrectomy.  No CT evidence of tumor recurrence or metastatic disease in the abdomen or pelvis.  --09/07/2021-CT chest wo contrast.  Pulmonary nodules noted.  Slightly increased in size compared to 6/11/2021 (left upper lobe 7.2 mm-prior 6.7 mm; posterior right lung, 19 mm-prior 13.9 mm; posterior medial right lung, 14 mm-prior 11 mm; right lung, 7.7 mm-prior 7.1 mm).  However no new nodules identified.  --09/07/2021-CT abdomen/pelvis wo contrast.  No evidence of intra-abdominal or pelvic metastasis.  Prior right nephrectomy.  Stable left renal cyst.  Colonic diverticulosis with questionable wall thickening of the sigmoid colon may be artifactual.  Correlation to any recent colonoscopy recommended.  --09/28/2021-PET scan.  Increased size of right lower lobe lung nodule (21 x 15 mm  compared to 14 x 14 mm 3 months ago) though this finding shows no significant FDG uptake.  Otherwise stable PET/CT exam.  --11/29/2021-RLL, RML wedge resections. Path consistent with metastatic renal cell carcinoma  --12/14/2021-CT chest.  Postoperative changes from wedge resections of prior right middle and lower lobe pulmonary nodules.  Stable left-sided pulmonary nodules, largest in the left upper lobe/apex measuring 8 mm.  Continued follow-up strongly recommended.  No pathologic lymphadenopathy.  --12/14/2021-CT abdomen/pelvis.  No convincing evidence of intra-abdominal or pelvic metastasis.  New diffuse abnormal gallbladder wall thickening.  Findings may be seen with cholecystitis.  Gallbladder ultrasound might be considered.  --12/20/2021-gallbladder ultrasound-diffuse gallbladder wall thickening.  Gallbladder nondistended with no visible stones.  Unlikely acute cholecystitis.  Favor chronic cholecystitis.  --03/18/2022-CT chest-patchy infiltrate compatible with right middle lobe pneumonia.  2 tiny left lung nodule stable.  --03/18/2022-CT abdomen/pelvis.  No evidence of metastatic disease to the abdomen/pelvis.  Prior right nephrectomy.  Colonic diverticulosis without evidence of diverticulitis.  Mild splenomegaly, unchanged.  --06/24/2022- CT chest-1. The resolution of the previously seen patchy infiltrate in the right middle lobe. 2. Postsurgical changes and scarring in the right upper and lower lobe. 3. Stable small nodules in both lungs the largest one in the left upper lobe. No change.  --06/24/2022- CT abdomen/pelvis- stable.  No evidence of a neoplastic process or metastatic disease. Evidence of right nephrectomy. No focal complication. Stable left renal cyst. Diverticulosis of the colon. No evidence for diverticulitis. Stable mild splenomegaly.  Enlarged prostate.    2.  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    --GFR 22.2 mL/min, 06/14/2022 (prior:  9 - 28)  --s/p multiple doses of HD steroids in the  past and remains on maintenance 5 mg alt 2.5 mg prednisone per nephrology (Dr. Grant)- are tapering down  --will not start Cytoxan unless biopsy done to determine etiology of kidney failure. Has been off Opdivo since September 2019.    3.  Normocytic anemia.    --Hgb 14.9; MCV 92.8, 06/14/2022 (prior: Hgb 10.2-15.1; MCV 86.3-95.5)  4.  Gerd: modulated by omeprazole  5.  Osteopenia: on bola+vit D  6.  History of acute diverticulitis.   --04/18/2022- colonoscopy-diverticulosis in the left colon.  Nonbleeding internal hemorrhoids.  Otherwise normal exam.  Repeat 5 years.  Dr. Robison  7.  Cholecystitis  --12/14/2021-CT abdomen/pelvis.  Stable lung nodules.  New diffuse abnormal gallbladder wall thickening.  Cholecystitis?  No convincing evidence of intra-abdominal or pelvic mets.  --12/22/2021-cholecystectomy.  Final diagnosis: Chronic cholecystitis.    Plan:  1.  Apprised of labs, 06/14/2022-normal CBC.  Depressed GFR (stable) otherwise normal CMP.  2.  Apprised of colonoscopy findings, 04/18/2022 (above).  3.  Apprised of CT scans of the chest, abdomen/pelvis, 06/24/2022 (above).   BOYD  4.  Again discussed the option of systemic therapy with cabozantinib as outlined by Dr. Arteaga (see #5 below) previously.  Patient remains wary of starting any type of therapy given previous AE's.      5.  Previously reviewed discharge summary, 11/30/2021.  Underwent bronchoscopy, right thoracoscopy, right lower lobe wedge resection, right middle lobe wedge resection, right lower lobe wedge basilar resection on 11/29/2021 by Dr. Tolentino.  Pathology revealed metastatic renal cell carcinoma.    6.  Previously reviewed encounter office encounter from GIOVANNY Edward with Dr. Arteaga's office, 05/27/2021.  Impression/plan: CT CAP, 05/26/2021: Stable examination including stable bilateral lung nodules compared to 03/15/2021.  Progression of right lower lobe lesion?  Discussed systemic therapy option with cabozantinib at 20 mg.  However patient  concerned due to his history of substantial AEs in the past.  Will arrange for lesion to be ablated.  However patient says multiple lesions were there for 6 years and unchanged.-Consider ablation or SBRT of the lesion and monitor and consider cabozantinib when failed.  Multiple systemic options down the road but if renal function improves can contemplate HIF-alpha inhibitor.  Continue surveillance for now.  Repeat scans in 3 months.    7.  Return to office in 12 weeks with preoffice CT scans of the chest, abdomen/pelvis without IV contrast (in 11 weeks), CBC with differential and CMP.    I spent 50  minutes caring for Parker on this date of service. This time includes time spent by me in the following activities: preparing for the visit, reviewing tests, performing a medically appropriate examination and/or evaluation, counseling and educating the patient/family/caregiver, ordering medications, tests, or procedures and documenting information in the medical record.

## 2022-06-27 ENCOUNTER — OFFICE VISIT (OUTPATIENT)
Dept: ONCOLOGY | Facility: CLINIC | Age: 67
End: 2022-06-27

## 2022-06-27 VITALS
RESPIRATION RATE: 16 BRPM | TEMPERATURE: 97.4 F | HEART RATE: 56 BPM | OXYGEN SATURATION: 98 % | DIASTOLIC BLOOD PRESSURE: 84 MMHG | WEIGHT: 194.8 LBS | HEIGHT: 70 IN | BODY MASS INDEX: 27.89 KG/M2 | SYSTOLIC BLOOD PRESSURE: 156 MMHG

## 2022-06-27 DIAGNOSIS — C64.1 RENAL CELL CARCINOMA OF RIGHT KIDNEY: Primary | ICD-10-CM

## 2022-06-27 PROCEDURE — 99215 OFFICE O/P EST HI 40 MIN: CPT | Performed by: INTERNAL MEDICINE

## 2022-09-18 NOTE — PROGRESS NOTES
[facility] HEMATOLOGY AND ONCOLOGY  [address]  [place] [address] [phone]    Patient Name: Parker Arnold  Encounter Date: 09/26/2022  YOB: 1955  Patient Number: [id number]       REASON FOR VISIT:  Parker Arnlod is a 67 y.o. 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  Underwent right nephrectomy. Was under surveillance until 11/26/2018  When he underwent  wedge resection of a right pulm nodule. Pathology:  metastatic renal cell carcinoma. He was not able to tolerate Sutent due to kidney failure, thus received ipilumomab/nivolumab, 05/13/2019 - 08/07/2019, then single agent nivolumab, 08/28/2019 - 09/09/2019 (36.5 months since cessation of therapy). Therapy stopped due to renal failure (nivolumab?). He has been on and off prednisone since.  Last 11/29/2021 (10 months) underwent right thoracoscopic RLL and RML wedge resection with final path consistent with metastatic renal cell carcinoma.  He is here alone.    I have reviewed the HPI and verified with the patient the accuracy of it. No changes to interval history since the information was documented. Leonardo Smith MD 09/26/22     Problem List Items Addressed This Visit        Other    Renal cell carcinoma of right kidney (HCC) - Primary    Overview     Overview:   Diagnoses 11/11/14. Path report in C[facility]E at [facility]. Tissue has been requested.              Oncology/Hematology History   Renal cell carcinoma of right kidney (HCC)   10/2014 Initial Diagnosis    Renal cell carcinoma (CMS/HCC)     10/2014 Surgery    Right nephrectomy, Dr Morales       10/1/2018 Progression    Increasing lung Nodules on f/u CT     10/2/2018 -  Chemotherapy    Sutent  Discontinued due to intolerance     5/13/2019 - 8/7/2019 Chemotherapy    Opdivo Yervoy x 4 cycles     8/28/2019 - 9/9/2019 Chemotherapy    Single agent  Opdivo    Opdivo held 9/9/2019 due to Renal Failure / NephritisToxicity     1/16/2020 Imaging    PET SCAN  Negative for neoplastic uptake         PAST MEDICAL HISTORY:  ALLERGIES:  Allergies   Allergen Reactions   • Amoxicillin Hives   • Contrast Dye Other (See Comments)     Due to only having 1 kidney   • Opdivo [Nivolumab] Provider Review Needed     Renal failure   • Penicillins Hives and Rash   • Clindamycin/Lincomycin Rash     pustules  Rash - pustules     • Doxycycline Rash   • Hepatitis B Virus Vaccines Rash     Pustules     • Hepatitis B Vaccine Rash   • Latex Rash   • Levofloxacin Rash     CURRENT MEDICATIONS:  Outpatient Encounter Medications as of 9/26/2022   Medication Sig Dispense Refill   • acetaminophen (TYLENOL) 500 MG tablet Take 500 mg by mouth Every 6 (Six) Hours As Needed for Mild Pain .     • allopurinol (ZYLOPRIM) 100 MG tablet Take 100 mg by mouth Daily.     • calcium carb-cholecalciferol 600-800 MG-UNIT tablet Take 1 tablet by mouth 2 (Two) Times a Day With Meals.     • carvedilol (COREG) 3.125 MG tablet Take 3.125 mg by mouth 2 (Two) Times a Day With Meals.     • finasteride (PROSCAR) 5 MG tablet Take 5 mg by mouth Daily.  3   • predniSONE (DELTASONE) 5 MG tablet Take 2.5 mg by mouth Daily.     • sodium bicarbonate 650 MG tablet Take 1 tablet by mouth 3 (Three) Times a Day. 90 tablet 2     No facility-administered encounter medications on file as of 9/26/2022.     ADULT ILLNESSES:  Patient Active Problem List   Diagnosis Code   • Renal cell carcinoma of right kidney (HCC) C64.1   • Multiple nodules of lung R91.8   • Lung anomaly Q33.9   • TIA (transient ischemic attack) G45.9   • Acute renal failure with tubular necrosis in the setting of solitary kidney N17.0   • Lung metastases (HCC) C78.00   • Pyuria R82.81   • Hyperkalemia E87.5   • Metabolic acidosis E87.2   • Hyperglycemia R73.9   • Hypercalcemia E83.52   • Benign prostatic hyperplasia N40.0   • Increased frequency of urination R35.0    • Nocturia R35.1   • Retention of urine R33.9   • Dehydration E86.0   • Nephritis and nephropathy N05.9   • Toxicity, chemicals T65.91XA   • Stage 3 chronic kidney disease (HCC) N18.30   • Campylobacter gastroenteritis A04.5   • Sigmoid diverticulitis K57.32   • History of TIA (transient ischemic attack) Z86.73   • Solitary kidney, acquired Z90.5   • Benign essential hypertension I10   • Gastroesophageal reflux disease K21.9   • Encounter for screening for malignant neoplasm of colon Z12.11     SURGERIES:  Past Surgical History:   Procedure Laterality Date   • BRONCHOSCOPY N/A 11/29/2021    Procedure: BRONCHOSCOPY;  Surgeon: Jarrod Tolentino MD;  Location: [facility] OR;  Service: Cardiothoracic;  Laterality: N/A;   • CHOLECYSTECTOMY WITH INTRAOPERATIVE CHOLANGIOGRAM N/A 12/22/2021    Procedure: LAPAROSCOPIC CHOLECYSTECTOMY;  Surgeon: Vandana Calhoun MD;  Location: [facility] OR;  Service: General;  Laterality: N/A;   • COLONOSCOPY N/A 4/18/2022    Procedure: COLONOSCOPY WITH ANESTHESIA;  Surgeon: Zoila Robison MD;  Location: [facility] ENDOSCOPY;  Service: Gastroenterology;  Laterality: N/A;  pre colon cancer  post  Dr. Osborne   • HERNIA REPAIR  2008   • LUNG BIOPSY  11/2018   • NEPHRECTOMY Right 2014   • SINUS SURGERY  2005   • THORACOSCOPY Right 11/29/2021    Procedure: RIGHT THORACOSCOPY WITH RIGHT MIDDLE AND RIGHT LOWER LOBE WEDGE RESECTIONS;  Surgeon: Jarrod Tolentino MD;  Location: [facility] OR;  Service: Cardiothoracic;  Laterality: Right;     HEALTH MAINTENANCE ITEMS:  Health Maintenance Due   Topic Date Due   • COVID-19 Vaccine (1) Never done   • TDAP/TD VACCINES (1 - Tdap) Never done   • ZOSTER VACCINE (1 of 2) Never done   • Pneumococcal Vaccine 65+ (1 - PCV) Never done   • HEPATITIS C SCREENING  Never done   • ANNUAL WELLNESS VISIT  Never done       <no information>  Last Completed Colonoscopy          COLORECTAL CANCER SCREENING (COLONOSCOPY - Every 5 Years) Next due on 4/18/2027 04/18/2022  COLONOSCOPY  "   2022  Surgical Procedure: COLONOSCOPY                There is no immunization history on file for this patient.  Last Completed Mammogram     This patient has no relevant Health Maintenance data.            FAMILY HISTORY:  Family History   Problem Relation Age of Onset   • Other Mother         blood disease unknown   • Cancer Father         unknown   • No Known Problems Sister    • No Known Problems Sister    • Heart disease Maternal Grandmother    • Heart disease Maternal Grandfather    • No Known Problems Paternal Grandmother    • No Known Problems Paternal Grandfather    • Colon polyps Neg Hx    • Colon cancer Neg Hx      SOCIAL HISTORY:  Social History     Socioeconomic History   • Marital status:    Tobacco Use   • Smoking status: Former Smoker     Packs/day: 1.00     Years: 22.00     Pack years: 22.00     Types: Cigarettes     Start date:      Quit date:      Years since quittin.7   • Smokeless tobacco: Never Used   Vaping Use   • Vaping Use: Never used   Substance and Sexual Activity   • Alcohol use: Not Currently   • Drug use: No   • Sexual activity: Defer       REVIEW OF SYSTEMS:  Constitutional:  Manages ADLs, chores, errands and driving.  Again drove himself in today.  Appetite is good.  \"All good still.\" Energy is fairly good.  He remains active.  \"Just finished working on my new deck.\"  He has regained 4 pounds (had lost 4 pounds at his prior visit) since his last visit. No fever, no chills nor drenching nights.  HENT: Negative.  No sore throat.  Has seasonal sinus symptoms/rhinorrhea. No headaches.  Eyes: Negative.    Respiratory:  No SOB at rest nor TENORIO.  Resolution of nonproductive cough. No wheezing. No tobacco use.    Cardiovascular: Negative.  No palpitations.  No orthopnea  Gastrointestinal: RUQ pain resolved since cholecystectomy.  No dysphagia.  No nausea.  No vomiting.  No dyspepsia.  Bowels moving regularly.  Had soft stools post-op, \"all better.\"  No melena. No " "hematochezia. Has history recurrent diverticulitis, early 06/2021.  Had colonoscopy, 04/18/2022- diverticulosis.  Nonbleeding internal hemorrhoids otherwise normal exam.  Endocrine: No hot flashes.  Genitourinary:  No dysuria.  No incontinence. No hematuria.  Occasional urgency.  \"I don't feel like I gotta go when I gotta go anymore.\" Nocturia much improved on Finasteride - up 2x- from every 90 min.  Has been started on allopurinol.  Musculoskeletal:  No arthralgias.  No edema  Skin: No new rash.    Neurological:  No dizziness.  No facial asymmetry. No headaches.  Mild sensory neuropathy of the hands. Not worse  Hematological: Negative for new adenopathy.  Says he bruises easily.  Psychiatric/Behavioral:  Has baseline anxiety, \"steroids and stress.\"  No depression.        VITAL SIGNS: /92   Pulse 66   Temp 97.4 °F (36.3 °C) (Temporal)   Resp 18   Ht 177.8 cm (70\")   Wt 90.2 kg (198 lb 12.8 oz)   SpO2 99%   BMI 28.52 kg/m² Body surface area is 2.08 meters squared.  Pain Score    09/26/22 1024   PainSc: 0-No pain         PHYSICAL EXAMINATION:   General Appearance: Patient is a very pleasant, cooperative, heavyset, modestly kept elderly male who is awake, alert, oriented and in no acute distress. ECOG 0 (same)  HEENT: Normocephalic. Sclerae clear, conjunctiva pink, extraocular movements intact, pupils, round, reactive to light and  accommodation. Is wearing a surgical mask today  NECK: Supple, no jugular venous distention, thyroid not enlarged.  LYMPH: No cervical, supraclavicular, axillary, or inguinal lymphadenopathy.  LUNGS: Good air movement, no rales, rhonchi, rubs or wheezes with auscultation.  Right thoracoscopic incisions are healed  CARDIO: Regular sinus rhythm, no murmurs, gallops or rubs.  ABDOMEN:  Obese, nondistended, soft, with no right upper quadrant tenderness.  Laparoscopy wounds are all healed. No obvious abdominal masses. Bowel sounds positive. No hernia  MUSKEL: No joint swelling, " decreased motion, or inflammation  EXTREMS:  No edema, clubbing, cyanosis, No varicose veins.  NEURO: Grossly nonfocal, Gait is coordinated and smooth, Cognition is preserved.  SKIN: No rashes, no ecchymoses, no petechia.  PSYCH: Oriented to time, place and person. Memory is preserved. Mood and affect appear normal         LABS    Lab Results - Last 18 Months   Lab Units 09/19/22  1127 06/14/22  0818 03/21/22  0923 01/31/22  1536 01/09/22  0918 12/14/21  1006 11/30/21  0625 11/24/21  1327 09/14/21  1016   HEMOGLOBIN g/dL 14.6 14.9 15.0 14.0 14.4 14.0   < > 15.1 14.4   HEMATOCRIT % 45.5 46.2 46.5 44.4 43.7 44.9   < > 46.1 42.1   MCV fL 91.4 92.8 93.2 93.3 91.6 91.6   < > 91.7 88.1   WBC 10*3/mm3 7.00 5.97 7.99 10.8 7.7 8.58   < > 6.59 8.90   RDW % 13.4 13.2 14.3 13.8 13.2 13.1   < > 13.1 13.3   MPV fL 9.6 9.8 9.6 9.4 9.2* 9.2   < > 9.8 9.6   PLATELETS 10*3/mm3 175 187 220 341 220 262   < > 232 196   IMM GRAN % % 0.3 0.3 0.4  --   --  0.3  --  0.3 0.3   NEUTROS ABS 10*3/mm3 4.59 3.39 5.84 8.3*  --  6.21  --  4.64 5.98   LYMPHS ABS 10*3/mm3 1.46 1.60 1.23 1.6  --  1.17  --  1.17 1.48   MONOS ABS 10*3/mm3 0.71 0.71 0.67 0.80  --  0.79  --  0.62 1.02*   EOS ABS 10*3/mm3 0.20 0.22 0.18 0.10  --  0.33  --  0.11 0.36   BASOS ABS 10*3/mm3 0.02 0.03 0.04 0.00  --  0.05  --  0.03 0.03   IMMATURE GRANS (ABS) 10*3/mm3 0.02 0.02 0.03 0.1  --  0.03  --  0.02 0.03   NRBC /100 WBC 0.0 0.0 0.0  --   --  0.0  --  0.0 0.0    < > = values in this interval not displayed.       Lab Results - Last 18 Months   Lab Units 09/19/22  1127 06/14/22  0818 03/21/22  0923 01/31/22  1536 12/14/21  1006 11/30/21  0625 11/29/21  0707 11/24/21  1521 11/24/21  1327 09/14/21  1016   GLUCOSE mg/dL 120* 114* 113* 105 95 137*  --  187* 106* 104*   SODIUM mmol/L 138 141 140 142 143 135*  --  139 139 141   SODIUM, ARTERIAL   --   --   --   --   --   --    < >  --   --   --    POTASSIUM mmol/L 4.9 4.8 5.0 5.8* 4.9 5.3*  --  5.2 6.2* 4.4   TOTAL CO2 mmol/L  --    --   --  18*  --   --   --   --   --   --    CO2 mmol/L 24.0 24.0 25.0  --  26.0 20.0*  --  21.0* 23.0 24.0   CHLORIDE mmol/L 106 109* 106 110 109* 104  --  106 106 107   ANION GAP mmol/L 8.0 8.0 9.0 14 8.0 11.0  --  12.0 10.0 10.0   CREATININE mg/dL 2.66* 2.97* 2.44* 3.2* 2.60* 2.23*  --  2.53* 2.50* 2.36*   BUN mg/dL 46* 46* 38* 45* 48* 47*  --  46* 44* 40*   BUN / CREAT RATIO  17.3 15.5 15.6  --  18.5 21.1  --  18.2 17.6 16.9   CALCIUM mg/dL 9.0 9.4 9.9 9.9 9.3 8.7  --  9.4 9.8 9.2   EGFR IF NONAFRICN AM   --   --   --  19* 25* 30*  --  26* 26* 28*   ALK PHOS U/L 73 59 59 75 68  --   --  72 67 65   TOTAL PROTEIN g/dL 6.7 6.5 6.4 6.6 6.5  --   --  6.5 6.7 6.3   ALT (SGPT) U/L 13 13 13 22 16  --   --  19 20 14   AST (SGOT) U/L 16 13 15 19 18  --   --  16 17 14   BILIRUBIN mg/dL 0.5 0.5 0.3 0.3 0.4  --   --  0.4 0.4 0.5   ALBUMIN g/dL 4.50 4.50 4.40 4.3 4.50  --   --  4.60 4.60 4.20   GLOBULIN gm/dL 2.2 2.0 2.0  --  2.0  --   --  1.9 2.1 2.1    < > = values in this interval not displayed.       Lab Results - Last 18 Months   Lab Units 01/31/22  1536   URIC ACID mg/dL 8.5*       Assessment:  1.  Renal cell carcinoma, diagnosed 11/11/2014 (5.5 cm G3, pT3a, pNx). --2014, right nephrectomy.   --surveillance until 11/26/2018    --11/26/2018-right lower lobe VATS wedge resection: Metastatic renal cell carcinoma, 0.9 cm; margins free of malignancy. Dr. Hu  -- Unable to tolerate Sutent due to kidney failure, thus received ipilumomab/nivolumab, 05/13/2019 - 08/07/2019, then single agent nivolumab, 08/28/2019 - 09/09/2019 (21.5 months since cessation of therapy). Therapy stopped due to renal failure (nivolumab?)  --01/16/2020-PET scan.  No neoplastic FDG uptake within the chest, abdomen or pelvis.  --07/06/2020-CT abdomen/pelvis with diverticulosis of the colon.  Evidence of acute diverticulitis of the sigmoid colon and adjacent distal descending colon.  No free air or fluid seen.  1 cm noncalcified subpleural nodule in  the left lower lobe.  Follow-up exam in 6 months recommended.  --08/04/2020-chest CT.  Scattered subcentimeter bilateral pulmonary nodules similar to 1/16/2020 with mild increase in the 5 mm left upper lobe nodule.  Continued follow-up recommended.  No pathologic lymphadenopathy.  Right nephrectomy.  --01/14/2020-CT chest.  Increased size of 1.2 cm right lower lobe superior segment pulmonary nodule (previously 0.9 cm)-representing 33% growth.  Multiple other bilateral pulmonary nodules have not significantly changed in size.  Findings are in keeping with disease progression.  --01/14/2020-CT abdomen/pelvis.  No evidence of recurrent or metastatic disease in the abdomen or pelvis.  --01/26/2021-PET/CT.  Impression: Minimal metabolic activity associated with the enlarging pulmonary nodule within the superior segment of the right lower lobe with a maximum SUV of 1.35.  The additional nodules noted within both lungs also demonstrate no abnormal metabolic activity which would favor benignity but continued radiographic follow-up will be suggested.  No scintigraphic evidence of metastatic disease on current exam.  Increased uptake overlying the right groin and more distal external iliac vessels felt to be related to previous hernia repair.  --02/14/2021-  Encounter from Dr. Mayito Arteaga, medical oncology at [facility].  History reviewed.  Most recent CT chest, 01/14/2021 (above) with increasing size of 1.2 cm right lower lobe superior segment pulmonary nodule (previously 0.9 cm) and PET scan, 01/26/2021 (above) noted including minimal metabolic activity associated with the enlarging pulmonary nodule within the superior segment of the right lower lobe.  Discussion: Biopsy right lower lobe lung nodule solitary lesion that is growing on PET scan which will be done at that institution.  Contemplate either surgical resection, ablation or SBRT.  We will focus on local therapy for now.  Multiple systemic  options down the road but if renal function improves can contemplate trial with HIF-alpha inhibitor.  --03/15/2021- CT chest.  Continued increased size of the right lower lobe superior segment now 1.4 cm pulmonary nodule (1.2 cm, 1/14/2021; 0.8 cm, 8/4/2020).  Other pulmonary nodules are stable.  New small area of groundglass opacity in the superior segment left lower lobe may be infectious/inflammatory.  Coronary artery calcifications.  Aortic calcifications.  Small hiatal hernia.  --03/15/2021-CT abdomen/pelvis.  Previous right nephrectomy identified with no evidence of intra-abdominal metastatic disease.  Benign-appearing exophytic cyst posterior aspect of the left kidney, upper pole, measuring 2.47 cm, stable.  Small nodules in the lung bases are unchanged compared to previous chest CT.  Evidence of previous right inguinal hernia repair.  Fatty infiltration of the inguinal canals.  Scattered colonic diverticula without diverticulitis.  Small sliding hiatal hernia identified.  --06/11/2021-CT chest without contrast.  Stable pulmonary nodules.  No evidence of disease progression.  Interval resolution superior segment left lower lobe groundglass opacity.  Scattered coronary artery calcifications.  --06/11/2021-CT abdomen/pelvis.  History of right renal cell carcinoma nephrectomy.  No CT evidence of tumor recurrence or metastatic disease in the abdomen or pelvis.  --09/07/2021-CT chest wo contrast.  Pulmonary nodules noted.  Slightly increased in size compared to 6/11/2021 (left upper lobe 7.2 mm-prior 6.7 mm; posterior right lung, 19 mm-prior 13.9 mm; posterior medial right lung, 14 mm-prior 11 mm; right lung, 7.7 mm-prior 7.1 mm).  However no new nodules identified.  --09/07/2021-CT abdomen/pelvis wo contrast.  No evidence of intra-abdominal or pelvic metastasis.  Prior right nephrectomy.  Stable left renal cyst.  Colonic diverticulosis with questionable wall thickening of the sigmoid colon may be artifactual.   Correlation to any recent colonoscopy recommended.  --09/28/2021-PET scan.  Increased size of right lower lobe lung nodule (21 x 15 mm compared to 14 x 14 mm 3 months ago) though this finding shows no significant FDG uptake.  Otherwise stable PET/CT exam.  --11/29/2021-RLL, RML wedge resections. Path consistent with metastatic renal cell carcinoma  --12/14/2021-CT chest.  Postoperative changes from wedge resections of prior right middle and lower lobe pulmonary nodules.  Stable left-sided pulmonary nodules, largest in the left upper lobe/apex measuring 8 mm.  Continued follow-up strongly recommended.  No pathologic lymphadenopathy.  --12/14/2021-CT abdomen/pelvis.  No convincing evidence of intra-abdominal or pelvic metastasis.  New diffuse abnormal gallbladder wall thickening.  Findings may be seen with cholecystitis.  Gallbladder ultrasound might be considered.  --12/20/2021-gallbladder ultrasound-diffuse gallbladder wall thickening.  Gallbladder nondistended with no visible stones.  Unlikely acute cholecystitis.  Favor chronic cholecystitis.  --03/18/2022-CT chest-patchy infiltrate compatible with right middle lobe pneumonia.  2 tiny left lung nodule stable.  --03/18/2022-CT abdomen/pelvis.  No evidence of metastatic disease to the abdomen/pelvis.  Prior right nephrectomy.  Colonic diverticulosis without evidence of diverticulitis.  Mild splenomegaly, unchanged.  --06/24/2022- CT chest-1. The resolution of the previously seen patchy infiltrate in the right middle lobe. 2. Postsurgical changes and scarring in the right upper and lower lobe. 3. Stable small nodules in both lungs the largest one in the left upper lobe. No change.  --06/24/2022- CT abdomen/pelvis- stable.  No evidence of a neoplastic process or metastatic disease. Evidence of right nephrectomy. No focal complication. Stable left renal cyst. Diverticulosis of the colon. No evidence for diverticulitis. Stable mild splenomegaly.  Enlarged  prostate.  --09/19/2022- CT CAP- Stable exam.  No change in multiple bilateral pulmonary nodules measuring up to 8 mm.  No evidence of recurrent or metastatic disease in the abdomen or pelvis status post RIGHT nephrectomy.    2.  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    --GFR 25.3 mL/min, 09/19/2022 (prior:  9 - 28)  --s/p multiple doses of HD steroids in the past and remains on maintenance 2.5 mg prednisone per nephrology (Dr. Grant)- tapered down from 5 mg/2.5 mg  --will not start Cytoxan unless biopsy done to determine etiology of kidney failure. Has been off Opdivo since September 2019.    3.  Normocytic anemia.    --Hgb 14.6; MCV 91.4, 09/19/2022 (prior: Hgb 10.2-15.1; MCV 86.3-95.5)  4.  Gerd: modulated by omeprazole  5.  Osteopenia: on bola+vit D  6.  History of acute diverticulitis.   --04/18/2022- colonoscopy-diverticulosis in the left colon.  Nonbleeding internal hemorrhoids.  Otherwise normal exam.  Repeat 5 years.  Dr. Robison  7.  Cholecystitis  --12/14/2021-CT abdomen/pelvis.  Stable lung nodules.  New diffuse abnormal gallbladder wall thickening.  Cholecystitis?  No convincing evidence of intra-abdominal or pelvic mets.  --12/22/2021-cholecystectomy.  Final diagnosis: Chronic cholecystitis.    Plan:  1.  Apprised of labs, 09/19/2022-normal CBC.  Depressed GFR (stable) otherwise normal CMP.  2.  Apprised of CT scans of the chest, abdomen/pelvis, 09/19/2022 (above).  Stable exams.    3.  Again discussed the option of systemic therapy with cabozantinib as outlined by Dr. Arteaga (see #5 below) previously.  Patient remains wary of starting any type of therapy given previous AE's.      4.  Previously reviewed discharge summary, 11/30/2021.  Underwent bronchoscopy, right thoracoscopy, right lower lobe wedge resection, right middle lobe wedge resection, right lower lobe wedge basilar resection on 11/29/2021 by Dr. Tolentino.  Pathology revealed metastatic renal cell carcinoma.    5.  Previously reviewed encounter office  encounter from GIOVANNY Edward with Dr. Arteaga's office, 05/27/2021.  Impression/plan: CT CAP, 05/26/2021: Stable examination including stable bilateral lung nodules compared to 03/15/2021.  Progression of right lower lobe lesion?  Discussed systemic therapy option with cabozantinib at 20 mg.  However patient concerned due to his history of substantial AEs in the past.  Will arrange for lesion to be ablated.  However patient says multiple lesions were there for 6 years and unchanged.-Consider ablation or SBRT of the lesion and monitor and consider cabozantinib when failed.  Multiple systemic options down the road but if renal function improves can contemplate HIF-alpha inhibitor.  Continue surveillance for now.  Repeat scans in 3 months.    6.  Return to office in 16 weeks with preoffice CT scans of the chest, abdomen/pelvis without IV contrast (in 15 weeks), CBC with differential and CMP.    I spent 35 minutes caring for Parker on this date of service. This time includes time spent by me in the following activities: preparing for the visit, reviewing tests, performing a medically appropriate examination and/or evaluation, counseling and educating the patient/family/caregiver, ordering medications, tests, or procedures and documenting information in the medical record.

## 2022-09-19 ENCOUNTER — HOSPITAL ENCOUNTER (OUTPATIENT)
Dept: CT IMAGING | Facility: HOSPITAL | Age: 67
Discharge: HOME | End: 2022-09-19
Admitting: INTERNAL MEDICINE

## 2022-09-19 ENCOUNTER — LAB (OUTPATIENT)
Dept: LAB | Facility: HOSPITAL | Age: 67
End: 2022-09-19

## 2022-09-19 DIAGNOSIS — C64.1 RENAL CELL CARCINOMA OF RIGHT KIDNEY: ICD-10-CM

## 2022-09-19 LAB
ALBUMIN SERPL-MCNC: 4.5 G/DL (ref 3.5–5.2)
ALBUMIN/GLOB SERPL: 2 G/DL
ALP SERPL-CCNC: 73 U/L (ref 39–117)
ALT SERPL W P-5'-P-CCNC: 13 U/L (ref 1–41)
ANION GAP SERPL CALCULATED.3IONS-SCNC: 8 MMOL/L (ref 5–15)
AST SERPL-CCNC: 16 U/L (ref 1–40)
BASOPHILS # BLD AUTO: 0.02 10*3/MM3 (ref 0–0.2)
BASOPHILS NFR BLD AUTO: 0.3 % (ref 0–1.5)
BILIRUB SERPL-MCNC: 0.5 MG/DL (ref 0–1.2)
BUN SERPL-MCNC: 46 MG/DL (ref 8–23)
BUN/CREAT SERPL: 17.3 (ref 7–25)
CALCIUM SPEC-SCNC: 9 MG/DL (ref 8.6–10.5)
CHLORIDE SERPL-SCNC: 106 MMOL/L (ref 98–107)
CO2 SERPL-SCNC: 24 MMOL/L (ref 22–29)
CREAT SERPL-MCNC: 2.66 MG/DL (ref 0.76–1.27)
DEPRECATED RDW RBC AUTO: 45.2 FL (ref 37–54)
EGFRCR SERPLBLD CKD-EPI 2021: 25.3 ML/MIN/1.73
EOSINOPHIL # BLD AUTO: 0.2 10*3/MM3 (ref 0–0.4)
EOSINOPHIL NFR BLD AUTO: 2.9 % (ref 0.3–6.2)
ERYTHROCYTE [DISTWIDTH] IN BLOOD BY AUTOMATED COUNT: 13.4 % (ref 12.3–15.4)
GLOBULIN UR ELPH-MCNC: 2.2 GM/DL
GLUCOSE SERPL-MCNC: 120 MG/DL (ref 65–99)
HCT VFR BLD AUTO: 45.5 % (ref 37.5–51)
HGB BLD-MCNC: 14.6 G/DL (ref 13–17.7)
IMM GRANULOCYTES # BLD AUTO: 0.02 10*3/MM3 (ref 0–0.05)
IMM GRANULOCYTES NFR BLD AUTO: 0.3 % (ref 0–0.5)
LYMPHOCYTES # BLD AUTO: 1.46 10*3/MM3 (ref 0.7–3.1)
LYMPHOCYTES NFR BLD AUTO: 20.9 % (ref 19.6–45.3)
MCH RBC QN AUTO: 29.3 PG (ref 26.6–33)
MCHC RBC AUTO-ENTMCNC: 32.1 G/DL (ref 31.5–35.7)
MCV RBC AUTO: 91.4 FL (ref 79–97)
MONOCYTES # BLD AUTO: 0.71 10*3/MM3 (ref 0.1–0.9)
MONOCYTES NFR BLD AUTO: 10.1 % (ref 5–12)
NEUTROPHILS NFR BLD AUTO: 4.59 10*3/MM3 (ref 1.7–7)
NEUTROPHILS NFR BLD AUTO: 65.5 % (ref 42.7–76)
NRBC BLD AUTO-RTO: 0 /100 WBC (ref 0–0.2)
PLATELET # BLD AUTO: 175 10*3/MM3 (ref 140–450)
PMV BLD AUTO: 9.6 FL (ref 6–12)
POTASSIUM SERPL-SCNC: 4.9 MMOL/L (ref 3.5–5.2)
PROT SERPL-MCNC: 6.7 G/DL (ref 6–8.5)
RBC # BLD AUTO: 4.98 10*6/MM3 (ref 4.14–5.8)
SODIUM SERPL-SCNC: 138 MMOL/L (ref 136–145)
WBC NRBC COR # BLD: 7 10*3/MM3 (ref 3.4–10.8)

## 2022-09-19 PROCEDURE — 85025 COMPLETE CBC W/AUTO DIFF WBC: CPT

## 2022-09-19 PROCEDURE — 74176 CT ABD & PELVIS W/O CONTRAST: CPT

## 2022-09-19 PROCEDURE — 80053 COMPREHEN METABOLIC PANEL: CPT

## 2022-09-19 PROCEDURE — 36415 COLL VENOUS BLD VENIPUNCTURE: CPT

## 2022-09-19 PROCEDURE — 71250 CT THORAX DX C-: CPT

## 2022-09-26 ENCOUNTER — OFFICE VISIT (OUTPATIENT)
Dept: ONCOLOGY | Facility: CLINIC | Age: 67
End: 2022-09-26

## 2022-09-26 VITALS
HEART RATE: 66 BPM | OXYGEN SATURATION: 99 % | SYSTOLIC BLOOD PRESSURE: 132 MMHG | RESPIRATION RATE: 18 BRPM | TEMPERATURE: 97.4 F | HEIGHT: 70 IN | WEIGHT: 198.8 LBS | DIASTOLIC BLOOD PRESSURE: 92 MMHG | BODY MASS INDEX: 28.46 KG/M2

## 2022-09-26 DIAGNOSIS — C64.1 RENAL CELL CARCINOMA OF RIGHT KIDNEY: Primary | ICD-10-CM

## 2022-09-26 PROCEDURE — 99214 OFFICE O/P EST MOD 30 MIN: CPT | Performed by: INTERNAL MEDICINE

## 2022-09-26 RX ORDER — ALLOPURINOL 100 MG/1
100 TABLET ORAL DAILY
COMMUNITY
Start: 2022-09-07

## 2023-01-19 ENCOUNTER — LAB (OUTPATIENT)
Dept: LAB | Facility: HOSPITAL | Age: 68
End: 2023-01-19
Payer: MEDICARE

## 2023-01-19 DIAGNOSIS — C64.1 RENAL CELL CARCINOMA OF RIGHT KIDNEY: ICD-10-CM

## 2023-01-19 LAB
ALBUMIN SERPL-MCNC: 4 G/DL (ref 3.5–5.2)
ALBUMIN/GLOB SERPL: 1.6 G/DL
ALP SERPL-CCNC: 64 U/L (ref 39–117)
ALT SERPL W P-5'-P-CCNC: 19 U/L (ref 1–41)
ANION GAP SERPL CALCULATED.3IONS-SCNC: 8 MMOL/L (ref 5–15)
AST SERPL-CCNC: 22 U/L (ref 1–40)
BASOPHILS # BLD AUTO: 0.05 10*3/MM3 (ref 0–0.2)
BASOPHILS NFR BLD AUTO: 0.8 % (ref 0–1.5)
BILIRUB SERPL-MCNC: 0.4 MG/DL (ref 0–1.2)
BUN SERPL-MCNC: 40 MG/DL (ref 8–23)
BUN/CREAT SERPL: 13 (ref 7–25)
CALCIUM SPEC-SCNC: 9 MG/DL (ref 8.6–10.5)
CHLORIDE SERPL-SCNC: 109 MMOL/L (ref 98–107)
CO2 SERPL-SCNC: 27 MMOL/L (ref 22–29)
CREAT SERPL-MCNC: 3.08 MG/DL (ref 0.76–1.27)
DEPRECATED RDW RBC AUTO: 43.4 FL (ref 37–54)
EGFRCR SERPLBLD CKD-EPI 2021: 21.3 ML/MIN/1.73
EOSINOPHIL # BLD AUTO: 0.39 10*3/MM3 (ref 0–0.4)
EOSINOPHIL NFR BLD AUTO: 6.6 % (ref 0.3–6.2)
ERYTHROCYTE [DISTWIDTH] IN BLOOD BY AUTOMATED COUNT: 13.2 % (ref 12.3–15.4)
GLOBULIN UR ELPH-MCNC: 2.5 GM/DL
GLUCOSE SERPL-MCNC: 92 MG/DL (ref 65–99)
HCT VFR BLD AUTO: 46.2 % (ref 37.5–51)
HGB BLD-MCNC: 15 G/DL (ref 13–17.7)
IMM GRANULOCYTES # BLD AUTO: 0.02 10*3/MM3 (ref 0–0.05)
IMM GRANULOCYTES NFR BLD AUTO: 0.3 % (ref 0–0.5)
LYMPHOCYTES # BLD AUTO: 1.39 10*3/MM3 (ref 0.7–3.1)
LYMPHOCYTES NFR BLD AUTO: 23.6 % (ref 19.6–45.3)
MCH RBC QN AUTO: 29.5 PG (ref 26.6–33)
MCHC RBC AUTO-ENTMCNC: 32.5 G/DL (ref 31.5–35.7)
MCV RBC AUTO: 90.9 FL (ref 79–97)
MONOCYTES # BLD AUTO: 0.86 10*3/MM3 (ref 0.1–0.9)
MONOCYTES NFR BLD AUTO: 14.6 % (ref 5–12)
NEUTROPHILS NFR BLD AUTO: 3.19 10*3/MM3 (ref 1.7–7)
NEUTROPHILS NFR BLD AUTO: 54.1 % (ref 42.7–76)
NRBC BLD AUTO-RTO: 0 /100 WBC (ref 0–0.2)
PLATELET # BLD AUTO: 198 10*3/MM3 (ref 140–450)
PMV BLD AUTO: 9.4 FL (ref 6–12)
POTASSIUM SERPL-SCNC: 4.7 MMOL/L (ref 3.5–5.2)
PROT SERPL-MCNC: 6.5 G/DL (ref 6–8.5)
RBC # BLD AUTO: 5.08 10*6/MM3 (ref 4.14–5.8)
SODIUM SERPL-SCNC: 144 MMOL/L (ref 136–145)
WBC NRBC COR # BLD: 5.9 10*3/MM3 (ref 3.4–10.8)

## 2023-01-19 PROCEDURE — 36415 COLL VENOUS BLD VENIPUNCTURE: CPT

## 2023-01-19 PROCEDURE — 85025 COMPLETE CBC W/AUTO DIFF WBC: CPT

## 2023-01-19 PROCEDURE — 80053 COMPREHEN METABOLIC PANEL: CPT

## 2023-01-22 NOTE — PROGRESS NOTES
[facility] ON[facility] GROUP HEMATOLOGY AND ONCOLOGY  [address]  [place] [address] [phone]    Patient Name: Parker Arnold  Encounter Date: 01/26/2023  YOB: 1955  Patient Number: [id number]         REASON FOR VISIT:  Parker Arnold is a 67 y.o. 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  Underwent right nephrectomy. Was under surveillance until 11/26/2018  When he underwent  wedge resection of a right pulm nodule. Pathology:  metastatic renal cell carcinoma. He was not able to tolerate Sutent due to kidney failure, thus received ipilumomab/nivolumab, 05/13/2019 - 08/07/2019, then single agent nivolumab, 08/28/2019 - 09/09/2019 (40.5 months since cessation of therapy). Therapy stopped due to renal failure (nivolumab?). He has been on and off prednisone since.  Last 11/29/2021 (14 months) underwent right thoracoscopic RLL and RML wedge resection with final path consistent with metastatic renal cell carcinoma.  He is here alone.    I have reviewed the HPI and verified with the patient the accuracy of it. No changes to interval history since the information was documented. Leonardo Smith MD 01/26/23     Problem List Items Addressed This Visit        Other    Renal cell carcinoma of right kidney (HCC) - Primary    Overview     Overview:   Diagnoses 11/11/14. Path report in C[facility]E at [facility]. Tissue has been requested.          Relevant Orders    CT chest wo contrast    CT abdomen pelvis wo contrast    CBC & Differential    Comprehensive Metabolic Panel     Oncology/Hematology History   Renal cell carcinoma of right kidney (HCC)   10/2014 Initial Diagnosis    Renal cell carcinoma (CMS/HCC)     10/2014 Surgery    Right nephrectomy, Dr Morales       10/1/2018 Progression    Increasing lung Nodules on f/u CT     10/2/2018 -  Chemotherapy    Sutent  Discontinued due to  intolerance     5/13/2019 - 8/7/2019 Chemotherapy    Opdivo Yervoy x 4 cycles     8/28/2019 - 9/9/2019 Chemotherapy    Single agent Opdivo    Opdivo held 9/9/2019 due to Renal Failure / NephritisToxicity     1/16/2020 Imaging    PET SCAN  Negative for neoplastic uptake         PAST MEDICAL HISTORY:  ALLERGIES:  Allergies   Allergen Reactions   • Amoxicillin Hives   • Contrast Dye (Echo Or Unknown Ct/Mr) Other (See Comments)     Due to only having 1 kidney   • Opdivo [Nivolumab] Provider Review Needed     Renal failure   • Penicillins Hives and Rash   • Clindamycin/Lincomycin Rash     pustules  Rash - pustules     • Doxycycline Rash   • Hepatitis B Virus Vaccines Rash     Pustules     • Hepatitis B Vaccine Rash   • Latex Rash   • Levofloxacin Rash     CURRENT MEDICATIONS:  Outpatient Encounter Medications as of 1/26/2023   Medication Sig Dispense Refill   • acetaminophen (TYLENOL) 500 MG tablet Take 500 mg by mouth Every 6 (Six) Hours As Needed for Mild Pain .     • allopurinol (ZYLOPRIM) 100 MG tablet Take 100 mg by mouth Daily.     • calcium carb-cholecalciferol 600-800 MG-UNIT tablet Take 1 tablet by mouth 2 (Two) Times a Day With Meals.     • carvedilol (COREG) 3.125 MG tablet Take 3.125 mg by mouth 2 (Two) Times a Day With Meals.     • finasteride (PROSCAR) 5 MG tablet Take 5 mg by mouth Daily.  3   • predniSONE (DELTASONE) 5 MG tablet Take 2.5 mg by mouth Daily.     • sodium bicarbonate 650 MG tablet Take 1 tablet by mouth 3 (Three) Times a Day. 90 tablet 2     No facility-administered encounter medications on file as of 1/26/2023.     ADULT ILLNESSES:  Patient Active Problem List   Diagnosis Code   • Renal cell carcinoma of right kidney (HCC) C64.1   • Multiple nodules of lung R91.8   • Lung anomaly Q33.9   • TIA (transient ischemic attack) G45.9   • Acute renal failure with tubular necrosis in the setting of solitary kidney N17.0   • Lung metastases (HCC) C78.00   • Pyuria R82.81   • Hyperkalemia E87.5   •  Metabolic acidosis E87.20   • Hyperglycemia R73.9   • Hypercalcemia E83.52   • Benign prostatic hyperplasia N40.0   • Increased frequency of urination R35.0   • Nocturia R35.1   • Retention of urine R33.9   • Dehydration E86.0   • Nephritis and nephropathy N05.9   • Toxicity, chemicals T65.91XA   • Stage 3 chronic kidney disease (HCC) N18.30   • Campylobacter gastroenteritis A04.5   • Sigmoid diverticulitis K57.32   • History of TIA (transient ischemic attack) Z86.73   • Solitary kidney, acquired Z90.5   • Benign essential hypertension I10   • Gastroesophageal reflux disease K21.9   • Encounter for screening for malignant neoplasm of colon Z12.11     SURGERIES:  Past Surgical History:   Procedure Laterality Date   • BRONCHOSCOPY N/A 11/29/2021    Procedure: BRONCHOSCOPY;  Surgeon: Jarrod Tolentino MD;  Location: [facility] OR;  Service: Cardiothoracic;  Laterality: N/A;   • CHOLECYSTECTOMY WITH INTRAOPERATIVE CHOLANGIOGRAM N/A 12/22/2021    Procedure: LAPAROSCOPIC CHOLECYSTECTOMY;  Surgeon: Vandana Calhoun MD;  Location: [facility] OR;  Service: General;  Laterality: N/A;   • COLONOSCOPY N/A 4/18/2022    Procedure: COLONOSCOPY WITH ANESTHESIA;  Surgeon: Zoila Robison MD;  Location: [facility] ENDOSCOPY;  Service: Gastroenterology;  Laterality: N/A;  pre colon cancer  post  Dr. Osborne   • HERNIA REPAIR  2008   • LUNG BIOPSY  11/2018   • NEPHRECTOMY Right 2014   • SINUS SURGERY  2005   • THORACOSCOPY Right 11/29/2021    Procedure: RIGHT THORACOSCOPY WITH RIGHT MIDDLE AND RIGHT LOWER LOBE WEDGE RESECTIONS;  Surgeon: Jarrod Tolentino MD;  Location: [facility] OR;  Service: Cardiothoracic;  Laterality: Right;     HEALTH MAINTENANCE ITEMS:  Health Maintenance Due   Topic Date Due   • COVID-19 Vaccine (1) Never done   • TDAP/TD VACCINES (1 - Tdap) Never done   • ZOSTER VACCINE (1 of 2) Never done   • Pneumococcal Vaccine 65+ (1 - PCV) Never done   • HEPATITIS C SCREENING  Never done   • ANNUAL WELLNESS VISIT  Never done   •  "INFLUENZA VACCINE  Never done       <no information>  Last Completed Colonoscopy          COLORECTAL CANCER SCREENING (COLONOSCOPY - Every 5 Years) Next due on 2022  COLONOSCOPY    2022  Surgical Procedure: COLONOSCOPY                There is no immunization history on file for this patient.  Last Completed Mammogram     This patient has no relevant Health Maintenance data.            FAMILY HISTORY:  Family History   Problem Relation Age of Onset   • Other Mother         blood disease unknown   • Cancer Father         unknown   • No Known Problems Sister    • No Known Problems Sister    • Heart disease Maternal Grandmother    • Heart disease Maternal Grandfather    • No Known Problems Paternal Grandmother    • No Known Problems Paternal Grandfather    • Colon polyps Neg Hx    • Colon cancer Neg Hx      SOCIAL HISTORY:  Social History     Socioeconomic History   • Marital status:    Tobacco Use   • Smoking status: Former     Packs/day: 1.00     Years: 22.00     Pack years: 22.00     Types: Cigarettes     Start date:      Quit date:      Years since quittin.0   • Smokeless tobacco: Never   Vaping Use   • Vaping Use: Never used   Substance and Sexual Activity   • Alcohol use: Not Currently   • Drug use: No   • Sexual activity: Defer       REVIEW OF SYSTEMS:  Constitutional:  Manages ADLs, chores, errands and driving.  Again drove himself in today.  Appetite is good.  \"Too good.\" Energy is fairly good.  He remains active.  \"Yeah.\" He has regained 15 lb pounds (in addition to 4 lb at his prior visit) since his last visit. No fever, no chills nor drenching nights.  HENT: Negative.  No sore throat.  Has seasonal sinus symptoms/rhinorrhea. No headaches.  Eyes: Negative.    Respiratory:  No SOB at rest nor TENORIO.  No cough. No wheezing. No tobacco use.    Cardiovascular: Negative.  No palpitations.  No orthopnea  Gastrointestinal: RUQ pain resolved since cholecystectomy.  No " "dysphagia.  No nausea.  No vomiting.  No dyspepsia. Bowels moving regularly.  Has occasional soft stools.  No overt diarrhea.  No melena. Occasional hemorrhoidal bleeding.  Has history recurrent diverticulitis, early 06/2021.  Had colonoscopy, 04/18/2022- diverticulosis.  Nonbleeding internal hemorrhoids otherwise normal exam.  Endocrine: No hot flashes.  Genitourinary:  No dysuria.  No incontinence. No hematuria.  Occasional urgency.  Nocturia much improved on Finasteride - up 2x- from every 90 min.  Remains on allopurinol.  Musculoskeletal:  No arthralgias.  No edema  Skin: No new rash.    Neurological:  No dizziness.  No facial asymmetry. No headaches.  Mild sensory neuropathy of the hands, not worse  Hematological: Negative for new adenopathy.  Says he bruises easily.  Psychiatric/Behavioral:  Has baseline anxiety, \"steroids and stress.\"  No depression.      VITAL SIGNS: /88   Pulse 73   Temp 97.4 °F (36.3 °C)   Resp 18   Ht 177.8 cm (70\")   Wt 96.7 kg (213 lb 3.2 oz)   SpO2 96%   BMI 30.59 kg/m² Body surface area is 2.14 meters squared.  Pain Score    01/26/23 1057   PainSc: 0-No pain     I have reexamined the patient and the results are consistent with the previously documented exam. Leonardo Smith MD     PHYSICAL EXAMINATION:   General Appearance: Patient is a very pleasant, cooperative, heavyset, modestly kept elderly male who is awake, alert, oriented and in no acute distress. ECOG 0 (same)  HEENT: Normocephalic. Sclerae clear, conjunctiva pink, extraocular movements intact, pupils, round, reactive to light and  accommodation. Is wearing a surgical mask today  NECK: Supple, no jugular venous distention, thyroid not enlarged.  LYMPH: No cervical, supraclavicular, axillary, or inguinal lymphadenopathy.  LUNGS: Good air movement, no rales, rhonchi, rubs or wheezes with auscultation.  Right thoracoscopic incisions are healed  CARDIO: Regular sinus rhythm, no murmurs, gallops or rubs.  ABDOMEN:  " Obese, nondistended, soft, with no right upper quadrant tenderness.  Laparoscopy wounds are all healed. No obvious abdominal masses. Bowel sounds positive. No hernia  MUSKEL: No joint swelling, decreased motion, or inflammation  EXTREMS:  No edema, clubbing, cyanosis, No varicose veins.  NEURO: Grossly nonfocal, Gait is coordinated and smooth, Cognition is preserved.  SKIN: No rashes, no ecchymoses, no petechia.  PSYCH: Oriented to time, place and person. Memory is preserved. Mood and affect appear normal         LABS    Lab Results - Last 18 Months   Lab Units 01/19/23  1053 09/19/22  1127 06/14/22  0818 03/21/22  0923 01/31/22  1536 01/09/22  0918 12/14/21  1006 11/30/21  0625 11/24/21  1327   HEMOGLOBIN g/dL 15.0 14.6 14.9 15.0 14.0 14.4 14.0   < > 15.1   HEMATOCRIT % 46.2 45.5 46.2 46.5 44.4 43.7 44.9   < > 46.1   MCV fL 90.9 91.4 92.8 93.2 93.3 91.6 91.6   < > 91.7   WBC 10*3/mm3 5.90 7.00 5.97 7.99 10.8 7.7 8.58   < > 6.59   RDW % 13.2 13.4 13.2 14.3 13.8 13.2 13.1   < > 13.1   MPV fL 9.4 9.6 9.8 9.6 9.4 9.2* 9.2   < > 9.8   PLATELETS 10*3/mm3 198 175 187 220 341 220 262   < > 232   IMM GRAN % % 0.3 0.3 0.3 0.4  --   --  0.3  --  0.3   NEUTROS ABS 10*3/mm3 3.19 4.59 3.39 5.84 8.3*  --  6.21  --  4.64   LYMPHS ABS 10*3/mm3 1.39 1.46 1.60 1.23 1.6  --  1.17  --  1.17   MONOS ABS 10*3/mm3 0.86 0.71 0.71 0.67 0.80  --  0.79  --  0.62   EOS ABS 10*3/mm3 0.39 0.20 0.22 0.18 0.10  --  0.33  --  0.11   BASOS ABS 10*3/mm3 0.05 0.02 0.03 0.04 0.00  --  0.05  --  0.03   IMMATURE GRANS (ABS) 10*3/mm3 0.02 0.02 0.02 0.03 0.1  --  0.03  --  0.02   NRBC /100 WBC 0.0 0.0 0.0 0.0  --   --  0.0  --  0.0    < > = values in this interval not displayed.       Lab Results - Last 18 Months   Lab Units 01/19/23  1053 09/19/22  1127 06/14/22  0818 03/21/22  0923 01/31/22  1536 12/14/21  1006 11/30/21  0625 11/29/21  0707 11/24/21  1521 11/24/21  1327 09/14/21  1016   GLUCOSE mg/dL 92 120* 114* 113* 105 95 137*  --  187* 106* 104*    SODIUM mmol/L 144 138 141 140 142 143 135*  --  139 139 141   SODIUM, ARTERIAL   --   --   --   --   --   --   --    < >  --   --   --    POTASSIUM mmol/L 4.7 4.9 4.8 5.0 5.8* 4.9 5.3*  --  5.2 6.2* 4.4   TOTAL CO2 mmol/L  --   --   --   --  18*  --   --   --   --   --   --    CO2 mmol/L 27.0 24.0 24.0 25.0  --  26.0 20.0*  --  21.0* 23.0 24.0   CHLORIDE mmol/L 109* 106 109* 106 110 109* 104  --  106 106 107   ANION GAP mmol/L 8.0 8.0 8.0 9.0 14 8.0 11.0  --  12.0 10.0 10.0   CREATININE mg/dL 3.08* 2.66* 2.97* 2.44* 3.2* 2.60* 2.23*  --  2.53* 2.50* 2.36*   BUN mg/dL 40* 46* 46* 38* 45* 48* 47*  --  46* 44* 40*   BUN / CREAT RATIO  13.0 17.3 15.5 15.6  --  18.5 21.1  --  18.2 17.6 16.9   CALCIUM mg/dL 9.0 9.0 9.4 9.9 9.9 9.3 8.7  --  9.4 9.8 9.2   EGFR IF NONAFRICN AM   --   --   --   --  19* 25* 30*  --  26* 26* 28*   ALK PHOS U/L 64 73 59 59 75 68  --   --  72 67 65   TOTAL PROTEIN g/dL 6.5 6.7 6.5 6.4 6.6 6.5  --   --  6.5 6.7 6.3   ALT (SGPT) U/L 19 13 13 13 22 16  --   --  19 20 14   AST (SGOT) U/L 22 16 13 15 19 18  --   --  16 17 14   BILIRUBIN mg/dL 0.4 0.5 0.5 0.3 0.3 0.4  --   --  0.4 0.4 0.5   ALBUMIN g/dL 4.0 4.50 4.50 4.40 4.3 4.50  --   --  4.60 4.60 4.20   GLOBULIN gm/dL 2.5 2.2 2.0 2.0  --  2.0  --   --  1.9 2.1 2.1    < > = values in this interval not displayed.       Lab Results - Last 18 Months   Lab Units 01/31/22  1536   URIC ACID mg/dL 8.5*       Assessment:  1.  Renal cell carcinoma, diagnosed 11/11/2014 (5.5 cm G3, pT3a, pNx). --2014, right nephrectomy.   --surveillance until 11/26/2018    --11/26/2018-right lower lobe VATS wedge resection: Metastatic renal cell carcinoma, 0.9 cm; margins free of malignancy. Dr. Hu  -- Unable to tolerate Sutent due to kidney failure, thus received ipilumomab/nivolumab, 05/13/2019 - 08/07/2019, then single agent nivolumab, 08/28/2019 - 09/09/2019 (21.5 months since cessation of therapy). Therapy stopped due to renal failure  (nivolumab?)  --01/16/2020-PET scan.  No neoplastic FDG uptake within the chest, abdomen or pelvis.  --07/06/2020-CT abdomen/pelvis with diverticulosis of the colon.  Evidence of acute diverticulitis of the sigmoid colon and adjacent distal descending colon.  No free air or fluid seen.  1 cm noncalcified subpleural nodule in the left lower lobe.  Follow-up exam in 6 months recommended.  --08/04/2020-chest CT.  Scattered subcentimeter bilateral pulmonary nodules similar to 1/16/2020 with mild increase in the 5 mm left upper lobe nodule.  Continued follow-up recommended.  No pathologic lymphadenopathy.  Right nephrectomy.  --01/14/2020-CT chest.  Increased size of 1.2 cm right lower lobe superior segment pulmonary nodule (previously 0.9 cm)-representing 33% growth.  Multiple other bilateral pulmonary nodules have not significantly changed in size.  Findings are in keeping with disease progression.  --01/14/2020-CT abdomen/pelvis.  No evidence of recurrent or metastatic disease in the abdomen or pelvis.  --01/26/2021-PET/CT.  Impression: Minimal metabolic activity associated with the enlarging pulmonary nodule within the superior segment of the right lower lobe with a maximum SUV of 1.35.  The additional nodules noted within both lungs also demonstrate no abnormal metabolic activity which would favor benignity but continued radiographic follow-up will be suggested.  No scintigraphic evidence of metastatic disease on current exam.  Increased uptake overlying the right groin and more distal external iliac vessels felt to be related to previous hernia repair.  --02/14/2021-  Encounter from Dr. Mayito Arteaga, medical oncology at [facility].  History reviewed.  Most recent CT chest, 01/14/2021 (above) with increasing size of 1.2 cm right lower lobe superior segment pulmonary nodule (previously 0.9 cm) and PET scan, 01/26/2021 (above) noted including minimal metabolic activity associated with the  enlarging pulmonary nodule within the superior segment of the right lower lobe.  Discussion: Biopsy right lower lobe lung nodule solitary lesion that is growing on PET scan which will be done at that institution.  Contemplate either surgical resection, ablation or SBRT.  We will focus on local therapy for now.  Multiple systemic options down the road but if renal function improves can contemplate trial with HIF-alpha inhibitor.  --03/15/2021- CT chest.  Continued increased size of the right lower lobe superior segment now 1.4 cm pulmonary nodule (1.2 cm, 1/14/2021; 0.8 cm, 8/4/2020).  Other pulmonary nodules are stable.  New small area of groundglass opacity in the superior segment left lower lobe may be infectious/inflammatory.  Coronary artery calcifications.  Aortic calcifications.  Small hiatal hernia.  --03/15/2021-CT abdomen/pelvis.  Previous right nephrectomy identified with no evidence of intra-abdominal metastatic disease.  Benign-appearing exophytic cyst posterior aspect of the left kidney, upper pole, measuring 2.47 cm, stable.  Small nodules in the lung bases are unchanged compared to previous chest CT.  Evidence of previous right inguinal hernia repair.  Fatty infiltration of the inguinal canals.  Scattered colonic diverticula without diverticulitis.  Small sliding hiatal hernia identified.  --06/11/2021-CT chest without contrast.  Stable pulmonary nodules.  No evidence of disease progression.  Interval resolution superior segment left lower lobe groundglass opacity.  Scattered coronary artery calcifications.  --06/11/2021-CT abdomen/pelvis.  History of right renal cell carcinoma nephrectomy.  No CT evidence of tumor recurrence or metastatic disease in the abdomen or pelvis.  --09/07/2021-CT chest wo contrast.  Pulmonary nodules noted.  Slightly increased in size compared to 6/11/2021 (left upper lobe 7.2 mm-prior 6.7 mm; posterior right lung, 19 mm-prior 13.9 mm; posterior medial right lung, 14  mm-prior 11 mm; right lung, 7.7 mm-prior 7.1 mm).  However no new nodules identified.  --09/07/2021-CT abdomen/pelvis wo contrast.  No evidence of intra-abdominal or pelvic metastasis.  Prior right nephrectomy.  Stable left renal cyst.  Colonic diverticulosis with questionable wall thickening of the sigmoid colon may be artifactual.  Correlation to any recent colonoscopy recommended.  --09/28/2021-PET scan.  Increased size of right lower lobe lung nodule (21 x 15 mm compared to 14 x 14 mm 3 months ago) though this finding shows no significant FDG uptake.  Otherwise stable PET/CT exam.  --11/29/2021-RLL, RML wedge resections. Path consistent with metastatic renal cell carcinoma  --12/14/2021-CT chest.  Postoperative changes from wedge resections of prior right middle and lower lobe pulmonary nodules.  Stable left-sided pulmonary nodules, largest in the left upper lobe/apex measuring 8 mm.  Continued follow-up strongly recommended.  No pathologic lymphadenopathy.  --12/14/2021-CT abdomen/pelvis.  No convincing evidence of intra-abdominal or pelvic metastasis.  New diffuse abnormal gallbladder wall thickening.  Findings may be seen with cholecystitis.  Gallbladder ultrasound might be considered.  --12/20/2021-gallbladder ultrasound-diffuse gallbladder wall thickening.  Gallbladder nondistended with no visible stones.  Unlikely acute cholecystitis.  Favor chronic cholecystitis.  --03/18/2022-CT chest-patchy infiltrate compatible with right middle lobe pneumonia.  2 tiny left lung nodule stable.  --03/18/2022-CT abdomen/pelvis.  No evidence of metastatic disease to the abdomen/pelvis.  Prior right nephrectomy.  Colonic diverticulosis without evidence of diverticulitis.  Mild splenomegaly, unchanged.  --06/24/2022- CT chest-1. The resolution of the previously seen patchy infiltrate in the right middle lobe. 2. Postsurgical changes and scarring in the right upper and lower lobe. 3. Stable small nodules in both lungs the  largest one in the left upper lobe. No change.  --06/24/2022- CT abdomen/pelvis- stable.  No evidence of a neoplastic process or metastatic disease. Evidence of right nephrectomy. No focal complication. Stable left renal cyst. Diverticulosis of the colon. No evidence for diverticulitis. Stable mild splenomegaly.  Enlarged prostate.  --09/19/2022- CT CAP- Stable exam.  No change in multiple bilateral pulmonary nodules measuring up to 8 mm.  No evidence of recurrent or metastatic disease in the abdomen or pelvis status post RIGHT nephrectomy.  --1/23/2023- CT CAP- Prior wedge resection within the RML/RLL. No evidence of localized recurrence at these sites. Numerous other pulmonary nodules are present. The majority of these  nodules are stable in size and appearance from the previous exam. There is one nodule which is subpleural in location within the lateral left upper lobe on image 52 which has an increased soft tissue component from the previous exam. This would warrant close follow-up on subsequent imaging.No acute abnormality of the abdomen or pelvis. Evidence of prior right nephrectomy. No focal complication. A left renal cyst.    2.  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    --GFR 21.3 mL/min, 1/19/2023 (prior:  9 - 28)  --s/p multiple doses of HD steroids in the past and remains on maintenance 2.5 mg prednisone per nephrology (Dr. Grant)- tapered down from 5 mg/2.5 mg  --will not start Cytoxan unless biopsy done to determine etiology of kidney failure. Has been off Opdivo since September 2019.    3.  Normocytic anemia.    --Hgb 15; MCV 90.9, 1/19/2023 (prior: Hgb 10.2-15.1; MCV 86.3-95.5)  4.  Gerd: modulated by omeprazole  5.  Osteopenia: on bola+vit D  6.  History of acute diverticulitis.   --04/18/2022- colonoscopy-diverticulosis in the left colon.  Nonbleeding internal hemorrhoids.  Otherwise normal exam.  Repeat 5 years.  Dr. Robison  7.  Cholecystitis  --12/14/2021-CT abdomen/pelvis.  Stable lung nodules.  New  diffuse abnormal gallbladder wall thickening.  Cholecystitis?  No convincing evidence of intra-abdominal or pelvic mets.  --12/22/2021-cholecystectomy.  Final diagnosis: Chronic cholecystitis.    Plan:  1.  Apprised of labs, 01/19/2023-normal CBC.  Depressed GFR (stable) otherwise normal CMP.    2.  Apprised of CT scans of the chest, abdomen/pelvis, 1/23/2023 (above).  Stable exams.  Needs follow-up    3.  Had previously discussed the option of systemic therapy with cabozantinib as outlined by Dr. Arteaga (see #5 below) previously.  Patient remains wary of starting any type of therapy given previous AE's.      4.  Previously reviewed discharge summary, 11/30/2021.  Underwent bronchoscopy, right thoracoscopy, right lower lobe wedge resection, right middle lobe wedge resection, right lower lobe wedge basilar resection on 11/29/2021 by Dr. Tolentino.  Pathology revealed metastatic renal cell carcinoma.    5.  Previously reviewed encounter office encounter from GIOVANNY Edward with Dr. Arteaga's office, 05/27/2021.  Impression/plan: CT CAP, 05/26/2021: Stable examination including stable bilateral lung nodules compared to 03/15/2021.  Progression of right lower lobe lesion?  Discussed systemic therapy option with cabozantinib at 20 mg.  However patient concerned due to his history of substantial AEs in the past.  Will arrange for lesion to be ablated.  However patient says multiple lesions were there for 6 years and unchanged.-Consider ablation or SBRT of the lesion and monitor and consider cabozantinib when failed.  Multiple systemic options down the road but if renal function improves can contemplate HIF-alpha inhibitor.  Continue surveillance for now.  Repeat scans in 3 months.    6.  Return to office in 16 weeks with preoffice CT scans of the chest, abdomen/pelvis without IV contrast (in 15 weeks), CBC with differential and CMP.    I spent 38 minutes caring for Parker on this date of service. This time includes time  spent by me in the following activities: preparing for the visit, reviewing tests, performing a medically appropriate examination and/or evaluation, counseling and educating the patient/family/caregiver, ordering medications, tests, or procedures and documenting information in the medical record.

## 2023-01-23 ENCOUNTER — HOSPITAL ENCOUNTER (OUTPATIENT)
Dept: CT IMAGING | Facility: HOSPITAL | Age: 68
Discharge: HOME | End: 2023-01-23
Admitting: INTERNAL MEDICINE
Payer: MEDICARE

## 2023-01-23 DIAGNOSIS — C64.1 RENAL CELL CARCINOMA OF RIGHT KIDNEY: ICD-10-CM

## 2023-01-23 PROCEDURE — 74176 CT ABD & PELVIS W/O CONTRAST: CPT

## 2023-01-23 PROCEDURE — 71250 CT THORAX DX C-: CPT

## 2023-01-26 ENCOUNTER — OFFICE VISIT (OUTPATIENT)
Dept: ONCOLOGY | Facility: CLINIC | Age: 68
End: 2023-01-26
Payer: MEDICARE

## 2023-01-26 VITALS
HEIGHT: 70 IN | BODY MASS INDEX: 30.52 KG/M2 | SYSTOLIC BLOOD PRESSURE: 140 MMHG | WEIGHT: 213.2 LBS | HEART RATE: 73 BPM | RESPIRATION RATE: 18 BRPM | DIASTOLIC BLOOD PRESSURE: 88 MMHG | OXYGEN SATURATION: 96 % | TEMPERATURE: 97.4 F

## 2023-01-26 DIAGNOSIS — C64.1 RENAL CELL CARCINOMA OF RIGHT KIDNEY: Primary | ICD-10-CM

## 2023-01-26 PROCEDURE — 99214 OFFICE O/P EST MOD 30 MIN: CPT | Performed by: INTERNAL MEDICINE

## 2023-06-24 NOTE — OUTREACH NOTE
Medical Week 2 Survey      Responses   Facility patient discharged from?  [facility]   Does the patient have one of the following disease processes/diagnoses(primary or secondary)?  Other   Week 2 attempt successful?  No   Unsuccessful attempts  Attempt 1          Ayanna Lockhart RN   No

## 2023-06-25 ENCOUNTER — HOSPITAL ENCOUNTER (EMERGENCY)
Age: 68
Discharge: HOME | End: 2023-06-26
Attending: EMERGENCY MEDICINE
Payer: MEDICARE

## 2023-06-25 DIAGNOSIS — R42 DIZZINESS: ICD-10-CM

## 2023-06-25 DIAGNOSIS — N18.9 CHRONIC KIDNEY DISEASE, UNSPECIFIED CKD STAGE: ICD-10-CM

## 2023-06-25 DIAGNOSIS — H81.10 BENIGN PAROXYSMAL POSITIONAL VERTIGO, UNSPECIFIED LATERALITY: Primary | ICD-10-CM

## 2023-06-25 LAB
ALBUMIN SERPL-MCNC: 4.4 G/DL (ref 3.5–5.2)
ALP SERPL-CCNC: 60 U/L (ref 40–130)
ALT SERPL-CCNC: 16 U/L (ref 5–41)
ANION GAP SERPL CALCULATED.3IONS-SCNC: 12 MMOL/L (ref 7–19)
AST SERPL-CCNC: 16 U/L (ref 5–40)
BASOPHILS # BLD: 0 K/UL (ref 0–0.2)
BASOPHILS NFR BLD: 0.6 % (ref 0–1)
BILIRUB SERPL-MCNC: 0.3 MG/DL (ref 0.2–1.2)
BUN SERPL-MCNC: 49 MG/DL (ref 8–23)
CALCIUM SERPL-MCNC: 9.2 MG/DL (ref 8.8–10.2)
CHLORIDE SERPL-SCNC: 110 MMOL/L (ref 98–111)
CO2 SERPL-SCNC: 18 MMOL/L (ref 22–29)
CREAT SERPL-MCNC: 3 MG/DL (ref 0.5–1.2)
EOSINOPHIL # BLD: 0.3 K/UL (ref 0–0.6)
EOSINOPHIL NFR BLD: 3.6 % (ref 0–5)
ERYTHROCYTE [DISTWIDTH] IN BLOOD BY AUTOMATED COUNT: 13.4 % (ref 11.5–14.5)
GLUCOSE SERPL-MCNC: 101 MG/DL (ref 74–109)
HCT VFR BLD AUTO: 43.3 % (ref 42–52)
HGB BLD-MCNC: 14.5 G/DL (ref 14–18)
IMM GRANULOCYTES # BLD: 0 K/UL
LIPASE SERPL-CCNC: 43 U/L (ref 13–60)
LYMPHOCYTES # BLD: 1.4 K/UL (ref 1.1–4.5)
LYMPHOCYTES NFR BLD: 18.8 % (ref 20–40)
MCH RBC QN AUTO: 30.4 PG (ref 27–31)
MCHC RBC AUTO-ENTMCNC: 33.5 G/DL (ref 33–37)
MCV RBC AUTO: 90.8 FL (ref 80–94)
MONOCYTES # BLD: 0.8 K/UL (ref 0–0.9)
MONOCYTES NFR BLD: 10.4 % (ref 0–10)
NEUTROPHILS # BLD: 4.8 K/UL (ref 1.5–7.5)
NEUTS SEG NFR BLD: 66.3 % (ref 50–65)
PLATELET # BLD AUTO: 168 K/UL (ref 130–400)
PMV BLD AUTO: 9.6 FL (ref 9.4–12.4)
POTASSIUM SERPL-SCNC: 5 MMOL/L (ref 3.5–5)
PROT SERPL-MCNC: 6.8 G/DL (ref 6.6–8.7)
RBC # BLD AUTO: 4.77 M/UL (ref 4.7–6.1)
SODIUM SERPL-SCNC: 140 MMOL/L (ref 136–145)
WBC # BLD AUTO: 7.2 K/UL (ref 4.8–10.8)

## 2023-06-25 PROCEDURE — 96374 THER/PROPH/DIAG INJ IV PUSH: CPT

## 2023-06-25 PROCEDURE — 2580000003 HC RX 258: Performed by: EMERGENCY MEDICINE

## 2023-06-25 PROCEDURE — 6360000002 HC RX W HCPCS: Performed by: EMERGENCY MEDICINE

## 2023-06-25 PROCEDURE — 99284 EMERGENCY DEPT VISIT MOD MDM: CPT

## 2023-06-25 PROCEDURE — 93005 ELECTROCARDIOGRAM TRACING: CPT | Performed by: EMERGENCY MEDICINE

## 2023-06-25 PROCEDURE — 96361 HYDRATE IV INFUSION ADD-ON: CPT

## 2023-06-25 RX ORDER — ONDANSETRON 2 MG/ML
4 INJECTION INTRAMUSCULAR; INTRAVENOUS ONCE
Status: COMPLETED | OUTPATIENT
Start: 2023-06-25 | End: 2023-06-25

## 2023-06-25 RX ORDER — MECLIZINE HCL 12.5 MG/1
25 TABLET ORAL ONCE
Status: COMPLETED | OUTPATIENT
Start: 2023-06-25 | End: 2023-06-26

## 2023-06-25 RX ORDER — CARVEDILOL 3.12 MG/1
3.12 TABLET ORAL 2 TIMES DAILY WITH MEALS
COMMUNITY

## 2023-06-25 RX ORDER — 0.9 % SODIUM CHLORIDE 0.9 %
1000 INTRAVENOUS SOLUTION INTRAVENOUS ONCE
Status: COMPLETED | OUTPATIENT
Start: 2023-06-25 | End: 2023-06-26

## 2023-06-25 RX ADMIN — ONDANSETRON 4 MG: 2 INJECTION INTRAMUSCULAR; INTRAVENOUS at 23:38

## 2023-06-25 RX ADMIN — SODIUM CHLORIDE 1000 ML: 9 INJECTION, SOLUTION INTRAVENOUS at 23:38

## 2023-06-25 ASSESSMENT — ENCOUNTER SYMPTOMS
ABDOMINAL PAIN: 0
VOMITING: 1
RHINORRHEA: 0
SHORTNESS OF BREATH: 0
NAUSEA: 1
SORE THROAT: 0
BACK PAIN: 0
DIARRHEA: 0
COUGH: 0

## 2023-06-25 ASSESSMENT — PAIN - FUNCTIONAL ASSESSMENT: PAIN_FUNCTIONAL_ASSESSMENT: NONE - DENIES PAIN

## 2023-06-26 ENCOUNTER — APPOINTMENT (OUTPATIENT)
Dept: CT IMAGING | Age: 68
End: 2023-06-26
Payer: MEDICARE

## 2023-06-26 VITALS
HEIGHT: 70 IN | HEART RATE: 68 BPM | OXYGEN SATURATION: 99 % | SYSTOLIC BLOOD PRESSURE: 140 MMHG | WEIGHT: 200 LBS | DIASTOLIC BLOOD PRESSURE: 78 MMHG | BODY MASS INDEX: 28.63 KG/M2 | RESPIRATION RATE: 15 BRPM | TEMPERATURE: 98.1 F

## 2023-06-26 LAB
BILIRUB UR QL STRIP: NEGATIVE
CLARITY UR: CLEAR
COLOR UR: YELLOW
EKG P AXIS: 37 DEGREES
EKG P-R INTERVAL: 164 MS
EKG Q-T INTERVAL: 422 MS
EKG QRS DURATION: 110 MS
EKG QTC CALCULATION (BAZETT): 432 MS
EKG T AXIS: 127 DEGREES
GLUCOSE UR STRIP.AUTO-MCNC: NEGATIVE MG/DL
HGB UR STRIP.AUTO-MCNC: NEGATIVE MG/L
KETONES UR STRIP.AUTO-MCNC: NEGATIVE MG/DL
LEUKOCYTE ESTERASE UR QL STRIP.AUTO: NEGATIVE
NITRITE UR QL STRIP.AUTO: NEGATIVE
PH UR STRIP.AUTO: 5.5 [PH] (ref 5–8)
PROT UR STRIP.AUTO-MCNC: NEGATIVE MG/DL
SP GR UR STRIP.AUTO: 1.02 (ref 1–1.03)
UROBILINOGEN UR STRIP.AUTO-MCNC: 0.2 E.U./DL

## 2023-06-26 PROCEDURE — 81003 URINALYSIS AUTO W/O SCOPE: CPT

## 2023-06-26 PROCEDURE — 36415 COLL VENOUS BLD VENIPUNCTURE: CPT

## 2023-06-26 PROCEDURE — 96361 HYDRATE IV INFUSION ADD-ON: CPT

## 2023-06-26 PROCEDURE — 80053 COMPREHEN METABOLIC PANEL: CPT

## 2023-06-26 PROCEDURE — 70450 CT HEAD/BRAIN W/O DYE: CPT

## 2023-06-26 PROCEDURE — 6370000000 HC RX 637 (ALT 250 FOR IP): Performed by: EMERGENCY MEDICINE

## 2023-06-26 PROCEDURE — 93010 ELECTROCARDIOGRAM REPORT: CPT | Performed by: INTERNAL MEDICINE

## 2023-06-26 PROCEDURE — 83690 ASSAY OF LIPASE: CPT

## 2023-06-26 PROCEDURE — 85025 COMPLETE CBC W/AUTO DIFF WBC: CPT

## 2023-06-26 RX ORDER — MECLIZINE HYDROCHLORIDE 25 MG/1
25 TABLET ORAL 3 TIMES DAILY PRN
Qty: 15 TABLET | Refills: 0 | Status: SHIPPED | OUTPATIENT
Start: 2023-06-26 | End: 2023-07-06

## 2023-06-26 RX ORDER — ONDANSETRON 4 MG/1
4 TABLET, FILM COATED ORAL 3 TIMES DAILY PRN
Qty: 15 TABLET | Refills: 0 | Status: SHIPPED | OUTPATIENT
Start: 2023-06-26

## 2023-06-26 RX ADMIN — MECLIZINE 25 MG: 12.5 TABLET ORAL at 00:08

## 2023-06-26 ASSESSMENT — PAIN - FUNCTIONAL ASSESSMENT
PAIN_FUNCTIONAL_ASSESSMENT: NONE - DENIES PAIN
PAIN_FUNCTIONAL_ASSESSMENT: NONE - DENIES PAIN

## 2023-07-20 ENCOUNTER — LAB (OUTPATIENT)
Dept: LAB | Facility: HOSPITAL | Age: 68
End: 2023-07-20
Payer: MEDICARE

## 2023-07-20 PROCEDURE — 85025 COMPLETE CBC W/AUTO DIFF WBC: CPT | Performed by: INTERNAL MEDICINE

## 2023-07-20 PROCEDURE — 80053 COMPREHEN METABOLIC PANEL: CPT | Performed by: INTERNAL MEDICINE

## 2023-07-22 NOTE — PROGRESS NOTES
[facility]  [address]  [place] [address] [phone]    Patient Name: Parker Arnold  Encounter Date: 07/27/2023  YOB: 1955  Patient Number: [id number]     REASON FOR VISIT:  Parker Arnold is a 68 y.o. 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  Underwent right nephrectomy. Was under surveillance until 11/26/2018  When he underwent  wedge resection of a right pulm nodule. Pathology:  metastatic renal cell carcinoma. He was not able to tolerate Sutent due to kidney failure, thus received ipilumomab/nivolumab, 05/13/2019 - 08/07/2019, then single agent nivolumab, 08/28/2019 - 09/09/2019 (46.5 months since cessation of therapy). Therapy stopped due to renal failure (nivolumab?). He has been on and off prednisone since.  Last 11/29/2021 (20 months) underwent right thoracoscopic RLL and RML wedge resection with final path consistent with metastatic renal cell carcinoma.  He is here alone.    I have reviewed the HPI and verified with the patient the accuracy of it. No changes to interval history since the information was documented. Leonardo Smith MD 07/27/23        Problem List Items Addressed This Visit          Other    Renal cell carcinoma of right kidney - Primary    Overview     Overview:   Diagnoses 11/11/14. Path report in C[facility]E at [facility]. Tissue has been requested.          Relevant Orders    CT chest wo contrast    CT abdomen pelvis wo contrast    CBC & Differential    Comprehensive Metabolic Panel    Lung metastases    Overview     4/1/2023 DX Regulatory Update         Relevant Medications    loratadine (CLARITIN) 10 MG tablet    Other Relevant Orders    CT chest wo contrast    CT abdomen pelvis wo contrast    CBC & Differential    Comprehensive Metabolic Panel       Oncology/Hematology History   Renal cell carcinoma  of right kidney   10/2014 Initial Diagnosis    Renal cell carcinoma (CMS/HCC)     10/2014 Surgery    Right nephrectomy, Dr Morales       10/1/2018 Progression    Increasing lung Nodules on f/u CT     10/2/2018 -  Chemotherapy    Sutent  Discontinued due to intolerance     5/13/2019 - 8/7/2019 Chemotherapy    Opdivo Yervoy x 4 cycles     8/28/2019 - 9/9/2019 Chemotherapy    Single agent Opdivo    Opdivo held 9/9/2019 due to Renal Failure / NephritisToxicity     1/16/2020 Imaging    PET SCAN  Negative for neoplastic uptake         PAST MEDICAL HISTORY:  ALLERGIES:  Allergies   Allergen Reactions    Amoxicillin Hives    Contrast Dye (Echo Or Unknown Ct/Mr) Other (See Comments)     No Oral or IV contrast Due to only having 1 kidney    Opdivo [Nivolumab] Provider Review Needed     Renal failure    Penicillins Hives and Rash    Clindamycin/Lincomycin Rash     pustules  Rash - pustules      Doxycycline Rash    Hepatitis B Virus Vaccines Rash     Pustules      Hepatitis B Vaccine Rash    Latex Rash    Levofloxacin Rash     CURRENT MEDICATIONS:  Outpatient Encounter Medications as of 7/27/2023   Medication Sig Dispense Refill    acetaminophen (TYLENOL) 500 MG tablet Take 1 tablet by mouth Every 6 (Six) Hours As Needed for Mild Pain.      allopurinol (ZYLOPRIM) 100 MG tablet Take 1 tablet by mouth Daily.      calcium carb-cholecalciferol 600-800 MG-UNIT tablet Take 1 tablet by mouth 2 (Two) Times a Day With Meals.      carvedilol (COREG) 3.125 MG tablet Take 1 tablet by mouth 2 (Two) Times a Day With Meals.      finasteride (PROSCAR) 5 MG tablet Take 1 tablet by mouth Daily.  3    loratadine (CLARITIN) 10 MG tablet Take 1 tablet every day by oral route.      predniSONE (DELTASONE) 5 MG tablet Take 0.5 tablets by mouth Daily.      sodium bicarbonate 650 MG tablet Take 1 tablet by mouth 3 (Three) Times a Day. 90 tablet 2     No facility-administered encounter medications on file as of 7/27/2023.     ADULT ILLNESSES:  Patient  Active Problem List   Diagnosis Code    Renal cell carcinoma of right kidney C64.1    Multiple nodules of lung R91.8    Lung anomaly Q33.9    TIA (transient ischemic attack) G45.9    Acute renal failure with tubular necrosis in the setting of solitary kidney N17.0    Lung metastases C78.00    Pyuria R82.81    Hyperkalemia E87.5    Metabolic acidosis E87.20    Hyperglycemia R73.9    Hypercalcemia E83.52    Benign prostatic hyperplasia N40.0    Increased frequency of urination R35.0    Nocturia R35.1    Retention of urine R33.9    Dehydration E86.0    Nephritis and nephropathy N05.9    Toxicity, chemicals T65.91XA    Stage 3 chronic kidney disease N18.30    Campylobacter gastroenteritis A04.5    Sigmoid diverticulitis K57.32    History of TIA (transient ischemic attack) Z86.73    Solitary kidney, acquired Z90.5    Benign essential hypertension I10    Gastroesophageal reflux disease K21.9    Encounter for screening for malignant neoplasm of colon Z12.11     SURGERIES:  Past Surgical History:   Procedure Laterality Date    BRONCHOSCOPY N/A 11/29/2021    Procedure: BRONCHOSCOPY;  Surgeon: Jarrod Tolentino MD;  Location: [facility] OR;  Service: Cardiothoracic;  Laterality: N/A;    CHOLECYSTECTOMY WITH INTRAOPERATIVE CHOLANGIOGRAM N/A 12/22/2021    Procedure: LAPAROSCOPIC CHOLECYSTECTOMY;  Surgeon: Vandana Calhoun MD;  Location: [facility] OR;  Service: General;  Laterality: N/A;    COLONOSCOPY N/A 4/18/2022    Procedure: COLONOSCOPY WITH ANESTHESIA;  Surgeon: Zoila Robison MD;  Location: [facility] ENDOSCOPY;  Service: Gastroenterology;  Laterality: N/A;  pre colon cancer  post  Dr. Osborne    HERNIA REPAIR  2008    LUNG BIOPSY  11/2018    NEPHRECTOMY Right 2014    SINUS SURGERY  2005    THORACOSCOPY Right 11/29/2021    Procedure: RIGHT THORACOSCOPY WITH RIGHT MIDDLE AND RIGHT LOWER LOBE WEDGE RESECTIONS;  Surgeon: Jarrod Tolentino MD;  Location: [facility] OR;  Service: Cardiothoracic;  Laterality: Right;     HEALTH MAINTENANCE  "ITEMS:  Health Maintenance Due   Topic Date Due    COVID-19 Vaccine (1) Never done    TDAP/TD VACCINES (1 - Tdap) Never done    ZOSTER VACCINE (1 of 2) Never done    Pneumococcal Vaccine 65+ (1 - PCV) Never done    HEPATITIS C SCREENING  Never done    ANNUAL WELLNESS VISIT  Never done       <no information>  Last Completed Colonoscopy            COLORECTAL CANCER SCREENING (COLONOSCOPY - Every 5 Years) Next due on 2022  COLONOSCOPY    2022  Surgical Procedure: COLONOSCOPY                    There is no immunization history on file for this patient.  Last Completed Mammogram       This patient has no relevant Health Maintenance data.              FAMILY HISTORY:  Family History   Problem Relation Age of Onset    Other Mother         blood disease unknown    Cancer Father         unknown    No Known Problems Sister     No Known Problems Sister     Heart disease Maternal Grandmother     Heart disease Maternal Grandfather     No Known Problems Paternal Grandmother     No Known Problems Paternal Grandfather     Colon polyps Neg Hx     Colon cancer Neg Hx      SOCIAL HISTORY:  Social History     Socioeconomic History    Marital status:    Tobacco Use    Smoking status: Former     Packs/day: 1.00     Years: 22.00     Pack years: 22.00     Types: Cigarettes     Start date:      Quit date:      Years since quittin.5    Smokeless tobacco: Never   Vaping Use    Vaping Use: Never used   Substance and Sexual Activity    Alcohol use: Not Currently    Drug use: No    Sexual activity: Defer       REVIEW OF SYSTEMS:  Constitutional:  Manages ADLs, chores, errands and driving.  Again drove himself in today.  Appetite is good.  \"Yeah too good.\" Energy is fairly good.  He remains active.  \"I'm building a room in my house.\" He has lost 18 lb (had gained 19 lb at his 2 prior visits) since his last visit. \"I don't munch at midnight anymore.\"  No fever, no chills nor drenching nights.  HENT: " "Negative.  No sore throat.  Has seasonal sinus symptoms/rhinorrhea. No headaches.  Eyes: Negative.    Respiratory:  No SOB at rest nor TENORIO.  No cough. No wheezing. No tobacco use.    Cardiovascular: Negative.  No palpitations.  No orthopnea  Gastrointestinal: RUQ pain resolved since cholecystectomy.  No dysphagia.  No nausea.  No vomiting.  No dyspepsia. Bowels moving regularly.  Has occasional soft stools.  No overt diarrhea.  No melena. Infrequent hemorrhoidal bleeding.  Has history recurrent diverticulitis, early 06/2021.  Had colonoscopy, 04/18/2022- diverticulosis.  Nonbleeding internal hemorrhoids otherwise normal exam.  Endocrine: No hot flashes.  Genitourinary:  No dysuria.  No incontinence. No hematuria.  Occasional urgency.  \"I gotta go when I gotta go sometimes.\"  Nocturia much improved on Finasteride - up 2x- from every 90 min.  Remains on allopurinol.  Musculoskeletal:  No arthralgias.  No edema  Skin: No new rash.    Neurological:  No dizziness.  No facial asymmetry. No headaches.  Mild sensory neuropathy of the hands, not worse  Hematological: Negative for new adenopathy.  Says he bruises easily.  Psychiatric/Behavioral:  Has baseline anxiety, \"steroids and stress.\"  No depression.      VITAL SIGNS: /84   Pulse 54   Temp 98.1 °F (36.7 °C) (Temporal)   Resp 18   Ht 177.8 cm (70\")   Wt 88.8 kg (195 lb 11.2 oz)   SpO2 99%   BMI 28.08 kg/m² Body surface area is 2.07 meters squared.  Pain Score    07/27/23 1105   PainSc: 0-No pain     I have reexamined the patient and the results are consistent with the previously documented exam. Leonardo Smith MD      PHYSICAL EXAMINATION:   General Appearance: Patient is a very pleasant, cooperative, heavyset, modestly kept elderly male who is awake, alert, oriented and in no acute distress. ECOG 0 (same)  HEENT: Normocephalic. Sclerae clear, conjunctiva pink, extraocular movements intact, pupils, round, reactive to light and  accommodation. Is wearing " a surgical mask today  NECK: Supple, no jugular venous distention, thyroid not enlarged.  LYMPH: No cervical, supraclavicular, axillary, or inguinal lymphadenopathy.  LUNGS: Good air movement, no rales, rhonchi, rubs or wheezes with auscultation.  Right thoracoscopic incisions are healed  CARDIO: Regular sinus rhythm, no murmurs, gallops or rubs.  ABDOMEN:  Obese, nondistended, soft, with no right upper quadrant tenderness.  Laparoscopy wounds are all healed. No obvious abdominal masses. Bowel sounds positive. No hernia  MUSKEL: No joint swelling, decreased motion, or inflammation  EXTREMS:  No edema, clubbing, cyanosis, No varicose veins.  NEURO: Grossly nonfocal, Gait is coordinated and smooth, Cognition is preserved.  SKIN: No rashes, no ecchymoses, no petechia.  PSYCH: Oriented to time, place and person. Memory is preserved. Mood and affect appear normal         LABS    Lab Results - Last 18 Months   Lab Units 07/20/23  1048 06/25/23  2321 01/19/23  1053 09/19/22  1127 06/14/22  0818 03/21/22  0923   HEMOGLOBIN g/dL 15.0 14.5 15.0 14.6 14.9 15.0   HEMATOCRIT % 47.4 43.3 46.2 45.5 46.2 46.5   MCV fL 92.9 90.8 90.9 91.4 92.8 93.2   WBC 10*3/mm3 7.00 7.2 5.90 7.00 5.97 7.99   RDW % 13.7 13.4 13.2 13.4 13.2 14.3   MPV fL 9.7 9.6 9.4 9.6 9.8 9.6   PLATELETS 10*3/mm3 189 168 198 175 187 220   IMM GRAN % % 0.3  --  0.3 0.3 0.3 0.4   NEUTROS ABS 10*3/mm3 4.42 4.8 3.19 4.59 3.39 5.84   LYMPHS ABS 10*3/mm3 1.52 1.4 1.39 1.46 1.60 1.23   MONOS ABS 10*3/mm3 0.73 0.80 0.86 0.71 0.71 0.67   EOS ABS 10*3/mm3 0.27 0.30 0.39 0.20 0.22 0.18   BASOS ABS 10*3/mm3 0.04 0.00 0.05 0.02 0.03 0.04   IMMATURE GRANS (ABS) 10*3/mm3 0.02 0.0 0.02 0.02 0.02 0.03   NRBC /100 WBC 0.0  --  0.0 0.0 0.0 0.0       Lab Results - Last 18 Months   Lab Units 07/20/23  1048 01/19/23  1053 09/19/22  1127 06/14/22  0818 03/21/22  0923 01/31/22  1536   GLUCOSE mg/dL 102* 92 120* 114* 113* 105   SODIUM mmol/L 141 144 138 141 140 142   POTASSIUM mmol/L 5.1  4.7 4.9 4.8 5.0 5.8*   TOTAL CO2 mmol/L  --   --   --   --   --  18*   CO2 mmol/L 20.0* 27.0 24.0 24.0 25.0  --    CHLORIDE mmol/L 109* 109* 106 109* 106 110   ANION GAP mmol/L 12.0 8.0 8.0 8.0 9.0 14   CREATININE mg/dL 2.40* 3.08* 2.66* 2.97* 2.44* 3.2*   BUN mg/dL 44* 40* 46* 46* 38* 45*   BUN / CREAT RATIO  18.3 13.0 17.3 15.5 15.6  --    CALCIUM mg/dL 9.6 9.0 9.0 9.4 9.9 9.9   EGFR IF NONAFRICN AM   --   --   --   --   --  19*   ALK PHOS U/L 71 64 73 59 59 75   TOTAL PROTEIN g/dL 6.6 6.5 6.7 6.5 6.4 6.6   ALT (SGPT) U/L 13 19 13 13 13 22   AST (SGOT) U/L 14 22 16 13 15 19   BILIRUBIN mg/dL 0.5 0.4 0.5 0.5 0.3 0.3   ALBUMIN g/dL 4.4 4.0 4.50 4.50 4.40 4.3   GLOBULIN gm/dL 2.2 2.5 2.2 2.0 2.0  --        Lab Results - Last 18 Months   Lab Units 01/31/22  1536   URIC ACID mg/dL 8.5*       Assessment:  1.  Renal cell carcinoma, diagnosed 11/11/2014 (5.5 cm G3, pT3a, pNx). --2014, right nephrectomy.   --surveillance until 11/26/2018    --11/26/2018-right lower lobe VATS wedge resection: Metastatic renal cell carcinoma, 0.9 cm; margins free of malignancy. Dr. Hu  -- Unable to tolerate Sutent due to kidney failure, thus received ipilumomab/nivolumab, 05/13/2019 - 08/07/2019, then single agent nivolumab, 08/28/2019 - 09/09/2019 (21.5 months since cessation of therapy). Therapy stopped due to renal failure (nivolumab?)  --01/16/2020-PET scan.  No neoplastic FDG uptake within the chest, abdomen or pelvis.  --07/06/2020-CT abdomen/pelvis with diverticulosis of the colon.  Evidence of acute diverticulitis of the sigmoid colon and adjacent distal descending colon.  No free air or fluid seen.  1 cm noncalcified subpleural nodule in the left lower lobe.  Follow-up exam in 6 months recommended.  --08/04/2020-chest CT.  Scattered subcentimeter bilateral pulmonary nodules similar to 1/16/2020 with mild increase in the 5 mm left upper lobe nodule.  Continued follow-up recommended.  No pathologic lymphadenopathy.  Right  nephrectomy.  --01/14/2020-CT chest.  Increased size of 1.2 cm right lower lobe superior segment pulmonary nodule (previously 0.9 cm)-representing 33% growth.  Multiple other bilateral pulmonary nodules have not significantly changed in size.  Findings are in keeping with disease progression.  --01/14/2020-CT abdomen/pelvis.  No evidence of recurrent or metastatic disease in the abdomen or pelvis.  --01/26/2021-PET/CT.  Impression: Minimal metabolic activity associated with the enlarging pulmonary nodule within the superior segment of the right lower lobe with a maximum SUV of 1.35.  The additional nodules noted within both lungs also demonstrate no abnormal metabolic activity which would favor benignity but continued radiographic follow-up will be suggested.  No scintigraphic evidence of metastatic disease on current exam.  Increased uptake overlying the right groin and more distal external iliac vessels felt to be related to previous hernia repair.  --02/14/2021-  Encounter from Dr. Mayito Arteaga, medical oncology at [facility].  History reviewed.  Most recent CT chest, 01/14/2021 (above) with increasing size of 1.2 cm right lower lobe superior segment pulmonary nodule (previously 0.9 cm) and PET scan, 01/26/2021 (above) noted including minimal metabolic activity associated with the enlarging pulmonary nodule within the superior segment of the right lower lobe.  Discussion: Biopsy right lower lobe lung nodule solitary lesion that is growing on PET scan which will be done at that institution.  Contemplate either surgical resection, ablation or SBRT.  We will focus on local therapy for now.  Multiple systemic options down the road but if renal function improves can contemplate trial with HIF-alpha inhibitor.  --03/15/2021- CT chest.  Continued increased size of the right lower lobe superior segment now 1.4 cm pulmonary nodule (1.2 cm, 1/14/2021; 0.8 cm, 8/4/2020).  Other pulmonary nodules are  stable.  New small area of groundglass opacity in the superior segment left lower lobe may be infectious/inflammatory.  Coronary artery calcifications.  Aortic calcifications.  Small hiatal hernia.  --03/15/2021-CT abdomen/pelvis.  Previous right nephrectomy identified with no evidence of intra-abdominal metastatic disease.  Benign-appearing exophytic cyst posterior aspect of the left kidney, upper pole, measuring 2.47 cm, stable.  Small nodules in the lung bases are unchanged compared to previous chest CT.  Evidence of previous right inguinal hernia repair.  Fatty infiltration of the inguinal canals.  Scattered colonic diverticula without diverticulitis.  Small sliding hiatal hernia identified.  --06/11/2021-CT chest without contrast.  Stable pulmonary nodules.  No evidence of disease progression.  Interval resolution superior segment left lower lobe groundglass opacity.  Scattered coronary artery calcifications.  --06/11/2021-CT abdomen/pelvis.  History of right renal cell carcinoma nephrectomy.  No CT evidence of tumor recurrence or metastatic disease in the abdomen or pelvis.  --09/07/2021-CT chest wo contrast.  Pulmonary nodules noted.  Slightly increased in size compared to 6/11/2021 (left upper lobe 7.2 mm-prior 6.7 mm; posterior right lung, 19 mm-prior 13.9 mm; posterior medial right lung, 14 mm-prior 11 mm; right lung, 7.7 mm-prior 7.1 mm).  However no new nodules identified.  --09/07/2021-CT abdomen/pelvis wo contrast.  No evidence of intra-abdominal or pelvic metastasis.  Prior right nephrectomy.  Stable left renal cyst.  Colonic diverticulosis with questionable wall thickening of the sigmoid colon may be artifactual.  Correlation to any recent colonoscopy recommended.  --09/28/2021-PET scan.  Increased size of right lower lobe lung nodule (21 x 15 mm compared to 14 x 14 mm 3 months ago) though this finding shows no significant FDG uptake.  Otherwise stable PET/CT exam.  --11/29/2021-RLL, RML wedge  resections. Path consistent with metastatic renal cell carcinoma  --12/14/2021-CT chest.  Postoperative changes from wedge resections of prior right middle and lower lobe pulmonary nodules.  Stable left-sided pulmonary nodules, largest in the left upper lobe/apex measuring 8 mm.  Continued follow-up strongly recommended.  No pathologic lymphadenopathy.  --12/14/2021-CT abdomen/pelvis.  No convincing evidence of intra-abdominal or pelvic metastasis.  New diffuse abnormal gallbladder wall thickening.  Findings may be seen with cholecystitis.  Gallbladder ultrasound might be considered.  --12/20/2021-gallbladder ultrasound-diffuse gallbladder wall thickening.  Gallbladder nondistended with no visible stones.  Unlikely acute cholecystitis.  Favor chronic cholecystitis.  --03/18/2022-CT chest-patchy infiltrate compatible with right middle lobe pneumonia.  2 tiny left lung nodule stable.  --03/18/2022-CT abdomen/pelvis.  No evidence of metastatic disease to the abdomen/pelvis.  Prior right nephrectomy.  Colonic diverticulosis without evidence of diverticulitis.  Mild splenomegaly, unchanged.  --06/24/2022- CT chest-1. The resolution of the previously seen patchy infiltrate in the right middle lobe. 2. Postsurgical changes and scarring in the right upper and lower lobe. 3. Stable small nodules in both lungs the largest one in the left upper lobe. No change.  --06/24/2022- CT abdomen/pelvis- stable.  No evidence of a neoplastic process or metastatic disease. Evidence of right nephrectomy. No focal complication. Stable left renal cyst. Diverticulosis of the colon. No evidence for diverticulitis. Stable mild splenomegaly.  Enlarged prostate.  --09/19/2022- CT CAP- Stable exam.  No change in multiple bilateral pulmonary nodules measuring up to 8 mm.  No evidence of recurrent or metastatic disease in the abdomen or pelvis status post RIGHT nephrectomy.  --1/23/2023- CT CAP- Prior wedge resection within the RML/RLL. No evidence  of localized recurrence at these sites. Numerous other pulmonary nodules are present. The majority of these nodules are stable in size and appearance from the previous exam. There is one nodule which is subpleural in location within the lateral left upper lobe on image 52 which has an increased soft tissue component from the previous exam. This would warrant close follow-up on subsequent imaging.No acute abnormality of the abdomen or pelvis. Evidence of prior right nephrectomy. No focal complication. A left renal cyst.  --7/20/23- CT CAP- No change in multiple bilateral pulmonary nodules measuring up to 10 mm, compared to 01/23/2023. However, there has been gradual increase in pulmonary nodule size compared to more remote studies dating back to 03/18/2022. For reference, the dominant 10 mm LEFT upper lobe pulmonary nodule measured 7 mm on 03/18/2022. Recommend continued clinical and imaging follow-up as metastatic disease remains in the differential.    2.  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    --GFR 28.5 mL/min, 7/20/23 (prior:  9 - 28)  --s/p multiple doses of HD steroids in the past and remains on maintenance 2.5 mg prednisone per nephrology (JACLYN Ham-Dr. Grant)- tapered down from 5 mg/2.5 mg  --will not start Cytoxan unless biopsy done to determine etiology of kidney failure. Has been off Opdivo since September 2019.    3.  Normocytic anemia.    --Hgb 15; MCV 92.9, 7/20/23 (prior: Hgb 10.2-15.1; MCV 86.3-95.5)  4.  Gerd: modulated by omeprazole  5.  Osteopenia: on bola+vit D  6.  History of acute diverticulitis.   --04/18/2022- colonoscopy-diverticulosis in the left colon.  Nonbleeding internal hemorrhoids.  Otherwise normal exam.  Repeat 5 years.  Dr. Robison  7.  Cholecystitis  --12/14/2021-CT abdomen/pelvis.  Stable lung nodules.  New diffuse abnormal gallbladder wall thickening.  Cholecystitis?  No convincing evidence of intra-abdominal or pelvic mets.  --12/22/2021-cholecystectomy.  Final  "diagnosis: Chronic cholecystitis.    Plan:  1.  Apprised of labs, 7/20/23-normal CBC.  Depressed GFR (stable) otherwise normal CMP.    2.  Apprised of CT scans of the chest, abdomen/pelvis, 7/20/23 (above).  Stable exams since 1/23/ 23.  Gradual increase (up to 30% of reference nodule) sine 3/18/22.  Needs follow-up    3.  Had previously discussed the option of systemic therapy with cabozantinib as outlined by Dr. Arteaga (see #5 below) previously.  Patient remains wary of starting any type of therapy given previous AE's.  Also does not want to return to [place].  \"I didn't get any benefit from going there.\"    4.  Refer to Dr. Baldwin ( oncology, [place]) Re:  Management opinion    5.  Previously reviewed encounter office encounter from GIOVANNY Edward with Dr. Arteaga's office, 05/27/2021.  Impression/plan: CT CAP, 05/26/2021: Stable examination including stable bilateral lung nodules compared to 03/15/2021.  Progression of right lower lobe lesion?  Discussed systemic therapy option with cabozantinib at 20 mg.  However patient concerned due to his history of substantial AEs in the past.  Will arrange for lesion to be ablated.  However patient says multiple lesions were there for 6 years and unchanged.-Consider ablation or SBRT of the lesion and monitor and consider cabozantinib when failed.  Multiple systemic options down the road but if renal function improves can contemplate HIF-alpha inhibitor.  Continue surveillance for now.  Repeat scans in 3 months.    6.  Return to office in 16 weeks with preoffice CT scans of the chest, abdomen/pelvis without IV contrast (in 15 weeks), CBC with differential and CMP.    I spent    minutes caring for Parker on this date of service. This time includes time spent by me in the following activities: preparing for the visit, reviewing tests, performing a medically appropriate examination and/or evaluation, counseling and educating the patient/family/caregiver, ordering " medications, tests, or procedures and documenting information in the medical record.

## 2023-07-27 ENCOUNTER — OFFICE VISIT (OUTPATIENT)
Dept: ONCOLOGY | Facility: CLINIC | Age: 68
End: 2023-07-27
Payer: MEDICARE

## 2023-07-27 VITALS
RESPIRATION RATE: 18 BRPM | WEIGHT: 195.7 LBS | DIASTOLIC BLOOD PRESSURE: 84 MMHG | SYSTOLIC BLOOD PRESSURE: 138 MMHG | BODY MASS INDEX: 28.02 KG/M2 | OXYGEN SATURATION: 99 % | TEMPERATURE: 98.1 F | HEART RATE: 54 BPM | HEIGHT: 70 IN

## 2023-07-27 DIAGNOSIS — C64.1 RENAL CELL CARCINOMA OF RIGHT KIDNEY: Primary | ICD-10-CM

## 2023-07-27 DIAGNOSIS — C78.00 MALIGNANT NEOPLASM METASTATIC TO LUNG, UNSPECIFIED LATERALITY: ICD-10-CM

## 2023-07-27 RX ORDER — LORATADINE 10 MG/1
TABLET ORAL
COMMUNITY

## 2023-07-28 ENCOUNTER — TELEPHONE (OUTPATIENT)
Dept: ONCOLOGY | Facility: CLINIC | Age: 68
End: 2023-07-28
Payer: MEDICARE

## 2023-07-28 NOTE — TELEPHONE ENCOUNTER
"Caller: Parker Arnold \"JESE\"    Relationship to patient: Self    Best call back number: [phone]    Patient is needing: TO HAVE OUR OFFICE CALL AND SET UP REFERRAL NETWORK WITH [place] BECAUSE HIS INSURANCE IS OUT OF NETWORK FOR THAT OFFICE.  "

## 2023-07-28 NOTE — TELEPHONE ENCOUNTER
I CALLED AND SPOKE WITH JESE ABOUT [facility] NOT TAKING HIS INSURANCE AND EXPLAINED TO HIM THAT I COULD EITHER SEND HIM TO [place] OR [place]. JESE DECIDED TO GO TO [place]. I EXPLAINED TO THAT I WOULD SEND OVER THE REFERRAL AND THAT [facility] WOULD BE CALLING HIM TO SET UP THE APPT DATE AND TIME. JESE VERBALIZED UNDERSTANDING. 07/28/2023 BS

## 2023-11-20 ENCOUNTER — HOSPITAL ENCOUNTER (OUTPATIENT)
Dept: CT IMAGING | Facility: HOSPITAL | Age: 68
Discharge: HOME | End: 2023-11-20
Admitting: INTERNAL MEDICINE
Payer: MEDICARE

## 2023-11-20 ENCOUNTER — TELEPHONE (OUTPATIENT)
Dept: ONCOLOGY | Facility: CLINIC | Age: 68
End: 2023-11-20
Payer: MEDICARE

## 2023-11-20 ENCOUNTER — LAB (OUTPATIENT)
Dept: LAB | Facility: HOSPITAL | Age: 68
End: 2023-11-20
Payer: MEDICARE

## 2023-11-20 DIAGNOSIS — C78.00 MALIGNANT NEOPLASM METASTATIC TO LUNG, UNSPECIFIED LATERALITY: ICD-10-CM

## 2023-11-20 DIAGNOSIS — C64.1 RENAL CELL CARCINOMA OF RIGHT KIDNEY: ICD-10-CM

## 2023-11-20 DIAGNOSIS — E87.6 LOW BLOOD POTASSIUM: Primary | ICD-10-CM

## 2023-11-20 LAB
ALBUMIN SERPL-MCNC: 4.5 G/DL (ref 3.5–5.2)
ALBUMIN/GLOB SERPL: 1.8 G/DL
ALP SERPL-CCNC: 75 U/L (ref 39–117)
ALT SERPL W P-5'-P-CCNC: 14 U/L (ref 1–41)
ANION GAP SERPL CALCULATED.3IONS-SCNC: 9 MMOL/L (ref 5–15)
AST SERPL-CCNC: 14 U/L (ref 1–40)
BASOPHILS # BLD AUTO: 0.04 10*3/MM3 (ref 0–0.2)
BASOPHILS NFR BLD AUTO: 0.5 % (ref 0–1.5)
BILIRUB SERPL-MCNC: 0.6 MG/DL (ref 0–1.2)
BUN SERPL-MCNC: 35 MG/DL (ref 8–23)
BUN/CREAT SERPL: 16.9 (ref 7–25)
CALCIUM SPEC-SCNC: 9.4 MG/DL (ref 8.6–10.5)
CHLORIDE SERPL-SCNC: 107 MMOL/L (ref 98–107)
CO2 SERPL-SCNC: 27 MMOL/L (ref 22–29)
CREAT SERPL-MCNC: 2.07 MG/DL (ref 0.76–1.27)
DEPRECATED RDW RBC AUTO: 45.7 FL (ref 37–54)
EGFRCR SERPLBLD CKD-EPI 2021: 34 ML/MIN/1.73
EOSINOPHIL # BLD AUTO: 0.31 10*3/MM3 (ref 0–0.4)
EOSINOPHIL NFR BLD AUTO: 4.1 % (ref 0.3–6.2)
ERYTHROCYTE [DISTWIDTH] IN BLOOD BY AUTOMATED COUNT: 13.4 % (ref 12.3–15.4)
GLOBULIN UR ELPH-MCNC: 2.5 GM/DL
GLUCOSE SERPL-MCNC: 107 MG/DL (ref 65–99)
HCT VFR BLD AUTO: 50.7 % (ref 37.5–51)
HGB BLD-MCNC: 16.1 G/DL (ref 13–17.7)
IMM GRANULOCYTES # BLD AUTO: 0.01 10*3/MM3 (ref 0–0.05)
IMM GRANULOCYTES NFR BLD AUTO: 0.1 % (ref 0–0.5)
LYMPHOCYTES # BLD AUTO: 2.26 10*3/MM3 (ref 0.7–3.1)
LYMPHOCYTES NFR BLD AUTO: 29.8 % (ref 19.6–45.3)
MCH RBC QN AUTO: 29.3 PG (ref 26.6–33)
MCHC RBC AUTO-ENTMCNC: 31.8 G/DL (ref 31.5–35.7)
MCV RBC AUTO: 92.2 FL (ref 79–97)
MONOCYTES # BLD AUTO: 0.77 10*3/MM3 (ref 0.1–0.9)
MONOCYTES NFR BLD AUTO: 10.1 % (ref 5–12)
NEUTROPHILS NFR BLD AUTO: 4.2 10*3/MM3 (ref 1.7–7)
NEUTROPHILS NFR BLD AUTO: 55.4 % (ref 42.7–76)
NRBC BLD AUTO-RTO: 0 /100 WBC (ref 0–0.2)
PLATELET # BLD AUTO: 218 10*3/MM3 (ref 140–450)
PMV BLD AUTO: 9.3 FL (ref 6–12)
POTASSIUM SERPL-SCNC: 5.4 MMOL/L (ref 3.5–5.2)
PROT SERPL-MCNC: 7 G/DL (ref 6–8.5)
RBC # BLD AUTO: 5.5 10*6/MM3 (ref 4.14–5.8)
SODIUM SERPL-SCNC: 143 MMOL/L (ref 136–145)
WBC NRBC COR # BLD AUTO: 7.59 10*3/MM3 (ref 3.4–10.8)

## 2023-11-20 PROCEDURE — 36415 COLL VENOUS BLD VENIPUNCTURE: CPT

## 2023-11-20 PROCEDURE — 85025 COMPLETE CBC W/AUTO DIFF WBC: CPT

## 2023-11-20 PROCEDURE — 74176 CT ABD & PELVIS W/O CONTRAST: CPT

## 2023-11-20 PROCEDURE — 80053 COMPREHEN METABOLIC PANEL: CPT

## 2023-11-20 PROCEDURE — 71250 CT THORAX DX C-: CPT

## 2023-11-20 NOTE — TELEPHONE ENCOUNTER
----- Message from Leonardo Smith MD sent at 11/20/2023 10:39 AM CST -----  Repeat k pls  ----- Message -----  From: Lab, Background User  Sent: 11/20/2023  10:02 AM CST  To: Leonardo Smith MD

## 2023-11-20 NOTE — TELEPHONE ENCOUNTER
Contacted patient Parker Arnold, he was informed that his Potassium level is 5.4  Per DR Smith recommendations he wants to repeat this lab again to check accuracy.   Patient reports that he is 2 miles from his home in [place], [place] and prefers not to return today but will come again tomorrow for the repeat lab test.     Apt chela 11/21/23 @ 11 am

## 2023-11-20 NOTE — TELEPHONE ENCOUNTER
----- Message from Leonardo Smith MD sent at 11/20/2023 10:41 AM CST -----  Pls confirm he has an appointment to see dr moore  ----- Message -----  From: [facility] [place] [facility]  Sent: 11/20/2023  10:23 AM CST  To: Leonardo Smith MD

## 2023-11-20 NOTE — TELEPHONE ENCOUNTER
Spoke with patient and he reports that he has a follow up with the Oncologist at [facility] around 12/15/2023. He does not recall who he sees there. I let him know that I could try to call to get that info so we can fax this CT report to them.

## 2023-11-21 ENCOUNTER — LAB (OUTPATIENT)
Dept: LAB | Facility: HOSPITAL | Age: 68
End: 2023-11-21
Payer: MEDICARE

## 2023-11-21 DIAGNOSIS — E87.6 LOW BLOOD POTASSIUM: ICD-10-CM

## 2023-11-21 LAB
ALBUMIN SERPL-MCNC: 4.1 G/DL (ref 3.5–5.2)
ALBUMIN/GLOB SERPL: 1.7 G/DL
ALP SERPL-CCNC: 70 U/L (ref 39–117)
ALT SERPL W P-5'-P-CCNC: 14 U/L (ref 1–41)
ANION GAP SERPL CALCULATED.3IONS-SCNC: 8 MMOL/L (ref 5–15)
AST SERPL-CCNC: 14 U/L (ref 1–40)
BILIRUB SERPL-MCNC: 0.5 MG/DL (ref 0–1.2)
BUN SERPL-MCNC: 38 MG/DL (ref 8–23)
BUN/CREAT SERPL: 17.4 (ref 7–25)
CALCIUM SPEC-SCNC: 8.7 MG/DL (ref 8.6–10.5)
CHLORIDE SERPL-SCNC: 107 MMOL/L (ref 98–107)
CO2 SERPL-SCNC: 25 MMOL/L (ref 22–29)
CREAT SERPL-MCNC: 2.18 MG/DL (ref 0.76–1.27)
EGFRCR SERPLBLD CKD-EPI 2021: 32 ML/MIN/1.73
GLOBULIN UR ELPH-MCNC: 2.4 GM/DL
GLUCOSE SERPL-MCNC: 105 MG/DL (ref 65–99)
POTASSIUM SERPL-SCNC: 4.8 MMOL/L (ref 3.5–5.2)
PROT SERPL-MCNC: 6.5 G/DL (ref 6–8.5)
SODIUM SERPL-SCNC: 140 MMOL/L (ref 136–145)

## 2023-11-21 PROCEDURE — 80053 COMPREHEN METABOLIC PANEL: CPT

## 2023-11-21 PROCEDURE — 36415 COLL VENOUS BLD VENIPUNCTURE: CPT

## 2023-11-21 NOTE — PROGRESS NOTES
[facility] GROUP HEMATOLOGY AND ONCOLOGY  [address]  [place] [address] [phone]    Patient Name: Parker Arnold  Encounter Date: 11/27/2023  YOB: 1955  Patient Number: [id number]         REASON FOR VISIT:  Parker Arnold is a 68 y.o. 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  Underwent right nephrectomy. Was under surveillance until 11/26/2018  When he underwent  wedge resection of a right pulm nodule. Pathology:  metastatic renal cell carcinoma. He was not able to tolerate Sutent due to kidney failure, thus received ipilumomab/nivolumab, 05/13/2019 - 08/07/2019, then single agent nivolumab, 08/28/2019 - 09/09/2019 (50.5 months since cessation of therapy). Therapy stopped due to renal failure (nivolumab?). He has been on and off prednisone since.  Last 11/29/2021 (24 months) underwent right thoracoscopic RLL and RML wedge resection with final path consistent with metastatic renal cell carcinoma.  He is here alone.    I have reviewed the HPI and verified with the patient the accuracy of it. No changes to interval history since the information was documented. Leonardo Smith MD 11/27/23      Problem List Items Addressed This Visit          Other    Renal cell carcinoma of right kidney - Primary    Overview     Overview:   Diagnoses 11/11/14. Path report in C.E at [facility]. Tissue has been requested.          Lung metastases    Overview     4/1/2023 DX Regulatory Update              Oncology/Hematology History   Renal cell carcinoma of right kidney   10/2014 Initial Diagnosis    Renal cell carcinoma (CMS/HCC)     10/2014 Surgery    Right nephrectomy, Dr Morales       10/1/2018 Progression    Increasing lung Nodules on f/u CT     10/2/2018 -  Chemotherapy    Sutent  Discontinued due to intolerance     5/13/2019 - 8/7/2019 Chemotherapy    Opdivo Yervoy x 4  cycles     8/28/2019 - 9/9/2019 Chemotherapy    Single agent Opdivo    Opdivo held 9/9/2019 due to Renal Failure / NephritisToxicity     1/16/2020 Imaging    PET SCAN  Negative for neoplastic uptake         PAST MEDICAL HISTORY:  ALLERGIES:  Allergies   Allergen Reactions    Amoxicillin Hives    Contrast Dye (Echo Or Unknown Ct/Mr) Other (See Comments)     No Oral or IV contrast Due to only having 1 kidney    Opdivo [Nivolumab] Provider Review Needed     Renal failure    Penicillins Hives and Rash    Clindamycin/Lincomycin Rash     pustules  Rash - pustules      Doxycycline Rash    Hepatitis B Virus Vaccines Rash     Pustules      Hepatitis B Vaccine Rash    Latex Rash    Levofloxacin Rash     CURRENT MEDICATIONS:  Outpatient Encounter Medications as of 11/27/2023   Medication Sig Dispense Refill    acetaminophen (TYLENOL) 500 MG tablet Take 1 tablet by mouth Every 6 (Six) Hours As Needed for Mild Pain.      allopurinol (ZYLOPRIM) 100 MG tablet Take 1 tablet by mouth Daily.      calcium carb-cholecalciferol 600-800 MG-UNIT tablet Take 1 tablet by mouth 2 (Two) Times a Day With Meals.      carvedilol (COREG) 3.125 MG tablet Take 1 tablet by mouth 2 (Two) Times a Day With Meals.      finasteride (PROSCAR) 5 MG tablet Take 1 tablet by mouth Daily.  3    loratadine (CLARITIN) 10 MG tablet Take 1 tablet every day by oral route.      predniSONE (DELTASONE) 5 MG tablet Take 0.5 tablets by mouth Daily.      sodium bicarbonate 650 MG tablet Take 1 tablet by mouth 3 (Three) Times a Day. 90 tablet 2    [DISCONTINUED] barium (READI-CAT 2) suspension 900 mL        No facility-administered encounter medications on file as of 11/27/2023.     ADULT ILLNESSES:  Patient Active Problem List   Diagnosis Code    Renal cell carcinoma of right kidney C64.1    Multiple nodules of lung R91.8    Lung anomaly Q33.9    TIA (transient ischemic attack) G45.9    Acute renal failure with tubular necrosis in the setting of solitary kidney N17.0     Lung metastases C78.00    Pyuria R82.81    Hyperkalemia E87.5    Metabolic acidosis E87.20    Hyperglycemia R73.9    Hypercalcemia E83.52    Benign prostatic hyperplasia N40.0    Increased frequency of urination R35.0    Nocturia R35.1    Retention of urine R33.9    Dehydration E86.0    Nephritis and nephropathy N05.9    Toxicity, chemicals T65.91XA    Stage 3 chronic kidney disease N18.30    Campylobacter gastroenteritis A04.5    Sigmoid diverticulitis K57.32    History of TIA (transient ischemic attack) Z86.73    Solitary kidney, acquired Z90.5    Benign essential hypertension I10    Gastroesophageal reflux disease K21.9    Encounter for screening for malignant neoplasm of colon Z12.11     SURGERIES:  Past Surgical History:   Procedure Laterality Date    BRONCHOSCOPY N/A 11/29/2021    Procedure: BRONCHOSCOPY;  Surgeon: Jarrod Tolentino MD;  Location: [facility] OR;  Service: Cardiothoracic;  Laterality: N/A;    CHOLECYSTECTOMY WITH INTRAOPERATIVE CHOLANGIOGRAM N/A 12/22/2021    Procedure: LAPAROSCOPIC CHOLECYSTECTOMY;  Surgeon: Vandana Calhoun MD;  Location: [facility] OR;  Service: General;  Laterality: N/A;    COLONOSCOPY N/A 4/18/2022    Procedure: COLONOSCOPY WITH ANESTHESIA;  Surgeon: Zoila Robison MD;  Location: [facility] ENDOSCOPY;  Service: Gastroenterology;  Laterality: N/A;  pre colon cancer  post  Dr. Osborne    HERNIA REPAIR  2008    LUNG BIOPSY  11/2018    NEPHRECTOMY Right 2014    SINUS SURGERY  2005    THORACOSCOPY Right 11/29/2021    Procedure: RIGHT THORACOSCOPY WITH RIGHT MIDDLE AND RIGHT LOWER LOBE WEDGE RESECTIONS;  Surgeon: Jarrod Tolentino MD;  Location: [facility] OR;  Service: Cardiothoracic;  Laterality: Right;     HEALTH MAINTENANCE ITEMS:  Health Maintenance Due   Topic Date Due    COVID-19 Vaccine (1) Never done    TDAP/TD VACCINES (1 - Tdap) Never done    ZOSTER VACCINE (1 of 2) Never done    Pneumococcal Vaccine 65+ (1 - PCV) Never done    HEPATITIS C SCREENING  Never done    ANNUAL WELLNESS  "VISIT  Never done    BMI FOLLOWUP  2023    INFLUENZA VACCINE  Never done       <no information>  Last Completed Colonoscopy            COLORECTAL CANCER SCREENING (COLONOSCOPY - Every 5 Years) Next due on 2022  COLONOSCOPY    2022  Surgical Procedure: COLONOSCOPY                    There is no immunization history on file for this patient.  Last Completed Mammogram       This patient has no relevant Health Maintenance data.              FAMILY HISTORY:  Family History   Problem Relation Age of Onset    Other Mother         blood disease unknown    Cancer Father         unknown    No Known Problems Sister     No Known Problems Sister     Heart disease Maternal Grandmother     Heart disease Maternal Grandfather     No Known Problems Paternal Grandmother     No Known Problems Paternal Grandfather     Colon polyps Neg Hx     Colon cancer Neg Hx      SOCIAL HISTORY:  Social History     Socioeconomic History    Marital status:    Tobacco Use    Smoking status: Former     Packs/day: 1.00     Years: 22.00     Additional pack years: 0.00     Total pack years: 22.00     Types: Cigarettes     Start date:      Quit date:      Years since quittin.9    Smokeless tobacco: Never   Vaping Use    Vaping Use: Never used   Substance and Sexual Activity    Alcohol use: Not Currently    Drug use: No    Sexual activity: Defer       REVIEW OF SYSTEMS:  Constitutional:  Manages ADLs, chores, errands and driving.  Again drove himself in today.  Appetite is good.  \"Still too good.\" Energy is fairly good.  He remains active.  \"I've completed building a new living room in my house.\" He has regained 6 lb (had lost 18 lb at his prior visit - had gained 19 lb at his 2 visits prior to that) since his last visit. No fever, no chills nor drenching nights.  HENT: Negative.  No sore throat.  Has seasonal sinus symptoms/rhinorrhea. No headaches.  Eyes: Negative.    Respiratory:  No SOB at rest nor " "TENORIO.  No cough. No wheezing. No tobacco use.    Cardiovascular: Negative.  No palpitations.  No orthopnea  Gastrointestinal: RUQ pain resolved since cholecystectomy.  No dysphagia.  No nausea.  No vomiting.  No dyspepsia. Bowels moving regularly.  Has occasional soft stools.  No overt diarrhea.  No melena.  Has not had hemorrhoidal bleeding.  Has history recurrent diverticulitis, early 06/2021.  Had colonoscopy, 04/18/2022- diverticulosis.  Nonbleeding internal hemorrhoids otherwise normal exam.  Endocrine: No hot flashes.  Genitourinary:  No dysuria.  No incontinence. No hematuria.  Occasional urgency.  \"I gotta go when I gotta go sometimes.\"  Nocturia much improved on Finasteride - up 2x- from every 90 min.  Remains on allopurinol.  Musculoskeletal:  No arthralgias.  No edema  Skin: No new rash.    Neurological:  No dizziness.  No facial asymmetry. No headaches.  Mild sensory neuropathy of the hands, not worse  Hematological: Negative for new adenopathy.  Says he bruises easily.  Psychiatric/Behavioral:  Has baseline anxiety, \"steroids and stress.\"  No depression.      VITAL SIGNS: /84   Pulse 71   Temp 98 °F (36.7 °C) (Temporal)   Resp 18   Ht 177.8 cm (70\")   Wt 91.3 kg (201 lb 3.2 oz)   SpO2 98%   BMI 28.87 kg/m² Body surface area is 2.09 meters squared.  Pain Score    11/27/23 1109   PainSc: 0-No pain         PHYSICAL EXAMINATION:   General Appearance: Patient is a very pleasant, cooperative, heavyset, modestly kept elderly male who is awake, alert, oriented and in no acute distress. ECOG 0 (same)  HEENT: Normocephalic. Sclerae clear, conjunctiva pink, extraocular movements intact, pupils, round, reactive to light and  accommodation. Is wearing a surgical mask today  NECK: Supple, no jugular venous distention, thyroid not enlarged.  LYMPH: No cervical, supraclavicular, axillary, or inguinal lymphadenopathy.  LUNGS: Good air movement, no rales, rhonchi, rubs or wheezes with auscultation.  Right " thoracoscopic incisions are healed  CARDIO: Regular sinus rhythm, no murmurs, gallops or rubs.  ABDOMEN:  Obese, nondistended, soft, with no right upper quadrant tenderness.  Laparoscopy wounds are all healed. No obvious abdominal masses. Bowel sounds positive. No hernia  MUSKEL: No joint swelling, decreased motion, or inflammation  EXTREMS:  No edema, clubbing, cyanosis, No varicose veins.  NEURO: Grossly nonfocal, Gait is coordinated and smooth, Cognition is preserved.  SKIN: No rashes, no ecchymoses, no petechia.  PSYCH: Oriented to time, place and person. Memory is preserved. Mood and affect appear normal         LABS    Lab Results - Last 18 Months   Lab Units 11/20/23  0952 07/20/23  1048 06/25/23  2321 01/19/23  1053 09/19/22  1127 06/14/22  0818   HEMOGLOBIN g/dL 16.1 15.0 14.5 15.0 14.6 14.9   HEMATOCRIT % 50.7 47.4 43.3 46.2 45.5 46.2   MCV fL 92.2 92.9 90.8 90.9 91.4 92.8   WBC 10*3/mm3 7.59 7.00 7.2 5.90 7.00 5.97   RDW % 13.4 13.7 13.4 13.2 13.4 13.2   MPV fL 9.3 9.7 9.6 9.4 9.6 9.8   PLATELETS 10*3/mm3 218 189 168 198 175 187   IMM GRAN % % 0.1 0.3  --  0.3 0.3 0.3   NEUTROS ABS 10*3/mm3 4.20 4.42 4.8 3.19 4.59 3.39   LYMPHS ABS 10*3/mm3 2.26 1.52 1.4 1.39 1.46 1.60   MONOS ABS 10*3/mm3 0.77 0.73 0.80 0.86 0.71 0.71   EOS ABS 10*3/mm3 0.31 0.27 0.30 0.39 0.20 0.22   BASOS ABS 10*3/mm3 0.04 0.04 0.00 0.05 0.02 0.03   IMMATURE GRANS (ABS) 10*3/mm3 0.01 0.02 0.0 0.02 0.02 0.02   NRBC /100 WBC 0.0 0.0  --  0.0 0.0 0.0       Lab Results - Last 18 Months   Lab Units 11/21/23  1059 11/20/23  0952 07/20/23  1048 01/19/23  1053 09/19/22  1127 06/14/22  0818   GLUCOSE mg/dL 105* 107* 102* 92 120* 114*   SODIUM mmol/L 140 143 141 144 138 141   POTASSIUM mmol/L 4.8 5.4* 5.1 4.7 4.9 4.8   CO2 mmol/L 25.0 27.0 20.0* 27.0 24.0 24.0   CHLORIDE mmol/L 107 107 109* 109* 106 109*   ANION GAP mmol/L 8.0 9.0 12.0 8.0 8.0 8.0   CREATININE mg/dL 2.18* 2.07* 2.40* 3.08* 2.66* 2.97*   BUN mg/dL 38* 35* 44* 40* 46* 46*   BUN /  "CREAT RATIO  17.4 16.9 18.3 13.0 17.3 15.5   CALCIUM mg/dL 8.7 9.4 9.6 9.0 9.0 9.4   ALK PHOS U/L 70 75 71 64 73 59   TOTAL PROTEIN g/dL 6.5 7.0 6.6 6.5 6.7 6.5   ALT (SGPT) U/L 14 14 13 19 13 13   AST (SGOT) U/L 14 14 14 22 16 13   BILIRUBIN mg/dL 0.5 0.6 0.5 0.4 0.5 0.5   ALBUMIN g/dL 4.1 4.5 4.4 4.0 4.50 4.50   GLOBULIN gm/dL 2.4 2.5 2.2 2.5 2.2 2.0       No results for input(s): \"MSPIKE\", \"KAPPALAMB\", \"IGLFLC\", \"URICACID\", \"FREEKAPPAL\", \"CEA\", \"LDH\", \"REFLABREPO\" in the last [id number] hours.      Assessment:  1.  Renal cell carcinoma, diagnosed 11/11/2014 (5.5 cm G3, pT3a, pNx). --2014, right nephrectomy.   --surveillance until 11/26/2018    --11/26/2018-right lower lobe VATS wedge resection: Metastatic renal cell carcinoma, 0.9 cm; margins free of malignancy. Dr. Hu  -- Unable to tolerate Sutent due to kidney failure, thus received ipilumomab/nivolumab, 05/13/2019 - 08/07/2019, then single agent nivolumab, 08/28/2019 - 09/09/2019 (21.5 months since cessation of therapy). Therapy stopped due to renal failure (nivolumab?)  --01/16/2020-PET scan.  No neoplastic FDG uptake within the chest, abdomen or pelvis.  --07/06/2020-CT abdomen/pelvis with diverticulosis of the colon.  Evidence of acute diverticulitis of the sigmoid colon and adjacent distal descending colon.  No free air or fluid seen.  1 cm noncalcified subpleural nodule in the left lower lobe.  Follow-up exam in 6 months recommended.  --08/04/2020-chest CT.  Scattered subcentimeter bilateral pulmonary nodules similar to 1/16/2020 with mild increase in the 5 mm left upper lobe nodule.  Continued follow-up recommended.  No pathologic lymphadenopathy.  Right nephrectomy.  --01/14/2020-CT chest.  Increased size of 1.2 cm right lower lobe superior segment pulmonary nodule (previously 0.9 cm)-representing 33% growth.  Multiple other bilateral pulmonary nodules have not significantly changed in size.  Findings are in keeping with disease " progression.  --01/14/2020-CT abdomen/pelvis.  No evidence of recurrent or metastatic disease in the abdomen or pelvis.  --01/26/2021-PET/CT.  Impression: Minimal metabolic activity associated with the enlarging pulmonary nodule within the superior segment of the right lower lobe with a maximum SUV of 1.35.  The additional nodules noted within both lungs also demonstrate no abnormal metabolic activity which would favor benignity but continued radiographic follow-up will be suggested.  No scintigraphic evidence of metastatic disease on current exam.  Increased uptake overlying the right groin and more distal external iliac vessels felt to be related to previous hernia repair.  --02/14/2021-  Encounter from Dr. Mayito Arteaga, medical oncology at [facility].  History reviewed.  Most recent CT chest, 01/14/2021 (above) with increasing size of 1.2 cm right lower lobe superior segment pulmonary nodule (previously 0.9 cm) and PET scan, 01/26/2021 (above) noted including minimal metabolic activity associated with the enlarging pulmonary nodule within the superior segment of the right lower lobe.  Discussion: Biopsy right lower lobe lung nodule solitary lesion that is growing on PET scan which will be done at that institution.  Contemplate either surgical resection, ablation or SBRT.  We will focus on local therapy for now.  Multiple systemic options down the road but if renal function improves can contemplate trial with HIF-alpha inhibitor.  --03/15/2021- CT chest.  Continued increased size of the right lower lobe superior segment now 1.4 cm pulmonary nodule (1.2 cm, 1/14/2021; 0.8 cm, 8/4/2020).  Other pulmonary nodules are stable.  New small area of groundglass opacity in the superior segment left lower lobe may be infectious/inflammatory.  Coronary artery calcifications.  Aortic calcifications.  Small hiatal hernia.  --03/15/2021-CT abdomen/pelvis.  Previous right nephrectomy identified with no  evidence of intra-abdominal metastatic disease.  Benign-appearing exophytic cyst posterior aspect of the left kidney, upper pole, measuring 2.47 cm, stable.  Small nodules in the lung bases are unchanged compared to previous chest CT.  Evidence of previous right inguinal hernia repair.  Fatty infiltration of the inguinal canals.  Scattered colonic diverticula without diverticulitis.  Small sliding hiatal hernia identified.  --06/11/2021-CT chest without contrast.  Stable pulmonary nodules.  No evidence of disease progression.  Interval resolution superior segment left lower lobe groundglass opacity.  Scattered coronary artery calcifications.  --06/11/2021-CT abdomen/pelvis.  History of right renal cell carcinoma nephrectomy.  No CT evidence of tumor recurrence or metastatic disease in the abdomen or pelvis.  --09/07/2021-CT chest wo contrast.  Pulmonary nodules noted.  Slightly increased in size compared to 6/11/2021 (left upper lobe 7.2 mm-prior 6.7 mm; posterior right lung, 19 mm-prior 13.9 mm; posterior medial right lung, 14 mm-prior 11 mm; right lung, 7.7 mm-prior 7.1 mm).  However no new nodules identified.  --09/07/2021-CT abdomen/pelvis wo contrast.  No evidence of intra-abdominal or pelvic metastasis.  Prior right nephrectomy.  Stable left renal cyst.  Colonic diverticulosis with questionable wall thickening of the sigmoid colon may be artifactual.  Correlation to any recent colonoscopy recommended.  --09/28/2021-PET scan.  Increased size of right lower lobe lung nodule (21 x 15 mm compared to 14 x 14 mm 3 months ago) though this finding shows no significant FDG uptake.  Otherwise stable PET/CT exam.  --11/29/2021-RLL, RML wedge resections. Path consistent with metastatic renal cell carcinoma  --12/14/2021-CT chest.  Postoperative changes from wedge resections of prior right middle and lower lobe pulmonary nodules.  Stable left-sided pulmonary nodules, largest in the left upper lobe/apex measuring 8 mm.   Continued follow-up strongly recommended.  No pathologic lymphadenopathy.  --12/14/2021-CT abdomen/pelvis.  No convincing evidence of intra-abdominal or pelvic metastasis.  New diffuse abnormal gallbladder wall thickening.  Findings may be seen with cholecystitis.  Gallbladder ultrasound might be considered.  --12/20/2021-gallbladder ultrasound-diffuse gallbladder wall thickening.  Gallbladder nondistended with no visible stones.  Unlikely acute cholecystitis.  Favor chronic cholecystitis.  --03/18/2022-CT chest-patchy infiltrate compatible with right middle lobe pneumonia.  2 tiny left lung nodule stable.  --03/18/2022-CT abdomen/pelvis.  No evidence of metastatic disease to the abdomen/pelvis.  Prior right nephrectomy.  Colonic diverticulosis without evidence of diverticulitis.  Mild splenomegaly, unchanged.  --06/24/2022- CT chest-1. The resolution of the previously seen patchy infiltrate in the right middle lobe. 2. Postsurgical changes and scarring in the right upper and lower lobe. 3. Stable small nodules in both lungs the largest one in the left upper lobe. No change.  --06/24/2022- CT abdomen/pelvis- stable.  No evidence of a neoplastic process or metastatic disease. Evidence of right nephrectomy. No focal complication. Stable left renal cyst. Diverticulosis of the colon. No evidence for diverticulitis. Stable mild splenomegaly.  Enlarged prostate.  --09/19/2022- CT CAP- Stable exam.  No change in multiple bilateral pulmonary nodules measuring up to 8 mm.  No evidence of recurrent or metastatic disease in the abdomen or pelvis status post RIGHT nephrectomy.  --1/23/2023- CT CAP- Prior wedge resection within the RML/RLL. No evidence of localized recurrence at these sites. Numerous other pulmonary nodules are present. The majority of these nodules are stable in size and appearance from the previous exam. There is one nodule which is subpleural in location within the lateral left upper lobe on image 52 which  has an increased soft tissue component from the previous exam. This would warrant close follow-up on subsequent imaging.No acute abnormality of the abdomen or pelvis. Evidence of prior right nephrectomy. No focal complication. A left renal cyst.  --7/20/23- CT CAP- No change in multiple bilateral pulmonary nodules measuring up to 10 mm, compared to 01/23/2023. However, there has been gradual increase in pulmonary nodule size compared to more remote studies dating back to 03/18/2022. For reference, the dominant 10 mm LEFT upper lobe pulmonary nodule measured 7 mm on 03/18/2022. Recommend continued clinical and imaging follow-up as metastatic disease remains in the differential.  --11/20/23- CT CAP wo- Multiple bilateral new and enlarging pulmonary nodules measuring up to 10 mm. Findings are highly concerning for metastatic disease.  Mild dilatation of the ascending artery measuring 4 cm diameter. No evidence of recurrent or metastatic disease in the abdomen or pelvis status post right nephrectomy on this unenhanced exam. Heavy sigmoid diverticulosis without evidence of diverticulitis.     2.  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    --GFR 34 mL/min, 11/20/23 (prior:  9 - 28.5)  --s/p multiple doses of HD steroids in the past and remains on maintenance 2.5 mg prednisone per nephrology (JACLYN Ham-Dr. Grant)- tapered down from 5 mg/2.5 mg  --will not start Cytoxan unless biopsy done to determine etiology of kidney failure. Has been off Opdivo since September 2019.    3.  Normocytic anemia.    --Resolved. Hgb 16.1; MCV 92.2, 11/20/23 (prior: Hgb 10.2-15.1; MCV 86.3-95.5)  4.  Gerd: modulated by omeprazole  5.  Osteopenia: on bola+vit D  6.  History of acute diverticulitis.   --04/18/2022- colonoscopy-diverticulosis in the left colon.  Nonbleeding internal hemorrhoids.  Otherwise normal exam.  Repeat 5 years.  Dr. Robison    Plan:  1.  Apprised of labs, 11/21 and 11/20/23-normal CBC.  K+5.4 (repeat 4.8), GFR 34  "(stable) otherwise normal CMP.    2.  Apprised of CT scans of the chest, abdomen/pelvis, 11/20/23 (above).  Progressive lung nodules    3.  Had previously discussed the option of systemic therapy with cabozantinib as outlined by Dr. Arteaga (see #5 below) previously.  Patient remains wary of starting any type of therapy given previous AE's.  Also does not want to return to [place].  \"I didn't get any benefit from going there.\"    4.  Pls obtain consult note from Dr. Cannon ( oncology, [facility]) last 8/2023.  Note pending but patient recalls, \"They talked about radiation if possible.\"  Says not eligible for their clinical trials due to poor kidney function.  Has follow-up on 12/7/23    5.  Previously reviewed encounter office encounter from GIOVANNY Edward with Dr. Arteaga's office, 05/27/2021.  Impression/plan: CT CAP, 05/26/2021: Stable examination including stable bilateral lung nodules compared to 03/15/2021.  Progression of right lower lobe lesion?  Discussed systemic therapy option with cabozantinib at 20 mg.  However patient concerned due to his history of substantial AEs in the past.  Will arrange for lesion to be ablated.  However patient says multiple lesions were there for 6 years and unchanged.-Consider ablation or SBRT of the lesion and monitor and consider cabozantinib when failed.  Multiple systemic options down the road but if renal function improves can contemplate HIF-alpha inhibitor.  Continue surveillance for now.  Repeat scans in 3 months.    6.  Return to office in 12 weeks with preoffice CT scans of the chest, abdomen/pelvis without IV contrast (in 11 weeks), CBC with differential and CMP.    I spent ~31 minutes caring for Parker on this date of service. This time includes time spent by me in the following activities: preparing for the visit, reviewing tests, performing a medically appropriate examination and/or evaluation, counseling and educating the patient/family/caregiver, ordering " medications, tests, or procedures and documenting information in the medical record.

## 2023-11-27 ENCOUNTER — OFFICE VISIT (OUTPATIENT)
Dept: ONCOLOGY | Facility: CLINIC | Age: 68
End: 2023-11-27
Payer: MEDICARE

## 2023-11-27 ENCOUNTER — TELEPHONE (OUTPATIENT)
Dept: ONCOLOGY | Facility: CLINIC | Age: 68
End: 2023-11-27

## 2023-11-27 VITALS
OXYGEN SATURATION: 98 % | SYSTOLIC BLOOD PRESSURE: 140 MMHG | WEIGHT: 201.2 LBS | RESPIRATION RATE: 18 BRPM | DIASTOLIC BLOOD PRESSURE: 84 MMHG | HEIGHT: 70 IN | HEART RATE: 71 BPM | TEMPERATURE: 98 F | BODY MASS INDEX: 28.8 KG/M2

## 2023-11-27 DIAGNOSIS — C78.00 MALIGNANT NEOPLASM METASTATIC TO LUNG, UNSPECIFIED LATERALITY: ICD-10-CM

## 2023-11-27 DIAGNOSIS — C64.1 RENAL CELL CARCINOMA OF RIGHT KIDNEY: Primary | ICD-10-CM

## 2023-11-27 NOTE — TELEPHONE ENCOUNTER
"  Caller: Parker Arnold \"JESE\"    Relationship: Self    Best call back number: [phone]    What is the best time to reach you: ANYTIME    Who are you requesting to speak with (clinical staff, provider,  specific staff member): CLINICAL    What was the call regarding: PT CALLING TO GIVE THE DOCTORS NAME OF WHOM HE SEEN AT [facility]  DR LESA GUERRERO AND NURSE NAVIGATOR TIMOTHY GARAY [phone]    Is it okay if the provider responds through MyChart: N/A        "

## 2024-01-15 ENCOUNTER — TELEPHONE (OUTPATIENT)
Dept: ONCOLOGY | Facility: CLINIC | Age: 69
End: 2024-01-15

## 2024-01-15 NOTE — TELEPHONE ENCOUNTER
Caller: BETH ARIASELLE    Relationship: Emergency Contact    Best call back number: [phone]    What is the best time to reach you: ANY.LEAVE VM    Who are you requesting to speak with (clinical staff, provider,  specific staff member): SCHEDULING        What was the call regarding: PATIENT'S WIFE ANG CALLED TO CANCEL AND R/S APPT FOR TODAY 1/15/24.     Is it okay if the provider responds through MyChart: NO

## 2024-01-17 NOTE — PROGRESS NOTES
[facility] GROUP HEMATOLOGY AND ONCOLOGY  [address]  [facility] [facility] [address] [phone]    Patient Name: Parker Arnold  Encounter Date: 01/23/2024  YOB: 1955  Patient Number: [id number]     REASON FOR VISIT:  Parker Arnold is a 68 y.o. 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  Underwent right nephrectomy. Was under surveillance until 11/26/2018  When he underwent  wedge resection of a right pulm nodule. Pathology:  metastatic renal cell carcinoma. He was not able to tolerate Sutent due to kidney failure, thus received ipilumomab/nivolumab, 05/13/2019 - 08/07/2019, then single agent nivolumab, 08/28/2019 - 09/09/2019 (50.5 months since cessation of therapy). Therapy stopped due to renal failure (nivolumab?). He has been on and off prednisone since.  Last 11/29/2021 (255.5 months) underwent right thoracoscopic RLL and RML wedge resection with final path consistent with metastatic renal cell carcinoma.  He is here with his spouse, Nelda.    I have reviewed the HPI and verified with the patient the accuracy of it. No changes to interval history since the information was documented. Leonardo Smith MD 01/23/24      Problem List Items Addressed This Visit    None          Oncology/Hematology History   Renal cell carcinoma of right kidney   10/2014 Initial Diagnosis    Renal cell carcinoma (CMS/HCC)     10/2014 Surgery    Right nephrectomy, Dr Morales       10/1/2018 Progression    Increasing lung Nodules on f/u CT     10/2/2018 -  Chemotherapy    Sutent  Discontinued due to intolerance     5/13/2019 - 8/7/2019 Chemotherapy    Opdivo Yervoy x 4 cycles     8/28/2019 - 9/9/2019 Chemotherapy    Single agent Opdivo    Opdivo held 9/9/2019 due to Renal Failure / NephritisToxicity     1/16/2020 Imaging    PET SCAN  Negative for neoplastic uptake         PAST MEDICAL  HISTORY:  ALLERGIES:  Allergies   Allergen Reactions    Amoxicillin Hives    Contrast Dye (Echo Or Unknown Ct/Mr) Other (See Comments)     No Oral or IV contrast Due to only having 1 kidney    Opdivo [Nivolumab] Provider Review Needed     Renal failure    Penicillins Hives and Rash    Clindamycin/Lincomycin Rash     pustules  Rash - pustules      Doxycycline Rash    Hepatitis B Virus Vaccines Rash     Pustules      Hepatitis B Vaccine Rash    Latex Rash    Levofloxacin Rash     CURRENT MEDICATIONS:  Outpatient Encounter Medications as of 1/23/2024   Medication Sig Dispense Refill    acetaminophen (TYLENOL) 500 MG tablet Take 1 tablet by mouth Every 6 (Six) Hours As Needed for Mild Pain.      allopurinol (ZYLOPRIM) 100 MG tablet Take 1 tablet by mouth Daily.      calcium carb-cholecalciferol 600-800 MG-UNIT tablet Take 1 tablet by mouth 2 (Two) Times a Day With Meals.      carvedilol (COREG) 3.125 MG tablet Take 1 tablet by mouth 2 (Two) Times a Day With Meals.      finasteride (PROSCAR) 5 MG tablet Take 1 tablet by mouth Daily.  3    loratadine (CLARITIN) 10 MG tablet Take 1 tablet every day by oral route.      predniSONE (DELTASONE) 5 MG tablet Take 0.5 tablets by mouth Daily.      sodium bicarbonate 650 MG tablet Take 1 tablet by mouth 3 (Three) Times a Day. 90 tablet 2     No facility-administered encounter medications on file as of 1/23/2024.     ADULT ILLNESSES:  Patient Active Problem List   Diagnosis Code    Renal cell carcinoma of right kidney C64.1    Multiple nodules of lung R91.8    Lung anomaly Q33.9    TIA (transient ischemic attack) G45.9    Acute renal failure with tubular necrosis in the setting of solitary kidney N17.0    Lung metastases C78.00    Pyuria R82.81    Hyperkalemia E87.5    Metabolic acidosis E87.20    Hyperglycemia R73.9    Hypercalcemia E83.52    Benign prostatic hyperplasia N40.0    Increased frequency of urination R35.0    Nocturia R35.1    Retention of urine R33.9    Dehydration  E86.0    Nephritis and nephropathy N05.9    Toxicity, chemicals T65.91XA    Stage 3 chronic kidney disease N18.30    Campylobacter gastroenteritis A04.5    Sigmoid diverticulitis K57.32    History of TIA (transient ischemic attack) Z86.73    Solitary kidney, acquired Z90.5    Benign essential hypertension I10    Gastroesophageal reflux disease K21.9    Encounter for screening for malignant neoplasm of colon Z12.11     SURGERIES:  Past Surgical History:   Procedure Laterality Date    BRONCHOSCOPY N/A 11/29/2021    Procedure: BRONCHOSCOPY;  Surgeon: Jarrod Tolentino MD;  Location: [facility] OR;  Service: Cardiothoracic;  Laterality: N/A;    CHOLECYSTECTOMY WITH INTRAOPERATIVE CHOLANGIOGRAM N/A 12/22/2021    Procedure: LAPAROSCOPIC CHOLECYSTECTOMY;  Surgeon: Vandana Calhoun MD;  Location: [facility] OR;  Service: General;  Laterality: N/A;    COLONOSCOPY N/A 4/18/2022    Procedure: COLONOSCOPY WITH ANESTHESIA;  Surgeon: Zoila Robison MD;  Location: [facility] ENDOSCOPY;  Service: Gastroenterology;  Laterality: N/A;  pre colon cancer  post  Dr. Osborne    HERNIA REPAIR  2008    LUNG BIOPSY  11/2018    NEPHRECTOMY Right 2014    SINUS SURGERY  2005    THORACOSCOPY Right 11/29/2021    Procedure: RIGHT THORACOSCOPY WITH RIGHT MIDDLE AND RIGHT LOWER LOBE WEDGE RESECTIONS;  Surgeon: Jarrod Tolentino MD;  Location: [facility] OR;  Service: Cardiothoracic;  Laterality: Right;     HEALTH MAINTENANCE ITEMS:  Health Maintenance Due   Topic Date Due    COVID-19 Vaccine (1) Never done    TDAP/TD VACCINES (1 - Tdap) Never done    ZOSTER VACCINE (1 of 2) Never done    Pneumococcal Vaccine 65+ (1 of 1 - PCV) Never done    HEPATITIS C SCREENING  Never done    ANNUAL WELLNESS VISIT  Never done    BMI FOLLOWUP  03/28/2023    INFLUENZA VACCINE  Never done       <no information>  Last Completed Colonoscopy            COLORECTAL CANCER SCREENING (COLONOSCOPY - Every 5 Years) Next due on 4/18/2027 04/18/2022  COLONOSCOPY    04/18/2022  Surgical  "Procedure: COLONOSCOPY                    There is no immunization history on file for this patient.  Last Completed Mammogram       This patient has no relevant Health Maintenance data.              FAMILY HISTORY:  Family History   Problem Relation Age of Onset    Other Mother         blood disease unknown    Cancer Father         unknown    No Known Problems Sister     No Known Problems Sister     Heart disease Maternal Grandmother     Heart disease Maternal Grandfather     No Known Problems Paternal Grandmother     No Known Problems Paternal Grandfather     Colon polyps Neg Hx     Colon cancer Neg Hx      SOCIAL HISTORY:  Social History     Socioeconomic History    Marital status:    Tobacco Use    Smoking status: Former     Packs/day: 1.00     Years: 22.00     Additional pack years: 0.00     Total pack years: 22.00     Types: Cigarettes     Start date:      Quit date:      Years since quittin.0    Smokeless tobacco: Never   Vaping Use    Vaping Use: Never used   Substance and Sexual Activity    Alcohol use: Not Currently    Drug use: No    Sexual activity: Defer       REVIEW OF SYSTEMS:  Constitutional:  Manages ADLs, chores, errands and driving.  Again drove himself in today.  Appetite is good.  \"Still too good.\" Energy is fairly good.  He remains active.  \"I've completed building a small house for my daughter.\" He has regained 6 lb (in addition to 6 lb at his prior visit - had lost 18 lb at his visit prior to that) since his last visit. No fever, no chills nor drenching nights.  HENT: Negative.  No sore throat.  Has seasonal sinus symptoms/rhinorrhea. No headaches.  Eyes: Negative.    Respiratory:  No SOB at rest nor TENORIO.  No cough. No wheezing. No tobacco use.    Cardiovascular: Negative.  No palpitations.  No orthopnea  Gastrointestinal: RUQ pain resolved since cholecystectomy.  No dysphagia.  No nausea.  No vomiting.  No dyspepsia. Bowels moving regularly.  Has occasional soft stools.  " "No overt diarrhea.  No melena.  Has not had hemorrhoidal bleeding.  Has history recurrent diverticulitis, early 06/2021.  Had colonoscopy, 04/18/2022- diverticulosis.  Nonbleeding internal hemorrhoids otherwise normal exam.  Endocrine: No hot flashes.  Genitourinary:  No dysuria.  No incontinence. No hematuria.  Occasional urgency.  \"I still feel like I gotta go when I gotta go sometimes.\"  Nocturia much improved on Finasteride - up 1x- from every 90 min.  Remains on allopurinol.  Musculoskeletal:  No arthralgias.  No edema  Skin: No new rash.    Neurological:  No dizziness.  No facial asymmetry. No headaches.  Mild sensory neuropathy of the hands, not worse  Hematological: Negative for new adenopathy.  Says he bruises easily.  Psychiatric/Behavioral:  Has baseline anxiety, \"steroids and stress.\"  No depression.      VITAL SIGNS: /86   Pulse 76   Temp 97.1 °F (36.2 °C) (Temporal)   Resp 18   Ht 177.8 cm (70\")   Wt 94 kg (207 lb 4.8 oz)   SpO2 97%   BMI 29.74 kg/m² Body surface area is 2.12 meters squared.  Pain Score    01/23/24 0952   PainSc: 0-No pain           PHYSICAL EXAMINATION:   General Appearance: Patient is a very pleasant, cooperative, heavyset, modestly kept elderly male who is awake, alert, oriented and in no acute distress. ECOG 0 (same)  HEENT: Normocephalic. Sclerae clear, conjunctiva pink, extraocular movements intact  NECK: Supple, no jugular venous distention, thyroid not enlarged.  LYMPH: No cervical, supraclavicular, axillary, or inguinal lymphadenopathy.  LUNGS: Good air movement, no rales, rhonchi, rubs or wheezes with auscultation.  Right thoracoscopic incisions are healed  CARDIO: Regular sinus rhythm, no murmurs, gallops or rubs.  ABDOMEN:  Obese, nondistended, soft, with no right upper quadrant tenderness.  Laparoscopy wounds are all healed. No obvious abdominal masses. Bowel sounds positive. No hernia  MUSKEL: No joint swelling, decreased motion, or inflammation  EXTREMS:  No " edema, clubbing, cyanosis, No varicose veins.  NEURO: Grossly nonfocal, Gait is coordinated and smooth, Cognition is preserved.  SKIN: No rashes, no ecchymoses, no petechia.  PSYCH: Oriented to time, place and person. Memory is preserved. Mood and affect appear normal         LABS    Lab Results - Last 18 Months   Lab Units 01/23/24  0945 12/07/23  1227 11/20/23  0952 07/20/23  1048 06/25/23  2321 01/19/23  1053 09/19/22  1127   HEMOGLOBIN g/dL 15.6  --  16.1 15.0 14.5 15.0 14.6   HEMATOCRIT % 48.7  --  50.7 47.4 43.3 46.2 45.5   MCV fL 91.7  --  92.2 92.9 90.8 90.9 91.4   WBC 10*3/mm3 6.19  --  7.59 7.00 7.2 5.90 7.00   RDW % 13.3  --  13.4 13.7 13.4 13.2 13.4   MPV fL 9.3  --  9.3 9.7 9.6 9.4 9.6   PLATELETS 10*3/mm3 174  --  218 189 168 198 175   IMM GRAN % % 0.3  --  0.1 0.3  --  0.3 0.3   NEUTROS ABS 10*3/mm3 3.53 5.3 4.20 4.42 4.8 3.19 4.59   LYMPHS ABS 10*3/mm3 1.66  --  2.26 1.52 1.4 1.39 1.46   MONOS ABS 10*3/mm3 0.66  --  0.77 0.73 0.80 0.86 0.71   EOS ABS 10*3/mm3 0.28 0.3 0.31 0.27 0.30 0.39 0.20   BASOS ABS 10*3/mm3 0.04 0.1 0.04 0.04 0.00 0.05 0.02   IMMATURE GRANS (ABS) 10*3/mm3 0.02  --  0.01 0.02 0.0 0.02 0.02   NRBC /100 WBC 0.0  --  0.0 0.0  --  0.0 0.0   NEUTROPHIL % %  --  67.6  --   --   --   --   --    MONOCYTES % %  --  8.7  --   --   --   --   --    BASOPHIL % %  --  0.8  --   --   --   --   --        Lab Results - Last 18 Months   Lab Units 12/07/23  1316 11/21/23  1059 11/20/23  0952 07/20/23  1048 01/19/23  1053 09/19/22  1127   GLUCOSE mg/dL  --  105* 107* 102* 92 120*   SODIUM mmol/L  --  140 143 141 144 138   POTASSIUM mmol/L  --  4.8 5.4* 5.1 4.7 4.9   CO2 mmol/L  --  25.0 27.0 20.0* 27.0 24.0   CHLORIDE mmol/L  --  107 107 109* 109* 106   ANION GAP mmol/L  --  8.0 9.0 12.0 8.0 8.0   CREATININE mg/dL 83.51 2.18* 2.07* 2.40* 3.08* 2.66*   BUN mg/dL  --  38* 35* 44* 40* 46*   BUN / CREAT RATIO   --  17.4 16.9 18.3 13.0 17.3   CALCIUM mg/dL  --  8.7 9.4 9.6 9.0 9.0   ALK PHOS U/L  --  70  75 71 64 73   TOTAL PROTEIN g/dL  --  6.5 7.0 6.6 6.5 6.7   ALT (SGPT) U/L  --  14 14 13 19 13   AST (SGOT) U/L  --  14 14 14 22 16   BILIRUBIN mg/dL  --  0.5 0.6 0.5 0.4 0.5   ALBUMIN g/dL  --  4.1 4.5 4.4 4.0 4.50   GLOBULIN gm/dL  --  2.4 2.5 2.2 2.5 2.2       Assessment:  1.  Renal cell carcinoma, diagnosed 11/11/2014 (5.5 cm G3, pT3a, pNx). --2014, right nephrectomy.   --surveillance until 11/26/2018    --11/26/2018-right lower lobe VATS wedge resection: Metastatic renal cell carcinoma, 0.9 cm; margins free of malignancy. Dr. Hu  -- Unable to tolerate Sutent due to kidney failure, thus received ipilumomab/nivolumab, 05/13/2019 - 08/07/2019, then single agent nivolumab, 08/28/2019 - 09/09/2019 (52 months since cessation of therapy). Therapy stopped due to renal failure (nivolumab?)  --01/16/2020-PET scan.  No neoplastic FDG uptake within the chest, abdomen or pelvis.  --07/06/2020-CT abdomen/pelvis with diverticulosis of the colon.  Evidence of acute diverticulitis of the sigmoid colon and adjacent distal descending colon.  No free air or fluid seen.  1 cm noncalcified subpleural nodule in the left lower lobe.  Follow-up exam in 6 months recommended.  --08/04/2020-chest CT.  Scattered subcentimeter bilateral pulmonary nodules similar to 1/16/2020 with mild increase in the 5 mm left upper lobe nodule.  Continued follow-up recommended.  No pathologic lymphadenopathy.  Right nephrectomy.  --01/14/2020-CT chest.  Increased size of 1.2 cm right lower lobe superior segment pulmonary nodule (previously 0.9 cm)-representing 33% growth.  Multiple other bilateral pulmonary nodules have not significantly changed in size.  Findings are in keeping with disease progression.  --01/14/2020-CT abdomen/pelvis.  No evidence of recurrent or metastatic disease in the abdomen or pelvis.  --01/26/2021-PET/CT.  Impression: Minimal metabolic activity associated with the enlarging pulmonary nodule within the superior segment of the  right lower lobe with a maximum SUV of 1.35.  The additional nodules noted within both lungs also demonstrate no abnormal metabolic activity which would favor benignity but continued radiographic follow-up will be suggested.  No scintigraphic evidence of metastatic disease on current exam.  Increased uptake overlying the right groin and more distal external iliac vessels felt to be related to previous hernia repair.  --02/14/2021-  Encounter from Dr. Mayito Arteaga, medical oncology at [facility].  History reviewed.  Most recent CT chest, 01/14/2021 (above) with increasing size of 1.2 cm right lower lobe superior segment pulmonary nodule (previously 0.9 cm) and PET scan, 01/26/2021 (above) noted including minimal metabolic activity associated with the enlarging pulmonary nodule within the superior segment of the right lower lobe.  Discussion: Biopsy right lower lobe lung nodule solitary lesion that is growing on PET scan which will be done at that institution.  Contemplate either surgical resection, ablation or SBRT.  We will focus on local therapy for now.  Multiple systemic options down the road but if renal function improves can contemplate trial with HIF-alpha inhibitor.  --03/15/2021- CT chest.  Continued increased size of the right lower lobe superior segment now 1.4 cm pulmonary nodule (1.2 cm, 1/14/2021; 0.8 cm, 8/4/2020).  Other pulmonary nodules are stable.  New small area of groundglass opacity in the superior segment left lower lobe may be infectious/inflammatory.  Coronary artery calcifications.  Aortic calcifications.  Small hiatal hernia.  --03/15/2021-CT abdomen/pelvis.  Previous right nephrectomy identified with no evidence of intra-abdominal metastatic disease.  Benign-appearing exophytic cyst posterior aspect of the left kidney, upper pole, measuring 2.47 cm, stable.  Small nodules in the lung bases are unchanged compared to previous chest CT.  Evidence of previous right  inguinal hernia repair.  Fatty infiltration of the inguinal canals.  Scattered colonic diverticula without diverticulitis.  Small sliding hiatal hernia identified.  --06/11/2021-CT chest without contrast.  Stable pulmonary nodules.  No evidence of disease progression.  Interval resolution superior segment left lower lobe groundglass opacity.  Scattered coronary artery calcifications.  --06/11/2021-CT abdomen/pelvis.  History of right renal cell carcinoma nephrectomy.  No CT evidence of tumor recurrence or metastatic disease in the abdomen or pelvis.  --09/07/2021-CT chest wo contrast.  Pulmonary nodules noted.  Slightly increased in size compared to 6/11/2021 (left upper lobe 7.2 mm-prior 6.7 mm; posterior right lung, 19 mm-prior 13.9 mm; posterior medial right lung, 14 mm-prior 11 mm; right lung, 7.7 mm-prior 7.1 mm).  However no new nodules identified.  --09/07/2021-CT abdomen/pelvis wo contrast.  No evidence of intra-abdominal or pelvic metastasis.  Prior right nephrectomy.  Stable left renal cyst.  Colonic diverticulosis with questionable wall thickening of the sigmoid colon may be artifactual.  Correlation to any recent colonoscopy recommended.  --09/28/2021-PET scan.  Increased size of right lower lobe lung nodule (21 x 15 mm compared to 14 x 14 mm 3 months ago) though this finding shows no significant FDG uptake.  Otherwise stable PET/CT exam.  --11/29/2021-RLL, RML wedge resections. Path consistent with metastatic renal cell carcinoma  --12/14/2021-CT chest.  Postoperative changes from wedge resections of prior right middle and lower lobe pulmonary nodules.  Stable left-sided pulmonary nodules, largest in the left upper lobe/apex measuring 8 mm.  Continued follow-up strongly recommended.  No pathologic lymphadenopathy.  --12/14/2021-CT abdomen/pelvis.  No convincing evidence of intra-abdominal or pelvic metastasis.  New diffuse abnormal gallbladder wall thickening.  Findings may be seen with cholecystitis.   Gallbladder ultrasound might be considered.  --12/20/2021-gallbladder ultrasound-diffuse gallbladder wall thickening.  Gallbladder nondistended with no visible stones.  Unlikely acute cholecystitis.  Favor chronic cholecystitis.  --03/18/2022-CT chest-patchy infiltrate compatible with right middle lobe pneumonia.  2 tiny left lung nodule stable.  --03/18/2022-CT abdomen/pelvis.  No evidence of metastatic disease to the abdomen/pelvis.  Prior right nephrectomy.  Colonic diverticulosis without evidence of diverticulitis.  Mild splenomegaly, unchanged.  --06/24/2022- CT chest-1. The resolution of the previously seen patchy infiltrate in the right middle lobe. 2. Postsurgical changes and scarring in the right upper and lower lobe. 3. Stable small nodules in both lungs the largest one in the left upper lobe. No change.  --06/24/2022- CT abdomen/pelvis- stable.  No evidence of a neoplastic process or metastatic disease. Evidence of right nephrectomy. No focal complication. Stable left renal cyst. Diverticulosis of the colon. No evidence for diverticulitis. Stable mild splenomegaly.  Enlarged prostate.  --09/19/2022- CT CAP- Stable exam.  No change in multiple bilateral pulmonary nodules measuring up to 8 mm.  No evidence of recurrent or metastatic disease in the abdomen or pelvis status post RIGHT nephrectomy.  --1/23/2023- CT CAP- Prior wedge resection within the RML/RLL. No evidence of localized recurrence at these sites. Numerous other pulmonary nodules are present. The majority of these nodules are stable in size and appearance from the previous exam. There is one nodule which is subpleural in location within the lateral left upper lobe on image 52 which has an increased soft tissue component from the previous exam. This would warrant close follow-up on subsequent imaging.No acute abnormality of the abdomen or pelvis. Evidence of prior right nephrectomy. No focal complication. A left renal cyst.  --7/20/23- CT CAP- No  change in multiple bilateral pulmonary nodules measuring up to 10 mm, compared to 01/23/2023. However, there has been gradual increase in pulmonary nodule size compared to more remote studies dating back to 03/18/2022. For reference, the dominant 10 mm LEFT upper lobe pulmonary nodule measured 7 mm on 03/18/2022. Recommend continued clinical and imaging follow-up as metastatic disease remains in the differential.  --8/17/23 and 9/5/23- Follow-up Dr. Juan A Cannon-A/P: 68-year-old male with metastatic ccRCC on active surveillance initially seen in [facility], 8/17/2023 for second opinion.  Discussed pathology, imaging, treatment options and prognosis in detail.  Images reviewed  tumor board that agreed with very slow-growing pulmonary nodules likely metastatic.  Discussed systemic therapy options including rechallenging IO, cabozantinib,mTOR, Belzutifan.  Clinical trials are not our institution discussed but likely will not be a candidate given his renal dysfunction.  Patient strongly prefers not to start systemic therapy unless absolutely necessary to avoid further renal dysfunction and other side effects.  Given slow growth, I think it is reasonable to hold off on systemic therapy continue surveillance for now.  Local therapy can be discussed to the dominant lesions based on future scans.  I do think he will ultimately need to get back on systemic therapy  --11/20/23- CT CAP wo- Multiple bilateral new and enlarging pulmonary nodules measuring up to 10 mm. Findings are highly concerning for metastatic disease.  Mild dilatation of the ascending artery measuring 4 cm diameter. No evidence of recurrent or metastatic disease in the abdomen or pelvis status post right nephrectomy on this unenhanced exam. Heavy sigmoid diverticulosis without evidence of diverticulitis.   --12/7/23- Follow-up Dr. Cannon: A/P: Discussed pathology, imaging, treatment options and prognosis in detail with him.  Images reviewed and agree  "with slow-growing pulmonary nodules that are likely metastatic.  Discussed systemic therapy options including rechallenging IO, cabozantinib, mTOR, Belzutifan, clinical trials at our institution discussed likely will not be a candidate given his renal dysfunction.  \"Both although slow, recommend cabozantinib.  Patient really concerned about side effects given his past experience with sunitinib.  I suggest start with carbo 20 mg p.o. daily and titrate up to 40 mg based on tolerability.  No dose modifications needed for renal dysfunction.  RTC as needed      2.  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    --GFR 29 mL/min, 1/23/23 (prior:  9 - 34)  --s/p multiple doses of HD steroids in the past and remains on maintenance 2.5 mg prednisone per nephrology (JACLYN Ham-Dr. Grant)- tapered down from 5 mg/2.5 mg  --will not start Cytoxan unless biopsy done to determine etiology of kidney failure. Has been off Opdivo since September 2019.    3.  Normocytic anemia.    --Resolved. Hgb 15.6; MCV 91.7, 1/23/24 (prior: Hgb 10.2-15.1; MCV 86.3-95.5)  4.  Gerd: modulated by omeprazole  5.  Osteopenia: on bola+vit D  6.  History of acute diverticulitis.   --04/18/2022- colonoscopy-diverticulosis in the left colon.  Nonbleeding internal hemorrhoids.  Otherwise normal exam.  Repeat 5 years.  Dr. Robison    Plan:  1.  Apprised of labs, 12/7/23 and 1/23/24 -normal CBC. GFR 29 otherwise normal CMP.    2.  Previously apprised of CT scans of the chest, abdomen/pelvis, 11/20/23 (above).  Progressive lung nodules    3.  Follow-up Dr. Cannon ( oncology, [facility]), 12/7/23: A/P: Discussed pathology, imaging, treatment options and prognosis in detail with him.  Images reviewed and agree with slow-growing pulmonary nodules that are likely metastatic.  Discussed systemic therapy options including rechallenging IO, cabozantinib, mTOR, Belzutifan, clinical trials at our institution discussed likely will not be a candidate given his " renal dysfunction.  Recommend cabozantinib.  Patient really concerned about side effects given his past experience with sunitinib.  I suggest start with cabo 20 mg p.o. daily and titrate up to 40 mg based on tolerability.  No dose modifications needed for renal dysfunction.  RTC as needed    4.  Review consult note, 8/17/23  from Dr. Cannon ( oncology, [facility])-A/P: ccRCC: Pathology, imaging, treatment options and prognosis discussed in detail.  Images reviewed,  tumor board agrees with slow-growing pulmonary nodules likely metastatic.  Discussed systemic therapy options including rechallenging IO, cabozantinib,mTOR, Belzutifan.  Clinical trials are not our institution discussed but likely will not be a candidate given his renal dysfunction.  Patient strongly prefers not to start systemic therapy unless absolutely necessary to avoid further renal dysfunction and other side effects.  Given slow growth, I think it is reasonable to hold off on systemic therapy continue surveillance for now.  Local therapy can be discussed to the dominant lesions based on future scans.  I do think he will ultimately need to get back on systemic therapy.    5.  Previously reviewed encounter office encounter from GIOVANNY Edward with Dr. Arteaga's office, 05/27/2021.  Impression/plan: CT CAP, 05/26/2021: Stable examination including stable bilateral lung nodules compared to 03/15/2021.  Progression of right lower lobe lesion?  Discussed systemic therapy option with cabozantinib at 20 mg.  However patient concerned due to his history of substantial AEs in the past.  Will arrange for lesion to be ablated.  However patient says multiple lesions were there for 6 years and unchanged.-Consider ablation or SBRT of the lesion and monitor and consider cabozantinib when failed.  Multiple systemic options down the road but if renal function improves can contemplate HIF-alpha inhibitor.  Continue surveillance for now.  Repeat scans in 3  months.    6.    Re: The potential toxicities of cabozantinib (to include but not limited to: Arterial/venous thromboembolism, hypertension, hypertensive crisis, severe hemorrhage, arterial aneurysm/dissection/rupture, impaired wound healing, GI perforation, fistula formation, severe diarrhea, hand-foot syndrome, hepatotoxicity (in combination with nivolumab), nephrotic syndrome, adrenal insufficiency (in combination with nivolumab), hypothyroidism, osteonecrosis of the jaw, posterior leukoencephalopathy syndrome, QT prolongation, hypocalcemia, neutropenia, diarrhea, fatigue, decreased appetite, hypertension, nausea, vomiting, decreased weight, rash, stomatitis, dysphonia, asthenia, dysgeusia, hypocalcemia, dyspnea, hypothyroidism, extremity pain, dyspepsia, constipation, muscle spasms, proteinuria, abdominal pain, thromboembolism, cough, anemia, hemorrhage, headache, arthralgia, xeroderma, dizziness, increased LDH, hypoalbuminemia, increased alk phos, decreased platelets, hypophosphatemia, hypomagnesemia).  Questions answered.  He agrees to trial therapy.     7.  Rx treatment   Cabozantinib 20 mg po daily # 30 x 11 RF  Zofran 8 mg po tid prn # 30     8.  Upon initiation of treatment: TSH, T4, CMP and CBC weekly on Mondays. Hold if creatinine > 1.5, total bilirubin > 1.5, AST/ALT > 3x ULN, TSH <0.5 or > 2. Administer Procrit 40,000 units subcutaneously if hemoglobin < 10 or hematocrit < 30. Neupogen 480 mcg sc x 3 days if ANC < 1.0     9.  Return to office in 6 weeks with preoffice CT scans of the chest, abdomen/pelvis without IV contrast (in 5 weeks), CBC with differential and CMP.    I spent ~95 minutes caring for Parker on this date of service. This time includes time spent by me in the following activities: preparing for the visit, reviewing tests, performing a medically appropriate examination and/or evaluation, counseling and educating the patient/family/caregiver, ordering medications, tests, or  procedures and documenting information in the medical record.

## 2024-01-23 ENCOUNTER — SPECIALTY PHARMACY (OUTPATIENT)
Dept: ONCOLOGY | Facility: HOSPITAL | Age: 69
End: 2024-01-23

## 2024-01-23 ENCOUNTER — OFFICE VISIT (OUTPATIENT)
Dept: ONCOLOGY | Facility: CLINIC | Age: 69
End: 2024-01-23
Payer: MEDICARE

## 2024-01-23 ENCOUNTER — LAB (OUTPATIENT)
Dept: LAB | Facility: HOSPITAL | Age: 69
End: 2024-01-23
Payer: MEDICARE

## 2024-01-23 VITALS
OXYGEN SATURATION: 97 % | TEMPERATURE: 97.1 F | RESPIRATION RATE: 18 BRPM | HEIGHT: 70 IN | DIASTOLIC BLOOD PRESSURE: 86 MMHG | HEART RATE: 76 BPM | SYSTOLIC BLOOD PRESSURE: 140 MMHG | BODY MASS INDEX: 29.68 KG/M2 | WEIGHT: 207.3 LBS

## 2024-01-23 DIAGNOSIS — R94.6 ABNORMAL RESULTS OF THYROID FUNCTION STUDIES: ICD-10-CM

## 2024-01-23 DIAGNOSIS — N05.9 NEPHRITIS AND NEPHROPATHY: ICD-10-CM

## 2024-01-23 DIAGNOSIS — C78.00 MALIGNANT NEOPLASM METASTATIC TO LUNG, UNSPECIFIED LATERALITY: ICD-10-CM

## 2024-01-23 DIAGNOSIS — I15.8 HYPERTENSION DUE TO DRUG: ICD-10-CM

## 2024-01-23 DIAGNOSIS — C64.1 RENAL CELL CARCINOMA OF RIGHT KIDNEY: Primary | ICD-10-CM

## 2024-01-23 DIAGNOSIS — C78.00 MALIGNANT NEOPLASM METASTATIC TO LUNG, UNSPECIFIED LATERALITY: Primary | ICD-10-CM

## 2024-01-23 DIAGNOSIS — T50.905A HYPERTENSION DUE TO DRUG: ICD-10-CM

## 2024-01-23 DIAGNOSIS — C64.1 RENAL CELL CARCINOMA OF RIGHT KIDNEY: ICD-10-CM

## 2024-01-23 LAB
ALBUMIN SERPL-MCNC: 4.2 G/DL (ref 3.5–5.2)
ALBUMIN/GLOB SERPL: 1.8 G/DL
ALP SERPL-CCNC: 72 U/L (ref 39–117)
ALT SERPL W P-5'-P-CCNC: 15 U/L (ref 1–41)
ANION GAP SERPL CALCULATED.3IONS-SCNC: 10 MMOL/L (ref 5–15)
AST SERPL-CCNC: 13 U/L (ref 1–40)
BASOPHILS # BLD AUTO: 0.04 10*3/MM3 (ref 0–0.2)
BASOPHILS NFR BLD AUTO: 0.6 % (ref 0–1.5)
BILIRUB SERPL-MCNC: 0.5 MG/DL (ref 0–1.2)
BUN SERPL-MCNC: 38 MG/DL (ref 8–23)
BUN/CREAT SERPL: 16.4 (ref 7–25)
CALCIUM SPEC-SCNC: 9 MG/DL (ref 8.6–10.5)
CHLORIDE SERPL-SCNC: 106 MMOL/L (ref 98–107)
CO2 SERPL-SCNC: 25 MMOL/L (ref 22–29)
CREAT SERPL-MCNC: 2.32 MG/DL (ref 0.76–1.27)
DEPRECATED RDW RBC AUTO: 45.2 FL (ref 37–54)
EGFRCR SERPLBLD CKD-EPI 2021: 29.7 ML/MIN/1.73
EOSINOPHIL # BLD AUTO: 0.28 10*3/MM3 (ref 0–0.4)
EOSINOPHIL NFR BLD AUTO: 4.5 % (ref 0.3–6.2)
ERYTHROCYTE [DISTWIDTH] IN BLOOD BY AUTOMATED COUNT: 13.3 % (ref 12.3–15.4)
GLOBULIN UR ELPH-MCNC: 2.3 GM/DL
GLUCOSE SERPL-MCNC: 95 MG/DL (ref 65–99)
HCT VFR BLD AUTO: 48.7 % (ref 37.5–51)
HGB BLD-MCNC: 15.6 G/DL (ref 13–17.7)
HOLD SPECIMEN: NORMAL
IMM GRANULOCYTES # BLD AUTO: 0.02 10*3/MM3 (ref 0–0.05)
IMM GRANULOCYTES NFR BLD AUTO: 0.3 % (ref 0–0.5)
LYMPHOCYTES # BLD AUTO: 1.66 10*3/MM3 (ref 0.7–3.1)
LYMPHOCYTES NFR BLD AUTO: 26.8 % (ref 19.6–45.3)
MCH RBC QN AUTO: 29.4 PG (ref 26.6–33)
MCHC RBC AUTO-ENTMCNC: 32 G/DL (ref 31.5–35.7)
MCV RBC AUTO: 91.7 FL (ref 79–97)
MONOCYTES # BLD AUTO: 0.66 10*3/MM3 (ref 0.1–0.9)
MONOCYTES NFR BLD AUTO: 10.7 % (ref 5–12)
NEUTROPHILS NFR BLD AUTO: 3.53 10*3/MM3 (ref 1.7–7)
NEUTROPHILS NFR BLD AUTO: 57.1 % (ref 42.7–76)
NRBC BLD AUTO-RTO: 0 /100 WBC (ref 0–0.2)
PLATELET # BLD AUTO: 174 10*3/MM3 (ref 140–450)
PMV BLD AUTO: 9.3 FL (ref 6–12)
POTASSIUM SERPL-SCNC: 5.2 MMOL/L (ref 3.5–5.2)
PROT SERPL-MCNC: 6.5 G/DL (ref 6–8.5)
RBC # BLD AUTO: 5.31 10*6/MM3 (ref 4.14–5.8)
SODIUM SERPL-SCNC: 141 MMOL/L (ref 136–145)
WBC NRBC COR # BLD AUTO: 6.19 10*3/MM3 (ref 3.4–10.8)

## 2024-01-23 PROCEDURE — 80053 COMPREHEN METABOLIC PANEL: CPT

## 2024-01-23 PROCEDURE — 36415 COLL VENOUS BLD VENIPUNCTURE: CPT

## 2024-01-23 PROCEDURE — 85025 COMPLETE CBC W/AUTO DIFF WBC: CPT

## 2024-02-19 NOTE — PROGRESS NOTES
[facility] ON[facility] GROUP HEMATOLOGY AND ONCOLOGY  [address]  [facility] [address] [phone]    Patient Name: Parker Arnold  Encounter Date: 02/27/2024  YOB: 1955  Patient Number: [id number]       REASON FOR VISIT:  Parker Arnold is a 68 y.o. 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  Underwent right nephrectomy. Was under surveillance until 11/26/2018  When he underwent  wedge resection of a right pulm nodule. Pathology:  metastatic renal cell carcinoma. He was not able to tolerate Sutent due to kidney failure, thus received ipilumomab/nivolumab, 05/13/2019 - 08/07/2019, then single agent nivolumab, 08/28/2019 - 09/09/2019 (50.5 months since cessation of therapy). Therapy stopped due to renal failure (nivolumab?). He has been on and off prednisone since.  Last 11/29/2021 (27 months) underwent right thoracoscopic RLL and RML wedge resection with final path consistent with metastatic renal cell carcinoma.  On 12/7/23 he was seen in follow-up by Dr. Cannon ( oncology, [facility]) who recommended a trial of cabozantinib.  Thus, at his visit on 1/23/24 palliative cabozantinib was sent in but due to insurance issues did not receive the drug until 2/21/24.  He is here with his spouse, Nelda.    I have reviewed the HPI and verified with the patient the accuracy of it. No changes to interval history since the information was documented. Leonardo Smith MD 02/27/24      Problem List Items Addressed This Visit          Other    Renal cell carcinoma of right kidney - Primary    Overview     Overview:   Diagnoses 11/11/14. Path report in C[facility]E at [facility]. Tissue has been requested.          Relevant Orders    CBC & Differential    Comprehensive Metabolic Panel    T4, Free    TSH    CT chest wo contrast    CT abdomen pelvis wo contrast    CBC & Differential     Comprehensive Metabolic Panel    Magnesium    Phosphorus     Other Visit Diagnoses       Abnormal results of thyroid function studies        Relevant Orders    T4, Free    TSH                Oncology/Hematology History   Renal cell carcinoma of right kidney   10/2014 Initial Diagnosis    Renal cell carcinoma (CMS/HCC)     10/2014 Surgery    Right nephrectomy, Dr Morales       10/1/2018 Progression    Increasing lung Nodules on f/u CT     10/2/2018 -  Chemotherapy    Sutent  Discontinued due to intolerance     5/13/2019 - 8/7/2019 Chemotherapy    Opdivo Yervoy x 4 cycles     8/28/2019 - 9/9/2019 Chemotherapy    Single agent Opdivo    Opdivo held 9/9/2019 due to Renal Failure / NephritisToxicity     1/16/2020 Imaging    PET SCAN  Negative for neoplastic uptake     1/23/2024 -  Chemotherapy    [facility] - Cabozantinib (Cabometyx)     Lung metastases   10/18/2019 Initial Diagnosis    Lung metastases     1/23/2024 -  Chemotherapy    SPE[facility]TY PHARM[facility] - Cabozantinib (Cabometyx)         PAST MEDICAL HISTORY:  ALLERGIES:  Allergies   Allergen Reactions    Amoxicillin Hives    Contrast Dye (Echo Or Unknown Ct/Mr) Other (See Comments)     No Oral or IV contrast Due to only having 1 kidney    Opdivo [Nivolumab] Provider Review Needed     Renal failure    Penicillins Hives and Rash    Clindamycin/Lincomycin Rash     pustules  Rash - pustules      Doxycycline Rash    Hepatitis B Virus Vaccines Rash     Pustules      Hepatitis B Vaccine Rash    Latex Rash    Levofloxacin Rash     CURRENT MEDICATIONS:  Outpatient Encounter Medications as of 2/27/2024   Medication Sig Dispense Refill    acetaminophen (TYLENOL) 500 MG tablet Take 1 tablet by mouth Every 6 (Six) Hours As Needed for Mild Pain.      allopurinol (ZYLOPRIM) 100 MG tablet Take 1 tablet by mouth Daily.      cabozantinib s-malate (CABOMETYX) 20 MG tablet tablet Take 1 tablet by mouth Daily. Please do not take with food; Take on an empty stomach (at least 1 hour  before or 2 hours after eating). 30 tablet 5    cabozantinib s-malate (CABOMETYX) 20 MG tablet tablet Take 1 tablet by mouth Daily. Please do not take with food; Take on an empty stomach (at least 1 hour before or 2 hours after eating). 30 tablet 0    calcium carb-cholecalciferol 600-800 MG-UNIT tablet Take 1 tablet by mouth 2 (Two) Times a Day With Meals.      carvedilol (COREG) 3.125 MG tablet Take 1 tablet by mouth 2 (Two) Times a Day With Meals.      finasteride (PROSCAR) 5 MG tablet Take 1 tablet by mouth Daily.  3    loratadine (CLARITIN) 10 MG tablet Take 1 tablet every day by oral route.      losartan (COZAAR) 25 MG tablet Take 1 tablet by mouth Daily. 30 tablet 0    predniSONE (DELTASONE) 5 MG tablet Take 0.5 tablets by mouth Daily.      sodium bicarbonate 650 MG tablet Take 1 tablet by mouth 3 (Three) Times a Day. 90 tablet 2     No facility-administered encounter medications on file as of 2/27/2024.     ADULT ILLNESSES:  Patient Active Problem List   Diagnosis Code    Renal cell carcinoma of right kidney C64.1    Multiple nodules of lung R91.8    Lung anomaly Q33.9    TIA (transient ischemic attack) G45.9    Acute renal failure with tubular necrosis in the setting of solitary kidney N17.0    Lung metastases C78.00    Pyuria R82.81    Hyperkalemia E87.5    Metabolic acidosis E87.20    Hyperglycemia R73.9    Hypercalcemia E83.52    Benign prostatic hyperplasia N40.0    Increased frequency of urination R35.0    Nocturia R35.1    Retention of urine R33.9    Dehydration E86.0    Nephritis and nephropathy N05.9    Toxicity, chemicals T65.91XA    Stage 3 chronic kidney disease N18.30    Campylobacter gastroenteritis A04.5    Sigmoid diverticulitis K57.32    History of TIA (transient ischemic attack) Z86.73    Solitary kidney, acquired Z90.5    Benign essential hypertension I10    Gastroesophageal reflux disease K21.9    Encounter for screening for malignant neoplasm of colon Z12.11    Hypertension due to drug  I15.8, T50.905A     SURGERIES:  Past Surgical History:   Procedure Laterality Date    BRONCHOSCOPY N/A 11/29/2021    Procedure: BRONCHOSCOPY;  Surgeon: Jarrod Tolentino MD;  Location: [facility] OR;  Service: Cardiothoracic;  Laterality: N/A;    CHOLECYSTECTOMY WITH INTRAOPERATIVE CHOLANGIOGRAM N/A 12/22/2021    Procedure: LAPAROSCOPIC CHOLECYSTECTOMY;  Surgeon: Vandana Calhoun MD;  Location: [facility] OR;  Service: General;  Laterality: N/A;    COLONOSCOPY N/A 4/18/2022    Procedure: COLONOSCOPY WITH ANESTHESIA;  Surgeon: Zoila Robison MD;  Location: [facility] ENDOSCOPY;  Service: Gastroenterology;  Laterality: N/A;  pre colon cancer  post  Dr. Osborne    HERNIA REPAIR  2008    LUNG BIOPSY  11/2018    NEPHRECTOMY Right 2014    SINUS SURGERY  2005    THORACOSCOPY Right 11/29/2021    Procedure: RIGHT THORACOSCOPY WITH RIGHT MIDDLE AND RIGHT LOWER LOBE WEDGE RESECTIONS;  Surgeon: Jarrod Tolentino MD;  Location: [facility] OR;  Service: Cardiothoracic;  Laterality: Right;     HEALTH MAINTENANCE ITEMS:  Health Maintenance Due   Topic Date Due    COVID-19 Vaccine (1) Never done    TDAP/TD VACCINES (1 - Tdap) Never done    ZOSTER VACCINE (1 of 2) Never done    RSV Vaccine - Adults (1 - 1-dose 60+ series) Never done    Pneumococcal Vaccine 65+ (1 of 1 - PCV) Never done    HEPATITIS C SCREENING  Never done    ANNUAL WELLNESS VISIT  Never done    BMI FOLLOWUP  03/28/2023    INFLUENZA VACCINE  Never done       <no information>  Last Completed Colonoscopy            COLORECTAL CANCER SCREENING (COLONOSCOPY - Every 5 Years) Next due on 4/18/2027 04/18/2022  COLONOSCOPY    04/18/2022  Surgical Procedure: COLONOSCOPY                    There is no immunization history on file for this patient.  Last Completed Mammogram       This patient has no relevant Health Maintenance data.              FAMILY HISTORY:  Family History   Problem Relation Age of Onset    Other Mother         blood disease unknown    Cancer Father         unknown  "   No Known Problems Sister     No Known Problems Sister     Heart disease Maternal Grandmother     Heart disease Maternal Grandfather     No Known Problems Paternal Grandmother     No Known Problems Paternal Grandfather     Colon polyps Neg Hx     Colon cancer Neg Hx      SOCIAL HISTORY:  Social History     Socioeconomic History    Marital status:    Tobacco Use    Smoking status: Former     Packs/day: 1.00     Years: 22.00     Additional pack years: 0.00     Total pack years: 22.00     Types: Cigarettes     Start date:      Quit date:      Years since quittin.1    Smokeless tobacco: Never   Vaping Use    Vaping Use: Never used   Substance and Sexual Activity    Alcohol use: Not Currently    Drug use: No    Sexual activity: Defer       REVIEW OF SYSTEMS:  Cabozantinib tolerance:  At his visit on 24 palliative cabozantinib was sent in but due to insurance issues did not receive the drug until 24.  +hypertension.  Started on Losartan on .  Some fatigue, no  arterial/venous thromboembolism, no bleeding, no GI symptoms (belly pain, viscus perforation, fistula formation, severe diarrhea) no rash (hand-foot syndrome), no jaundice (hepatotoxicity), no edema (nephrotic syndrome), no jaw pains (osteonecrosis of the jaw), no decreased appetite, no nausea, no vomiting, no stomatitis, no dysphonia, no asthenia, no dysgeusia, no dyspnea, no extremity pain, no dyspepsia, no constipation, no muscle spasms, no cough, no headache, no arthralgia, no xeroderma, no dizziness    Constitutional:  Manages ADLs, chores, errands and driving.  Again drove himself in today.  Appetite is good.  \"Real good.\" Energy is fairly good.  He remains active.  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 No fever, no chills nor drenching nights.  HENT: Negative.  No sore throat.  Has seasonal sinus symptoms/rhinorrhea. No headaches.  Eyes: " "Negative.    Respiratory:  No SOB at rest nor TENORIO.  No cough. No wheezing. No tobacco use.    Cardiovascular: Negative.  No palpitations.  No orthopnea  Gastrointestinal: RUQ pain resolved since cholecystectomy.  No dysphagia.  No nausea.  No vomiting.  No dyspepsia. Bowels moving regularly.  Has occasional soft stools.  No overt diarrhea.  No melena.  Has not had hemorrhoidal bleeding.  Has history recurrent diverticulitis, early 06/2021.  Had colonoscopy, 04/18/2022- diverticulosis.  Nonbleeding internal hemorrhoids otherwise normal exam.  Endocrine: No hot flashes.  Genitourinary:  No dysuria.  No incontinence. No hematuria.  Occasional urgency.  \"I still feel like I gotta go when I gotta go sometimes.\"  Nocturia much improved on Finasteride - up 1x- from every 90 min.  Remains on allopurinol.  Musculoskeletal:  No arthralgias.  No edema  Skin: No new rash.    Neurological:  No dizziness.  No facial asymmetry. No headaches.  Mild sensory neuropathy of the hands, not worse  Hematological: Negative for new adenopathy.  Says he bruises easily.  Psychiatric/Behavioral:  Has baseline anxiety, \"more now.\"  No depression.      VITAL SIGNS: /90   Pulse 59   Temp 97.3 °F (36.3 °C) (Temporal)   Resp 18   Ht 177.8 cm (70\")   Wt 92.3 kg (203 lb 8 oz)   SpO2 98%   BMI 29.20 kg/m² Body surface area is 2.1 meters squared.  Pain Score    02/27/24 0931   PainSc: 0-No pain         I have reexamined the patient and the results are consistent with the previously documented exam. Leonardo Smith MD      PHYSICAL EXAMINATION:   General Appearance: Patient is a very pleasant, cooperative, heavyset, modestly kept elderly male who is awake, alert, oriented and in no acute distress. ECOG 0 (same)  HEENT: Normocephalic. Sclerae clear, conjunctiva pink, extraocular movements intact  NECK: Supple, no jugular venous distention, thyroid not enlarged.  LYMPH: No cervical, supraclavicular, axillary, or inguinal " lymphadenopathy.  LUNGS: Good air movement, no rales, rhonchi, rubs or wheezes with auscultation.  Right thoracoscopic incisions are healed  CARDIO: Regular sinus rhythm, no murmurs, gallops or rubs.  ABDOMEN:  Obese, nondistended, soft, with no right upper quadrant tenderness.  Laparoscopy wounds are all healed. No obvious abdominal masses. Bowel sounds positive. No hernia  MUSKEL: No joint swelling, decreased motion, or inflammation  EXTREMS:  No edema, clubbing, cyanosis, No varicose veins.  NEURO: Grossly nonfocal, Gait is coordinated and smooth, Cognition is preserved.  SKIN: No rashes, no ecchymoses, no petechia.  PSYCH: Oriented to time, place and person. Memory is preserved. Mood and affect appear normal         LABS    Lab Results - Last 18 Months   Lab Units 02/27/24  0906 02/20/24  0953 01/23/24  0945 12/07/23  1227 11/20/23  0952 07/20/23  1048 06/25/23  2321 01/19/23  1053   HEMOGLOBIN g/dL 15.7 16.1 15.6  --  16.1 15.0 14.5 15.0   HEMATOCRIT % 47.5 49.8 48.7  --  50.7 47.4 43.3 46.2   MCV fL 89.5 90.5 91.7  --  92.2 92.9 90.8 90.9   WBC 10*3/mm3 5.99 7.56 6.19  --  7.59 7.00 7.2 5.90   RDW % 13.6 13.5 13.3  --  13.4 13.7 13.4 13.2   MPV fL 9.6 9.7 9.3  --  9.3 9.7 9.6 9.4   PLATELETS 10*3/mm3 197 197 174  --  218 189 168 198   IMM GRAN % % 0.2 0.1 0.3  --  0.1 0.3  --  0.3   NEUTROS ABS 10*3/mm3 3.37 4.36 3.53 5.3 4.20 4.42 4.8 3.19   LYMPHS ABS 10*3/mm3 1.69 2.09 1.66  --  2.26 1.52 1.4 1.39   MONOS ABS 10*3/mm3 0.61 0.76 0.66  --  0.77 0.73 0.80 0.86   EOS ABS 10*3/mm3 0.27 0.29 0.28 0.3 0.31 0.27 0.30 0.39   BASOS ABS 10*3/mm3 0.04 0.05 0.04 0.1 0.04 0.04 0.00 0.05   IMMATURE GRANS (ABS) 10*3/mm3 0.01 0.01 0.02  --  0.01 0.02 0.0 0.02   NRBC /100 WBC 0.0 0.0 0.0  --  0.0 0.0  --  0.0   NEUTROPHIL % %  --   --   --  67.6  --   --   --   --    MONOCYTES % %  --   --   --  8.7  --   --   --   --    BASOPHIL % %  --   --   --  0.8  --   --   --   --        Lab Results - Last 18 Months   Lab Units  02/27/24  0906 02/20/24  0953 01/23/24  0945 12/07/23  1316 11/21/23  1059 11/20/23  0952 07/20/23  1048   GLUCOSE mg/dL 102* 106* 95  --  105* 107* 102*   SODIUM mmol/L 138 142 141  --  140 143 141   POTASSIUM mmol/L 4.7 5.2 5.2  --  4.8 5.4* 5.1   CO2 mmol/L 23.0 23.0 25.0  --  25.0 27.0 20.0*   CHLORIDE mmol/L 106 108* 106  --  107 107 109*   ANION GAP mmol/L 9.0 11.0 10.0  --  8.0 9.0 12.0   CREATININE mg/dL 2.36* 2.51* 2.32* 83.51 2.18* 2.07* 2.40*   BUN mg/dL 45* 47* 38*  --  38* 35* 44*   BUN / CREAT RATIO  19.1 18.7 16.4  --  17.4 16.9 18.3   CALCIUM mg/dL 9.1 9.7 9.0  --  8.7 9.4 9.6   ALK PHOS U/L 92 93 72  --  70 75 71   TOTAL PROTEIN g/dL 6.6 7.1 6.5  --  6.5 7.0 6.6   ALT (SGPT) U/L 21 18 15  --  14 14 13   AST (SGOT) U/L 19 15 13  --  14 14 14   BILIRUBIN mg/dL 0.3 0.6 0.5  --  0.5 0.6 0.5   ALBUMIN g/dL 4.1 4.4 4.2  --  4.1 4.5 4.4   GLOBULIN gm/dL 2.5 2.7 2.3  --  2.4 2.5 2.2       Assessment:  1.  Renal cell carcinoma, diagnosed 11/11/2014 (5.5 cm G3, pT3a, pNx). --2014, right nephrectomy.   --surveillance until 11/26/2018    --11/26/2018-right lower lobe VATS wedge resection: Metastatic renal cell carcinoma, 0.9 cm; margins free of malignancy. Dr. Hu  -- Unable to tolerate Sutent due to kidney failure, thus received ipilumomab/nivolumab, 05/13/2019 - 08/07/2019, then single agent nivolumab, 08/28/2019 - 09/09/2019 (52 months since cessation of therapy). Therapy stopped due to renal failure (nivolumab?)  --01/16/2020-PET scan.  No neoplastic FDG uptake within the chest, abdomen or pelvis.  --07/06/2020-CT abdomen/pelvis with diverticulosis of the colon.  Evidence of acute diverticulitis of the sigmoid colon and adjacent distal descending colon.  No free air or fluid seen.  1 cm noncalcified subpleural nodule in the left lower lobe.  Follow-up exam in 6 months recommended.  --08/04/2020-chest CT.  Scattered subcentimeter bilateral pulmonary nodules similar to 1/16/2020 with mild increase in the  5 mm left upper lobe nodule.  Continued follow-up recommended.  No pathologic lymphadenopathy.  Right nephrectomy.  --01/14/2020-CT chest.  Increased size of 1.2 cm right lower lobe superior segment pulmonary nodule (previously 0.9 cm)-representing 33% growth.  Multiple other bilateral pulmonary nodules have not significantly changed in size.  Findings are in keeping with disease progression.  --01/14/2020-CT abdomen/pelvis.  No evidence of recurrent or metastatic disease in the abdomen or pelvis.  --01/26/2021-PET/CT.  Impression: Minimal metabolic activity associated with the enlarging pulmonary nodule within the superior segment of the right lower lobe with a maximum SUV of 1.35.  The additional nodules noted within both lungs also demonstrate no abnormal metabolic activity which would favor benignity but continued radiographic follow-up will be suggested.  No scintigraphic evidence of metastatic disease on current exam.  Increased uptake overlying the right groin and more distal external iliac vessels felt to be related to previous hernia repair.  --02/14/2021-  Encounter from Dr. Mayito Arteaga, medical oncology at [facility].  History reviewed.  Most recent CT chest, 01/14/2021 (above) with increasing size of 1.2 cm right lower lobe superior segment pulmonary nodule (previously 0.9 cm) and PET scan, 01/26/2021 (above) noted including minimal metabolic activity associated with the enlarging pulmonary nodule within the superior segment of the right lower lobe.  Discussion: Biopsy right lower lobe lung nodule solitary lesion that is growing on PET scan which will be done at that institution.  Contemplate either surgical resection, ablation or SBRT.  We will focus on local therapy for now.  Multiple systemic options down the road but if renal function improves can contemplate trial with HIF-alpha inhibitor.  --03/15/2021- CT chest.  Continued increased size of the right lower lobe superior  segment now 1.4 cm pulmonary nodule (1.2 cm, 1/14/2021; 0.8 cm, 8/4/2020).  Other pulmonary nodules are stable.  New small area of groundglass opacity in the superior segment left lower lobe may be infectious/inflammatory.  Coronary artery calcifications.  Aortic calcifications.  Small hiatal hernia.  --03/15/2021-CT abdomen/pelvis.  Previous right nephrectomy identified with no evidence of intra-abdominal metastatic disease.  Benign-appearing exophytic cyst posterior aspect of the left kidney, upper pole, measuring 2.47 cm, stable.  Small nodules in the lung bases are unchanged compared to previous chest CT.  Evidence of previous right inguinal hernia repair.  Fatty infiltration of the inguinal canals.  Scattered colonic diverticula without diverticulitis.  Small sliding hiatal hernia identified.  --5/27/2021- Follow-up from GIOVANNY Edward with Dr. Arteaga's office.  Impression/plan: CT CAP, 05/26/2021: Stable examination including stable bilateral lung nodules compared to 03/15/2021.  Progression of right lower lobe lesion?  Discussed systemic therapy option with cabozantinib at 20 mg.  However patient concerned due to his history of substantial AEs in the past.  Will arrange for lesion to be ablated.  However patient says multiple lesions were there for 6 years and unchanged.-Consider ablation or SBRT of the lesion and monitor and consider cabozantinib when failed.  Multiple systemic options down the road but if renal function improves can contemplate HIF-alpha inhibitor.  Continue surveillance for now.  Repeat scans in 3 months.  --06/11/2021-CT chest without contrast.  Stable pulmonary nodules.  No evidence of disease progression.  Interval resolution superior segment left lower lobe groundglass opacity.  Scattered coronary artery calcifications.  --06/11/2021-CT abdomen/pelvis.  History of right renal cell carcinoma nephrectomy.  No CT evidence of tumor recurrence or metastatic disease in the abdomen or  pelvis.  --09/07/2021-CT chest wo contrast.  Pulmonary nodules noted.  Slightly increased in size compared to 6/11/2021 (left upper lobe 7.2 mm-prior 6.7 mm; posterior right lung, 19 mm-prior 13.9 mm; posterior medial right lung, 14 mm-prior 11 mm; right lung, 7.7 mm-prior 7.1 mm).  However no new nodules identified.  --09/07/2021-CT abdomen/pelvis wo contrast.  No evidence of intra-abdominal or pelvic metastasis.  Prior right nephrectomy.  Stable left renal cyst.  Colonic diverticulosis with questionable wall thickening of the sigmoid colon may be artifactual.  Correlation to any recent colonoscopy recommended.  --09/28/2021-PET scan.  Increased size of right lower lobe lung nodule (21 x 15 mm compared to 14 x 14 mm 3 months ago) though this finding shows no significant FDG uptake.  Otherwise stable PET/CT exam.  --11/29/2021-RLL, RML wedge resections. Path consistent with metastatic renal cell carcinoma  --12/14/2021-CT chest.  Postoperative changes from wedge resections of prior right middle and lower lobe pulmonary nodules.  Stable left-sided pulmonary nodules, largest in the left upper lobe/apex measuring 8 mm.  Continued follow-up strongly recommended.  No pathologic lymphadenopathy.  --12/14/2021-CT abdomen/pelvis.  No convincing evidence of intra-abdominal or pelvic metastasis.  New diffuse abnormal gallbladder wall thickening.  Findings may be seen with cholecystitis.  Gallbladder ultrasound might be considered.  --12/20/2021-gallbladder ultrasound-diffuse gallbladder wall thickening.  Gallbladder nondistended with no visible stones.  Unlikely acute cholecystitis.  Favor chronic cholecystitis.  --03/18/2022-CT chest-patchy infiltrate compatible with right middle lobe pneumonia.  2 tiny left lung nodule stable.  --03/18/2022-CT abdomen/pelvis.  No evidence of metastatic disease to the abdomen/pelvis.  Prior right nephrectomy.  Colonic diverticulosis without evidence of diverticulitis.  Mild splenomegaly,  unchanged.  --06/24/2022- CT chest-1. The resolution of the previously seen patchy infiltrate in the right middle lobe. 2. Postsurgical changes and scarring in the right upper and lower lobe. 3. Stable small nodules in both lungs the largest one in the left upper lobe. No change.  --06/24/2022- CT abdomen/pelvis- stable.  No evidence of a neoplastic process or metastatic disease. Evidence of right nephrectomy. No focal complication. Stable left renal cyst. Diverticulosis of the colon. No evidence for diverticulitis. Stable mild splenomegaly.  Enlarged prostate.  --09/19/2022- CT CAP- Stable exam.  No change in multiple bilateral pulmonary nodules measuring up to 8 mm.  No evidence of recurrent or metastatic disease in the abdomen or pelvis status post RIGHT nephrectomy.  --1/23/2023- CT CAP- Prior wedge resection within the RML/RLL. No evidence of localized recurrence at these sites. Numerous other pulmonary nodules are present. The majority of these nodules are stable in size and appearance from the previous exam. There is one nodule which is subpleural in location within the lateral left upper lobe on image 52 which has an increased soft tissue component from the previous exam. This would warrant close follow-up on subsequent imaging.No acute abnormality of the abdomen or pelvis. Evidence of prior right nephrectomy. No focal complication. A left renal cyst.  --7/20/23- CT CAP- No change in multiple bilateral pulmonary nodules measuring up to 10 mm, compared to 01/23/2023. However, there has been gradual increase in pulmonary nodule size compared to more remote studies dating back to 03/18/2022. For reference, the dominant 10 mm LEFT upper lobe pulmonary nodule measured 7 mm on 03/18/2022. Recommend continued clinical and imaging follow-up as metastatic disease remains in the differential.  --8/17/23 and 9/5/23- Follow-up Dr. Juan A Cannon-A/P: 68-year-old male with metastatic ccRCC on active surveillance initially  "seen in [facility], 8/17/2023 for second opinion.  Discussed pathology, imaging, treatment options and prognosis in detail.  Images reviewed  tumor board that agreed with very slow-growing pulmonary nodules likely metastatic.  Discussed systemic therapy options including rechallenging IO, cabozantinib,mTOR, Belzutifan.  Clinical trials are not our institution discussed but likely will not be a candidate given his renal dysfunction.  Patient strongly prefers not to start systemic therapy unless absolutely necessary to avoid further renal dysfunction and other side effects.  Given slow growth, I think it is reasonable to hold off on systemic therapy continue surveillance for now.  Local therapy can be discussed to the dominant lesions based on future scans.  I do think he will ultimately need to get back on systemic therapy  --11/20/23- CT CAP wo- Multiple bilateral new and enlarging pulmonary nodules measuring up to 10 mm. Findings are highly concerning for metastatic disease.  Mild dilatation of the ascending artery measuring 4 cm diameter. No evidence of recurrent or metastatic disease in the abdomen or pelvis status post right nephrectomy on this unenhanced exam. Heavy sigmoid diverticulosis without evidence of diverticulitis.   --12/7/23- Follow-up Dr. Cannon: A/P: Discussed pathology, imaging, treatment options and prognosis in detail with him.  Images reviewed and agree with slow-growing pulmonary nodules that are likely metastatic.  Discussed systemic therapy options including rechallenging IO, cabozantinib, mTOR, Belzutifan, clinical trials at our institution discussed likely will not be a candidate given his renal dysfunction.  \"Both although slow, recommend cabozantinib.  Patient really concerned about side effects given his past experience with sunitinib.  I suggest start with cabozantinib 20 mg p.o. daily and titrate up to 40 mg based on tolerability.  No dose modifications needed for renal " dysfunction.  RTC as needed  --2/20/24- CT chest- Mild to moderate increase in size of several pulmonary nodules as detailed above. A few nodules are stable. Another follow-up examination in 6 months is recommended. Scattered areas of scarring in the right upper/middle and right lower  lobe similar to the previous study.  --2/20/24- CT abdomen/pelvis- No acute abnormality of the abdomen or pelvis, particularly, no finding to suggest neoplastic process/metastatic disease. Diverticulosis of the colon. No evidence for diverticulitis. A stable left renal cyst. The prior right nephrectomy. No focal or regional complication.  -- 1/23/24 palliative cabozantinib was sent in but due to insurance issues did not receive the drug until 2/21/24.      2.  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    --GFR 29.1 mL/min, 2/27/24 (prior:  9 - 34)  --s/p multiple doses of HD steroids in the past and remains on maintenance 2.5 mg prednisone per nephrology (Leo Louis, [facility]-Dr. Grant)- tapered down from 5 mg/2.5 mg  --will not start Cytoxan unless biopsy done to determine etiology of kidney failure. Has been off Opdivo since September 2019.    3.  Normocytic anemia.    --Resolved. Hgb 15.7; MCV 89.5, 2/27/24 (prior: Hgb 10.2-16.1; MCV 86.3-95.5)  4.  Gerd: modulated by omeprazole  5.  Osteopenia: on bola+vit D  6.  History of acute diverticulitis.   --04/18/2022- colonoscopy-diverticulosis in the left colon.  Nonbleeding internal hemorrhoids.  Otherwise normal exam.  Repeat 5 years.  Dr. Robison  7.  Hypertension. Cabozantinib associated?  Stable on losartan    Plan:  1.  Apprised of labs, 2/20/24 and 2/27/24 -normal CBC. GFR 29 otherwise normal CMP. Normal TSH/T4    2.  Cabozantinib tolerance:  At his visit on 1/23/24 palliative cabozantinib was sent in but due to insurance issues did not receive the drug until 2/21/24.  +hypertension.  Started on Losartan on 2/26.  Some fatigue, no  arterial/venous thromboembolism, no bleeding, no GI  symptoms (belly pain, viscus perforation, fistula formation, severe diarrhea) no rash (hand-foot syndrome), no jaundice (hepatotoxicity), no edema (nephrotic syndrome), no jaw pains (osteonecrosis of the jaw), no decreased appetite, no nausea, no vomiting, no stomatitis, no dysphonia, no asthenia, no dysgeusia, no dyspnea, no extremity pain, no dyspepsia, no constipation, no muscle spasms, no cough, no headache, no arthralgia, no xeroderma, no dizziness    3.  Apprised of CT CAP, 2/20/24 (above). Mild to moderate increase in size of several pulmonary nodules    4.  Previously reviewed follow-up Dr. Cannon ( oncology, [facility]), 12/7/23: A/P: Discussed pathology, imaging, treatment options and prognosis in detail with him.  Images reviewed and agree with slow-growing pulmonary nodules that are likely metastatic.  Discussed systemic therapy options including rechallenging IO, cabozantinib, mTOR, Belzutifan, clinical trials at our institution discussed likely will not be a candidate given his renal dysfunction.  Recommend cabozantinib.  Patient really concerned about side effects given his past experience with sunitinib.  I suggest start with cabo 20 mg p.o. daily and titrate up to 40 mg based on tolerability.  No dose modifications needed for renal dysfunction.  RTC as needed    5.   Re: The potential toxicities of cabozantinib (to include but not limited to: Arterial/venous thromboembolism, hypertension, hypertensive crisis, severe hemorrhage, arterial aneurysm/dissection/rupture, impaired wound healing, GI perforation, fistula formation, severe diarrhea, hand-foot syndrome, hepatotoxicity (in combination with nivolumab), nephrotic syndrome, adrenal insufficiency (in combination with nivolumab), hypothyroidism, osteonecrosis of the jaw, posterior leukoencephalopathy syndrome, QT prolongation, hypocalcemia, neutropenia, diarrhea, fatigue, decreased appetite, hypertension, nausea, vomiting, decreased  weight, rash, stomatitis, dysphonia, asthenia, dysgeusia, hypocalcemia, dyspnea, hypothyroidism, extremity pain, dyspepsia, constipation, muscle spasms, proteinuria, abdominal pain, thromboembolism, cough, anemia, hemorrhage, headache, arthralgia, xeroderma, dizziness, increased LDH, hypoalbuminemia, increased alk phos, decreased platelets, hypophosphatemia, hypomagnesemia).  Questions answered.  He agrees to press on     6.  Rx treatment   Cabozantinib 20 mg po daily # 30 x 11 RF  Zofran 8 mg po tid prn # 30     7.  Upon initiation of treatment: TSH, T4, CMP and CBC weekly on Mondays. Hold if creatinine > 1.5, total bilirubin > 1.5, AST/ALT > 3x ULN, TSH <0.5 or > 2. Administer Procrit 40,000 units subcutaneously if hemoglobin < 10 or hematocrit < 30. Neupogen 480 mcg sc x 3 days if ANC < 1.0     8.  Return to office in 6 weeks with preoffice CT scans of the chest, abdomen/pelvis without IV contrast (in 5 weeks), CBC with differential, CMP, PO, magnesium.    I spent ~60 minutes caring for Parker on this date of service. This time includes time spent by me in the following activities: preparing for the visit, reviewing tests, performing a medically appropriate examination and/or evaluation, counseling and educating the patient/family/caregiver, ordering medications, tests, or procedures and documenting information in the medical record.

## 2024-02-20 ENCOUNTER — LAB (OUTPATIENT)
Dept: LAB | Facility: HOSPITAL | Age: 69
End: 2024-02-20
Payer: MEDICARE

## 2024-02-20 ENCOUNTER — HOSPITAL ENCOUNTER (OUTPATIENT)
Dept: CT IMAGING | Facility: HOSPITAL | Age: 69
Discharge: HOME | End: 2024-02-20
Payer: MEDICARE

## 2024-02-20 DIAGNOSIS — C78.00 MALIGNANT NEOPLASM METASTATIC TO LUNG, UNSPECIFIED LATERALITY: ICD-10-CM

## 2024-02-20 DIAGNOSIS — C64.1 RENAL CELL CARCINOMA OF RIGHT KIDNEY: ICD-10-CM

## 2024-02-20 DIAGNOSIS — R94.6 ABNORMAL RESULTS OF THYROID FUNCTION STUDIES: ICD-10-CM

## 2024-02-20 LAB
ALBUMIN SERPL-MCNC: 4.4 G/DL (ref 3.5–5.2)
ALBUMIN/GLOB SERPL: 1.6 G/DL
ALP SERPL-CCNC: 93 U/L (ref 39–117)
ALT SERPL W P-5'-P-CCNC: 18 U/L (ref 1–41)
ANION GAP SERPL CALCULATED.3IONS-SCNC: 11 MMOL/L (ref 5–15)
AST SERPL-CCNC: 15 U/L (ref 1–40)
BASOPHILS # BLD AUTO: 0.05 10*3/MM3 (ref 0–0.2)
BASOPHILS NFR BLD AUTO: 0.7 % (ref 0–1.5)
BILIRUB SERPL-MCNC: 0.6 MG/DL (ref 0–1.2)
BUN SERPL-MCNC: 47 MG/DL (ref 8–23)
BUN/CREAT SERPL: 18.7 (ref 7–25)
CALCIUM SPEC-SCNC: 9.7 MG/DL (ref 8.6–10.5)
CHLORIDE SERPL-SCNC: 108 MMOL/L (ref 98–107)
CO2 SERPL-SCNC: 23 MMOL/L (ref 22–29)
CREAT SERPL-MCNC: 2.51 MG/DL (ref 0.76–1.27)
DEPRECATED RDW RBC AUTO: 45 FL (ref 37–54)
EGFRCR SERPLBLD CKD-EPI 2021: 27 ML/MIN/1.73
EOSINOPHIL # BLD AUTO: 0.29 10*3/MM3 (ref 0–0.4)
EOSINOPHIL NFR BLD AUTO: 3.8 % (ref 0.3–6.2)
ERYTHROCYTE [DISTWIDTH] IN BLOOD BY AUTOMATED COUNT: 13.5 % (ref 12.3–15.4)
GLOBULIN UR ELPH-MCNC: 2.7 GM/DL
GLUCOSE SERPL-MCNC: 106 MG/DL (ref 65–99)
HCT VFR BLD AUTO: 49.8 % (ref 37.5–51)
HGB BLD-MCNC: 16.1 G/DL (ref 13–17.7)
IMM GRANULOCYTES # BLD AUTO: 0.01 10*3/MM3 (ref 0–0.05)
IMM GRANULOCYTES NFR BLD AUTO: 0.1 % (ref 0–0.5)
LYMPHOCYTES # BLD AUTO: 2.09 10*3/MM3 (ref 0.7–3.1)
LYMPHOCYTES NFR BLD AUTO: 27.6 % (ref 19.6–45.3)
MCH RBC QN AUTO: 29.3 PG (ref 26.6–33)
MCHC RBC AUTO-ENTMCNC: 32.3 G/DL (ref 31.5–35.7)
MCV RBC AUTO: 90.5 FL (ref 79–97)
MONOCYTES # BLD AUTO: 0.76 10*3/MM3 (ref 0.1–0.9)
MONOCYTES NFR BLD AUTO: 10.1 % (ref 5–12)
NEUTROPHILS NFR BLD AUTO: 4.36 10*3/MM3 (ref 1.7–7)
NEUTROPHILS NFR BLD AUTO: 57.7 % (ref 42.7–76)
NRBC BLD AUTO-RTO: 0 /100 WBC (ref 0–0.2)
PLATELET # BLD AUTO: 197 10*3/MM3 (ref 140–450)
PMV BLD AUTO: 9.7 FL (ref 6–12)
POTASSIUM SERPL-SCNC: 5.2 MMOL/L (ref 3.5–5.2)
PROT SERPL-MCNC: 7.1 G/DL (ref 6–8.5)
RBC # BLD AUTO: 5.5 10*6/MM3 (ref 4.14–5.8)
SODIUM SERPL-SCNC: 142 MMOL/L (ref 136–145)
T4 FREE SERPL-MCNC: 0.88 NG/DL (ref 0.93–1.7)
TSH SERPL DL<=0.05 MIU/L-ACNC: 1.05 UIU/ML (ref 0.27–4.2)
WBC NRBC COR # BLD AUTO: 7.56 10*3/MM3 (ref 3.4–10.8)

## 2024-02-20 PROCEDURE — 84439 ASSAY OF FREE THYROXINE: CPT

## 2024-02-20 PROCEDURE — 84443 ASSAY THYROID STIM HORMONE: CPT

## 2024-02-20 PROCEDURE — 80053 COMPREHEN METABOLIC PANEL: CPT

## 2024-02-20 PROCEDURE — 71250 CT THORAX DX C-: CPT

## 2024-02-20 PROCEDURE — 85025 COMPLETE CBC W/AUTO DIFF WBC: CPT

## 2024-02-20 PROCEDURE — 36415 COLL VENOUS BLD VENIPUNCTURE: CPT

## 2024-02-20 PROCEDURE — 74176 CT ABD & PELVIS W/O CONTRAST: CPT

## 2024-02-24 PROBLEM — T50.905A HYPERTENSION DUE TO DRUG: Status: ACTIVE | Noted: 2024-02-24

## 2024-02-24 PROBLEM — I15.8 HYPERTENSION DUE TO DRUG: Status: ACTIVE | Noted: 2024-02-24

## 2024-02-24 RX ORDER — LOSARTAN POTASSIUM 25 MG/1
25 TABLET ORAL DAILY
Qty: 30 TABLET | Refills: 0 | OUTPATIENT
Start: 2024-02-24

## 2024-02-27 ENCOUNTER — LAB (OUTPATIENT)
Dept: LAB | Facility: HOSPITAL | Age: 69
End: 2024-02-27
Payer: MEDICARE

## 2024-02-27 ENCOUNTER — OFFICE VISIT (OUTPATIENT)
Dept: ONCOLOGY | Facility: CLINIC | Age: 69
End: 2024-02-27
Payer: MEDICARE

## 2024-02-27 VITALS
HEART RATE: 59 BPM | SYSTOLIC BLOOD PRESSURE: 132 MMHG | TEMPERATURE: 97.3 F | RESPIRATION RATE: 18 BRPM | OXYGEN SATURATION: 98 % | DIASTOLIC BLOOD PRESSURE: 90 MMHG | WEIGHT: 203.5 LBS | HEIGHT: 70 IN | BODY MASS INDEX: 29.13 KG/M2

## 2024-02-27 DIAGNOSIS — C64.1 RENAL CELL CARCINOMA OF RIGHT KIDNEY: Primary | ICD-10-CM

## 2024-02-27 DIAGNOSIS — R94.6 ABNORMAL RESULTS OF THYROID FUNCTION STUDIES: ICD-10-CM

## 2024-02-27 DIAGNOSIS — C78.00 MALIGNANT NEOPLASM METASTATIC TO LUNG, UNSPECIFIED LATERALITY: ICD-10-CM

## 2024-02-27 LAB
ALBUMIN SERPL-MCNC: 4.1 G/DL (ref 3.5–5.2)
ALBUMIN/GLOB SERPL: 1.6 G/DL
ALP SERPL-CCNC: 92 U/L (ref 39–117)
ALT SERPL W P-5'-P-CCNC: 21 U/L (ref 1–41)
ANION GAP SERPL CALCULATED.3IONS-SCNC: 9 MMOL/L (ref 5–15)
AST SERPL-CCNC: 19 U/L (ref 1–40)
BASOPHILS # BLD AUTO: 0.04 10*3/MM3 (ref 0–0.2)
BASOPHILS NFR BLD AUTO: 0.7 % (ref 0–1.5)
BILIRUB SERPL-MCNC: 0.3 MG/DL (ref 0–1.2)
BUN SERPL-MCNC: 45 MG/DL (ref 8–23)
BUN/CREAT SERPL: 19.1 (ref 7–25)
CALCIUM SPEC-SCNC: 9.1 MG/DL (ref 8.6–10.5)
CHLORIDE SERPL-SCNC: 106 MMOL/L (ref 98–107)
CO2 SERPL-SCNC: 23 MMOL/L (ref 22–29)
CREAT SERPL-MCNC: 2.36 MG/DL (ref 0.76–1.27)
DEPRECATED RDW RBC AUTO: 44.3 FL (ref 37–54)
EGFRCR SERPLBLD CKD-EPI 2021: 29.1 ML/MIN/1.73
EOSINOPHIL # BLD AUTO: 0.27 10*3/MM3 (ref 0–0.4)
EOSINOPHIL NFR BLD AUTO: 4.5 % (ref 0.3–6.2)
ERYTHROCYTE [DISTWIDTH] IN BLOOD BY AUTOMATED COUNT: 13.6 % (ref 12.3–15.4)
GLOBULIN UR ELPH-MCNC: 2.5 GM/DL
GLUCOSE SERPL-MCNC: 102 MG/DL (ref 65–99)
HCT VFR BLD AUTO: 47.5 % (ref 37.5–51)
HGB BLD-MCNC: 15.7 G/DL (ref 13–17.7)
HOLD SPECIMEN: NORMAL
IMM GRANULOCYTES # BLD AUTO: 0.01 10*3/MM3 (ref 0–0.05)
IMM GRANULOCYTES NFR BLD AUTO: 0.2 % (ref 0–0.5)
LYMPHOCYTES # BLD AUTO: 1.69 10*3/MM3 (ref 0.7–3.1)
LYMPHOCYTES NFR BLD AUTO: 28.2 % (ref 19.6–45.3)
MCH RBC QN AUTO: 29.6 PG (ref 26.6–33)
MCHC RBC AUTO-ENTMCNC: 33.1 G/DL (ref 31.5–35.7)
MCV RBC AUTO: 89.5 FL (ref 79–97)
MONOCYTES # BLD AUTO: 0.61 10*3/MM3 (ref 0.1–0.9)
MONOCYTES NFR BLD AUTO: 10.2 % (ref 5–12)
NEUTROPHILS NFR BLD AUTO: 3.37 10*3/MM3 (ref 1.7–7)
NEUTROPHILS NFR BLD AUTO: 56.2 % (ref 42.7–76)
NRBC BLD AUTO-RTO: 0 /100 WBC (ref 0–0.2)
PLATELET # BLD AUTO: 197 10*3/MM3 (ref 140–450)
PMV BLD AUTO: 9.6 FL (ref 6–12)
POTASSIUM SERPL-SCNC: 4.7 MMOL/L (ref 3.5–5.2)
PROT SERPL-MCNC: 6.6 G/DL (ref 6–8.5)
RBC # BLD AUTO: 5.31 10*6/MM3 (ref 4.14–5.8)
SODIUM SERPL-SCNC: 138 MMOL/L (ref 136–145)
T4 FREE SERPL-MCNC: 0.95 NG/DL (ref 0.93–1.7)
TSH SERPL DL<=0.05 MIU/L-ACNC: 1.51 UIU/ML (ref 0.27–4.2)
WBC NRBC COR # BLD AUTO: 5.99 10*3/MM3 (ref 3.4–10.8)

## 2024-02-27 PROCEDURE — 80053 COMPREHEN METABOLIC PANEL: CPT

## 2024-02-27 PROCEDURE — 84439 ASSAY OF FREE THYROXINE: CPT

## 2024-02-27 PROCEDURE — 85025 COMPLETE CBC W/AUTO DIFF WBC: CPT

## 2024-02-27 PROCEDURE — 36415 COLL VENOUS BLD VENIPUNCTURE: CPT

## 2024-02-27 PROCEDURE — 84443 ASSAY THYROID STIM HORMONE: CPT

## 2024-02-28 ENCOUNTER — SPECIALTY PHARMACY (OUTPATIENT)
Dept: ONCOLOGY | Facility: HOSPITAL | Age: 69
End: 2024-02-28
Payer: MEDICARE

## 2024-02-28 NOTE — PROGRESS NOTES
"Specialty Pharmacy Refill Coordination Note       Spoke with Mr. Arnold for his monthly telephone follow-up regarding the oral oncology medication. He stated that he has been tolerating the Cabometyx well and has not had any obvious side effects from the medication. \"I'm doing alrigh\" Patient states his blood pressure did start to go up and he was started on Losartan daily. No interactions noted between Cabometyx and Losartan. Patient also states that he does feel unsteady sometimes \"like riding in elevator\", but that Dr. Smith said it is to be expected when starting this medication. He has not missed any doses of the medication in the last month. He still receives refills through [facility] , and has not had any delays in getting those refills. He will be getting 1 month free through [employer] shipped to his address on 3/10. He states that the only change to his medications was adding the losartan daily, and that there have not been any changes to his drug allergies. He stated he has not had any recent stays in the hospital and has not visited the ER in the last month due to the Cabometyx .     I encouraged him to reach out anytime with any questions or concerns he might have regarding his oral chemotherapy medication.     No needs expressed by the patient. Will follow-up next month regarding the patient's oral chemotherapy with a routine telephone consultation.     Refill Questions      Flowsheet Row Most Recent Value   Changes to allergies? No   Changes to medications? Yes  [Started on Losartan]   New conditions or infections since last clinic visit Yes   If yes, please describe  Patient states his blood pressure started to run high and he was started on losartan.   Unplanned office visit, urgent care, ED, or hospital admission in the last 4 weeks  No   How does patient/caregiver feel medication is working? Good   Financial problems or insurance changes  No   Since the previous refill, were any specialty medication " doses or scheduled injections missed or delayed?  No   Does this patient require a clinical escalation to a pharmacist? No            Delivery Questions      Flowsheet Row Most Recent Value   Delivery method [employer]   Delivery address verified with patient/caregiver? Yes   Delivery address Home   Number of medications in delivery 1   Medication(s) being filled and delivered Cabozantinib S-Malate (Antineoplastic Enzyme Inhibitors)   Doses left of specialty medications 30   Copay verified? Yes   Copay amount 0 Patient has foundation assistance through [employer]   Copay form of payment No copayment ($0)                   Follow-up: 28-30 day(s)     Lexie Renee, Pharmacy Technician  Specialty Pharmacy Technician

## 2024-03-01 NOTE — PROGRESS NOTES
Specialty Pharmacy Patient Management Program  Oncology Initial Assessment       Parker Arnold is a 68 y.o. male with RCC Right kidney seen by an Oncology provider and enrolled in the Oncology Patient Management program offered by [facility].  An initial outreach was conducted, including assessment of therapy appropriateness and specialty medication education for Cabometyx. The patient was introduced to services offered by [facility], including: regular assessments, refill coordination, curbside pick-up or mail order delivery options, prior authorization maintenance, and financial assistance programs as applicable. The patient was also provided with contact information for the pharmacy team.     After a thorough Benefits Investigation, and after discussing preferences and medication access options with the patient, the patient decided to proceed with the following pharmacy:   [facility]       Regimen: Cabometyx 20 mg PO daily    Oncology Provider: Leonardo Smith MD ([facility])    Prior Authorization Status: Approved - High Copay  Financial Assistance Status:Approved - EASE Free card plus 10K nichole per Lexie    Start date of oral specialty medication:  Start 2/21/24    Relevant Past Medical History, Comorbidities, and Vaccines  Relevant medical history and concomitant health conditions were discussed with the patient. The patient's chart has been reviewed for relevant past medical history and comorbid health conditions and updated as necessary.  Vaccines are coordinated by the patient's oncologist and primary care provider.  Past Medical History:   Diagnosis Date    Dehydration 12/30/2019    GERD (gastroesophageal reflux disease)     Hypertension     Hypertension due to drug 02/24/2024    Cabometyx    Kidney disease, chronic, stage III (moderate, EGFR 30-59 ml/min) 2018    Post infusion of  "Optivo, states GFR is 28%    Nephritis and nephropathy 2020    Renal cell carcinoma     Renal cell carcinoma of right kidney 10/18/2019    Overview:  Diagnoses 14. Path report in [facility] at [facility]. Tissue has been requested.     Stroke     States \"I had a mini stroke several years ago.\"    Toxicity, chemicals 2020     Social History     Socioeconomic History    Marital status:    Tobacco Use    Smoking status: Former     Packs/day: 1.00     Years: 22.00     Additional pack years: 0.00     Total pack years: 22.00     Types: Cigarettes     Start date:      Quit date:      Years since quittin.1    Smokeless tobacco: Never   Vaping Use    Vaping Use: Never used   Substance and Sexual Activity    Alcohol use: Not Currently    Drug use: No    Sexual activity: Defer       Allergies  Known allergies and reactions were discussed with the patient. The patient's chart has been reviewed for allergy information and updated as necessary.   Allergies   Allergen Reactions    Amoxicillin Hives    Contrast Dye (Echo Or Unknown Ct/Mr) Other (See Comments)     No Oral or IV contrast Due to only having 1 kidney    Opdivo [Nivolumab] Provider Review Needed     Renal failure    Penicillins Hives and Rash    Clindamycin/Lincomycin Rash     pustules  Rash - pustules      Doxycycline Rash    Hepatitis B Virus Vaccines Rash     Pustules      Hepatitis B Vaccine Rash    Latex Rash    Levofloxacin Rash        Current Medication List  This medication list has been reviewed with the patient and evaluated for any interactions or necessary modifications/recommendations, and updated to include all prescription medications, OTC medications, and supplements the patient is currently taking.  This list reflects what is contained in the patient's profile, which has also been marked as reviewed to communicate to other providers it is the most up to date version of the patient's current medication " therapy.   Prior to Admission medications    Medication Sig Start Date End Date Taking? Authorizing Provider   acetaminophen (TYLENOL) 500 MG tablet Take 1 tablet by mouth Every 6 (Six) Hours As Needed for Mild Pain.    Brian Lopes MD   allopurinol (ZYLOPRIM) 100 MG tablet Take 1 tablet by mouth Daily. 9/7/22   Brian Lopes MD   cabozantinib s-malate (CABOMETYX) 20 MG tablet tablet Take 1 tablet by mouth Daily. Please do not take with food; Take on an empty stomach (at least 1 hour before or 2 hours after eating). 2/20/24   Leonardo Smith MD   cabozantinib s-malate (CABOMETYX) 20 MG tablet tablet Take 1 tablet by mouth Daily. Please do not take with food; Take on an empty stomach (at least 1 hour before or 2 hours after eating). 2/20/24   Leonardo Smith MD   calcium carb-cholecalciferol 600-800 MG-UNIT tablet Take 1 tablet by mouth 2 (Two) Times a Day With Meals.    Brian Lopes MD   carvedilol (COREG) 3.125 MG tablet Take 1 tablet by mouth 2 (Two) Times a Day With Meals.    Brian Lopes MD   finasteride (PROSCAR) 5 MG tablet Take 1 tablet by mouth Daily. 10/30/19   Brian Lopes MD   loratadine (CLARITIN) 10 MG tablet Take 1 tablet every day by oral route.    Brian Lopes MD   losartan (COZAAR) 25 MG tablet Take 1 tablet by mouth Daily. 2/24/24   Jaxson Javier APRN   predniSONE (DELTASONE) 5 MG tablet Take 0.5 tablets by mouth Daily.    Brian Lopes MD   sodium bicarbonate 650 MG tablet Take 1 tablet by mouth 3 (Three) Times a Day. 1/10/20   Bipin Burr DO       Drug Interactions  Reviewed concomitant medications, allergies, labs, comorbidities/medical history, and immunization history.   Drug-drug interactions noted and discussed during education: no significant drug interactions noted. . Reminded the patient to let us know before making any changes or starting any new prescription or OTC medications so we can first assess drug  interactions.  Drug-food interactions noted and discussed during education. Patient was instructed to avoid eating grapefruit and drinking grapefruit juice    Recommended Medications Assessment  Bone Health (such as calcium/vitamin D, bisphosphonate, RANKL inhibitor) - Currently Taking The patient is currently on Calcium and Vitamin D  VTE prophylaxis - Not Indicated The patient  does not need at this time    Prophylactic antimicrobials - Not Indicated The patient  does not need    Tumor lysis syndrome prophylaxis - Risk for TLS Low. The patient  does not need  allopurinol 300 mg PO daily, he is already on allopurinol for another indication. Adequate hydration was discussed during education.    Relevant Laboratory Values  Lab Results   Component Value Date    GLUCOSE 102 (H) 02/27/2024    CALCIUM 9.1 02/27/2024     02/27/2024    K 4.7 02/27/2024    CO2 23.0 02/27/2024     02/27/2024    BUN 45 (H) 02/27/2024    CREATININE 2.36 (H) 02/27/2024    EGFRIFAFRI 23 (L) 01/31/2022    EGFRIFNONA 19 (A) 01/31/2022    BCR 19.1 02/27/2024    ANIONGAP 9.0 02/27/2024     Lab Results   Component Value Date    WBC 5.99 02/27/2024    RBC 5.31 02/27/2024    HGB 15.7 02/27/2024    HCT 47.5 02/27/2024    MCV 89.5 02/27/2024    MCH 29.6 02/27/2024    MCHC 33.1 02/27/2024    RDW 13.6 02/27/2024    RDWSD 44.3 02/27/2024    MPV 9.6 02/27/2024     02/27/2024    NEUTRORELPCT 56.2 02/27/2024    LYMPHORELPCT 28.2 02/27/2024    MONORELPCT 10.2 02/27/2024    EOSRELPCT 4.5 02/27/2024    BASORELPCT 0.7 02/27/2024    AUTOIGPER 0.2 02/27/2024    NEUTROABS 3.37 02/27/2024    LYMPHSABS 1.69 02/27/2024    MONOSABS 0.61 02/27/2024    EOSABS 0.27 02/27/2024    BASOSABS 0.04 02/27/2024    AUTOIGNUM 0.01 02/27/2024    NRBC 0.0 02/27/2024       The above labs have been reviewed.  Labs OK to begin treatment    Initial Education Provided for Specialty Medication  The patient has been provided with the following education. All questions and  concerns have been addressed prior to the patient receiving the medication, and the patient has verbalized understanding of the education and any materials provided.  Additional patient education shall be provided and documented upon request by the patient, provider or payer.      Provided patient with:   Education sheets about the medication    Medication Education Sheets Provided: (select all that apply)  Oral Specialty Medication: Cabometyx (cabozantinib)    TOPICS COMMENTS   Storage and Handling of Oral Specialty Medication Store in the original container, in a dry location out of direct sunlight, and out of reach of children or pets. and Store at room temperature.  Discussed safe handling and what to do with any unused medication.   Administration of Oral Specialty Medication Take on an empty stomach, 1 hour(s) before and 2 hour(s) after eating.   Adherence to Oral Specialty Regimen and Handling Missed Doses Patient is likely to have good treatment adherence; reinforced the importance of adherence. Reviewed how to address missed doses and encouraged the patient to let us know of any missed doses.   Anemia: role of RBC, cause, s/s, ways to manage, role of transfusion Reviewed the role of RBC and the use of transfusions if hemoglobin decreases too much.  Patient to notify us if he experiences shortness of breath, dizziness, or palpitations.  Also let patient know that he could feel more tired than usual and to try to stay active, but rest if he needs to.    Thrombocytopenia: role of platelet, cause, s/s, ways to prevent bleeding, things to avoid, when to seek help Reviewed the role of platelets in blood clotting and when to call clinic (bloody nose that bleeds for 5 minutes despite pressure, a cut that won't stop bleeding despite pressure, gums that bleed excessively with brushing or flossing). Recommended using an electric razor, soft bristle toothbrush, and blowing your nose gently.    Neutropenia: role of WBC,  cause, infection precautions, s/s of infection, when to call MD Reviewed the role of WBC, good infection prevention practices, and when to call the clinic (temperature 100.4F, sore throat, burning urination, etc).   Nutrition and Appetite Changes:  importance of maintaining healthy diet & weight, ways to manage to improve intake, dietary consult, exercise regimen, electrolyte and/or blood glucose abnormalities Decreased Appetite: Discussed the risk of decreased appetite. Recommended eating smaller, more frequent meals. Instructed the patient to contact the clinic if losing weight or having difficulty eating enough to maintain energy level.   Diarrhea: causes, s/s of dehydration, ways to manage, dietary changes, when to call MD Chemotherapy : Discussed the risk of diarrhea. Instructed patient on use OTC loperamide with diarrhea onset, but emphasized the importance of calling the clinic if 4-6 episodes in 24 hours not relieved by OTC loperamide.   Constipation: causes, ways to manage, dietary changes, when to call MD Provided supplementary handout with instructions for use of docusate and other OTC therapies to manage constipation.  Patient was instructed to call us if medications aren't working.   Nausea/Vomiting: cause, use of antiemetics, dietary changes, when to call MD Emetic risk: Moderate  PRN home meds: Ondansetron  Pharmacy home meds sent to: Already has    Instructed the patient to take a dose of the PRN medication at the first onset of nausea and if it's not working to call us for additional medications.  Also provided non-drug measures to mitigate nausea.   Mouth Sores: causes, oral care, ways to manage Mouth sores can be prevented by making a mouth wash mixture of salt, baking soda, and water. The patient was instructed to swish and spit four times daily after meals and before bedtime.  Use of a soft bristle toothbrush was recommended.  The patient was instructed to avoid alcohol-containing OTC  mouthwashes.    Alopecia: cause, ways to manage, resources Discussed the possibility of hair loss with the patient. Informed patient that he could request a prescription for a wig if desired and most of the cost is usually covered by insurance. Recommended covering the head with a hat and/or protecting the skin on the head with SPF 30 or higher.    Nervous System Changes: causes, s/s, neuropathies, cognitive changes, ways to manage Asthenia (19% to 22%), dizziness (11% to 14%), fatigue (41% to 56%), headache (10% to 18%), voice disorder (10% to 20%)   Pain: causes, ways to manage Chemo: Discussed muscle and joint aches/pains with chemotherapy, and recommended the use of OTC pain relief with ibuprofen or acetaminophen if needed.   Skin/Nail Changes: cause, s/s, ways to manage Hand-foot syndrome was discussed, including the s/s, prevention measures, and treatment measures. A supplementary handout with this information was also given to the patient.    Organ Toxicities: cause, s/s, need for diagnostic tests, labs, when to notify MD Discussed potential effects on organ systems, monitoring, diagnostic tests, labs, and when to notify the MD. Discussed the signs/symptoms of the following:  HTN, adrenal, skin, GI, hemorrhage, ONJ, PRES, proteinuria, clots .   Miscellaneous Financial Issues: Big concern. Feel we have resolved now. Should be covered by nichole then his insurance  Lab Draws:  As often as MD requests  Blood Clots: Explained the rare, but possible risk of DVT or PE.  Reviewed the signs and symptoms of VTE and stressed the urgency to call 911 immediately.     Infertility and Sexuality:  causes, fertility preservation options, sexuality changes, ways to manage, importance of birth control Oral Oncology Therapy: Reviewed safe sex practices and the importance of minimizing exposure to body fluids while on oral oncology therapy.     Adherence and Self-Administration  Barriers to Patient Adherence and/or  Self-Administration: None  Methods for Supporting Patient Adherence and/or Self-Administration:  None, good support system  Expected duration of therapy: Until disease progression or intolerable toxicity    Goals of Therapy  Patient Goals of Therapy:   Consistently take medications as prescribed  Patient will adhere to medication regimen  Patient will report any medication side effects to healthcare provider  Clinical Goals:    Goals Addressed Today        Remain at or Below Target Blood Pressure      Self-monitor blood pressure           Support patient understanding of medication regimen  Ensure patient knows the pharmacy contact information  Schedule regular follow-up to monitor the treatment serious adverse events  Schedule regular follow-up to confirm medication adherence  Schedule regular follow-up to monitor disease progression or stability      Reassessment Plan & Follow-Up  Pharmacist to perform regular reassessments no more than (6) months from the previous assessment.  Welcome information and patient satisfaction survey to be sent by retail team with patient's initial fill.  Care Coordinator to set up future refill outreaches, coordinate prescription delivery, and escalate clinical questions to pharmacist.     Additional Plans, Therapy Recommendations or Therapy Problems to Be Addressed: None    After the above discussion was held, the patient agreed / gave consent to begin therapy. The consent was given by: Patient. A consent form will be uploaded to the media section of this EHR.    Attestation  I attest the patient was actively involved in and has agreed to the above plan of care. If the prescribed therapy is at any point deemed not appropriate based on the current or future assessments, a consultation will be initiated with the patient's specialty care provider to determine the best course of action. The revised plan of therapy will be documented along with any required assessments and/or additional  patient education provided.       Electronically signed 02/20/24 at 19:31 CST by:  Neftaly Solis PharmD, [facility]  Specialty Pharmacist - Hematology & Oncology  [facility]  [phone]          ADDENDUM:  On 2/24/24 the patient messaged me via [employer]. I responded with a phone call. See [employer] documentation. The patient reported very high blood pressures. He had informed Nephrology this might be the issue. I worked with Nephrology to get him on Losartan and asked him to contact nephrology [facility] the following Monday. All questions answered.     Electronically signed 02/24/24 at 16:40 CST by:  Neftaly Solis PharmD, [facility]  Specialty Pharmacist - Hematology & Oncology  [facility]  [phone]

## 2024-03-04 ENCOUNTER — SPECIALTY PHARMACY (OUTPATIENT)
Dept: ONCOLOGY | Facility: HOSPITAL | Age: 69
End: 2024-03-04
Payer: MEDICARE

## 2024-03-04 ENCOUNTER — LAB (OUTPATIENT)
Dept: LAB | Facility: HOSPITAL | Age: 69
End: 2024-03-04
Payer: MEDICARE

## 2024-03-04 DIAGNOSIS — C78.00 MALIGNANT NEOPLASM METASTATIC TO LUNG, UNSPECIFIED LATERALITY: ICD-10-CM

## 2024-03-04 DIAGNOSIS — C64.1 RENAL CELL CARCINOMA OF RIGHT KIDNEY: ICD-10-CM

## 2024-03-04 DIAGNOSIS — R94.6 ABNORMAL RESULTS OF THYROID FUNCTION STUDIES: ICD-10-CM

## 2024-03-04 LAB
ALBUMIN SERPL-MCNC: 4.2 G/DL (ref 3.5–5.2)
ALBUMIN/GLOB SERPL: 1.8 G/DL
ALP SERPL-CCNC: 94 U/L (ref 39–117)
ALT SERPL W P-5'-P-CCNC: 28 U/L (ref 1–41)
ANION GAP SERPL CALCULATED.3IONS-SCNC: 8 MMOL/L (ref 5–15)
AST SERPL-CCNC: 23 U/L (ref 1–40)
BASOPHILS # BLD AUTO: 0.04 10*3/MM3 (ref 0–0.2)
BASOPHILS NFR BLD AUTO: 0.6 % (ref 0–1.5)
BILIRUB SERPL-MCNC: 0.4 MG/DL (ref 0–1.2)
BUN SERPL-MCNC: 41 MG/DL (ref 8–23)
BUN/CREAT SERPL: 18.3 (ref 7–25)
CALCIUM SPEC-SCNC: 9.4 MG/DL (ref 8.6–10.5)
CHLORIDE SERPL-SCNC: 107 MMOL/L (ref 98–107)
CO2 SERPL-SCNC: 24 MMOL/L (ref 22–29)
CREAT SERPL-MCNC: 2.24 MG/DL (ref 0.76–1.27)
DEPRECATED RDW RBC AUTO: 43.2 FL (ref 37–54)
EGFRCR SERPLBLD CKD-EPI 2021: 31 ML/MIN/1.73
EOSINOPHIL # BLD AUTO: 0.33 10*3/MM3 (ref 0–0.4)
EOSINOPHIL NFR BLD AUTO: 5.2 % (ref 0.3–6.2)
ERYTHROCYTE [DISTWIDTH] IN BLOOD BY AUTOMATED COUNT: 13.5 % (ref 12.3–15.4)
GLOBULIN UR ELPH-MCNC: 2.3 GM/DL
GLUCOSE SERPL-MCNC: 104 MG/DL (ref 65–99)
HCT VFR BLD AUTO: 47.7 % (ref 37.5–51)
HGB BLD-MCNC: 16 G/DL (ref 13–17.7)
IMM GRANULOCYTES # BLD AUTO: 0.01 10*3/MM3 (ref 0–0.05)
IMM GRANULOCYTES NFR BLD AUTO: 0.2 % (ref 0–0.5)
LYMPHOCYTES # BLD AUTO: 1.85 10*3/MM3 (ref 0.7–3.1)
LYMPHOCYTES NFR BLD AUTO: 29.2 % (ref 19.6–45.3)
MCH RBC QN AUTO: 29.5 PG (ref 26.6–33)
MCHC RBC AUTO-ENTMCNC: 33.5 G/DL (ref 31.5–35.7)
MCV RBC AUTO: 87.8 FL (ref 79–97)
MONOCYTES # BLD AUTO: 0.54 10*3/MM3 (ref 0.1–0.9)
MONOCYTES NFR BLD AUTO: 8.5 % (ref 5–12)
NEUTROPHILS NFR BLD AUTO: 3.57 10*3/MM3 (ref 1.7–7)
NEUTROPHILS NFR BLD AUTO: 56.3 % (ref 42.7–76)
NRBC BLD AUTO-RTO: 0 /100 WBC (ref 0–0.2)
PLATELET # BLD AUTO: 186 10*3/MM3 (ref 140–450)
PMV BLD AUTO: 9.5 FL (ref 6–12)
POTASSIUM SERPL-SCNC: 5.1 MMOL/L (ref 3.5–5.2)
PROT SERPL-MCNC: 6.5 G/DL (ref 6–8.5)
RBC # BLD AUTO: 5.43 10*6/MM3 (ref 4.14–5.8)
SODIUM SERPL-SCNC: 139 MMOL/L (ref 136–145)
T4 FREE SERPL-MCNC: 0.99 NG/DL (ref 0.93–1.7)
TSH SERPL DL<=0.05 MIU/L-ACNC: 1.54 UIU/ML (ref 0.27–4.2)
WBC NRBC COR # BLD AUTO: 6.34 10*3/MM3 (ref 3.4–10.8)

## 2024-03-04 PROCEDURE — 84443 ASSAY THYROID STIM HORMONE: CPT

## 2024-03-04 PROCEDURE — 85025 COMPLETE CBC W/AUTO DIFF WBC: CPT

## 2024-03-04 PROCEDURE — 84439 ASSAY OF FREE THYROXINE: CPT

## 2024-03-04 PROCEDURE — 80053 COMPREHEN METABOLIC PANEL: CPT

## 2024-03-04 PROCEDURE — 36415 COLL VENOUS BLD VENIPUNCTURE: CPT

## 2024-03-04 NOTE — PROGRESS NOTES
"   Specialty Pharmacy Patient Management Program  Oncology Reassessment     Parker Arnold is a 68 y.o. male with metastatic renal cell carcinoma seen by an Oncology provider and enrolled in the Oncology Patient Management program offered by [facility].  A follow-up outreach was conducted, including assessment of continued therapy appropriateness, medication adherence, and side effect incidence and management for cabometyx 20mg PO daily.       Relevant Past Medical History and Comorbidities  Relevant medical history and concomitant health conditions were discussed with the patient. The patient's chart has been reviewed for relevant past medical history and comorbid health conditions and updated as necessary.   Past Medical History:   Diagnosis Date    Dehydration 2019    GERD (gastroesophageal reflux disease)     Hypertension     Hypertension due to drug 2024    Cabometyx    Kidney disease, chronic, stage III (moderate, EGFR 30-59 ml/min) 2018    Post infusion of Optivo, states GFR is 28%    Nephritis and nephropathy 2020    Renal cell carcinoma     Renal cell carcinoma of right kidney 10/18/2019    Overview:  Diagnoses 14. Path report in C[facility] at [facility]. Tissue has been requested.     Stroke     States \"I had a mini stroke several years ago.\"    Toxicity, chemicals 2020     Social History     Socioeconomic History    Marital status:    Tobacco Use    Smoking status: Former     Current packs/day: 0.00     Average packs/day: 1 pack/day for 22.0 years (22.0 ttl pk-yrs)     Types: Cigarettes     Start date:      Quit date:      Years since quittin.2    Smokeless tobacco: Never   Vaping Use    Vaping status: Never Used   Substance and Sexual Activity    Alcohol use: Not Currently    Drug use: No    Sexual activity: Defer       Allergies  Known allergies and reactions were discussed with the patient. The patient's chart has " been reviewed for allergy information and updated as necessary.   Amoxicillin, Contrast dye (echo or unknown ct/mr), Opdivo [nivolumab], Penicillins, Clindamycin/lincomycin, Doxycycline, Hepatitis b virus vaccines, Hepatitis b vaccine, Latex, and Levofloxacin    Relevant Laboratory Values  Lab Results   Component Value Date    GLUCOSE 104 (H) 03/04/2024    CALCIUM 9.4 03/04/2024     03/04/2024    K 5.1 03/04/2024    CO2 24.0 03/04/2024     03/04/2024    BUN 41 (H) 03/04/2024    CREATININE 2.24 (H) 03/04/2024    EGFRIFAFRI 23 (L) 01/31/2022    EGFRIFNONA 19 (A) 01/31/2022    BCR 18.3 03/04/2024    ANIONGAP 8.0 03/04/2024     Lab Results   Component Value Date    WBC 6.34 03/04/2024    RBC 5.43 03/04/2024    HGB 16.0 03/04/2024    HCT 47.7 03/04/2024    MCV 87.8 03/04/2024    MCH 29.5 03/04/2024    MCHC 33.5 03/04/2024    RDW 13.5 03/04/2024    RDWSD 43.2 03/04/2024    MPV 9.5 03/04/2024     03/04/2024    NEUTRORELPCT 56.3 03/04/2024    LYMPHORELPCT 29.2 03/04/2024    MONORELPCT 8.5 03/04/2024    EOSRELPCT 5.2 03/04/2024    BASORELPCT 0.6 03/04/2024    AUTOIGPER 0.2 03/04/2024    NEUTROABS 3.57 03/04/2024    LYMPHSABS 1.85 03/04/2024    MONOSABS 0.54 03/04/2024    EOSABS 0.33 03/04/2024    BASOSABS 0.04 03/04/2024    AUTOIGNUM 0.01 03/04/2024    NRBC 0.0 03/04/2024        Current Medication List  This medication list has been reviewed with the patient and evaluated for any interactions or necessary modifications/recommendations, and updated to include all prescription medications, OTC medications, and supplements the patient is currently taking.  This list reflects what is contained in the patient's profile, which has also been marked as reviewed to communicate to other providers it is the most up to date version of the patient's current medication therapy.     Current Outpatient Medications:     acetaminophen (TYLENOL) 500 MG tablet, Take 1 tablet by mouth Every 6 (Six) Hours As Needed for Mild  Pain., Disp: , Rfl:     allopurinol (ZYLOPRIM) 100 MG tablet, Take 1 tablet by mouth Daily., Disp: , Rfl:     cabozantinib s-malate (CABOMETYX) 20 MG tablet tablet, Take 1 tablet by mouth Daily. Please do not take with food; Take on an empty stomach (at least 1 hour before or 2 hours after eating)., Disp: 30 tablet, Rfl: 5    cabozantinib s-malate (CABOMETYX) 20 MG tablet tablet, Take 1 tablet by mouth Daily. Please do not take with food; Take on an empty stomach (at least 1 hour before or 2 hours after eating)., Disp: 30 tablet, Rfl: 0    calcium carb-cholecalciferol 600-800 MG-UNIT tablet, Take 1 tablet by mouth 2 (Two) Times a Day With Meals., Disp: , Rfl:     carvedilol (COREG) 3.125 MG tablet, Take 1 tablet by mouth 2 (Two) Times a Day With Meals., Disp: , Rfl:     finasteride (PROSCAR) 5 MG tablet, Take 1 tablet by mouth Daily., Disp: , Rfl: 3    loratadine (CLARITIN) 10 MG tablet, Take 1 tablet every day by oral route., Disp: , Rfl:     losartan (COZAAR) 25 MG tablet, Take 1 tablet by mouth Daily., Disp: 30 tablet, Rfl: 0    predniSONE (DELTASONE) 5 MG tablet, Take 0.5 tablets by mouth Daily., Disp: , Rfl:     sodium bicarbonate 650 MG tablet, Take 1 tablet by mouth 3 (Three) Times a Day., Disp: 90 tablet, Rfl: 2    Drug Interactions  None currently. However food interaction noted on patient interview. Patient had been drinking grapefruit juice containing soda (Fresca) 4-6/week which can increase the serum concentration of cabozantinib.     Adverse Drug Reactions  Adverse Reactions Experienced: On cabozantinib, patient continues to experience hypertension with systolic BP in high 160s on evening of 3/3 followed by 3 readings on 3/4 obtained by the patient of 169/89 30' post losartan dose, 167/97 , and 150/95  2 weeks after initiation of losartan 25 mg po daily.   Patient reports feeling dizzy spells that seem to coincide with elevated blood pressure.  Plan for ADR Management: recommend increasing dose of  losartan until patient reaches target BP, Message sent to [facility] [phone]) regarding BP management on cabozantinib.   Hospitalizations and Urgent Care Since Last Assessment  Hospitalizations or Admissions: None    ED Visits: None   Urgent Office Visits: None     Adherence and Self-Administration  Approximate Number of Doses Missed Since Last Assessment: None   Ongoing or New Barriers to Patient Adherence and/or Self-Administration: None   Methods for Supporting Patient Adherence and/or Self-Administration: Continued monthly assessment with oral oncology clinic    Goals of Therapy  Progress Toward Meeting Patient-Identified Goals of Therapy:  Goal Met  Patient-Identified Goals  Consistently take medications as prescribed  Patient will adhere to medication regimen  Patient will report any medication side effects to healthcare provider    Progress Toward Meeting Clinical Goals or Therapeutic Targets: Goal Met  Clinical Goals or Therapeutic Targets  Support patient understanding of medication regimen  Ensure patient knows the pharmacy contact information  Schedule regular follow-up to monitor the treatment serious adverse events  Schedule regular follow-up to confirm medication adherence  Schedule regular follow-up to monitor disease progression or stabilty    Quality of Life Assessment   Quality of Life related to the patient's specialty therapy was discussed with the patient. The QOL segment of this outreach has been reviewed and updated.   Quality of Life Score: 6    Reassessment Plan & Follow-Up  Pharmacist to continue to perform regular reassessments no more than (6) months from the previous assessment.  Care Coordinator to facilitate future refill outreaches, coordinate prescription delivery, and escalate clinical questions to pharmacist.     Additional Plans, Therapy Recommendations or Therapy Problems to Be Addressed: Blood pressure control as previously addressed   Recent Provider  Changes:  None    Attestation  I attest that the specialty medication(s) addressed above are appropriate for the patient based on my reassessment.  If the prescribed therapy is at any point deemed not appropriate based on the current or future assessments, a consultation will be initiated with the patient's specialty care provider to determine the best course of action. The revised plan of therapy will be documented along with any additional patient education provided.     Kaylee Cheng, PharmD  3/4/2024

## 2024-03-11 ENCOUNTER — LAB (OUTPATIENT)
Dept: LAB | Facility: HOSPITAL | Age: 69
End: 2024-03-11
Payer: MEDICARE

## 2024-03-11 DIAGNOSIS — C64.1 RENAL CELL CARCINOMA OF RIGHT KIDNEY: ICD-10-CM

## 2024-03-11 DIAGNOSIS — R94.6 ABNORMAL RESULTS OF THYROID FUNCTION STUDIES: ICD-10-CM

## 2024-03-11 DIAGNOSIS — C78.00 MALIGNANT NEOPLASM METASTATIC TO LUNG, UNSPECIFIED LATERALITY: ICD-10-CM

## 2024-03-11 LAB
ALBUMIN SERPL-MCNC: 4.2 G/DL (ref 3.5–5.2)
ALBUMIN/GLOB SERPL: 1.8 G/DL
ALP SERPL-CCNC: 102 U/L (ref 39–117)
ALT SERPL W P-5'-P-CCNC: 39 U/L (ref 1–41)
ANION GAP SERPL CALCULATED.3IONS-SCNC: 7 MMOL/L (ref 5–15)
AST SERPL-CCNC: 29 U/L (ref 1–40)
BASOPHILS # BLD AUTO: 0.03 10*3/MM3 (ref 0–0.2)
BASOPHILS NFR BLD AUTO: 0.5 % (ref 0–1.5)
BILIRUB SERPL-MCNC: 0.5 MG/DL (ref 0–1.2)
BUN SERPL-MCNC: 36 MG/DL (ref 8–23)
BUN/CREAT SERPL: 16.1 (ref 7–25)
CALCIUM SPEC-SCNC: 9 MG/DL (ref 8.6–10.5)
CHLORIDE SERPL-SCNC: 109 MMOL/L (ref 98–107)
CO2 SERPL-SCNC: 26 MMOL/L (ref 22–29)
CREAT SERPL-MCNC: 2.24 MG/DL (ref 0.76–1.27)
DEPRECATED RDW RBC AUTO: 43.6 FL (ref 37–54)
EGFRCR SERPLBLD CKD-EPI 2021: 30.8 ML/MIN/1.73
EOSINOPHIL # BLD AUTO: 0.3 10*3/MM3 (ref 0–0.4)
EOSINOPHIL NFR BLD AUTO: 4.7 % (ref 0.3–6.2)
ERYTHROCYTE [DISTWIDTH] IN BLOOD BY AUTOMATED COUNT: 13.6 % (ref 12.3–15.4)
GLOBULIN UR ELPH-MCNC: 2.4 GM/DL
GLUCOSE SERPL-MCNC: 108 MG/DL (ref 65–99)
HCT VFR BLD AUTO: 47.5 % (ref 37.5–51)
HGB BLD-MCNC: 16 G/DL (ref 13–17.7)
IMM GRANULOCYTES # BLD AUTO: 0.01 10*3/MM3 (ref 0–0.05)
IMM GRANULOCYTES NFR BLD AUTO: 0.2 % (ref 0–0.5)
LYMPHOCYTES # BLD AUTO: 1.73 10*3/MM3 (ref 0.7–3.1)
LYMPHOCYTES NFR BLD AUTO: 27.3 % (ref 19.6–45.3)
MCH RBC QN AUTO: 29.6 PG (ref 26.6–33)
MCHC RBC AUTO-ENTMCNC: 33.7 G/DL (ref 31.5–35.7)
MCV RBC AUTO: 88 FL (ref 79–97)
MONOCYTES # BLD AUTO: 0.59 10*3/MM3 (ref 0.1–0.9)
MONOCYTES NFR BLD AUTO: 9.3 % (ref 5–12)
NEUTROPHILS NFR BLD AUTO: 3.68 10*3/MM3 (ref 1.7–7)
NEUTROPHILS NFR BLD AUTO: 58 % (ref 42.7–76)
NRBC BLD AUTO-RTO: 0 /100 WBC (ref 0–0.2)
PLATELET # BLD AUTO: 172 10*3/MM3 (ref 140–450)
PMV BLD AUTO: 9.3 FL (ref 6–12)
POTASSIUM SERPL-SCNC: 4.9 MMOL/L (ref 3.5–5.2)
PROT SERPL-MCNC: 6.6 G/DL (ref 6–8.5)
RBC # BLD AUTO: 5.4 10*6/MM3 (ref 4.14–5.8)
SODIUM SERPL-SCNC: 142 MMOL/L (ref 136–145)
T4 FREE SERPL-MCNC: 1.06 NG/DL (ref 0.93–1.7)
TSH SERPL DL<=0.05 MIU/L-ACNC: 1.85 UIU/ML (ref 0.27–4.2)
WBC NRBC COR # BLD AUTO: 6.34 10*3/MM3 (ref 3.4–10.8)

## 2024-03-11 PROCEDURE — 84443 ASSAY THYROID STIM HORMONE: CPT

## 2024-03-11 PROCEDURE — 84439 ASSAY OF FREE THYROXINE: CPT

## 2024-03-11 PROCEDURE — 85025 COMPLETE CBC W/AUTO DIFF WBC: CPT

## 2024-03-11 PROCEDURE — 80053 COMPREHEN METABOLIC PANEL: CPT

## 2024-03-11 PROCEDURE — 36415 COLL VENOUS BLD VENIPUNCTURE: CPT

## 2024-03-12 ENCOUNTER — SPECIALTY PHARMACY (OUTPATIENT)
Dept: ONCOLOGY | Facility: HOSPITAL | Age: 69
End: 2024-03-12
Payer: MEDICARE

## 2024-03-12 DIAGNOSIS — C64.1 RENAL CELL CARCINOMA OF RIGHT KIDNEY: ICD-10-CM

## 2024-03-12 DIAGNOSIS — I15.8 HYPERTENSION DUE TO DRUG: Primary | ICD-10-CM

## 2024-03-12 DIAGNOSIS — T50.905A HYPERTENSION DUE TO DRUG: Primary | ICD-10-CM

## 2024-03-12 NOTE — PROGRESS NOTES
"[facility]    ALERT - ENCOUNTER FOR PROBLEM      Consult Details  Consulting Provider:   Leonardo Smith MD ([facility])   Medication and Regimen:  Cabometyx 20 mg PO Daily     Parker \"JESE\" is a 69 y.o. male, who is followed by the specialty pharmacy service for Cabometyx support.     This patient contacted our service today due to a problem that requires attention.     Visit Type: telephone from patient     PROBLEM: HYPERTENSION, Remains uncontrolled, see previous documentation       Subjective   Patient here for follow-up of elevated blood pressure.  He is exercising and is adherent to a low-salt diet.  Blood pressure is not well controlled at home. Cardiac symptoms: none. Patient denies: chest pain, exertional chest pressure/discomfort, irregular heart beat, and palpitations. Cardiovascular risk factors: advanced age (older than 55 for men, 65 for women), hypertension, male gender, and microalbuminuria. Use of agents associated with hypertension:  CABOMETYX . History of target organ damage: chronic kidney disease and transient ischemic attack.    Subjective BP numbers reported today are: 175/105, 158/98, 170/88    Currently on Losartan 50 mg daily which we started him on in conjunction with Nephrology. Also on Coreg 3.125 mg PO BID.     No symptoms other than he states he feels \"clumsy, possibly dizzy/lightheaded\"    The following portions of the patient's history were reviewed and updated as appropriate: allergies, current medications, past family history, past medical history, past social history, past surgical history, and problem list.    Review of Systems  Pertinent items are noted in HPI.     Objective    No exam performed today, TELEPHONE CALL.     Assessment & Plan   Hypertension, stage 2 Uncontrolled. Evidence of target organ damage: none.    Medication: continue Losartan and increase to 100 mg per day.  Check blood pressures 2-4 times daily " and record.  Follow up: 1-3 weeks and as needed.    The above was discussed in great detail with [facility] and they will be sending him in a new prescription. I discussed with the patient and wife as well. All questions answered.       Electronically signed 03/13/24 at 15:35 CDT by:  Neftaly Solis PharmD, BCPS  Specialty Pharmacist - Hematology & Oncology  [facility]  [phone]

## 2024-03-13 RX ORDER — LOSARTAN POTASSIUM 100 MG/1
100 TABLET ORAL DAILY
COMMUNITY

## 2024-03-18 ENCOUNTER — LAB (OUTPATIENT)
Dept: LAB | Facility: HOSPITAL | Age: 69
End: 2024-03-18
Payer: MEDICARE

## 2024-03-18 ENCOUNTER — SPECIALTY PHARMACY (OUTPATIENT)
Dept: ONCOLOGY | Facility: HOSPITAL | Age: 69
End: 2024-03-18
Payer: MEDICARE

## 2024-03-18 VITALS
HEIGHT: 70 IN | BODY MASS INDEX: 29.13 KG/M2 | DIASTOLIC BLOOD PRESSURE: 95 MMHG | SYSTOLIC BLOOD PRESSURE: 180 MMHG | WEIGHT: 203.48 LBS

## 2024-03-18 DIAGNOSIS — I15.8 HYPERTENSION DUE TO DRUG: Primary | ICD-10-CM

## 2024-03-18 DIAGNOSIS — R94.6 ABNORMAL RESULTS OF THYROID FUNCTION STUDIES: ICD-10-CM

## 2024-03-18 DIAGNOSIS — C64.1 RENAL CELL CARCINOMA OF RIGHT KIDNEY: ICD-10-CM

## 2024-03-18 DIAGNOSIS — T50.905A HYPERTENSION DUE TO DRUG: Primary | ICD-10-CM

## 2024-03-18 DIAGNOSIS — C78.00 MALIGNANT NEOPLASM METASTATIC TO LUNG, UNSPECIFIED LATERALITY: ICD-10-CM

## 2024-03-18 LAB
ALBUMIN SERPL-MCNC: 4.3 G/DL (ref 3.5–5.2)
ALBUMIN/GLOB SERPL: 1.8 G/DL
ALP SERPL-CCNC: 104 U/L (ref 39–117)
ALT SERPL W P-5'-P-CCNC: 48 U/L (ref 1–41)
ANION GAP SERPL CALCULATED.3IONS-SCNC: 8 MMOL/L (ref 5–15)
AST SERPL-CCNC: 30 U/L (ref 1–40)
BASOPHILS # BLD AUTO: 0.03 10*3/MM3 (ref 0–0.2)
BASOPHILS NFR BLD AUTO: 0.5 % (ref 0–1.5)
BILIRUB SERPL-MCNC: 0.5 MG/DL (ref 0–1.2)
BUN SERPL-MCNC: 39 MG/DL (ref 8–23)
BUN/CREAT SERPL: 19.6 (ref 7–25)
CALCIUM SPEC-SCNC: 10 MG/DL (ref 8.6–10.5)
CHLORIDE SERPL-SCNC: 105 MMOL/L (ref 98–107)
CO2 SERPL-SCNC: 27 MMOL/L (ref 22–29)
CREAT SERPL-MCNC: 1.99 MG/DL (ref 0.76–1.27)
DEPRECATED RDW RBC AUTO: 43.8 FL (ref 37–54)
EGFRCR SERPLBLD CKD-EPI 2021: 35.5 ML/MIN/1.73
EOSINOPHIL # BLD AUTO: 0.29 10*3/MM3 (ref 0–0.4)
EOSINOPHIL NFR BLD AUTO: 4.9 % (ref 0.3–6.2)
ERYTHROCYTE [DISTWIDTH] IN BLOOD BY AUTOMATED COUNT: 13.9 % (ref 12.3–15.4)
GLOBULIN UR ELPH-MCNC: 2.4 GM/DL
GLUCOSE SERPL-MCNC: 104 MG/DL (ref 65–99)
HCT VFR BLD AUTO: 50.7 % (ref 37.5–51)
HGB BLD-MCNC: 16.9 G/DL (ref 13–17.7)
IMM GRANULOCYTES # BLD AUTO: 0.02 10*3/MM3 (ref 0–0.05)
IMM GRANULOCYTES NFR BLD AUTO: 0.3 % (ref 0–0.5)
LYMPHOCYTES # BLD AUTO: 1.58 10*3/MM3 (ref 0.7–3.1)
LYMPHOCYTES NFR BLD AUTO: 26.6 % (ref 19.6–45.3)
MCH RBC QN AUTO: 29.2 PG (ref 26.6–33)
MCHC RBC AUTO-ENTMCNC: 33.3 G/DL (ref 31.5–35.7)
MCV RBC AUTO: 87.6 FL (ref 79–97)
MONOCYTES # BLD AUTO: 0.53 10*3/MM3 (ref 0.1–0.9)
MONOCYTES NFR BLD AUTO: 8.9 % (ref 5–12)
NEUTROPHILS NFR BLD AUTO: 3.5 10*3/MM3 (ref 1.7–7)
NEUTROPHILS NFR BLD AUTO: 58.8 % (ref 42.7–76)
NRBC BLD AUTO-RTO: 0 /100 WBC (ref 0–0.2)
PLATELET # BLD AUTO: 154 10*3/MM3 (ref 140–450)
PMV BLD AUTO: 9.4 FL (ref 6–12)
POTASSIUM SERPL-SCNC: 5.5 MMOL/L (ref 3.5–5.2)
PROT SERPL-MCNC: 6.7 G/DL (ref 6–8.5)
RBC # BLD AUTO: 5.79 10*6/MM3 (ref 4.14–5.8)
SODIUM SERPL-SCNC: 140 MMOL/L (ref 136–145)
T4 FREE SERPL-MCNC: 0.93 NG/DL (ref 0.93–1.7)
TSH SERPL DL<=0.05 MIU/L-ACNC: 2.19 UIU/ML (ref 0.27–4.2)
WBC NRBC COR # BLD AUTO: 5.95 10*3/MM3 (ref 3.4–10.8)

## 2024-03-18 PROCEDURE — 36415 COLL VENOUS BLD VENIPUNCTURE: CPT

## 2024-03-18 PROCEDURE — 85025 COMPLETE CBC W/AUTO DIFF WBC: CPT

## 2024-03-18 PROCEDURE — 84443 ASSAY THYROID STIM HORMONE: CPT

## 2024-03-18 PROCEDURE — 84439 ASSAY OF FREE THYROXINE: CPT

## 2024-03-18 PROCEDURE — 80053 COMPREHEN METABOLIC PANEL: CPT

## 2024-03-18 NOTE — PROGRESS NOTES
"[facility]    ALERT - ENCOUNTER FOR PROBLEM      Consult Details  Consulting Provider:   Leonardo Smith MD ([facility])   Medication and Regimen:  Cabometyx 20 mg PO daily     Parker \"HERNANDO" is a 69 y.o. male, who is followed by the specialty pharmacy service for Cabometyx support.     This patient contacted our service today due to a problem that requires attention.     Visit Type: telephone from patient     PROBLEM: HYPERTENSION / HYPERKALEMIA     Subjective:      Parker Arnold is here for follow-up of his hypertension. His high blood pressure was first noted  after starting Cabometyx . Home blood pressure readings: range 180/90s to 150s/100s . Salt intake and diet:  acceptable . Usual weight: 92 kg. Associated signs and symptoms: headache. Patient denies: chest pain and palpitations. Use of agents associated with hypertension:  chemo . Medication compliance: taking as prescribed.    The following portions of the patient's history were reviewed and updated as appropriate: allergies, current medications, past family history, past medical history, past social history, past surgical history, and problem list.     Review of Systems  Pertinent items are noted in HPI.       Objective:      Physical Exam:  No exam performed today, phone call.    Lab Review   Lab Results   Component Value Date     03/18/2024     01/31/2022    K 5.5 (H) 03/18/2024    K 5.8 (H) 01/31/2022     03/18/2024     01/31/2022    CO2 27.0 03/18/2024    CO2 18 (L) 01/31/2022    BUN 39 (H) 03/18/2024    BUN 45 (H) 01/31/2022    CREATININE 1.99 (H) 03/18/2024    CREATININE 83.51 12/07/2023    CREATININE 3.2 (H) 01/31/2022    GLUCOSE 104 (H) 03/18/2024    GLUCOSE 105 01/31/2022    CALCIUM 10.0 03/18/2024    CALCIUM 9.9 01/31/2022          Assessment:      Hypertension, stage 2 . Evidence of target organ damage: none.      Plan:      Medication: increasing Coreg " to 6.25 mg PO twice daily, and adding HCTZ per Nephrology - orders deferred to their service.     Discussed in detail with the patient and with Nephrology    ISSUE: Resolved    Electronically signed 03/18/24 at 17:35 CDT by:  Neftaly Solis, PharmD  Specialty Pharmacist - Hematology & Oncology  [facility]  [phone]

## 2024-03-25 ENCOUNTER — LAB (OUTPATIENT)
Dept: LAB | Facility: HOSPITAL | Age: 69
End: 2024-03-25
Payer: MEDICARE

## 2024-03-25 DIAGNOSIS — R94.6 ABNORMAL RESULTS OF THYROID FUNCTION STUDIES: ICD-10-CM

## 2024-03-25 DIAGNOSIS — C64.1 RENAL CELL CARCINOMA OF RIGHT KIDNEY: ICD-10-CM

## 2024-03-25 DIAGNOSIS — C78.00 MALIGNANT NEOPLASM METASTATIC TO LUNG, UNSPECIFIED LATERALITY: ICD-10-CM

## 2024-03-25 LAB
ALBUMIN SERPL-MCNC: 4.3 G/DL (ref 3.5–5.2)
ALBUMIN/GLOB SERPL: 1.7 G/DL
ALP SERPL-CCNC: 94 U/L (ref 39–117)
ALT SERPL W P-5'-P-CCNC: 50 U/L (ref 1–41)
ANION GAP SERPL CALCULATED.3IONS-SCNC: 9 MMOL/L (ref 5–15)
AST SERPL-CCNC: 29 U/L (ref 1–40)
BASOPHILS # BLD AUTO: 0.02 10*3/MM3 (ref 0–0.2)
BASOPHILS NFR BLD AUTO: 0.3 % (ref 0–1.5)
BILIRUB SERPL-MCNC: 0.5 MG/DL (ref 0–1.2)
BUN SERPL-MCNC: 45 MG/DL (ref 8–23)
BUN/CREAT SERPL: 18.1 (ref 7–25)
CALCIUM SPEC-SCNC: 8.9 MG/DL (ref 8.6–10.5)
CHLORIDE SERPL-SCNC: 107 MMOL/L (ref 98–107)
CO2 SERPL-SCNC: 26 MMOL/L (ref 22–29)
CREAT SERPL-MCNC: 2.48 MG/DL (ref 0.76–1.27)
DEPRECATED RDW RBC AUTO: 45.1 FL (ref 37–54)
EGFRCR SERPLBLD CKD-EPI 2021: 27.2 ML/MIN/1.73
EOSINOPHIL # BLD AUTO: 0.35 10*3/MM3 (ref 0–0.4)
EOSINOPHIL NFR BLD AUTO: 6.1 % (ref 0.3–6.2)
ERYTHROCYTE [DISTWIDTH] IN BLOOD BY AUTOMATED COUNT: 14.1 % (ref 12.3–15.4)
GLOBULIN UR ELPH-MCNC: 2.5 GM/DL
GLUCOSE SERPL-MCNC: 105 MG/DL (ref 65–99)
HCT VFR BLD AUTO: 49 % (ref 37.5–51)
HGB BLD-MCNC: 16.3 G/DL (ref 13–17.7)
LYMPHOCYTES # BLD AUTO: 1.88 10*3/MM3 (ref 0.7–3.1)
LYMPHOCYTES NFR BLD AUTO: 32.8 % (ref 19.6–45.3)
MCH RBC QN AUTO: 29.5 PG (ref 26.6–33)
MCHC RBC AUTO-ENTMCNC: 33.3 G/DL (ref 31.5–35.7)
MCV RBC AUTO: 88.6 FL (ref 79–97)
MONOCYTES # BLD AUTO: 0.63 10*3/MM3 (ref 0.1–0.9)
MONOCYTES NFR BLD AUTO: 11 % (ref 5–12)
NEUTROPHILS NFR BLD AUTO: 2.85 10*3/MM3 (ref 1.7–7)
NEUTROPHILS NFR BLD AUTO: 49.8 % (ref 42.7–76)
PLATELET # BLD AUTO: 147 10*3/MM3 (ref 140–450)
PMV BLD AUTO: 9.6 FL (ref 6–12)
POTASSIUM SERPL-SCNC: 4.9 MMOL/L (ref 3.5–5.2)
PROT SERPL-MCNC: 6.8 G/DL (ref 6–8.5)
RBC # BLD AUTO: 5.53 10*6/MM3 (ref 4.14–5.8)
SODIUM SERPL-SCNC: 142 MMOL/L (ref 136–145)
T4 FREE SERPL-MCNC: 1.07 NG/DL (ref 0.93–1.7)
TSH SERPL DL<=0.05 MIU/L-ACNC: 1.69 UIU/ML (ref 0.27–4.2)
WBC NRBC COR # BLD AUTO: 5.73 10*3/MM3 (ref 3.4–10.8)

## 2024-03-25 PROCEDURE — 80053 COMPREHEN METABOLIC PANEL: CPT

## 2024-03-25 PROCEDURE — 85025 COMPLETE CBC W/AUTO DIFF WBC: CPT

## 2024-03-25 PROCEDURE — 84443 ASSAY THYROID STIM HORMONE: CPT

## 2024-03-25 PROCEDURE — 84439 ASSAY OF FREE THYROXINE: CPT

## 2024-03-25 PROCEDURE — 36415 COLL VENOUS BLD VENIPUNCTURE: CPT

## 2024-04-01 ENCOUNTER — LAB (OUTPATIENT)
Dept: LAB | Facility: HOSPITAL | Age: 69
End: 2024-04-01
Payer: MEDICARE

## 2024-04-01 DIAGNOSIS — C64.1 RENAL CELL CARCINOMA OF RIGHT KIDNEY: ICD-10-CM

## 2024-04-01 DIAGNOSIS — R94.6 ABNORMAL RESULTS OF THYROID FUNCTION STUDIES: ICD-10-CM

## 2024-04-01 LAB
ALBUMIN SERPL-MCNC: 4.4 G/DL (ref 3.5–5.2)
ALBUMIN/GLOB SERPL: 1.8 G/DL
ALP SERPL-CCNC: 103 U/L (ref 39–117)
ALT SERPL W P-5'-P-CCNC: 51 U/L (ref 1–41)
ANION GAP SERPL CALCULATED.3IONS-SCNC: 10 MMOL/L (ref 5–15)
AST SERPL-CCNC: 31 U/L (ref 1–40)
BASOPHILS # BLD AUTO: 0.03 10*3/MM3 (ref 0–0.2)
BASOPHILS NFR BLD AUTO: 0.5 % (ref 0–1.5)
BILIRUB SERPL-MCNC: 0.8 MG/DL (ref 0–1.2)
BUN SERPL-MCNC: 56 MG/DL (ref 8–23)
BUN/CREAT SERPL: 21.4 (ref 7–25)
CALCIUM SPEC-SCNC: 9.9 MG/DL (ref 8.6–10.5)
CHLORIDE SERPL-SCNC: 104 MMOL/L (ref 98–107)
CO2 SERPL-SCNC: 27 MMOL/L (ref 22–29)
CREAT SERPL-MCNC: 2.62 MG/DL (ref 0.76–1.27)
DEPRECATED RDW RBC AUTO: 46.5 FL (ref 37–54)
EGFRCR SERPLBLD CKD-EPI 2021: 25.5 ML/MIN/1.73
EOSINOPHIL # BLD AUTO: 0.28 10*3/MM3 (ref 0–0.4)
EOSINOPHIL NFR BLD AUTO: 4.8 % (ref 0.3–6.2)
ERYTHROCYTE [DISTWIDTH] IN BLOOD BY AUTOMATED COUNT: 14.5 % (ref 12.3–15.4)
GLOBULIN UR ELPH-MCNC: 2.4 GM/DL
GLUCOSE SERPL-MCNC: 111 MG/DL (ref 65–99)
HCT VFR BLD AUTO: 48.7 % (ref 37.5–51)
HGB BLD-MCNC: 16.3 G/DL (ref 13–17.7)
IMM GRANULOCYTES # BLD AUTO: 0.01 10*3/MM3 (ref 0–0.05)
IMM GRANULOCYTES NFR BLD AUTO: 0.2 % (ref 0–0.5)
LYMPHOCYTES # BLD AUTO: 1.83 10*3/MM3 (ref 0.7–3.1)
LYMPHOCYTES NFR BLD AUTO: 31.1 % (ref 19.6–45.3)
MAGNESIUM SERPL-MCNC: 1.9 MG/DL (ref 1.6–2.4)
MCH RBC QN AUTO: 29.9 PG (ref 26.6–33)
MCHC RBC AUTO-ENTMCNC: 33.5 G/DL (ref 31.5–35.7)
MCV RBC AUTO: 89.2 FL (ref 79–97)
MONOCYTES # BLD AUTO: 0.64 10*3/MM3 (ref 0.1–0.9)
MONOCYTES NFR BLD AUTO: 10.9 % (ref 5–12)
NEUTROPHILS NFR BLD AUTO: 3.1 10*3/MM3 (ref 1.7–7)
NEUTROPHILS NFR BLD AUTO: 52.5 % (ref 42.7–76)
NRBC BLD AUTO-RTO: 0 /100 WBC (ref 0–0.2)
PHOSPHATE SERPL-MCNC: 2.9 MG/DL (ref 2.5–4.5)
PLATELET # BLD AUTO: 164 10*3/MM3 (ref 140–450)
PMV BLD AUTO: 9.4 FL (ref 6–12)
POTASSIUM SERPL-SCNC: 5.2 MMOL/L (ref 3.5–5.2)
PROT SERPL-MCNC: 6.8 G/DL (ref 6–8.5)
RBC # BLD AUTO: 5.46 10*6/MM3 (ref 4.14–5.8)
SODIUM SERPL-SCNC: 141 MMOL/L (ref 136–145)
T4 FREE SERPL-MCNC: 0.95 NG/DL (ref 0.93–1.7)
TSH SERPL DL<=0.05 MIU/L-ACNC: 1.44 UIU/ML (ref 0.27–4.2)
WBC NRBC COR # BLD AUTO: 5.89 10*3/MM3 (ref 3.4–10.8)

## 2024-04-01 PROCEDURE — 84443 ASSAY THYROID STIM HORMONE: CPT

## 2024-04-01 PROCEDURE — 83735 ASSAY OF MAGNESIUM: CPT

## 2024-04-01 PROCEDURE — 80053 COMPREHEN METABOLIC PANEL: CPT

## 2024-04-01 PROCEDURE — 36415 COLL VENOUS BLD VENIPUNCTURE: CPT

## 2024-04-01 PROCEDURE — 85025 COMPLETE CBC W/AUTO DIFF WBC: CPT

## 2024-04-01 PROCEDURE — 84439 ASSAY OF FREE THYROXINE: CPT

## 2024-04-01 PROCEDURE — 84100 ASSAY OF PHOSPHORUS: CPT

## 2024-04-02 ENCOUNTER — SPECIALTY PHARMACY (OUTPATIENT)
Dept: ONCOLOGY | Facility: HOSPITAL | Age: 69
End: 2024-04-02
Payer: MEDICARE

## 2024-04-02 ENCOUNTER — HOSPITAL ENCOUNTER (OUTPATIENT)
Dept: CT IMAGING | Facility: HOSPITAL | Age: 69
Discharge: HOME | End: 2024-04-02
Payer: MEDICARE

## 2024-04-02 ENCOUNTER — INFUSION (OUTPATIENT)
Dept: ONCOLOGY | Facility: HOSPITAL | Age: 69
End: 2024-04-02
Payer: MEDICARE

## 2024-04-02 VITALS
RESPIRATION RATE: 18 BRPM | TEMPERATURE: 97.2 F | HEIGHT: 70 IN | BODY MASS INDEX: 29.2 KG/M2 | OXYGEN SATURATION: 100 % | HEART RATE: 70 BPM | DIASTOLIC BLOOD PRESSURE: 95 MMHG | SYSTOLIC BLOOD PRESSURE: 171 MMHG | WEIGHT: 204 LBS

## 2024-04-02 DIAGNOSIS — C64.1 RENAL CELL CARCINOMA OF RIGHT KIDNEY: ICD-10-CM

## 2024-04-02 DIAGNOSIS — R11.0 NAUSEA: ICD-10-CM

## 2024-04-02 DIAGNOSIS — R19.7 DIARRHEA, UNSPECIFIED TYPE: ICD-10-CM

## 2024-04-02 DIAGNOSIS — C64.1 RENAL CELL CARCINOMA OF RIGHT KIDNEY: Primary | ICD-10-CM

## 2024-04-02 DIAGNOSIS — E86.0 DEHYDRATION: Primary | ICD-10-CM

## 2024-04-02 PROCEDURE — 25810000003 SODIUM CHLORIDE 0.9 % SOLUTION: Performed by: INTERNAL MEDICINE

## 2024-04-02 PROCEDURE — 74176 CT ABD & PELVIS W/O CONTRAST: CPT

## 2024-04-02 PROCEDURE — 96360 HYDRATION IV INFUSION INIT: CPT

## 2024-04-02 PROCEDURE — 71250 CT THORAX DX C-: CPT

## 2024-04-02 PROCEDURE — 96361 HYDRATE IV INFUSION ADD-ON: CPT

## 2024-04-02 RX ADMIN — SODIUM CHLORIDE 1000 ML: 9 INJECTION, SOLUTION INTRAVENOUS at 10:33

## 2024-04-02 NOTE — PROGRESS NOTES
1103 Dr. Smith's office secure messaged to ask pharmacy to talk to patient if they have not recently and have patient hold cabozantinib. Luisa in pharmacy was contacted and will have Neftaly contact patient. Patient was informed and reports understanding. Susana Vera RN

## 2024-04-02 NOTE — PROGRESS NOTES
"[facility]    ALERT - ENCOUNTER FOR PROBLEM      Consult Details  Consulting Provider:   Leonardo Smith MD ([facility])   Medication and Regimen:  Cabometyx 20 mg PO daily     Parker \"JESE\" is a 69 y.o. male, who is followed by the specialty pharmacy service for Cabometyx support.     This patient contacted our service today due to a problem that requires attention.     Visit Type: Patient seen in Infusion chair 15 04/02/24     PROBLEM: Multiple complaints - HTN, constipation & diarrhea, fatigue, mental un-clarity       Had a long discussion with the patient in infusion chair    Decision: hold tx for 1 week, likely re-initiate QOD. Message sent to patient.         Me   to Parker Arnold \"JESE\"   SK      4/2/24  5:59 PM  Good afternoon Mr. Arnold,      Thank you for the time spent talking to me today during your infusion. I reviewed our entire discussion with Dr. Smith.      As previously mentioned, we would like you to take a week off of Cabometyx and focus on feeling better and feeling more yourself. Please still keep your Thursday appointment this week for fluids. While off of Cabometyx, monitor your blood pressure occasionally in case it decreases as your blood pressure medicine requirement could potentially change (or it all may stay the same - either way is acceptable during this time).  Stay well hydrated as you have been, and get some good rest.      Regarding your blood pressure, it definitely can be hard to not focus and/or worry about that especially for a person that is invested in their health. As we discussed, the stress of carrying that concern can have a negative effect on blood pressure, too. Please keep in mind that sometimes achieving a great or perfect blood pressure can take a little time, and there's much left to be done on that forefront if needed. I appreciate your concern for this and how well you're monitoring yourself. " Regardless of what changes with your blood pressure on or off Cabometyx, we will stick with you to get you to where you need to be.      Regarding your GI symptoms we discussed, I would consider trying an OTC stool softener (but not a laxative), to see if that helps if symptoms continue. Something like Docusate sodium (Colace) or similar store brand would be okay. As we discussed how your day starts versus how it ends, your body could be keeping some extra water in the large intestine in attempt to self-resolve the issue. This could lead to variability in consistency. The IV fluids and oral rehydration should continue to help as well.  If the GI issues do not resolve in a few days, perhaps consider that it may be the HCTZ or other causes...     Regarding your lab changes discussed today, we will have those checked as scheduled next week. Please keep your lab appointment and Dr. Smith's appointment next week for further information and direction.  At this time, we do certainly consider that continuing on a middle-ground dose adjustment (such as taking the medicine every other day) could be appropriate for you depending on how you feel at the end of this week off. But for now, let's focus on feeling better.      If you have any questions, feel free to contact me or one of my partners at [phone], or reply to this message.      Thank you again for your time, transparency, and discussion. I very much hope you are feeling better soon!     Wishing you all the best,   Neftaly ARENAS (Pharmacist)       Electronically signed 04/02/24 at 17:36 CDT by:  Neftaly Solis, Greg  Specialty Pharmacist - Hematology & Oncology  [facility]  [phone]

## 2024-04-02 NOTE — PROGRESS NOTES
Sent following message per secure message to Dr. Smith's office for FYI: CT abd/pelvis/chest for IVFs r/t kidney function. Patient has had trouble with bloodpressure with doctor adding new medications but patient is reporting very fatigue, upset stomach as well as nausea/contstipation in am/diarrhea at night. Patient has difficulty getting thoughts together it seems when asking questions. Patient is alert and oriented and aware has appointment soon to discuss but didn't know if doc was aware of all the complaints. Informed patient to be sure wife is watching patient for any stroke symptoms related to high blood pressure. Susana Vera RN

## 2024-04-04 ENCOUNTER — INFUSION (OUTPATIENT)
Dept: ONCOLOGY | Facility: HOSPITAL | Age: 69
End: 2024-04-04
Payer: MEDICARE

## 2024-04-04 VITALS
SYSTOLIC BLOOD PRESSURE: 153 MMHG | WEIGHT: 209 LBS | HEART RATE: 63 BPM | RESPIRATION RATE: 18 BRPM | HEIGHT: 70 IN | BODY MASS INDEX: 29.92 KG/M2 | DIASTOLIC BLOOD PRESSURE: 98 MMHG | TEMPERATURE: 98 F | OXYGEN SATURATION: 100 %

## 2024-04-04 DIAGNOSIS — E86.0 DEHYDRATION: Primary | ICD-10-CM

## 2024-04-04 DIAGNOSIS — C64.1 RENAL CELL CARCINOMA OF RIGHT KIDNEY: ICD-10-CM

## 2024-04-04 PROCEDURE — 25810000003 SODIUM CHLORIDE 0.9 % SOLUTION: Performed by: INTERNAL MEDICINE

## 2024-04-04 PROCEDURE — 96360 HYDRATION IV INFUSION INIT: CPT

## 2024-04-04 PROCEDURE — 96361 HYDRATE IV INFUSION ADD-ON: CPT

## 2024-04-04 RX ADMIN — SODIUM CHLORIDE 1000 ML: 9 INJECTION, SOLUTION INTRAVENOUS at 10:17

## 2024-04-09 ENCOUNTER — OFFICE VISIT (OUTPATIENT)
Dept: ONCOLOGY | Facility: CLINIC | Age: 69
End: 2024-04-09
Payer: MEDICARE

## 2024-04-09 ENCOUNTER — LAB (OUTPATIENT)
Dept: LAB | Facility: HOSPITAL | Age: 69
End: 2024-04-09
Payer: MEDICARE

## 2024-04-09 VITALS
OXYGEN SATURATION: 96 % | TEMPERATURE: 97.6 F | HEIGHT: 70 IN | WEIGHT: 207.7 LBS | DIASTOLIC BLOOD PRESSURE: 90 MMHG | HEART RATE: 76 BPM | RESPIRATION RATE: 18 BRPM | SYSTOLIC BLOOD PRESSURE: 138 MMHG | BODY MASS INDEX: 29.73 KG/M2

## 2024-04-09 DIAGNOSIS — C64.1 RENAL CELL CARCINOMA OF RIGHT KIDNEY: Primary | ICD-10-CM

## 2024-04-09 DIAGNOSIS — R94.6 ABNORMAL RESULTS OF THYROID FUNCTION STUDIES: ICD-10-CM

## 2024-04-09 DIAGNOSIS — C64.1 RENAL CELL CARCINOMA OF RIGHT KIDNEY: ICD-10-CM

## 2024-04-09 LAB
ALBUMIN SERPL-MCNC: 4.3 G/DL (ref 3.5–5.2)
ALBUMIN/GLOB SERPL: 2 G/DL
ALP SERPL-CCNC: 89 U/L (ref 39–117)
ALT SERPL W P-5'-P-CCNC: 34 U/L (ref 1–41)
ANION GAP SERPL CALCULATED.3IONS-SCNC: 10 MMOL/L (ref 5–15)
AST SERPL-CCNC: 19 U/L (ref 1–40)
BASOPHILS # BLD AUTO: 0.03 10*3/MM3 (ref 0–0.2)
BASOPHILS NFR BLD AUTO: 0.6 % (ref 0–1.5)
BILIRUB SERPL-MCNC: 0.5 MG/DL (ref 0–1.2)
BUN SERPL-MCNC: 48 MG/DL (ref 8–23)
BUN/CREAT SERPL: 19.4 (ref 7–25)
CALCIUM SPEC-SCNC: 9.3 MG/DL (ref 8.6–10.5)
CHLORIDE SERPL-SCNC: 108 MMOL/L (ref 98–107)
CO2 SERPL-SCNC: 23 MMOL/L (ref 22–29)
CREAT SERPL-MCNC: 2.47 MG/DL (ref 0.76–1.27)
DEPRECATED RDW RBC AUTO: 47.8 FL (ref 37–54)
EGFRCR SERPLBLD CKD-EPI 2021: 27.4 ML/MIN/1.73
EOSINOPHIL # BLD AUTO: 0.27 10*3/MM3 (ref 0–0.4)
EOSINOPHIL NFR BLD AUTO: 5.5 % (ref 0.3–6.2)
ERYTHROCYTE [DISTWIDTH] IN BLOOD BY AUTOMATED COUNT: 14.7 % (ref 12.3–15.4)
GLOBULIN UR ELPH-MCNC: 2.1 GM/DL
GLUCOSE SERPL-MCNC: 134 MG/DL (ref 65–99)
HCT VFR BLD AUTO: 47.1 % (ref 37.5–51)
HGB BLD-MCNC: 15.8 G/DL (ref 13–17.7)
IMM GRANULOCYTES # BLD AUTO: 0.01 10*3/MM3 (ref 0–0.05)
IMM GRANULOCYTES NFR BLD AUTO: 0.2 % (ref 0–0.5)
LYMPHOCYTES # BLD AUTO: 1.22 10*3/MM3 (ref 0.7–3.1)
LYMPHOCYTES NFR BLD AUTO: 24.7 % (ref 19.6–45.3)
MCH RBC QN AUTO: 30.1 PG (ref 26.6–33)
MCHC RBC AUTO-ENTMCNC: 33.5 G/DL (ref 31.5–35.7)
MCV RBC AUTO: 89.7 FL (ref 79–97)
MONOCYTES # BLD AUTO: 0.44 10*3/MM3 (ref 0.1–0.9)
MONOCYTES NFR BLD AUTO: 8.9 % (ref 5–12)
NEUTROPHILS NFR BLD AUTO: 2.97 10*3/MM3 (ref 1.7–7)
NEUTROPHILS NFR BLD AUTO: 60.1 % (ref 42.7–76)
NRBC BLD AUTO-RTO: 0 /100 WBC (ref 0–0.2)
PLATELET # BLD AUTO: 154 10*3/MM3 (ref 140–450)
PMV BLD AUTO: 9 FL (ref 6–12)
POTASSIUM SERPL-SCNC: 4.7 MMOL/L (ref 3.5–5.2)
PROT SERPL-MCNC: 6.4 G/DL (ref 6–8.5)
RBC # BLD AUTO: 5.25 10*6/MM3 (ref 4.14–5.8)
SODIUM SERPL-SCNC: 141 MMOL/L (ref 136–145)
T4 FREE SERPL-MCNC: 0.98 NG/DL (ref 0.93–1.7)
TSH SERPL DL<=0.05 MIU/L-ACNC: 1.3 UIU/ML (ref 0.27–4.2)
WBC NRBC COR # BLD AUTO: 4.94 10*3/MM3 (ref 3.4–10.8)

## 2024-04-09 PROCEDURE — 84443 ASSAY THYROID STIM HORMONE: CPT

## 2024-04-09 PROCEDURE — 36415 COLL VENOUS BLD VENIPUNCTURE: CPT

## 2024-04-09 PROCEDURE — 80053 COMPREHEN METABOLIC PANEL: CPT

## 2024-04-09 PROCEDURE — 84439 ASSAY OF FREE THYROXINE: CPT

## 2024-04-09 PROCEDURE — 85025 COMPLETE CBC W/AUTO DIFF WBC: CPT

## 2024-04-09 NOTE — PROGRESS NOTES
[facility] GROUP HEMATOLOGY AND ONCOLOGY  [address]  [facility] [address] [phone]    Patient Name: Parker Arnold  Encounter Date: 04/9/2024  YOB: 1955  Patient Number: [id number]       REASON FOR VISIT:  Parker Arnold is a 68 y.o. 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  Underwent right nephrectomy. Was under surveillance until 11/26/2018  When he underwent  wedge resection of a right pulm nodule. Pathology:  metastatic renal cell carcinoma. He was not able to tolerate Sutent due to kidney failure, thus received ipilumomab/nivolumab, 05/13/2019 - 08/07/2019, then single agent nivolumab, 08/28/2019 - 09/09/2019 (52 months since cessation of therapy). Therapy stopped due to renal failure (nivolumab?). He has been on and off prednisone since.  Last 11/29/2021 (28.5 months) underwent right thoracoscopic RLL and RML wedge resection with final path consistent with metastatic renal cell carcinoma.  On 12/7/23 he was seen in follow-up by Dr. Cannon ( oncology, [facility]) who recommended a trial of cabozantinib.  Thus, at his visit on 1/23/24 palliative cabozantinib was sent in but due to insurance issues did not receive the drug until 2/21/24 (7 weeks).  He is here with his spouse, Nelda.    I have reviewed the HPI and verified with the patient the accuracy of it. No changes to interval history since the information was documented. Leonardo Smith MD 04/09/24        Problem List Items Addressed This Visit          Other    Renal cell carcinoma of right kidney - Primary    Overview     Overview:   Diagnoses 11/11/14. Path report in C[facility] at [facility]. Tissue has been requested.                   Oncology/Hematology History   Renal cell carcinoma of right kidney   10/2014 Initial Diagnosis    Renal cell carcinoma (CMS/HCC)     10/2014 Surgery     Right nephrectomy, Dr Morales       10/1/2018 Progression    Increasing lung Nodules on f/u CT     10/2/2018 -  Chemotherapy    Sutent  Discontinued due to intolerance     5/13/2019 - 8/7/2019 Chemotherapy    Opdivo Yervoy x 4 cycles     8/28/2019 - 9/9/2019 Chemotherapy    Single agent Opdivo    Opdivo held 9/9/2019 due to Renal Failure / NephritisToxicity     1/16/2020 Imaging    PET SCAN  Negative for neoplastic uptake     1/23/2024 -  Chemotherapy    [facility] - Cabozantinib (Cabometyx)     4/2/2024 -  Chemotherapy    OP SUPPORTIVE HYDRATION + ANTIEMETICS     Lung metastases   10/18/2019 Initial Diagnosis    Lung metastases     1/23/2024 -  Chemotherapy    [facility] - Cabozantinib (Cabometyx)         PAST MEDICAL HISTORY:  ALLERGIES:  Allergies   Allergen Reactions    Amoxicillin Hives    Contrast Dye (Echo Or Unknown Ct/Mr) Other (See Comments)     No Oral or IV contrast Due to only having 1 kidney    Opdivo [Nivolumab] Provider Review Needed     Renal failure    Penicillins Hives and Rash    Clindamycin/Lincomycin Rash     pustules  Rash - pustules      Doxycycline Rash    Hepatitis B Virus Vaccines Rash     Pustules      Hepatitis B Vaccine Rash    Latex Rash    Levofloxacin Rash     CURRENT MEDICATIONS:  Outpatient Encounter Medications as of 4/9/2024   Medication Sig Dispense Refill    acetaminophen (TYLENOL) 500 MG tablet Take 1 tablet by mouth Every 6 (Six) Hours As Needed for Mild Pain.      allopurinol (ZYLOPRIM) 100 MG tablet Take 1 tablet by mouth Daily.      calcium carb-cholecalciferol 600-800 MG-UNIT tablet Take 1 tablet by mouth 2 (Two) Times a Day With Meals.      carvedilol (COREG) 6.25 MG tablet Take 1 tablet by mouth 2 (Two) Times a Day With Meals.      finasteride (PROSCAR) 5 MG tablet Take 1 tablet by mouth Daily.  3    HYDROCHLOROTHIAZIDE PO Take  by mouth Daily. Take recommended dose daily as directed      loratadine (CLARITIN) 10 MG tablet Take 1 tablet every day by oral  route.      losartan (COZAAR) 100 MG tablet Take 1 tablet by mouth Daily.      predniSONE (DELTASONE) 5 MG tablet Take 0.5 tablets by mouth Daily.      sodium bicarbonate 650 MG tablet Take 1 tablet by mouth 3 (Three) Times a Day. 90 tablet 2    cabozantinib s-malate (CABOMETYX) 20 MG tablet tablet Take 1 tablet by mouth Daily. Please do not take with food; Take on an empty stomach (at least 1 hour before or 2 hours after eating). (Patient not taking: Reported on 4/4/2024) 30 tablet 5     No facility-administered encounter medications on file as of 4/9/2024.     ADULT ILLNESSES:  Patient Active Problem List   Diagnosis Code    Renal cell carcinoma of right kidney C64.1    Multiple nodules of lung R91.8    Lung anomaly Q33.9    TIA (transient ischemic attack) G45.9    Acute renal failure with tubular necrosis in the setting of solitary kidney N17.0    Lung metastases C78.00    Pyuria R82.81    Hyperkalemia E87.5    Metabolic acidosis E87.20    Hyperglycemia R73.9    Hypercalcemia E83.52    Benign prostatic hyperplasia N40.0    Increased frequency of urination R35.0    Nocturia R35.1    Retention of urine R33.9    Dehydration E86.0    Nephritis and nephropathy N05.9    Toxicity, chemicals T65.91XA    Stage 3 chronic kidney disease N18.30    Campylobacter gastroenteritis A04.5    Sigmoid diverticulitis K57.32    History of TIA (transient ischemic attack) Z86.73    Solitary kidney, acquired Z90.5    Benign essential hypertension I10    Gastroesophageal reflux disease K21.9    Encounter for screening for malignant neoplasm of colon Z12.11    Hypertension due to drug I15.8, T50.905A     SURGERIES:  Past Surgical History:   Procedure Laterality Date    BRONCHOSCOPY N/A 11/29/2021    Procedure: BRONCHOSCOPY;  Surgeon: Jarrod Tolentino MD;  Location: [facility] OR;  Service: Cardiothoracic;  Laterality: N/A;    CHOLECYSTECTOMY WITH INTRAOPERATIVE CHOLANGIOGRAM N/A 12/22/2021    Procedure: LAPAROSCOPIC CHOLECYSTECTOMY;   Surgeon: Vandana Calhoun MD;  Location: [facility] OR;  Service: General;  Laterality: N/A;    COLONOSCOPY N/A 4/18/2022    Procedure: COLONOSCOPY WITH ANESTHESIA;  Surgeon: Zoila Robison MD;  Location: [facility] ENDOSCOPY;  Service: Gastroenterology;  Laterality: N/A;  pre colon cancer  post  Dr. Osborne    HERNIA REPAIR  2008    LUNG BIOPSY  11/2018    NEPHRECTOMY Right 2014    SINUS SURGERY  2005    THORACOSCOPY Right 11/29/2021    Procedure: RIGHT THORACOSCOPY WITH RIGHT MIDDLE AND RIGHT LOWER LOBE WEDGE RESECTIONS;  Surgeon: Jarrod Tolentino MD;  Location: [facility] OR;  Service: Cardiothoracic;  Laterality: Right;     HEALTH MAINTENANCE ITEMS:  Health Maintenance Due   Topic Date Due    TDAP/TD VACCINES (1 - Tdap) Never done    ZOSTER VACCINE (1 of 2) Never done    RSV Vaccine - Adults (1 - 1-dose 60+ series) Never done    Pneumococcal Vaccine 65+ (1 of 1 - PCV) Never done    HEPATITIS C SCREENING  Never done    ANNUAL WELLNESS VISIT  Never done    BMI FOLLOWUP  03/28/2023    COVID-19 Vaccine (1 - 2023-24 season) Never done       <no information>  Last Completed Colonoscopy            COLORECTAL CANCER SCREENING (COLONOSCOPY - Every 5 Years) Next due on 4/18/2027 04/18/2022  COLONOSCOPY    04/18/2022  Surgical Procedure: COLONOSCOPY                    There is no immunization history on file for this patient.  Last Completed Mammogram       This patient has no relevant Health Maintenance data.              FAMILY HISTORY:  Family History   Problem Relation Age of Onset    Other Mother         blood disease unknown    Cancer Father         unknown    No Known Problems Sister     No Known Problems Sister     Heart disease Maternal Grandmother     Heart disease Maternal Grandfather     No Known Problems Paternal Grandmother     No Known Problems Paternal Grandfather     Colon polyps Neg Hx     Colon cancer Neg Hx      SOCIAL HISTORY:  Social History     Socioeconomic History    Marital status:    Tobacco  "Use    Smoking status: Former     Current packs/day: 0.00     Average packs/day: 1 pack/day for 22.0 years (22.0 ttl pk-yrs)     Types: Cigarettes     Start date:      Quit date:      Years since quittin.2    Smokeless tobacco: Never   Vaping Use    Vaping status: Never Used   Substance and Sexual Activity    Alcohol use: Not Currently    Drug use: No    Sexual activity: Defer       REVIEW OF SYSTEMS:  Cabozantinib tolerance:  +hypertension.  Started on varying doses of Losartan on , added Coreg 6.25 bid and HCTZ in conjunction with nephrology.  According to notes from specialty pharmacy, 3/12, 3/18, 24-. Subjective BP numbers reported: 175/105, 158/98, 170/88.  Reported he felt \"clumsy, possibly dizzy/lightheaded, fatigue, constipation, diarrhea\".  No  arterial/venous thromboembolism, no bleeding, no other GI symptoms (belly pain, viscus perforation, fistula formation, severe diarrhea) no rash (hand-foot syndrome), no jaundice (hepatotoxicity), no edema (nephrotic syndrome), no jaw pains (osteonecrosis of the jaw), no decreased appetite, no nausea, no vomiting, no stomatitis, no dysphonia, no asthenia, no dysgeusia, no dyspnea, no extremity pain, no dyspepsia, no muscle spasms, no cough, no headache, no arthralgia, no xeroderma.  Discussed with Neftaly Solis PharmD on 24 who discussed with patient (face to face) on that day (see phone note):  Instructed to hold the drug for at least a week, monitor BP, use stool softeners.  Says all the symptoms have resolved off the drug.  Will consider resuming the drug qod schedule.  Constitutional:  Manages ADLs, chores, errands and driving.  Again drove himself in today.  Appetite is good.  \"Too good.\" Energy is fairly good.  He remains active. \"On the farm.\" He has regained 4 lb (had lost 4 lb at his prior visit ) since his last visit. No fever, no chills nor drenching nights.  HENT: Negative.  No sore throat.  Has seasonal sinus symptoms/rhinorrhea. " "No headaches.  Eyes: Negative.    Respiratory:  No SOB at rest nor TENORIO.  No cough. No wheezing. No tobacco use.    Cardiovascular: Negative.  No palpitations.  No orthopnea  Gastrointestinal: RUQ pain resolved since cholecystectomy.  No dysphagia.  No nausea.  No vomiting.  No dyspepsia. Since stopping the drug last 4/2/24.  Bowels moving regularly.  Has occasional soft stools.  No overt diarrhea.  No melena.  Has not had hemorrhoidal bleeding.  Has history recurrent diverticulitis, early 06/2021.  Had colonoscopy, 04/18/2022- diverticulosis.  Nonbleeding internal hemorrhoids otherwise normal exam.  Endocrine: No hot flashes.  Genitourinary:  No dysuria.  No incontinence. No hematuria.  Occasional urgency.  \"I still feel like I gotta go when I gotta go sometimes.\"  Nocturia much improved on Finasteride - up 1x- from every 90 min.  Remains on allopurinol.  Musculoskeletal:  No arthralgias.  No edema  Skin: No new rash.    Neurological:  No dizziness.  No facial asymmetry. No headaches.  Mild sensory neuropathy of the hands, not worse  Hematological: Negative for new adenopathy.  Says he bruises easily.  Psychiatric/Behavioral:  Has baseline anxiety, \"more now.\"  No depression.      VITAL SIGNS: /90   Pulse 76   Temp 97.6 °F (36.4 °C) (Temporal)   Resp 18   Ht 177.8 cm (70\")   Wt 94.2 kg (207 lb 11.2 oz)   SpO2 96%   BMI 29.80 kg/m² Body surface area is 2.12 meters squared.  Pain Score    04/09/24 0954   PainSc: 0-No pain         PHYSICAL EXAMINATION:   General Appearance: Patient is a very pleasant, cooperative, heavyset, modestly kept elderly male who is awake, alert, oriented and in no acute distress. ECOG 1 (from 0)  HEENT: Normocephalic. Sclerae clear, conjunctiva pink, extraocular movements intact  NECK: Supple, no jugular venous distention, thyroid not enlarged.  LYMPH: No cervical, supraclavicular, axillary, or inguinal lymphadenopathy.  LUNGS: Good air movement, no rales, rhonchi, rubs or wheezes " with auscultation.  Right thoracoscopic incisions are healed  CARDIO: Regular sinus rhythm, no murmurs, gallops or rubs.  ABDOMEN:  Obese, nondistended, soft, with no right upper quadrant tenderness.  Laparoscopy wounds are all healed. No obvious abdominal masses. Bowel sounds positive. No hernia  MUSKEL: No joint swelling, decreased motion, or inflammation  EXTREMS:  No edema, clubbing, cyanosis, No varicose veins.  NEURO: Grossly nonfocal, Gait is coordinated and smooth, Cognition is preserved.  SKIN: No rashes, no ecchymoses, no petechia.  PSYCH: Oriented to time, place and person. Memory is preserved. Mood and affect appear normal         LABS    Lab Results - Last 18 Months   Lab Units 04/09/24  0947 04/01/24  0913 03/25/24  0933 03/18/24  0911 03/11/24  0913 03/04/24  0929 02/27/24  0906 01/23/24  0945 12/07/23  1227   HEMOGLOBIN g/dL 15.8 16.3 16.3 16.9 16.0 16.0 15.7   < >  --    HEMATOCRIT % 47.1 48.7 49.0 50.7 47.5 47.7 47.5   < >  --    MCV fL 89.7 89.2 88.6 87.6 88.0 87.8 89.5   < >  --    WBC 10*3/mm3 4.94 5.89 5.73 5.95 6.34 6.34 5.99   < >  --    RDW % 14.7 14.5 14.1 13.9 13.6 13.5 13.6   < >  --    MPV fL 9.0 9.4 9.6 9.4 9.3 9.5 9.6   < >  --    PLATELETS 10*3/mm3 154 164 147 154 172 186 197   < >  --    IMM GRAN % % 0.2 0.2  --  0.3 0.2 0.2 0.2   < >  --    NEUTROS ABS 10*3/mm3 2.97 3.10 2.85 3.50 3.68 3.57 3.37   < > 5.3   LYMPHS ABS 10*3/mm3 1.22 1.83 1.88 1.58 1.73 1.85 1.69   < >  --    MONOS ABS 10*3/mm3 0.44 0.64 0.63 0.53 0.59 0.54 0.61   < >  --    EOS ABS 10*3/mm3 0.27 0.28 0.35 0.29 0.30 0.33 0.27   < > 0.3   BASOS ABS 10*3/mm3 0.03 0.03 0.02 0.03 0.03 0.04 0.04   < > 0.1   IMMATURE GRANS (ABS) 10*3/mm3 0.01 0.01  --  0.02 0.01 0.01 0.01   < >  --    NRBC /100 WBC 0.0 0.0  --  0.0 0.0 0.0 0.0   < >  --    NEUTROPHIL % %  --   --   --   --   --   --   --   --  67.6   MONOCYTES % %  --   --   --   --   --   --   --   --  8.7   BASOPHIL % %  --   --   --   --   --   --   --   --  0.8    < > =  values in this interval not displayed.       Lab Results - Last 18 Months   Lab Units 04/01/24  0913 03/25/24  0933 03/18/24  0911 03/11/24  0913 03/04/24  0929 02/27/24  0906   GLUCOSE mg/dL 111* 105* 104* 108* 104* 102*   SODIUM mmol/L 141 142 140 142 139 138   POTASSIUM mmol/L 5.2 4.9 5.5* 4.9 5.1 4.7   CO2 mmol/L 27.0 26.0 27.0 26.0 24.0 23.0   CHLORIDE mmol/L 104 107 105 109* 107 106   ANION GAP mmol/L 10.0 9.0 8.0 7.0 8.0 9.0   CREATININE mg/dL 2.62* 2.48* 1.99* 2.24* 2.24* 2.36*   BUN mg/dL 56* 45* 39* 36* 41* 45*   BUN / CREAT RATIO  21.4 18.1 19.6 16.1 18.3 19.1   CALCIUM mg/dL 9.9 8.9 10.0 9.0 9.4 9.1   ALK PHOS U/L 103 94 104 102 94 92   TOTAL PROTEIN g/dL 6.8 6.8 6.7 6.6 6.5 6.6   ALT (SGPT) U/L 51* 50* 48* 39 28 21   AST (SGOT) U/L 31 29 30 29 23 19   BILIRUBIN mg/dL 0.8 0.5 0.5 0.5 0.4 0.3   ALBUMIN g/dL 4.4 4.3 4.3 4.2 4.2 4.1   GLOBULIN gm/dL 2.4 2.5 2.4 2.4 2.3 2.5       Assessment:  1.  Renal cell carcinoma, diagnosed 11/11/2014 (5.5 cm G3, pT3a, pNx). --2014, right nephrectomy.   --surveillance until 11/26/2018    --11/26/2018-right lower lobe VATS wedge resection: Metastatic renal cell carcinoma, 0.9 cm; margins free of malignancy. Dr. Hu  -- Unable to tolerate Sutent due to kidney failure, thus received ipilumomab/nivolumab, 05/13/2019 - 08/07/2019, then single agent nivolumab, 08/28/2019 - 09/09/2019 (52 months since cessation of therapy). Therapy stopped due to renal failure (nivolumab?)  --01/16/2020-PET scan.  No neoplastic FDG uptake within the chest, abdomen or pelvis.  --07/06/2020-CT abdomen/pelvis with diverticulosis of the colon.  Evidence of acute diverticulitis of the sigmoid colon and adjacent distal descending colon.  No free air or fluid seen.  1 cm noncalcified subpleural nodule in the left lower lobe.  Follow-up exam in 6 months recommended.  --08/04/2020-chest CT.  Scattered subcentimeter bilateral pulmonary nodules similar to 1/16/2020 with mild increase in the 5 mm left  upper lobe nodule.  Continued follow-up recommended.  No pathologic lymphadenopathy.  Right nephrectomy.  --01/14/2020-CT chest.  Increased size of 1.2 cm right lower lobe superior segment pulmonary nodule (previously 0.9 cm)-representing 33% growth.  Multiple other bilateral pulmonary nodules have not significantly changed in size.  Findings are in keeping with disease progression.  --01/14/2020-CT abdomen/pelvis.  No evidence of recurrent or metastatic disease in the abdomen or pelvis.  --01/26/2021-PET/CT.  Impression: Minimal metabolic activity associated with the enlarging pulmonary nodule within the superior segment of the right lower lobe with a maximum SUV of 1.35.  The additional nodules noted within both lungs also demonstrate no abnormal metabolic activity which would favor benignity but continued radiographic follow-up will be suggested.  No scintigraphic evidence of metastatic disease on current exam.  Increased uptake overlying the right groin and more distal external iliac vessels felt to be related to previous hernia repair.  --02/14/2021-  Encounter from Dr. Mayito Arteaga, medical oncology at [facility].  History reviewed.  Most recent CT chest, 01/14/2021 (above) with increasing size of 1.2 cm right lower lobe superior segment pulmonary nodule (previously 0.9 cm) and PET scan, 01/26/2021 (above) noted including minimal metabolic activity associated with the enlarging pulmonary nodule within the superior segment of the right lower lobe.  Discussion: Biopsy right lower lobe lung nodule solitary lesion that is growing on PET scan which will be done at that institution.  Contemplate either surgical resection, ablation or SBRT.  We will focus on local therapy for now.  Multiple systemic options down the road but if renal function improves can contemplate trial with HIF-alpha inhibitor.  --03/15/2021- CT chest.  Continued increased size of the right lower lobe superior segment now  1.4 cm pulmonary nodule (1.2 cm, 1/14/2021; 0.8 cm, 8/4/2020).  Other pulmonary nodules are stable.  New small area of groundglass opacity in the superior segment left lower lobe may be infectious/inflammatory.  Coronary artery calcifications.  Aortic calcifications.  Small hiatal hernia.  --03/15/2021-CT abdomen/pelvis.  Previous right nephrectomy identified with no evidence of intra-abdominal metastatic disease.  Benign-appearing exophytic cyst posterior aspect of the left kidney, upper pole, measuring 2.47 cm, stable.  Small nodules in the lung bases are unchanged compared to previous chest CT.  Evidence of previous right inguinal hernia repair.  Fatty infiltration of the inguinal canals.  Scattered colonic diverticula without diverticulitis.  Small sliding hiatal hernia identified.  --5/27/2021- Follow-up from GIOVANNY Edward with Dr. Arteaga's office.  Impression/plan: CT CAP, 05/26/2021: Stable examination including stable bilateral lung nodules compared to 03/15/2021.  Progression of right lower lobe lesion?  Discussed systemic therapy option with cabozantinib at 20 mg.  However patient concerned due to his history of substantial AEs in the past.  Will arrange for lesion to be ablated.  However patient says multiple lesions were there for 6 years and unchanged.-Consider ablation or SBRT of the lesion and monitor and consider cabozantinib when failed.  Multiple systemic options down the road but if renal function improves can contemplate HIF-alpha inhibitor.  Continue surveillance for now.  Repeat scans in 3 months.  --06/11/2021-CT chest without contrast.  Stable pulmonary nodules.  No evidence of disease progression.  Interval resolution superior segment left lower lobe groundglass opacity.  Scattered coronary artery calcifications.  --06/11/2021-CT abdomen/pelvis.  History of right renal cell carcinoma nephrectomy.  No CT evidence of tumor recurrence or metastatic disease in the abdomen or  pelvis.  --09/07/2021-CT chest wo contrast.  Pulmonary nodules noted.  Slightly increased in size compared to 6/11/2021 (left upper lobe 7.2 mm-prior 6.7 mm; posterior right lung, 19 mm-prior 13.9 mm; posterior medial right lung, 14 mm-prior 11 mm; right lung, 7.7 mm-prior 7.1 mm).  However no new nodules identified.  --09/07/2021-CT abdomen/pelvis wo contrast.  No evidence of intra-abdominal or pelvic metastasis.  Prior right nephrectomy.  Stable left renal cyst.  Colonic diverticulosis with questionable wall thickening of the sigmoid colon may be artifactual.  Correlation to any recent colonoscopy recommended.  --09/28/2021-PET scan.  Increased size of right lower lobe lung nodule (21 x 15 mm compared to 14 x 14 mm 3 months ago) though this finding shows no significant FDG uptake.  Otherwise stable PET/CT exam.  --11/29/2021-RLL, RML wedge resections. Path consistent with metastatic renal cell carcinoma  --12/14/2021-CT chest.  Postoperative changes from wedge resections of prior right middle and lower lobe pulmonary nodules.  Stable left-sided pulmonary nodules, largest in the left upper lobe/apex measuring 8 mm.  Continued follow-up strongly recommended.  No pathologic lymphadenopathy.  --12/14/2021-CT abdomen/pelvis.  No convincing evidence of intra-abdominal or pelvic metastasis.  New diffuse abnormal gallbladder wall thickening.  Findings may be seen with cholecystitis.  Gallbladder ultrasound might be considered.  --12/20/2021-gallbladder ultrasound-diffuse gallbladder wall thickening.  Gallbladder nondistended with no visible stones.  Unlikely acute cholecystitis.  Favor chronic cholecystitis.  --03/18/2022-CT chest-patchy infiltrate compatible with right middle lobe pneumonia.  2 tiny left lung nodule stable.  --03/18/2022-CT abdomen/pelvis.  No evidence of metastatic disease to the abdomen/pelvis.  Prior right nephrectomy.  Colonic diverticulosis without evidence of diverticulitis.  Mild splenomegaly,  unchanged.  --06/24/2022- CT chest-1. The resolution of the previously seen patchy infiltrate in the right middle lobe. 2. Postsurgical changes and scarring in the right upper and lower lobe. 3. Stable small nodules in both lungs the largest one in the left upper lobe. No change.  --06/24/2022- CT abdomen/pelvis- stable.  No evidence of a neoplastic process or metastatic disease. Evidence of right nephrectomy. No focal complication. Stable left renal cyst. Diverticulosis of the colon. No evidence for diverticulitis. Stable mild splenomegaly.  Enlarged prostate.  --09/19/2022- CT CAP- Stable exam.  No change in multiple bilateral pulmonary nodules measuring up to 8 mm.  No evidence of recurrent or metastatic disease in the abdomen or pelvis status post RIGHT nephrectomy.  --1/23/2023- CT CAP- Prior wedge resection within the RML/RLL. No evidence of localized recurrence at these sites. Numerous other pulmonary nodules are present. The majority of these nodules are stable in size and appearance from the previous exam. There is one nodule which is subpleural in location within the lateral left upper lobe on image 52 which has an increased soft tissue component from the previous exam. This would warrant close follow-up on subsequent imaging.No acute abnormality of the abdomen or pelvis. Evidence of prior right nephrectomy. No focal complication. A left renal cyst.  --7/20/23- CT CAP- No change in multiple bilateral pulmonary nodules measuring up to 10 mm, compared to 01/23/2023. However, there has been gradual increase in pulmonary nodule size compared to more remote studies dating back to 03/18/2022. For reference, the dominant 10 mm LEFT upper lobe pulmonary nodule measured 7 mm on 03/18/2022. Recommend continued clinical and imaging follow-up as metastatic disease remains in the differential.  --8/17/23 and 9/5/23- Follow-up Dr. Juan A Cannon-A/P: 68-year-old male with metastatic ccRCC on active surveillance initially  "seen in [facility], 8/17/2023 for second opinion.  Discussed pathology, imaging, treatment options and prognosis in detail.  Images reviewed  tumor board that agreed with very slow-growing pulmonary nodules likely metastatic.  Discussed systemic therapy options including rechallenging IO, cabozantinib,mTOR, Belzutifan.  Clinical trials are not our institution discussed but likely will not be a candidate given his renal dysfunction.  Patient strongly prefers not to start systemic therapy unless absolutely necessary to avoid further renal dysfunction and other side effects.  Given slow growth, I think it is reasonable to hold off on systemic therapy continue surveillance for now.  Local therapy can be discussed to the dominant lesions based on future scans.  I do think he will ultimately need to get back on systemic therapy  --11/20/23- CT CAP wo- Multiple bilateral new and enlarging pulmonary nodules measuring up to 10 mm. Findings are highly concerning for metastatic disease.  Mild dilatation of the ascending artery measuring 4 cm diameter. No evidence of recurrent or metastatic disease in the abdomen or pelvis status post right nephrectomy on this unenhanced exam. Heavy sigmoid diverticulosis without evidence of diverticulitis.   --12/7/23- Follow-up Dr. Cannon: A/P: Discussed pathology, imaging, treatment options and prognosis in detail with him.  Images reviewed and agree with slow-growing pulmonary nodules that are likely metastatic.  Discussed systemic therapy options including rechallenging IO, cabozantinib, mTOR, Belzutifan, clinical trials at our institution discussed likely will not be a candidate given his renal dysfunction.  \"Both although slow, recommend cabozantinib.  Patient really concerned about side effects given his past experience with sunitinib.  I suggest start with cabozantinib 20 mg p.o. daily and titrate up to 40 mg based on tolerability.  No dose modifications needed for renal " dysfunction.  RTC as needed  --2/20/24- CT chest- Mild to moderate increase in size of several pulmonary nodules as detailed above. A few nodules are stable. Another follow-up examination in 6 months is recommended. Scattered areas of scarring in the right upper/middle and right lower  lobe similar to the previous study.  --2/20/24- CT abdomen/pelvis- No acute abnormality of the abdomen or pelvis, particularly, no finding to suggest neoplastic process/metastatic disease. Diverticulosis of the colon. No evidence for diverticulitis. A stable left renal cyst. The prior right nephrectomy. No focal or regional complication.  -- 1/23/24 palliative cabozantinib was sent in but due to insurance issues did not receive the drug until 2/21/24.  -- 4/2/24- CT chest-Bilateral pulmonary nodules appear relatively stable, as described above. There are no new pulmonary nodules. Prior wedge resections on the right. Mild atheromatous disease of the thoracic aorta. Coronary artery calcification is noted. Ascending thoracic aorta is dilated measuring 4 cm. Areas of ill-defined low density within the visualized liver. Difficult to tell if this represents metastatic disease or geographic fatty infiltration on the nonenhanced images. Follow-up hepatic mass protocol CT or MRI recommended.  -- 4/2/24- CT abdomen/pelvis- Large areas of low-attenuation within the liver likely representing geographic fatty infiltration. Metastatic disease is felt to be less likely but not entirely excluded as this finding is not present on   1/23/2023. Hepatic mass protocol CT or MRI is recommended to further evaluate. If the patient cannot receive IV contrast for clinical reasons, noncontrast MRI would be more beneficial. A 3 cm left renal cyst. Prior right nephrectomy. Small hiatal hernia. Diverticulosis of the colon. Under distention of stomach and colon. Normal appendix. Small fat-containing inguinal hernias bilaterally.     2.  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    --GFR 27.4 mL/min, 4/9/24 (prior:  9 - 34)  --s/p multiple doses of HD steroids in the past and remains on maintenance 2.5 mg prednisone per nephrology (JACLYN Ham-Dr. Grant)- tapered down from 5 mg/2.5 mg  --will not start Cytoxan unless biopsy done to determine etiology of kidney failure. Has been off Opdivo since September 2019.  --Cabozantinib, 2/21/24- present    3.  Normocytic anemia.    --Resolved. Hgb 15.8; MCV 89.7, 4/9/24 (prior: Hgb 10.2-16.1; MCV 86.3-95.5)  4.  Gerd: modulated by omeprazole  5.  Osteopenia: on bola+vit D  6.  History of acute diverticulitis.   --04/18/2022- colonoscopy-diverticulosis in the left colon.  Nonbleeding internal hemorrhoids.  Otherwise normal exam.  Repeat 5 years.  Dr. Robison  7.  Hypertension. Cabozantinib associated.  Improved since stopping drug last 4/2/24    Plan:  1.  Apprised of labs, 4/1/24 and 4/9/24 -normal CBC. GFR 25.5, glucose 134, otherwise stable CMP. Normal TSH/T4    2.  Cabozantinib tolerance: +hypertension.  Started on varying doses of Losartan on 2/26, added Coreg 6.25 bid and HCTZ in conjunction with nephrology.  According to notes from specialty pharmacy, 3/12, 3/18, 4/2/24-. Subjective BP numbers reported: 175/105, 158/98, 170/88.  Reported he felt \"clumsy, possibly dizzy/lightheaded, fatigue, constipation, diarrhea\".  No  arterial/venous thromboembolism, no bleeding, no other GI symptoms (belly pain, viscus perforation, fistula formation, severe diarrhea) no rash (hand-foot syndrome), no jaundice (hepatotoxicity), no edema (nephrotic syndrome), no jaw pains (osteonecrosis of the jaw), no decreased appetite, no nausea, no vomiting, no stomatitis, no dysphonia, no asthenia, no dysgeusia, no dyspnea, no extremity pain, no dyspepsia, no muscle spasms, no cough, no headache, no arthralgia, no xeroderma.  Discussed with Neftaly Solis PharmD on 4/2/24 who discussed with patient (face to face) on that day (see phone note):  Instructed to " hold the drug for at least a week, monitor BP, use stool softeners.  Says all the symptoms have resolved off the drug.  Will consider resuming the drug qod schedule.  The patient however wants to stay off the drug.      3.  Apprised of CT CAP, 4/2/24 (above).  Stable pulmonary nodules. Infiltration of liver.  Metastatic disease less likely but not entirely excluded.  Hepatic mass protocol CT or MRI recommended.  4.   Keep appointment for noncontrast MRI of the abdomen    5.  Previously reviewed follow-up Dr. Cannon ( oncology, [facility]), 12/7/23: A/P: Discussed pathology, imaging, treatment options and prognosis in detail with him.  Images reviewed and agree with slow-growing pulmonary nodules that are likely metastatic.  Discussed systemic therapy options including rechallenging IO, cabozantinib, mTOR, Belzutifan, clinical trials at our institution discussed likely will not be a candidate given his renal dysfunction.  Recommend cabozantinib.  Patient really concerned about side effects given his past experience with sunitinib.  I suggest start with cabo 20 mg p.o. daily and titrate up to 40 mg based on tolerability.  No dose modifications needed for renal dysfunction.  RTC as needed    6.   Re: The potential toxicities of cabozantinib (to include but not limited to: Arterial/venous thromboembolism, hypertension, hypertensive crisis, severe hemorrhage, arterial aneurysm/dissection/rupture, impaired wound healing, GI perforation, fistula formation, severe diarrhea, hand-foot syndrome, hepatotoxicity (in combination with nivolumab), nephrotic syndrome, adrenal insufficiency (in combination with nivolumab), hypothyroidism, osteonecrosis of the jaw, posterior leukoencephalopathy syndrome, QT prolongation, hypocalcemia, neutropenia, diarrhea, fatigue, decreased appetite, hypertension, nausea, vomiting, decreased weight, rash, stomatitis, dysphonia, asthenia, dysgeusia, hypocalcemia, dyspnea,  "hypothyroidism, extremity pain, dyspepsia, constipation, muscle spasms, proteinuria, abdominal pain, thromboembolism, cough, anemia, hemorrhage, headache, arthralgia, xeroderma, dizziness, increased LDH, hypoalbuminemia, increased alk phos, decreased platelets, hypophosphatemia, hypomagnesemia).  Questions answered.  He now wants to stop the drug.  \"It took away my QOL.\"     7.  Schedule follow-up nephrology ASAP to taper BP meds.    8.  Return to office in 12 weeks with preoffice CT scans of the chest, abdomen/pelvis without IV contrast (in 11 weeks), CBC with differential, CMP, PO, magnesium.    I spent ~47 minutes caring for Parker on this date of service. This time includes time spent by me in the following activities: preparing for the visit, reviewing tests, performing a medically appropriate examination and/or evaluation, counseling and educating the patient/family/caregiver, ordering medications, tests, or procedures and documenting information in the medical record.       "

## 2024-05-13 ENCOUNTER — HOSPITAL ENCOUNTER (OUTPATIENT)
Dept: MRI IMAGING | Facility: HOSPITAL | Age: 69
Discharge: HOME | End: 2024-05-13
Admitting: INTERNAL MEDICINE
Payer: MEDICARE

## 2024-05-13 DIAGNOSIS — R19.7 DIARRHEA, UNSPECIFIED TYPE: ICD-10-CM

## 2024-05-13 DIAGNOSIS — C64.1 RENAL CELL CARCINOMA OF RIGHT KIDNEY: ICD-10-CM

## 2024-05-13 DIAGNOSIS — R11.0 NAUSEA: ICD-10-CM

## 2024-05-13 PROCEDURE — 74181 MRI ABDOMEN W/O CONTRAST: CPT

## 2024-06-28 NOTE — PROGRESS NOTES
"[facility] ON[facility] GROUP HEMATOLOGY AND ONCOLOGY  [address]  [place] [address] [phone]    Patient Name: Parker Arnold  Encounter Date: 07/9/2024  YOB: 1955  Patient Number: [id number]       REASON FOR VISIT:  Parker Arnold is a 68 y.o. 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  Underwent right nephrectomy. Was under surveillance until 11/26/2018  When he underwent  wedge resection of a right pulm nodule. Pathology:  metastatic renal cell carcinoma. He was not able to tolerate Sutent due to kidney failure, thus received ipilumomab/nivolumab, 05/13/2019 - 08/07/2019, then single agent nivolumab, 08/28/2019 - 09/09/2019 (52 months since cessation of therapy). Therapy stopped due to renal failure (nivolumab?). He has been on and off prednisone since.  Last 11/29/2021 (31.5 months) underwent right thoracoscopic RLL and RML wedge resection with final path consistent with metastatic renal cell carcinoma.  On 12/7/23 he was seen in follow-up by Dr. Cannon ( oncology, [facility]) who recommended a trial of cabozantinib.  Thus, at his visit on 1/23/24 palliative cabozantinib was sent in but due to insurance issues did not receive the drug until 2/21/24.  However, on 4/2/24 the patient decided to stop the drug due to intolerable AEs (Hypertension and feeling \"clumsy, possibly dizzy/lightheaded, fatigue, constipation, diarrhea\").  He is here with his spouse, Nelda.       I have reviewed the HPI and verified with the patient the accuracy of it. No changes to interval history since the information was documented. Leonardo Smith MD 07/09/24      Problem List Items Addressed This Visit    None              Oncology/Hematology History   Renal cell carcinoma of right kidney   10/2014 Initial Diagnosis    Renal cell carcinoma (CMS/HCC)     10/2014 Surgery    Right nephrectomy, " Dr Morales       10/1/2018 Progression    Increasing lung Nodules on f/u CT     10/2/2018 -  Chemotherapy    Sutent  Discontinued due to intolerance     5/13/2019 - 8/7/2019 Chemotherapy    Opdivo Yervoy x 4 cycles     8/28/2019 - 9/9/2019 Chemotherapy    Single agent Opdivo    Opdivo held 9/9/2019 due to Renal Failure / NephritisToxicity     1/16/2020 Imaging    PET SCAN  Negative for neoplastic uptake     4/2/2024 -  Chemotherapy    OP SUPPORTIVE HYDRATION + ANTIEMETICS     4/9/2024 -  Chemotherapy    [facility] - Cabozantinib (Cabometyx)     Lung metastases   10/18/2019 Initial Diagnosis    Lung metastases     4/9/2024 -  Chemotherapy    SPE[facility] - Cabozantinib (Cabometyx)         PAST MEDICAL HISTORY:  ALLERGIES:  Allergies   Allergen Reactions    Amoxicillin Hives    Contrast Dye (Echo Or Unknown Ct/Mr) Other (See Comments)     No Oral or IV contrast Due to only having 1 kidney    Opdivo [Nivolumab] Provider Review Needed     Renal failure    Penicillins Hives and Rash    Clindamycin/Lincomycin Rash     pustules  Rash - pustules      Doxycycline Rash    Hepatitis B Virus Vaccines Rash     Pustules      Hepatitis B Vaccine Rash    Latex Rash    Levofloxacin Rash     CURRENT MEDICATIONS:  Outpatient Encounter Medications as of 7/9/2024   Medication Sig Dispense Refill    acetaminophen (TYLENOL) 500 MG tablet Take 1 tablet by mouth Every 6 (Six) Hours As Needed for Mild Pain.      allopurinol (ZYLOPRIM) 100 MG tablet Take 1 tablet by mouth Daily.      calcium carb-cholecalciferol 600-800 MG-UNIT tablet Take 1 tablet by mouth 2 (Two) Times a Day With Meals.      carvedilol (COREG) 6.25 MG tablet Take 1 tablet by mouth 2 (Two) Times a Day With Meals.      finasteride (PROSCAR) 5 MG tablet Take 1 tablet by mouth Daily.  3    loratadine (CLARITIN) 10 MG tablet Take 1 tablet every day by oral route.      predniSONE (DELTASONE) 5 MG tablet Take 0.5 tablets by mouth Daily.      sodium bicarbonate 650 MG  tablet Take 1 tablet by mouth 3 (Three) Times a Day. 90 tablet 2    [DISCONTINUED] HYDROCHLOROTHIAZIDE PO Take  by mouth Daily. Take recommended dose daily as directed (Patient not taking: Reported on 7/9/2024)      [DISCONTINUED] losartan (COZAAR) 100 MG tablet Take 1 tablet by mouth Daily. (Patient not taking: Reported on 7/9/2024)       No facility-administered encounter medications on file as of 7/9/2024.     ADULT ILLNESSES:  Patient Active Problem List   Diagnosis Code    Renal cell carcinoma of right kidney C64.1    Multiple nodules of lung R91.8    Lung anomaly Q33.9    TIA (transient ischemic attack) G45.9    Acute renal failure with tubular necrosis in the setting of solitary kidney N17.0    Lung metastases C78.00    Pyuria R82.81    Hyperkalemia E87.5    Metabolic acidosis E87.20    Hyperglycemia R73.9    Hypercalcemia E83.52    Benign prostatic hyperplasia N40.0    Increased frequency of urination R35.0    Nocturia R35.1    Retention of urine R33.9    Dehydration E86.0    Nephritis and nephropathy N05.9    Toxicity, chemicals T65.91XA    Stage 3 chronic kidney disease N18.30    Campylobacter gastroenteritis A04.5    Sigmoid diverticulitis K57.32    History of TIA (transient ischemic attack) Z86.73    Solitary kidney, acquired Z90.5    Benign essential hypertension I10    Gastroesophageal reflux disease K21.9    Encounter for screening for malignant neoplasm of colon Z12.11    Hypertension due to drug I15.8, T50.905A     SURGERIES:  Past Surgical History:   Procedure Laterality Date    BRONCHOSCOPY N/A 11/29/2021    Procedure: BRONCHOSCOPY;  Surgeon: Jarrod Tolentino MD;  Location:  [facility] OR;  Service: Cardiothoracic;  Laterality: N/A;    CHOLECYSTECTOMY WITH INTRAOPERATIVE CHOLANGIOGRAM N/A 12/22/2021    Procedure: LAPAROSCOPIC CHOLECYSTECTOMY;  Surgeon: Vandana Calhoun MD;  Location:  [facility] OR;  Service: General;  Laterality: N/A;    COLONOSCOPY N/A 4/18/2022    Procedure: COLONOSCOPY WITH  ANESTHESIA;  Surgeon: Zoila Robison MD;  Location: [facility] ENDOSCOPY;  Service: Gastroenterology;  Laterality: N/A;  pre colon cancer  post  Dr. Osborne    HERNIA REPAIR  2008    LUNG BIOPSY  11/2018    NEPHRECTOMY Right 2014    SINUS SURGERY  2005    THORACOSCOPY Right 11/29/2021    Procedure: RIGHT THORACOSCOPY WITH RIGHT MIDDLE AND RIGHT LOWER LOBE WEDGE RESECTIONS;  Surgeon: Jarrod Tolentino MD;  Location: [facility] OR;  Service: Cardiothoracic;  Laterality: Right;     HEALTH MAINTENANCE ITEMS:  Health Maintenance Due   Topic Date Due    TDAP/TD VACCINES (1 - Tdap) Never done    ZOSTER VACCINE (1 of 2) Never done    Pneumococcal Vaccine 65+ (1 of 1 - PCV) Never done    HEPATITIS C SCREENING  Never done    ANNUAL WELLNESS VISIT  Never done    BMI FOLLOWUP  03/28/2023    COVID-19 Vaccine (1 - 2023-24 season) Never done       <no information>  Last Completed Colonoscopy            COLORECTAL CANCER SCREENING (COLONOSCOPY - Every 5 Years) Next due on 4/18/2027 04/18/2022  COLONOSCOPY    04/18/2022  Surgical Procedure: COLONOSCOPY                    There is no immunization history on file for this patient.  Last Completed Mammogram       This patient has no relevant Health Maintenance data.              FAMILY HISTORY:  Family History   Problem Relation Age of Onset    Other Mother         blood disease unknown    Cancer Father         unknown    No Known Problems Sister     No Known Problems Sister     Heart disease Maternal Grandmother     Heart disease Maternal Grandfather     No Known Problems Paternal Grandmother     No Known Problems Paternal Grandfather     Colon polyps Neg Hx     Colon cancer Neg Hx      SOCIAL HISTORY:  Social History     Socioeconomic History    Marital status:    Tobacco Use    Smoking status: Former     Current packs/day: 0.00     Average packs/day: 1 pack/day for 22.0 years (22.0 ttl pk-yrs)     Types: Cigarettes     Start date: 1962     Quit date: 1984     Years since  "quittin.5    Smokeless tobacco: Never   Vaping Use    Vaping status: Never Used   Substance and Sexual Activity    Alcohol use: Not Currently    Drug use: No    Sexual activity: Defer       REVIEW OF SYSTEMS:  Cabozantinib tolerance:  +hypertension.  Started on varying doses of Losartan on , added Coreg 6.25 bid and HCTZ in conjunction with nephrology.  According to notes from specialty pharmacy, 3/12, 3/18, 24-. Subjective BP numbers reported: 175/105, 158/98, 170/88.  Reported he felt \"clumsy, possibly dizzy/lightheaded, fatigue, constipation, diarrhea\".  No  arterial/venous thromboembolism, no bleeding, no other GI symptoms (belly pain, viscus perforation, fistula formation, severe diarrhea) no rash (hand-foot syndrome), no jaundice (hepatotoxicity), no edema (nephrotic syndrome), no jaw pains (osteonecrosis of the jaw), no decreased appetite, no nausea, no vomiting, no stomatitis, no dysphonia, no asthenia, no dysgeusia, no dyspnea, no extremity pain, no dyspepsia, no muscle spasms, no cough, no headache, no arthralgia, no xeroderma.  Discussed with Neftaly Solis PharmD on 24 who discussed with patient (face to face) on that day (see phone note):  Instructed to hold the drug for at least a week, monitor BP, use stool softeners.  Says all the symptoms have resolved off the drug.  On 24 stopped the drug  Constitutional:  Manages ADLs, chores, errands and driving.  Again drove himself in today.  Appetite is good, \"great.\"  Energy is fairly good.  He remains active. He has intentionally lost 15 lb (had gained 4 lb at his prior visit ) since his last visit. \"Cutting back sugars and walking 3 miles/day.\"  Says he feels fit and feels \"good\".  No fever, no chills nor drenching nights.  HENT: Negative.  No sore throat.  Has seasonal sinus symptoms/rhinorrhea. No headaches.  Eyes: Negative.    Respiratory:  No SOB at rest nor TENORIO.  No cough. No wheezing. No tobacco use.    Cardiovascular: Negative.  " "No palpitations.  No orthopnea  Gastrointestinal: RUQ pain resolved since cholecystectomy.  No dysphagia.  No nausea.  No vomiting.  No dyspepsia. Since stopping the drug last 4/2/24.  Bowels moving regularly.  Has occasional soft stools.  No overt diarrhea.  No melena.  Has not had hemorrhoidal bleeding.  Has history recurrent diverticulitis, early 06/2021.  Had colonoscopy, 04/18/2022- diverticulosis.  Nonbleeding internal hemorrhoids otherwise normal exam.  Endocrine: No hot flashes.  Genitourinary:  No dysuria.  No incontinence. No hematuria.  Occasional urgency.  \"I still feel like I gotta go when I gotta go sometimes.\"  Nocturia much improved on Finasteride - up 1x- from every 90 min.  Remains on allopurinol.  Musculoskeletal:  No arthralgias.  No edema  Skin: No new rash.    Neurological:  No dizziness.  No facial asymmetry. No headaches.  Mild sensory neuropathy of the hands, not worse  Hematological: Negative for new adenopathy.  Says he bruises easily.  Psychiatric/Behavioral:  Has baseline anxiety, \"more now.\"  No depression.        VITAL SIGNS: /86   Pulse 54   Temp 97.4 °F (36.3 °C) (Temporal)   Resp 18   Ht 177.8 cm (70\")   Wt 87.4 kg (192 lb 11.2 oz)   SpO2 98%   BMI 27.65 kg/m² Body surface area is 2.05 meters squared.  Pain Score    07/09/24 1017   PainSc: 0-No pain           PHYSICAL EXAMINATION:   General Appearance: Patient is a very pleasant, cooperative, heavyset, slimmer, fitter, modestly kept elderly male who is awake, alert, oriented and in no acute distress. ECOG 0  HEENT: Normocephalic. Sclerae clear, conjunctiva pink, extraocular movements intact  NECK: Supple, no jugular venous distention, thyroid not enlarged.  LYMPH: No cervical, supraclavicular, axillary, or inguinal lymphadenopathy.  LUNGS: Good air movement, no rales, rhonchi, rubs or wheezes with auscultation.  Right thoracoscopic incisions are healed  CARDIO: Regular sinus rhythm, no murmurs, gallops or " rubs.  ABDOMEN:  Soft, flatter with no right upper quadrant tenderness.  Laparoscopy wounds are all healed. No obvious abdominal masses. Bowel sounds positive. No hernia  MUSKEL: No joint swelling, decreased motion, or inflammation  EXTREMS:  No edema, clubbing, cyanosis, No varicose veins.  NEURO: Grossly nonfocal, Gait is coordinated and smooth, Cognition is preserved.  SKIN: No rashes, no ecchymoses, no petechia.  PSYCH: Oriented to time, place and person. Memory is preserved. Mood and affect appear normal         LABS    Lab Results - Last 18 Months   Lab Units 07/05/24  0848 04/09/24  0947 04/01/24  0913 03/25/24  0933 03/18/24  0911 03/11/24  0913 03/04/24  0929 01/23/24  0945 12/07/23  1227   HEMOGLOBIN g/dL 15.3 15.8 16.3 16.3 16.9 16.0 16.0   < >  --    HEMATOCRIT % 45.8 47.1 48.7 49.0 50.7 47.5 47.7   < >  --    MCV fL 92.7 89.7 89.2 88.6 87.6 88.0 87.8   < >  --    WBC 10*3/mm3 5.50 4.94 5.89 5.73 5.95 6.34 6.34   < >  --    RDW % 12.6 14.7 14.5 14.1 13.9 13.6 13.5   < >  --    MPV fL 9.4 9.0 9.4 9.6 9.4 9.3 9.5   < >  --    PLATELETS 10*3/mm3 169 154 164 147 154 172 186   < >  --    IMM GRAN % % 0.4 0.2 0.2  --  0.3 0.2 0.2   < >  --    NEUTROS ABS 10*3/mm3 3.07 2.97 3.10 2.85 3.50 3.68 3.57   < > 5.3   LYMPHS ABS 10*3/mm3 1.45 1.22 1.83 1.88 1.58 1.73 1.85   < >  --    MONOS ABS 10*3/mm3 0.61 0.44 0.64 0.63 0.53 0.59 0.54   < >  --    EOS ABS 10*3/mm3 0.32 0.27 0.28 0.35 0.29 0.30 0.33   < > 0.3   BASOS ABS 10*3/mm3 0.03 0.03 0.03 0.02 0.03 0.03 0.04   < > 0.1   IMMATURE GRANS (ABS) 10*3/mm3 0.02 0.01 0.01  --  0.02 0.01 0.01   < >  --    NRBC /100 WBC 0.0 0.0 0.0  --  0.0 0.0 0.0   < >  --    NEUTROPHIL % %  --   --   --   --   --   --   --   --  67.6   MONOCYTES % %  --   --   --   --   --   --   --   --  8.7   BASOPHIL % %  --   --   --   --   --   --   --   --  0.8    < > = values in this interval not displayed.       Lab Results - Last 18 Months   Lab Units 07/05/24  0848 04/09/24  0947  04/01/24  0913 03/25/24  0933 03/18/24  0911 03/11/24  0913   GLUCOSE mg/dL 107* 134* 111* 105* 104* 108*   SODIUM mmol/L 139 141 141 142 140 142   POTASSIUM mmol/L 4.9 4.7 5.2 4.9 5.5* 4.9   CO2 mmol/L 25.0 23.0 27.0 26.0 27.0 26.0   CHLORIDE mmol/L 106 108* 104 107 105 109*   ANION GAP mmol/L 8.0 10.0 10.0 9.0 8.0 7.0   CREATININE mg/dL 2.38* 2.47* 2.62* 2.48* 1.99* 2.24*   BUN mg/dL 43* 48* 56* 45* 39* 36*   BUN / CREAT RATIO  18.1 19.4 21.4 18.1 19.6 16.1   CALCIUM mg/dL 9.5 9.3 9.9 8.9 10.0 9.0   ALK PHOS U/L 58 89 103 94 104 102   TOTAL PROTEIN g/dL 6.3 6.4 6.8 6.8 6.7 6.6   ALT (SGPT) U/L 17 34 51* 50* 48* 39   AST (SGOT) U/L 16 19 31 29 30 29   BILIRUBIN mg/dL 0.6 0.5 0.8 0.5 0.5 0.5   ALBUMIN g/dL 4.2 4.3 4.4 4.3 4.3 4.2   GLOBULIN gm/dL 2.1 2.1 2.4 2.5 2.4 2.4       Assessment:  1.  Renal cell carcinoma, diagnosed 11/11/2014 (5.5 cm G3, pT3a, pNx). --2014, right nephrectomy.   --surveillance until 11/26/2018    --11/26/2018-right lower lobe VATS wedge resection: Metastatic renal cell carcinoma, 0.9 cm; margins free of malignancy. Dr. Hu  -- Unable to tolerate Sutent due to kidney failure, thus received ipilumomab/nivolumab, 05/13/2019 - 08/07/2019, then single agent nivolumab, 08/28/2019 - 09/09/2019 (52 months since cessation of therapy). Therapy stopped due to renal failure (nivolumab?)  --01/16/2020-PET scan.  No neoplastic FDG uptake within the chest, abdomen or pelvis.  --07/06/2020-CT abdomen/pelvis with diverticulosis of the colon.  Evidence of acute diverticulitis of the sigmoid colon and adjacent distal descending colon.  No free air or fluid seen.  1 cm noncalcified subpleural nodule in the left lower lobe.  Follow-up exam in 6 months recommended.  --08/04/2020-chest CT.  Scattered subcentimeter bilateral pulmonary nodules similar to 1/16/2020 with mild increase in the 5 mm left upper lobe nodule.  Continued follow-up recommended.  No pathologic lymphadenopathy.  Right  nephrectomy.  --01/14/2020-CT chest.  Increased size of 1.2 cm right lower lobe superior segment pulmonary nodule (previously 0.9 cm)-representing 33% growth.  Multiple other bilateral pulmonary nodules have not significantly changed in size.  Findings are in keeping with disease progression.  --01/14/2020-CT abdomen/pelvis.  No evidence of recurrent or metastatic disease in the abdomen or pelvis.  --01/26/2021-PET/CT.  Impression: Minimal metabolic activity associated with the enlarging pulmonary nodule within the superior segment of the right lower lobe with a maximum SUV of 1.35.  The additional nodules noted within both lungs also demonstrate no abnormal metabolic activity which would favor benignity but continued radiographic follow-up will be suggested.  No scintigraphic evidence of metastatic disease on current exam.  Increased uptake overlying the right groin and more distal external iliac vessels felt to be related to previous hernia repair.  --02/14/2021-  Encounter from Dr. Mayito Arteaga, medical oncology at [facility].  History reviewed.  Most recent CT chest, 01/14/2021 (above) with increasing size of 1.2 cm right lower lobe superior segment pulmonary nodule (previously 0.9 cm) and PET scan, 01/26/2021 (above) noted including minimal metabolic activity associated with the enlarging pulmonary nodule within the superior segment of the right lower lobe.  Discussion: Biopsy right lower lobe lung nodule solitary lesion that is growing on PET scan which will be done at that institution.  Contemplate either surgical resection, ablation or SBRT.  We will focus on local therapy for now.  Multiple systemic options down the road but if renal function improves can contemplate trial with HIF-alpha inhibitor.  --03/15/2021- CT chest.  Continued increased size of the right lower lobe superior segment now 1.4 cm pulmonary nodule (1.2 cm, 1/14/2021; 0.8 cm, 8/4/2020).  Other pulmonary nodules are  stable.  New small area of groundglass opacity in the superior segment left lower lobe may be infectious/inflammatory.  Coronary artery calcifications.  Aortic calcifications.  Small hiatal hernia.  --03/15/2021-CT abdomen/pelvis.  Previous right nephrectomy identified with no evidence of intra-abdominal metastatic disease.  Benign-appearing exophytic cyst posterior aspect of the left kidney, upper pole, measuring 2.47 cm, stable.  Small nodules in the lung bases are unchanged compared to previous chest CT.  Evidence of previous right inguinal hernia repair.  Fatty infiltration of the inguinal canals.  Scattered colonic diverticula without diverticulitis.  Small sliding hiatal hernia identified.  --5/27/2021- Follow-up from GIOVANNY Edward with Dr. Arteaga's office.  Impression/plan: CT CAP, 05/26/2021: Stable examination including stable bilateral lung nodules compared to 03/15/2021.  Progression of right lower lobe lesion?  Discussed systemic therapy option with cabozantinib at 20 mg.  However patient concerned due to his history of substantial AEs in the past.  Will arrange for lesion to be ablated.  However patient says multiple lesions were there for 6 years and unchanged.-Consider ablation or SBRT of the lesion and monitor and consider cabozantinib when failed.  Multiple systemic options down the road but if renal function improves can contemplate HIF-alpha inhibitor.  Continue surveillance for now.  Repeat scans in 3 months.  --06/11/2021-CT chest without contrast.  Stable pulmonary nodules.  No evidence of disease progression.  Interval resolution superior segment left lower lobe groundglass opacity.  Scattered coronary artery calcifications.  --06/11/2021-CT abdomen/pelvis.  History of right renal cell carcinoma nephrectomy.  No CT evidence of tumor recurrence or metastatic disease in the abdomen or pelvis.  --09/07/2021-CT chest wo contrast.  Pulmonary nodules noted.  Slightly increased in size compared  to 6/11/2021 (left upper lobe 7.2 mm-prior 6.7 mm; posterior right lung, 19 mm-prior 13.9 mm; posterior medial right lung, 14 mm-prior 11 mm; right lung, 7.7 mm-prior 7.1 mm).  However no new nodules identified.  --09/07/2021-CT abdomen/pelvis wo contrast.  No evidence of intra-abdominal or pelvic metastasis.  Prior right nephrectomy.  Stable left renal cyst.  Colonic diverticulosis with questionable wall thickening of the sigmoid colon may be artifactual.  Correlation to any recent colonoscopy recommended.  --09/28/2021-PET scan.  Increased size of right lower lobe lung nodule (21 x 15 mm compared to 14 x 14 mm 3 months ago) though this finding shows no significant FDG uptake.  Otherwise stable PET/CT exam.  --11/29/2021-RLL, RML wedge resections. Path consistent with metastatic renal cell carcinoma  --12/14/2021-CT chest.  Postoperative changes from wedge resections of prior right middle and lower lobe pulmonary nodules.  Stable left-sided pulmonary nodules, largest in the left upper lobe/apex measuring 8 mm.  Continued follow-up strongly recommended.  No pathologic lymphadenopathy.  --12/14/2021-CT abdomen/pelvis.  No convincing evidence of intra-abdominal or pelvic metastasis.  New diffuse abnormal gallbladder wall thickening.  Findings may be seen with cholecystitis.  Gallbladder ultrasound might be considered.  --12/20/2021-gallbladder ultrasound-diffuse gallbladder wall thickening.  Gallbladder nondistended with no visible stones.  Unlikely acute cholecystitis.  Favor chronic cholecystitis.  --03/18/2022-CT chest-patchy infiltrate compatible with right middle lobe pneumonia.  2 tiny left lung nodule stable.  --03/18/2022-CT abdomen/pelvis.  No evidence of metastatic disease to the abdomen/pelvis.  Prior right nephrectomy.  Colonic diverticulosis without evidence of diverticulitis.  Mild splenomegaly, unchanged.  --06/24/2022- CT chest-1. The resolution of the previously seen patchy infiltrate in the right  middle lobe. 2. Postsurgical changes and scarring in the right upper and lower lobe. 3. Stable small nodules in both lungs the largest one in the left upper lobe. No change.  --06/24/2022- CT abdomen/pelvis- stable.  No evidence of a neoplastic process or metastatic disease. Evidence of right nephrectomy. No focal complication. Stable left renal cyst. Diverticulosis of the colon. No evidence for diverticulitis. Stable mild splenomegaly.  Enlarged prostate.  --09/19/2022- CT CAP- Stable exam.  No change in multiple bilateral pulmonary nodules measuring up to 8 mm.  No evidence of recurrent or metastatic disease in the abdomen or pelvis status post RIGHT nephrectomy.  --1/23/2023- CT CAP- Prior wedge resection within the RML/RLL. No evidence of localized recurrence at these sites. Numerous other pulmonary nodules are present. The majority of these nodules are stable in size and appearance from the previous exam. There is one nodule which is subpleural in location within the lateral left upper lobe on image 52 which has an increased soft tissue component from the previous exam. This would warrant close follow-up on subsequent imaging.No acute abnormality of the abdomen or pelvis. Evidence of prior right nephrectomy. No focal complication. A left renal cyst.  --7/20/23- CT CAP- No change in multiple bilateral pulmonary nodules measuring up to 10 mm, compared to 01/23/2023. However, there has been gradual increase in pulmonary nodule size compared to more remote studies dating back to 03/18/2022. For reference, the dominant 10 mm LEFT upper lobe pulmonary nodule measured 7 mm on 03/18/2022. Recommend continued clinical and imaging follow-up as metastatic disease remains in the differential.  --8/17/23 and 9/5/23- Follow-up Dr. Juan A Cannon-A/P: 68-year-old male with metastatic ccRCC on active surveillance initially seen in [facility], 8/17/2023 for second opinion.  Discussed pathology, imaging, treatment options  "and prognosis in detail.  Images reviewed  tumor board that agreed with very slow-growing pulmonary nodules likely metastatic.  Discussed systemic therapy options including rechallenging IO, cabozantinib,mTOR, Belzutifan.  Clinical trials are not our institution discussed but likely will not be a candidate given his renal dysfunction.  Patient strongly prefers not to start systemic therapy unless absolutely necessary to avoid further renal dysfunction and other side effects.  Given slow growth, I think it is reasonable to hold off on systemic therapy continue surveillance for now.  Local therapy can be discussed to the dominant lesions based on future scans.  I do think he will ultimately need to get back on systemic therapy  --11/20/23- CT CAP wo- Multiple bilateral new and enlarging pulmonary nodules measuring up to 10 mm. Findings are highly concerning for metastatic disease.  Mild dilatation of the ascending artery measuring 4 cm diameter. No evidence of recurrent or metastatic disease in the abdomen or pelvis status post right nephrectomy on this unenhanced exam. Heavy sigmoid diverticulosis without evidence of diverticulitis.   --12/7/23- Follow-up Dr. Cannon: A/P: Discussed pathology, imaging, treatment options and prognosis in detail with him.  Images reviewed and agree with slow-growing pulmonary nodules that are likely metastatic.  Discussed systemic therapy options including rechallenging IO, cabozantinib, mTOR, Belzutifan, clinical trials at our institution discussed likely will not be a candidate given his renal dysfunction.  \"Both although slow, recommend cabozantinib.  Patient really concerned about side effects given his past experience with sunitinib.  I suggest start with cabozantinib 20 mg p.o. daily and titrate up to 40 mg based on tolerability.  No dose modifications needed for renal dysfunction.  RTC as needed  --2/20/24- CT chest- Mild to moderate increase in size of several pulmonary nodules " as detailed above. A few nodules are stable. Another follow-up examination in 6 months is recommended. Scattered areas of scarring in the right upper/middle and right lower lobe similar to the previous study.  --2/20/24- CT abdomen/pelvis- No acute abnormality of the abdomen or pelvis, particularly, no finding to suggest neoplastic process/metastatic disease. Diverticulosis of the colon. No evidence for diverticulitis. A stable left renal cyst. The prior right nephrectomy. No focal or regional complication.  -- 1/23/24 palliative cabozantinib was sent in but due to insurance issues did not receive the drug until 2/21/24 - stopped on 4/2/24 due to intolerance.  -- 4/2/24- CT chest-Bilateral pulmonary nodules appear relatively stable, as described above. There are no new pulmonary nodules. Prior wedge resections on the right. Mild atheromatous disease of the thoracic aorta. Coronary artery calcification is noted. Ascending thoracic aorta is dilated measuring 4 cm. Areas of ill-defined low density within the visualized liver. Difficult to tell if this represents metastatic disease or geographic fatty infiltration on the nonenhanced images. Follow-up hepatic mass protocol CT or MRI recommended.  -- 4/2/24- CT abdomen/pelvis- Large areas of low-attenuation within the liver likely representing geographic fatty infiltration. Metastatic disease is felt to be less likely but not entirely excluded as this finding is not present on 1/23/2023. Hepatic mass protocol CT or MRI is recommended to further evaluate. If the patient cannot receive IV contrast for clinical reasons, noncontrast MRI would be more beneficial. A 3 cm left renal cyst. Prior right nephrectomy. Small hiatal hernia. Diverticulosis of the colon. Under distention of stomach and colon. Normal appendix. Small fat-containing inguinal hernias bilaterally.   -- 5/14/24- MRI wo contrast-Postoperative change of right nephrectomy without evidence of recurrent or  metastatic disease in the abdomen. Mild geographic hepatic steatosis. 6 mm cyst in the pancreatic body/tail. This likely represents a sidebranch IPMN. Recommend a follow-up MRI in 2 years based on ACR white paper criteria. Splenomegaly.  - 7/5/24- CT chest-Several of the pulmonary nodules are slightly larger than on the exam from April 2, 2024 but very similar in size when compared to the exam from 2/20/2024. I don't see any new pulmonary nodules.  - 7/5/24- CT abdomen/pelvis- Evidence of prior right nephrectomy. No finding to suggest local recurrence or neoplastic process or metastatic disease in the abdomen or pelvis.  Diverticulosis of the colon. No evidence for diverticulitis.  A stable 3 cm left renal cyst no change. Geographic fatty infiltration of the liver is poorly and barely visible in the present study. No discrete intrahepatic mass.      2.  Moderate calcified coronary atherosclerosis.    2.  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    --GFR 28.6 mL/min, 7/5/24 (prior:  9 - 34)  --s/p multiple doses of HD steroids in the past and remains on maintenance 2.5 mg prednisone per nephrology (Leo Louis, APRN-Dr. Grant)- tapered down from 5 mg/2.5 mg  --will not start Cytoxan unless biopsy done to determine etiology of kidney failure. Has been off Opdivo since September 2019.    3.  Normocytic anemia.    --Resolved. Hgb 15.3; MCV 92.7, 7/5/24 (prior: Hgb 10.2-16.1; MCV 86.3-95.5)  4.  Gerd: modulated by omeprazole  5.  Osteopenia: on bola+vit D  6.  History of acute diverticulitis.   --04/18/2022- colonoscopy-diverticulosis in the left colon.  Nonbleeding internal hemorrhoids.  Otherwise normal exam.  Repeat 5 years.  Dr. Robison  7.  Hypertension. Cabozantinib associated.  Improved since stopping drug last 4/2/24    Plan:  1.  Apprised of labs, 7/5/24 -normal CBC. GFR 28.6, glucose 107, otherwise stable CMP. Normal TSH/T4    2.  Apprised of CT CAP, 7/5/24 (above). Stable overall    3.   Cabozantinib tolerance:  "+hypertension.  Started on varying doses of Losartan on 2/26, added Coreg 6.25 bid and HCTZ in conjunction with nephrology.  According to notes from specialty pharmacy, 3/12, 3/18, 4/2/24-. Subjective BP numbers reported: 175/105, 158/98, 170/88.  Reported he felt \"clumsy, possibly dizzy/lightheaded, fatigue, constipation, diarrhea\".  No  arterial/venous thromboembolism, no bleeding, no other GI symptoms (belly pain, viscus perforation, fistula formation, severe diarrhea) no rash (hand-foot syndrome), no jaundice (hepatotoxicity), no edema (nephrotic syndrome), no jaw pains (osteonecrosis of the jaw), no decreased appetite, no nausea, no vomiting, no stomatitis, no dysphonia, no asthenia, no dysgeusia, no dyspnea, no extremity pain, no dyspepsia, no muscle spasms, no cough, no headache, no arthralgia, no xeroderma.  Discussed with Neftaly Solis PharmD on 4/2/24 who discussed with patient (face to face) on that day (see phone note):  Instructed to hold the drug for at least a week, monitor BP, use stool softeners.  Says all the symptoms have resolved off the drug.  We discussed resuming the drug qod schedule.  The patient wanted to stop the drug permanently (had stopped since 4/2/24).  \"It took away my QOL\"    4.   Previously reviewed follow-up Dr. Cannon ( oncology, [facility]), 12/7/23: A/P: Discussed pathology, imaging, treatment options and prognosis in detail with him.  Images reviewed and agree with slow-growing pulmonary nodules that are likely metastatic.  Discussed systemic therapy options including rechallenging IO, cabozantinib, mTOR, Belzutifan, clinical trials at our institution discussed likely will not be a candidate given his renal dysfunction.  Recommend cabozantinib.  Patient really concerned about side effects given his past experience with sunitinib.  I suggest start with cabo 20 mg p.o. daily and titrate up to 40 mg based on tolerability.  No dose modifications needed for renal " dysfunction.  RTC as needed     5.   Return to office in 24 weeks (per patient request) with preoffice CT scans of the chest, abdomen/pelvis without IV contrast (in 11 weeks), CBC with differential, CMP, PO, magnesium.    I spent ~49 minutes caring for Parker on this date of service. This time includes time spent by me in the following activities: preparing for the visit, reviewing tests, performing a medically appropriate examination and/or evaluation, counseling and educating the patient/family/caregiver, ordering medications, tests, or procedures and documenting information in the medical record.

## 2024-07-05 ENCOUNTER — HOSPITAL ENCOUNTER (OUTPATIENT)
Dept: CT IMAGING | Facility: HOSPITAL | Age: 69
Discharge: HOME | End: 2024-07-05
Payer: MEDICARE

## 2024-07-05 ENCOUNTER — LAB (OUTPATIENT)
Dept: LAB | Facility: HOSPITAL | Age: 69
End: 2024-07-05
Payer: MEDICARE

## 2024-07-05 DIAGNOSIS — C64.1 RENAL CELL CARCINOMA OF RIGHT KIDNEY: ICD-10-CM

## 2024-07-05 LAB
ALBUMIN SERPL-MCNC: 4.2 G/DL (ref 3.5–5.2)
ALBUMIN/GLOB SERPL: 2 G/DL
ALP SERPL-CCNC: 58 U/L (ref 39–117)
ALT SERPL W P-5'-P-CCNC: 17 U/L (ref 1–41)
ANION GAP SERPL CALCULATED.3IONS-SCNC: 8 MMOL/L (ref 5–15)
AST SERPL-CCNC: 16 U/L (ref 1–40)
BASOPHILS # BLD AUTO: 0.03 10*3/MM3 (ref 0–0.2)
BASOPHILS NFR BLD AUTO: 0.5 % (ref 0–1.5)
BILIRUB SERPL-MCNC: 0.6 MG/DL (ref 0–1.2)
BUN SERPL-MCNC: 43 MG/DL (ref 8–23)
BUN/CREAT SERPL: 18.1 (ref 7–25)
CALCIUM SPEC-SCNC: 9.5 MG/DL (ref 8.6–10.5)
CHLORIDE SERPL-SCNC: 106 MMOL/L (ref 98–107)
CO2 SERPL-SCNC: 25 MMOL/L (ref 22–29)
CREAT SERPL-MCNC: 2.38 MG/DL (ref 0.76–1.27)
DEPRECATED RDW RBC AUTO: 43 FL (ref 37–54)
EGFRCR SERPLBLD CKD-EPI 2021: 28.6 ML/MIN/1.73
EOSINOPHIL # BLD AUTO: 0.32 10*3/MM3 (ref 0–0.4)
EOSINOPHIL NFR BLD AUTO: 5.8 % (ref 0.3–6.2)
ERYTHROCYTE [DISTWIDTH] IN BLOOD BY AUTOMATED COUNT: 12.6 % (ref 12.3–15.4)
GLOBULIN UR ELPH-MCNC: 2.1 GM/DL
GLUCOSE SERPL-MCNC: 107 MG/DL (ref 65–99)
HCT VFR BLD AUTO: 45.8 % (ref 37.5–51)
HGB BLD-MCNC: 15.3 G/DL (ref 13–17.7)
IMM GRANULOCYTES # BLD AUTO: 0.02 10*3/MM3 (ref 0–0.05)
IMM GRANULOCYTES NFR BLD AUTO: 0.4 % (ref 0–0.5)
LYMPHOCYTES # BLD AUTO: 1.45 10*3/MM3 (ref 0.7–3.1)
LYMPHOCYTES NFR BLD AUTO: 26.4 % (ref 19.6–45.3)
MAGNESIUM SERPL-MCNC: 1.9 MG/DL (ref 1.6–2.4)
MCH RBC QN AUTO: 31 PG (ref 26.6–33)
MCHC RBC AUTO-ENTMCNC: 33.4 G/DL (ref 31.5–35.7)
MCV RBC AUTO: 92.7 FL (ref 79–97)
MONOCYTES # BLD AUTO: 0.61 10*3/MM3 (ref 0.1–0.9)
MONOCYTES NFR BLD AUTO: 11.1 % (ref 5–12)
NEUTROPHILS NFR BLD AUTO: 3.07 10*3/MM3 (ref 1.7–7)
NEUTROPHILS NFR BLD AUTO: 55.8 % (ref 42.7–76)
NRBC BLD AUTO-RTO: 0 /100 WBC (ref 0–0.2)
PHOSPHATE SERPL-MCNC: 3 MG/DL (ref 2.5–4.5)
PLATELET # BLD AUTO: 169 10*3/MM3 (ref 140–450)
PMV BLD AUTO: 9.4 FL (ref 6–12)
POTASSIUM SERPL-SCNC: 4.9 MMOL/L (ref 3.5–5.2)
PROT SERPL-MCNC: 6.3 G/DL (ref 6–8.5)
RBC # BLD AUTO: 4.94 10*6/MM3 (ref 4.14–5.8)
SODIUM SERPL-SCNC: 139 MMOL/L (ref 136–145)
WBC NRBC COR # BLD AUTO: 5.5 10*3/MM3 (ref 3.4–10.8)

## 2024-07-05 PROCEDURE — 71250 CT THORAX DX C-: CPT

## 2024-07-05 PROCEDURE — 85025 COMPLETE CBC W/AUTO DIFF WBC: CPT

## 2024-07-05 PROCEDURE — 36415 COLL VENOUS BLD VENIPUNCTURE: CPT

## 2024-07-05 PROCEDURE — 83735 ASSAY OF MAGNESIUM: CPT

## 2024-07-05 PROCEDURE — 74176 CT ABD & PELVIS W/O CONTRAST: CPT

## 2024-07-05 PROCEDURE — 80053 COMPREHEN METABOLIC PANEL: CPT

## 2024-07-05 PROCEDURE — 84100 ASSAY OF PHOSPHORUS: CPT

## 2024-07-09 ENCOUNTER — OFFICE VISIT (OUTPATIENT)
Dept: ONCOLOGY | Facility: CLINIC | Age: 69
End: 2024-07-09
Payer: MEDICARE

## 2024-07-09 VITALS
RESPIRATION RATE: 18 BRPM | DIASTOLIC BLOOD PRESSURE: 86 MMHG | TEMPERATURE: 97.4 F | HEIGHT: 70 IN | WEIGHT: 192.7 LBS | OXYGEN SATURATION: 98 % | HEART RATE: 54 BPM | SYSTOLIC BLOOD PRESSURE: 130 MMHG | BODY MASS INDEX: 27.59 KG/M2

## 2024-07-09 DIAGNOSIS — C64.1 RENAL CELL CARCINOMA OF RIGHT KIDNEY: Primary | ICD-10-CM

## 2024-07-09 PROCEDURE — 3079F DIAST BP 80-89 MM HG: CPT | Performed by: INTERNAL MEDICINE

## 2024-07-09 PROCEDURE — 1126F AMNT PAIN NOTED NONE PRSNT: CPT | Performed by: INTERNAL MEDICINE

## 2024-07-09 PROCEDURE — 99215 OFFICE O/P EST HI 40 MIN: CPT | Performed by: INTERNAL MEDICINE

## 2024-07-09 PROCEDURE — 3075F SYST BP GE 130 - 139MM HG: CPT | Performed by: INTERNAL MEDICINE

## 2024-07-09 PROCEDURE — 1159F MED LIST DOCD IN RCRD: CPT | Performed by: INTERNAL MEDICINE

## 2024-08-06 NOTE — DISCHARGE INSTRUCTIONS

## 2024-12-04 LAB
ALBUMIN SERPL-MCNC: 4.5 G/DL (ref 3.5–5.2)
ALP SERPL-CCNC: 83 U/L (ref 40–129)
ALT SERPL-CCNC: 20 U/L (ref 5–41)
ANION GAP SERPL CALCULATED.3IONS-SCNC: 10 MMOL/L (ref 7–19)
AST SERPL-CCNC: 18 U/L (ref 5–40)
BASOPHILS # BLD: 0 K/UL (ref 0–0.2)
BASOPHILS NFR BLD: 0.4 % (ref 0–1)
BILIRUB SERPL-MCNC: 0.6 MG/DL (ref 0.2–1.2)
BUN SERPL-MCNC: 33 MG/DL (ref 8–23)
CALCIUM SERPL-MCNC: 9.7 MG/DL (ref 8.8–10.2)
CHLORIDE SERPL-SCNC: 105 MMOL/L (ref 98–111)
CO2 SERPL-SCNC: 25 MMOL/L (ref 22–29)
CREAT SERPL-MCNC: 2.1 MG/DL (ref 0.7–1.2)
EOSINOPHIL # BLD: 0.5 K/UL (ref 0–0.6)
EOSINOPHIL NFR BLD: 6.9 % (ref 0–5)
ERYTHROCYTE [DISTWIDTH] IN BLOOD BY AUTOMATED COUNT: 13.2 % (ref 11.5–14.5)
GLUCOSE SERPL-MCNC: 96 MG/DL (ref 70–99)
HCT VFR BLD AUTO: 49.3 % (ref 42–52)
HGB BLD-MCNC: 16 G/DL (ref 14–18)
IMM GRANULOCYTES # BLD: 0 K/UL
LYMPHOCYTES # BLD: 1.6 K/UL (ref 1.1–4.5)
LYMPHOCYTES NFR BLD: 23.7 % (ref 20–40)
MCH RBC QN AUTO: 30.5 PG (ref 27–31)
MCHC RBC AUTO-ENTMCNC: 32.5 G/DL (ref 33–37)
MCV RBC AUTO: 94.1 FL (ref 80–94)
MONOCYTES # BLD: 1 K/UL (ref 0–0.9)
MONOCYTES NFR BLD: 15 % (ref 0–10)
NEUTROPHILS # BLD: 3.6 K/UL (ref 1.5–7.5)
NEUTS SEG NFR BLD: 53.7 % (ref 50–65)
PLATELET # BLD AUTO: 155 K/UL (ref 130–400)
PMV BLD AUTO: 9.4 FL (ref 9.4–12.4)
POTASSIUM SERPL-SCNC: 4.8 MMOL/L (ref 3.5–5)
PROT SERPL-MCNC: 6.7 G/DL (ref 6.4–8.3)
RBC # BLD AUTO: 5.24 M/UL (ref 4.7–6.1)
SODIUM SERPL-SCNC: 140 MMOL/L (ref 136–145)
WBC # BLD AUTO: 6.7 K/UL (ref 4.8–10.8)

## 2025-01-02 ENCOUNTER — LAB (OUTPATIENT)
Dept: LAB | Facility: HOSPITAL | Age: 70
End: 2025-01-02
Payer: MEDICARE

## 2025-01-02 ENCOUNTER — HOSPITAL ENCOUNTER (OUTPATIENT)
Dept: CT IMAGING | Facility: HOSPITAL | Age: 70
Discharge: HOME | End: 2025-01-02
Payer: MEDICARE

## 2025-01-02 DIAGNOSIS — C64.1 RENAL CELL CARCINOMA OF RIGHT KIDNEY: ICD-10-CM

## 2025-01-02 DIAGNOSIS — R94.6 ABNORMAL RESULTS OF THYROID FUNCTION STUDIES: ICD-10-CM

## 2025-01-02 LAB
ALBUMIN SERPL-MCNC: 4.2 G/DL (ref 3.5–5.2)
ALBUMIN/GLOB SERPL: 1.7 G/DL
ALP SERPL-CCNC: 69 U/L (ref 39–117)
ALT SERPL W P-5'-P-CCNC: 19 U/L (ref 1–41)
ANION GAP SERPL CALCULATED.3IONS-SCNC: 10 MMOL/L (ref 5–15)
AST SERPL-CCNC: 18 U/L (ref 1–40)
BASOPHILS # BLD AUTO: 0.04 10*3/MM3 (ref 0–0.2)
BASOPHILS NFR BLD AUTO: 0.6 % (ref 0–1.5)
BILIRUB SERPL-MCNC: 0.5 MG/DL (ref 0–1.2)
BUN SERPL-MCNC: 33 MG/DL (ref 8–23)
BUN/CREAT SERPL: 15.7 (ref 7–25)
CALCIUM SPEC-SCNC: 9.6 MG/DL (ref 8.6–10.5)
CHLORIDE SERPL-SCNC: 107 MMOL/L (ref 98–107)
CO2 SERPL-SCNC: 25 MMOL/L (ref 22–29)
CREAT SERPL-MCNC: 2.1 MG/DL (ref 0.76–1.27)
DEPRECATED RDW RBC AUTO: 45.7 FL (ref 37–54)
EGFRCR SERPLBLD CKD-EPI 2021: 33.2 ML/MIN/1.73
EOSINOPHIL # BLD AUTO: 0.29 10*3/MM3 (ref 0–0.4)
EOSINOPHIL NFR BLD AUTO: 4.4 % (ref 0.3–6.2)
ERYTHROCYTE [DISTWIDTH] IN BLOOD BY AUTOMATED COUNT: 13.5 % (ref 12.3–15.4)
GLOBULIN UR ELPH-MCNC: 2.5 GM/DL
GLUCOSE SERPL-MCNC: 106 MG/DL (ref 65–99)
HCT VFR BLD AUTO: 49.5 % (ref 37.5–51)
HGB BLD-MCNC: 16 G/DL (ref 13–17.7)
IMM GRANULOCYTES # BLD AUTO: 0.01 10*3/MM3 (ref 0–0.05)
IMM GRANULOCYTES NFR BLD AUTO: 0.2 % (ref 0–0.5)
LYMPHOCYTES # BLD AUTO: 1.61 10*3/MM3 (ref 0.7–3.1)
LYMPHOCYTES NFR BLD AUTO: 24.2 % (ref 19.6–45.3)
MAGNESIUM SERPL-MCNC: 1.7 MG/DL (ref 1.6–2.4)
MCH RBC QN AUTO: 29.9 PG (ref 26.6–33)
MCHC RBC AUTO-ENTMCNC: 32.3 G/DL (ref 31.5–35.7)
MCV RBC AUTO: 92.5 FL (ref 79–97)
MONOCYTES # BLD AUTO: 0.79 10*3/MM3 (ref 0.1–0.9)
MONOCYTES NFR BLD AUTO: 11.9 % (ref 5–12)
NEUTROPHILS NFR BLD AUTO: 3.92 10*3/MM3 (ref 1.7–7)
NEUTROPHILS NFR BLD AUTO: 58.7 % (ref 42.7–76)
NRBC BLD AUTO-RTO: 0 /100 WBC (ref 0–0.2)
PHOSPHATE SERPL-MCNC: 3.3 MG/DL (ref 2.5–4.5)
PLATELET # BLD AUTO: 164 10*3/MM3 (ref 140–450)
PMV BLD AUTO: 9.5 FL (ref 6–12)
POTASSIUM SERPL-SCNC: 5.1 MMOL/L (ref 3.5–5.2)
PROT SERPL-MCNC: 6.7 G/DL (ref 6–8.5)
RBC # BLD AUTO: 5.35 10*6/MM3 (ref 4.14–5.8)
SODIUM SERPL-SCNC: 142 MMOL/L (ref 136–145)
T4 FREE SERPL-MCNC: 0.96 NG/DL (ref 0.93–1.7)
TSH SERPL DL<=0.05 MIU/L-ACNC: 1.16 UIU/ML (ref 0.27–4.2)
WBC NRBC COR # BLD AUTO: 6.66 10*3/MM3 (ref 3.4–10.8)

## 2025-01-02 PROCEDURE — 84100 ASSAY OF PHOSPHORUS: CPT

## 2025-01-02 PROCEDURE — 84443 ASSAY THYROID STIM HORMONE: CPT

## 2025-01-02 PROCEDURE — 74176 CT ABD & PELVIS W/O CONTRAST: CPT

## 2025-01-02 PROCEDURE — 71250 CT THORAX DX C-: CPT

## 2025-01-02 PROCEDURE — 36415 COLL VENOUS BLD VENIPUNCTURE: CPT

## 2025-01-02 PROCEDURE — 83735 ASSAY OF MAGNESIUM: CPT

## 2025-01-02 PROCEDURE — 80053 COMPREHEN METABOLIC PANEL: CPT

## 2025-01-02 PROCEDURE — 84439 ASSAY OF FREE THYROXINE: CPT

## 2025-01-02 PROCEDURE — 85025 COMPLETE CBC W/AUTO DIFF WBC: CPT

## 2025-01-17 ENCOUNTER — OFFICE VISIT (OUTPATIENT)
Dept: ONCOLOGY | Facility: CLINIC | Age: 70
End: 2025-01-17
Payer: MEDICARE

## 2025-01-17 VITALS
OXYGEN SATURATION: 98 % | BODY MASS INDEX: 28.88 KG/M2 | HEIGHT: 70 IN | RESPIRATION RATE: 18 BRPM | WEIGHT: 201.7 LBS | DIASTOLIC BLOOD PRESSURE: 84 MMHG | SYSTOLIC BLOOD PRESSURE: 122 MMHG | HEART RATE: 68 BPM | TEMPERATURE: 97.2 F

## 2025-01-17 DIAGNOSIS — C64.1 RENAL CELL CARCINOMA OF RIGHT KIDNEY: Primary | ICD-10-CM

## 2025-03-31 ENCOUNTER — APPOINTMENT (OUTPATIENT)
Dept: CT IMAGING | Age: 70
End: 2025-03-31
Payer: MEDICARE

## 2025-03-31 ENCOUNTER — HOSPITAL ENCOUNTER (EMERGENCY)
Age: 70
Discharge: HOME | End: 2025-03-31
Attending: PEDIATRICS
Payer: MEDICARE

## 2025-03-31 VITALS
SYSTOLIC BLOOD PRESSURE: 170 MMHG | DIASTOLIC BLOOD PRESSURE: 99 MMHG | TEMPERATURE: 97.4 F | HEART RATE: 65 BPM | BODY MASS INDEX: 28.63 KG/M2 | WEIGHT: 200 LBS | RESPIRATION RATE: 18 BRPM | OXYGEN SATURATION: 98 % | HEIGHT: 70 IN

## 2025-03-31 DIAGNOSIS — M25.552 LEFT HIP PAIN: ICD-10-CM

## 2025-03-31 DIAGNOSIS — M51.361 DEGENERATION OF INTERVERTEBRAL DISC OF LUMBAR REGION WITH LOWER EXTREMITY PAIN: Primary | ICD-10-CM

## 2025-03-31 LAB
ALBUMIN SERPL-MCNC: 4.2 G/DL (ref 3.5–5.2)
ALP SERPL-CCNC: 79 U/L (ref 40–129)
ALT SERPL-CCNC: 33 U/L (ref 10–50)
ANION GAP SERPL CALCULATED.3IONS-SCNC: 9 MMOL/L (ref 8–16)
AST SERPL-CCNC: 24 U/L (ref 10–50)
BASOPHILS # BLD: 0 K/UL (ref 0–0.2)
BASOPHILS NFR BLD: 0.6 % (ref 0–1)
BILIRUB SERPL-MCNC: 0.4 MG/DL (ref 0.2–1.2)
BUN SERPL-MCNC: 33 MG/DL (ref 8–23)
CALCIUM SERPL-MCNC: 10 MG/DL (ref 8.8–10.2)
CHLORIDE SERPL-SCNC: 106 MMOL/L (ref 98–107)
CO2 SERPL-SCNC: 25 MMOL/L (ref 22–29)
CREAT SERPL-MCNC: 2.1 MG/DL (ref 0.7–1.2)
EOSINOPHIL # BLD: 0.3 K/UL (ref 0–0.6)
EOSINOPHIL NFR BLD: 3.6 % (ref 0–5)
ERYTHROCYTE [DISTWIDTH] IN BLOOD BY AUTOMATED COUNT: 13 % (ref 11.5–14.5)
GLUCOSE SERPL-MCNC: 95 MG/DL (ref 70–99)
HCT VFR BLD AUTO: 49.1 % (ref 42–52)
HGB BLD-MCNC: 16 G/DL (ref 14–18)
IMM GRANULOCYTES # BLD: 0 K/UL
LYMPHOCYTES # BLD: 1.5 K/UL (ref 1.1–4.5)
LYMPHOCYTES NFR BLD: 20.6 % (ref 20–40)
MCH RBC QN AUTO: 30.4 PG (ref 27–31)
MCHC RBC AUTO-ENTMCNC: 32.6 G/DL (ref 33–37)
MCV RBC AUTO: 93.3 FL (ref 80–94)
MONOCYTES # BLD: 0.7 K/UL (ref 0–0.9)
MONOCYTES NFR BLD: 10.1 % (ref 0–10)
NEUTROPHILS # BLD: 4.7 K/UL (ref 1.5–7.5)
NEUTS SEG NFR BLD: 64.8 % (ref 50–65)
PLATELET # BLD AUTO: 190 K/UL (ref 130–400)
PMV BLD AUTO: 9.2 FL (ref 9.4–12.4)
POTASSIUM SERPL-SCNC: 5.2 MMOL/L (ref 3.5–5.1)
PROT SERPL-MCNC: 6.6 G/DL (ref 6.4–8.3)
RBC # BLD AUTO: 5.26 M/UL (ref 4.7–6.1)
SODIUM SERPL-SCNC: 140 MMOL/L (ref 136–145)
WBC # BLD AUTO: 7.2 K/UL (ref 4.8–10.8)

## 2025-03-31 PROCEDURE — 99284 EMERGENCY DEPT VISIT MOD MDM: CPT

## 2025-03-31 PROCEDURE — 96375 TX/PRO/DX INJ NEW DRUG ADDON: CPT

## 2025-03-31 PROCEDURE — 80053 COMPREHEN METABOLIC PANEL: CPT

## 2025-03-31 PROCEDURE — 36415 COLL VENOUS BLD VENIPUNCTURE: CPT

## 2025-03-31 PROCEDURE — 73700 CT LOWER EXTREMITY W/O DYE: CPT

## 2025-03-31 PROCEDURE — 96374 THER/PROPH/DIAG INJ IV PUSH: CPT

## 2025-03-31 PROCEDURE — 6360000002 HC RX W HCPCS: Performed by: PEDIATRICS

## 2025-03-31 PROCEDURE — 2500000003 HC RX 250 WO HCPCS: Performed by: PEDIATRICS

## 2025-03-31 PROCEDURE — 85025 COMPLETE CBC W/AUTO DIFF WBC: CPT

## 2025-03-31 PROCEDURE — 72131 CT LUMBAR SPINE W/O DYE: CPT

## 2025-03-31 RX ORDER — OXYCODONE AND ACETAMINOPHEN 7.5; 325 MG/1; MG/1
1 TABLET ORAL EVERY 6 HOURS PRN
Qty: 12 TABLET | Refills: 0 | Status: SHIPPED | OUTPATIENT
Start: 2025-03-31 | End: 2025-04-03

## 2025-03-31 RX ORDER — HYDROMORPHONE HYDROCHLORIDE 1 MG/ML
0.5 INJECTION, SOLUTION INTRAMUSCULAR; INTRAVENOUS; SUBCUTANEOUS EVERY 30 MIN PRN
Status: DISCONTINUED | OUTPATIENT
Start: 2025-03-31 | End: 2025-03-31 | Stop reason: HOSPADM

## 2025-03-31 RX ORDER — ORPHENADRINE CITRATE 30 MG/ML
60 INJECTION INTRAMUSCULAR; INTRAVENOUS ONCE
Status: COMPLETED | OUTPATIENT
Start: 2025-03-31 | End: 2025-03-31

## 2025-03-31 RX ORDER — METHYLPREDNISOLONE 4 MG/1
TABLET ORAL
Qty: 1 KIT | Refills: 0 | Status: SHIPPED | OUTPATIENT
Start: 2025-03-31

## 2025-03-31 RX ADMIN — WATER 125 MG: 1 INJECTION INTRAMUSCULAR; INTRAVENOUS; SUBCUTANEOUS at 12:19

## 2025-03-31 RX ADMIN — HYDROMORPHONE HYDROCHLORIDE 0.5 MG: 1 INJECTION, SOLUTION INTRAMUSCULAR; INTRAVENOUS; SUBCUTANEOUS at 10:21

## 2025-03-31 RX ADMIN — ORPHENADRINE CITRATE 60 MG: 60 INJECTION INTRAMUSCULAR; INTRAVENOUS at 12:19

## 2025-03-31 ASSESSMENT — PAIN SCALES - GENERAL: PAINLEVEL_OUTOF10: 5

## 2025-04-02 ASSESSMENT — ENCOUNTER SYMPTOMS: BACK PAIN: 0

## 2025-04-03 NOTE — ED PROVIDER NOTES
[facility] EMERGENCY DEPARTMENT  eMERGENCY dEPARTMENT eNCOUnter      Pt Name: Alec Rubi  MRN: [id number]  Birthdate 1955  Date of evaluation: 3/31/2025  Provider: Jackelin Ocampo MD    CHIEF COMPLAINT       Chief Complaint   Patient presents with    Hip Pain     Left; ladder tipped about 10 days ago and stepped down harder than normal to catch self         HISTORY OF PRESENT ILLNESS   (Location/Symptom, Timing/Onset,Context/Setting, Quality, Duration, Modifying Factors, Severity)  Note limiting factors.   Alec Rubi is a 70 y.o. male who presents to the emergency department with hip pain.  Patient states that he has been experiencing left hip pain since last week.  Patient states that he stepped off the second step of a ladder onto a branch on the ground approximately 12 days ago.  Patient states that he may have \"twisted\" and injured his hip.  Patient states that pain is worse \"when I am trying to  my leg.\"  Patient denies falling or other trauma or injury that is known.  Patient denies prior diagnosis of degenerative disc disease or chronic back pain.  Patient denies weakness, numbness, or loss of control of bowel bladder.  Patient denies other injury at this time.  Patient denies burning with urination, difficulty urinating, or hematuria.    HPI    NursingNotes were reviewed.    REVIEW OF SYSTEMS    (2-9 systems for level 4, 10 or more for level 5)     Review of Systems   Musculoskeletal:  Negative for back pain.        Left hip pain   Neurological:  Negative for weakness and numbness.   All other systems reviewed and are negative.           PAST MEDICALHISTORY     Past Medical History:   Diagnosis Date    Cancer (HCC)     Chronic kidney disease     Renal carcinoma (HCC)     TIA (transient ischemic attack)          SURGICAL HISTORY       Past Surgical History:   Procedure Laterality Date    CHOLECYSTECTOMY      HERNIA REPAIR  2012    KIDNEY REMOVAL Right 2014    LUNG REMOVAL, PARTIAL

## 2025-07-11 ENCOUNTER — HOSPITAL ENCOUNTER (OUTPATIENT)
Dept: CT IMAGING | Facility: HOSPITAL | Age: 70
Discharge: HOME | End: 2025-07-11
Payer: MEDICARE

## 2025-07-11 ENCOUNTER — LAB (OUTPATIENT)
Dept: LAB | Facility: HOSPITAL | Age: 70
End: 2025-07-11
Payer: MEDICARE

## 2025-07-11 DIAGNOSIS — C64.1 RENAL CELL CARCINOMA OF RIGHT KIDNEY: ICD-10-CM

## 2025-07-11 LAB
ALBUMIN SERPL-MCNC: 4.2 G/DL (ref 3.5–5.2)
ALBUMIN/GLOB SERPL: 1.8 G/DL
ALP SERPL-CCNC: 74 U/L (ref 39–117)
ALT SERPL W P-5'-P-CCNC: 15 U/L (ref 1–41)
ANION GAP SERPL CALCULATED.3IONS-SCNC: 11 MMOL/L (ref 5–15)
AST SERPL-CCNC: 16 U/L (ref 1–40)
BASOPHILS # BLD AUTO: 0.05 10*3/MM3 (ref 0–0.2)
BASOPHILS NFR BLD AUTO: 0.7 % (ref 0–1.5)
BILIRUB SERPL-MCNC: 0.5 MG/DL (ref 0–1.2)
BUN SERPL-MCNC: 33.9 MG/DL (ref 8–23)
BUN/CREAT SERPL: 16.2 (ref 7–25)
CALCIUM SPEC-SCNC: 9.4 MG/DL (ref 8.6–10.5)
CHLORIDE SERPL-SCNC: 107 MMOL/L (ref 98–107)
CO2 SERPL-SCNC: 24 MMOL/L (ref 22–29)
CREAT SERPL-MCNC: 2.09 MG/DL (ref 0.76–1.27)
DEPRECATED RDW RBC AUTO: 44.2 FL (ref 37–54)
EGFRCR SERPLBLD CKD-EPI 2021: 33.2 ML/MIN/1.73
EOSINOPHIL # BLD AUTO: 0.41 10*3/MM3 (ref 0–0.4)
EOSINOPHIL NFR BLD AUTO: 6.1 % (ref 0.3–6.2)
ERYTHROCYTE [DISTWIDTH] IN BLOOD BY AUTOMATED COUNT: 13.1 % (ref 12.3–15.4)
GLOBULIN UR ELPH-MCNC: 2.3 GM/DL
GLUCOSE SERPL-MCNC: 99 MG/DL (ref 65–99)
HCT VFR BLD AUTO: 47.4 % (ref 37.5–51)
HGB BLD-MCNC: 15.3 G/DL (ref 13–17.7)
IMM GRANULOCYTES # BLD AUTO: 0.02 10*3/MM3 (ref 0–0.05)
IMM GRANULOCYTES NFR BLD AUTO: 0.3 % (ref 0–0.5)
LYMPHOCYTES # BLD AUTO: 1.57 10*3/MM3 (ref 0.7–3.1)
LYMPHOCYTES NFR BLD AUTO: 23.2 % (ref 19.6–45.3)
MAGNESIUM SERPL-MCNC: 1.9 MG/DL (ref 1.6–2.4)
MCH RBC QN AUTO: 29.8 PG (ref 26.6–33)
MCHC RBC AUTO-ENTMCNC: 32.3 G/DL (ref 31.5–35.7)
MCV RBC AUTO: 92.4 FL (ref 79–97)
MONOCYTES # BLD AUTO: 0.81 10*3/MM3 (ref 0.1–0.9)
MONOCYTES NFR BLD AUTO: 12 % (ref 5–12)
NEUTROPHILS NFR BLD AUTO: 3.91 10*3/MM3 (ref 1.7–7)
NEUTROPHILS NFR BLD AUTO: 57.7 % (ref 42.7–76)
NRBC BLD AUTO-RTO: 0 /100 WBC (ref 0–0.2)
PHOSPHATE SERPL-MCNC: 3.1 MG/DL (ref 2.5–4.5)
PLATELET # BLD AUTO: 167 10*3/MM3 (ref 140–450)
PMV BLD AUTO: 9.9 FL (ref 6–12)
POTASSIUM SERPL-SCNC: 5 MMOL/L (ref 3.5–5.2)
PROT SERPL-MCNC: 6.5 G/DL (ref 6–8.5)
RBC # BLD AUTO: 5.13 10*6/MM3 (ref 4.14–5.8)
SODIUM SERPL-SCNC: 142 MMOL/L (ref 136–145)
WBC NRBC COR # BLD AUTO: 6.77 10*3/MM3 (ref 3.4–10.8)

## 2025-07-11 PROCEDURE — 71250 CT THORAX DX C-: CPT

## 2025-07-11 PROCEDURE — 74176 CT ABD & PELVIS W/O CONTRAST: CPT

## 2025-07-11 PROCEDURE — 83735 ASSAY OF MAGNESIUM: CPT

## 2025-07-11 PROCEDURE — 80053 COMPREHEN METABOLIC PANEL: CPT

## 2025-07-11 PROCEDURE — 36415 COLL VENOUS BLD VENIPUNCTURE: CPT

## 2025-07-11 PROCEDURE — 85025 COMPLETE CBC W/AUTO DIFF WBC: CPT

## 2025-07-11 PROCEDURE — 84100 ASSAY OF PHOSPHORUS: CPT

## 2025-07-13 NOTE — PROGRESS NOTES
"[facility] GROUP HEMATOLOGY AND ONCOLOGY  [address]  [place] [address] [phone]    Patient Name: Parker Arnold  Encounter Date: 07/18/2025  YOB: 1955  Patient Number: [id number]     REASON FOR VISIT:  Parker \"Magdiel\" Jaxson Arnold is a 70 y.o. 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  Underwent right nephrectomy. Was under surveillance until 11/26/2018  When he underwent  wedge resection of a right pulm nodule. Pathology:  metastatic renal cell carcinoma. He was not able to tolerate Sutent due to kidney failure, thus received ipilumomab/nivolumab, 05/13/2019 - 08/07/2019, then single agent nivolumab, 08/28/2019 - 09/09/2019 (~64 months since cessation of therapy). Therapy stopped due to renal failure (nivolumab?). He has been on and off prednisone since.  Last 11/29/2021 (43.5 months) underwent right thoracoscopic RLL and RML wedge resection with final path consistent with metastatic renal cell carcinoma.  On 12/7/23 he was seen in follow-up by Dr. Cannon ( oncology, [facility]) who recommended a trial of cabozantinib.  Thus, at his visit on 1/23/24 palliative cabozantinib was sent in but due to insurance issues did not receive the drug until 2/21/24.  However, on 4/2/24 the patient decided to stop the drug due to intolerable AEs (Hypertension and feeling \"clumsy, possibly dizzy/lightheaded, fatigue, constipation, diarrhea\").  He is here alone (previously with his spouse, Nelda).    I have reviewed the HPI and verified with the patient the accuracy of it. No changes to interval history since the information was documented. Leonardo Smith MD 07/18/25      Problem List Items Addressed This Visit          Other    Renal cell carcinoma of right kidney - Primary    Overview   Overview:   Diagnoses 11/11/14. Path report in C[facility]E at [facility]. Tissue has " been requested.                       Oncology/Hematology History   Renal cell carcinoma of right kidney    Initial Diagnosis    Renal cell carcinoma (CMS/HCC)     11/11/2014 Surgery    Final Diagnosis   Right kidney:        A.     Renal cell carcinoma.         B.     Clear cell type.        C.     Lucía grade 2 nuclei.         D.     Margins of excision free of tumor.         E.     Extensive hemorrhage seen.         F.     Moderate atherosclerotic change.         G.     AJCC [id number].      4/16/2015 Imaging    CT Abd/Pelvis:  IMPRESSION-   1. Prior right nephrectomy. 2 cm exophytic mass arising from the upper  pole of the left kidney, likely a cyst. Recommend ultrasound or  contrast-enhanced CT.   2. 15 x 52 mm lobular mass involving the right pericardial fat, possibly  a pericardial cyst. Recommend contrast-enhanced CT.  3. Colonic diverticulosis.     7/30/2018 Procedure    [place] Review of Path:  KIDNEY, RIGHT, RADICAL NEPHRECTOMY: RENAL CELL CARCINOMA, CLEAR CELL TYPE, SEE COMMENT.     Comments   The vast majority of the tumor demonstrate grade 3 nuclei; however, a single, small focus demonstrates grade 4. Dr. Manda Shah and Dr. Ghulam Boyd have reviewed select slides and concur with the presence of lymphovascular invasion into a major vein. Dr. Shah has also reviewed the case for the histologic grade and subtype and concurs.        10/2/2018 -  Chemotherapy    Sutent  Discontinued due to intolerance     10/30/2018 Imaging    CT Chest:  1. Bilateral pulmonary nodules. On the right nodules are slightly   larger than on the prior CT from April. The possibility of metastatic   disease should be considered. The sub-4 mm nodules on the left are   stable.   2. No mediastinal or hilar adenopathy.   3. Pericardial cyst.      11/26/2018 Surgery    Diagnosis     LUNG, RIGHT LOWER LOBE, WEDGE RESECTION:   -- METASTATIC RENAL CELL CARCINOMA, 0.9 CM; MARGIN IS FREE OF MALIGNANCY   -- MULTIPLE SMALL  PULMONARY ARTERIES WITH MARKED INTIMAL FIBROSIS. SEE COMMENT.     Comments   The possibility of pulmonary hypertension should be considered. Intimal fibrosis is irregular in some areas, suggesting recanalized thrombi.      3/14/2019 Imaging    CT Chest:  Findings of a positive treatment response with interval decrease in size of 2 right pulmonary nodules and decrease in size of the right epicardial soft tissue nodules.  Findings of a prior granulomatous infection  Minimal coronary artery calcifications  Otherwise unremarkable examination with no evidence of disease progression within the chest.    CT Abd/Pelvis:  Postoperative changes from right nephrectomy.  Stable left renal cyst  Mild prostatic enlargement  Colonic diverticulosis  Otherwise unremarkable examination with no evidence of metastatic disease.     5/13/2019 - 8/7/2019 Chemotherapy    Opdivo Yervoy x 4 cycles     6/27/2019 Imaging    CT Abd/Pelvis:  Findings of bilateral lower lobe pneumonia with a tiny right pleural effusion.  2.8 cm partially exophytic left renal cyst. No urinary tract stones are noted.  Postoperative changes from right nephrectomy.  Mild gallbladder wall thickening but no definite stones seen.  Mild prostatic enlargement  Mild urinary wall thickening which may be secondary to underdistention or bladder outlet obstruction.  Colonic diverticulosis     7/3/2019 Imaging    Bone Scan:  New, moderately intense focus of tracer uptake in the right hindfoot which may be within the calcaneus and is nonspecific in appearance although a stress fracture cannot be excluded. Recommend correlation with radiographs or cross-sectional imaging of the foot and ankle for further evaluation if there is pain in this location.  Otherwise unremarkable and stable bone scan with no evidence of skeletal metastatic disease.    CT Chest:  Findings of bilateral lower lobe pneumonia.  Minimal, scattered right upper lobe ground glass opacities with may reflect areas  of minimal pneumonitis.  Scattered atheromatous disease in the thoracic aorta and coronary arteries.  Stable 7 mm nodules in the superior segment of the right lower lobe and medial right lower lobe. No new pulmonary nodules are identified.     8/28/2019 - 9/9/2019 Chemotherapy    Single agent Opdivo    Opdivo held 9/9/2019 due to Renal Failure / NephritisToxicity     12/30/2019 Imaging    CT Head w/o:  Impression:    1. No acute intracranial process.     1/16/2020 Imaging    PET/CT:  Summary:  1. No neoplastic FDG uptake is seen within the chest, abdomen, or pelvis.     7/6/2020 Imaging    CT Abd/Pelvis:  The diverticulosis of the colon. Evidence of acute   diverticulitis of the sigmoid colon and the adjacent distal descending   colon.   No free air or fluid in the area.   A 1 cm noncalcified subpleural nodule in the left lower lobe. This   needs to be further evaluated. A follow-up examination in 6 months.      8/4/2020 Imaging    CT Chest:  IMPRESSION:  1. Scattered subcentimeter bilateral pulmonary nodules. These are  similar to 1/16/2020 with only a mild increase in the 5 mm left upper  lobe nodule as described above. Continued follow-up recommended. No  pathologic lymphadenopathy.  2. Mild vascular calcification.  3. Right nephrectomy. Left renal cyst.     1/14/2021 Imaging    CT Chest:  IMPRESSION:  Increased size of 1.2 cm RIGHT lower lobe superior segment pulmonary  nodule (previously 0.9 cm). Multiple other bilateral pulmonary nodules  have not significantly changed in size. Findings are in keeping with  disease progression.    CT Abd/Pelvis:  IMPRESSION:  No evidence of recurrent or metastatic disease in the abdomen or pelvis.     1/26/2021 Imaging    PET/CT:  IMPRESSION:  1. There is minimal metabolic activity associated with the enlarging  pulmonary nodule within the superior segment of the right lower lobe  with a maximum SUV of 1.35. The additional nodules noted within both  lungs also demonstrate no  abnormal metabolic activity. This would favor  benignity but continued radiographic follow-up will be suggested. There  is no scintigraphic evidence of metastatic disease on the current exam.  2. Increased uptake overlying the right groin and more distal external  iliac vessels is felt to be related to previous hernia repair..     3/15/2021 Imaging    CT Chest:  IMPRESSION:  1. Continued increased size of right lower lobe superior segment now 1.4  cm pulmonary nodule. Other pulmonary nodules are stable.  2. New small area of groundglass opacity at the superior segment left  lower lobe, may be infectious/inflammatory.  3. Coronary artery calcifications. Aortic calcifications.  4. Small hiatal hernia.    CT Abd/Pelvis:  IMPRESSION:  1. Previous right nephrectomy is identified, with no evidence of  intra-abdominal metastatic disease.  2. Benign-appearing exophytic cyst at the posterior aspect of the left  kidney, upper pole, measuring 2.47 m, stable.  3. Small nodules are noted in the lung bases and are unchanged compared  with previous chest CT.  4. Evidence previous right inguinal hernia repair. There is fatty  infiltration of the inguinal canals.  5. Scattered colonic diverticula are identified without evidence of  acute diverticulitis.  6. A small sliding-type hiatal hernia is identified.     6/7/2021 Imaging    CT Abd/Pelvis:  The diverticulosis of the distal colon. Evidence of   moderate acute diverticulitis. No free air or abscess.   Asymmetrical thickening of the wall of the rectum may partly be due to   incomplete distention. Possibility of a neoplastic process is not   excluded. This may be clinically correlated.   Left renal cyst.   The appendix are normal.   Moderately enlarged prostate.   A moderate diffuse thickening of the wall of the urinary bladder which   may partly be due to incomplete distention. The possibility of chronic   cystitis or partial chronic opioid obstruction is not excluded.   A mild  increase in size of the right lower lobar noncalcified nodule.   This needs further evaluation.      6/11/2021 Imaging    CT Chest:  IMPRESSION:  1. Stable pulmonary nodules. No evidence of disease progression.  2. Interval resolution superior segment left lower lobe groundglass  opacity.  3. Scattered coronary artery calcifications.    CT Abd/Pelvis:  IMPRESSION:  1. History of right renal cell carcinoma nephrectomy.  2. No CT evidence of tumor recurrence or metastatic disease in the  abdomen or pelvis..     9/7/2021 Imaging    CT Chest:  IMPRESSION:  1. Pulmonary nodules are noted as described above. These are slightly  increased in size compared to June 11, 2021. However no new nodules are  identified.    CT Abd/Pelvis:  IMPRESSION:  1. No evidence of intra-abdominal or pelvic metastasis.  2. Prior right nephrectomy. Stable appearing left renal cyst.  3. Colonic diverticulosis. Questionable wall thickening of the sigmoid  colon may be artifactual. Correlation to any recent colonoscopy  recommended.  4. 1.2 cm posterior medial right lower lobe nodule. Please refer to CT  chest report.     9/28/2021 Imaging    PET/CT:  Summary:  1. There has been increased size of a right lower lobe lung nodule (21 x  15 mm compared with 14 x 14 mm 3 months ago) though this finding shows  no significant FDG uptake.  2. Otherwise stable PET CT exam     11/29/2021 Surgery    Final Diagnosis   1.  Lung, right lower lobe, wedge resection:  Metastatic renal cell carcinoma.  Parenchymal margin free of tumor.    2.  Lung, right middle lobe, wedge resection:  Metastatic renal cell carcinoma.  Parenchymal margin free of tumor.    3.  Lung, right lower lobe #2, wedge resection:  Metastatic renal cell carcinoma.  Parenchymal margin free of tumor.          12/14/2021 Imaging    CT Chest:  IMPRESSION:  1. Postoperative changes from wedge resections of prior right middle and  lower lobe pulmonary nodules. Stable left-sided pulmonary  nodules,  largest in the left upper lobe/apex measuring 8 mm. Continued follow-up  strongly recommended. No pathologic lymphadenopathy.    CT Abd/Pelvis:  IMPRESSION:  1. No convincing evidence of intra-abdominal or pelvic metastasis.  2. New diffuse abnormal gallbladder wall thickening. Findings may be  seen with cholecystitis. Gallbladder ultrasound might be considered.  3. Left colonic diverticulosis.     1/9/2022 Imaging    CT Head w/o d/t headache:  1. No acute intracranial process.     CT Abd/Pelvis d/t abdominal pain:  1. Diverticulosis. There is no evidence of diverticulitis.   2. Status post right nephrectomy.   3. 2.6 cm left renal cyst.      3/18/2022 Imaging    CT Chest:  Summary:  1. Patchy infiltrate compatible with right middle lobe pneumonia.  2. 2 tiny left lung nodules are stable.    CT Abd/Pelvis:  IMPRESSION:  1.  No evidence of metastatic disease to the abdomen or pelvis.  2.  Prior right nephrectomy.  3.  Colonic diverticulitis, without evidence diverticulitis.  4.  Mild splenomegaly, unchanged.     6/24/2022 Imaging    CT Chest:  IMPRESSION:  1. The resolution of the previously seen patchy infiltrate in the right  middle lobe.  2. Postsurgical changes and scarring in the right upper and lower lobe  as detailed above.  3. Stable small nodules in both lungs the largest one in the left upper  lobe. No change.    CT Abd/Pelvis:  IMPRESSION:  1. A stable CT scan of the abdomen and pelvis. No evidence of a  neoplastic process or metastatic disease.  2. Evidence of right nephrectomy. No focal complication.  3. A stable left renal cyst.  4. Diverticulosis of the colon. No evidence for diverticulitis.  5. Stable mild splenomegaly.  6. Enlarged prostate.        9/19/2022 Imaging    CT Chest:  IMPRESSION:  Stable exam. No change in multiple bilateral pulmonary nodules measuring  up to 8 mm.    CT Abd/Pelvis:  IMPRESSION:  No evidence of recurrent or metastatic disease in the abdomen or pelvis  status  post RIGHT nephrectomy.        1/23/2023 Imaging    CT Chest:  IMPRESSION:  1. The patient has undergone wedge resection within the right middle and  right lower lobe. No evidence of localized recurrence at these sites.  2. Numerous other pulmonary nodules are present. The majority of these  nodules are stable in size and appearance from the previous exam. There  is one nodule which is subpleural in location within the lateral left  upper lobe on image 52 which has an increased soft tissue component from  the previous exam. This would warrant close follow-up on subsequent  imaging.  3. Coronary calcifications are demonstrated. The ascending thoracic  aorta is borderline enlarged measuring up to 3.8 cm in transverse  dimension..  4. Bibasilar atelectasis. No evidence of acute consolidative pneumonia.  5. Small hiatal hernia is present. The patient is status post right  nephrectomy. There is a cortical cyst of the left kidney..    CT Abd/Pelvis:  IMPRESSION:  1. A limited study in the absence of intravenous and oral contrast  enhancement. No acute abnormality of the abdomen or pelvis.  2. Evidence of prior right nephrectomy. No focal complication. A left  renal cyst.  3. Diverticulosis of the colon. No evidence for diverticulitis.        6/26/2023 Imaging    CT Head w/o:  Impression: No intracranial hemorrhage or evidence of large vessel infarct.   Chronic small vessel ischemic changes.   Diffuse cerebral atrophy.      7/20/2023 Imaging    CT Abd/Pelvis:  IMPRESSION:  1. History of renal cell carcinoma status post right nephrectomy. No  recurrence identified at the nephrectomy bed. No suspicious adenopathy  or evidence of distant metastatic disease in the abdomen or pelvis.  2. Colonic diverticulosis.    CT Chest:  IMPRESSION:  No change in multiple bilateral pulmonary nodules measuring up to 10 mm,  compared to 01/23/2023. However, there has been gradual increase in  pulmonary nodule size compared to more remote  studies dating back to  03/18/2022. For reference, the dominant 10 mm LEFT upper lobe pulmonary  nodule measured 7 mm on 03/18/2022. Recommend continued clinical and  imaging follow-up as metastatic disease remains in the differential.     11/20/2023 Imaging    CT Chest:  IMPRESSION:  1.  Multiple bilateral new and enlarging pulmonary nodules measuring up  to 10 mm. Findings are highly concerning for metastatic disease.  2.  Mild dilatation of the ascending artery measuring 4 cm diameter.    CT Abd/Pelvis:  IMPRESSION:  1.  No evidence of recurrent or metastatic disease in the abdomen or  pelvis status post right nephrectomy on this unenhanced exam.  2.  Heavy sigmoid diverticulosis without evidence of diverticulitis.     2/20/2024 Imaging    CT Chest:  IMPRESSION:  1. Mild to moderate increase in size of several pulmonary nodules as  detailed above. A few nodules are stable. Another follow-up examination  in 6 months is recommended.  2. Scattered areas of scarring in the right upper/middle and right lower  lobe similar to the previous study.    CT Abd/Pelvis:  IMPRESSION:  1. No acute abnormality of the abdomen or pelvis, particularly, no  finding to suggest neoplastic process/metastatic disease.  2. Diverticulosis of the colon. No evidence for diverticulitis.  3. A stable left renal cyst.  4. The prior right nephrectomy. No focal or regional complication.     4/2/2024 -  Chemotherapy    OP SUPPORTIVE HYDRATION + ANTIEMETICS     4/2/2024 Imaging    CT Chest:  IMPRESSION:  1. Bilateral pulmonary nodules appear relatively stable, as described  above. There are no new pulmonary nodules.  2. Prior wedge resections on the right.  3. Mild atheromatous disease of the thoracic aorta. Coronary artery  calcification is noted. Ascending thoracic aorta is dilated measuring 4 cm.  4. Areas of ill-defined low density within the visualized liver.  Difficult to tell if this represents metastatic disease or geographic  fatty  infiltration on the nonenhanced images. Follow-up hepatic mass  protocol CT or MRI recommended.  5. Small hiatal hernia.  6. Probable partially imaged cyst in the left mid kidney.    CT Abd/Pelvis:  IMPRESSION:  1. Large areas of low-attenuation within the liver likely representing  geographic fatty infiltration. Metastatic disease is felt to be less  likely but not entirely excluded as this finding is not present on  1/23/2023. Hepatic mass protocol CT or MRI is recommended to further  evaluate. If the patient cannot receive IV contrast for clinical  reasons, noncontrast MRI would be more beneficial.  2. A 3 cm left renal cyst. Prior right nephrectomy.  3. Small hiatal hernia. Diverticulosis of the colon. Under distention of  stomach and colon. Normal appendix.  4. Small fat-containing inguinal hernias bilaterally. Other nonacute  findings, as discussed above.     4/9/2024 - 4/9/2024 Chemotherapy    [facility] - Cabozantinib (Cabometyx)     5/13/2024 Imaging    MRI Abdomen:  IMPRESSION:  1.  Postoperative change of right nephrectomy without evidence of  recurrent or metastatic disease in the abdomen.   2.  Mild geographic hepatic steatosis.  3.  6 mm cyst in the pancreatic body/tail. This likely represents a  sidebranch IPMN. Recommend a follow-up MRI in 2 years based on ACR white  paper criteria.  4.  Splenomegaly.        7/5/2024 Imaging    CT Chest:  IMPRESSION:  1.  Several of the pulmonary nodules are slightly larger than on the  exam from April 2, 2024 but very similar in size when compared to the  exam from 2/20/2024. I don't see any new pulmonary nodules.  2.  Moderate calcified coronary atherosclerosis.  3.  Please see the dedicated CT abdomen and pelvis report for further  details regarding those structures.    CT Abd/Pelvis:  IMPRESSION:  1. Evidence of prior right nephrectomy. No finding to suggest local  recurrence or neoplastic process or metastatic disease in the abdomen or  pelvis.  2.  Diverticulosis of the colon. No evidence for diverticulitis.  3. A stable 3 cm left renal cyst no change.  4. Geographic fatty infiltration of the liver is poorly and barely  visible in the present study. No discrete intrahepatic mass.     1/2/2025 Imaging    CT Chest:  IMPRESSION:  1. Mildly increased size and number of pulmonary nodules compared to  7/5/2024.  2. Scattered coronary artery calcifications.  3. See separately dictated CT abdomen pelvis of the same day.    CT Abd/Pelvis:  IMPRESSION:  1. No acute abnormality of the abdomen or pelvis, particularly, no  finding to suggest neoplastic process or metastatic disease.  2. Evidence of a prior right nephrectomy. No focal/regional  complication/recurrence.  3. A persistent moderate splenomegaly. A poorly defined low-density  nodule in the spleen which was not noted in the previous study which may  partly be due to lack of contrast enhancement?. This may represent a  small cyst or hemangioma.  4. Diverticulosis of the colon. No evidence for diverticulitis.     3/31/2025 Imaging    CT Left Hip:  Benign appearing sclerotic lesions in the proximal left femur and indeterminate though benign appearing sclerotic lesion in the visualized parasymphyseal pubic bone. No definite soft tissue mass identified. Further assessment with MRI of the right hip   without and with intravenous contrast is recommended if there is ongoing high clinical concern for malignancy. Correlate with clinical history.   No acute osseous injury.   Mild bladder wall thickening is nonspecific though correlation for signs of cystitis is recommended. Prostatic calcifications.   Chronic/degenerative changes as detailed above.     CT Lumbar Spine:  1.  No visible fractures or dislocations.   2. Mild scoliosis, convex to the left.   3. Mild-to-moderate multilevel degenerative changes as described.   4. At L4-5, moderate to severe spinal canal stenosis, and bilateral neural foraminal narrowing, moderate  on the left and mild on the right.   5. At L5-S1, mild left neuroforaminal narrowing.      7/11/2025 Imaging    CT Abd/Pelvis:  IMPRESSION:  1. Exophytic cortical cyst left kidney. Left kidney is otherwise  homogeneous in density. No nephrolithiasis or obstructive uropathy.  Previous right nephrectomy.  2. Mild constipation. Scattered diverticula within the colon with no  diverticulitis. No mechanical obstruction. Normal appendix.  3. Prostate gland is mildly enlarged. Mild prominence of the urinary  bladder wall could be related to an element of bladder outlet  obstruction and muscular hypertrophy.  4. Bilateral fat-containing inguinal hernias as well as a fat-containing  periumbilical hernia.  5. No radiographic evidence of metastatic disease to the abdomen or  pelvis..    CT Chest:  IMPRESSION:  1. Evidence of disease progression. Increased size LEFT lower lobe  pulmonary nodule now 13 mm, previously 7 mm on 1/2/2025. The other 3  small pulmonary nodules are similar compared to prior.     Lung metastases   10/18/2019 Initial Diagnosis    Lung metastases     4/9/2024 - 4/9/2024 Chemotherapy    [facility] - Cabozantinib (Cabometyx)         PAST MEDICAL HISTORY:  ALLERGIES:  Allergies   Allergen Reactions    Amoxicillin Hives    Contrast Dye (Echo Or Unknown Ct/Mr) Other (See Comments)     No Oral or IV contrast Due to only having 1 kidney    Opdivo [Nivolumab] Provider Review Needed     Renal failure    Penicillins Hives and Rash    Clindamycin/Lincomycin Rash     pustules  Rash - pustules      Doxycycline Rash    Hepatitis B Virus Vaccines Rash     Pustules      Hepatitis B Vaccine Rash    Latex Rash    Levofloxacin Rash     CURRENT MEDICATIONS:  Outpatient Encounter Medications as of 7/18/2025   Medication Sig Dispense Refill    acetaminophen (TYLENOL) 500 MG tablet Take 1 tablet by mouth Every 6 (Six) Hours As Needed for Mild Pain.      allopurinol (ZYLOPRIM) 100 MG tablet Take 1 tablet by mouth Daily.       calcium carb-cholecalciferol 600-800 MG-UNIT tablet Take 1 tablet by mouth 2 (Two) Times a Day With Meals.      carvedilol (COREG) 6.25 MG tablet Take 1 tablet by mouth 2 (Two) Times a Day With Meals.      finasteride (PROSCAR) 5 MG tablet Take 1 tablet by mouth Daily.  3    loratadine (CLARITIN) 10 MG tablet Take 1 tablet every day by oral route.      predniSONE (DELTASONE) 5 MG tablet Take 0.5 tablets by mouth Daily.      sodium bicarbonate 650 MG tablet Take 1 tablet by mouth 3 (Three) Times a Day. 90 tablet 2     No facility-administered encounter medications on file as of 7/18/2025.     ADULT ILLNESSES:  Patient Active Problem List   Diagnosis Code    Renal cell carcinoma of right kidney C64.1    Multiple nodules of lung R91.8    Lung anomaly Q33.9    TIA (transient ischemic attack) G45.9    Acute renal failure with tubular necrosis in the setting of solitary kidney N17.0    Lung metastases C78.00    Pyuria R82.81    Hyperkalemia E87.5    Metabolic acidosis E87.20    Hyperglycemia R73.9    Hypercalcemia E83.52    Benign prostatic hyperplasia N40.0    Increased frequency of urination R35.0    Nocturia R35.1    Retention of urine R33.9    Dehydration E86.0    Nephritis and nephropathy N05.9    Toxicity, chemicals T65.91XA    Stage 3 chronic kidney disease N18.30    Campylobacter gastroenteritis A04.5    Sigmoid diverticulitis K57.32    History of TIA (transient ischemic attack) Z86.73    Solitary kidney, acquired Z90.5    Benign essential hypertension I10    Gastroesophageal reflux disease K21.9    Encounter for screening for malignant neoplasm of colon Z12.11    Hypertension due to drug I15.8, T50.905A    Former smoker Z87.891     SURGERIES:  Past Surgical History:   Procedure Laterality Date    BRONCHOSCOPY N/A 11/29/2021    Procedure: BRONCHOSCOPY;  Surgeon: Jarrod Tolentino MD;  Location: [facility];  Service: Cardiothoracic;  Laterality: N/A;    CHOLECYSTECTOMY WITH INTRAOPERATIVE CHOLANGIOGRAM N/A  12/22/2021    Procedure: LAPAROSCOPIC CHOLECYSTECTOMY;  Surgeon: Vandana Calhoun MD;  Location: [facility] OR;  Service: General;  Laterality: N/A;    COLONOSCOPY N/A 4/18/2022    Procedure: COLONOSCOPY WITH ANESTHESIA;  Surgeon: Zoila Robison MD;  Location: [facility] ENDOSCOPY;  Service: Gastroenterology;  Laterality: N/A;  pre colon cancer  post  Dr. Osborne    HERNIA REPAIR  2008    LUNG BIOPSY  11/2018    NEPHRECTOMY Right 2014    SINUS SURGERY  2005    THORACOSCOPY Right 11/29/2021    Procedure: RIGHT THORACOSCOPY WITH RIGHT MIDDLE AND RIGHT LOWER LOBE WEDGE RESECTIONS;  Surgeon: Jarrod Tolentino MD;  Location: [facility] OR;  Service: Cardiothoracic;  Laterality: Right;     HEALTH MAINTENANCE ITEMS:  Health Maintenance Due   Topic Date Due    TDAP/TD VACCINES (1 - Tdap) Never done    Pneumococcal Vaccine 50+ (1 of 1 - PCV) Never done    ZOSTER VACCINE (1 of 2) Never done    HEPATITIS C SCREENING  Never done    ANNUAL WELLNESS VISIT  Never done    COVID-19 Vaccine (1 - 2024-25 season) Never done       <no information>  Last Completed Colonoscopy            Upcoming       COLORECTAL CANCER SCREENING (COLONOSCOPY - Every 5 Years) Next due on 4/18/2027 04/18/2022  Surgical Procedure: COLONOSCOPY    04/18/2022  COLONOSCOPY                            There is no immunization history on file for this patient.  Last Completed Mammogram    This patient has no relevant Health Maintenance data.           FAMILY HISTORY:  Family History   Problem Relation Age of Onset    Other Mother         blood disease unknown    Cancer Father         unknown    No Known Problems Sister     No Known Problems Sister     Heart disease Maternal Grandmother     Heart disease Maternal Grandfather     No Known Problems Paternal Grandmother     No Known Problems Paternal Grandfather     Colon polyps Neg Hx     Colon cancer Neg Hx      SOCIAL HISTORY:  Social History     Socioeconomic History    Marital status:    Tobacco Use    Smoking  "status: Former     Current packs/day: 0.00     Average packs/day: 1 pack/day for 22.0 years (22.0 ttl pk-yrs)     Types: Cigarettes     Start date:      Quit date:      Years since quittin.5    Smokeless tobacco: Never   Vaping Use    Vaping status: Never Used   Substance and Sexual Activity    Alcohol use: Not Currently    Drug use: No    Sexual activity: Defer       REVIEW OF SYSTEMS:  Cabozantinib tolerance:  +hypertension.  On varying doses of Losartan beginning 24, added Coreg 6.25 bid and HCTZ in conjunction with nephrology.  According to notes from specialty pharmacy, 3/12, 3/18, 24-. Subjective BP numbers reported: 175/105, 158/98, 170/88.  Reported he felt \"clumsy, possibly dizzy/lightheaded, fatigue, constipation, diarrhea\".  No  arterial/venous thromboembolism, no bleeding, no other GI symptoms (belly pain, viscus perforation, fistula formation, severe diarrhea) no rash (hand-foot syndrome), no jaundice (hepatotoxicity), no edema (nephrotic syndrome), no jaw pains (osteonecrosis of the jaw), no decreased appetite, no nausea, no vomiting, no stomatitis, no dysphonia, no asthenia, no dysgeusia, no dyspnea, no extremity pain, no dyspepsia, no muscle spasms, no cough, no headache, no arthralgia, no xeroderma.  Discussed with Neftaly Solis, PharmD on 24 who discussed with patient (face to face) on that day (see phone note):  Instructed to hold the drug for at least a week, monitor BP, use stool softeners.  Says all the symptoms have resolved off the drug.  On 24 stopped the drug.  Again states \"I do not want to get back on it\".  Constitutional:  Manages ADLs, chores, errands and driving.  Again drove himself in today.  Appetite is good, \"great.\"  Energy is fairly good.  He remains active.  States he has a new dog that he is enjoying.  He has regained 9 lb (had intentionally lost 15 lb at his prior visit ) since his last visit. No fever, no chills nor drenching nights.  HENT: " "Negative.  No sore throat.  Has seasonal sinus symptoms/rhinorrhea. No headaches.  Eyes: Negative.    Respiratory:  No SOB at rest nor TENORIO.  No cough. No wheezing. No tobacco use.    Cardiovascular: Negative.  No palpitations.  No orthopnea  Gastrointestinal: RUQ pain resolved since cholecystectomy.  No dysphagia.  No nausea.  No vomiting.  No dyspepsia. Since stopping the drug last 4/2/24.  Bowels moving regularly.  Has occasional soft stools.  No overt diarrhea.  No melena.  Has not had hemorrhoidal bleeding.  Has history recurrent diverticulitis, early 06/2021.  Had colonoscopy, 04/18/2022- diverticulosis.  Nonbleeding internal hemorrhoids otherwise normal exam.  Endocrine: No hot flashes.  Genitourinary:  No dysuria.  No incontinence. No hematuria.  Occasional urgency.  \"I still feel like I gotta go when I gotta go sometimes.\"  Nocturia much improved on Finasteride - up 1x- from every 90 min.  Remains on allopurinol.  Musculoskeletal:  No arthralgias.  No edema  Skin: No new rash.    Neurological:  No dizziness.  No facial asymmetry. No headaches.  Mild sensory neuropathy of the hands, not worse  Hematological: Negative for new adenopathy.  Says he bruises easily.  Psychiatric/Behavioral:  Has baseline anxiety, \"more now.\"  No depression.      VITAL SIGNS: /82   Pulse 74   Temp 97.7 °F (36.5 °C) (Temporal)   Resp 18   Ht 177.8 cm (70\")   Wt 93.3 kg (205 lb 11.2 oz)   SpO2 98%   BMI 29.51 kg/m² Body surface area is 2.11 meters squared.  Pain Score    07/18/25 0849   PainSc: 0-No pain       PHYSICAL EXAMINATION:   General Appearance: Patient is a very pleasant, cooperative, heavyset, well-developed, well-nourished, modestly kept elderly male who is awake, alert, oriented and in no acute distress. ECOG 0  HEENT: Normocephalic. Sclerae clear, conjunctiva pink, extraocular movements intact  NECK: Supple, no jugular venous distention, thyroid not enlarged.  LYMPH: No cervical, supraclavicular, axillary, " or inguinal lymphadenopathy.  LUNGS: Good air movement, no rales, rhonchi, rubs or wheezes with auscultation.  Right thoracoscopic incisions are healed  CARDIO: Regular sinus rhythm, no murmurs, gallops or rubs.  ABDOMEN:  Soft, with no right upper quadrant tenderness.  Laparoscopy wounds are all healed. No obvious abdominal masses. Bowel sounds positive. No hernia  MUSKEL: No joint swelling, decreased motion, or inflammation  EXTREMS:  No edema, clubbing, cyanosis, No varicose veins.  NEURO: Grossly nonfocal, Gait is coordinated and smooth, Cognition is preserved.  SKIN: No rashes, no ecchymoses, no petechia.  PSYCH: Oriented to time, place and person. Memory is preserved. Mood and affect appear normal         LABS    Lab Results - Last 18 Months   Lab Units 07/11/25  0809 03/31/25  1023 01/02/25  0901 12/04/24  0906 07/05/24  0848 04/09/24  0947 04/01/24  0913 03/25/24  0933 03/18/24  0911   HEMOGLOBIN g/dL 15.3 16 16.0 16 15.3 15.8 16.3   < > 16.9   HEMATOCRIT % 47.4 49.1 49.5 49.3 45.8 47.1 48.7   < > 50.7   MCV fL 92.4 93.3 92.5 94.1* 92.7 89.7 89.2   < > 87.6   WBC 10*3/mm3 6.77 7.2 6.66 6.7 5.50 4.94 5.89   < > 5.95   RDW % 13.1 13 13.5 13.2 12.6 14.7 14.5   < > 13.9   MPV fL 9.9 9.2* 9.5 9.4 9.4 9.0 9.4   < > 9.4   PLATELETS 10*3/mm3 167 190 164 155 169 154 164   < > 154   IMM GRAN % % 0.3  --  0.2  --  0.4 0.2 0.2  --  0.3   NEUTROS ABS 10*3/mm3 3.91 4.7 3.92 3.6 3.07 2.97 3.10   < > 3.50   LYMPHS ABS 10*3/mm3 1.57 1.5 1.61 1.6 1.45 1.22 1.83   < > 1.58   MONOS ABS 10*3/mm3 0.81 0.7 0.79 1* 0.61 0.44 0.64   < > 0.53   EOS ABS 10*3/mm3 0.41* 0.3 0.29 0.5 0.32 0.27 0.28   < > 0.29   BASOS ABS 10*3/mm3 0.05 0 0.04 0 0.03 0.03 0.03   < > 0.03   IMMATURE GRANS (ABS) 10*3/mm3 0.02 0 0.01 0 0.02 0.01 0.01  --  0.02   NRBC /100 WBC 0.0  --  0.0  --  0.0 0.0 0.0  --  0.0    < > = values in this interval not displayed.       Lab Results - Last 18 Months   Lab Units 07/11/25  0809 03/31/25  1023 01/02/25  0901  12/04/24  0906 07/05/24  0848 04/09/24  0947 04/01/24  0913 03/25/24  0933   GLUCOSE mg/dL 99 95 106* 96 107* 134* 111* 105*   SODIUM mmol/L 142 140 142 140 139 141 141 142   POTASSIUM mmol/L 5.0 5.2* 5.1 4.8 4.9 4.7 5.2 4.9   TOTAL CO2 mmol/L  --  25  --  25  --   --   --   --    CO2 mmol/L 24.0  --  25.0  --  25.0 23.0 27.0 26.0   CHLORIDE mmol/L 107 106 107 105 106 108* 104 107   ANION GAP mmol/L 11.0 9 10.0 10 8.0 10.0 10.0 9.0   CREATININE mg/dL 2.09* 2.1* 2.10* 2.1* 2.38* 2.47* 2.62* 2.48*   BUN mg/dL 33.9* 33* 33* 33* 43* 48* 56* 45*   BUN / CREAT RATIO  16.2  --  15.7  --  18.1 19.4 21.4 18.1   CALCIUM mg/dL 9.4 10 9.6 9.7 9.5 9.3 9.9 8.9   ALK PHOS U/L 74 79 69 83 58 89 103 94   TOTAL PROTEIN g/dL 6.5 6.6 6.7 6.7 6.3 6.4 6.8 6.8   ALT (SGPT) U/L 15 33 19 20 17 34 51* 50*   AST (SGOT) U/L 16 24 18 18 16 19 31 29   BILIRUBIN mg/dL 0.5 0.4 0.5 0.6 0.6 0.5 0.8 0.5   ALBUMIN g/dL 4.2 4.2 4.2 4.5 4.2 4.3 4.4 4.3   GLOBULIN gm/dL 2.3  --  2.5  --  2.1 2.1 2.4 2.5       Assessment:  1.  Renal cell carcinoma, diagnosed 11/11/2014 (5.5 cm G3, pT3a, pNx). --2014, right nephrectomy.   --surveillance until 11/26/2018    --11/26/2018-right lower lobe VATS wedge resection: Metastatic renal cell carcinoma, 0.9 cm; margins free of malignancy. Dr. Hu  -- Unable to tolerate Sutent due to kidney failure, thus received ipilumomab/nivolumab, 05/13/2019 - 08/07/2019, then single agent nivolumab, 08/28/2019 - 09/09/2019 (52 months since cessation of therapy). Therapy stopped due to renal failure (nivolumab?)  --01/16/2020-PET scan.  No neoplastic FDG uptake within the chest, abdomen or pelvis.  --07/06/2020-CT abdomen/pelvis with diverticulosis of the colon.  Evidence of acute diverticulitis of the sigmoid colon and adjacent distal descending colon.  No free air or fluid seen.  1 cm noncalcified subpleural nodule in the left lower lobe.  Follow-up exam in 6 months recommended.  --08/04/2020-chest CT.  Scattered subcentimeter  bilateral pulmonary nodules similar to 1/16/2020 with mild increase in the 5 mm left upper lobe nodule.  Continued follow-up recommended.  No pathologic lymphadenopathy.  Right nephrectomy.  --01/14/2020-CT chest.  Increased size of 1.2 cm right lower lobe superior segment pulmonary nodule (previously 0.9 cm)-representing 33% growth.  Multiple other bilateral pulmonary nodules have not significantly changed in size.  Findings are in keeping with disease progression.  --01/14/2020-CT abdomen/pelvis.  No evidence of recurrent or metastatic disease in the abdomen or pelvis.  --01/26/2021-PET/CT.  Impression: Minimal metabolic activity associated with the enlarging pulmonary nodule within the superior segment of the right lower lobe with a maximum SUV of 1.35.  The additional nodules noted within both lungs also demonstrate no abnormal metabolic activity which would favor benignity but continued radiographic follow-up will be suggested.  No scintigraphic evidence of metastatic disease on current exam.  Increased uptake overlying the right groin and more distal external iliac vessels felt to be related to previous hernia repair.  --02/14/2021-  Encounter from Dr. Mayito Arteaga, medical oncology at [facility].  History reviewed.  Most recent CT chest, 01/14/2021 (above) with increasing size of 1.2 cm right lower lobe superior segment pulmonary nodule (previously 0.9 cm) and PET scan, 01/26/2021 (above) noted including minimal metabolic activity associated with the enlarging pulmonary nodule within the superior segment of the right lower lobe.  Discussion: Biopsy right lower lobe lung nodule solitary lesion that is growing on PET scan which will be done at that institution.  Contemplate either surgical resection, ablation or SBRT.  We will focus on local therapy for now.  Multiple systemic options down the road but if renal function improves can contemplate trial with HIF-alpha  inhibitor.  --03/15/2021- CT chest.  Continued increased size of the right lower lobe superior segment now 1.4 cm pulmonary nodule (1.2 cm, 1/14/2021; 0.8 cm, 8/4/2020).  Other pulmonary nodules are stable.  New small area of groundglass opacity in the superior segment left lower lobe may be infectious/inflammatory.  Coronary artery calcifications.  Aortic calcifications.  Small hiatal hernia.  --03/15/2021-CT abdomen/pelvis.  Previous right nephrectomy identified with no evidence of intra-abdominal metastatic disease.  Benign-appearing exophytic cyst posterior aspect of the left kidney, upper pole, measuring 2.47 cm, stable.  Small nodules in the lung bases are unchanged compared to previous chest CT.  Evidence of previous right inguinal hernia repair.  Fatty infiltration of the inguinal canals.  Scattered colonic diverticula without diverticulitis.  Small sliding hiatal hernia identified.  --5/27/2021- Follow-up from GIOVANNY Edward with Dr. Arteaga's office.  Impression/plan: CT CAP, 05/26/2021: Stable examination including stable bilateral lung nodules compared to 03/15/2021.  Progression of right lower lobe lesion?  Discussed systemic therapy option with cabozantinib at 20 mg.  However patient concerned due to his history of substantial AEs in the past.  Will arrange for lesion to be ablated.  However patient says multiple lesions were there for 6 years and unchanged.-Consider ablation or SBRT of the lesion and monitor and consider cabozantinib when failed.  Multiple systemic options down the road but if renal function improves can contemplate HIF-alpha inhibitor.  Continue surveillance for now.  Repeat scans in 3 months.  --06/11/2021-CT chest without contrast.  Stable pulmonary nodules.  No evidence of disease progression.  Interval resolution superior segment left lower lobe groundglass opacity.  Scattered coronary artery calcifications.  --06/11/2021-CT abdomen/pelvis.  History of right renal cell  carcinoma nephrectomy.  No CT evidence of tumor recurrence or metastatic disease in the abdomen or pelvis.  --09/07/2021-CT chest wo contrast.  Pulmonary nodules noted.  Slightly increased in size compared to 6/11/2021 (left upper lobe 7.2 mm-prior 6.7 mm; posterior right lung, 19 mm-prior 13.9 mm; posterior medial right lung, 14 mm-prior 11 mm; right lung, 7.7 mm-prior 7.1 mm).  However no new nodules identified.  --09/07/2021-CT abdomen/pelvis wo contrast.  No evidence of intra-abdominal or pelvic metastasis.  Prior right nephrectomy.  Stable left renal cyst.  Colonic diverticulosis with questionable wall thickening of the sigmoid colon may be artifactual.  Correlation to any recent colonoscopy recommended.  --09/28/2021-PET scan.  Increased size of right lower lobe lung nodule (21 x 15 mm compared to 14 x 14 mm 3 months ago) though this finding shows no significant FDG uptake.  Otherwise stable PET/CT exam.  --11/29/2021-RLL, RML wedge resections. Path consistent with metastatic renal cell carcinoma  --12/14/2021-CT chest.  Postoperative changes from wedge resections of prior right middle and lower lobe pulmonary nodules.  Stable left-sided pulmonary nodules, largest in the left upper lobe/apex measuring 8 mm.  Continued follow-up strongly recommended.  No pathologic lymphadenopathy.  --12/14/2021-CT abdomen/pelvis.  No convincing evidence of intra-abdominal or pelvic metastasis.  New diffuse abnormal gallbladder wall thickening.  Findings may be seen with cholecystitis.  Gallbladder ultrasound might be considered.  --12/20/2021-gallbladder ultrasound-diffuse gallbladder wall thickening.  Gallbladder nondistended with no visible stones.  Unlikely acute cholecystitis.  Favor chronic cholecystitis.  --03/18/2022-CT chest-patchy infiltrate compatible with right middle lobe pneumonia.  2 tiny left lung nodule stable.  --03/18/2022-CT abdomen/pelvis.  No evidence of metastatic disease to the abdomen/pelvis.  Prior  right nephrectomy.  Colonic diverticulosis without evidence of diverticulitis.  Mild splenomegaly, unchanged.  --06/24/2022- CT chest-1. The resolution of the previously seen patchy infiltrate in the right middle lobe. 2. Postsurgical changes and scarring in the right upper and lower lobe. 3. Stable small nodules in both lungs the largest one in the left upper lobe. No change.  --06/24/2022- CT abdomen/pelvis- stable.  No evidence of a neoplastic process or metastatic disease. Evidence of right nephrectomy. No focal complication. Stable left renal cyst. Diverticulosis of the colon. No evidence for diverticulitis. Stable mild splenomegaly.  Enlarged prostate.  --09/19/2022- CT CAP- Stable exam.  No change in multiple bilateral pulmonary nodules measuring up to 8 mm.  No evidence of recurrent or metastatic disease in the abdomen or pelvis status post RIGHT nephrectomy.  --1/23/2023- CT CAP- Prior wedge resection within the RML/RLL. No evidence of localized recurrence at these sites. Numerous other pulmonary nodules are present. The majority of these nodules are stable in size and appearance from the previous exam. There is one nodule which is subpleural in location within the lateral left upper lobe on image 52 which has an increased soft tissue component from the previous exam. This would warrant close follow-up on subsequent imaging.No acute abnormality of the abdomen or pelvis. Evidence of prior right nephrectomy. No focal complication. A left renal cyst.  --7/20/23- CT CAP- No change in multiple bilateral pulmonary nodules measuring up to 10 mm, compared to 01/23/2023. However, there has been gradual increase in pulmonary nodule size compared to more remote studies dating back to 03/18/2022. For reference, the dominant 10 mm LEFT upper lobe pulmonary nodule measured 7 mm on 03/18/2022. Recommend continued clinical and imaging follow-up as metastatic disease remains in the differential.  --8/17/23 and 9/5/23-  "Follow-up Dr. Juan A Cannon-A/P: 68-year-old male with metastatic ccRCC on active surveillance initially seen in [facility], 8/17/2023 for second opinion.  Discussed pathology, imaging, treatment options and prognosis in detail.  Images reviewed  tumor board that agreed with very slow-growing pulmonary nodules likely metastatic.  Discussed systemic therapy options including rechallenging IO, cabozantinib,mTOR, Belzutifan.  Clinical trials are not our institution discussed but likely will not be a candidate given his renal dysfunction.  Patient strongly prefers not to start systemic therapy unless absolutely necessary to avoid further renal dysfunction and other side effects.  Given slow growth, I think it is reasonable to hold off on systemic therapy continue surveillance for now.  Local therapy can be discussed to the dominant lesions based on future scans.  I do think he will ultimately need to get back on systemic therapy  --11/20/23- CT CAP wo- Multiple bilateral new and enlarging pulmonary nodules measuring up to 10 mm. Findings are highly concerning for metastatic disease.  Mild dilatation of the ascending artery measuring 4 cm diameter. No evidence of recurrent or metastatic disease in the abdomen or pelvis status post right nephrectomy on this unenhanced exam. Heavy sigmoid diverticulosis without evidence of diverticulitis.   --12/7/23- Follow-up Dr. Cannon: A/P: Discussed pathology, imaging, treatment options and prognosis in detail with him.  Images reviewed and agree with slow-growing pulmonary nodules that are likely metastatic.  Discussed systemic therapy options including rechallenging IO, cabozantinib, mTOR, Belzutifan, clinical trials at our institution discussed likely will not be a candidate given his renal dysfunction.  \"Both although slow, recommend cabozantinib.  Patient really concerned about side effects given his past experience with sunitinib.  I suggest start with cabozantinib 20 mg " p.o. daily and titrate up to 40 mg based on tolerability.  No dose modifications needed for renal dysfunction.  RTC as needed  --2/20/24- CT chest- Mild to moderate increase in size of several pulmonary nodules as detailed above. A few nodules are stable. Another follow-up examination in 6 months is recommended. Scattered areas of scarring in the right upper/middle and right lower lobe similar to the previous study.  --2/20/24- CT abdomen/pelvis- No acute abnormality of the abdomen or pelvis, particularly, no finding to suggest neoplastic process/metastatic disease. Diverticulosis of the colon. No evidence for diverticulitis. A stable left renal cyst. The prior right nephrectomy. No focal or regional complication.  -- 1/23/24 palliative cabozantinib was sent in but due to insurance issues did not receive the drug until 2/21/24 - stopped on 4/2/24 due to intolerance.  -- 4/2/24- CT chest-Bilateral pulmonary nodules appear relatively stable, as described above. There are no new pulmonary nodules. Prior wedge resections on the right. Mild atheromatous disease of the thoracic aorta. Coronary artery calcification is noted. Ascending thoracic aorta is dilated measuring 4 cm. Areas of ill-defined low density within the visualized liver. Difficult to tell if this represents metastatic disease or geographic fatty infiltration on the nonenhanced images. Follow-up hepatic mass protocol CT or MRI recommended.  -- 4/2/24- CT abdomen/pelvis- Large areas of low-attenuation within the liver likely representing geographic fatty infiltration. Metastatic disease is felt to be less likely but not entirely excluded as this finding is not present on 1/23/2023. Hepatic mass protocol CT or MRI is recommended to further evaluate. If the patient cannot receive IV contrast for clinical reasons, noncontrast MRI would be more beneficial. A 3 cm left renal cyst. Prior right nephrectomy. Small hiatal hernia. Diverticulosis of the colon. Under  distention of stomach and colon. Normal appendix. Small fat-containing inguinal hernias bilaterally.   -- 5/14/24- MRI wo contrast-Postoperative change of right nephrectomy without evidence of recurrent or metastatic disease in the abdomen. Mild geographic hepatic steatosis. 6 mm cyst in the pancreatic body/tail. This likely represents a sidebranch IPMN. Recommend a follow-up MRI in 2 years based on ACR white paper criteria. Splenomegaly.  - 7/5/24- CT chest-Several of the pulmonary nodules are slightly larger than on the exam from April 2, 2024 but very similar in size when compared to the exam from 2/20/2024. I don't see any new pulmonary nodules.  - 7/5/24- CT abdomen/pelvis- Evidence of prior right nephrectomy. No finding to suggest local recurrence or neoplastic process or metastatic disease in the abdomen or pelvis.  Diverticulosis of the colon. No evidence for diverticulitis.  A stable 3 cm left renal cyst no change. Geographic fatty infiltration of the liver is poorly and barely visible in the present study. No discrete intrahepatic mass.   - 1/2/25- CT CAP- Mildly increased size and number of pulmonary nodules compared to 7/5/2024. Scattered coronary artery calcifications. No acute abnormality of the abdomen or pelvis, particularly, no finding to suggest neoplastic process or metastatic disease. Evidence of a prior right nephrectomy. No focal/regional complication/recurrence. A persistent moderate splenomegaly. A poorly defined low-density nodule in the spleen which was not noted in the previous study which may partly be due to lack of contrast enhancement?. This may represent a small cyst or hemangioma. Diverticulosis of the colon. No evidence for diverticulitis.   - 7/11/25- CT CAP-  Evidence of disease progression. Increased size LEFT lower lobe pulmonary nodule now 13 mm, previously 7 mm on 1/2/2025. The other 3 small pulmonary nodules are similar compared to prior.  Moderate calcified coronary  atherosclerosis. Exophytic cortical cyst left kidney. Left kidney is otherwise homogeneous in density. No nephrolithiasis or obstructive uropathy. Previous right nephrectomy. Mild constipation. Scattered diverticula within the colon with no diverticulitis. No mechanical obstruction. Normal appendix.Prostate gland is mildly enlarged. Mild prominence of the urinary bladder wall could be related to an element of bladder outlet obstruction and muscular hypertrophy. Bilateral fat-containing inguinal hernias as well as a fat-containing periumbilical hernia.No radiographic evidence of metastatic disease to the abdomen or pelvis.  - 7/17/25- Consult radiation oncology-Kanchan Cao LPN with Dr. Gonzalez: AP-note in epic incomplete.  Patient states PET scan ordered for 7/31/2025 prior to planning for palliative RT.    2.  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    --GFR 33.2 mL/min, 1/2/25 (prior:  9 - 34)  --s/p multiple doses of HD steroids in the past and remains on maintenance 2.5 mg prednisone per nephrology (JACLYN Ham-Dr. Grant)- tapered down from 5 mg/2.5 mg  --will not start Cytoxan unless biopsy done to determine etiology of kidney failure. Has been off Opdivo since September 2019.    3.  Normocytic anemia.    --Resolved. Hgb 15.3; MCV 92.4, 7/11/25 (prior: Hgb 10.2-16.1; MCV 86.3-95.5)  4.  Gerd: modulated by omeprazole  5.  Osteopenia: on bola+vit D  6.  History of acute diverticulitis.   --04/18/2022- colonoscopy-diverticulosis in the left colon.  Nonbleeding internal hemorrhoids.  Otherwise normal exam.  Repeat 5 years.  Dr. Robison  7.  Hypertension. Cabozantinib associated.  Resolved since stopping drug last 4/2/24    Plan:  1.  Apprised of labs, 7/11/25 -normal CBC. GFR 33.2 (prior: 33.2) otherwise stable CMP. Normal mag/PO    2.  Consult radiation oncology-Kanchan Cao LPN with Dr. Gonzalez, 7/17/25: AP-note in epic incomplete.  Patient states PET scan ordered for 7/31/2025 prior to planning for palliative RT.  3.  "  Apprised of CT CAP, 7/11/25 (above). Evidence of disease progression. Increased size LEFT lower lobe pulmonary nodule now 13 mm, previously 7 mm on 1/2/2025  4.   Previously discussed cabozantinib tolerance: +hypertension.  Started on varying doses of Losartan on 2/26, added Coreg 6.25 bid and HCTZ in conjunction with nephrology.  According to notes from specialty pharmacy, 3/12, 3/18, 4/2/24-. Subjective BP numbers reported: 175/105, 158/98, 170/88.  Reported he felt \"clumsy, possibly dizzy/lightheaded, fatigue, constipation, diarrhea\".  No  arterial/venous thromboembolism, no bleeding, no other GI symptoms (belly pain, viscus perforation, fistula formation, severe diarrhea) no rash (hand-foot syndrome), no jaundice (hepatotoxicity), no edema (nephrotic syndrome), no jaw pains (osteonecrosis of the jaw), no decreased appetite, no nausea, no vomiting, no stomatitis, no dysphonia, no asthenia, no dysgeusia, no dyspnea, no extremity pain, no dyspepsia, no muscle spasms, no cough, no headache, no arthralgia, no xeroderma.  Discussed with Neftaly Solis PharmD on 4/2/24 who discussed with patient (face to face) on that day (see phone note):  Instructed to hold the drug for at least a week, monitor BP, use stool softeners.  Says all the symptoms have resolved off the drug.  We discussed resuming the drug qod schedule.  The patient wanted to stop the drug permanently (had stopped since 4/2/24).  \"It took away my QOL\".  Today - as on prior visits- he again reiterates that he does not want to get back on any type of systemic therapy.      5.   Previously reviewed follow-up Dr. Cannon ( oncology, [facility]), 12/7/23: A/P: Discussed pathology, imaging, treatment options and prognosis in detail with him.  Images reviewed and agree with slow-growing pulmonary nodules that are likely metastatic.  Discussed systemic therapy options including rechallenging IO, cabozantinib, mTOR, Belzutifan, clinical trials at our " institution discussed likely will not be a candidate given his renal dysfunction.  Recommend cabozantinib.  Patient really concerned about side effects given his past experience with sunitinib.  I suggest start with cabo 20 mg p.o. daily and titrate up to 40 mg based on tolerability.  No dose modifications needed for renal dysfunction.  RTC as needed    6.   Return to office in 24 weeks (per patient request) with preoffice CT scans of the chest, abdomen/pelvis without IV contrast (in 11 weeks), CBC with differential, CMP, PO, magnesium.    I spent ~40 minutes caring for Parker on this date of service. This time includes time spent by me in the following activities: preparing for the visit, reviewing tests, performing a medically appropriate examination and/or evaluation, counseling and educating the patient/family/caregiver, ordering medications, tests, or procedures and documenting information in the medical record.

## 2025-07-14 ENCOUNTER — RESULTS FOLLOW-UP (OUTPATIENT)
Dept: ONCOLOGY | Facility: CLINIC | Age: 70
End: 2025-07-14
Payer: MEDICARE

## 2025-07-14 DIAGNOSIS — R91.1 LUNG NODULE: Primary | ICD-10-CM

## 2025-07-14 NOTE — TELEPHONE ENCOUNTER
----- Message from Leonardo Smith sent at 7/13/2025  6:18 PM CDT -----  Refer to radiation oncology Re:  Assess for palliative SBRT to enlarging lung nodule  ----- Message -----  From: [facility] [place] [facility]  Sent: 7/11/2025  10:58 AM CDT  To: Leonardo Smith MD

## 2025-07-14 NOTE — TELEPHONE ENCOUNTER
Called and notified the patient, Magdiel, that Dr. Smith would like to refer him to Radiation Oncology due to a growing lung nodule. He verbalized understanding. I notified him that someone from our office the the Radiation office would be calling him for an appointment. He voiced understanding. No c/o were voiced at this time.

## 2025-07-15 PROBLEM — Z87.891 FORMER SMOKER: Status: ACTIVE | Noted: 2025-07-15

## 2025-07-15 NOTE — PROGRESS NOTES
[facility]  Radiation Oncology Clinic   Tejas Gonzalez MD, FACR  Reggie Issa APRN  _______________________________________________  [facility]  Department of Radiation Oncology  [address] [place] [id number]  Office: [phone]  Fax: [phone]    DATE: 07/17/2025  PATIENT: Parker Arnold  1955                         MEDICAL RECORD #: [id number]    REASON FOR VISIT:    No chief complaint on file.    1. Secondary malignant neoplasm of left lung    2. Malignant neoplasm metastatic to lung, unspecified laterality    3. Renal cell carcinoma of right kidney    4. Former smoker                                              REASON FOR VISIT:    No chief complaint on file.    Parker Arnold is a 70 y.o. male that has been referred to our clinic to be evaluated for consideration of SBRT to the left lung.         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     11/11/2014 - Underwent Right nephrectomy.   Renal cell carcinoma.    Clear cell type.   Lucía grade 2 nuclei.    Margins of excision free of tumor.    Extensive hemorrhage seen.    Moderate atherosclerotic change.    AJCC [id number].     07/30/2018 - [place] Review of Path:  KIDNEY, RIGHT, RADICAL NEPHRECTOMY:   RENAL CELL CARCINOMA, CLEAR CELL TYPE, SEE COMMENT.   Comments: The vast majority of the tumor demonstrate grade 3 nuclei; however, a single, small focus demonstrates grade 4. Dr. Manda Shah and Dr. Ghulam Boyd have reviewed select slides and concur with the presence of lymphovascular invasion into a major vein. Dr. Shah has also reviewed the case for the histologic grade and subtype and concurs.     10/02/2018 - Chemotherapy course:  Sutent - Discontinued due to intolerance    11/26/2018 - Right lower lobe VATS wedge resection per :  Metastatic renal cell carcinoma, 0.9 cm; margins free  of malignancy.     05/13/2019 - 08/07/2019 - Chemotherapy course:  ipilumomab/nivolumab, 05/13/2019 - 08/07/2019,     08/28/2019 - 09/09/2019 - Chemotherapy course:  Single agent nivolumab (58 months since cessation of therapy).   Therapy stopped due to renal failure (nivolumab?).   He has been on and off prednisone since.      01/16/2020 - PET scan:  No neoplastic FDG uptake within the chest, abdomen or pelvis.    07/06/2020-CT abdomen/pelvis without contrast:  The diverticulosis of the colon. Evidence of acute diverticulitis of the sigmoid colon and the adjacent distal descending colon.   No free air or fluid in the area.   A 1 cm noncalcified subpleural nodule in the left lower lobe. This needs to be further evaluated. A follow-up examination in 6 months.     08/04/2020 - CT chest without contrast:  Scattered subcentimeter bilateral pulmonary nodules. These are similar to 1/16/2020 with only a mild increase in the 5 mm left upper lobe nodule as described above. Continued follow-up recommended. No pathologic lymphadenopathy.  Mild vascular calcification.  Right nephrectomy. Left renal cyst.    01/14/2021 - CT Abdomen/pelvis without contrast:  No evidence of recurrent or metastatic disease in the abdomen or pelvis.    01/14/2021 - CT Chest without contrast:  Increased size of 1.2 cm right lower lobe superior segment pulmonary nodule (previously 0.9 cm)-representing 33% growth.    Multiple other bilateral pulmonary nodules have not significantly changed in size.    Findings are in keeping with disease progression.    01/26/2021 - PET Scan:  Minimal metabolic activity associated with the enlarging pulmonary nodule within the superior segment of the right lower lobe with a maximum SUV of 1.35.    The additional nodules noted within both lungs also demonstrate no abnormal metabolic activity which would favor benignity but continued radiographic follow-up will be suggested.    No scintigraphic evidence of metastatic  disease on current exam.    Increased uptake overlying the right groin and more distal external iliac vessels felt to be related to previous hernia repair.    02/14/2021 - Appointment with Dr. Mayito Arteaga, medical oncology at [facility]:  History reviewed.    Most recent CT chest, 01/14/2021 (above) with increasing size of 1.2 cm right lower lobe superior segment pulmonary nodule (previously 0.9 cm) and PET scan, 01/26/2021 (above) noted including minimal metabolic activity associated with the enlarging pulmonary nodule within the superior segment of the right lower lobe.    Discussion:   Biopsy right lower lobe lung nodule solitary lesion that is growing on PET scan which will be done at that institution.  C  ontemplate either surgical resection, ablation or SBRT.    We will focus on local therapy for now.    Multiple systemic options down the road but if renal function improves can contemplate trial with HIF-alpha inhibitor.    03/15/2021- CT chest without contrast:  Continued increased size of the right lower lobe superior segment now 1.4 cm pulmonary nodule (1.2 cm, 1/14/2021; 0.8 cm, 8/4/2020).    Other pulmonary nodules are stable.    New small area of groundglass opacity in the superior segment left lower lobe may be infectious/inflammatory.  Coronary artery calcifications.    Aortic calcifications.    Small hiatal hernia.    03/15/2021 - CT abdomen/pelvis without contrast:  Previous right nephrectomy identified with no evidence of intra-abdominal metastatic disease.  Benign-appearing exophytic cyst posterior aspect of the left kidney, upper pole, measuring 2.47 cm, stable.  Small nodules in the lung bases are unchanged compared to previous chest CT.  Evidence of previous right inguinal hernia repair.    Fatty infiltration of the inguinal canals.    Scattered colonic diverticula without diverticulitis.    Small sliding hiatal hernia identified.    05/27/2021- Appointment with Amber  GIOVANNY Sims with Dr. Arteaga's office:  Impression/plan:   CT CAP, 05/26/2021: Stable examination including stable bilateral lung nodules compared to 03/15/2021.  Progression of right lower lobe lesion?    Discussed systemic therapy option with cabozantinib at 20 mg.  However patient concerned due to his history of substantial AEs in the past.    Will arrange for lesion to be ablated.    However patient says multiple lesions were there for 6 years and unchanged.  Consider ablation or SBRT of the lesion and monitor and consider cabozantinib when failed.    Multiple systemic options down the road but if renal function improves can contemplate HIF-alpha inhibitor.    Continue surveillance for now.    Repeat scans in 3 months.    06/11/2021 - CT chest without contrast:  Stable pulmonary nodules.    No evidence of disease progression.    Interval resolution superior segment left lower lobe groundglass opacity.    Scattered coronary artery calcifications.    06/11/2021 - CT abdomen/pelvis without contrast:  History of right renal cell carcinoma nephrectomy.    No CT evidence of tumor recurrence or metastatic disease in the abdomen or pelvis.    09/07/2021 - CT chest without contrast:  Pulmonary nodules noted.    Slightly increased in size compared to 6/11/2021 (left upper lobe 7.2 mm-prior 6.7 mm; posterior right lung, 19 mm-prior 13.9 mm; posterior medial right lung, 14 mm-prior 11 mm; right lung, 7.7 mm-prior 7.1 mm).    However no new nodules identified.    09/07/2021 - CT abdomen/pelvis without contrast:  No evidence of intra-abdominal or pelvic metastasis.    Prior right nephrectomy.    Stable left renal cyst.    Colonic diverticulosis with questionable wall thickening of the sigmoid colon may be artifactual.  Correlation to any recent colonoscopy recommended.    09/28/2021 - PET scan:  Increased size of right lower lobe lung nodule (21 x 15 mm compared to 14 x 14 mm 3 months ago) though this finding shows no  significant FDG uptake.    Otherwise stable PET/CT exam.    11/29/2021 - RLL, RML wedge resections per :  Lung, right lower lobe, wedge resection:  Metastatic renal cell carcinoma.  Parenchymal margin free of tumor.  Lung, right middle lobe, wedge resection:  Metastatic renal cell carcinoma.  Parenchymal margin free of tumor.  Lung, right lower lobe #2, wedge resection:  Metastatic renal cell carcinoma.  Parenchymal margin free of tumor.     12/14/2021 - CT chest without contrast:  Postoperative changes from wedge resections of prior right middle and lower lobe pulmonary nodules.   Stable left-sided pulmonary nodules, largest in the left upper lobe/apex measuring 8 mm.    Continued follow-up strongly recommended.    No pathologic lymphadenopathy.    12/14/2021 - CT abdomen/pelvis without contrast:  No convincing evidence of intra-abdominal or pelvic metastasis.    New diffuse abnormal gallbladder wall thickening.  Findings may be seen with cholecystitis.    Gallbladder ultrasound might be considered.    12/20/2021 - Gallbladder ultrasound:  Siffuse gallbladder wall thickening.    Gallbladder nondistended with no visible stones.    Unlikely acute cholecystitis.    Favor chronic cholecystitis.    03/18/2022 - CT chest without contrast:  Patchy infiltrate compatible with right middle lobe pneumonia.    2 tiny left lung nodule stable.    03/18/2022 - CT abdomen/pelvis without contrast:  No evidence of metastatic disease to the abdomen/pelvis.    Prior right nephrectomy.    Colonic diverticulosis without evidence of diverticulitis.    Mild splenomegaly, unchanged.    06/24/2022 - CT Chest without contrast:  The resolution of the previously seen patchy infiltrate in the right middle lobe.   Postsurgical changes and scarring in the right upper and lower lobe.   Stable small nodules in both lungs the largest one in the left upper lobe. No change.    06/24/2022 - CT abdomen/pelvis without contrast:  No evidence of a  neoplastic process or metastatic disease. Evidence of right nephrectomy. No focal complication.   Stable left renal cyst.   Diverticulosis of the colon.   No evidence for diverticulitis.   Stable mild splenomegaly.  Enlarged prostate.    09/19/2022- CT Chest/Abdomen/Pelvis without contrast:  Stable exam.    No change in multiple bilateral pulmonary nodules measuring up to 8 mm.    No evidence of recurrent or metastatic disease in the abdomen or pelvis status post RIGHT nephrectomy.    09/26/2022 - Appointment with :  Plan:  Apprised of labs, 09/19/2022-normal CBC.  Depressed GFR (stable) otherwise normal CMP.  Apprised of CT scans of the chest, abdomen/pelvis, 09/19/2022 (above).  Stable exams.  Again discussed the option of systemic therapy with cabozantinib as outlined by Dr. Arteaga (see #5 below) previously.  Patient remains wary of starting any type of therapy given previous AE's.    Previously reviewed discharge summary, 11/30/2021.  Underwent bronchoscopy, right thoracoscopy, right lower lobe wedge resection, right middle lobe wedge resection, right lower lobe wedge basilar resection on 11/29/2021 by Dr. Tolentino.  Pathology revealed metastatic renal cell carcinoma.  Previously reviewed encounter office encounter from GIOVANNY Edward with Dr. Arteaga's office, 05/27/2021.  Impression/plan: CT CAP, 05/26/2021: Stable examination including stable bilateral lung nodules compared to 03/15/2021.  Progression of right lower lobe lesion?  Discussed systemic therapy option with cabozantinib at 20 mg.  However patient concerned due to his history of substantial AEs in the past.  Will arrange for lesion to be ablated.  However patient says multiple lesions were there for 6 years and unchanged.-Consider ablation or SBRT of the lesion and monitor and consider cabozantinib when failed.  Multiple systemic options down the road but if renal function improves can contemplate HIF-alpha inhibitor.  Continue  surveillance for now.  Repeat scans in 3 months.  Return to office in 16 weeks with preoffice CT scans of the chest, abdomen/pelvis without IV contrast (in 15 weeks), CBC with differential and CMP.    01/23/2023 - CT Chest/Abdomen/Pelvis without contrast:  Prior wedge resection within the RML/RLL.   No evidence of localized recurrence at these sites.   Numerous other pulmonary nodules are present. The majority of these nodules are stable in size and appearance from the previous exam.   There is one nodule which is subpleural in location within the lateral left upper lobe on image 52 which has an increased soft tissue component from the previous exam. This would warrant close follow-up on subsequent imaging.  No acute abnormality of the abdomen or pelvis.   Evidence of prior right nephrectomy.   No focal complication.   A left renal cyst.    01/26/2023 - Appointment with :  Plan:  Apprised of labs, 01/19/2023-normal CBC.  Depressed GFR (stable) otherwise normal CMP.  Apprised of CT scans of the chest, abdomen/pelvis, 1/23/2023 (above).  Stable exams.  Needs follow-up  Had previously discussed the option of systemic therapy with cabozantinib as outlined by Dr. Arteaga (see #5 below) previously.  Patient remains wary of starting any type of therapy given previous AE's.    Previously reviewed discharge summary, 11/30/2021.  Underwent bronchoscopy, right thoracoscopy, right lower lobe wedge resection, right middle lobe wedge resection, right lower lobe wedge basilar resection on 11/29/2021 by Dr. Tolentino.  Pathology revealed metastatic renal cell carcinoma.  Previously reviewed encounter office encounter from GIOVANNY Edward with Dr. Arteaga's office, 05/27/2021.  Impression/plan: CT CAP, 05/26/2021: Stable examination including stable bilateral lung nodules compared to 03/15/2021.  Progression of right lower lobe lesion?  Discussed systemic therapy option with cabozantinib at 20 mg.  However patient  "concerned due to his history of substantial AEs in the past.  Will arrange for lesion to be ablated.  However patient says multiple lesions were there for 6 years and unchanged.-Consider ablation or SBRT of the lesion and monitor and consider cabozantinib when failed.  Multiple systemic options down the road but if renal function improves can contemplate HIF-alpha inhibitor.  Continue surveillance for now.  Repeat scans in 3 months.  Return to office in 16 weeks with preoffice CT scans of the chest, abdomen/pelvis without IV contrast (in 15 weeks), CBC with differential and CMP.    07/20/2023 - CT Chest/Abdomen/Pelvis without contrast:   No change in multiple bilateral pulmonary nodules measuring up to 10 mm, compared to 01/23/2023. However, there has been gradual increase in pulmonary nodule size compared to more remote studies dating back to 03/18/2022. For reference, the dominant 10 mm LEFT upper lobe pulmonary nodule measured 7 mm on 03/18/2022.  Recommend continued clinical and imaging follow-up as metastatic disease remains in the differential.    07/27/2023 - Appointment with ion:  Plan:  Apprised of labs, 7/20/23-normal CBC.  Depressed GFR (stable) otherwise normal CMP.  Apprised of CT scans of the chest, abdomen/pelvis, 7/20/23 (above).  Stable exams since 1/23/ 23.  Gradual increase (up to 30% of reference nodule) sine 3/18/22.  Needs follow-up  Had previously discussed the option of systemic therapy with cabozantinib as outlined by Dr. Arteaga (see #5 below) previously.  Patient remains wary of starting any type of therapy given previous AE's.  Also does not want to return to [place].  \"I didn't get any benefit from going there.\"  Refer to Dr. Baldwin ( oncology, [place]) Re:  Management opinion  Previously reviewed encounter office encounter from GIOVANNY Edward with Dr. Arteaga's office, 05/27/2021.  Impression/plan: CT CAP, 05/26/2021: Stable examination including stable bilateral lung " nodules compared to 03/15/2021.  Progression of right lower lobe lesion?  Discussed systemic therapy option with cabozantinib at 20 mg.  However patient concerned due to his history of substantial AEs in the past.  Will arrange for lesion to be ablated.  However patient says multiple lesions were there for 6 years and unchanged.-Consider ablation or SBRT of the lesion and monitor and consider cabozantinib when failed.  Multiple systemic options down the road but if renal function improves can contemplate HIF-alpha inhibitor.  Continue surveillance for now.  Repeat scans in 3 months.  Return to office in 16 weeks with preoffice CT scans of the chest, abdomen/pelvis without IV contrast (in 15 weeks), CBC with differential and CMP.    09/05/2023 - Appointment with Dr. Juan A Cannon:  A/P:   68-year-old male with metastatic ccRCC on active surveillance initially seen in [facility], 8/17/2023 for second opinion.  Discussed pathology, imaging, treatment options and prognosis in detail.  Images reviewed  tumor board that agreed with very slow-growing pulmonary nodules likely metastatic.    Discussed systemic therapy options including rechallenging IO, cabozantinib,mTOR, Belzutifan.    Clinical trials are not our institution discussed but likely will not be a candidate given his renal dysfunction.    Patient strongly prefers not to start systemic therapy unless absolutely necessary to avoid further renal dysfunction and other side effects.    Given slow growth, I think it is reasonable to hold off on systemic therapy continue surveillance for now.  Local therapy can be discussed to the dominant lesions based on future scans.    I do think he will ultimately need to get back on systemic therapy    11/20/2023 - CT Chest/Abdomen/Pelvis without contrast:  Multiple bilateral new and enlarging pulmonary nodules measuring up to 10 mm. Findings are highly concerning for metastatic disease.    Mild dilatation of the  "ascending artery measuring 4 cm diameter.   No evidence of recurrent or metastatic disease in the abdomen or pelvis status post right nephrectomy on this unenhanced exam.   Heavy sigmoid diverticulosis without evidence of diverticulitis.     11/27/2023 - Appointment with :  Plan:  Apprised of labs, 11/21 and 11/20/23-normal CBC.  K+5.4 (repeat 4.8), GFR 34 (stable) otherwise normal CMP.  Apprised of CT scans of the chest, abdomen/pelvis, 11/20/23 (above).  Progressive lung nodules  Had previously discussed the option of systemic therapy with cabozantinib as outlined by Dr. Arteaga (see #5 below) previously.  Patient remains wary of starting any type of therapy given previous AE's.  Also does not want to return to [place].  \"I didn't get any benefit from going there.\"  Pls obtain consult note from Dr. Cannon ( oncology, [facility]) last 8/2023.  Note pending but patient recalls, \"They talked about radiation if possible.\"  Says not eligible for their clinical trials due to poor kidney function.  Has follow-up on 12/7/23  Previously reviewed encounter office encounter from GIOVANNY Edward with Dr. Arteaga's office, 05/27/2021.  Impression/plan: CT CAP, 05/26/2021: Stable examination including stable bilateral lung nodules compared to 03/15/2021.  Progression of right lower lobe lesion?  Discussed systemic therapy option with cabozantinib at 20 mg.  However patient concerned due to his history of substantial AEs in the past.  Will arrange for lesion to be ablated.  However patient says multiple lesions were there for 6 years and unchanged.-Consider ablation or SBRT of the lesion and monitor and consider cabozantinib when failed.  Multiple systemic options down the road but if renal function improves can contemplate HIF-alpha inhibitor.  Continue surveillance for now.  Repeat scans in 3 months.  Return to office in 12 weeks with preoffice CT scans of the chest, abdomen/pelvis without IV contrast (in 11 weeks), " "CBC with differential and CMP.    12/07/2023 - Appointment with Dr. Cannon:   A/P:   Discussed pathology, imaging, treatment options and prognosis in detail with him.  Images reviewed and agree with slow-growing pulmonary nodules that are likely metastatic.    Discussed systemic therapy options including rechallenging IO, cabozantinib, mTOR, Belzutifan, clinical trials at our institution discussed likely will not be a candidate given his renal dysfunction.  \"Both although slow, recommend cabozantinib.    Patient really concerned about side effects given his past experience with sunitinib.    I suggest start with cabozantinib 20 mg p.o. daily and titrate up to 40 mg based on tolerability.    No dose modifications needed for renal dysfunction.    RTC as needed    01/23/2024 - Appointment with :  Plan:  Apprised of labs, 12/7/23 and 1/23/24 -normal CBC. GFR 29 otherwise normal CMP.  Previously apprised of CT scans of the chest, abdomen/pelvis, 11/20/23 (above).  Progressive lung nodules  Follow-up Dr. Cannon ( oncology, [facility]), 12/7/23: A/P: Discussed pathology, imaging, treatment options and prognosis in detail with him.  Images reviewed and agree with slow-growing pulmonary nodules that are likely metastatic.  Discussed systemic therapy options including rechallenging IO, cabozantinib, mTOR, Belzutifan, clinical trials at our institution discussed likely will not be a candidate given his renal dysfunction.  Recommend cabozantinib.  Patient really concerned about side effects given his past experience with sunitinib.  I suggest start with cabo 20 mg p.o. daily and titrate up to 40 mg based on tolerability.  No dose modifications needed for renal dysfunction.  RTC as needed  Review consult note, 8/17/23  from Dr. Cannon ( oncology, [facility])-A/P: ccRCC: Pathology, imaging, treatment options and prognosis discussed in detail.  Images reviewed,  tumor board agrees with slow-growing pulmonary " nodules likely metastatic.  Discussed systemic therapy options including rechallenging IO, cabozantinib,mTOR, Belzutifan.  Clinical trials are not our institution discussed but likely will not be a candidate given his renal dysfunction.  Patient strongly prefers not to start systemic therapy unless absolutely necessary to avoid further renal dysfunction and other side effects.  Given slow growth, I think it is reasonable to hold off on systemic therapy continue surveillance for now.  Local therapy can be discussed to the dominant lesions based on future scans.  I do think he will ultimately need to get back on systemic therapy.  Previously reviewed encounter office encounter from GIOVANNY Edward with Dr. Arteaga's office, 05/27/2021.  Impression/plan: CT CAP, 05/26/2021: Stable examination including stable bilateral lung nodules compared to 03/15/2021.  Progression of right lower lobe lesion?  Discussed systemic therapy option with cabozantinib at 20 mg.  However patient concerned due to his history of substantial AEs in the past.  Will arrange for lesion to be ablated.  However patient says multiple lesions were there for 6 years and unchanged.-Consider ablation or SBRT of the lesion and monitor and consider cabozantinib when failed.  Multiple systemic options down the road but if renal function improves can contemplate HIF-alpha inhibitor.  Continue surveillance for now.  Repeat scans in 3 months.   Re: The potential toxicities of cabozantinib (to include but not limited to: Arterial/venous thromboembolism, hypertension, hypertensive crisis, severe hemorrhage, arterial aneurysm/dissection/rupture, impaired wound healing, GI perforation, fistula formation, severe diarrhea, hand-foot syndrome, hepatotoxicity (in combination with nivolumab), nephrotic syndrome, adrenal insufficiency (in combination with nivolumab), hypothyroidism, osteonecrosis of the jaw, posterior leukoencephalopathy syndrome, QT  "prolongation, hypocalcemia, neutropenia, diarrhea, fatigue, decreased appetite, hypertension, nausea, vomiting, decreased weight, rash, stomatitis, dysphonia, asthenia, dysgeusia, hypocalcemia, dyspnea, hypothyroidism, extremity pain, dyspepsia, constipation, muscle spasms, proteinuria, abdominal pain, thromboembolism, cough, anemia, hemorrhage, headache, arthralgia, xeroderma, dizziness, increased LDH, hypoalbuminemia, increased alk phos, decreased platelets, hypophosphatemia, hypomagnesemia).  Questions answered.  He agrees to trial therapy.  Rx treatment   Cabozantinib 20 mg po daily # 30 x 11 RF  Zofran 8 mg po tid prn # 30  Upon initiation of treatment: TSH, T4, CMP and CBC weekly on Mondays. Hold if creatinine > 1.5, total bilirubin > 1.5, AST/ALT > 3x ULN, TSH <0.5 or > 2. Administer Procrit 40,000 units subcutaneously if hemoglobin < 10 or hematocrit < 30. Neupogen 480 mcg sc x 3 days if ANC < 1.0   Return to office in 6 weeks with preoffice CT scans of the chest, abdomen/pelvis without IV contrast (in 5 weeks), CBC with differential and CMP.     02/20/2024 - CT chest without contrast:  Mild to moderate increase in size of several pulmonary nodules as detailed above.   A few nodules are stable.   Another follow-up examination in 6 months is recommended.   Scattered areas of scarring in the right upper/middle and right lower lobe similar to the previous study.    02/20/2024- CT abdomen/pelvis without contrast:  No acute abnormality of the abdomen or pelvis, particularly, no finding to suggest neoplastic process/metastatic disease.   Diverticulosis of the colon.   No evidence for diverticulitis. A stable left renal cyst.   The prior right nephrectomy.   No focal or regional complication.    02/21/2024 - 04/02/2024 - Chemotherapy course:  Palliative cabozantinib  Patient decided to stop the drug due to intolerable AEs (Hypertension and feeling \"clumsy, possibly dizzy/lightheaded, fatigue, constipation, " diarrhea)    02/27/2024 - Appointment with :  Plan:  Apprised of labs, 2/20/24 and 2/27/24 -normal CBC. GFR 29 otherwise normal CMP. Normal TSH/T4  Cabozantinib tolerance:  At his visit on 1/23/24 palliative cabozantinib was sent in but due to insurance issues did not receive the drug until 2/21/24.  +hypertension.  Started on Losartan on 2/26.  Some fatigue, no  arterial/venous thromboembolism, no bleeding, no GI symptoms (belly pain, viscus perforation, fistula formation, severe diarrhea) no rash (hand-foot syndrome), no jaundice (hepatotoxicity), no edema (nephrotic syndrome), no jaw pains (osteonecrosis of the jaw), no decreased appetite, no nausea, no vomiting, no stomatitis, no dysphonia, no asthenia, no dysgeusia, no dyspnea, no extremity pain, no dyspepsia, no constipation, no muscle spasms, no cough, no headache, no arthralgia, no xeroderma, no dizziness  Apprised of CT CAP, 2/20/24 (above). Mild to moderate increase in size of several pulmonary nodules  Previously reviewed follow-up Dr. Cannon ( oncology, [facility]), 12/7/23: A/P: Discussed pathology, imaging, treatment options and prognosis in detail with him.  Images reviewed and agree with slow-growing pulmonary nodules that are likely metastatic.  Discussed systemic therapy options including rechallenging IO, cabozantinib, mTOR, Belzutifan, clinical trials at our institution discussed likely will not be a candidate given his renal dysfunction.  Recommend cabozantinib.  Patient really concerned about side effects given his past experience with sunitinib.  I suggest start with cabo 20 mg p.o. daily and titrate up to 40 mg based on tolerability.  No dose modifications needed for renal dysfunction.  RTC as needed   Re: The potential toxicities of cabozantinib (to include but not limited to: Arterial/venous thromboembolism, hypertension, hypertensive crisis, severe hemorrhage, arterial aneurysm/dissection/rupture, impaired  wound healing, GI perforation, fistula formation, severe diarrhea, hand-foot syndrome, hepatotoxicity (in combination with nivolumab), nephrotic syndrome, adrenal insufficiency (in combination with nivolumab), hypothyroidism, osteonecrosis of the jaw, posterior leukoencephalopathy syndrome, QT prolongation, hypocalcemia, neutropenia, diarrhea, fatigue, decreased appetite, hypertension, nausea, vomiting, decreased weight, rash, stomatitis, dysphonia, asthenia, dysgeusia, hypocalcemia, dyspnea, hypothyroidism, extremity pain, dyspepsia, constipation, muscle spasms, proteinuria, abdominal pain, thromboembolism, cough, anemia, hemorrhage, headache, arthralgia, xeroderma, dizziness, increased LDH, hypoalbuminemia, increased alk phos, decreased platelets, hypophosphatemia, hypomagnesemia).  Questions answered.  He agrees to press on  Rx treatment   Cabozantinib 20 mg po daily # 30 x 11 RF  Zofran 8 mg po tid prn # 30  Upon initiation of treatment: TSH, T4, CMP and CBC weekly on Mondays. Hold if creatinine > 1.5, total bilirubin > 1.5, AST/ALT > 3x ULN, TSH <0.5 or > 2. Administer Procrit 40,000 units subcutaneously if hemoglobin < 10 or hematocrit < 30. Neupogen 480 mcg sc x 3 days if ANC < 1.0   Return to office in 6 weeks with preoffice CT scans of the chest, abdomen/pelvis without IV contrast (in 5 weeks), CBC with differential, CMP, PO, magnesium.    04/02/2024 - CT chest without contrast:  Bilateral pulmonary nodules appear relatively stable, as described above. There are no new pulmonary nodules.   Prior wedge resections on the right. Mild atheromatous disease of the thoracic aorta. Coronary artery calcification is noted.   Ascending thoracic aorta is dilated measuring 4 cm. Areas of ill-defined low density within the visualized liver.   Difficult to tell if this represents metastatic disease or geographic fatty infiltration on the nonenhanced images.   Follow-up hepatic mass protocol CT or MRI  "recommended.    04/02/2024 - CT abdomen/pelvis without contrast:  Large areas of low-attenuation within the liver likely representing geographic fatty infiltration. Metastatic disease is felt to be less likely but not entirely excluded as this finding is not present on 1/23/2023.   Hepatic mass protocol CT or MRI is recommended to further evaluate. If the patient cannot receive IV contrast for clinical reasons, noncontrast MRI would be more beneficial.   A 3 cm left renal cyst. Prior right nephrectomy.   Small hiatal hernia.   Diverticulosis of the colon.   Under distention of stomach and colon.   Normal appendix.   Small fat-containing inguinal hernias bilaterally.     04/09/2024 - Appointment with :  Plan:  Apprised of labs, 4/1/24 and 4/9/24 -normal CBC. GFR 25.5, glucose 134, otherwise stable CMP. Normal TSH/T4  Cabozantinib tolerance: +hypertension.  Started on varying doses of Losartan on 2/26, added Coreg 6.25 bid and HCTZ in conjunction with nephrology.  According to notes from specialty pharmacy, 3/12, 3/18, 4/2/24-. Subjective BP numbers reported: 175/105, 158/98, 170/88.  Reported he felt \"clumsy, possibly dizzy/lightheaded, fatigue, constipation, diarrhea\".  No  arterial/venous thromboembolism, no bleeding, no other GI symptoms (belly pain, viscus perforation, fistula formation, severe diarrhea) no rash (hand-foot syndrome), no jaundice (hepatotoxicity), no edema (nephrotic syndrome), no jaw pains (osteonecrosis of the jaw), no decreased appetite, no nausea, no vomiting, no stomatitis, no dysphonia, no asthenia, no dysgeusia, no dyspnea, no extremity pain, no dyspepsia, no muscle spasms, no cough, no headache, no arthralgia, no xeroderma.  Discussed with Neftaly Solis PharmD on 4/2/24 who discussed with patient (face to face) on that day (see phone note):  Instructed to hold the drug for at least a week, monitor BP, use stool softeners.  Says all the symptoms have resolved off the drug.  " Will consider resuming the drug qod schedule.  The patient however wants to stay off the drug.    Apprised of CT CAP, 4/2/24 (above).  Stable pulmonary nodules. Infiltration of liver.  Metastatic disease less likely but not entirely excluded.  Hepatic mass protocol CT or MRI recommended.  Keep appointment for noncontrast MRI of the abdomen  Previously reviewed follow-up Dr. Cannon ( oncology, [facility]), 12/7/23: A/P: Discussed pathology, imaging, treatment options and prognosis in detail with him.  Images reviewed and agree with slow-growing pulmonary nodules that are likely metastatic.  Discussed systemic therapy options including rechallenging IO, cabozantinib, mTOR, Belzutifan, clinical trials at our institution discussed likely will not be a candidate given his renal dysfunction.  Recommend cabozantinib.  Patient really concerned about side effects given his past experience with sunitinib.  I suggest start with cabo 20 mg p.o. daily and titrate up to 40 mg based on tolerability.  No dose modifications needed for renal dysfunction.  RTC as needed   Re: The potential toxicities of cabozantinib (to include but not limited to: Arterial/venous thromboembolism, hypertension, hypertensive crisis, severe hemorrhage, arterial aneurysm/dissection/rupture, impaired wound healing, GI perforation, fistula formation, severe diarrhea, hand-foot syndrome, hepatotoxicity (in combination with nivolumab), nephrotic syndrome, adrenal insufficiency (in combination with nivolumab), hypothyroidism, osteonecrosis of the jaw, posterior leukoencephalopathy syndrome, QT prolongation, hypocalcemia, neutropenia, diarrhea, fatigue, decreased appetite, hypertension, nausea, vomiting, decreased weight, rash, stomatitis, dysphonia, asthenia, dysgeusia, hypocalcemia, dyspnea, hypothyroidism, extremity pain, dyspepsia, constipation, muscle spasms, proteinuria, abdominal pain, thromboembolism, cough, anemia, hemorrhage,  "headache, arthralgia, xeroderma, dizziness, increased LDH, hypoalbuminemia, increased alk phos, decreased platelets, hypophosphatemia, hypomagnesemia).  Questions answered.  He now wants to stop the drug.  \"It took away my QOL.\"  Schedule follow-up nephrology ASAP to taper BP meds.  Return to office in 12 weeks with preoffice CT scans of the chest, abdomen/pelvis without IV contrast (in 11 weeks), CBC with differential, CMP, PO, magnesium.    07/05/2024 - CT chest without contrast:  Several of the pulmonary nodules are slightly larger than on the exam from April 2, 2024 but very similar in size when compared to the exam from 2/20/2024. I don't see any new pulmonary nodules.    07/05/2024 - CT abdomen/pelvis without contrast:  Evidence of prior right nephrectomy. No finding to suggest local recurrence or neoplastic process or metastatic disease in the abdomen or pelvis.  Diverticulosis of the colon. No evidence for diverticulitis.  A stable 3 cm left renal cyst no change. Geographic fatty infiltration of the liver is poorly and barely visible in the present study. No discrete intrahepatic mass.     07/09/2024 - Appointment with :  Plan:  Apprised of labs, 7/5/24 -normal CBC. GFR 28.6, glucose 107, otherwise stable CMP. Normal TSH/T4  Apprised of CT CAP, 7/5/24 (above). Stable overall  Cabozantinib tolerance: +hypertension.  Started on varying doses of Losartan on 2/26, added Coreg 6.25 bid and HCTZ in conjunction with nephrology.  According to notes from specialty pharmacy, 3/12, 3/18, 4/2/24-. Subjective BP numbers reported: 175/105, 158/98, 170/88.  Reported he felt \"clumsy, possibly dizzy/lightheaded, fatigue, constipation, diarrhea\".  No  arterial/venous thromboembolism, no bleeding, no other GI symptoms (belly pain, viscus perforation, fistula formation, severe diarrhea) no rash (hand-foot syndrome), no jaundice (hepatotoxicity), no edema (nephrotic syndrome), no jaw pains (osteonecrosis of the " "jaw), no decreased appetite, no nausea, no vomiting, no stomatitis, no dysphonia, no asthenia, no dysgeusia, no dyspnea, no extremity pain, no dyspepsia, no muscle spasms, no cough, no headache, no arthralgia, no xeroderma.  Discussed with Neftaly Solis PharmD on 4/2/24 who discussed with patient (face to face) on that day (see phone note):  Instructed to hold the drug for at least a week, monitor BP, use stool softeners.  Says all the symptoms have resolved off the drug.  We discussed resuming the drug qod schedule.  The patient wanted to stop the drug permanently (had stopped since 4/2/24).  \"It took away my QOL\"  Previously reviewed follow-up Dr. Cannon ( oncology, [facility]), 12/7/23: A/P: Discussed pathology, imaging, treatment options and prognosis in detail with him.  Images reviewed and agree with slow-growing pulmonary nodules that are likely metastatic.  Discussed systemic therapy options including rechallenging IO, cabozantinib, mTOR, Belzutifan, clinical trials at our institution discussed likely will not be a candidate given his renal dysfunction.  Recommend cabozantinib.  Patient really concerned about side effects given his past experience with sunitinib.  I suggest start with cabo 20 mg p.o. daily and titrate up to 40 mg based on tolerability.  No dose modifications needed for renal dysfunction.  RTC as needed  Return to office in 24 weeks (per patient request) with preoffice CT scans of the chest, abdomen/pelvis without IV contrast (in 11 weeks), CBC with differential, CMP, PO, magnesium.    01/02/2025 - CT Chest/Abdomen/Pelvis without contrast:  Mildly increased size and number of pulmonary nodules compared to 7/5/2024. Scattered coronary artery calcifications.   No acute abnormality of the abdomen or pelvis, particularly, no finding to suggest neoplastic process or metastatic disease.   Evidence of a prior right nephrectomy. No focal/regional complication/recurrence.   A persistent " "moderate splenomegaly.   A poorly defined low-density nodule in the spleen which was not noted in the previous study which may partly be due to lack of contrast enhancement?. This may represent a small cyst or hemangioma.   Diverticulosis of the colon.   No evidence for diverticulitis.      01/07/2025 - Appointment with :  Plan:  Apprised of labs, 1/2/25 -normal CBC. GFR 33 (prior: 28.6), glucose 106, otherwise stable CMP. Normal TSH/T4  Apprised of CT CAP, 1/2/25 (above). Mildly increased size and number of pulmonary nodules compared to 7/5/2024 otherwise BOYD  Previously discussed cabozantinib tolerance: +hypertension.  Started on varying doses of Losartan on 2/26, added Coreg 6.25 bid and HCTZ in conjunction with nephrology.  According to notes from specialty pharmacy, 3/12, 3/18, 4/2/24-. Subjective BP numbers reported: 175/105, 158/98, 170/88.  Reported he felt \"clumsy, possibly dizzy/lightheaded, fatigue, constipation, diarrhea\".  No  arterial/venous thromboembolism, no bleeding, no other GI symptoms (belly pain, viscus perforation, fistula formation, severe diarrhea) no rash (hand-foot syndrome), no jaundice (hepatotoxicity), no edema (nephrotic syndrome), no jaw pains (osteonecrosis of the jaw), no decreased appetite, no nausea, no vomiting, no stomatitis, no dysphonia, no asthenia, no dysgeusia, no dyspnea, no extremity pain, no dyspepsia, no muscle spasms, no cough, no headache, no arthralgia, no xeroderma.  Discussed with Neftaly Solis, Greg on 4/2/24 who discussed with patient (face to face) on that day (see phone note):  Instructed to hold the drug for at least a week, monitor BP, use stool softeners.  Says all the symptoms have resolved off the drug.  We discussed resuming the drug qod schedule.  The patient wanted to stop the drug permanently (had stopped since 4/2/24).  \"It took away my QOL\".  Today he again reiterates that he does not want to get back on any type of systemic therapy.  "   Previously reviewed follow-up Dr. Cannon ( oncology, [facility]), 12/7/23: A/P: Discussed pathology, imaging, treatment options and prognosis in detail with him.  Images reviewed and agree with slow-growing pulmonary nodules that are likely metastatic.  Discussed systemic therapy options including rechallenging IO, cabozantinib, mTOR, Belzutifan, clinical trials at our institution discussed likely will not be a candidate given his renal dysfunction.  Recommend cabozantinib.  Patient really concerned about side effects given his past experience with sunitinib.  I suggest start with cabo 20 mg p.o. daily and titrate up to 40 mg based on tolerability.  No dose modifications needed for renal dysfunction.  RTC as needed  Return to office in 24 weeks (per patient request) with preoffice CT scans of the chest, abdomen/pelvis without IV contrast (in 11 weeks), CBC with differential, CMP, PO, magnesium.    07/11/2025 - CT Chest without contrast:  Evidence of disease progression. Increased size LEFT lower lobe pulmonary nodule now 13 mm, previously 7 mm on 1/2/2025. The other 3 small pulmonary nodules are similar compared to prior.    07/11/2025 - CT Abdomen/Pelvis without contrast:  Exophytic cortical cyst left kidney. Left kidney is otherwise homogeneous in density. No nephrolithiasis or obstructive uropathy. Previous right nephrectomy.  Mild constipation. Scattered diverticula within the colon with no diverticulitis. No mechanical obstruction. Normal appendix.  Prostate gland is mildly enlarged. Mild prominence of the urinary bladder wall could be related to an element of bladder outlet obstruction and muscular hypertrophy.  Bilateral fat-containing inguinal hernias as well as a fat-containing periumbilical hernia.  No radiographic evidence of metastatic disease to the abdomen or pelvis.    07/14/2025 - Documentation per :  Refer to radiation oncology Re: Assess for palliative SBRT to enlarging  "lung nodule     07/18/2025 - Scheduled appointment with .    History obtained from  PATIENT, FAMILY, and CHART    PAST MEDICAL HISTORY  Past Medical History:   Diagnosis Date    Cancer of kidney     Dehydration 12/30/2019    GERD (gastroesophageal reflux disease)     Hypertension     Hypertension due to drug 02/24/2024    Cabometyx    Kidney disease, chronic, stage III (moderate, EGFR 30-59 ml/min) 2018    Post infusion of Optivo, states GFR is 28%    Nephritis and nephropathy 01/02/2020    Renal cell carcinoma     Renal cell carcinoma of right kidney 10/18/2019    Overview:  Diagnoses 11/11/14. Path report in [facility] at [facility]. Tissue has been requested.     Stroke     States \"I had a mini stroke several years ago.\"    Toxicity, chemicals 01/02/2020      PAST SURGICAL HISTORY  Past Surgical History:   Procedure Laterality Date    BRONCHOSCOPY N/A 11/29/2021    Procedure: BRONCHOSCOPY;  Surgeon: Jarrod Tolentino MD;  Location: [facility] OR;  Service: Cardiothoracic;  Laterality: N/A;    CHOLECYSTECTOMY WITH INTRAOPERATIVE CHOLANGIOGRAM N/A 12/22/2021    Procedure: LAPAROSCOPIC CHOLECYSTECTOMY;  Surgeon: Vandana Calhoun MD;  Location: [facility] OR;  Service: General;  Laterality: N/A;    COLONOSCOPY N/A 4/18/2022    Procedure: COLONOSCOPY WITH ANESTHESIA;  Surgeon: Zoila Robison MD;  Location: [facility] ENDOSCOPY;  Service: Gastroenterology;  Laterality: N/A;  pre colon cancer  post  Dr. Osborne    HERNIA REPAIR  2008    LUNG BIOPSY  11/2018    NEPHRECTOMY Right 2014    SINUS SURGERY  2005    THORACOSCOPY Right 11/29/2021    Procedure: RIGHT THORACOSCOPY WITH RIGHT MIDDLE AND RIGHT LOWER LOBE WEDGE RESECTIONS;  Surgeon: Jarrod Tolentino MD;  Location: [facility] OR;  Service: Cardiothoracic;  Laterality: Right;      FAMILY HISTORY  family history includes Cancer in his father; Heart disease in his maternal grandfather and maternal grandmother; No Known Problems in his paternal grandfather, " paternal grandmother, sister, and sister; Other in his mother.    SOCIAL HISTORY  Social History     Tobacco Use    Smoking status: Former     Current packs/day: 0.00     Average packs/day: 1 pack/day for 22.0 years (22.0 ttl pk-yrs)     Types: Cigarettes     Start date:      Quit date:      Years since quittin.5    Smokeless tobacco: Never   Vaping Use    Vaping status: Never Used   Substance Use Topics    Alcohol use: Not Currently    Drug use: No     ALLERGIES  Amoxicillin, Contrast dye (echo or unknown ct/mr), Opdivo [nivolumab], Penicillins, Clindamycin/lincomycin, Doxycycline, Hepatitis b virus vaccines, Hepatitis b vaccine, Latex, and Levofloxacin     MEDICATIONS    Current Outpatient Medications:     acetaminophen (TYLENOL) 500 MG tablet, Take 1 tablet by mouth Every 6 (Six) Hours As Needed for Mild Pain., Disp: , Rfl:     allopurinol (ZYLOPRIM) 100 MG tablet, Take 1 tablet by mouth Daily., Disp: , Rfl:     calcium carb-cholecalciferol 600-800 MG-UNIT tablet, Take 1 tablet by mouth 2 (Two) Times a Day With Meals., Disp: , Rfl:     carvedilol (COREG) 6.25 MG tablet, Take 1 tablet by mouth 2 (Two) Times a Day With Meals., Disp: , Rfl:     finasteride (PROSCAR) 5 MG tablet, Take 1 tablet by mouth Daily., Disp: , Rfl: 3    loratadine (CLARITIN) 10 MG tablet, Take 1 tablet every day by oral route., Disp: , Rfl:     predniSONE (DELTASONE) 5 MG tablet, Take 0.5 tablets by mouth Daily., Disp: , Rfl:     sodium bicarbonate 650 MG tablet, Take 1 tablet by mouth 3 (Three) Times a Day., Disp: 90 tablet, Rfl: 2    The following portions of the patient's history were reviewed and updated as appropriate: allergies, current medications, past family history, past medical history, past social history, past surgical history and problem list.    REVIEW OF SYSTEMS  Review of Systems   Constitutional:  Positive for fatigue.   HENT:  Negative.     Eyes:         Glasses    Respiratory: Negative.     Cardiovascular:  "Negative.    Gastrointestinal: Negative.    Endocrine: Negative.    Genitourinary: Negative.     Musculoskeletal: Negative.    Skin: Negative.    Neurological: Negative.    Hematological: Negative.    Psychiatric/Behavioral: Negative.       Implant: None.     PHYSICAL EXAM  VITAL SIGNS:   Vitals:    07/17/25 0926   BP: 163/95   Pulse: 63   SpO2: 98%  Comment: room air   Weight: 92.4 kg (203 lb 11.2 oz)   Height: 177.8 cm (70\")   PainSc: 0-No pain     Physical Exam  Vitals and nursing note reviewed. Exam conducted with a chaperone present.   Constitutional:       General: He is not in acute distress.     Appearance: Normal appearance. He is normal weight.   Cardiovascular:      Rate and Rhythm: Normal rate and regular rhythm.      Heart sounds: Normal heart sounds.   Pulmonary:      Effort: Pulmonary effort is normal.      Breath sounds: Normal breath sounds.   Abdominal:      General: There is no distension.      Palpations: Abdomen is soft.      Tenderness: There is no abdominal tenderness.   Musculoskeletal:         General: Normal range of motion.   Neurological:      General: No focal deficit present.      Mental Status: He is alert and oriented to person, place, and time.   Psychiatric:         Mood and Affect: Mood normal.         Behavior: Behavior normal.         Thought Content: Thought content normal.         Judgment: Judgment normal.          Performance Status: ECOG (1) Restricted in physically strenuous activity, ambulatory and able to do work of light nature    Clinical Quality Measures  - Pain Documented by Standardized Tool, FPS  Parker Arnold reports a pain score of 0.  Given his pain assessment as noted, treatment options were discussed and the following options were decided upon as a follow-up plan to address the patient's pain: N/A.    - Body Mass Index Screening and Follow-Up Plan  BMI is >= 25 and <30. (Overweight) The following options were offered after discussion;: none (medical " contraindication)    - Tobacco Use: Screening and Cessation Intervention  Social History    Tobacco Use      Smoking status: Former        Packs/day: 0.00        Years: 1 pack/day for 22.0 years (22.0 ttl pk-yrs)        Types: Cigarettes        Start date:         Quit date:         Years since quittin.5      Smokeless tobacco: Never    - Advanced Care Planning Advance Care Planning   ACP discussion was held with the patient during this visit. Patient does not have an advance directive, information provided.    - PHQ-2 Depression Screening:  Little interest or pleasure in doing things? Not at all   Feeling down, depressed, or hopeless? Not at all   PHQ-2 Total Score 0     ASSESSMENT AND PLAN  1. Secondary malignant neoplasm of left lung    2. Malignant neoplasm metastatic to lung, unspecified laterality    3. Renal cell carcinoma of right kidney    4. Former smoker      Orders Placed This Encounter   Procedures    NM PET/CT Skull Base to Mid Thigh     RECOMMENDATIONS: Parker Arnold is a 70-year-old male with a good performance status diagnosed with indolent, metastatic renal cell carcinoma with a asymptomatic lesion progressing in the left lung.  He is status post 2 right lung resections for metastatic renal cell carcinoma.  He continues on systemic therapy and is doing well.  Targeted definitive therapy using SBRT to the left lung as recommended.    The indications and rationale of lung stereotactic radiation therapy according to the NCCN Guidelines has been discussed today. I have extensively reviewed the risks, benefits and alternatives of therapy with this diagnosis. The risks of radiation therapy includes but is not limited to radiation induced pulmonary fibrosis, progression of disease in spite of therapy with either local or systemic failure. I have seen, examined and reviewed this patient's medication list, appropriate labs and imaging studies as well as other physician notes. We  discussed the goals and plans of care with the patient and family and answered all questions.     Pulmonary function tests have been reviewed.     Following this discussion and in consideration of the diagnostic data/evaluation of the patient, I recommended a course of targeted SBRT to the the left lung to a planned total dose of 6000 cGy in 3 fractions. Continue ongoing management per primary care physician and other specialists.     Thank you for allowing me to assist in this patients care.     Return in about 2 weeks (around 7/31/2025), or CT Simulation.     Time Spent: I spent 60 minutes caring for Parker on this date of service. This time includes time spent by me in the following activities: preparing for the visit, reviewing tests, obtaining and/or reviewing a separately obtained history, performing a medically appropriate examination and/or evaluation, counseling and educating the patient/family/caregiver, ordering medications, tests, or procedures, and documenting information in the medical record.   Buddy Gonzalez MD  07/17/2025

## 2025-07-16 ENCOUNTER — HOSPITAL ENCOUNTER (OUTPATIENT)
Dept: RADIATION ONCOLOGY | Facility: HOSPITAL | Age: 70
Setting detail: RADIATION/ONCOLOGY SERIES
End: 2025-07-16
Payer: MEDICARE

## 2025-07-17 ENCOUNTER — CONSULT (OUTPATIENT)
Age: 70
End: 2025-07-17
Payer: MEDICARE

## 2025-07-17 VITALS
OXYGEN SATURATION: 98 % | HEIGHT: 70 IN | SYSTOLIC BLOOD PRESSURE: 163 MMHG | DIASTOLIC BLOOD PRESSURE: 95 MMHG | HEART RATE: 63 BPM | BODY MASS INDEX: 29.16 KG/M2 | WEIGHT: 203.7 LBS

## 2025-07-17 DIAGNOSIS — Z87.891 FORMER SMOKER: ICD-10-CM

## 2025-07-17 DIAGNOSIS — C64.1 RENAL CELL CARCINOMA OF RIGHT KIDNEY: ICD-10-CM

## 2025-07-17 DIAGNOSIS — C78.00 MALIGNANT NEOPLASM METASTATIC TO LUNG, UNSPECIFIED LATERALITY: ICD-10-CM

## 2025-07-17 DIAGNOSIS — C78.02 SECONDARY MALIGNANT NEOPLASM OF LEFT LUNG: Primary | ICD-10-CM

## 2025-07-17 PROCEDURE — 1159F MED LIST DOCD IN RCRD: CPT | Performed by: RADIOLOGY

## 2025-07-17 PROCEDURE — G0463 HOSPITAL OUTPT CLINIC VISIT: HCPCS | Performed by: RADIOLOGY

## 2025-07-17 PROCEDURE — 3080F DIAST BP >= 90 MM HG: CPT | Performed by: RADIOLOGY

## 2025-07-17 PROCEDURE — 1160F RVW MEDS BY RX/DR IN RCRD: CPT | Performed by: RADIOLOGY

## 2025-07-17 PROCEDURE — 1126F AMNT PAIN NOTED NONE PRSNT: CPT | Performed by: RADIOLOGY

## 2025-07-17 PROCEDURE — 99205 OFFICE O/P NEW HI 60 MIN: CPT | Performed by: RADIOLOGY

## 2025-07-17 PROCEDURE — 3077F SYST BP >= 140 MM HG: CPT | Performed by: RADIOLOGY

## 2025-07-18 ENCOUNTER — OFFICE VISIT (OUTPATIENT)
Dept: ONCOLOGY | Facility: CLINIC | Age: 70
End: 2025-07-18
Payer: MEDICARE

## 2025-07-18 VITALS
SYSTOLIC BLOOD PRESSURE: 136 MMHG | RESPIRATION RATE: 18 BRPM | HEART RATE: 74 BPM | TEMPERATURE: 97.7 F | OXYGEN SATURATION: 98 % | DIASTOLIC BLOOD PRESSURE: 82 MMHG | HEIGHT: 70 IN | WEIGHT: 205.7 LBS | BODY MASS INDEX: 29.45 KG/M2

## 2025-07-18 DIAGNOSIS — C64.1 RENAL CELL CARCINOMA OF RIGHT KIDNEY: Primary | ICD-10-CM

## 2025-07-22 PROBLEM — Q33.9 LUNG ANOMALY: Status: ACTIVE | Noted: 2018-05-11

## 2025-07-22 PROBLEM — C78.02 SECONDARY MALIGNANT NEOPLASM OF LEFT LUNG: Status: ACTIVE | Noted: 2019-10-18

## 2025-07-22 PROCEDURE — 77470 SPECIAL RADIATION TREATMENT: CPT | Performed by: RADIOLOGY

## 2025-08-01 ENCOUNTER — HOSPITAL ENCOUNTER (OUTPATIENT)
Dept: CT IMAGING | Facility: HOSPITAL | Age: 70
Discharge: HOME | End: 2025-08-01
Payer: MEDICARE

## 2025-08-01 DIAGNOSIS — C78.00 MALIGNANT NEOPLASM METASTATIC TO LUNG, UNSPECIFIED LATERALITY: ICD-10-CM

## 2025-08-01 DIAGNOSIS — Z87.891 FORMER SMOKER: ICD-10-CM

## 2025-08-01 DIAGNOSIS — C64.1 RENAL CELL CARCINOMA OF RIGHT KIDNEY: ICD-10-CM

## 2025-08-01 LAB — GLUCOSE BLDC GLUCOMTR-MCNC: 103 MG/DL (ref 70–130)

## 2025-08-01 PROCEDURE — 82948 REAGENT STRIP/BLOOD GLUCOSE: CPT

## 2025-08-01 PROCEDURE — 78815 PET IMAGE W/CT SKULL-THIGH: CPT

## 2025-08-01 PROCEDURE — 34310000005 FLUDEOXYGLUCOSE F18 SOLUTION: Performed by: RADIOLOGY

## 2025-08-01 PROCEDURE — A9552 F18 FDG: HCPCS | Performed by: RADIOLOGY

## 2025-08-01 RX ADMIN — FLUDEOXYGLUCOSE F18 1 DOSE: 300 INJECTION INTRAVENOUS at 08:24

## 2025-08-03 PROBLEM — C78.00 MALIGNANT NEOPLASM METASTATIC TO LUNG: Status: ACTIVE | Noted: 2025-08-03

## 2025-08-06 ENCOUNTER — OFFICE VISIT (OUTPATIENT)
Age: 70
End: 2025-08-06
Payer: MEDICARE

## 2025-08-06 ENCOUNTER — HOSPITAL ENCOUNTER (OUTPATIENT)
Dept: RADIATION ONCOLOGY | Facility: HOSPITAL | Age: 70
Discharge: HOME | End: 2025-08-06

## 2025-08-06 VITALS
HEIGHT: 70 IN | DIASTOLIC BLOOD PRESSURE: 87 MMHG | BODY MASS INDEX: 29.06 KG/M2 | WEIGHT: 203 LBS | SYSTOLIC BLOOD PRESSURE: 160 MMHG | HEART RATE: 56 BPM | OXYGEN SATURATION: 98 % | RESPIRATION RATE: 18 BRPM

## 2025-08-06 DIAGNOSIS — Z87.891 FORMER SMOKER: ICD-10-CM

## 2025-08-06 DIAGNOSIS — C78.00 MALIGNANT NEOPLASM METASTATIC TO LUNG, UNSPECIFIED LATERALITY: ICD-10-CM

## 2025-08-06 DIAGNOSIS — C64.1 RENAL CELL CARCINOMA OF RIGHT KIDNEY: Primary | ICD-10-CM

## (undated) DEVICE — PAD LAP CHOLE: Brand: MEDLINE INDUSTRIES, INC.

## (undated) DEVICE — APPL CHLORAPREP HI/LITE 26ML ORNG

## (undated) DEVICE — WOUND RETRACTOR AND PROTECTOR: Brand: ALEXIS O WOUND PROTECTOR-RETRACTOR

## (undated) DEVICE — MASK,OXYGEN,MED CONC,ADLT,7' TUB, UC: Brand: PENDING

## (undated) DEVICE — BANDAGE,GAUZE,BULKEE II,4.5"X4.1YD,STRL: Brand: MEDLINE

## (undated) DEVICE — SUT PROLN 4/0 RB1 D/A 36IN 8557H

## (undated) DEVICE — CVR HNDL LIGHT RIGID

## (undated) DEVICE — SPONGE,LAP,12"X12",XR,ST,5/PK,40PK/CS: Brand: MEDLINE

## (undated) DEVICE — 2, DISPOSABLE SUCTION/IRRIGATOR WITHOUT DISPOSABLE TIP: Brand: STRYKEFLOW

## (undated) DEVICE — SUT SILK 2/0 SUTUPAK TIES 24IN SA75H

## (undated) DEVICE — ELECTRD BLD EZ CLN MOD 6.5IN

## (undated) DEVICE — PAD MINOR UNIVERSAL: Brand: MEDLINE INDUSTRIES, INC.

## (undated) DEVICE — KT NDL GUIDE STRL 18GA

## (undated) DEVICE — ENDOPATH XCEL DILATING TIP TROCARS WITH STABILITY SLEEVES: Brand: ENDOPATH XCEL

## (undated) DEVICE — SUT SILK 0 SUTUPAK TIES 24IN SA76G

## (undated) DEVICE — ENDOPATH XCEL WITH OPTIVIEW TECHNOLOGY DILATING TIP TROCARS WITH STABILITY SLEEVES: Brand: ENDOPATH XCEL OPTIVIEW

## (undated) DEVICE — GLV SURG BIOGEL M LTX PF 7 1/2

## (undated) DEVICE — THE ECHELON FLEX POWERED PLUS ARTICULATING ENDOSCOPIC LINEAR CUTTERS ARE STERILE, SINGLE PATIENT USE INSTRUMENTS THAT SIMULTANEOUSLYCUT AND STAPLE TISSUE. THERE ARE SIX STAGGERED ROWS OF STAPLES, THREE ON EITHER SIDE OF THE CUT LINE. THE ECHELON FLEX 45 POWERED PLUSINSTRUMENTS HAVE A STAPLE LINE THAT IS APPROXIMATELY 45 MM LONG AND A CUT LINE THAT IS APPROXIMATELY 42 MM LONG. THE SHAFT CAN ROTATE FREELYIN BOTH DIRECTIONS AND AN ARTICULATION MECHANISM ENABLES THE DISTAL PORTION OF THE SHAFT TO PIVOT TO FACILITATE LATERAL ACCESS TO THE OPERATIVESITE.THE INSTRUMENTS ARE PACKAGED WITH A PRIMARY LITHIUM BATTERY PACK THAT MUST BE INSTALLED PRIOR TO USE. THERE ARE SPECIFIC REQUIREMENTS FORDISPOSING OF THE BATTERY PACK. REFER TO THE BATTERY PACK DISPOSAL SECTION.THE INSTRUMENTS ARE PACKAGED WITHOUT A RELOAD AND MUST BE LOADED PRIOR TO USE. A STAPLE RETAINING CAP ON THE RELOAD PROTECTS THE STAPLE LEGPOINTS DURING SHIPPING AND TRANSPORTATION. THE INSTRUMENTS’ LOCK-OUT FEATURE IS DESIGNED TO PREVENT A USED OR IMPROPERLY INSTALLED RELOADFROM BEING REFIRED OR AN INSTRUMENT FROM BEING FIRED WITHOUT A RELOAD.: Brand: ECHELON FLEX

## (undated) DEVICE — DRSNG TELFA PAD NONADH STR 1S 3X8IN

## (undated) DEVICE — YANKAUER,BULB TIP WITH VENT: Brand: ARGYLE

## (undated) DEVICE — ANTIBACTERIAL UNDYED BRAIDED (POLYGLACTIN 910), SYNTHETIC ABSORBABLE SUTURE: Brand: COATED VICRYL

## (undated) DEVICE — SUT VIC 0 UR6 27IN VCP603H

## (undated) DEVICE — TOWEL,OR,DSP,ST,BLUE,STD,4/PK,20PK/CS: Brand: MEDLINE

## (undated) DEVICE — 28 FR STRAIGHT – SOFT PVC CATHETER: Brand: PVC THORACIC CATHETERS

## (undated) DEVICE — Device: Brand: DEFENDO AIR/WATER/SUCTION AND BIOPSY VALVE

## (undated) DEVICE — ENDOPOUCH RETRIEVER SPECIMEN RETRIEVAL BAGS: Brand: ENDOPOUCH RETRIEVER

## (undated) DEVICE — PDS II VLT 0 107CM AG ST3: Brand: ENDOLOOP

## (undated) DEVICE — TOTAL TRAY, 16FR 10ML SIL FOLEY, URN: Brand: MEDLINE

## (undated) DEVICE — MONOPOLAR METZENBAUM SCISSOR, MINI BLADE TIP, DISPOSABLE: Brand: MONOPOLAR METZENBAUM SCISSOR, MINI BLADE TIP, DISPOSABLE

## (undated) DEVICE — ENDOPATH XCEL WITH OPTIVIEW TECHNOLOGY UNIVERSAL TROCAR STABILITY SLEEVES: Brand: ENDOPATH XCEL OPTIVIEW

## (undated) DEVICE — SUT SILK 4/0 SUTUPAK TIES 24IN SA73H

## (undated) DEVICE — PK TURNOVER RM ADV

## (undated) DEVICE — KT CLN CLEANOR SCPE

## (undated) DEVICE — SUT SILK 2/0 FS BLK 18IN 685G

## (undated) DEVICE — DRP SURG UTIL W/TPE 2/LAYR 15X26IN DISP

## (undated) DEVICE — OASIS DRAIN, SINGLE, INLINE & ATS COMPATIBLE: Brand: OASIS

## (undated) DEVICE — COVER,MAYO STAND,STERILE: Brand: MEDLINE

## (undated) DEVICE — GLV SURG BIOGEL LTX PF 6 1/2

## (undated) DEVICE — DISSCT SECTO 1PC 1P/U 5MMX35CM STRL

## (undated) DEVICE — SENSR O2 OXIMAX FNGR A/ 18IN NONSTR

## (undated) DEVICE — 3M™ IOBAN™ 2 ANTIMICROBIAL INCISE DRAPE 6651EZ: Brand: IOBAN™ 2

## (undated) DEVICE — TBG SMPL FLTR LINE NASL 02/C02 A/ BX/100

## (undated) DEVICE — THE CHANNEL CLEANING BRUSH IS A NYLON FLEXI BRUSH ATTACHED TO A FLEXIBLE PLASTIC SHEATH DESIGNED TO SAFELY REMOVE DEBRIS FROM FLEXIBLE ENDOSCOPES.

## (undated) DEVICE — KT ANTI FOG W/FLD AND SPNG

## (undated) DEVICE — CONTAINER,SPECIMEN,OR STERILE,4OZ: Brand: MEDLINE

## (undated) DEVICE — UTILITY MARKER W/MED LABELS: Brand: MEDLINE

## (undated) DEVICE — SUT MNCRYL 4/0 PS2 27IN UD MCP426H

## (undated) DEVICE — ENDOPATH PNEUMONEEDLE INSUFFLATION NEEDLES WITH LUER LOCK CONNECTORS 120MM: Brand: ENDOPATH

## (undated) DEVICE — GLV SURG BIOGEL LTX PF 7 1/2

## (undated) DEVICE — CUFF,BP,DISP,1 TUBE,ADULT,HP: Brand: MEDLINE

## (undated) DEVICE — SUT SILK 3/0 SUTUPAK TIES 24IN SA74H

## (undated) DEVICE — SUT VIC 2/0 UR6 27IN VCP602H

## (undated) DEVICE — ADHS SKIN PREMIERPRO EXOFIN TOPICAL HI/VISC .5ML

## (undated) DEVICE — 3M™ STERI-STRIP™ REINFORCED ADHESIVE SKIN CLOSURES, R1547, 1/2 IN X 4 IN (12 MM X 100 MM), 6 STRIPS/ENVELOPE: Brand: 3M™ STERI-STRIP™